# Patient Record
Sex: MALE | Race: WHITE | Employment: OTHER | URBAN - METROPOLITAN AREA
[De-identification: names, ages, dates, MRNs, and addresses within clinical notes are randomized per-mention and may not be internally consistent; named-entity substitution may affect disease eponyms.]

---

## 2017-01-23 DIAGNOSIS — I10 ESSENTIAL HYPERTENSION WITH GOAL BLOOD PRESSURE LESS THAN 140/90: Primary | ICD-10-CM

## 2017-01-23 DIAGNOSIS — E78.5 HYPERLIPIDEMIA LDL GOAL <100: ICD-10-CM

## 2017-01-23 DIAGNOSIS — E11.59 TYPE 2 DIABETES MELLITUS WITH OTHER CIRCULATORY COMPLICATIONS (H): ICD-10-CM

## 2017-01-23 DIAGNOSIS — M17.0 PRIMARY OSTEOARTHRITIS OF BOTH KNEES: ICD-10-CM

## 2017-01-23 NOTE — TELEPHONE ENCOUNTER
amLODIPine (NORVASC) 10 MG tablet      Last Written Prescription Date: 6/7/16  Last Fill Quantity: 90, # refills: 1    Last Office Visit with [facility], [facility] or [facility] prescribing provider:  9/27/16   Future Office Visit:    Next 5 appointments (look out 90 days)     Jan 24, 2017 10:00 AM   SHORT with Keyla Fonseca MD   [facility] ([facility]    [address] [facility] [address]   [phone]                    BP Readings from Last 3 Encounters:   09/27/16 124/77   07/06/16 137/75   06/07/16 118/68

## 2017-01-23 NOTE — TELEPHONE ENCOUNTER
atorvastatin (LIPITOR) 80 MG tablet     Last Written Prescription Date: 1/27/16  Last Fill Quantity: 90, # refills: 3  Last Office Visit with [facility], [facility] or [facility] prescribing provider: 9/27/16   Next 5 appointments (look out 90 days)     Jan 24, 2017 10:00 AM   SHORT with Keyla Fonseca MD   [facility] ([facility]    [address] [address]   [phone]                   CHOL      119   1/7/2016  HDL       57   1/7/2016  LDL       46   1/7/2016  LDL       73   4/24/2012  TRIG       81   1/7/2016  CHOLHDLRATIO      1.9   9/25/2014

## 2017-01-24 ENCOUNTER — OFFICE VISIT (OUTPATIENT)
Dept: FAMILY MEDICINE | Facility: CLINIC | Age: 77
End: 2017-01-24
Payer: MEDICARE

## 2017-01-24 VITALS
TEMPERATURE: 98.8 F | SYSTOLIC BLOOD PRESSURE: 137 MMHG | BODY MASS INDEX: 33.34 KG/M2 | HEIGHT: 68 IN | WEIGHT: 220 LBS | HEART RATE: 59 BPM | DIASTOLIC BLOOD PRESSURE: 57 MMHG

## 2017-01-24 DIAGNOSIS — I10 BENIGN ESSENTIAL HYPERTENSION: ICD-10-CM

## 2017-01-24 DIAGNOSIS — E11.65 TYPE 2 DIABETES MELLITUS WITH HYPERGLYCEMIA, WITHOUT LONG-TERM CURRENT USE OF INSULIN (H): Primary | ICD-10-CM

## 2017-01-24 DIAGNOSIS — E78.5 HYPERLIPIDEMIA LDL GOAL <100: ICD-10-CM

## 2017-01-24 DIAGNOSIS — M50.10 CERVICAL DISC DISORDER WITH RADICULOPATHY: ICD-10-CM

## 2017-01-24 DIAGNOSIS — M54.16 LUMBAR BACK PAIN WITH RADICULOPATHY AFFECTING LEFT LOWER EXTREMITY: ICD-10-CM

## 2017-01-24 DIAGNOSIS — M17.0 PRIMARY OSTEOARTHRITIS OF BOTH KNEES: ICD-10-CM

## 2017-01-24 LAB
ALBUMIN SERPL-MCNC: 4 G/DL (ref 3.4–5)
ALP SERPL-CCNC: 107 U/L (ref 40–150)
ALT SERPL W P-5'-P-CCNC: 20 U/L (ref 0–70)
ANION GAP SERPL CALCULATED.3IONS-SCNC: 9 MMOL/L (ref 3–14)
AST SERPL W P-5'-P-CCNC: 15 U/L (ref 0–45)
BASOPHILS # BLD AUTO: 0 10E9/L (ref 0–0.2)
BASOPHILS NFR BLD AUTO: 0.3 %
BILIRUB SERPL-MCNC: 0.5 MG/DL (ref 0.2–1.3)
BUN SERPL-MCNC: 23 MG/DL (ref 7–30)
CALCIUM SERPL-MCNC: 9.2 MG/DL (ref 8.5–10.1)
CHLORIDE SERPL-SCNC: 103 MMOL/L (ref 94–109)
CO2 SERPL-SCNC: 22 MMOL/L (ref 20–32)
CREAT SERPL-MCNC: 1.42 MG/DL (ref 0.66–1.25)
DIFFERENTIAL METHOD BLD: ABNORMAL
EOSINOPHIL # BLD AUTO: 0.7 10E9/L (ref 0–0.7)
EOSINOPHIL NFR BLD AUTO: 9.1 %
ERYTHROCYTE [DISTWIDTH] IN BLOOD BY AUTOMATED COUNT: 13.7 % (ref 10–15)
GFR SERPL CREATININE-BSD FRML MDRD: 48 ML/MIN/1.7M2
GLUCOSE SERPL-MCNC: 262 MG/DL (ref 70–99)
HBA1C MFR BLD: 7.7 % (ref 4.3–6)
HCT VFR BLD AUTO: 37.7 % (ref 40–53)
HGB BLD-MCNC: 11.9 G/DL (ref 13.3–17.7)
LDLC SERPL DIRECT ASSAY-MCNC: 69 MG/DL
LYMPHOCYTES # BLD AUTO: 1.3 10E9/L (ref 0.8–5.3)
LYMPHOCYTES NFR BLD AUTO: 16.4 %
MCH RBC QN AUTO: 27.1 PG (ref 26.5–33)
MCHC RBC AUTO-ENTMCNC: 31.6 G/DL (ref 31.5–36.5)
MCV RBC AUTO: 86 FL (ref 78–100)
MONOCYTES # BLD AUTO: 0.5 10E9/L (ref 0–1.3)
MONOCYTES NFR BLD AUTO: 6.4 %
NEUTROPHILS # BLD AUTO: 5.4 10E9/L (ref 1.6–8.3)
NEUTROPHILS NFR BLD AUTO: 67.8 %
PLATELET # BLD AUTO: 314 10E9/L (ref 150–450)
POTASSIUM SERPL-SCNC: 4.6 MMOL/L (ref 3.4–5.3)
PROT SERPL-MCNC: 7.9 G/DL (ref 6.8–8.8)
RBC # BLD AUTO: 4.39 10E12/L (ref 4.4–5.9)
SODIUM SERPL-SCNC: 134 MMOL/L (ref 133–144)
WBC # BLD AUTO: 8 10E9/L (ref 4–11)

## 2017-01-24 PROCEDURE — 99214 OFFICE O/P EST MOD 30 MIN: CPT | Performed by: FAMILY MEDICINE

## 2017-01-24 PROCEDURE — 80053 COMPREHEN METABOLIC PANEL: CPT | Performed by: FAMILY MEDICINE

## 2017-01-24 PROCEDURE — 83036 HEMOGLOBIN GLYCOSYLATED A1C: CPT | Performed by: FAMILY MEDICINE

## 2017-01-24 PROCEDURE — 36415 COLL VENOUS BLD VENIPUNCTURE: CPT | Performed by: FAMILY MEDICINE

## 2017-01-24 PROCEDURE — 83721 ASSAY OF BLOOD LIPOPROTEIN: CPT | Performed by: FAMILY MEDICINE

## 2017-01-24 PROCEDURE — 85025 COMPLETE CBC W/AUTO DIFF WBC: CPT | Performed by: FAMILY MEDICINE

## 2017-01-24 RX ORDER — ATORVASTATIN CALCIUM 80 MG/1
80 TABLET, FILM COATED ORAL DAILY
Qty: 90 TABLET | Refills: 3 | Status: SHIPPED | OUTPATIENT
Start: 2017-01-24 | End: 2018-02-01

## 2017-01-24 RX ORDER — OXYCODONE HCL 20 MG/1
20 TABLET, FILM COATED, EXTENDED RELEASE ORAL EVERY 12 HOURS
Qty: 60 TABLET | Refills: 0 | Status: SHIPPED | OUTPATIENT
Start: 2017-01-24 | End: 2017-03-06 | Stop reason: DRUGHIGH

## 2017-01-24 RX ORDER — AMLODIPINE BESYLATE 10 MG/1
10 TABLET ORAL DAILY
Qty: 90 TABLET | Refills: 1 | Status: SHIPPED | OUTPATIENT
Start: 2017-01-24 | End: 2017-07-21

## 2017-01-24 RX ORDER — OXYCODONE HYDROCHLORIDE 5 MG/1
TABLET ORAL
Qty: 15 TABLET | Refills: 0 | Status: SHIPPED | OUTPATIENT
Start: 2017-01-24 | End: 2017-03-06

## 2017-01-24 NOTE — PROGRESS NOTES
SUBJECTIVE:                                                    Vincent Mishra is a 76 year old male who presents to clinic today for the following health issues:    Ear Pain       Duration: pain started 8 days ago     Description (location/character/radiation): pt is concerned about right ear pain     Intensity:  moderate    Accompanying signs and symptoms: Pain in teeth that goes to his ear, he wears false teeth     History (similar episodes/previous evaluation): last time this happened he had a hair on his ear drum     Precipitating or alleviating factors: he is having trouble sleeping     Therapies tried and outcome: oxycodone, tylenol, oral gel for tooth pain        Problem list and histories reviewed & adjusted, as indicated.  Additional history: has severe ear pain and has tooth ache in the lower jaw  Also has the pain in the side of the face   He is also having to be helped out of the bed and he is very weak.   He has gained a lot of weight has been eating lots of sugar last time he checked his blood sugar a week or so ago it was 300 he needs a new glucometer  He has had 15/10 pain in the rigth side of the head. Has been lying around more the oxy doesn't even help with the pain .   His knees hurt and he just walks less and less  Due for bloodwork  He is able to sleep and he is able to get comfortabel enough to read       Patient Active Problem List   Diagnosis     Tension headache     Trapezius strain     Hyperlipidemia LDL goal <100     Parotid mass     Diplopia     Neuropathy (H)     Vision loss night     Neck pain     B12 deficiency     Tear of meniscus of left knee     Hypertension goal BP (blood pressure) < 140/90     C6-7 disc with radiculopathy     Right bundle branch block     Advanced directives, info letter sent 10/4/2011     Chest pain     Anatomic airway obstruction     Abnormal antinuclear antibody titer     Osteoarthritis, knee     Moderate major depression (H)     Orchitis, epididymitis, and  epididymo-orchitis     Malignant neoplasm of kidney excluding renal pelvis (H)     Renal mass, left     Vitamin D deficiency disease     Health Care Home     Insomnia     Chronic low back pain     Abdominal pain, right upper quadrant     Esophageal reflux     Hard of hearing     Constipation     NSTEMI (non-ST elevated myocardial infarction) (H)     Myocardial infarction, nontransmural (H)     Acute myocardial infarction of inferolateral wall (H)     Obesity     Sinoatrial node dysfunction (H)     Right bundle branch block (RBBB)     Essential hypertension     Hyperlipidemia     Type 2 diabetes mellitus with other circulatory complications (H)     Thyroid nodule     Hip pain, bilateral     Claudication (H)     Past Surgical History   Procedure Laterality Date     Arthroscopy knee with medial meniscectomy  6/26/2012     Procedure: ARTHROSCOPY KNEE WITH MEDIAL MENISCECTOMY;  Right Knee Arthroscopy With Medial Menisectomy;  Surgeon: Ley, Jeffrey Duane, MD;  Location: [facility]     Esophagoscopy, gastroscopy, duodenoscopy (egd), combined  10/25/2012     Procedure: COMBINED ESOPHAGOSCOPY, GASTROSCOPY, DUODENOSCOPY (EGD), BIOPSY SINGLE OR MULTIPLE;  Gastroscopy  ;  Surgeon: Lucius Dasilva MD;  Location: [facility]     Colonoscopy       Biopsy       Arthroscopy knee  2/11/2011     ARTHROSCOPY KNEE performed by LEY, JEFFREY DUANE at [facility]     Orthopedic surgery        back surgery     Cardiac surgery  7/2011     stentt placed     Back surgery       back surgery x4     Heart cath, angioplasty  07-25-14     mid LAD      Coronary angiography adult order  07-25-14     RCA, L.Main and CFX  had minor luminal irregularites. Mid LAD high grade stenosis 80-90%.Stent placed to mid LAD       Social History   Substance Use Topics     Smoking status: Never Smoker      Smokeless tobacco: Never Used     Alcohol Use: No     Family History   Problem Relation Age of Onset     CANCER Mother      Depression Mother      DIABETES Father      Hypertension  "Father      HEART DISEASE Father      MENTAL ILLNESS Father      HEART DISEASE Maternal Grandfather      Substance Abuse Maternal Grandfather      Alcohol/Drug Paternal Grandmother      Substance Abuse Paternal Grandmother      HEART DISEASE Paternal Grandfather      Alcohol/Drug Paternal Grandfather      Substance Abuse Paternal Grandfather      HEART DISEASE Brother      DIABETES Brother      Substance Abuse Brother      Obesity Brother      DIABETES Brother      Obesity Sister      Alcohol/Drug Daughter      Depression Daughter      Obesity Sister      DIABETES Sister      Obesity Sister      DIABETES Sister      Alcohol/Drug Sister      CANCER Sister 55     possible kidney cancer     Substance Abuse Sister      Alcohol/Drug Son      Substance Abuse Son      DIABETES Son      Alcohol/Drug Son      Substance Abuse Son      Obesity Daughter      DIABETES Daughter      Hypertension Daughter      Bipolar Disorder Other            ROS:  Constitutional, HEENT, cardiovascular, pulmonary, gi and gu systems are negative, except as otherwise noted.    OBJECTIVE:                                                    /57 mmHg  Pulse 59  Temp(Src) 98.8  F (37.1  C) (Tympanic)  Ht 5' 8\" (1.727 m)  Wt 220 lb (99.791 kg)  BMI 33.46 kg/m2 Body mass index is 33.46 kg/(m^2).   GENERAL APPEARANCE: alert and no distress  HENT: ear canals and TM's normal, oral mucous membranes moist and upper plate no lesions lower jaw with only 4 teeth that appear to be on the end of their use   NECK: no adenopathy, no asymmetry, masses, or scars and thyroid normal to palpation  RESP: lungs clear to auscultation - no rales, rhonchi or wheezes  CV: regular rates and rhythm, normal S1 S2, no S3 or S4 and no murmur, click or rub  ABDOMEN: soft, nontender, without hepatosplenomegaly or masses, bowel sounds normal and obese  ORTHO: Cervical Spine Exam: Inspection: rigid cervical spine  Tender:  occipital nerves, right paracervical " muscles  Non-tender:  spinous processes, left paracervical muscles  Range of Motion:  flexion:  full, extension: decreased, painful, left lateral bending: decreased, painful, right lateral bending: decreased, painful, left lateral rotation:  decreased, painful, right lateral rotation:  decreased, painful  Strength: Full strength of all neck muscles  Knees bilateral crepitance with quad atrophy and visible osteoarthritic changes         PSYCH: mentation appears normal and affect normal/bright  MENTAL STATUS EXAM:  Appearance/Behavior: No apparent distress and Casually groomed  Speech: Normal  Mood/Affect: depressed affect  Insight: Adequate       ASSESSMENT/PLAN:                                                      1. Type 2 diabetes mellitus with hyperglycemia, without long-term current use of insulin (H)  Results for orders placed or performed in visit on 01/24/17   CBC with platelets and differential   Result Value Ref Range    WBC 8.0 4.0 - 11.0 10e9/L    RBC Count 4.39 (L) 4.4 - 5.9 10e12/L    Hemoglobin 11.9 (L) 13.3 - 17.7 g/dL    Hematocrit 37.7 (L) 40.0 - 53.0 %    MCV 86 78 - 100 fl    MCH 27.1 26.5 - 33.0 pg    MCHC 31.6 31.5 - 36.5 g/dL    RDW 13.7 10.0 - 15.0 %    Platelet Count 314 150 - 450 10e9/L    Diff Method Automated Method     % Neutrophils 67.8 %    % Lymphocytes 16.4 %    % Monocytes 6.4 %    % Eosinophils 9.1 %    % Basophils 0.3 %    Absolute Neutrophil 5.4 1.6 - 8.3 10e9/L    Absolute Lymphocytes 1.3 0.8 - 5.3 10e9/L    Absolute Monocytes 0.5 0.0 - 1.3 10e9/L    Absolute Eosinophils 0.7 0.0 - 0.7 10e9/L    Absolute Basophils 0.0 0.0 - 0.2 10e9/L   Hemoglobin A1c   Result Value Ref Range    Hemoglobin A1C 7.7 (H) 4.3 - 6.0 %     Ok control   - blood glucose monitoring (NO BRAND SPECIFIED) meter device kit; Use to test blood sugar 2 times daily or as directed.  Dispense: 1 kit; Refill: 0  - Comprehensive metabolic panel  - CBC with platelets and differential  - Hemoglobin A1c    2. C6-7 disc  with radiculopathy  Pool [facility] at [facility]  - oxyCODONE (OXYCONTIN) 20 MG 12 hr tablet; Take 1 tablet (20 mg) by mouth every 12 hours  Dispense: 60 tablet; Refill: 0  - [facility] PT, HAND, AND CHIROPRACTIC REFERRAL    3. Primary osteoarthritis of both knees  Do the pool therapy   - oxyCODONE (OXYCONTIN) 20 MG 12 hr tablet; Take 1 tablet (20 mg) by mouth every 12 hours  Dispense: 60 tablet; Refill: 0  - [facility] PT, HAND, AND CHIROPRACTIC REFERRAL    4. Lumbar back pain with radiculopathy affecting left lower extremity  Pool therapy and get out of bed more   - oxyCODONE (ROXICODONE) 5 MG IR tablet; Take 1 daily as needed  for severe pain may have 15 per month  Dispense: 15 tablet; Refill: 0  - [facility] PT, HAND, AND CHIROPRACTIC REFERRAL    5. Benign essential hypertension  Good control   - Comprehensive metabolic panel  - CBC with platelets and differential    6. Hyperlipidemia LDL goal <100    - LDL cholesterol direct   scheduled to see urology, and cardiology in may for his follow ups.    reports that he has never smoked. He has never used smokeless tobacco.          Keyla Fonseca M.D.    [facility]

## 2017-01-24 NOTE — MR AVS SNAPSHOT
After Visit Summary   1/24/2017    Vincent Mishra    MRN: [id number]           Patient Information     Date Of Birth          1940        Visit Information        Provider Department      1/24/2017 10:00 AM Keyla Fonseca MD [facility]        Today's Diagnoses     Type 2 diabetes mellitus with hyperglycemia, without long-term current use of insulin (H)    -  1     C6-7 disc with radiculopathy         Primary osteoarthritis of both knees         Lumbar back pain with radiculopathy affecting left lower extremity         Benign essential hypertension         Hyperlipidemia LDL goal <100            Follow-ups after your visit        Additional Services     [facility] HAND, AND CHIR[facility]RACTIC REFERRAL       **This order will print in the [facility] Scheduling Office**    Physical Therapy, Hand Therapy and Chiropractic Care are available through:    [employer] [phone] or      [employer] [phone]    Your provider has referred you to: Physical Therapy at [facility] or [facility]    Indication/Reason for Referral: Knee Pain and Low Back Pain  Onset of Illness: years but he is now deconditioning   Therapy Orders: Per Protocol or Clinical Pathway  Special Programs: aquatic therapy   Special Request: Exercise: Active/Assistive ROM, Conditioning, Home Exercise Program, Posture/Body Mechanics, Progressive Strengthening and Stretching/Flexibility  Modalities: As Indicated:     Brody Skinner      Additional Comments for the Therapist or Chiropractor: this is a request for POOL therapy     Please be aware that coverage of these services is subject to the terms and limitations of your health insurance plan.  Call member services at your health plan with any benefit or coverage questions.      Please bring the following to your appointment:    *Your personal calendar for scheduling future appointments  *Comfortable clothing                  Who to contact     Normal or non-critical lab and imaging results  "will be communicated to you by [employer], letter or phone within 4 business days after the clinic has received the results. If you do not hear from us within 7 days, please contact the clinic through [employer] or phone. If you have a critical or abnormal lab result, we will notify you by phone as soon as possible.  Submit refill requests through [employer] or call your pharmacy and they will forward the refill request to us. Please allow 3 business days for your refill to be completed.          If you need to speak with a  for additional information , please call: [phone]             Additional Information About Your Visit        [employer] Information     [employer] gives you secure access to your electronic health record. If you see a primary care provider, you can also send messages to your care team and make appointments. If you have questions, please call your primary care clinic.  If you do not have a primary care provider, please call [phone] and they will assist you.        Care EveryWhere ID     This is your Care EveryWhere ID. This could be used by other organizations to access your [facility] medical records  [id number]        Your Vitals Were     Pulse Temperature Height BMI (Body Mass Index)          59 98.8  F (37.1  C) (Tympanic) 5' 8\" (1.727 m) 33.46 kg/m2         Blood Pressure from Last 3 Encounters:   01/24/17 137/57   09/27/16 124/77   07/06/16 137/75    Weight from Last 3 Encounters:   01/24/17 220 lb (99.791 kg)   09/27/16 188 lb (85.276 kg)   07/06/16 190 lb (86.183 kg)              We Performed the Following     CBC with platelets and differential     Comprehensive metabolic panel     Hemoglobin A1c     [facility] HAND, AND CHIROPRACTIC REFERRAL     LDL cholesterol direct          Today's Medication Changes          These changes are accurate as of: 1/24/17 11:06 AM.  If you have any questions, ask your nurse or doctor.               These medicines have changed or have updated " prescriptions.        Dose/Directions    * blood glucose monitoring meter device kit   This may have changed:  Another medication with the same name was added. Make sure you understand how and when to take each.   Used for:  Type 2 diabetes, HbA1c goal < 7% (H)        Use to test blood sugars 1  times daily or as directed.   Quantity:  1 kit   Refills:  0       * blood glucose monitoring meter device kit   Commonly known as:  no brand specified   This may have changed:  You were already taking a medication with the same name, and this prescription was added. Make sure you understand how and when to take each.   Used for:  Type 2 diabetes mellitus with hyperglycemia, without long-term current use of insulin (H)        Use to test blood sugar 2 times daily or as directed.   Quantity:  1 kit   Refills:  0       * Notice:  This list has 2 medication(s) that are the same as other medications prescribed for you. Read the directions carefully, and ask your doctor or other care provider to review them with you.         Where to get your medicines      These medications were sent to [id number] [place] [address] [facility] [place] [address] [address] [address]     Phone:  [phone]    - blood glucose monitoring meter device kit      Some of these will need a paper prescription and others can be bought over the counter.  Ask your nurse if you have questions.     Bring a paper prescription for each of these medications    - oxyCODONE 20 MG 12 hr tablet  - oxyCODONE 5 MG IR tablet             Primary Care Provider Office Phone # Fax #    Keyla Fonseca [phone]       [facility] [address] [facility] [address]        Thank you!     Thank you for choosing [facility]  for your care. Our goal is always to provide you with excellent care. Hearing back from our patients is one way we can continue to improve our services.  Please take a few minutes to complete the written survey that you may receive in the mail after your visit with us. Thank you!             Your Updated Medication List - Protect others around you: Learn how to safely use, store and throw away your medicines at www.disposemymeds.org.          This list is accurate as of: 1/24/17 11:06 AM.  Always use your most recent med list.                   Brand Name Dispense Instructions for use    acetaminophen 325 MG tablet    TYLENOL    250 tablet    Take 2 tablets by mouth every 4 hours as needed.       amLODIPine 10 MG tablet    NORVASC    90 tablet    Take 1 tablet (10 mg) by mouth daily       ascorbic acid 500 MG tablet    VITAMIN C     Take 500 mg by mouth daily       aspirin 81 MG tablet      Take by mouth daily       atorvastatin 80 MG tablet    LIPITOR    90 tablet    Take 1 tablet (80 mg) by mouth daily       B-12 1000 MCG Caps          * blood glucose monitoring meter device kit     1 kit    Use to test blood sugars 1  times daily or as directed.       * blood glucose monitoring meter device kit    no brand specified    1 kit    Use to test blood sugar 2 times daily or as directed.       blood glucose monitoring test strip    no brand specified    3 Month    Use to test blood sugar 1  times daily or as directed.       busPIRone 15 MG tablet    BUSPAR    180 tablet    Take 1 tablet (15 mg) by mouth 2 times daily       calcium + D 600-200 MG-UNIT Tabs   Generic drug:  calcium carbonate-vitamin D     100 tablet    Take 2 tablets by mouth daily.       cholecalciferol 1000 UNITS capsule    vitamin  -D     Take 1 capsule by mouth daily       clopidogrel 75 MG tablet    PLAVIX    90 tablet    Take 1 tablet (75 mg) by mouth daily       * gabapentin 100 MG capsule    NEURONTIN    270 capsule    Start with 1 capsule at bedtime then increase as directed up to 3 capsules 3 times per day       * gabapentin 100 MG capsule    NEURONTIN    270 capsule    Take 1 tablet increase by  1 tablet every 2 days to 6 tablets 3 times per day       hydrochlorothiazide 25 MG tablet    HYDRODIURIL    90 tablet    Take 1 tablet (25 mg) by mouth daily       ibuprofen 800 MG tablet    ADVIL/MOTRIN    120 tablet    Take 1 tablet (800 mg) by mouth every 8 hours as needed for moderate pain       LocappyCAN FINEPOINT LANCETS Misc     100 each    Use to test blood sugars 1 times daily or as directed.       lisinopril 40 MG tablet    PRINIVIL/ZESTRIL    90 tablet    Take 1 tablet (40 mg) by mouth daily       metFORMIN 500 MG 24 hr tablet    GLUCOPHAGE-XR    360 tablet    Take 2 tablets (1,000 mg) by mouth 2 times daily (with meals)       mirtazapine 15 MG tablet    REMERON    30 tablet    Take 0.5 tablets (7.5 mg) by mouth At Bedtime       nitroglycerin 0.4 MG sublingual tablet    NITROSTAT    25 tablet    Place 1 tablet (0.4 mg) under the tongue every 5 minutes as needed for chest pain       omeprazole 20 MG CR capsule    priLOSEC    180 capsule    Take 1 capsule (20 mg) by mouth 2 times daily       order for DME     1 Device    Equipment being ordered: Wheelchair motorized for patient with spinal stenosis and neurogenic claudication       * oxyCODONE 20 MG 12 hr tablet    OXYCONTIN    60 tablet    Take 1 tablet (20 mg) by mouth every 12 hours       * oxyCODONE 5 MG IR tablet    ROXICODONE    15 tablet    Take 1 daily as needed  for severe pain may have 15 per month       sertraline 100 MG tablet    ZOLOFT    225 tablet    Take 2 per day       spironolactone 25 MG tablet    ALDACTONE    90 tablet    Take 1 tablet (25 mg) by mouth daily       tiZANidine 2 MG tablet    ZANAFLEX    120 tablet    Take 1-2 tablets as needed up to 3 times per day       traZODone 100 MG tablet    DESYREL    270 tablet    Take 3 tablets (300 mg) by mouth nightly as needed for sleep 300 mg at HS for sleep       * Notice:  This list has 6 medication(s) that are the same as other medications prescribed for you. Read the directions carefully,  and ask your doctor or other care provider to review them with you.

## 2017-01-24 NOTE — NURSING NOTE
"Initial /57 mmHg  Pulse 59  Temp(Src) 98.8  F (37.1  C) (Tympanic)  Ht 5' 8\" (1.727 m)  Wt 220 lb (99.791 kg)  BMI 33.46 kg/m2 Estimated body mass index is 33.46 kg/(m^2) as calculated from the following:    Height as of this encounter: 5' 8\" (1.727 m).    Weight as of this encounter: 220 lb (99.791 kg). .    Ayla Real, EMILY ([facility])  "

## 2017-01-24 NOTE — TELEPHONE ENCOUNTER
ibuprofen (ADVIL/MOTRIN) 800 MG tablet      Last Written Prescription Date: 12/7/16  Last Quantity: 120, # refills: 0  Last Office Visit with [facility], P or [facility] prescribing provider: 1/24/17       CREATININE   Date Value Ref Range Status   01/24/2017 1.42* 0.66 - 1.25 mg/dL Final     AST       15   1/24/2017  ALT       20   1/24/2017  BP Readings from Last 3 Encounters:   01/24/17 137/57   09/27/16 124/77   07/06/16 137/75

## 2017-01-25 ENCOUNTER — TELEPHONE (OUTPATIENT)
Dept: FAMILY MEDICINE | Facility: CLINIC | Age: 77
End: 2017-01-25

## 2017-01-25 DIAGNOSIS — E11.59 TYPE 2 DIABETES MELLITUS WITH OTHER CIRCULATORY COMPLICATIONS (H): Primary | ICD-10-CM

## 2017-01-25 NOTE — TELEPHONE ENCOUNTER
Spouse calling stating that Dr. Fonseca ordered new blood glucose machine but no test strips.  New strips need to be refilled -  One touch ultra test strips.  Please order.  Thank you..Ebony Dickson    One touch ultra test strips      Last Written Prescription Date: 10/9/14  Last Fill Quantity: 3 month,  # refills: 3   Last Office Visit with G, P or [facility] prescribing provider: 1/24/17

## 2017-01-26 RX ORDER — IBUPROFEN 800 MG/1
800 TABLET, FILM COATED ORAL EVERY 8 HOURS PRN
Qty: 120 TABLET | Refills: 0 | Status: SHIPPED | OUTPATIENT
Start: 2017-01-26 | End: 2017-03-10

## 2017-01-26 NOTE — TELEPHONE ENCOUNTER
Medication is being filled for 1 time refill only due to:  Patient needs to be seen because will need 6 week follow up. Kidney function tests were abnormal.   Yvon Dickens RN

## 2017-01-31 ENCOUNTER — OFFICE VISIT (OUTPATIENT)
Dept: FAMILY MEDICINE | Facility: CLINIC | Age: 77
End: 2017-01-31
Payer: MEDICARE

## 2017-01-31 VITALS
WEIGHT: 225.5 LBS | TEMPERATURE: 98.3 F | BODY MASS INDEX: 34.17 KG/M2 | HEART RATE: 56 BPM | DIASTOLIC BLOOD PRESSURE: 85 MMHG | SYSTOLIC BLOOD PRESSURE: 146 MMHG | HEIGHT: 68 IN

## 2017-01-31 DIAGNOSIS — F33.1 MAJOR DEPRESSIVE DISORDER, RECURRENT EPISODE, MODERATE (H): ICD-10-CM

## 2017-01-31 DIAGNOSIS — K12.1 DENTURE SORE MOUTH: ICD-10-CM

## 2017-01-31 DIAGNOSIS — M26.609 TMJ (TEMPOROMANDIBULAR JOINT SYNDROME): Primary | ICD-10-CM

## 2017-01-31 DIAGNOSIS — M27.0 TORUS PALATINUS: ICD-10-CM

## 2017-01-31 PROCEDURE — 99214 OFFICE O/P EST MOD 30 MIN: CPT | Performed by: FAMILY MEDICINE

## 2017-01-31 RX ORDER — TRIAMCINOLONE ACETONIDE 0.1 %
PASTE (GRAM) DENTAL 2 TIMES DAILY
Qty: 5 G | Refills: 0 | Status: SHIPPED | OUTPATIENT
Start: 2017-01-31 | End: 2017-09-20

## 2017-01-31 ASSESSMENT — ANXIETY QUESTIONNAIRES
3. WORRYING TOO MUCH ABOUT DIFFERENT THINGS: SEVERAL DAYS
7. FEELING AFRAID AS IF SOMETHING AWFUL MIGHT HAPPEN: MORE THAN HALF THE DAYS
5. BEING SO RESTLESS THAT IT IS HARD TO SIT STILL: NOT AT ALL
6. BECOMING EASILY ANNOYED OR IRRITABLE: NOT AT ALL
1. FEELING NERVOUS, ANXIOUS, OR ON EDGE: NOT AT ALL
GAD7 TOTAL SCORE: 4
2. NOT BEING ABLE TO STOP OR CONTROL WORRYING: SEVERAL DAYS

## 2017-01-31 ASSESSMENT — PATIENT HEALTH QUESTIONNAIRE - PHQ9: 5. POOR APPETITE OR OVEREATING: NOT AT ALL

## 2017-01-31 NOTE — Clinical Note
My Depression Action Plan  Name: Vincent Mishra   Date of Birth 1940  Date: 1/31/2017    My doctor: Keyla Fonseca   My clinic: [address] [address] [phone]          GREEN    ZONE   Good Control    What it looks like:     Things are going generally well. You have normal up s and down s. You may even feel depressed from time to time, but bad moods usually last less than a day.   What you need to do:  1. Continue to care for yourself (see self care plan)  2. Check your depression survival kit and update it as needed  3. Follow your physician s recommendations including any medication.  4. Do not stop taking medication unless you consult with your physician first.           YELLOW         ZONE Getting Worse    What it looks like:     Depression is starting to interfere with your life.     It may be hard to get out of bed; you may be starting to isolate yourself from others.    Symptoms of depression are starting to last most all day and this has happened for several days.     You may have suicidal thoughts but they are not constant.   What you need to do:     1. Call your care team, your response to treatment will improve if you keep your care team informed of your progress. Yellow periods are signs an adjustment may need to be made.     2. Continue your self-care, even if you have to fake it!    3. Talk to someone in your support network    4. Open up your depression survival kit           RED    ZONE Medical Alert - Get Help    What it looks like:     Depression is seriously interfering with your life.     You may experience these or other symptoms: You can t get out of bed most days, can t work or engage in other necessary activities, you have trouble taking care of basic hygiene, or basic responsibilities, thoughts of suicide or death that will not go away, self-injurious behavior.     What you need to do:  1. Call your care team and request  a same-day appointment. If they are not available (weekends or after hours) call your local crisis line, emergency room or 911.      Electronically signed by: Shoshana Viramontes, January 31, 2017    Depression Self Care Plan / Survival Kit    Self-Care for Depression  Here s the deal. Your body and mind are really not as separate as most people think.  What you do and think affects how you feel and how you feel influences what you do and think. This means if you do things that people who feel good do, it will help you feel better.  Sometimes this is all it takes.  There is also a place for medication and therapy depending on how severe your depression is, so be sure to consult with your medical provider and/ or Behavioral Health Consultant if your symptoms are worsening or not improving.     In order to better manage my stress, I will:    Exercise  Get some form of exercise, every day. This will help reduce pain and release endorphins, the  feel good  chemicals in your brain. This is almost as good as taking antidepressants!  This is not the same as joining a gym and then never going! (they count on that by the way ) It can be as simple as just going for a walk or doing some gardening, anything that will get you moving.      Hygiene   Maintain good hygiene (Get out of bed in the morning, Make your bed, Brush your teeth, Take a shower, and Get dressed like you were going to work, even if you are unemployed).  If your clothes don't fit try to get ones that do.    Diet  I will strive to eat foods that are good for me, drink plenty of water, and avoid excessive sugar, caffeine, alcohol, and other mood-altering substances.  Some foods that are helpful in depression are: complex carbohydrates, B vitamins, flaxseed, fish or fish oil, fresh fruits and vegetables.    Psychotherapy  I agree to participate in Individual Therapy (if recommended).    Medication  If prescribed medications, I agree to take them.  Missing doses can  result in serious side effects.  I understand that drinking alcohol, or other illicit drug use, may cause potential side effects.  I will not stop my medication abruptly without first discussing it with my provider.    Staying Connected With Others  I will stay in touch with my friends, family members, and my primary care provider/team.    Use your imagination  Be creative.  We all have a creative side; it doesn t matter if it s oil painting, sand castles, or mud pies! This will also kick up the endorphins.    Witness Beauty  (AKA stop and smell the roses) Take a look outside, even in mid-winter. Notice colors, textures. Watch the squirrels and birds.     Service to others  Be of service to others.  There is always someone else in need.  By helping others we can  get out of ourselves  and remember the really important things.  This also provides opportunities for practicing all the other parts of the program.    Humor  Laugh and be silly!  Adjust your TV habits for less news and crime-drama and more comedy.    Control your stress  Try breathing deep, massage therapy, biofeedback, and meditation. Find time to relax each day.     My support system    Clinic Contact:  Phone number:    Contact 1:  Phone number:    Contact 2:  Phone number:    [facility]/:  Phone number:    Therapist:  Phone number:    Local crisis center:    Phone number:    Other community support:  Phone number:

## 2017-01-31 NOTE — MR AVS SNAPSHOT
"              After Visit Summary   1/31/2017    Vincent Mishra    MRN: [id number]           Patient Information     Date Of Birth          1940        Visit Information        Provider Department      1/31/2017 10:30 AM Ramya Velez MD [facility]        Today's Diagnoses     TMJ (temporomandibular joint syndrome)    -  1     Denture sore mouth         Major depressive disorder, recurrent episode, moderate (H)            Follow-ups after your visit        Who to contact     Normal or non-critical lab and imaging results will be communicated to you by [email or web address], letter or phone within 4 business days after the clinic has received the results. If you do not hear from us within 7 days, please contact the clinic through [email or web address] or phone. If you have a critical or abnormal lab result, we will notify you by phone as soon as possible.  Submit refill requests through [employer] or call your pharmacy and they will forward the refill request to us. Please allow 3 business days for your refill to be completed.          If you need to speak with a  for additional information , please call: [phone]             Additional Information About Your Visit        MyChart Information     [employer] gives you secure access to your electronic health record. If you see a primary care provider, you can also send messages to your care team and make appointments. If you have questions, please call your primary care clinic.  If you do not have a primary care provider, please call [phone] and they will assist you.        Care EveryWhere ID     This is your Care EveryWhere ID. This could be used by other organizations to access your [employer] medical records  [id number]        Your Vitals Were     Pulse Temperature Height BMI (Body Mass Index)          56 98.3  F (36.8  C) (Tympanic) 5' 8\" (1.727 m) 34.30 kg/m2         Blood Pressure from Last 3 Encounters:   01/31/17 146/85   01/24/17 137/57 "   09/27/16 124/77    Weight from Last 3 Encounters:   01/31/17 225 lb 8 oz (102.286 kg)   01/24/17 220 lb (99.791 kg)   09/27/16 188 lb (85.276 kg)              We Performed the Following     DEPRESSION ACTION PLAN (DAP) Order [[id number]]          Today's Medication Changes          These changes are accurate as of: 1/31/17 11:19 AM.  If you have any questions, ask your nurse or doctor.               Start taking these medicines.        Dose/Directions    triamcinolone 0.1 % paste   Commonly known as:  KENALOG   Used for:  Denture sore mouth   Started by:  Ramya Velez MD        Take by mouth 2 times daily Apply to sore area twice a day for a week   Quantity:  5 g   Refills:  0            Where to get your medicines      These medications were sent to [id number] - [place] [address] AT [address] [facility] [address]     Phone:  [phone]    - triamcinolone 0.1 % paste             Primary Care Provider Office Phone # Fax #    Keyla Fonseca [phone]       [facility] [address] [facility] [address]        Thank you!     Thank you for choosing [facility]  for your care. Our goal is always to provide you with excellent care. Hearing back from our patients is one way we can continue to improve our services. Please take a few minutes to complete the written survey that you may receive in the mail after your visit with us. Thank you!             Your Updated Medication List - Protect others around you: Learn how to safely use, store and throw away your medicines at www.disposemymeds.org.          This list is accurate as of: 1/31/17 11:19 AM.  Always use your most recent med list.                   Brand Name Dispense Instructions for use    acetaminophen 325 MG tablet    TYLENOL    250 tablet    Take 2 tablets by mouth every 4 hours as needed.       amLODIPine 10 MG tablet    NORVASC    90  tablet    Take 1 tablet (10 mg) by mouth daily       ascorbic acid 500 MG tablet    VITAMIN C     Take 500 mg by mouth daily       aspirin 81 MG tablet      Take by mouth daily       atorvastatin 80 MG tablet    LIPITOR    90 tablet    Take 1 tablet (80 mg) by mouth daily       B-12 1000 MCG Caps          blood glucose monitoring meter device kit    no brand specified    1 kit    Use to test blood sugar 2 times daily or as directed.       blood glucose monitoring test strip    ONE TOUCH ULTRA    200 strip    Use to test blood sugars 2 times daily or as directed.       busPIRone 15 MG tablet    BUSPAR    180 tablet    Take 1 tablet (15 mg) by mouth 2 times daily       calcium + D 600-200 MG-UNIT Tabs   Generic drug:  calcium carbonate-vitamin D     100 tablet    Take 2 tablets by mouth daily.       cholecalciferol 1000 UNITS capsule    vitamin  -D     Take 1 capsule by mouth daily       clopidogrel 75 MG tablet    PLAVIX    90 tablet    Take 1 tablet (75 mg) by mouth daily       * gabapentin 100 MG capsule    NEURONTIN    270 capsule    Start with 1 capsule at bedtime then increase as directed up to 3 capsules 3 times per day       * gabapentin 100 MG capsule    NEURONTIN    270 capsule    Take 1 tablet increase by 1 tablet every 2 days to 6 tablets 3 times per day       hydrochlorothiazide 25 MG tablet    HYDRODIURIL    90 tablet    Take 1 tablet (25 mg) by mouth daily       ibuprofen 800 MG tablet    ADVIL/MOTRIN    120 tablet    Take 1 tablet (800 mg) by mouth every 8 hours as needed for moderate pain       [employer] FINEPOINT LANCETS Misc     100 each    Use to test blood sugars 1 times daily or as directed.       lisinopril 40 MG tablet    PRINIVIL/ZESTRIL    90 tablet    Take 1 tablet (40 mg) by mouth daily       metFORMIN 500 MG 24 hr tablet    GLUCOPHAGE-XR    360 tablet    Take 2 tablets (1,000 mg) by mouth 2 times daily (with meals)       mirtazapine 15 MG tablet    REMERON    30 tablet    Take 0.5 tablets  (7.5 mg) by mouth At Bedtime       nitroglycerin 0.4 MG sublingual tablet    NITROSTAT    25 tablet    Place 1 tablet (0.4 mg) under the tongue every 5 minutes as needed for chest pain       omeprazole 20 MG CR capsule    priLOSEC    180 capsule    Take 1 capsule (20 mg) by mouth 2 times daily       order for DME     1 Device    Equipment being ordered: Wheelchair motorized for patient with spinal stenosis and neurogenic claudication       * oxyCODONE 20 MG 12 hr tablet    OXYCONTIN    60 tablet    Take 1 tablet (20 mg) by mouth every 12 hours       * oxyCODONE 5 MG IR tablet    ROXICODONE    15 tablet    Take 1 daily as needed  for severe pain may have 15 per month       sertraline 100 MG tablet    ZOLOFT    225 tablet    Take 2 per day       spironolactone 25 MG tablet    ALDACTONE    90 tablet    Take 1 tablet (25 mg) by mouth daily       tiZANidine 2 MG tablet    ZANAFLEX    120 tablet    Take 1-2 tablets as needed up to 3 times per day       traZODone 100 MG tablet    DESYREL    270 tablet    Take 3 tablets (300 mg) by mouth nightly as needed for sleep 300 mg at HS for sleep       triamcinolone 0.1 % paste    KENALOG    5 g    Take by mouth 2 times daily Apply to sore area twice a day for a week       * Notice:  This list has 4 medication(s) that are the same as other medications prescribed for you. Read the directions carefully, and ask your doctor or other care provider to review them with you.

## 2017-01-31 NOTE — PROGRESS NOTES
"  SUBJECTIVE:                                                    Vincent Mishra is a 76 year old male who presents to clinic today for the following health issues:      Acute Illness-    Acute illness concerns:   Onset: about 2 weeks     Fever: no     Chills/Sweats: no     Headache (location?): Yes    Sinus Pressure:no    Conjunctivitis:  Eye pain     Ear Pain: YES: right    Rhinorrhea: no     Congestion: no     Sore Throat: YES- right side sometimes      Cough: no    Wheeze: no     Decreased Appetite: no     Nausea: no     Vomiting: no     Diarrhea:  YES- off and on     Dysuria/Freq.: no    Fatigue/Achiness: YES- weakness     Sick/Strep Exposure: no     Lump in rough of mouth     Jaw/gum pain    swelling   Therapies Tried and outcome: tylenol and heat     Right ear pain -   Constant pain   No drainage     Right jaw pain - along the side of the jaw    He has only 4 teeth in the front of the mouth  Normally wears a plate but hasn't been wearing it correctly recently  Likes to crunch nuts with front teeth     Lump on roof of mouth - feels a \"crack and a sore\" there - noticed it two days ago     Gums are sore   Dentist says no cavities  Dentist adjusted the plate and advised him to wear the adhesive to keep the plate still     Review of systems:  No f/c   No trouble chewing/talking/swallowing   No loss of appetite   No malaise or fatigue that is more than usual   No n/v       Problem list and histories reviewed & adjusted, as indicated.  Additional history: as documented    Patient Active Problem List   Diagnosis     Tension headache     Trapezius strain     Hyperlipidemia LDL goal <100     Parotid mass     Diplopia     Neuropathy (H)     Vision loss night     Neck pain     B12 deficiency     Tear of meniscus of left knee     Hypertension goal BP (blood pressure) < 140/90     C6-7 disc with radiculopathy     Right bundle branch block     Advanced directives, info letter sent 10/4/2011     Chest pain     Anatomic " airway obstruction     Abnormal antinuclear antibody titer     Osteoarthritis, knee     Moderate major depression (H)     Orchitis, epididymitis, and epididymo-orchitis     Malignant neoplasm of kidney excluding renal pelvis (H)     Renal mass, left     Vitamin D deficiency disease     Health Care Home     Insomnia     Chronic low back pain     Abdominal pain, right upper quadrant     Esophageal reflux     Hard of hearing     Constipation     NSTEMI (non-ST elevated myocardial infarction) (H)     Myocardial infarction, nontransmural (H)     Acute myocardial infarction of inferolateral wall (H)     Obesity     Sinoatrial node dysfunction (H)     Right bundle branch block (RBBB)     Essential hypertension     Hyperlipidemia     Type 2 diabetes mellitus with other circulatory complications (H)     Thyroid nodule     Hip pain, bilateral     Claudication (H)     Current Outpatient Prescriptions   Medication     triamcinolone (KENALOG) 0.1 % paste     ibuprofen (ADVIL/MOTRIN) 800 MG tablet     blood glucose monitoring (ONE TOUCH ULTRA) test strip     amLODIPine (NORVASC) 10 MG tablet     atorvastatin (LIPITOR) 80 MG tablet     blood glucose monitoring (NO BRAND SPECIFIED) meter device kit     oxyCODONE (OXYCONTIN) 20 MG 12 hr tablet     oxyCODONE (ROXICODONE) 5 MG IR tablet     sertraline (ZOLOFT) 100 MG tablet     omeprazole (PRILOSEC) 20 MG CR capsule     busPIRone (BUSPAR) 15 MG tablet     clopidogrel (PLAVIX) 75 MG tablet     lisinopril (PRINIVIL/ZESTRIL) 40 MG tablet     traZODone (DESYREL) 100 MG tablet     gabapentin (NEURONTIN) 100 MG capsule     hydrochlorothiazide (HYDRODIURIL) 25 MG tablet     mirtazapine (REMERON) 15 MG tablet     gabapentin (NEURONTIN) 100 MG capsule     spironolactone (ALDACTONE) 25 MG tablet     metFORMIN (GLUCOPHAGE-XR) 500 MG 24 hr tablet     order for DME     Cyanocobalamin (B-12) 1000 MCG CAPS     tiZANidine (ZANAFLEX) 2 MG tablet     aspirin 81 MG tablet     nitroglycerin (NITROSTAT)  "0.4 MG SL tablet     [employer] FINEPOINT LANCETS MISC     cholecalciferol (VITAMIN  -D) 1000 UNITS capsule     ascorbic acid (VITAMIN C) 500 MG tablet     acetaminophen (TYLENOL) 325 MG tablet     Calcium Carbonate-Vitamin D (CALCIUM + D) 600-200 MG-UNIT per tablet     No current facility-administered medications for this visit.        Allergies   Allergen Reactions     Prozac [Fluoxetine] Other (See Comments)     \"I went crazy.\"       Benadryl [Diphenhydramine Hcl] Other (See Comments)     Starts to shake, and paranoid     Codeine Itching     Diphenhydramine      Other reaction(s): Hallucinations  See [facility] records scanned on 7/16/15     Labetalol Other (See Comments)     See [facility] records scanned on 7/16/15  Bradycardia down to 30 on holter, dizziness. Stopped med and symptoms resolved     Metoprolol Other (See Comments)     Bradycardia even at 12.5 mg     Morphine Visual Disturbance     /85 mmHg  Pulse 56  Temp(Src) 98.3  F (36.8  C) (Tympanic)  Ht 5' 8\" (1.727 m)  Wt 225 lb 8 oz (102.286 kg)  BMI 34.30 kg/m2  GENERAL - Pt is alert and oriented in no acute distress.  Affect is appropriate. Good eye contact. Very [place]   HEET - Head is normocephalic, atraumatic.    PERRLA,EEMI. Conjunctiva are free of icterus or erythema.    TMs bilaterally normal.   TM joint - clicking bilaterally on exam  Jaw - no swelling or redness or tenderness   Oropharynx  - mild erythema on the roof of the mouth and lump on the anterior roof of the mouth,no tonsillar exudate or petechiae.    NECK - Neck is supple w/o LA or thyromegaly  RESPIRATORY - Clear to auscultation bilaterally.  No wheezing noted  CV - RRR, no murmurs, rubs, gallops.       Assessment/Plan -  (M26.719) TMJ (temporomandibular joint syndrome)  (primary encounter diagnosis)  Comment: discussed diagnosis. Likely related to adentia and denture plate. 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 Erythema is likely due to rubbing plate.   Plan: triamcinolone (KENALOG) 0.1 % paste            (F33.1) Major depressive disorder, recurrent episode, moderate (H)  Comment:   Plan: DEPRESSION ACTION PLAN (DAP) Order [[id number]]            ARYA Velez MD

## 2017-01-31 NOTE — NURSING NOTE
"Chief Complaint   Patient presents with     Ear Problem       Initial /85 mmHg  Pulse 56  Temp(Src) 98.3  F (36.8  C) (Tympanic)  Ht 5' 8\" (1.727 m)  Wt 225 lb 8 oz (102.286 kg)  BMI 34.30 kg/m2 Estimated body mass index is 34.3 kg/(m^2) as calculated from the following:    Height as of this encounter: 5' 8\" (1.727 m).    Weight as of this encounter: 225 lb 8 oz (102.286 kg).  BP completed using cuff size: daryl Viramontes LPN    "

## 2017-02-01 ASSESSMENT — ANXIETY QUESTIONNAIRES: GAD7 TOTAL SCORE: 4

## 2017-02-01 ASSESSMENT — PATIENT HEALTH QUESTIONNAIRE - PHQ9: SUM OF ALL RESPONSES TO PHQ QUESTIONS 1-9: 11

## 2017-02-06 ENCOUNTER — TELEPHONE (OUTPATIENT)
Dept: FAMILY MEDICINE | Facility: CLINIC | Age: 77
End: 2017-02-06

## 2017-02-06 DIAGNOSIS — I10 ESSENTIAL HYPERTENSION WITH GOAL BLOOD PRESSURE LESS THAN 140/90: Primary | ICD-10-CM

## 2017-02-06 NOTE — TELEPHONE ENCOUNTER
Spironolactone 25mg      Last Written Prescription Date: 6/7/16  Last Fill Quantity: 90, # refills: 1  Last Office Visit with G, P or [facility] prescribing provider: 1/24/17       POTASSIUM   Date Value Ref Range Status   01/24/2017 4.6 3.4 - 5.3 mmol/L Final     CREATININE   Date Value Ref Range Status   01/24/2017 1.42* 0.66 - 1.25 mg/dL Final     BP Readings from Last 3 Encounters:   01/31/17 146/85   01/24/17 137/57   09/27/16 124/77

## 2017-02-07 RX ORDER — SPIRONOLACTONE 25 MG/1
25 TABLET ORAL DAILY
Qty: 90 TABLET | Refills: 0 | Status: SHIPPED | OUTPATIENT
Start: 2017-02-07 | End: 2017-09-06

## 2017-02-07 NOTE — TELEPHONE ENCOUNTER
Refilled but please remind patient that she wanted to see him 6 weeks from his last visit (1/24/17) which would be the first week in March  Renetta

## 2017-02-07 NOTE — TELEPHONE ENCOUNTER
Routing refill request to provider for review/approval because:  Labs out of range:  See below    Lavern Evangelista RN

## 2017-03-06 ENCOUNTER — OFFICE VISIT (OUTPATIENT)
Dept: FAMILY MEDICINE | Facility: CLINIC | Age: 77
End: 2017-03-06
Payer: MEDICARE

## 2017-03-06 VITALS
TEMPERATURE: 98.2 F | HEIGHT: 68 IN | SYSTOLIC BLOOD PRESSURE: 133 MMHG | HEART RATE: 62 BPM | DIASTOLIC BLOOD PRESSURE: 73 MMHG | BODY MASS INDEX: 34.51 KG/M2 | WEIGHT: 227.7 LBS

## 2017-03-06 DIAGNOSIS — I10 ESSENTIAL HYPERTENSION WITH GOAL BLOOD PRESSURE LESS THAN 140/90: ICD-10-CM

## 2017-03-06 DIAGNOSIS — G89.29 CHRONIC BILATERAL LOW BACK PAIN WITH SCIATICA, SCIATICA LATERALITY UNSPECIFIED: Primary | ICD-10-CM

## 2017-03-06 DIAGNOSIS — M25.552 HIP PAIN, BILATERAL: ICD-10-CM

## 2017-03-06 DIAGNOSIS — M25.551 HIP PAIN, BILATERAL: ICD-10-CM

## 2017-03-06 DIAGNOSIS — M54.16 LUMBAR BACK PAIN WITH RADICULOPATHY AFFECTING LEFT LOWER EXTREMITY: ICD-10-CM

## 2017-03-06 DIAGNOSIS — M54.40 CHRONIC BILATERAL LOW BACK PAIN WITH SCIATICA, SCIATICA LATERALITY UNSPECIFIED: Primary | ICD-10-CM

## 2017-03-06 DIAGNOSIS — E11.59 TYPE 2 DIABETES MELLITUS WITH OTHER CIRCULATORY COMPLICATIONS (H): ICD-10-CM

## 2017-03-06 LAB
ANION GAP SERPL CALCULATED.3IONS-SCNC: 7 MMOL/L (ref 3–14)
BUN SERPL-MCNC: 24 MG/DL (ref 7–30)
CALCIUM SERPL-MCNC: 9.2 MG/DL (ref 8.5–10.1)
CHLORIDE SERPL-SCNC: 101 MMOL/L (ref 94–109)
CO2 SERPL-SCNC: 27 MMOL/L (ref 20–32)
CREAT SERPL-MCNC: 1.32 MG/DL (ref 0.66–1.25)
GFR SERPL CREATININE-BSD FRML MDRD: 53 ML/MIN/1.7M2
GLUCOSE SERPL-MCNC: 228 MG/DL (ref 70–99)
POTASSIUM SERPL-SCNC: 4.4 MMOL/L (ref 3.4–5.3)
SODIUM SERPL-SCNC: 135 MMOL/L (ref 133–144)

## 2017-03-06 PROCEDURE — 80048 BASIC METABOLIC PNL TOTAL CA: CPT | Performed by: FAMILY MEDICINE

## 2017-03-06 PROCEDURE — 99213 OFFICE O/P EST LOW 20 MIN: CPT | Performed by: FAMILY MEDICINE

## 2017-03-06 PROCEDURE — 36415 COLL VENOUS BLD VENIPUNCTURE: CPT | Performed by: FAMILY MEDICINE

## 2017-03-06 RX ORDER — OXYCODONE HYDROCHLORIDE 30 MG/1
30 TABLET, FILM COATED, EXTENDED RELEASE ORAL EVERY 12 HOURS
Qty: 60 TABLET | Refills: 0 | Status: SHIPPED | OUTPATIENT
Start: 2017-04-06 | End: 2017-03-06

## 2017-03-06 RX ORDER — OXYCODONE HYDROCHLORIDE 5 MG/1
TABLET ORAL
Qty: 45 TABLET | Refills: 0 | Status: SHIPPED | OUTPATIENT
Start: 2017-03-06 | End: 2017-04-12

## 2017-03-06 RX ORDER — OXYCODONE HYDROCHLORIDE 30 MG/1
30 TABLET, FILM COATED, EXTENDED RELEASE ORAL EVERY 12 HOURS
Qty: 60 TABLET | Refills: 0 | Status: SHIPPED | OUTPATIENT
Start: 2017-05-06 | End: 2017-04-12

## 2017-03-06 RX ORDER — OXYCODONE HYDROCHLORIDE 30 MG/1
30 TABLET, FILM COATED, EXTENDED RELEASE ORAL EVERY 12 HOURS
Qty: 60 TABLET | Refills: 0 | Status: SHIPPED | OUTPATIENT
Start: 2017-03-06 | End: 2017-03-06

## 2017-03-06 NOTE — PROGRESS NOTES
"SUBJECTIVE:                                                    Vincent Mishra is a 77 year old male who presents to clinic today for the following health issues:    Chief Complaint   Patient presents with     RECHECK     6 week recheck. Doing terrible, staying in bed all day.        Problem list and histories reviewed & adjusted, as indicated.  Additional history: has not been able to take the nighttime dosing of the oxy  Sleeps ok with the trazodone  Rates his pain as a \"15\" today. He did return the book that he had borrowed last time and he would like another one to look at.   Most of the time he is relatively comfortable he does have episodes of severe pain that are associated mostly with min trying to cross his left leg. This is consistent with a tendonitis or possibly a neuralgia . He has not started physical therapy and remains resistant to anything that involves this type of effort. His wife Adenike agrees with me that warm pool therapy would be great for him but he has not been wililng to do this over the last few months and now they are getting ready for the summer when they travel a lot. They are willing to try while they are home the month of May.        Patient Active Problem List   Diagnosis     Tension headache     Trapezius strain     Hyperlipidemia LDL goal <100     Parotid mass     Diplopia     Neuropathy (H)     Vision loss night     Neck pain     B12 deficiency     Tear of meniscus of left knee     Hypertension goal BP (blood pressure) < 140/90     C6-7 disc with radiculopathy     Right bundle branch block     Advanced directives, info letter sent 10/4/2011     Chest pain     Anatomic airway obstruction     Abnormal antinuclear antibody titer     Osteoarthritis, knee     Moderate major depression (H)     Orchitis, epididymitis, and epididymo-orchitis     Malignant neoplasm of kidney excluding renal pelvis (H)     Renal mass, left     Vitamin D deficiency disease     Health Care Home     Insomnia "     Chronic low back pain     Abdominal pain, right upper quadrant     Esophageal reflux     Hard of hearing     Constipation     NSTEMI (non-ST elevated myocardial infarction) (H)     Myocardial infarction, nontransmural (H)     Acute myocardial infarction of inferolateral wall (H)     Obesity     Sinoatrial node dysfunction (H)     Right bundle branch block (RBBB)     Essential hypertension     Hyperlipidemia     Type 2 diabetes mellitus with other circulatory complications (H)     Thyroid nodule     Hip pain, bilateral     Claudication (H)     Past Surgical History   Procedure Laterality Date     Arthroscopy knee with medial meniscectomy  6/26/2012     Procedure: ARTHROSCOPY KNEE WITH MEDIAL MENISCECTOMY;  Right Knee Arthroscopy With Medial Menisectomy;  Surgeon: Ley, Jeffrey Duane, MD;  Location: [facility]     Esophagoscopy, gastroscopy, duodenoscopy (egd), combined  10/25/2012     Procedure: COMBINED ESOPHAGOSCOPY, GASTROSCOPY, DUODENOSCOPY (EGD), BIOPSY SINGLE OR MULTIPLE;  Gastroscopy  ;  Surgeon: Lucius Dasilva MD;  Location: [facility]     Colonoscopy       Biopsy       Arthroscopy knee  2/11/2011     ARTHROSCOPY KNEE performed by LEY, JEFFREY DUANE at [facility]     Orthopedic surgery        back surgery     Cardiac surgery  7/2011     stentt placed     Back surgery       back surgery x4     Heart cath, angioplasty  07-25-14     mid LAD      Coronary angiography adult order  07-25-14     RCA, L.Main and CFX  had minor luminal irregularites. Mid LAD high grade stenosis 80-90%.Stent placed to mid LAD       Social History   Substance Use Topics     Smoking status: Never Smoker     Smokeless tobacco: Never Used     Alcohol use No     Family History   Problem Relation Age of Onset     CANCER Mother      Depression Mother      DIABETES Father      Hypertension Father      HEART DISEASE Father      MENTAL ILLNESS Father      HEART DISEASE Maternal Grandfather      Substance Abuse Maternal Grandfather      Alcohol/Drug Paternal  "Grandmother      Substance Abuse Paternal Grandmother      HEART DISEASE Paternal Grandfather      Alcohol/Drug Paternal Grandfather      Substance Abuse Paternal Grandfather      HEART DISEASE Brother      DIABETES Brother      Substance Abuse Brother      Obesity Brother      DIABETES Brother      Obesity Sister      Alcohol/Drug Daughter      Depression Daughter      Obesity Sister      DIABETES Sister      Obesity Sister      DIABETES Sister      Alcohol/Drug Sister      CANCER Sister 55     possible kidney cancer     Substance Abuse Sister      Alcohol/Drug Son      Substance Abuse Son      DIABETES Son      Alcohol/Drug Son      Substance Abuse Son      Obesity Daughter      DIABETES Daughter      Hypertension Daughter      Bipolar Disorder Other            ROS:  Constitutional, HEENT, cardiovascular, pulmonary, gi and gu systems are negative, except as otherwise noted.    OBJECTIVE:                                                    /73 (BP Location: Right arm, Cuff Size: Adult Large)  Pulse 62  Temp 98.2  F (36.8  C) (Tympanic)  Ht 5' 8\" (1.727 m)  Wt 227 lb 11.2 oz (103.3 kg)  BMI 34.62 kg/m2 Body mass index is 34.62 kg/(m^2).   GENERAL APPEARANCE: healthy, alert and no distress  RESP: lungs clear to auscultation - no rales, rhonchi or wheezes  CV: regular rates and rhythm, normal S1 S2, no S3 or S4 and no murmur, click or rub  MS: decreased range of motion lumbar spine knees, arthritic changes of the hands knees  and decreased muscles mass legs bilaterally with core muscle weakness symmetrical        ASSESSMENT/PLAN:                                                      1. Essential hypertension with goal blood pressure less than 140/90  Well controlled   - Basic metabolic panel    2. Type 2 diabetes mellitus with other circulatory complications (H)  Controlled   - Basic metabolic panel    3. Chronic bilateral low back pain with sciatica, sciatica laterality unspecified  Needs to get into the pool " therapy   - order for DME; Equipment being ordered: TENS  Dispense: 1 Units; Refill: 0  - oxyCODONE (OXYCONTIN) 30 MG 12 hr tablet; Take 1 tablet (30 mg) by mouth every 12 hours  Dispense: 60 tablet; Refill: 0  - [facility] RE[facility]ERRAL    4. Hip pain, bilateral  Will try tens unit for the lower back he will try self adjusting this   - order for DME; Equipment being ordered: TENS  Dispense: 1 Units; Refill: 0  - oxyCODONE (OXYCONTIN) 30 MG 12 hr tablet; Take 1 tablet (30 mg) by mouth every 12 hours  Dispense: 60 tablet; Refill: 0  - [facility] RE[facility]ERRAL    5. Lumbar back pain with radiculopathy affecting left lower extremity    - oxyCODONE (ROXICODONE) 5 MG IR tablet; Take 1 daily as needed  for severe pain may have 15 per month this is a 3 month supply  Dispense: 45 tablet; Refill: 0  - [facility] REFERRAL  .pain pooorly controlled patient needs to be more actively involved in pain control and although hard push to work through the pain    reports that he has never smoked. He has never used smokeless tobacco.      Weight management plan: Discussed healthy diet and exercise guidelines and patient will follow up in 6 months in clinic to re-evaluate.    Keyla Fonseca M.D.  [facility]

## 2017-03-06 NOTE — MR AVS SNAPSHOT
After Visit Summary   3/6/2017    Vincent Mishra    MRN: [id number]           Patient Information     Date Of Birth          1940        Visit Information        Provider Department      3/6/2017 10:30 AM Keyla Fonseca MD [facility]        Today's Diagnoses     Chronic bilateral low back pain with sciatica, sciatica laterality unspecified    -  1    Essential hypertension with goal blood pressure less than 140/90        Type 2 diabetes mellitus with other circulatory complications (H)        Hip pain, bilateral        Lumbar back pain with radiculopathy affecting left lower extremity           Follow-ups after your visit        Additional Services     [facility] REFERRAL       **This order will print in the [facility] Scheduling Office**    Physical Therapy, Hand Therapy and Chiropractic Care are available through:    *[facility]  *[facility]  *[facility]    Call one number to schedule at any of the above locations: [phone].    Your provider has referred you to: Physical Therapy at [facility] or [facility]    Indication/Reason for Referral: Knee Pain and Low Back Pain  Onset of Illness: years and years   Therapy Orders: Evaluate and Treat  Special Programs: None  Special Request: Equipment: I have written for the tens unit  Exercise: Active/Assistive ROM, Conditioning, Home Exercise Program, Posture/Body Mechanics, Progressive Strengthening and Stretching/Flexibility  Modalities: As Indicated:E-Stim/TENS help him use these     Brody Skinner      Additional Comments for the Therapist or Chiropractor:     Please be aware that coverage of these services is subject to the terms and limitations of your health insurance plan.  Call member services at your health plan with any benefit or coverage questions.      Please bring the following to your appointment:    *Your personal calendar for scheduling future  "appointments  *Comfortable clothing                  Who to contact     Normal or non-critical lab and imaging results will be communicated to you by [employer], letter or phone within 4 business days after the clinic has received the results. If you do not hear from us within 7 days, please contact the clinic through [employer] or phone. If you have a critical or abnormal lab result, we will notify you by phone as soon as possible.  Submit refill requests through [employer] or call your pharmacy and they will forward the refill request to us. Please allow 3 business days for your refill to be completed.          If you need to speak with a  for additional information , please call: [phone]             Additional Information About Your Visit        [employer] Information     [employer] gives you secure access to your electronic health record. If you see a primary care provider, you can also send messages to your care team and make appointments. If you have questions, please call your primary care clinic.  If you do not have a primary care provider, please call [phone] and they will assist you.        Care EveryWhere ID     This is your Care EveryWhere ID. This could be used by other organizations to access your [place] medical records  [id number]        Your Vitals Were     Pulse Temperature Height BMI (Body Mass Index)          62 98.2  F (36.8  C) (Tympanic) 5' 8\" (1.727 m) 34.62 kg/m2         Blood Pressure from Last 3 Encounters:   03/06/17 133/73   01/31/17 146/85   01/24/17 137/57    Weight from Last 3 Encounters:   03/06/17 227 lb 11.2 oz (103.3 kg)   01/31/17 225 lb 8 oz (102.3 kg)   01/24/17 220 lb (99.8 kg)              We Performed the Following     Basic metabolic panel     [facility] REFERRAL          Today's Medication Changes          These changes are accurate as of: 3/6/17 11:34 AM.  If you have any questions, ask your nurse or doctor.               These medicines " have changed or have updated prescriptions.        Dose/Directions    gabapentin 100 MG capsule   Commonly known as:  NEURONTIN   This may have changed:  Another medication with the same name was removed. Continue taking this medication, and follow the directions you see here.   Used for:  Failed back surgical syndrome, Type 2 diabetes mellitus with diabetic neuropathy (H)   Changed by:  Keyla Fonseca MD        Take 1 tablet increase by 1 tablet every 2 days to 6 tablets 3 times per day   Quantity:  270 capsule   Refills:  3       * order for DME   This may have changed:  Another medication with the same name was added. Make sure you understand how and when to take each.   Used for:  Spinal stenosis, lumbar region, with neurogenic claudication   Changed by:  Keyla Fonseca MD        Equipment being ordered: Wheelchair motorized for patient with spinal stenosis and neurogenic claudication   Quantity:  1 Device   Refills:  0       * order for DME   This may have changed:  You were already taking a medication with the same name, and this prescription was added. Make sure you understand how and when to take each.   Used for:  Chronic bilateral low back pain with sciatica, sciatica laterality unspecified, Hip pain, bilateral   Changed by:  Keyla Fonseca MD        Equipment being ordered: TENS   Quantity:  1 Units   Refills:  0       * oxyCODONE 5 MG IR tablet   Commonly known as:  ROXICODONE   This may have changed:  additional instructions   Used for:  Lumbar back pain with radiculopathy affecting left lower extremity   Changed by:  Keyla Fonseca MD        Take 1 daily as needed  for severe pain may have 15 per month this is a 3 month supply   Quantity:  45 tablet   Refills:  0       * oxyCODONE 30 MG 12 hr tablet   Commonly known as:  OxyCONTIN   This may have changed:    - medication strength  - how much to take   Used for:  Hip pain, bilateral, Chronic bilateral low back pain with sciatica, sciatica  laterality unspecified   Changed by:  Keyla Fonseca MD        Dose:  30 mg   Start taking on:  5/6/2017   Take 1 tablet (30 mg) by mouth every 12 hours   Quantity:  60 tablet   Refills:  0       * Notice:  This list has 4 medication(s) that are the same as other medications prescribed for you. Read the directions carefully, and ask your doctor or other care provider to review them with you.      Stop taking these medicines if you haven't already. Please contact your care team if you have questions.     mirtazapine 15 MG tablet   Commonly known as:  REMERON   Stopped by:  Keyla Fonseca MD           tiZANidine 2 MG tablet   Commonly known as:  ZANAFLEX   Stopped by:  Keyla Fonseca MD                Where to get your medicines      Some of these will need a paper prescription and others can be bought over the counter.  Ask your nurse if you have questions.     Bring a paper prescription for each of these medications     order for DME    oxyCODONE 30 MG 12 hr tablet    oxyCODONE 5 MG IR tablet                Primary Care Provider Office Phone # Fax #    Keyla Fonseca [phone]       [address] [address]        Thank you!     Thank you for choosing [facility]  for your care. Our goal is always to provide you with excellent care. Hearing back from our patients is one way we can continue to improve our services. Please take a few minutes to complete the written survey that you may receive in the mail after your visit with us. Thank you!             Your Updated Medication List - Protect others around you: Learn how to safely use, store and throw away your medicines at www.disposemymeds.org.          This list is accurate as of: 3/6/17 11:34 AM.  Always use your most recent med list.                   Brand Name Dispense Instructions for use    acetaminophen 325 MG tablet    TYLENOL    250 tablet    Take 2 tablets by mouth every 4 hours as  needed.       amLODIPine 10 MG tablet    NORVASC    90 tablet    Take 1 tablet (10 mg) by mouth daily       ascorbic acid 500 MG tablet    VITAMIN C     Take 500 mg by mouth daily       aspirin 81 MG tablet      Take by mouth daily       atorvastatin 80 MG tablet    LIPITOR    90 tablet    Take 1 tablet (80 mg) by mouth daily       B-12 1000 MCG Caps          blood glucose monitoring meter device kit    no brand specified    1 kit    Use to test blood sugar 2 times daily or as directed.       blood glucose monitoring test strip    ONE TOUCH ULTRA    200 strip    Use to test blood sugars 2 times daily or as directed.       busPIRone 15 MG tablet    BUSPAR    180 tablet    Take 1 tablet (15 mg) by mouth 2 times daily       calcium + D 600-200 MG-UNIT Tabs   Generic drug:  calcium carbonate-vitamin D     100 tablet    Take 2 tablets by mouth daily.       cholecalciferol 1000 UNITS capsule    vitamin  -D     Take 1 capsule by mouth daily       clopidogrel 75 MG tablet    PLAVIX    90 tablet    Take 1 tablet (75 mg) by mouth daily       gabapentin 100 MG capsule    NEURONTIN    270 capsule    Take 1 tablet increase by 1 tablet every 2 days to 6 tablets 3 times per day       hydrochlorothiazide 25 MG tablet    HYDRODIURIL    90 tablet    Take 1 tablet (25 mg) by mouth daily       ibuprofen 800 MG tablet    ADVIL/MOTRIN    120 tablet    Take 1 tablet (800 mg) by mouth every 8 hours as needed for moderate pain       [employer] FINEPOINT LANCETS Misc     100 each    Use to test blood sugars 1 times daily or as directed.       lisinopril 40 MG tablet    PRINIVIL/ZESTRIL    90 tablet    Take 1 tablet (40 mg) by mouth daily       metFORMIN 500 MG 24 hr tablet    GLUCOPHAGE-XR    360 tablet    Take 2 tablets (1,000 mg) by mouth 2 times daily (with meals)       nitroglycerin 0.4 MG sublingual tablet    NITROSTAT    25 tablet    Place 1 tablet (0.4 mg) under the tongue every 5 minutes as needed for chest pain       omeprazole 20 MG  CR capsule    priLOSEC    180 capsule    Take 1 capsule (20 mg) by mouth 2 times daily       * order for DME     1 Device    Equipment being ordered: Wheelchair motorized for patient with spinal stenosis and neurogenic claudication       * order for DME     1 Units    Equipment being ordered: TENS       * oxyCODONE 5 MG IR tablet    ROXICODONE    45 tablet    Take 1 daily as needed  for severe pain may have 15 per month this is a 3 month supply       * oxyCODONE 30 MG 12 hr tablet   Start taking on:  5/6/2017    OxyCONTIN    60 tablet    Take 1 tablet (30 mg) by mouth every 12 hours       sertraline 100 MG tablet    ZOLOFT    225 tablet    Take 2 per day       spironolactone 25 MG tablet    ALDACTONE    90 tablet    Take 1 tablet (25 mg) by mouth daily       traZODone 100 MG tablet    DESYREL    270 tablet    Take 3 tablets (300 mg) by mouth nightly as needed for sleep 300 mg at HS for sleep       triamcinolone 0.1 % paste    KENALOG    5 g    Take by mouth 2 times daily Apply to sore area twice a day for a week       * Notice:  This list has 4 medication(s) that are the same as other medications prescribed for you. Read the directions carefully, and ask your doctor or other care provider to review them with you.

## 2017-03-06 NOTE — NURSING NOTE
"Chief Complaint   Patient presents with     RECHECK     6 week recheck. Doing terrible, staying in bed all day.        Initial /73 (BP Location: Right arm, Cuff Size: Adult Large)  Pulse 62  Temp 98.2  F (36.8  C) (Tympanic)  Ht 5' 8\" (1.727 m)  Wt 227 lb 11.2 oz (103.3 kg)  BMI 34.62 kg/m2 Estimated body mass index is 34.62 kg/(m^2) as calculated from the following:    Height as of this encounter: 5' 8\" (1.727 m).    Weight as of this encounter: 227 lb 11.2 oz (103.3 kg).  Medication Reconciliation: complete     Kassidy Reardon CMA    "

## 2017-03-07 ENCOUNTER — TELEPHONE (OUTPATIENT)
Dept: FAMILY MEDICINE | Facility: CLINIC | Age: 77
End: 2017-03-07

## 2017-03-07 NOTE — TELEPHONE ENCOUNTER
Vincent was seen on 3/6/17 and received an order for TENS unit.  He went to [facility] and they don't have them and referred him to [facility].  They have them, but don't bill.  They told him that he needed to go to a Pain Clinic to received a TENS unit.  They are leaving on Thursday and not sure what they should do.  Please review and advise.  Thank you..Ebony Dickson

## 2017-03-30 DIAGNOSIS — M17.0 PRIMARY OSTEOARTHRITIS OF BOTH KNEES: ICD-10-CM

## 2017-03-30 NOTE — TELEPHONE ENCOUNTER
ibuprofen (ADVIL/MOTRIN) 800 MG tablet      Last Written Prescription Date: 3/15/17  Last Quantity: 90, # refills: 0  Last Office Visit with [facility] or [facility] prescribing provider: 3/6/17       Creatinine   Date Value Ref Range Status   03/06/2017 1.32 (H) 0.66 - 1.25 mg/dL Final     Lab Results   Component Value Date    AST 15 01/24/2017     Lab Results   Component Value Date    ALT 20 01/24/2017     BP Readings from Last 3 Encounters:   03/06/17 133/73   01/31/17 146/85   01/24/17 137/57

## 2017-03-31 DIAGNOSIS — E11.42 PERIPHERAL SENSORY NEUROPATHY DUE TO TYPE 2 DIABETES MELLITUS (H): Primary | ICD-10-CM

## 2017-03-31 DIAGNOSIS — M96.1 FAILED BACK SURGICAL SYNDROME: ICD-10-CM

## 2017-03-31 RX ORDER — IBUPROFEN 800 MG/1
800 TABLET, FILM COATED ORAL EVERY 8 HOURS PRN
Qty: 90 TABLET | Refills: 0 | Status: SHIPPED | OUTPATIENT
Start: 2017-03-31 | End: 2017-10-03

## 2017-03-31 RX ORDER — GABAPENTIN 100 MG/1
CAPSULE ORAL
Qty: 270 CAPSULE | Refills: 3 | Status: SHIPPED | OUTPATIENT
Start: 2017-03-31 | End: 2017-12-11

## 2017-03-31 NOTE — TELEPHONE ENCOUNTER
Gabapentin 400mg      Last Written Prescription Date: 9/27/16  Last Quantity: 270, # refills: 3  [facility] or [facility] prescribing provider: 3/6/17       Creatinine   Date Value Ref Range Status   03/06/2017 1.32 (H) 0.66 - 1.25 mg/dL Final     Lab Results   Component Value Date    AST 15 01/24/2017     Lab Results   Component Value Date    ALT 20 01/24/2017     BP Readings from Last 3 Encounters:   03/06/17 133/73   01/31/17 146/85   01/24/17 137/57

## 2017-03-31 NOTE — TELEPHONE ENCOUNTER
Routing refill request to provider for review/approval because:  Drug not on the [facility] refill protocol     Lavern Evangelista RN

## 2017-04-03 ENCOUNTER — THERAPY VISIT (OUTPATIENT)
Dept: PHYSICAL THERAPY | Facility: CLINIC | Age: 77
End: 2017-04-03
Payer: MEDICARE

## 2017-04-03 DIAGNOSIS — M54.42 BILATERAL LOW BACK PAIN WITH LEFT-SIDED SCIATICA: ICD-10-CM

## 2017-04-03 DIAGNOSIS — M54.41 BILATERAL LOW BACK PAIN WITH RIGHT-SIDED SCIATICA: Primary | ICD-10-CM

## 2017-04-03 PROCEDURE — G8980 MOBILITY D/C STATUS: HCPCS | Mod: GP | Performed by: PHYSICAL THERAPIST

## 2017-04-03 PROCEDURE — G8979 MOBILITY GOAL STATUS: HCPCS | Mod: GP | Performed by: PHYSICAL THERAPIST

## 2017-04-03 PROCEDURE — 97110 THERAPEUTIC EXERCISES: CPT | Mod: GP | Performed by: PHYSICAL THERAPIST

## 2017-04-03 PROCEDURE — G8978 MOBILITY CURRENT STATUS: HCPCS | Mod: GP | Performed by: PHYSICAL THERAPIST

## 2017-04-03 PROCEDURE — 97163 PT EVAL HIGH COMPLEX 45 MIN: CPT | Mod: GP | Performed by: PHYSICAL THERAPIST

## 2017-04-03 NOTE — PROGRESS NOTES
Subjective:    Vincent Mishra is a 77 year old male with a lumbar condition.  Condition occurred with:  Repetition/overuse.  Condition occurred: at work.  This is a chronic condition  Long history of back pain dating back to 1972, had 4 spinal surgeries 1974 fusion, 76 pt fell and dislocated disc, 1981 discectomy, 2001 titanium screw.  Pt noted flare-up of pain as recent as 3/2/17.    Patient reports pain:  Central lumbar spine, lumbar spine left and lumbar spine right.  Radiates to:  Thigh left, lower leg left, gluteals right, gluteals left, thigh right and lower leg right.  Pain is described as aching (sharp pain in groin) Episode frequency: intermittent in groin, near constant pain. and reported as 5/10 and 10/10.  Associated symptoms:  Loss of balance, loss of strength, tingling, numbness, loss of motion and loss of motion/stiffness. Worse during: activity dependent.  Symptoms are exacerbated by walking, standing and lifting (lift 10# or under) and relieved by analgesics and rest.  Since onset symptoms are gradually worsening.  Special tests:  X-ray.  Previous treatment includes physical therapy and surgery.  There was mild and no improvement following previous treatment.  General health as reported by patient is fair and poor.                                              Objective:    Standing Alignment:    Cervical/Thoracic:  Forward head  Shoulder/UE:  Rounded shoulders  Lumbar:  Lordosis decr            Gait:  Pt states legs give way on him with prolonged ambulation  Gait Type:  Antalgic   Weight Bearing Status:  WBAT   Assistive Devices:  Crutches and walker  Deviations:  Lumbar:  Trunk flexionKnee:  Knee extension decr L and knee extension decr RAnkle:  Heel strike decr L, heel strike decr R, push off decr R and push off decr L    Flexibility/Screens:   Positive screens:  Hip; Lumbar and Knee                   Lumbar/SI Evaluation  ROM:    AROM Lumbar:   Flexion:          Mid shin  Ext:                     Unable to get to neutral   Side Bend:        Left:  35%pain    Right:  35%pain  Rotation:           Left:     Right:   Side Glide:        Left:     Right:           Lumbar Myotomes:    T12-L3 (Hip Flex):  Left: 4+    Right: 4+  L2-4 (Quads):  Left:  5    Right:  5  L4 (Ankle DF):  Left:  5    Right:  5  L5 (Great Toe Ext): Left: 5-    Right: 5-   S1 (Toe Raise):  Left: 3+    Right: 3+  Lumbar DTR's:  Lumbar dtr's: unable to ellicit bilaterally.        Lumbar Dermtomes:              L5 Right:  Hypo-light touch    Neural Tension/Mobility:    Left side:  SLR w/DF positive.  Left side:SLR  negative.     Right side:   SLR w/DF or SLR  negative.       Lumbar Provocation:    Left positive with:  PROM hip  Right positive with: PROM hip  Spinal Segmental Conclusions: NA lumbar fusion                                                       General     ROS    Assessment/Plan:      Patient is a 77 year old male with lumbar complaints.    Patient has the following significant findings with corresponding treatment plan.                Diagnosis 1:  Low back pain  Pain -  self management  Decreased ROM/flexibility - manual therapy and therapeutic exercise  Decreased strength - therapeutic exercise and therapeutic activities  Impaired balance - neuro re-education and therapeutic activities  Decreased proprioception - neuro re-education and therapeutic activities  Impaired gait - gait training  Decreased function - therapeutic activities and home program  Impaired posture - neuro re-education    Therapy Evaluation Codes:   1) History comprised of:   Personal factors that impact the plan of care:      Age, Overall behavior pattern, Past/current experiences, Time since onset of symptoms and Work status.    Comorbidity factors that impact the plan of care are:      Cancer, Chemical Dependency, Diabetes, Depression, Dizziness, Heart problems, High blood pressure, Numbness/tingling and Overweight.     Medications impacting care:  Anti-depressant, High blood pressure and Pain.  2) Examination of Body Systems comprised of:   Body structures and functions that impact the plan of care:      Hip and Lumbar spine.   Activity limitations that impact the plan of care are:      Bathing, Dressing, Lifting, Squatting/kneeling, Stairs, Standing and Walking.  3) Clinical presentation characteristics are:   Unstable/Unpredictable.  4) Decision-Making    High complexity using standardized patient assessment instrument and/or measureable assessment of functional outcome.  Cumulative Therapy Evaluation is: High complexity.    Previous and current functional limitations:  (See Goal Flow Sheet for this information)    Short term and Long term goals: (See Goal Flow Sheet for this information)     Communication ability:  Patient appears to be able to clearly communicate and understand verbal and written communication and follow directions correctly.  Treatment Explanation - The following has been discussed with the patient:   RX ordered/plan of care  Anticipated outcomes  Possible risks and side effects  This patient would benefit from PT intervention to resume normal activities.   Rehab potential is good.    Frequency:  1 X week, once daily  Duration:  for 6 weeks  Discharge Plan:  Achieve all LTG.  Independent in home treatment program.  Reach maximal therapeutic benefit.    Please refer to the daily flowsheet for treatment today, total treatment time and time spent performing 1:1 timed codes.

## 2017-04-03 NOTE — LETTER
DEPARTMENT OF HEALTH AND HUMAN SERVICES  [facility] MEDICA[facility] SERVICES    PLAN/UPDATED PLAN OF PROGRESS FOR OUTPATIENT REHABILITATION    PATIENTS NAME:  Vincent Mishra     : 1940    PROVIDER NUMBER:    [id number]    [id number]     PROVIDER NAME: [facility]    MEDICAL RECORD NUMBER: [id number]     START OF CARE DATE:  SOC Date: 17   TYPE:  PT    PRIMARY/TREATMENT DIAGNOSIS: (Pertinent Medical Diagnosis)     Bilateral low back pain with right-sided sciatica  Bilateral low back pain with left-sided sciatica    VISITS FROM START OF CARE:  Rxs Used: 1     Subjective:    Vincent Mishra is a 77 year old male with a lumbar condition.  Condition occurred with:  Repetition/overuse.  Condition occurred: at work.  This is a chronic condition  Long history of back pain dating back to , had 4 spinal surgeries  fusion,  pt fell and dislocated disc,  discectomy,  titanium screw.  Pt noted flare-up of pain as recent as 3/2/17.    Patient reports pain:  Central lumbar spine, lumbar spine left and lumbar spine right.  Radiates to:  Thigh left, lower leg left, gluteals right, gluteals left, thigh right and lower leg right.  Pain is described as aching (sharp pain in groin) Episode frequency: intermittent in groin, near constant pain. and reported as 5/10 and 10/10.  Associated symptoms:  Loss of balance, loss of strength, tingling, numbness, loss of motion and loss of motion/stiffness. Worse during: activity dependent.  Symptoms are exacerbated by walking, standing and lifting (lift 10# or under) and relieved by analgesics and rest.  Since onset symptoms are gradually worsening.  Special tests:  X-ray.  Previous treatment includes physical therapy and surgery.  There was mild and no improvement following previous treatment.  General health as reported by patient is fair and poor.                          Objective:    Standing Alignment:    Cervical/Thoracic:   Forward head  Shoulder/UE:  Rounded shoulders  Lumbar:  Lordosis decr      PATIENTS NAME:  Vincent Mishra     : 1940        Gait:  Pt states legs give way on him with prolonged ambulation  Gait Type:  Antalgic   Weight Bearing Status:  WBAT   Assistive Devices:  Crutches and walker  Deviations:  Lumbar:  Trunk flexionKnee:  Knee extension decr L and knee extension decr RAnkle:  Heel strike decr L, heel strike decr R, push off decr R and push off decr L  Flexibility/Screens:   Positive screens:  Hip; Lumbar and Knee       Lumbar/SI Evaluation  ROM:    AROM Lumbar:   Flexion:          Mid shin  Ext:                    Unable to get to neutral   Side Bend:        Left:  35%pain    Right:  35%pain  Rotation:           Left:     Right:   Side Glide:        Left:     Right:         Lumbar Myotomes:    T12-L3 (Hip Flex):  Left: 4+    Right: 4+  L2-4 (Quads):  Left:  5    Right:  5  L4 (Ankle DF):  Left:  5    Right:  5  L5 (Great Toe Ext): Left: 5-    Right: 5-   S1 (Toe Raise):  Left: 3+    Right: 3+  Lumbar DTR's:  Lumbar dtr's: unable to ellicit bilaterally.  Lumbar Dermtomes:    L5 Right:  Hypo-light touch    Neural Tension/Mobility:    Left side:  SLR w/DF positive.  Left side:SLR  negative.   Right side:   SLR w/DF or SLR  negative.     Lumbar Provocation:    Left positive with:  PROM hip  Right positive with: PROM hip  Spinal Segmental Conclusions: NA lumbar fusion  Assessment/Plan:      Patient is a 77 year old male with lumbar complaints.    Patient has the following significant findings with corresponding treatment plan.                Diagnosis 1:  Low back pain  Pain -  self management  Decreased ROM/flexibility - manual therapy and therapeutic exercise  Decreased strength - therapeutic exercise and therapeutic activities  Impaired balance - neuro re-education and therapeutic activities  PATIENTS NAME:  Vincent Mishra     : 1940      Decreased proprioception - neuro re-education and therapeutic  activities  Impaired gait - gait training  Decreased function - therapeutic activities and home program  Impaired posture - neuro re-education    Therapy Evaluation Codes:   1) History comprised of:   Personal factors that impact the plan of care:      Age, Overall behavior pattern, Past/current experiences, Time since onset of symptoms and Work status.    Comorbidity factors that impact the plan of care are:      Cancer, Chemical Dependency, Diabetes, Depression, Dizziness, Heart problems, High blood pressure, Numbness/tingling and Overweight.     Medications impacting care: Anti-depressant, High blood pressure and Pain.  2) Examination of Body Systems comprised of:   Body structures and functions that impact the plan of care:      Hip and Lumbar spine.   Activity limitations that impact the plan of care are:      Bathing, Dressing, Lifting, Squatting/kneeling, Stairs, Standing and Walking.  3) Clinical presentation characteristics are:   Unstable/Unpredictable.  4) Decision-Making    High complexity using standardized patient assessment instrument and/or measureable assessment of functional outcome.  Cumulative Therapy Evaluation is: High complexity.    Previous and current functional limitations:  (See Goal Flow Sheet for this information)    Short term and Long term goals: (See Goal Flow Sheet for this information)     Communication ability:  Patient appears to be able to clearly communicate and understand verbal and written communication and follow directions correctly.  Treatment Explanation - The following has been discussed with the patient:   RX ordered/plan of care  Anticipated outcomes  Possible risks and side effects  This patient would benefit from PT intervention to resume normal activities.   Rehab potential is good.                  PATIENTS NAME:  Vincent Mishra     : 1940        Frequency:  1 X week, once daily  Duration:  for 6 weeks  Discharge Plan:  Achieve all LTG.  Independent in home  "treatment program.  Reach maximal therapeutic benefit.         Caregiver Signature/Credentials _____________________________ Date ________       Treating Provider: justo ascencio PT   I have reviewed and certified the need for these services and plan of treatment while under my care.        PHYSICIAN'S SIGNATURE:   ______________________________________ Date___________     Keyla Fonseca    Certification period:  Beginning of Cert date period: 04/03/17 to  End of Cert period date: 07/02/17     Functional Level Progress Report: Please see attached \"Goal Flow sheet for Functional level.\"    ____X____ Continue Services or       ________ DC Services                Service dates: From  SOC Date: 04/03/17 date to present                         "

## 2017-04-03 NOTE — MR AVS SNAPSHOT
After Visit Summary   4/3/2017    Vincent Mishra    MRN: [id number]           Patient Information     Date Of Birth          1940        Visit Information        Provider Department      4/3/2017 9:30 AM Ruby Almanza, PT [facility]        Today's Diagnoses     Bilateral low back pain with right-sided sciatica    -  1    Bilateral low back pain with left-sided sciatica           Follow-ups after your visit        Your next 10 appointments already scheduled     Apr 12, 2017 11:20 AM CDT   [facility] Spine with Rosanne Stewart [facility]    [address] [facility] [address] [phone]            Apr 19, 2017 11:20 AM CDT   [facility] Spine with Rosanne Stewart [facility] (\[facility]    [address] [facility] [address] [phone]            Apr 26, 2017 11:20 AM CDT   [facility] Spine with Rosanne Stewart [facility] (\[facility]    [address] [facility] [address] [phone]            May 10, 2017 11:20 AM CDT   [facility] Spine with Rosanne Stewart [facility] (\[facility]    [id number] [place] [id number] [phone]              Who to contact     If you have questions or need follow up information about today's clinic visit or your schedule please contact [facility] directly at [phone].  Normal or non-critical lab and imaging results will be communicated to you by MyChart, letter or phone within 4 business days after the clinic has received the results. If you do not hear from us within 7 days, please contact the clinic through [employer] or phone. If you have a critical or abnormal lab result, we will notify you by phone as soon as possible.  Submit refill requests through [facility]Base or call your pharmacy and they will forward the refill request to us. Please allow 3 business days for your refill to be completed.           Additional Information About Your Visit        MyChart Information     [facility] gives you secure access to your electronic health record. If you see a primary care provider, you can also send messages to your care team and make appointments. If you have questions, please call your primary care clinic.  If you do not have a primary care provider, please call [phone] and they will assist you.        Care EveryWhere ID     This is your Care EveryWhere ID. This could be used by other organizations to access your [facility] medical records  [id number]         Blood Pressure from Last 3 Encounters:   03/06/17 133/73   01/31/17 146/85   01/24/17 137/57    Weight from Last 3 Encounters:   03/06/17 103.3 kg (227 lb 11.2 oz)   01/31/17 102.3 kg (225 lb 8 oz)   01/24/17 99.8 kg (220 lb)              We Performed the Following     HC PT EVAL, HIGH COMPLEXITY     WYATT CERT REPORT     WYATT INITIAL EVAL REPORT     THERAPEUTIC EXERCISES        Primary Care Provider Office Phone # Fax #    Keyla Fonseca [phone]       [facility] [address] [facility] [address]        Thank you!     Thank you for choosing [facility]  for your care. Our goal is always to provide you with excellent care. Hearing back from our patients is one way we can continue to improve our services. Please take a few minutes to complete the written survey that you may receive in the mail after your visit with us. Thank you!             Your Updated Medication List - Protect others around you: Learn how to safely use, store and throw away your medicines at www.disposemymeds.org.          This list is accurate as of: 4/3/17 11:51 AM.  Always use your most recent med list.                   Brand Name Dispense Instructions for use    acetaminophen 325 MG tablet    TYLENOL    250 tablet    Take 2 tablets by mouth every 4 hours as needed.       amLODIPine 10 MG tablet    NORVASC    90 tablet    Take 1 tablet  (10 mg) by mouth daily       ascorbic acid 500 MG tablet    VITAMIN C     Take 500 mg by mouth daily       aspirin 81 MG tablet      Take by mouth daily       atorvastatin 80 MG tablet    LIPITOR    90 tablet    Take 1 tablet (80 mg) by mouth daily       B-12 1000 MCG Caps          blood glucose monitoring meter device kit    no brand specified    1 kit    Use to test blood sugar 2 times daily or as directed.       blood glucose monitoring test strip    ONE TOUCH ULTRA    200 strip    Use to test blood sugars 2 times daily or as directed.       busPIRone 15 MG tablet    BUSPAR    180 tablet    Take 1 tablet (15 mg) by mouth 2 times daily       calcium + D 600-200 MG-UNIT Tabs   Generic drug:  calcium carbonate-vitamin D     100 tablet    Take 2 tablets by mouth daily.       cholecalciferol 1000 UNITS capsule    vitamin  -D     Take 1 capsule by mouth daily       clopidogrel 75 MG tablet    PLAVIX    90 tablet    Take 1 tablet (75 mg) by mouth daily       gabapentin 100 MG capsule    NEURONTIN    270 capsule    Take 1 tablet increase by 1 tablet every 2 days to 6 tablets 3 times per day       hydrochlorothiazide 25 MG tablet    HYDRODIURIL    90 tablet    Take 1 tablet (25 mg) by mouth daily       ibuprofen 800 MG tablet    ADVIL/MOTRIN    90 tablet    Take 1 tablet (800 mg) by mouth every 8 hours as needed for moderate pain       Shortlist FINEPOINT LANCETS Misc     100 each    Use to test blood sugars 1 times daily or as directed.       lisinopril 40 MG tablet    PRINIVIL/ZESTRIL    90 tablet    Take 1 tablet (40 mg) by mouth daily       metFORMIN 500 MG 24 hr tablet    GLUCOPHAGE-XR    360 tablet    Take 2 tablets (1,000 mg) by mouth 2 times daily (with meals)       nitroglycerin 0.4 MG sublingual tablet    NITROSTAT    25 tablet    Place 1 tablet (0.4 mg) under the tongue every 5 minutes as needed for chest pain       omeprazole 20 MG CR capsule    priLOSEC    180 capsule    Take 1 capsule (20 mg) by mouth 2 times  daily       * order for DME     1 Device    Equipment being ordered: Wheelchair motorized for patient with spinal stenosis and neurogenic claudication       * order for DME     1 Units    Equipment being ordered: TENS       * oxyCODONE 5 MG IR tablet    ROXICODONE    45 tablet    Take 1 daily as needed  for severe pain may have 15 per month this is a 3 month supply       * oxyCODONE 30 MG 12 hr tablet   Start taking on:  5/6/2017    OxyCONTIN    60 tablet    Take 1 tablet (30 mg) by mouth every 12 hours       sertraline 100 MG tablet    ZOLOFT    225 tablet    Take 2 per day       spironolactone 25 MG tablet    ALDACTONE    90 tablet    Take 1 tablet (25 mg) by mouth daily       traZODone 100 MG tablet    DESYREL    270 tablet    Take 3 tablets (300 mg) by mouth nightly as needed for sleep 300 mg at HS for sleep       triamcinolone 0.1 % paste    KENALOG    5 g    Take by mouth 2 times daily Apply to sore area twice a day for a week       * Notice:  This list has 4 medication(s) that are the same as other medications prescribed for you. Read the directions carefully, and ask your doctor or other care provider to review them with you.

## 2017-04-12 ENCOUNTER — THERAPY VISIT (OUTPATIENT)
Dept: PHYSICAL THERAPY | Facility: CLINIC | Age: 77
End: 2017-04-12
Payer: MEDICARE

## 2017-04-12 DIAGNOSIS — M54.42 ACUTE BILATERAL LOW BACK PAIN WITH LEFT-SIDED SCIATICA: ICD-10-CM

## 2017-04-12 DIAGNOSIS — M54.16 LUMBAR BACK PAIN WITH RADICULOPATHY AFFECTING LEFT LOWER EXTREMITY: ICD-10-CM

## 2017-04-12 DIAGNOSIS — M54.41 ACUTE BILATERAL LOW BACK PAIN WITH RIGHT-SIDED SCIATICA: ICD-10-CM

## 2017-04-12 DIAGNOSIS — M54.40 CHRONIC BILATERAL LOW BACK PAIN WITH SCIATICA, SCIATICA LATERALITY UNSPECIFIED: ICD-10-CM

## 2017-04-12 DIAGNOSIS — M25.551 HIP PAIN, BILATERAL: ICD-10-CM

## 2017-04-12 DIAGNOSIS — G89.29 CHRONIC BILATERAL LOW BACK PAIN WITH SCIATICA, SCIATICA LATERALITY UNSPECIFIED: ICD-10-CM

## 2017-04-12 DIAGNOSIS — M25.552 HIP PAIN, BILATERAL: ICD-10-CM

## 2017-04-12 PROCEDURE — 97110 THERAPEUTIC EXERCISES: CPT | Mod: GP | Performed by: PHYSICAL THERAPIST

## 2017-04-12 RX ORDER — OXYCODONE HYDROCHLORIDE 30 MG/1
30 TABLET, FILM COATED, EXTENDED RELEASE ORAL EVERY 12 HOURS
Qty: 60 TABLET | Refills: 0 | Status: SHIPPED | OUTPATIENT
Start: 2017-05-06 | End: 2017-04-12

## 2017-04-12 RX ORDER — OXYCODONE HYDROCHLORIDE 30 MG/1
30 TABLET, FILM COATED, EXTENDED RELEASE ORAL EVERY 12 HOURS
Qty: 60 TABLET | Refills: 0 | Status: SHIPPED | OUTPATIENT
Start: 2017-05-12 | End: 2017-09-15

## 2017-04-12 RX ORDER — OXYCODONE HYDROCHLORIDE 5 MG/1
TABLET ORAL
Qty: 45 TABLET | Refills: 0 | Status: SHIPPED | OUTPATIENT
Start: 2017-04-12 | End: 2017-09-15

## 2017-04-12 RX ORDER — OXYCODONE HYDROCHLORIDE 30 MG/1
30 TABLET, FILM COATED, EXTENDED RELEASE ORAL EVERY 12 HOURS
Qty: 60 TABLET | Refills: 0 | Status: SHIPPED | OUTPATIENT
Start: 2017-04-12 | End: 2017-04-12

## 2017-04-19 ENCOUNTER — THERAPY VISIT (OUTPATIENT)
Dept: PHYSICAL THERAPY | Facility: CLINIC | Age: 77
End: 2017-04-19
Payer: MEDICARE

## 2017-04-19 DIAGNOSIS — M54.42 ACUTE BILATERAL LOW BACK PAIN WITH LEFT-SIDED SCIATICA: ICD-10-CM

## 2017-04-19 DIAGNOSIS — M54.41 ACUTE BILATERAL LOW BACK PAIN WITH RIGHT-SIDED SCIATICA: ICD-10-CM

## 2017-04-19 PROCEDURE — 97110 THERAPEUTIC EXERCISES: CPT | Mod: GP | Performed by: PHYSICAL THERAPIST

## 2017-04-26 DIAGNOSIS — I45.10 RIGHT BUNDLE BRANCH BLOCK: ICD-10-CM

## 2017-04-26 DIAGNOSIS — I21.4 NSTEMI (NON-ST ELEVATED MYOCARDIAL INFARCTION) (H): ICD-10-CM

## 2017-04-26 DIAGNOSIS — I25.118 CORONARY ARTERY DISEASE INVOLVING NATIVE CORONARY ARTERY WITH OTHER FORMS OF ANGINA PECTORIS: ICD-10-CM

## 2017-04-26 RX ORDER — CLOPIDOGREL BISULFATE 75 MG/1
TABLET ORAL
Qty: 90 TABLET | Refills: 3 | Status: SHIPPED | OUTPATIENT
Start: 2017-04-26 | End: 2018-04-30

## 2017-04-26 NOTE — TELEPHONE ENCOUNTER
CLOPIDOGREL 75MG TABLETS             Last Written Prescription Date: 12/20/16  Last Fill Quantity: 90, # refills: 0    Last Office Visit with [facility] or [facility] prescribing provider:  3/6/17   Future Office Visit:       Lab Results   Component Value Date    WBC 8.0 01/24/2017     Lab Results   Component Value Date    RBC 4.39 01/24/2017     Lab Results   Component Value Date    HGB 11.9 01/24/2017     Lab Results   Component Value Date    HCT 37.7 01/24/2017     No components found for: MCT  Lab Results   Component Value Date    MCV 86 01/24/2017     Lab Results   Component Value Date    MCH 27.1 01/24/2017     Lab Results   Component Value Date    MCHC 31.6 01/24/2017     Lab Results   Component Value Date    RDW 13.7 01/24/2017     Lab Results   Component Value Date     01/24/2017     Lab Results   Component Value Date    AST 15 01/24/2017     Lab Results   Component Value Date    ALT 20 01/24/2017     Creatinine   Date Value Ref Range Status   03/06/2017 1.32 (H) 0.66 - 1.25 mg/dL Final   ]

## 2017-04-26 NOTE — TELEPHONE ENCOUNTER
Prescription approved per [facility] Refill Protocol.  Yvon Dickens RN

## 2017-04-27 DIAGNOSIS — M17.0 PRIMARY OSTEOARTHRITIS OF BOTH KNEES: ICD-10-CM

## 2017-04-28 RX ORDER — IBUPROFEN 800 MG/1
TABLET, FILM COATED ORAL
Qty: 120 TABLET | Refills: 1 | Status: SHIPPED | OUTPATIENT
Start: 2017-04-28 | End: 2017-12-11

## 2017-04-28 NOTE — TELEPHONE ENCOUNTER
Dr. Fonseca,   Drug-drug interaction warning with advil and sertraline, ok to proceed with refill? Please advise.  Aamir

## 2017-04-28 NOTE — TELEPHONE ENCOUNTER
ibuprofen (ADVIL/MOTRIN) 800 MG tablet      Last Written Prescription Date: 3/31/17  Last Quantity: 90, # refills: 0  [facility] Office Visit with [facility] or [facility] prescribing provider: 3/6/17       Creatinine   Date Value Ref Range Status   03/06/2017 1.32 (H) 0.66 - 1.25 mg/dL Final     Lab Results   Component Value Date    AST 15 01/24/2017     Lab Results   Component Value Date    ALT 20 01/24/2017     BP Readings from Last 3 Encounters:   03/06/17 133/73   01/31/17 146/85   01/24/17 137/57

## 2017-05-09 DIAGNOSIS — C64.9: Primary | ICD-10-CM

## 2017-05-09 DIAGNOSIS — C64.9 ADENOCARCINOMA, RENAL CELL (H): Primary | ICD-10-CM

## 2017-05-10 ENCOUNTER — PRE VISIT (OUTPATIENT)
Dept: UROLOGY | Facility: CLINIC | Age: 77
End: 2017-05-10

## 2017-05-10 ENCOUNTER — THERAPY VISIT (OUTPATIENT)
Dept: PHYSICAL THERAPY | Facility: CLINIC | Age: 77
End: 2017-05-10
Payer: MEDICARE

## 2017-05-10 DIAGNOSIS — M54.42 ACUTE BILATERAL LOW BACK PAIN WITH LEFT-SIDED SCIATICA: ICD-10-CM

## 2017-05-10 DIAGNOSIS — M54.41 ACUTE BILATERAL LOW BACK PAIN WITH RIGHT-SIDED SCIATICA: ICD-10-CM

## 2017-05-10 PROCEDURE — 97110 THERAPEUTIC EXERCISES: CPT | Mod: GP | Performed by: PHYSICAL THERAPIST

## 2017-05-10 NOTE — TELEPHONE ENCOUNTER
Chart reviewed for upcoming appt.  Getting CT before seeing Dr. Capone for 1 year follow up after kidney mass.  Ready for appt.

## 2017-05-12 ENCOUNTER — OFFICE VISIT (OUTPATIENT)
Dept: UROLOGY | Facility: CLINIC | Age: 77
End: 2017-05-12

## 2017-05-12 VITALS
WEIGHT: 225 LBS | BODY MASS INDEX: 34.1 KG/M2 | HEIGHT: 68 IN | SYSTOLIC BLOOD PRESSURE: 136 MMHG | DIASTOLIC BLOOD PRESSURE: 68 MMHG | HEART RATE: 56 BPM

## 2017-05-12 DIAGNOSIS — C64.2 MALIGNANT NEOPLASM OF KIDNEY EXCLUDING RENAL PELVIS, LEFT (H): Primary | ICD-10-CM

## 2017-05-12 ASSESSMENT — PAIN SCALES - GENERAL: PAINLEVEL: EXTREME PAIN (8)

## 2017-05-12 NOTE — PROGRESS NOTES
ASSESSMENT and PLAN    Still having chronic perineal pain.  His exam and previous CTs have been negative.  There is no clear etiology for this pain.  Discussed referring to pain clinic for possible injections but patient didn't want referral at this time.  He has 2 kidneys and normal kidney function.    Stable bilateral adrenal nodules.    Renal cell carcinoma.  Successful cryoablation.    Plan for follow-up with CT Abdomen/Pelvis in 1 year.    _________________________________________________________________    CHIEF COMPLAINT   It was my pleasure to see Vincent Mishra who is a 77 year old male for follow-up of perineal pain.      HPI  Mr. Mishra is status post percutaneous cryoablation of the left kidney for Gr 1 renal cell carcinoma in 2012.  Today he is here for groin pain.    His pain is constant occurring every day/night.  10/10 intensity. Had a fall in the tub this morning. Uses a walker to get around. He is currently on oxycodone 30mg and this helps the pain. Hx of 4 MIs.    Has been having left flank pain. No hematuria.     Has gained back the weight he lost last year. Son recently passed.     RADIOLOGIC IMAGING    EXAMINATION: CT ABDOMEN PELVIS W/O CONTRAST, 5/12/2017 8:33 AM     TECHNIQUE: Helical CT images from the lung bases through the pubic  symphysis were obtained without IV contrast.      COMPARISON: CT 6/3/2016     HISTORY: Malignant neoplasm of kidney excluding renal pelvis,  unspecified laterality, Malignant neoplasm of unspecified kidney,  except renal pelvis     FINDINGS:     Abdomen and pelvis: Postablation changes in the lateral interpolar  left kidney, the evaluation of which is limited without intravenous  contrast. Nodular soft tissue density within treatment bed measures 17  x 22 mm, previously demonstrating nonenhancing measuring 17 x 25 mm.  Calcific foci along the peripheral aspect of the treatment bed are  unchanged. Previously seen heterogeneously enhancing lesion in  the  superior pole of the left kidney is not visualized without intravenous  contrast.  No hyperdense renal calculi within either kidney, ureter or urinary  bladder. No hydronephrosis or perinephric fat stranding. Bladder is  partially distended with diffuse bladder wall thickening, not  significantly changed since prior study. Prostate is surgically  absent.     Remaining abdomen and pelvis: The liver, gallbladder, spleen, pancreas  and bowel are unremarkable on this limited unenhanced exam. Unchanged  multiple bilateral adrenal gland nodules. No ascites. No abdominal or  pelvic lymphadenopathy. Abdominal aorta is normal caliber. Scattered  atherosclerotic calcifications in the abdomen and pelvis.     Lung bases: Right middle lobe consolidation with associated  bronchiectasis, unchanged since prior study. Coronary artery  calcifications.     Bones and soft tissues: No pathologic appearing osseous lesions  identified. Extensive degenerative changes in the spine, not clearly  changed since prior study. Postsurgical changes of laminectomy and  interbody at L3-L4 level. Grade 1 retrolisthesis of L2 on L3,  unchanged since prior study.         IMPRESSION:   1. No renal calculi over the course of the kidneys, ureters or  bladder.  2. Stable post ablation changes along the lateral aspect of the left  kidney.  3. Previously seen complex solid and cystic lesion in the superior  pole of the left kidney cannot be evaluated on today's study without  intravenous contrast.  4. Unchanged multinodular appearing adrenal glands.     I have personally reviewed the examination and initial interpretation  and I agree with the findings.     CARLOS DAVIS    I reviewed the recent radiologic imaging and reports described above.     3/17/17 Cr 1.32    Patient Active Problem List    Diagnosis Date Noted     Chest pain 01/12/2012     Priority: High     Hyperlipidemia LDL goal <100 09/23/2010     Priority: High     Bilateral low back pain  with right-sided sciatica 04/03/2017     Priority: Medium     Bilateral low back pain with left-sided sciatica 04/03/2017     Priority: Medium     Claudication (H) 05/09/2016     Priority: Medium     Hip pain, bilateral 04/05/2016     Priority: Medium     Thyroid nodule 10/30/2015     Priority: Medium     Type 2 diabetes mellitus with other circulatory complications (H) 10/22/2015     Priority: Medium     Myocardial infarction, nontransmural (H) 07/16/2015     Priority: Medium     Sinoatrial node dysfunction (H) 07/16/2015     Priority: Medium     Right bundle branch block (RBBB) 07/16/2015     Priority: Medium     Essential hypertension 07/16/2015     Priority: Medium     Hyperlipidemia 07/16/2015     Priority: Medium     Malignant neoplasm of kidney excluding renal pelvis (H) 11/16/2012     Priority: Medium     SURGERY, PATHOLOGY, AND TREATMENT HISTORY FOR KIDNEY CANCER  11/12/12:  Left lower pole kidney mass biopsy: Renal cell carcinoma, clear cell type; Viktor grade 1  12/5/12 Left percutaneous cryoablation of two renal tumors.  These were located adjacent to one another in the lateral kidney.  Dr Asif Capone, [facility].  Problem list name updated by automated process. Provider to review       Acute myocardial infarction of inferolateral wall (H) 07/14/2011     Priority: Medium     Obesity 07/14/2011     Priority: Medium     Hypertension goal BP (blood pressure) < 140/90 02/28/2011     Priority: Medium     NSTEMI (non-ST elevated myocardial infarction) (H)      07-25-14 CATH- RCA, L.Main and CFX  had minor luminal irregularites. Mid LAD high grade stenosis 80-90%.Stent placed to mid LAD       Hard of hearing 06/05/2014     Constipation 06/05/2014     Abdominal pain, right upper quadrant 03/26/2014     Esophageal reflux 03/26/2014     Chronic low back pain 01/10/2014     Insomnia 10/21/2013     Health Care Home 10/08/2013     *See Letters for [facility] Care Plan: My Access Plan            Vitamin D deficiency disease 09/25/2013     Renal mass, left 12/05/2012     Moderate major depression (H) 09/17/2012     Orchitis, epididymitis, and epididymo-orchitis 09/17/2012     Osteoarthritis, knee 09/05/2012     Abnormal antinuclear antibody titer 03/12/2012     Anatomic airway obstruction 02/09/2012     fixed obstruction on PFTs with dyspnea       Advanced directives, info letter sent 10/4/2011 10/04/2011     Right bundle branch block 07/26/2011     Seen on EKG 2/2011.  Different configuration on 7/26/2011 s/p MI - but same at [place] post-MI as here        C6-7 disc with radiculopathy 02/28/2011     consider second Epidural steroid injection at [facility].  Continue PT.  Await accupuncture       Tear of meniscus of left knee 02/11/2011     Neck pain 11/12/2010     B12 deficiency 11/12/2010     Diplopia 11/02/2010     Neuropathy (H) 11/02/2010     Vision loss night 11/02/2010     Parotid mass 10/04/2010     Tension headache 09/23/2010     Trapezius strain 09/23/2010     Past Medical History:   Diagnosis Date     CAD (coronary artery disease) 7/26/2011 7/2011 ([place]): s/p MI, PTCA - RCA      Cancer of kidney (H)      Chest pain 1/12/2012     Coronary artery disease      History of angina      History of blood transfusion      Hyperlipidaemia      Hyperlipidemia LDL goal <100 9/23/2010     Malignant neoplasm (H)     Prostate CA (removed)     Myocardial infarction (H)     s/p MI 7/29/14, stent placement     NSTEMI (non-ST elevated myocardial infarction) (H)     07-25-14 CATH- RCA, L.Main and CFX  had minor luminal irregularites. Mid LAD high grade stenosis 80-90%.Stent placed to mid LAD     Other and unspecified hyperlipidemia     MI in July 2011     Right bundle branch block      Type II or unspecified type diabetes mellitus without mention of complication, not stated as uncontrolled      Unspecified essential hypertension      Past Surgical History:   Procedure Laterality Date     ARTHROSCOPY KNEE  2/11/2011     ARTHROSCOPY KNEE performed by LEY, JEFFREY DUANE at [facility]     ARTHROSCOPY KNEE WITH MEDIAL MENISCECTOMY  6/26/2012    Procedure: ARTHROSCOPY KNEE WITH MEDIAL MENISCECTOMY;  Right Knee Arthroscopy With Medial Menisectomy;  Surgeon: Ley, Jeffrey Duane, MD;  Location: [facility]     BACK SURGERY      back surgery x4     BIOPSY       CARDIAC SURGERY  7/2011    stentt placed     COLONOSCOPY       CORONARY ANGIOGRAPHY ADULT ORDER  07-25-14    RCA, L.Main and CFX  had minor luminal irregularites. Mid LAD high grade stenosis 80-90%.Stent placed to mid LAD     ESOPHAGOSCOPY, GASTROSCOPY, DUODENOSCOPY (EGD), COMBINED  10/25/2012    Procedure: COMBINED ESOPHAGOSCOPY, GASTROSCOPY, DUODENOSCOPY (EGD), BIOPSY SINGLE OR MULTIPLE;  Gastroscopy  ;  Surgeon: Lucius Dasilva MD;  Location: [facility]     HEART CATH, ANGIOPLASTY  07-25-14    mid LAD      ORTHOPEDIC SURGERY       back surgery     Current Outpatient Prescriptions   Medication Sig Dispense Refill     ibuprofen (ADVIL/MOTRIN) 800 MG tablet TAKE 1 TABLET BY MOUTH EVERY 8 HOURS AS NEEDED FOR MODERATE PAIN 120 tablet 1     clopidogrel (PLAVIX) 75 MG tablet TAKE 1 TABLET BY MOUTH DAILY 90 tablet 3     oxyCODONE (ROXICODONE) 5 MG IR tablet Take 1 daily as needed  for severe pain may have 15 per month this is a 3 month supply 45 tablet 0     oxyCODONE (OXYCONTIN) 30 MG 12 hr tablet Take 1 tablet (30 mg) by mouth every 12 hours 60 tablet 0     ibuprofen (ADVIL/MOTRIN) 800 MG tablet Take 1 tablet (800 mg) by mouth every 8 hours as needed for moderate pain 90 tablet 0     gabapentin (NEURONTIN) 100 MG capsule Take 1 tablet increase by 1 tablet every 2 days to 6 tablets 3 times per day 270 capsule 3     order for DME Equipment being ordered: TENS 1 Units 0     spironolactone (ALDACTONE) 25 MG tablet Take 1 tablet (25 mg) by mouth daily 90 tablet 0     triamcinolone (KENALOG) 0.1 % paste Take by mouth 2 times daily Apply to sore area twice a day for a week 5 g 0     blood glucose monitoring  (ONE TOUCH ULTRA) test strip Use to test blood sugars 2 times daily or as directed. 200 strip 3     amLODIPine (NORVASC) 10 MG tablet Take 1 tablet (10 mg) by mouth daily 90 tablet 1     atorvastatin (LIPITOR) 80 MG tablet Take 1 tablet (80 mg) by mouth daily 90 tablet 3     blood glucose monitoring (NO BRAND SPECIFIED) meter device kit Use to test blood sugar 2 times daily or as directed. 1 kit 0     sertraline (ZOLOFT) 100 MG tablet Take 2 per day 225 tablet 1     omeprazole (PRILOSEC) 20 MG CR capsule Take 1 capsule (20 mg) by mouth 2 times daily 180 capsule 2     busPIRone (BUSPAR) 15 MG tablet Take 1 tablet (15 mg) by mouth 2 times daily 180 tablet 1     lisinopril (PRINIVIL/ZESTRIL) 40 MG tablet Take 1 tablet (40 mg) by mouth daily 90 tablet 2     traZODone (DESYREL) 100 MG tablet Take 3 tablets (300 mg) by mouth nightly as needed for sleep 300 mg at HS for sleep 270 tablet 2     hydrochlorothiazide (HYDRODIURIL) 25 MG tablet Take 1 tablet (25 mg) by mouth daily 90 tablet 3     metFORMIN (GLUCOPHAGE-XR) 500 MG 24 hr tablet Take 2 tablets (1,000 mg) by mouth 2 times daily (with meals) 360 tablet 2     order for DME Equipment being ordered: Wheelchair motorized for patient with spinal stenosis and neurogenic claudication 1 Device 0     Cyanocobalamin (B-12) 1000 MCG CAPS        aspirin 81 MG tablet Take by mouth daily       nitroglycerin (NITROSTAT) 0.4 MG SL tablet Place 1 tablet (0.4 mg) under the tongue every 5 minutes as needed for chest pain 25 tablet 1     Code FeverCAN FINEPOINT LANCETS MISC Use to test blood sugars 1 times daily or as directed. 100 each 12     cholecalciferol (VITAMIN  -D) 1000 UNITS capsule Take 1 capsule by mouth daily       ascorbic acid (VITAMIN C) 500 MG tablet Take 500 mg by mouth daily        acetaminophen (TYLENOL) 325 MG tablet Take 2 tablets by mouth every 4 hours as needed. 250 tablet 1     Calcium Carbonate-Vitamin D (CALCIUM + D) 600-200 MG-UNIT per tablet Take 2 tablets by mouth  "daily. 100 tablet 12      SOCIAL HISTORY: He  reports that he has never smoked. He has never used smokeless tobacco. He reports that he does not drink alcohol or use illicit drugs.    PHYSICAL EXAM  Vitals:    05/12/17 1006   BP: 136/68   Pulse: 56   Weight: 102.1 kg (225 lb)   Height: 1.727 m (5' 8\")     Constitutional: Alert, no acute distress  Psychiatric: Mood is appropriate.    I spent over 15 minutes with the patient.  Over half this time was spent on counseling regarding his perineal pain.    I, Leslye Abarca am acting as scribe for Dr. Lucius Capone MD.    Leslye Abarca served as the scribe for this patient's visit and documented my history and physical exam.  I performed the history and physical exam.  I have edited and agree with the note.   I reviewed the described radiologic imaging and reports.   WHITLEY Capone MD          Patient Care Team:  Keyla Fonseca MD as PCP - General (Family Practice)  ARTHUR ASIF    Copy to patient  ISELA AGRAWAL[address] [id number]      "

## 2017-05-12 NOTE — LETTER
5/12/2017       RE: Vincent Mishra  [address] [place] [address]     Dear Colleague,    Thank you for referring your patient, Vincent Mishra, to the [facility] UROLOGY AND INST FOR PROSTATE AND UROLOGIC CANCERS at [facility]. Please see a copy of my visit note below.    ASSESSMENT and PLAN    Still having chronic perineal pain.  His exam and previous CTs have been negative.  There is no clear etiology for this pain.  Discussed referring to pain clinic for possible injections but patient didn't want referral at this time.  He has 2 kidneys and normal kidney function.    Stable bilateral adrenal nodules.    Renal cell carcinoma.  Successful cryoablation.    Plan for follow-up with CT Abdomen/Pelvis in 1 year.    _________________________________________________________________  CHIEF COMPLAINT   It was my pleasure to see Vincent Mishra who is a 77 year old male for follow-up of perineal pain.      HPI  Mr. Mishra is status post percutaneous cryoablation of the left kidney for Gr 1 renal cell carcinoma in 2012.  Today he is here for groin pain.    His pain is constant occurring every day/night.  10/10 intensity. Had a fall in the tub this morning. Uses a walker to get around. He is currently on oxycodone 30mg and this helps the pain. Hx of 4 MIs.    Has been having left flank pain. No hematuria.   Has gained back the weight he lost last year. Son recently passed.     RADIOLOGIC IMAGING  EXAMINATION: CT ABDOMEN PELVIS W/O CONTRAST, 5/12/2017 8:33 AM     TECHNIQUE: Helical CT images from the lung bases through the pubic  symphysis were obtained without IV contrast.      COMPARISON: CT 6/3/2016     HISTORY: Malignant neoplasm of kidney excluding renal pelvis,  unspecified laterality, Malignant neoplasm of unspecified kidney,  except renal pelvis     FINDINGS:     Abdomen and pelvis: Postablation changes in the lateral interpolar  left kidney, the evaluation of which is  limited without intravenous  contrast. Nodular soft tissue density within treatment bed measures 17  x 22 mm, previously demonstrating nonenhancing measuring 17 x 25 mm.  Calcific foci along the peripheral aspect of the treatment bed are  unchanged. Previously seen heterogeneously enhancing lesion in the  superior pole of the left kidney is not visualized without intravenous  contrast.  No hyperdense renal calculi within either kidney, ureter or urinary  bladder. No hydronephrosis or perinephric fat stranding. Bladder is  partially distended with diffuse bladder wall thickening, not  significantly changed since prior study. Prostate is surgically  absent.     Remaining abdomen and pelvis: The liver, gallbladder, spleen, pancreas  and bowel are unremarkable on this limited unenhanced exam. Unchanged  multiple bilateral adrenal gland nodules. No ascites. No abdominal or  pelvic lymphadenopathy. Abdominal aorta is normal caliber. Scattered  atherosclerotic calcifications in the abdomen and pelvis.     Lung bases: Right middle lobe consolidation with associated  bronchiectasis, unchanged since prior study. Coronary artery  calcifications.     Bones and soft tissues: No pathologic appearing osseous lesions  identified. Extensive degenerative changes in the spine, not clearly  changed since prior study. Postsurgical changes of laminectomy and  interbody at L3-L4 level. Grade 1 retrolisthesis of L2 on L3,  unchanged since prior study.         IMPRESSION:   1. No renal calculi over the course of the kidneys, ureters or  bladder.  2. Stable post ablation changes along the lateral aspect of the left  kidney.  3. Previously seen complex solid and cystic lesion in the superior  pole of the left kidney cannot be evaluated on today's study without  intravenous contrast.  4. Unchanged multinodular appearing adrenal glands.     I have personally reviewed the examination and initial interpretation  and I agree with the  findings.     CARLOS DAVIS I reviewed the recent radiologic imaging and reports described above.     3/17/17 Cr 1.32    Patient Active Problem List    Diagnosis Date Noted     Chest pain 01/12/2012     Priority: High     Hyperlipidemia LDL goal <100 09/23/2010     Priority: High     Bilateral low back pain with right-sided sciatica 04/03/2017     Priority: Medium     Bilateral low back pain with left-sided sciatica 04/03/2017     Priority: Medium     Claudication (H) 05/09/2016     Priority: Medium     Hip pain, bilateral 04/05/2016     Priority: Medium     Thyroid nodule 10/30/2015     Priority: Medium     Type 2 diabetes mellitus with other circulatory complications (H) 10/22/2015     Priority: Medium     Myocardial infarction, nontransmural (H) 07/16/2015     Priority: Medium     Sinoatrial node dysfunction (H) 07/16/2015     Priority: Medium     Right bundle branch block (RBBB) 07/16/2015     Priority: Medium     Essential hypertension 07/16/2015     Priority: Medium     Hyperlipidemia 07/16/2015     Priority: Medium     Malignant neoplasm of kidney excluding renal pelvis (H) 11/16/2012     Priority: Medium     SURGERY, PATHOLOGY, AND TREATMENT HISTORY FOR KIDNEY CANCER  11/12/12:  Left lower pole kidney mass biopsy: Renal cell carcinoma, clear cell type; Viktor grade 1  12/5/12 Left percutaneous cryoablation of two renal tumors.  These were located adjacent to one another in the lateral kidney.  Dr Asif Capone, [facility].  Problem list name updated by automated process. Provider to review       Acute myocardial infarction of inferolateral wall (H) 07/14/2011     Priority: Medium     Obesity 07/14/2011     Priority: Medium     Hypertension goal BP (blood pressure) < 140/90 02/28/2011     Priority: Medium     NSTEMI (non-ST elevated myocardial infarction) (H)      07-25-14 CATH- RCA, L.Main and CFX  had minor luminal irregularites. Mid LAD high grade stenosis 80-90%.Stent  placed to mid LAD       Hard of hearing 06/05/2014     Constipation 06/05/2014     Abdominal pain, right upper quadrant 03/26/2014     Esophageal reflux 03/26/2014     Chronic low back pain 01/10/2014     Insomnia 10/21/2013     Health Care Home 10/08/2013     *See Letters for [facility] Care Plan: My Access Plan           Vitamin D deficiency disease 09/25/2013     Renal mass, left 12/05/2012     Moderate major depression (H) 09/17/2012     Orchitis, epididymitis, and epididymo-orchitis 09/17/2012     Osteoarthritis, knee 09/05/2012     Abnormal antinuclear antibody titer 03/12/2012     Anatomic airway obstruction 02/09/2012     fixed obstruction on PFTs with dyspnea       Advanced directives, info letter sent 10/4/2011 10/04/2011     Right bundle branch block 07/26/2011     Seen on EKG 2/2011.  Different configuration on 7/26/2011 s/p MI - but same at [facility] post-MI as here        C6-7 disc with radiculopathy 02/28/2011     consider second Epidural steroid injection at [facility].  Continue PT.  Await accupuncture       Tear of meniscus of left knee 02/11/2011     Neck pain 11/12/2010     B12 deficiency 11/12/2010     Diplopia 11/02/2010     Neuropathy (H) 11/02/2010     Vision loss night 11/02/2010     Parotid mass 10/04/2010     Tension headache 09/23/2010     Trapezius strain 09/23/2010     Past Medical History:   Diagnosis Date     CAD (coronary artery disease) 7/26/2011 7/2011 ([facility]): s/p MI, PTCA - RCA      Cancer of kidney (H)      Chest pain 1/12/2012     Coronary artery disease      History of angina      History of blood transfusion      Hyperlipidaemia      Hyperlipidemia LDL goal <100 9/23/2010     Malignant neoplasm (H)     Prostate CA (removed)     Myocardial infarction (H)     s/p MI 7/29/14, stent placement     NSTEMI (non-ST elevated myocardial infarction) (H)     07-25-14 CATH- RCA, L.Main and CFX  had minor luminal irregularites. Mid LAD high grade stenosis 80-90%.Stent placed to mid LAD     Other and  unspecified hyperlipidemia     MI in July 2011     Right bundle branch block      Type II or unspecified type diabetes mellitus without mention of complication, not stated as uncontrolled      Unspecified essential hypertension      Past Surgical History:   Procedure Laterality Date     ARTHROSCOPY KNEE  2/11/2011    ARTHROSCOPY KNEE performed by LEY, JEFFREY DUANE at [facility]     ARTHROSCOPY KNEE WITH MEDIAL MENISCECTOMY  6/26/2012    Procedure: ARTHROSCOPY KNEE WITH MEDIAL MENISCECTOMY;  Right Knee Arthroscopy With Medial Menisectomy;  Surgeon: Ley, Jeffrey Duane, MD;  Location: [facility]     BACK SURGERY      back surgery x4     BIOPSY       CARDIAC SURGERY  7/2011    stentt placed     COLONOSCOPY       CORONARY ANGIOGRAPHY ADULT ORDER  07-25-14    RCA, L.Main and CFX  had minor luminal irregularites. Mid LAD high grade stenosis 80-90%.Stent placed to mid LAD     ESOPHAGOSCOPY, GASTROSCOPY, DUODENOSCOPY (EGD), COMBINED  10/25/2012    Procedure: COMBINED ESOPHAGOSCOPY, GASTROSCOPY, DUODENOSCOPY (EGD), BIOPSY SINGLE OR MULTIPLE;  Gastroscopy  ;  Surgeon: Lucius Dasilva MD;  Location: [facility]     HEART CATH, ANGIOPLASTY  07-25-14    mid LAD      ORTHOPEDIC SURGERY       back surgery     Current Outpatient Prescriptions   Medication Sig Dispense Refill     ibuprofen (ADVIL/MOTRIN) 800 MG tablet TAKE 1 TABLET BY MOUTH EVERY 8 HOURS AS NEEDED FOR MODERATE PAIN 120 tablet 1     clopidogrel (PLAVIX) 75 MG tablet TAKE 1 TABLET BY MOUTH DAILY 90 tablet 3     oxyCODONE (ROXICODONE) 5 MG IR tablet Take 1 daily as needed  for severe pain may have 15 per month this is a 3 month supply 45 tablet 0     oxyCODONE (OXYCONTIN) 30 MG 12 hr tablet Take 1 tablet (30 mg) by mouth every 12 hours 60 tablet 0     ibuprofen (ADVIL/MOTRIN) 800 MG tablet Take 1 tablet (800 mg) by mouth every 8 hours as needed for moderate pain 90 tablet 0     gabapentin (NEURONTIN) 100 MG capsule Take 1 tablet increase by 1 tablet every 2 days to 6 tablets 3 times  per day 270 capsule 3     order for DME Equipment being ordered: TENS 1 Units 0     spironolactone (ALDACTONE) 25 MG tablet Take 1 tablet (25 mg) by mouth daily 90 tablet 0     triamcinolone (KENALOG) 0.1 % paste Take by mouth 2 times daily Apply to sore area twice a day for a week 5 g 0     blood glucose monitoring (ONE TOUCH ULTRA) test strip Use to test blood sugars 2 times daily or as directed. 200 strip 3     amLODIPine (NORVASC) 10 MG tablet Take 1 tablet (10 mg) by mouth daily 90 tablet 1     atorvastatin (LIPITOR) 80 MG tablet Take 1 tablet (80 mg) by mouth daily 90 tablet 3     blood glucose monitoring (NO BRAND SPECIFIED) meter device kit Use to test blood sugar 2 times daily or as directed. 1 kit 0     sertraline (ZOLOFT) 100 MG tablet Take 2 per day 225 tablet 1     omeprazole (PRILOSEC) 20 MG CR capsule Take 1 capsule (20 mg) by mouth 2 times daily 180 capsule 2     busPIRone (BUSPAR) 15 MG tablet Take 1 tablet (15 mg) by mouth 2 times daily 180 tablet 1     lisinopril (PRINIVIL/ZESTRIL) 40 MG tablet Take 1 tablet (40 mg) by mouth daily 90 tablet 2     traZODone (DESYREL) 100 MG tablet Take 3 tablets (300 mg) by mouth nightly as needed for sleep 300 mg at HS for sleep 270 tablet 2     hydrochlorothiazide (HYDRODIURIL) 25 MG tablet Take 1 tablet (25 mg) by mouth daily 90 tablet 3     metFORMIN (GLUCOPHAGE-XR) 500 MG 24 hr tablet Take 2 tablets (1,000 mg) by mouth 2 times daily (with meals) 360 tablet 2     order for DME Equipment being ordered: Wheelchair motorized for patient with spinal stenosis and neurogenic claudication 1 Device 0     Cyanocobalamin (B-12) 1000 MCG CAPS        aspirin 81 MG tablet Take by mouth daily       nitroglycerin (NITROSTAT) 0.4 MG SL tablet Place 1 tablet (0.4 mg) under the tongue every 5 minutes as needed for chest pain 25 tablet 1     [employer] FINEPOINT LANCETS MISC Use to test blood sugars 1 times daily or as directed. 100 each 12     cholecalciferol (VITAMIN  -D) 1000  "UNITS capsule Take 1 capsule by mouth daily       ascorbic acid (VITAMIN C) 500 MG tablet Take 500 mg by mouth daily        acetaminophen (TYLENOL) 325 MG tablet Take 2 tablets by mouth every 4 hours as needed. 250 tablet 1     Calcium Carbonate-Vitamin D (CALCIUM + D) 600-200 MG-UNIT per tablet Take 2 tablets by mouth daily. 100 tablet 12      SOCIAL HISTORY: He  reports that he has never smoked. He has never used smokeless tobacco. He reports that he does not drink alcohol or use illicit drugs.    PHYSICAL EXAM  Vitals:    05/12/17 1006   BP: 136/68   Pulse: 56   Weight: 102.1 kg (225 lb)   Height: 1.727 m (5' 8\")     Constitutional: Alert, no acute distress  Psychiatric: Mood is appropriate.    I spent over 15 minutes with the patient.  Over half this time was spent on counseling regarding his perineal pain.    I, Leslye Abarca am acting as scribe for Dr. Lucius Capone MD.    Leslye Abarca served as the scribe for this patient's visit and documented my history and physical exam.  I performed the history and physical exam.  I have edited and agree with the note.   I reviewed the described radiologic imaging and reports.   WHITLEY Capone MD      Patient Care Team:  Keyla Fonseca MD as PCP - General (Family Practice)  ARTHUR ASIF    Copy to patient  ISELA AGUIRRE  [address] [place] [id number]    Again, thank you for allowing me to participate in the care of your patient.      Sincerely,    Lucius Capone MD      "

## 2017-05-12 NOTE — MR AVS SNAPSHOT
After Visit Summary   5/12/2017    Vincent Mishra    MRN: [id number]           Patient Information     Date Of Birth          1940        Visit Information        Provider Department      5/12/2017 10:20 AM Lucius Capone MD [facility]rology and [facility] for Prostate and Urologic Cancers        Today's Diagnoses     Malignant neoplasm of kidney excluding renal pelvis, left (H)    -  1      Care Instructions    Return in one year for CT.   Return to clinic as needed.    It was a pleasure meeting with you today.  Thank you for allowing me and my team the privilege of caring for you today.  YOU are the reason we are here, and I truly hope we provided you with the excellent service you deserve.  Please let us know if there is anything else we can do for you so that we can be sure you are leaving completely satisfied with your care experience.       Yoel Morrison LPN        Follow-ups after your visit        Your next 10 appointments already scheduled     May 17, 2017 12:00 PM CDT   [facility] with Rosanne Stewart PT   [facility]    [address] [facility] [address] [phone]            May 31, 2017  1:50 PM CDT   [facility] with Rosanne Stewart PT   [facility]    [address] [facility] [address] [phone]            Jun 14, 2017 11:00 AM CDT   Return Visit with Geremias hPelps MD   [facility] PHYSICIAN HEART AT [facility] ([facility])    [address] [facility] [address]   [phone]            Jun 21, 2017 12:00 PM CDT   [facility] with Rosanne Stewart PT   [facility]    [address] [address]   [phone]            Jun 28, 2017 12:00 PM CDT   [facility] with Rosanne Stewart PT   [facility]    [address] [address] [phone]              Future tests that were ordered  "for you today     Open Future Orders        Priority Expected Expires Ordered    CT Abdomen w/o & w Contrast Routine  6/16/2018 5/12/2017            Who to contact     Please call your clinic at [phone] to:    Ask questions about your health    Make or cancel appointments    Discuss your medicines    Learn about your test results    Speak to your doctor   If you have compliments or concerns about an experience at your clinic, or if you wish to file a complaint, please contact [facility] Patient Relations at [phone] or email us at [email or web address]         Additional Information About Your Visit        S[employer]le[employer]InstaJob Information     Your.MD gives you secure access to your electronic health record. If you see a primary care provider, you can also send messages to your care team and make appointments. If you have questions, please call your primary care clinic.  If you do not have a primary care provider, please call [phone] and they will assist you.      Your.MD is an electronic gateway that provides easy, online access to your medical records. With Your.MD, you can request a clinic appointment, read your test results, renew a prescription or communicate with your care team.     To access your existing account, please contact your [facility] or call [phone] for assistance.        Care EveryWhere ID     This is your Care EveryWhere ID. This could be used by other organizations to access your [facility] medical records  [id number]        Your Vitals Were     Pulse Height BMI (Body Mass Index)             56 1.727 m (5' 8\") 34.21 kg/m2          Blood Pressure from Last 3 Encounters:   05/12/17 136/68   03/06/17 133/73   01/31/17 146/85    Weight from Last 3 Encounters:   05/12/17 102.1 kg (225 lb)   03/06/17 103.3 kg (227 lb 11.2 oz)   01/31/17 102.3 kg (225 lb 8 oz)               Primary Care Provider Office Phone # Fax #    Keyla CABAN " MD Marian [phone]       [facility] [address] [facility] [address]        Thank you!     Thank you for choosing [facility] UROLOGY AND [facility] FOR PROSTATE AND UROLOGIC CANCERS  for your care. Our goal is always to provide you with excellent care. Hearing back from our patients is one way we can continue to improve our services. Please take a few minutes to complete the written survey that you may receive in the mail after your visit with us. Thank you!             Your Updated Medication List - Protect others around you: Learn how to safely use, store and throw away your medicines at www.disposemymeds.org.          This list is accurate as of: 5/12/17 11:27 AM.  Always use your most recent med list.                   Brand Name Dispense Instructions for use    acetaminophen 325 MG tablet    TYLENOL    250 tablet    Take 2 tablets by mouth every 4 hours as needed.       amLODIPine 10 MG tablet    NORVASC    90 tablet    Take 1 tablet (10 mg) by mouth daily       ascorbic acid 500 MG tablet    VITAMIN C     Take 500 mg by mouth daily       aspirin 81 MG tablet      Take by mouth daily       atorvastatin 80 MG tablet    LIPITOR    90 tablet    Take 1 tablet (80 mg) by mouth daily       B-12 1000 MCG Caps          blood glucose monitoring meter device kit    no brand specified    1 kit    Use to test blood sugar 2 times daily or as directed.       blood glucose monitoring test strip    ONE TOUCH ULTRA    200 strip    Use to test blood sugars 2 times daily or as directed.       busPIRone 15 MG tablet    BUSPAR    180 tablet    Take 1 tablet (15 mg) by mouth 2 times daily       calcium + D 600-200 MG-UNIT Tabs   Generic drug:  calcium carbonate-vitamin D     100 tablet    Take 2 tablets by mouth daily.       cholecalciferol 1000 UNITS capsule    vitamin  -D     Take 1 capsule by mouth daily       clopidogrel 75 MG tablet    PLAVIX    90 tablet    TAKE 1 TABLET BY MOUTH DAILY        gabapentin 100 MG capsule    NEURONTIN    270 capsule    Take 1 tablet increase by 1 tablet every 2 days to 6 tablets 3 times per day       hydrochlorothiazide 25 MG tablet    HYDRODIURIL    90 tablet    Take 1 tablet (25 mg) by mouth daily       * ibuprofen 800 MG tablet    ADVIL/MOTRIN    90 tablet    Take 1 tablet (800 mg) by mouth every 8 hours as needed for moderate pain       * ibuprofen 800 MG tablet    ADVIL/MOTRIN    120 tablet    TAKE 1 TABLET BY MOUTH EVERY 8 HOURS AS NEEDED FOR MODERATE PAIN       LIFESCAN FINEPOINT LANCETS Misc     100 each    Use to test blood sugars 1 times daily or as directed.       lisinopril 40 MG tablet    PRINIVIL/ZESTRIL    90 tablet    Take 1 tablet (40 mg) by mouth daily       metFORMIN 500 MG 24 hr tablet    GLUCOPHAGE-XR    360 tablet    Take 2 tablets (1,000 mg) by mouth 2 times daily (with meals)       nitroglycerin 0.4 MG sublingual tablet    NITROSTAT    25 tablet    Place 1 tablet (0.4 mg) under the tongue every 5 minutes as needed for chest pain       omeprazole 20 MG CR capsule    priLOSEC    180 capsule    Take 1 capsule (20 mg) by mouth 2 times daily       * order for DME     1 Device    Equipment being ordered: Wheelchair motorized for patient with spinal stenosis and neurogenic claudication       * order for DME     1 Units    Equipment being ordered: TENS       * oxyCODONE 5 MG IR tablet    ROXICODONE    45 tablet    Take 1 daily as needed  for severe pain may have 15 per month this is a 3 month supply       * oxyCODONE 30 MG 12 hr tablet    OxyCONTIN    60 tablet    Take 1 tablet (30 mg) by mouth every 12 hours       sertraline 100 MG tablet    ZOLOFT    225 tablet    Take 2 per day       spironolactone 25 MG tablet    ALDACTONE    90 tablet    Take 1 tablet (25 mg) by mouth daily       traZODone 100 MG tablet    DESYREL    270 tablet    Take 3 tablets (300 mg) by mouth nightly as needed for sleep 300 mg at HS for sleep       triamcinolone 0.1 % paste     KENALOG    5 g    Take by mouth 2 times daily Apply to sore area twice a day for a week       * Notice:  This list has 6 medication(s) that are the same as other medications prescribed for you. Read the directions carefully, and ask your doctor or other care provider to review them with you.

## 2017-05-12 NOTE — PATIENT INSTRUCTIONS
Return in one year for CT.   Return to clinic as needed.    It was a pleasure meeting with you today.  Thank you for allowing me and my team the privilege of caring for you today.  YOU are the reason we are here, and I truly hope we provided you with the excellent service you deserve.  Please let us know if there is anything else we can do for you so that we can be sure you are leaving completely satisfied with your care experience.       Yoel Morrison LPN

## 2017-05-30 ENCOUNTER — HOSPITAL ENCOUNTER (EMERGENCY)
Facility: CLINIC | Age: 77
Discharge: ANOTHER HOSPITAL | End: 2017-05-30
Attending: EMERGENCY MEDICINE | Admitting: EMERGENCY MEDICINE
Payer: MEDICARE

## 2017-05-30 ENCOUNTER — APPOINTMENT (OUTPATIENT)
Dept: GENERAL RADIOLOGY | Facility: CLINIC | Age: 77
End: 2017-05-30
Attending: EMERGENCY MEDICINE
Payer: MEDICARE

## 2017-05-30 ENCOUNTER — HOSPITAL ENCOUNTER (INPATIENT)
Facility: CLINIC | Age: 77
LOS: 1 days | Discharge: HOME | DRG: 282 | End: 2017-05-31
Attending: INTERNAL MEDICINE | Admitting: INTERNAL MEDICINE
Payer: MEDICARE

## 2017-05-30 VITALS
HEART RATE: 43 BPM | SYSTOLIC BLOOD PRESSURE: 137 MMHG | RESPIRATION RATE: 20 BRPM | DIASTOLIC BLOOD PRESSURE: 86 MMHG | TEMPERATURE: 98.1 F | OXYGEN SATURATION: 96 %

## 2017-05-30 DIAGNOSIS — I21.4 NSTEMI (NON-ST ELEVATED MYOCARDIAL INFARCTION) (H): ICD-10-CM

## 2017-05-30 DIAGNOSIS — R07.9 CHEST PAIN, UNSPECIFIED TYPE: Primary | ICD-10-CM

## 2017-05-30 LAB
ALBUMIN SERPL-MCNC: 3.6 G/DL (ref 3.4–5)
ALP SERPL-CCNC: 93 U/L (ref 40–150)
ALT SERPL W P-5'-P-CCNC: 20 U/L (ref 0–70)
ANION GAP SERPL CALCULATED.3IONS-SCNC: 10 MMOL/L (ref 3–14)
APTT PPP: 31 SEC (ref 22–37)
AST SERPL W P-5'-P-CCNC: 16 U/L (ref 0–45)
BASOPHILS # BLD AUTO: 0 10E9/L (ref 0–0.2)
BASOPHILS NFR BLD AUTO: 0.3 %
BILIRUB SERPL-MCNC: 0.5 MG/DL (ref 0.2–1.3)
BUN SERPL-MCNC: 20 MG/DL (ref 7–30)
CALCIUM SERPL-MCNC: 9 MG/DL (ref 8.5–10.1)
CHLORIDE SERPL-SCNC: 106 MMOL/L (ref 94–109)
CO2 SERPL-SCNC: 23 MMOL/L (ref 20–32)
CREAT SERPL-MCNC: 1.32 MG/DL (ref 0.66–1.25)
DIFFERENTIAL METHOD BLD: ABNORMAL
EOSINOPHIL # BLD AUTO: 0.6 10E9/L (ref 0–0.7)
EOSINOPHIL NFR BLD AUTO: 9 %
ERYTHROCYTE [DISTWIDTH] IN BLOOD BY AUTOMATED COUNT: 14.9 % (ref 10–15)
ERYTHROCYTE [DISTWIDTH] IN BLOOD BY AUTOMATED COUNT: 15.4 % (ref 10–15)
GFR SERPL CREATININE-BSD FRML MDRD: 53 ML/MIN/1.7M2
GLUCOSE BLDC GLUCOMTR-MCNC: 167 MG/DL (ref 70–99)
GLUCOSE SERPL-MCNC: 180 MG/DL (ref 70–99)
HCT VFR BLD AUTO: 36 % (ref 40–53)
HCT VFR BLD AUTO: 36.8 % (ref 40–53)
HGB BLD-MCNC: 10.9 G/DL (ref 13.3–17.7)
HGB BLD-MCNC: 11.4 G/DL (ref 13.3–17.7)
IMM GRANULOCYTES # BLD: 0 10E9/L (ref 0–0.4)
IMM GRANULOCYTES NFR BLD: 0.3 %
INR PPP: 0.96 (ref 0.86–1.14)
LYMPHOCYTES # BLD AUTO: 1.1 10E9/L (ref 0.8–5.3)
LYMPHOCYTES NFR BLD AUTO: 17.9 %
MCH RBC QN AUTO: 25.1 PG (ref 26.5–33)
MCH RBC QN AUTO: 25.6 PG (ref 26.5–33)
MCHC RBC AUTO-ENTMCNC: 30.3 G/DL (ref 31.5–36.5)
MCHC RBC AUTO-ENTMCNC: 31 G/DL (ref 31.5–36.5)
MCV RBC AUTO: 83 FL (ref 78–100)
MCV RBC AUTO: 83 FL (ref 78–100)
MONOCYTES # BLD AUTO: 0.4 10E9/L (ref 0–1.3)
MONOCYTES NFR BLD AUTO: 7.1 %
NEUTROPHILS # BLD AUTO: 4.1 10E9/L (ref 1.6–8.3)
NEUTROPHILS NFR BLD AUTO: 65.4 %
PLATELET # BLD AUTO: 283 10E9/L (ref 150–450)
PLATELET # BLD AUTO: 312 10E9/L (ref 150–450)
POTASSIUM SERPL-SCNC: 4.3 MMOL/L (ref 3.4–5.3)
PROT SERPL-MCNC: 7.3 G/DL (ref 6.8–8.8)
RBC # BLD AUTO: 4.35 10E12/L (ref 4.4–5.9)
RBC # BLD AUTO: 4.46 10E12/L (ref 4.4–5.9)
SODIUM SERPL-SCNC: 139 MMOL/L (ref 133–144)
TROPONIN I SERPL-MCNC: 0.33 UG/L (ref 0–0.04)
TROPONIN I SERPL-MCNC: 0.36 UG/L (ref 0–0.04)
WBC # BLD AUTO: 6.2 10E9/L (ref 4–11)
WBC # BLD AUTO: 7.1 10E9/L (ref 4–11)

## 2017-05-30 PROCEDURE — 00000146 ZZHCL STATISTIC GLUCOSE BY METER IP

## 2017-05-30 PROCEDURE — 85610 PROTHROMBIN TIME: CPT | Performed by: EMERGENCY MEDICINE

## 2017-05-30 PROCEDURE — 80053 COMPREHEN METABOLIC PANEL: CPT | Performed by: EMERGENCY MEDICINE

## 2017-05-30 PROCEDURE — 25000128 H RX IP 250 OP 636: Performed by: INTERNAL MEDICINE

## 2017-05-30 PROCEDURE — 25000132 ZZH RX MED GY IP 250 OP 250 PS 637: Mod: GY | Performed by: EMERGENCY MEDICINE

## 2017-05-30 PROCEDURE — 93010 ELECTROCARDIOGRAM REPORT: CPT | Performed by: INTERNAL MEDICINE

## 2017-05-30 PROCEDURE — A9270 NON-COVERED ITEM OR SERVICE: HCPCS | Mod: GY | Performed by: INTERNAL MEDICINE

## 2017-05-30 PROCEDURE — 25000132 ZZH RX MED GY IP 250 OP 250 PS 637: Mod: GY | Performed by: INTERNAL MEDICINE

## 2017-05-30 PROCEDURE — 85025 COMPLETE CBC W/AUTO DIFF WBC: CPT | Performed by: EMERGENCY MEDICINE

## 2017-05-30 PROCEDURE — 93005 ELECTROCARDIOGRAM TRACING: CPT

## 2017-05-30 PROCEDURE — 71010 XR CHEST PORT 1 VW: CPT

## 2017-05-30 PROCEDURE — 84484 ASSAY OF TROPONIN QUANT: CPT | Performed by: INTERNAL MEDICINE

## 2017-05-30 PROCEDURE — 85027 COMPLETE CBC AUTOMATED: CPT | Performed by: INTERNAL MEDICINE

## 2017-05-30 PROCEDURE — 84484 ASSAY OF TROPONIN QUANT: CPT | Performed by: EMERGENCY MEDICINE

## 2017-05-30 PROCEDURE — 96365 THER/PROPH/DIAG IV INF INIT: CPT

## 2017-05-30 PROCEDURE — 99285 EMERGENCY DEPT VISIT HI MDM: CPT | Mod: 25

## 2017-05-30 PROCEDURE — 21400006 ZZH R&B CCU INTERMEDIATE UMMC

## 2017-05-30 PROCEDURE — A9270 NON-COVERED ITEM OR SERVICE: HCPCS | Mod: GY | Performed by: EMERGENCY MEDICINE

## 2017-05-30 PROCEDURE — 96376 TX/PRO/DX INJ SAME DRUG ADON: CPT

## 2017-05-30 PROCEDURE — 99285 EMERGENCY DEPT VISIT HI MDM: CPT | Mod: 25 | Performed by: EMERGENCY MEDICINE

## 2017-05-30 PROCEDURE — 25000128 H RX IP 250 OP 636: Performed by: EMERGENCY MEDICINE

## 2017-05-30 PROCEDURE — 93010 ELECTROCARDIOGRAM REPORT: CPT | Performed by: EMERGENCY MEDICINE

## 2017-05-30 PROCEDURE — 85730 THROMBOPLASTIN TIME PARTIAL: CPT | Performed by: EMERGENCY MEDICINE

## 2017-05-30 PROCEDURE — 36415 COLL VENOUS BLD VENIPUNCTURE: CPT | Performed by: INTERNAL MEDICINE

## 2017-05-30 RX ORDER — AMLODIPINE BESYLATE 10 MG/1
10 TABLET ORAL DAILY
Status: DISCONTINUED | OUTPATIENT
Start: 2017-05-31 | End: 2017-05-31 | Stop reason: HOSPADM

## 2017-05-30 RX ORDER — ACETAMINOPHEN 325 MG/1
650 TABLET ORAL EVERY 4 HOURS PRN
Status: DISCONTINUED | OUTPATIENT
Start: 2017-05-30 | End: 2017-05-31 | Stop reason: HOSPADM

## 2017-05-30 RX ORDER — SENNOSIDES 8.6 MG
8.6 TABLET ORAL 2 TIMES DAILY
Status: DISCONTINUED | OUTPATIENT
Start: 2017-05-30 | End: 2017-05-31 | Stop reason: HOSPADM

## 2017-05-30 RX ORDER — TRAZODONE HYDROCHLORIDE 150 MG/1
300 TABLET ORAL
Status: DISCONTINUED | OUTPATIENT
Start: 2017-05-30 | End: 2017-05-31 | Stop reason: HOSPADM

## 2017-05-30 RX ORDER — BUSPIRONE HYDROCHLORIDE 15 MG/1
15 TABLET ORAL 2 TIMES DAILY
Status: DISCONTINUED | OUTPATIENT
Start: 2017-05-30 | End: 2017-05-31 | Stop reason: HOSPADM

## 2017-05-30 RX ORDER — GABAPENTIN 100 MG/1
100 CAPSULE ORAL 2 TIMES DAILY
Status: DISCONTINUED | OUTPATIENT
Start: 2017-05-30 | End: 2017-05-31 | Stop reason: HOSPADM

## 2017-05-30 RX ORDER — SERTRALINE HYDROCHLORIDE 100 MG/1
200 TABLET, FILM COATED ORAL DAILY
Status: DISCONTINUED | OUTPATIENT
Start: 2017-05-31 | End: 2017-05-31 | Stop reason: HOSPADM

## 2017-05-30 RX ORDER — HYDROCHLOROTHIAZIDE 25 MG/1
25 TABLET ORAL DAILY
Status: DISCONTINUED | OUTPATIENT
Start: 2017-05-31 | End: 2017-05-31 | Stop reason: HOSPADM

## 2017-05-30 RX ORDER — ASPIRIN 81 MG/1
324 TABLET, CHEWABLE ORAL ONCE
Status: COMPLETED | OUTPATIENT
Start: 2017-05-30 | End: 2017-05-30

## 2017-05-30 RX ORDER — HEPARIN SODIUM 10000 [USP'U]/100ML
0-3500 INJECTION, SOLUTION INTRAVENOUS CONTINUOUS
Status: DISCONTINUED | OUTPATIENT
Start: 2017-05-30 | End: 2017-05-31

## 2017-05-30 RX ORDER — ALUMINA, MAGNESIA, AND SIMETHICONE 2400; 2400; 240 MG/30ML; MG/30ML; MG/30ML
15-30 SUSPENSION ORAL EVERY 4 HOURS PRN
Status: DISCONTINUED | OUTPATIENT
Start: 2017-05-30 | End: 2017-05-31 | Stop reason: HOSPADM

## 2017-05-30 RX ORDER — NALOXONE HYDROCHLORIDE 0.4 MG/ML
.1-.4 INJECTION, SOLUTION INTRAMUSCULAR; INTRAVENOUS; SUBCUTANEOUS
Status: DISCONTINUED | OUTPATIENT
Start: 2017-05-30 | End: 2017-05-31 | Stop reason: HOSPADM

## 2017-05-30 RX ORDER — ACETAMINOPHEN 650 MG/1
650 SUPPOSITORY RECTAL EVERY 4 HOURS PRN
Status: DISCONTINUED | OUTPATIENT
Start: 2017-05-30 | End: 2017-05-31 | Stop reason: HOSPADM

## 2017-05-30 RX ORDER — NITROGLYCERIN 0.4 MG/1
0.4 TABLET SUBLINGUAL EVERY 5 MIN PRN
Status: DISCONTINUED | OUTPATIENT
Start: 2017-05-30 | End: 2017-05-31 | Stop reason: HOSPADM

## 2017-05-30 RX ORDER — NICOTINE POLACRILEX 4 MG
15-30 LOZENGE BUCCAL
Status: DISCONTINUED | OUTPATIENT
Start: 2017-05-30 | End: 2017-05-31

## 2017-05-30 RX ORDER — OXYCODONE HYDROCHLORIDE 5 MG/1
5 TABLET ORAL EVERY 6 HOURS PRN
Status: DISCONTINUED | OUTPATIENT
Start: 2017-05-30 | End: 2017-05-31 | Stop reason: HOSPADM

## 2017-05-30 RX ORDER — ASCORBIC ACID 500 MG
500 TABLET ORAL DAILY
Status: DISCONTINUED | OUTPATIENT
Start: 2017-05-31 | End: 2017-05-30

## 2017-05-30 RX ORDER — DEXTROSE MONOHYDRATE 25 G/50ML
25-50 INJECTION, SOLUTION INTRAVENOUS
Status: DISCONTINUED | OUTPATIENT
Start: 2017-05-30 | End: 2017-05-31

## 2017-05-30 RX ORDER — LIDOCAINE 40 MG/G
CREAM TOPICAL
Status: DISCONTINUED | OUTPATIENT
Start: 2017-05-30 | End: 2017-05-31 | Stop reason: HOSPADM

## 2017-05-30 RX ORDER — OXYCODONE HCL 10 MG/1
30 TABLET, FILM COATED, EXTENDED RELEASE ORAL EVERY MORNING
Status: DISCONTINUED | OUTPATIENT
Start: 2017-05-31 | End: 2017-05-31 | Stop reason: HOSPADM

## 2017-05-30 RX ORDER — SPIRONOLACTONE 25 MG/1
25 TABLET ORAL DAILY
Status: DISCONTINUED | OUTPATIENT
Start: 2017-05-31 | End: 2017-05-31 | Stop reason: HOSPADM

## 2017-05-30 RX ORDER — ATORVASTATIN CALCIUM 80 MG/1
80 TABLET, FILM COATED ORAL AT BEDTIME
Status: DISCONTINUED | OUTPATIENT
Start: 2017-05-30 | End: 2017-05-31 | Stop reason: HOSPADM

## 2017-05-30 RX ORDER — LISINOPRIL 40 MG/1
40 TABLET ORAL DAILY
Status: DISCONTINUED | OUTPATIENT
Start: 2017-05-31 | End: 2017-05-31 | Stop reason: HOSPADM

## 2017-05-30 RX ORDER — CLOPIDOGREL BISULFATE 75 MG/1
75 TABLET ORAL DAILY
Status: DISCONTINUED | OUTPATIENT
Start: 2017-05-31 | End: 2017-05-31 | Stop reason: HOSPADM

## 2017-05-30 RX ADMIN — OMEPRAZOLE 20 MG: 20 CAPSULE, DELAYED RELEASE ORAL at 21:05

## 2017-05-30 RX ADMIN — TRAZODONE HYDROCHLORIDE 300 MG: 150 TABLET ORAL at 21:05

## 2017-05-30 RX ADMIN — Medication 5000 UNITS: at 15:24

## 2017-05-30 RX ADMIN — HEPARIN SODIUM 12 UNITS/KG/HR: 10000 INJECTION, SOLUTION INTRAVENOUS at 15:25

## 2017-05-30 RX ADMIN — BUSPIRONE HYDROCHLORIDE 15 MG: 15 TABLET ORAL at 21:05

## 2017-05-30 RX ADMIN — NITROGLYCERIN 15 MG: 20 OINTMENT TOPICAL at 15:14

## 2017-05-30 RX ADMIN — HEPARIN SODIUM 1200 UNITS/HR: 10000 INJECTION, SOLUTION INTRAVENOUS at 20:48

## 2017-05-30 RX ADMIN — ASPIRIN 324 MG: 81 TABLET, CHEWABLE ORAL at 15:15

## 2017-05-30 RX ADMIN — GABAPENTIN 100 MG: 100 CAPSULE ORAL at 21:05

## 2017-05-30 RX ADMIN — ATORVASTATIN CALCIUM 80 MG: 80 TABLET, FILM COATED ORAL at 21:06

## 2017-05-30 ASSESSMENT — ACTIVITIES OF DAILY LIVING (ADL)
DRESS: 0-->INDEPENDENT
RETIRED_EATING: 0-->INDEPENDENT
TOILETING: 0-->INDEPENDENT
SWALLOWING: 0-->SWALLOWS FOODS/LIQUIDS WITHOUT DIFFICULTY
COGNITION: 0 - NO COGNITION ISSUES REPORTED
RETIRED_COMMUNICATION: 0-->UNDERSTANDS/COMMUNICATES WITHOUT DIFFICULTY
AMBULATION: 1-->ASSISTIVE EQUIPMENT
NUMBER_OF_TIMES_PATIENT_HAS_FALLEN_WITHIN_LAST_SIX_MONTHS: 30
BATHING: 0-->INDEPENDENT
FALL_HISTORY_WITHIN_LAST_SIX_MONTHS: YES
TRANSFERRING: 1-->ASSISTIVE EQUIPMENT

## 2017-05-30 NOTE — CONSULTS
Patient to start the heparin C-V/Vascular protocol with a goal anti 10a level of 0.15-0.35. Using a HT of 68 inches and a WT of 102 kg, a dosing WT of 81.9 kg was calculated. Based on this dosing WT, give patient a heparin bolus of 5000 units from the bag and then start a drip at 1000 units/hr. Check the patient's anti 10a level 6 hours after starting the drip at ~2130.   Per C-V/Vascular protocol.    Roni RossiD

## 2017-05-30 NOTE — LETTER
Transition Communication Hand-off for Care Transitions to Next Level of Care Provider    Name: Vincent Mishra  MRN #: [id number]  Primary Care Provider: Keyla Fonseca     Primary Clinic[facility] [address] [address]     Reason for Hospitalization:  N-STEMI  ACS (acute coronary syndrome) (H)  Admit Date/Time: 5/30/2017  6:36 PM  Discharge Date:  5/31/17  Payor Source: Payor: MEDICARE / Plan: MEDICARE / Product Type: Medicare /     RReason for Communication Hand-off Referral: Multiple providers/specialties  Discharge Plan:   Concern for non-adherence with plan of care:   no  Discharge Needs Assessment:  Needs       Most Recent Value    Equipment Currently Used at Home crutches, forearm, wheelchair    Transportation Available family or friend will provide, car        Follow-up specialty is recommended: Yes    Follow-up plan:  Future Appointments  Date Time Provider Department Center   6/14/2017 11:00 AM [facility]   6/21/2017 12:00 PM Rosanne Stewart, [facility]   6/28/2017 12:00 PM Rosanne Stewart, PT [facility]            [place] Recommendations:  Post hospitalization follow up.     SHAKEEL HOUGH RN CC    AVS/Discharge Summary is the source of truth; this is a helpful guide for improved communication of patient story

## 2017-05-30 NOTE — IP AVS SNAPSHOT
MRN:[id number]                      After Visit Summary   5/30/2017    Vincent Mishra    MRN: [id number]           Thank you!     Thank you for choosing [facility] for your care. Our goal is always to provide you with excellent care. Hearing back from our patients is one way we can continue to improve our services. Please take a few minutes to complete the written survey that you may receive in the mail after you visit with us. Thank you!        Patient Information     Date Of Birth          1940        Designated Caregiver       Most Recent Value    Caregiver    Will someone help with your care after discharge? yes    Name of designated caregiver Mima    Phone number of caregiver same as pt     Caregiver address same as pt      About your hospital stay     You were admitted on:  May 30, 2017 You last received care in the:  [address] [facility]    You were discharged on:  May 31, 2017        Reason for your hospital stay       You were admitted to the hospital for chest discomfort and underwent a stress test which was negative.                  Who to Call     For medical emergencies, please call 911.  For non-urgent questions about your medical care, please call your primary care provider or clinic, [phone]          Attending Provider     Provider Specialty    Carlos Leung MD Cardiology       Primary Care Provider Office Phone # Fax #    Keyla Fonseca [phone]      After Care Instructions     Activity       Your activity upon discharge: Activity as tolerated.            Diet       Follow this diet upon discharge: Regular                  Follow-up Appointments     Adult [facility] Follow-up and recommended labs and tests       Follow up with primary care provider, Keyla Fonseca, within 1 week of your hospitalization and schedule an appointment with your cardiologist as instructed.     Appointments on [place] and/or [facility] (with [facility] or [facility]  provider or service). Call [phone] if you haven't heard regarding these appointments within 7 days of discharge.                  Your next 10 appointments already scheduled     Jun 14, 2017 11:00 AM CDT   Return Visit with Geremias Phelps MD   [facility] ([facility])    [address] [facility] [place] [address]   [phone]            Jun 21, 2017 12:00 PM CDT   [facility] with Rosanne Stewart PT   [facility]    [address] [facility] [address] [phone]            Jun 28, 2017 12:00 PM CDT   [facility] Spine with Rosanne Stewart PT   [facility]    [address] [facility] [address] [phone]              Further instructions from your care team           Pending Results     No orders found for last 3 day(s).            Statement of Approval     Ordered          05/31/17 1800  I have reviewed and agree with all the recommendations and orders detailed in this document.  EFFECTIVE NOW     Approved and electronically signed by:  Carlos Leung MD             Admission Information     Date & Time Provider Department Dept. Phone    5/30/2017 Carlos Leung MD [address] [facility] [phone]      Your Vitals Were     Blood Pressure Temperature Respirations Weight Pulse Oximetry BMI (Body Mass Index)    130/72 (BP Location: Right arm) 98  F (36.7  C) (Oral) 16 101.7 kg (224 lb 3.2 oz) 96% 34.09 kg/m2      [employer] Information     [employer] gives you secure access to your electronic health record. If you see a primary care provider, you can also send messages to your care team and make appointments. If you have questions, please call your primary care clinic.  If you do not have a primary care provider, please call [phone] and they will assist you.        Care EveryWhere ID     This is your Care EveryWhere ID. This could be used by other organizations to access your  [facility] medical records  [id number]           Review of your medicines      START taking        Dose / Directions    aspirin 81 MG EC tablet   Used for:  Chest pain, unspecified type, NSTEMI (non-ST elevated myocardial infarction) (H)        Dose:  81 mg   Start taking on:  6/1/2017   Take 1 tablet (81 mg) by mouth daily   Refills:  0         CONTINUE these medicines which may have CHANGED, or have new prescriptions. If we are uncertain of the size of tablets/capsules you have at home, strength may be listed as something that might have changed.        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 Make sure you understand how and when to take each.   Used for:  Lumbar back pain with radiculopathy affecting left lower extremity        Take 1 daily as needed  for severe pain may have 15 per month this is a 3 month supply   Quantity:  45 tablet   Refills:  0       * oxyCODONE 30 MG 12 hr tablet   Commonly known as:  OxyCONTIN   This may have changed:  when to take this   Used for:  Hip pain, bilateral, Chronic bilateral low back pain with sciatica, sciatica laterality unspecified        Dose:  30 mg   Take 1 tablet (30 mg) by mouth every 12 hours   Quantity:  60 tablet   Refills:  0       sertraline 100 MG tablet   Commonly known as:  ZOLOFT   This may have changed:    - how much to take  - how to take this  - when to take this  - additional instructions   Used for:  Major depressive disorder,  recurrent episode, moderate (H)        Take 2 per day   Quantity:  225 tablet   Refills:  1       * Notice:  This list has 2 medication(s) that are the same as other medications prescribed for you. Read the directions carefully, and ask your doctor or other care provider to review them with you.      CONTINUE these medicines which have NOT CHANGED        Dose / Directions    acetaminophen 325 MG tablet   Commonly known as:  TYLENOL   Used for:  Tear of meniscus of left knee        Dose:  650 mg   Take 2 tablets by mouth every 4 hours as needed.   Quantity:  250 tablet   Refills:  1       amLODIPine 10 MG tablet   Commonly known as:  NORVASC   Used for:  Essential hypertension with goal blood pressure less than 140/90        Dose:  10 mg   Take 1 tablet (10 mg) by mouth daily   Quantity:  90 tablet   Refills:  1       ascorbic acid 500 MG tablet   Commonly known as:  VITAMIN C        Dose:  500 mg   Take 500 mg by mouth daily   Refills:  0       atorvastatin 80 MG tablet   Commonly known as:  LIPITOR   Used for:  Hyperlipidemia LDL goal <100        Dose:  80 mg   Take 1 tablet (80 mg) by mouth daily   Quantity:  90 tablet   Refills:  3       blood glucose monitoring meter device kit   Commonly known as:  no brand specified   Used for:  Type 2 diabetes mellitus with hyperglycemia, without long-term current use of insulin (H)        Use to test blood sugar 2 times daily or as directed.   Quantity:  1 kit   Refills:  0       blood glucose monitoring test strip   Commonly known as:  ONE TOUCH ULTRA   Used for:  Type 2 diabetes mellitus with other circulatory complications (H)        Use to test blood sugars 2 times daily or as directed.   Quantity:  200 strip   Refills:  3       busPIRone 15 MG tablet   Commonly known as:  BUSPAR   Used for:  Depression with anxiety        Dose:  15 mg   Take 1 tablet (15 mg) by mouth 2 times daily   Quantity:  180 tablet   Refills:  1       calcium + D 600-200 MG-UNIT Tabs   Generic  drug:  calcium carbonate-vitamin D        Dose:  2 tablet   Take 2 tablets by mouth daily.   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 (H), Right bundle branch block, NSTEMI (non-ST elevated myocardial infarction) (H)        TAKE 1 TABLET BY MOUTH DAILY   Quantity:  90 tablet   Refills:  3       hydrochlorothiazide 25 MG tablet   Commonly known as:  HYDRODIURIL   Used for:  Essential hypertension with goal blood pressure less than 140/90        Dose:  25 mg   Take 1 tablet (25 mg) by mouth daily   Quantity:  90 tablet   Refills:  3       * ibuprofen 800 MG tablet   Commonly known as:  ADVIL/MOTRIN   Used for:  Primary osteoarthritis of both knees        Dose:  800 mg   Take 1 tablet (800 mg) by mouth every 8 hours as needed for moderate pain   Quantity:  90 tablet   Refills:  0       * ibuprofen 800 MG tablet   Commonly known as:  ADVIL/MOTRIN   Used for:  Primary osteoarthritis of both knees        TAKE 1 TABLET BY MOUTH EVERY 8 HOURS AS NEEDED FOR MODERATE PAIN   Quantity:  120 tablet   Refills:  1       [employer] FINEPOINT LANCETS Misc   Used for:  Type 2 diabetes, HbA1c goal < 7% (H)        Use to test blood sugars 1 times daily or as directed.   Quantity:  100 each   Refills:  12       lisinopril 40 MG tablet   Commonly known as:  PRINIVIL/ZESTRIL   Used for:  Essential hypertension with goal blood pressure less than 140/90        Dose:  40 mg   Take 1 tablet (40 mg) by mouth daily   Quantity:  90 tablet   Refills:  2       metFORMIN 500 MG 24 hr tablet   Commonly known as:  GLUCOPHAGE-XR   Used for:  Type 2 diabetes mellitus with diabetic polyneuropathy (H)        Dose:  1000 mg   Take 2 tablets (1,000 mg) by  mouth 2 times daily (with meals)   Quantity:  360 tablet   Refills:  2       nitroglycerin 0.4 MG sublingual tablet   Commonly known as:  NITROSTAT   Used for:  CAD (coronary artery disease)        Dose:  0.4 mg   Place 1 tablet (0.4 mg) under the tongue every 5 minutes as needed for chest pain   Quantity:  25 tablet   Refills:  1       omeprazole 20 MG CR capsule   Commonly known as:  priLOSEC   Used for:  Gastroesophageal reflux disease without esophagitis        Dose:  20 mg   Take 1 capsule (20 mg) by mouth 2 times daily   Quantity:  180 capsule   Refills:  2       * order for DME   Used for:  Spinal stenosis, lumbar region, with neurogenic claudication        Equipment being ordered: Wheelchair motorized for patient with spinal stenosis and neurogenic claudication   Quantity:  1 Device   Refills:  0       * order for DME   Used for:  Chronic bilateral low back pain with sciatica, sciatica laterality unspecified, Hip pain, bilateral        Equipment being ordered: TENS   Quantity:  1 Units   Refills:  0       spironolactone 25 MG tablet   Commonly known as:  ALDACTONE   Used for:  Essential hypertension with goal blood pressure less than 140/90        Dose:  25 mg   Take 1 tablet (25 mg) by mouth daily   Quantity:  90 tablet   Refills:  0       traZODone 100 MG tablet   Commonly known as:  DESYREL   Used for:  Primary insomnia        Dose:  300 mg   Take 3 tablets (300 mg) by mouth nightly as needed for sleep 300 mg at HS for sleep   Quantity:  270 tablet   Refills:  2       triamcinolone 0.1 % paste   Commonly known as:  KENALOG   Used for:  Denture sore mouth        Take by mouth 2 times daily Apply to sore area twice a day for a week   Quantity:  5 g   Refills:  0       * Notice:  This list has 4 medication(s) that are the same as other medications prescribed for you. Read the directions carefully, and ask your doctor or other care provider to review them with you.         Where to get your medicines       Some of these will need a paper prescription and others can be bought over the counter. Ask your nurse if you have questions.     You don't need a prescription for these medications     aspirin 81 MG EC tablet                Protect others around you: Learn how to safely use, store and throw away your medicines at www.disposemymeds.org.             Medication List: This is a list of all your medications and when to take them. Check marks below indicate your daily home schedule. Keep this list as a reference.      Medications           Morning Afternoon Evening Bedtime As Needed    acetaminophen 325 MG tablet   Commonly known as:  TYLENOL   Take 2 tablets by mouth every 4 hours as needed.                                amLODIPine 10 MG tablet   Commonly known as:  NORVASC   Take 1 tablet (10 mg) by mouth daily   Last time this was given:  10 mg on 5/31/2017  8:04 AM                                ascorbic acid 500 MG tablet   Commonly known as:  VITAMIN C   Take 500 mg by mouth daily                                aspirin 81 MG EC tablet   Take 1 tablet (81 mg) by mouth daily   Start taking on:  6/1/2017                                atorvastatin 80 MG tablet   Commonly known as:  LIPITOR   Take 1 tablet (80 mg) by mouth daily   Last time this was given:  80 mg on 5/30/2017  9:06 PM                                blood glucose monitoring meter device kit   Commonly known as:  no brand specified   Use to test blood sugar 2 times daily or as directed.                                blood glucose monitoring test strip   Commonly known as:  ONE TOUCH ULTRA   Use to test blood sugars 2 times daily or as directed.                                busPIRone 15 MG tablet   Commonly known as:  BUSPAR   Take 1 tablet (15 mg) by mouth 2 times daily   Last time this was given:  15 mg on 5/31/2017  8:04 AM                                calcium + D 600-200 MG-UNIT Tabs   Take 2 tablets by mouth daily.   Generic drug:  calcium  carbonate-vitamin D                                cholecalciferol 1000 UNITS capsule   Commonly known as:  vitamin  -D   Take 1 capsule by mouth daily                                clopidogrel 75 MG tablet   Commonly known as:  PLAVIX   TAKE 1 TABLET BY MOUTH DAILY   Last time this was given:  75 mg on 5/31/2017  8:04 AM                                gabapentin 100 MG capsule   Commonly known as:  NEURONTIN   Take 1 tablet increase by 1 tablet every 2 days to 6 tablets 3 times per day   Last time this was given:  100 mg on 5/31/2017  8:04 AM                                hydrochlorothiazide 25 MG tablet   Commonly known as:  HYDRODIURIL   Take 1 tablet (25 mg) by mouth daily   Last time this was given:  25 mg on 5/31/2017  8:05 AM                                * ibuprofen 800 MG tablet   Commonly known as:  ADVIL/MOTRIN   Take 1 tablet (800 mg) by mouth every 8 hours as needed for moderate pain                                * ibuprofen 800 MG tablet   Commonly known as:  ADVIL/MOTRIN   TAKE 1 TABLET BY MOUTH EVERY 8 HOURS AS NEEDED FOR MODERATE PAIN                                [employer] FINEPOINT LANCETS Misc   Use to test blood sugars 1 times daily or as directed.                                lisinopril 40 MG tablet   Commonly known as:  PRINIVIL/ZESTRIL   Take 1 tablet (40 mg) by mouth daily   Last time this was given:  40 mg on 5/31/2017  8:04 AM                                metFORMIN 500 MG 24 hr tablet   Commonly known as:  GLUCOPHAGE-XR   Take 2 tablets (1,000 mg) by mouth 2 times daily (with meals)                                nitroglycerin 0.4 MG sublingual tablet   Commonly known as:  NITROSTAT   Place 1 tablet (0.4 mg) under the tongue every 5 minutes as needed for chest pain                                omeprazole 20 MG CR capsule   Commonly known as:  priLOSEC   Take 1 capsule (20 mg) by mouth 2 times daily   Last time this was given:  20 mg on 5/31/2017  8:04 AM                                 * order for DME   Equipment being ordered: Wheelchair motorized for patient with spinal stenosis and neurogenic claudication                                * order for DME   Equipment being ordered: TENS                                * oxyCODONE 5 MG IR tablet   Commonly known as:  ROXICODONE   Take 1 daily as needed  for severe pain may have 15 per month this is a 3 month supply                                * oxyCODONE 30 MG 12 hr tablet   Commonly known as:  OxyCONTIN   Take 1 tablet (30 mg) by mouth every 12 hours   Last time this was given:  30 mg on 5/31/2017  8:04 AM                                sertraline 100 MG tablet   Commonly known as:  ZOLOFT   Take 2 per day   Last time this was given:  200 mg on 5/31/2017  8:04 AM                                spironolactone 25 MG tablet   Commonly known as:  ALDACTONE   Take 1 tablet (25 mg) by mouth daily   Last time this was given:  25 mg on 5/31/2017  8:05 AM                                traZODone 100 MG tablet   Commonly known as:  DESYREL   Take 3 tablets (300 mg) by mouth nightly as needed for sleep 300 mg at HS for sleep   Last time this was given:  300 mg on 5/30/2017  9:05 PM                                triamcinolone 0.1 % paste   Commonly known as:  KENALOG   Take by mouth 2 times daily Apply to sore area twice a day for a week                                * Notice:  This list has 6 medication(s) that are the same as other medications prescribed for you. Read the directions carefully, and ask your doctor or other care provider to review them with you.

## 2017-05-30 NOTE — ED NOTES
Pt presents to ED with complaints of chest pain that started at 5:30 am. Nothing seems to make the pain worse, laying on the left side makes the pain better. Pt had several episodes of dry heaves when the pain started in the night. Pt describes the pain as stabbing. Pt still has some nausea and feels dizzy because of his eyes (they see cross-eyed/double vision).

## 2017-05-30 NOTE — IP AVS SNAPSHOT
[address] [address]    Phone:  [phone]                                       After Visit Summary   5/30/2017    Vincent Mishra    MRN: [id number]           After Visit Summary Signature Page     I have received my discharge instructions, and my questions have been answered. I have discussed any challenges I see with this plan with the nurse or doctor.    ..........................................................................................................................................  Patient/Patient Representative Signature      ..........................................................................................................................................  Patient Representative Print Name and Relationship to Patient    ..................................................               ................................................  Date                                            Time    ..........................................................................................................................................  Reviewed by Signature/Title    ...................................................              ..............................................  Date                                                            Time

## 2017-05-30 NOTE — ED PROVIDER NOTES
Chief complaint chest pain    77-year-old male presents from home with spouse.  History of coronary disease, stents ×3, maintained on Plavix 75 mg daily which he took this morning.  5:00 this morning developed substernal chest pain, nausea and gagging, associated shortness of air.  Symptoms waxed and waned for approximately an hour, subsequently was able to lie down and sleep.  Continued to have very mild substernal chest discomfort and a feeling of a sharp poking sensation in the left parasternal.  He denies recent febrile illness, cough, abdominal pain, denies change in bowel or bladder.  Currently denies any substernal chest pain or shortness of air.    Past medical history, medications, allergies, social history, family history all reviewed.  Patient Active Problem List   Diagnosis     Tension headache     Trapezius strain     Hyperlipidemia LDL goal <100     Parotid mass     Diplopia     Neuropathy (H)     Vision loss night     Neck pain     B12 deficiency     Tear of meniscus of left knee     Hypertension goal BP (blood pressure) < 140/90     C6-7 disc with radiculopathy     Right bundle branch block     Advanced directives, info letter sent 10/4/2011     Chest pain     Anatomic airway obstruction     Abnormal antinuclear antibody titer     Osteoarthritis, knee     Moderate major depression (H)     Orchitis, epididymitis, and epididymo-orchitis     Malignant neoplasm of kidney excluding renal pelvis (H)     Renal mass, left     Vitamin D deficiency disease     Health Care Home     Insomnia     Chronic low back pain     Abdominal pain, right upper quadrant     Esophageal reflux     Hard of hearing     Constipation     NSTEMI (non-ST elevated myocardial infarction) (H)     Myocardial infarction, nontransmural (H)     Acute myocardial infarction of inferolateral wall (H)     Obesity     Sinoatrial node dysfunction (H)     Right bundle branch block (RBBB)     Essential hypertension     Hyperlipidemia     Type 2  diabetes mellitus with other circulatory complications (H)     Thyroid nodule     Hip pain, bilateral     Claudication (H)     Bilateral low back pain with right-sided sciatica     Bilateral low back pain with left-sided sciatica     ROS: All other systems reviewed and are negative.    /70  Temp 98  F (36.7  C) (Oral)  Resp 16  SpO2 93%  Nontoxic appearing no respiratory distress alert and oriented ×3  Head atraumatic normocephalic  Conjunctiva noninjected, oropharynx moist without lesions or erythema  No cervical adenopathy neck supple full active painless range of motion  Lungs clear to auscultation  Heart regular no murmur  Abdomen soft nontender bowel sounds positive no masses or HSM  Strength and sensation grossly intact throughout the extremities, gait and station normal  Speech is fluent, good eye contact, thought processes are rational    EKG time 1401, symptoms none, normal sinus rhythm rate 58, first-degree AV block, right bundle-branch block, no acute ST or T-wave changes appreciated, unchanged from 6/17/16, Read Dr Jayy Cross    Results for orders placed or performed during the hospital encounter of 05/30/17   CBC with platelets differential   Result Value Ref Range    WBC 6.2 4.0 - 11.0 10e9/L    RBC Count 4.35 (L) 4.4 - 5.9 10e12/L    Hemoglobin 10.9 (L) 13.3 - 17.7 g/dL    Hematocrit 36.0 (L) 40.0 - 53.0 %    MCV 83 78 - 100 fl    MCH 25.1 (L) 26.5 - 33.0 pg    MCHC 30.3 (L) 31.5 - 36.5 g/dL    RDW 14.9 10.0 - 15.0 %    Platelet Count 312 150 - 450 10e9/L    Diff Method Automated Method     % Neutrophils 65.4 %    % Lymphocytes 17.9 %    % Monocytes 7.1 %    % Eosinophils 9.0 %    % Basophils 0.3 %    % Immature Granulocytes 0.3 %    Absolute Neutrophil 4.1 1.6 - 8.3 10e9/L    Absolute Lymphocytes 1.1 0.8 - 5.3 10e9/L    Absolute Monocytes 0.4 0.0 - 1.3 10e9/L    Absolute Eosinophils 0.6 0.0 - 0.7 10e9/L    Absolute Basophils 0.0 0.0 - 0.2 10e9/L    Abs Immature Granulocytes 0.0 0 - 0.4  10e9/L   Comprehensive metabolic panel   Result Value Ref Range    Sodium 139 133 - 144 mmol/L    Potassium 4.3 3.4 - 5.3 mmol/L    Chloride 106 94 - 109 mmol/L    Carbon Dioxide 23 20 - 32 mmol/L    Anion Gap 10 3 - 14 mmol/L    Glucose 180 (H) 70 - 99 mg/dL    Urea Nitrogen 20 7 - 30 mg/dL    Creatinine 1.32 (H) 0.66 - 1.25 mg/dL    GFR Estimate 53 (L) >60 mL/min/1.7m2    GFR Estimate If Black 64 >60 mL/min/1.7m2    Calcium 9.0 8.5 - 10.1 mg/dL    Bilirubin Total 0.5 0.2 - 1.3 mg/dL    Albumin 3.6 3.4 - 5.0 g/dL    Protein Total 7.3 6.8 - 8.8 g/dL    Alkaline Phosphatase 93 40 - 150 U/L    ALT 20 0 - 70 U/L    AST 16 0 - 45 U/L   Troponin I   Result Value Ref Range    Troponin I ES 0.328 (HH) 0.000 - 0.045 ug/L     Medications   nitroglycerin (NITRO-BID) ointment REMOVAL (not administered)   heparin infusion 25,000 units in 0.45% NaCl 250 mL (12 Units/kg/hr × 81.9 kg (Adjusted) Intravenous New Bag 5/30/17 1525)   aspirin chewable tablet 324 mg (324 mg Oral Given 5/30/17 1515)   nitroglycerin (NITRO-BID) 2 % ointment 15 mg (15 mg Transdermal Given 5/30/17 1514)   heparin Loading Dose bolus dose from infusion pump 5,000 Units (5,000 Units Intravenous Given 5/30/17 1524)     MDM: 77-year-old male with known coronary disease, 3 previous stents presents with chest pain outlined per HPI.  Asymptomatic here.  Vitals within normal limits.  No ischemic changes on cardiogram, troponin mild elevation 0.328.  Treated as above.  Reviewed with Dr. Carlos Leung cardiology [facility] who accepts patient in transfer.  Results of study, working diagnosis and plan reviewed with patient and spouse expressed understanding and agreement.    Impression:NSTEMI     Jayy Cross MD  05/30/17 3303

## 2017-05-30 NOTE — ED NOTES
DATE:  5/30/2017   TIME OF RECEIPT FROM LAB:  8102  LAB TEST:  troponin  LAB VALUE:  0.328  RESULTS GIVEN WITH READ-BACK TO (PROVIDER):  Jayy Cross MD  TIME LAB VALUE REPORTED TO PROVIDER:   6824

## 2017-05-31 ENCOUNTER — TRANSFERRED RECORDS (OUTPATIENT)
Dept: CARDIOLOGY | Facility: CLINIC | Age: 77
End: 2017-05-31

## 2017-05-31 ENCOUNTER — APPOINTMENT (OUTPATIENT)
Dept: NUCLEAR MEDICINE | Facility: CLINIC | Age: 77
DRG: 282 | End: 2017-05-31
Attending: INTERNAL MEDICINE
Payer: MEDICARE

## 2017-05-31 ENCOUNTER — APPOINTMENT (OUTPATIENT)
Dept: OCCUPATIONAL THERAPY | Facility: CLINIC | Age: 77
DRG: 282 | End: 2017-05-31
Attending: INTERNAL MEDICINE
Payer: MEDICARE

## 2017-05-31 VITALS
BODY MASS INDEX: 34.09 KG/M2 | TEMPERATURE: 98 F | SYSTOLIC BLOOD PRESSURE: 130 MMHG | RESPIRATION RATE: 16 BRPM | OXYGEN SATURATION: 96 % | WEIGHT: 224.2 LBS | DIASTOLIC BLOOD PRESSURE: 72 MMHG

## 2017-05-31 LAB
ANION GAP SERPL CALCULATED.3IONS-SCNC: 7 MMOL/L (ref 3–14)
BUN SERPL-MCNC: 21 MG/DL (ref 7–30)
CALCIUM SERPL-MCNC: 8.7 MG/DL (ref 8.5–10.1)
CHLORIDE SERPL-SCNC: 105 MMOL/L (ref 94–109)
CO2 SERPL-SCNC: 27 MMOL/L (ref 20–32)
CREAT SERPL-MCNC: 1.31 MG/DL (ref 0.66–1.25)
GFR SERPL CREATININE-BSD FRML MDRD: 53 ML/MIN/1.7M2
GLUCOSE BLDC GLUCOMTR-MCNC: 169 MG/DL (ref 70–99)
GLUCOSE BLDC GLUCOMTR-MCNC: 170 MG/DL (ref 70–99)
GLUCOSE BLDC GLUCOMTR-MCNC: 224 MG/DL (ref 70–99)
GLUCOSE BLDC GLUCOMTR-MCNC: 235 MG/DL (ref 70–99)
GLUCOSE SERPL-MCNC: 154 MG/DL (ref 70–99)
INTERPRETATION ECG - MUSE: NORMAL
LMWH PPP CHRO-ACNC: 0.3 IU/ML
LMWH PPP CHRO-ACNC: 0.4 IU/ML
POTASSIUM SERPL-SCNC: 4.3 MMOL/L (ref 3.4–5.3)
RADIOLOGIST FLAGS: NORMAL
SODIUM SERPL-SCNC: 139 MMOL/L (ref 133–144)
TROPONIN I SERPL-MCNC: 0.33 UG/L (ref 0–0.04)

## 2017-05-31 PROCEDURE — 00000146 ZZHCL STATISTIC GLUCOSE BY METER IP

## 2017-05-31 PROCEDURE — 93017 CV STRESS TEST TRACING ONLY: CPT

## 2017-05-31 PROCEDURE — 85520 HEPARIN ASSAY: CPT | Performed by: INTERNAL MEDICINE

## 2017-05-31 PROCEDURE — 97110 THERAPEUTIC EXERCISES: CPT | Mod: GO | Performed by: OCCUPATIONAL THERAPIST

## 2017-05-31 PROCEDURE — 25000128 H RX IP 250 OP 636: Performed by: INTERNAL MEDICINE

## 2017-05-31 PROCEDURE — 40000133 ZZH STATISTIC OT WARD VISIT: Performed by: OCCUPATIONAL THERAPIST

## 2017-05-31 PROCEDURE — 25000131 ZZH RX MED GY IP 250 OP 636 PS 637: Mod: GY | Performed by: NURSE PRACTITIONER

## 2017-05-31 PROCEDURE — 36415 COLL VENOUS BLD VENIPUNCTURE: CPT | Performed by: INTERNAL MEDICINE

## 2017-05-31 PROCEDURE — A9270 NON-COVERED ITEM OR SERVICE: HCPCS | Mod: GY | Performed by: INTERNAL MEDICINE

## 2017-05-31 PROCEDURE — 78452 HT MUSCLE IMAGE SPECT MULT: CPT

## 2017-05-31 PROCEDURE — 25000132 ZZH RX MED GY IP 250 OP 250 PS 637: Mod: GY | Performed by: INTERNAL MEDICINE

## 2017-05-31 PROCEDURE — 97535 SELF CARE MNGMENT TRAINING: CPT | Mod: GO | Performed by: OCCUPATIONAL THERAPIST

## 2017-05-31 PROCEDURE — 80048 BASIC METABOLIC PNL TOTAL CA: CPT | Performed by: INTERNAL MEDICINE

## 2017-05-31 PROCEDURE — 99223 1ST HOSP IP/OBS HIGH 75: CPT | Performed by: INTERNAL MEDICINE

## 2017-05-31 PROCEDURE — 84484 ASSAY OF TROPONIN QUANT: CPT | Performed by: INTERNAL MEDICINE

## 2017-05-31 PROCEDURE — A9502 TC99M TETROFOSMIN: HCPCS | Performed by: INTERNAL MEDICINE

## 2017-05-31 PROCEDURE — 97165 OT EVAL LOW COMPLEX 30 MIN: CPT | Mod: GO | Performed by: OCCUPATIONAL THERAPIST

## 2017-05-31 PROCEDURE — 94620 ZZHC PULMONARY STRESS TEST, SIMPLE: CPT | Mod: 26 | Performed by: INTERNAL MEDICINE

## 2017-05-31 PROCEDURE — 34300033 ZZH RX 343: Performed by: INTERNAL MEDICINE

## 2017-05-31 RX ORDER — ASPIRIN 81 MG/1
324 TABLET, CHEWABLE ORAL ONCE
Status: COMPLETED | OUTPATIENT
Start: 2017-05-31 | End: 2017-05-31

## 2017-05-31 RX ORDER — NICOTINE POLACRILEX 4 MG
15-30 LOZENGE BUCCAL
Status: DISCONTINUED | OUTPATIENT
Start: 2017-05-31 | End: 2017-05-31 | Stop reason: HOSPADM

## 2017-05-31 RX ORDER — REGADENOSON 0.08 MG/ML
0.4 INJECTION, SOLUTION INTRAVENOUS ONCE
Status: COMPLETED | OUTPATIENT
Start: 2017-05-31 | End: 2017-05-31

## 2017-05-31 RX ORDER — ASPIRIN 81 MG/1
81 TABLET ORAL DAILY
Status: DISCONTINUED | OUTPATIENT
Start: 2017-06-01 | End: 2017-05-31 | Stop reason: HOSPADM

## 2017-05-31 RX ORDER — DEXTROSE MONOHYDRATE 25 G/50ML
25-50 INJECTION, SOLUTION INTRAVENOUS
Status: DISCONTINUED | OUTPATIENT
Start: 2017-05-31 | End: 2017-05-31 | Stop reason: HOSPADM

## 2017-05-31 RX ADMIN — TETROFOSMIN 11.1 MCI.: 0.23 INJECTION, POWDER, LYOPHILIZED, FOR SOLUTION INTRAVENOUS at 09:52

## 2017-05-31 RX ADMIN — CLOPIDOGREL 75 MG: 75 TABLET, FILM COATED ORAL at 08:04

## 2017-05-31 RX ADMIN — OXYCODONE HYDROCHLORIDE 30 MG: 10 TABLET, FILM COATED, EXTENDED RELEASE ORAL at 08:04

## 2017-05-31 RX ADMIN — OMEPRAZOLE 20 MG: 20 CAPSULE, DELAYED RELEASE ORAL at 08:04

## 2017-05-31 RX ADMIN — GABAPENTIN 100 MG: 100 CAPSULE ORAL at 08:04

## 2017-05-31 RX ADMIN — BUSPIRONE HYDROCHLORIDE 15 MG: 15 TABLET ORAL at 08:04

## 2017-05-31 RX ADMIN — TETROFOSMIN 40.3 MCI.: 0.23 INJECTION, POWDER, LYOPHILIZED, FOR SOLUTION INTRAVENOUS at 11:40

## 2017-05-31 RX ADMIN — HYDROCHLOROTHIAZIDE 25 MG: 25 TABLET ORAL at 08:05

## 2017-05-31 RX ADMIN — HEPARIN SODIUM 1050 UNITS/HR: 10000 INJECTION, SOLUTION INTRAVENOUS at 08:34

## 2017-05-31 RX ADMIN — REGADENOSON 0.4 MG: 0.08 INJECTION, SOLUTION INTRAVENOUS at 11:23

## 2017-05-31 RX ADMIN — LISINOPRIL 40 MG: 40 TABLET ORAL at 08:04

## 2017-05-31 RX ADMIN — INSULIN ASPART 2 UNITS: 100 INJECTION, SOLUTION INTRAVENOUS; SUBCUTANEOUS at 14:57

## 2017-05-31 RX ADMIN — SENNOSIDES 8.6 MG: 8.6 TABLET, FILM COATED ORAL at 08:05

## 2017-05-31 RX ADMIN — SPIRONOLACTONE 25 MG: 25 TABLET ORAL at 08:05

## 2017-05-31 RX ADMIN — ASPIRIN 81 MG 324 MG: 81 TABLET ORAL at 08:59

## 2017-05-31 RX ADMIN — SERTRALINE HYDROCHLORIDE 200 MG: 100 TABLET ORAL at 08:04

## 2017-05-31 RX ADMIN — AMLODIPINE BESYLATE 10 MG: 10 TABLET ORAL at 08:04

## 2017-05-31 NOTE — PLAN OF CARE
Problem: Goal Outcome Summary  Goal: Goal Outcome Summary  Outcome: Adequate for Discharge Date Met:  05/31/17  DISCHARGE   Discharged to: Home  Via: Automobile  Accompanied by: Family  Discharge Instructions: diet, activity, medications, follow up appointments, when to call the MD, and what to watchout for (i.e. s/s of infection, increasing SOB, palpitations, chest pain,)  Prescriptions: On Aspirin as new medication, will get OTC; medication list reviewed & sent with pt  Follow Up Appointments: arranged; information given  Belongings: All sent with pt  IV: out  Telemetry: off  Pt exhibits understanding of above discharge instructions; all questions answered.     D/I/A: Patient admitted for chest discomfort on 5/30; plan for stress test this am. Stress test completed at 0900. Rhythm: A fib (40s-50s); no dizziness reported by patient. BPs 150s/80s-90s prior to medication, /80s this afternoon. -235; 2 units coverage given this afternoon. Patient normally takes Metformin at home. Declined BS check prior to leaving the unit; patient will recheck when home. Heparin gtt changed to 1050 at 0830; stopped while down for stress test, per orders.   P: Patient stress test negative. Discharged home; left unit at 1830.     Genesis Hewitt RN

## 2017-05-31 NOTE — PLAN OF CARE
Problem: Goal Outcome Summary  Goal: Goal Outcome Summary  Outcome: No Change  Admission          5/30/2017  6:36 PM  -----------------------------------------------------------  Diagnosis:  NSTEMI     Admitted from:  [facility] ED  Via: stretcher  Belongings: Placed in closet  Admission Profile: complete  Teaching: orientation to unit, call don't fall, use of console, meal times, visiting hours,  when to call for the RN (angina/sob/dizzyness, etc.), and enforced importance of safety   Access: PIV  Telemetry:Placed on pt  Ht./Wt.: complete     Pt reported daily falls. Educated him on the importance of using call light when needing to get up to prevent further falls and injury. He verbalized understanding.      Temp:  [97.6  F (36.4  C)] 97.6  F (36.4  C)  Heart Rate:  [42] 42  Resp:  [18] 18  BP: (151)/(92) 151/92  SpO2:  [96 %] 96 %

## 2017-05-31 NOTE — DISCHARGE SUMMARY
[facility] Discharge Summary    Vincent Mishra MRN# [id number]   Age: 77 year old YOB: 1940     Date of Admission:  5/30/2017  Date of Discharge::  5/31/2017  Admitting Physician:  Carlos Leung MD  Discharge Physician:  Kendrick Kirby MD             Admission Diagnoses:   N-STEMI  ACS (acute coronary syndrome) (H)          Discharge Diagnosis:   NSTEMI, unlikely ACS          Procedures:   Cardiology procedures perfromed:   Nuclear stress testing             Discharge Medications:   Resume previous outpatient medication regimen. In addition please take Aspirin 81 mg daily.           Consultations:   No consultations were requested during this admission            Hospital Course:   Vincent Mishra is a 77 year old male with NSTEMI s/p PCI in 2011 (RCA) and 2014 (mLAD), HTN, and HLD presenting with acute burning/ hollow sensation in his chest. Patient reports chest pain is similar to prior bouts of MI. He was found to have mildly positive enzymes (TnI 0.3 x3) with no true peak/through). Patient had an angiogram in 2015 which showed mild in stent stenosis (20%), no other disease. Patient's symptoms are very vague and not typical. Given prior negative angiograms and symptomatology a NM stress test was pursued to evaluate for ischemia.     NM stress showed a normal SPECT study w/ a summed stress score of 3. The apical thinning was present in 2014 and is unchanged. The apical thinning may be physiological. No changes of ischemia. Normal LVEF and wall motion.          Discharge Instructions and Follow-Up:   Discharge diet: Cardiac   Discharge activity: Activity as tolerated   Discharge follow-up: Follow up with primary care provider in 7-14 days           Discharge Disposition:   Discharged to home        Kendrick Kirby MD

## 2017-05-31 NOTE — PROGRESS NOTES
05/31/17 1500   Quick Adds   Type of Visit Initial Occupational Therapy Evaluation   Living Environment   Lives With child(adán), adult;child(adán), dependent;grandchild(adán);spouse   Living Arrangements house   Home Accessibility tub/shower is not walk in;stairs to enter home;bed and bath on same level   Number of Stairs to Enter Home 3   Number of Stairs Within Home 0  (flight to basement but Pt does not need to go)   Stair Railings at Home outside, present on right side   Transportation Available family or friend will provide;car   Living Environment Comment Pt lives with large family in home (10 people in house), 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 Wife and children perform heavy IADL and assist as needed. planning on traveling to [facility] in near future for up to 7 weeks   Self-Care   Usual Activity Tolerance fair   Current Activity Tolerance poor   Regular Exercise no   Equipment Currently Used at Home crutches, forearm;wheelchair   Activity/Exercise/Self-Care Comment Pt currently working [facility] outpatient PT, Pt states that he does not get out of bed on some days and does not always engaged in PT home exercise program   Functional Level Prior   Ambulation 1-->assistive equipment   Transferring 1-->assistive equipment   Toileting 0-->independent   Bathing 0-->independent   Dressing 0-->independent   Eating 0-->independent   Communication 0-->understands/communicates without difficulty   Swallowing 0-->swallows foods/liquids without difficulty   Fall history within last six months yes   Number of times patient has fallen within last six months 30   Which of the above functional risks had a recent onset or change? 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  Wife present throughout evaluation.  Has hearing deficit has uses assistive device   Cognitive Status Examination   Orientation orientation to person, place and time   Level of Consciousness alert   Able to Follow Commands WNL/WFL   Personal Safety (Cognitive) WNL/WFL   Cognitive Comment Pt states that he has felt a decrease in short term memory.  Wife provides most IADL.  Further testing requried   Visual Perception   Visual Perception No deficits were identified   Visual Perception Comments Pt able to read clock from 10 ft and therapist's name tag from 2 ft   Range of Motion (ROM)   ROM Comment B UE WFL   Strength   Strength Comments B UE & LE WFL   Hand Strength   Hand Strength Comments B UE WFL   Coordination   Upper Extremity Coordination No deficits were identified   Gross Motor Coordination BUE WFL   Fine Motor Coordination B UE WFL   Coordination Comments B UE WFL   Lower Body Dressing   Level of [place]: Dress Lower Body independent   Physical Assist/Nonphysical Assist: Dress Lower Body (donning socks using figure 4 technique)   Instrumental Activities of Daily Living (IADL)   IADL Comments Family performs majority of IADL   Activities of Daily Living Analysis   Impairments Contributing to Impaired Activities of Daily Living balance impaired;strength decreased   ADL Comments Pt appears to have LE deficits limiting ambulation and ADL performance   General Therapy Interventions   Planned Therapy Interventions balance training;ADL  "retraining;cognition;progressive activity/exercise;home program guidelines;risk factor education   Clinical Impression   Criteria for Skilled Therapeutic Interventions Met yes, treatment indicated   OT Diagnosis decreased ADL and IADL I   Assessment of Occupational Performance 1-3 Performance Deficits   Identified Performance Deficits showering, leisure, home management   Clinical Decision Making (Complexity) Low complexity   Therapy Frequency 3 times/wk   Predicted Duration of Therapy Intervention (days/wks) 1 week   Anticipated Equipment Needs at Discharge tub bench   Anticipated Discharge Disposition Home with Outpatient Therapy   Risks and Benefits of Treatment have been explained. Yes   Patient, Family & other staff in agreement with plan of care Yes   Clinical Impression Comments Pt presents with general deconditioning resulting in decreased ADL I.  Pt would benefit from skilled OT/CR to improve functional endurance to allow for greater ADL I. Pt also will benefit from continued outpatient PT as pt has significant history of falls at home.    [facility] [employer] TM \"6 Clicks\"   2016, Trustees of [facility], under license to [employer].  All rights reserved.   6 Clicks Short Forms Daily Activity Inpatient Short Form   [facility] AM-PAC  \"6 Clicks\" Daily Activity Inpatient Short Form   1. Putting on and taking off regular lower body clothing? 4 - None   2. Bathing (including washing, rinsing, drying)? 4 - None   3. Toileting, which includes using toilet, bedpan or urinal? 4 - None   4. Putting on and taking off regular upper body clothing? 4 - None   5. Taking care of personal grooming such as brushing teeth? 4 - None   6. Eating meals? 4 - None   Daily Activity Raw Score (Score out of 24.Lower scores equate to lower levels of function) 24   Total Evaluation Time   Total Evaluation Time (Minutes) 10      05/31/17 1500   Quick Adds   Type of Visit Initial Occupational Therapy Evaluation "   Living Environment   Lives With child(adán), adult;child(adán), dependent;grandchild(adán);spouse   Living Arrangements house   Home Accessibility tub/shower is not walk in;stairs to enter home;bed and bath on same level   Number of Stairs to Enter Home 3   Number of Stairs Within Home 0  (flight to basement but Pt does not need to go)   Stair Railings at Home outside, present on right side   Transportation Available family or friend will provide;car   Living Environment Comment Pt lives with large family in home (10 people in house), 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 Wife and children perform heavy IADL and assist as needed. planning on traveling to [facility] in near future for up to 7 weeks   Self-Care   Usual Activity Tolerance fair   Current Activity Tolerance poor   Regular Exercise no   Equipment Currently Used at Home crutches, forearm;wheelchair   Activity/Exercise/Self-Care Comment Pt currently working [facility] outpatient PT, Pt states that he does not get out of bed on some days and does not always engaged in PT home exercise program   Functional Level Prior   Ambulation 1-->assistive equipment   Transferring 1-->assistive equipment   Toileting 0-->independent   Bathing 0-->independent   Dressing 0-->independent   Eating 0-->independent   Communication 0-->understands/communicates without difficulty   Swallowing 0-->swallows foods/liquids without difficulty   Fall history within last six months yes   Number of times patient has fallen within last six months 30   Which of the above functional risks had a recent onset or change? 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  Wife present throughout evaluation.  Has hearing deficit has uses assistive device   Cognitive Status Examination   Orientation orientation to person, place and time   Level of Consciousness alert   Able to Follow Commands WNL/WFL   Personal Safety (Cognitive) WNL/WFL   Cognitive Comment Pt states that he has felt a decrease in short term memory.  Wife provides most IADL.  Further testing requried   Visual Perception   Visual Perception No deficits were identified   Visual Perception Comments Pt able to read clock from 10 ft and therapist's name tag from 2 ft   Range of Motion (ROM)   ROM Comment B UE WFL   Strength   Strength Comments B UE & LE WFL   Hand Strength   Hand Strength Comments B UE WFL   Coordination   Upper Extremity Coordination No deficits were identified   Gross Motor Coordination BUE WFL   Fine Motor Coordination B UE WFL   Coordination Comments B UE WFL   Lower Body Dressing   Level of [place]: Dress Lower Body independent   Physical Assist/Nonphysical Assist: Dress Lower Body (donning socks using figure 4 technique)   Instrumental Activities of Daily Living (IADL)   IADL Comments Family performs majority of IADL   Activities of Daily Living Analysis   Impairments Contributing to Impaired Activities of Daily Living balance impaired;strength decreased   ADL Comments Pt appears to have LE deficits limiting ambulation and ADL performance   General Therapy Interventions   Planned Therapy Interventions balance training;ADL retraining;cognition;progressive activity/exercise;home program guidelines;risk factor education   Clinical  "Impression   Criteria for Skilled Therapeutic Interventions Met yes, treatment indicated   OT Diagnosis decreased ADL and IADL I   Assessment of Occupational Performance 1-3 Performance Deficits   Identified Performance Deficits showering, leisure, home management   Clinical Decision Making (Complexity) Low complexity   Therapy Frequency 3 times/wk   Predicted Duration of Therapy Intervention (days/wks) 1 week   Anticipated Equipment Needs at Discharge tub bench   Anticipated Discharge Disposition Home with Outpatient Therapy   Risks and Benefits of Treatment have been explained. Yes   Patient, Family & other staff in agreement with plan of care Yes   Clinical Impression Comments Pt presents with general deconditioning resulting in decreased ADL I.  Pt would benefit from skilled OT/CR to improve functional endurance to allow for greater ADL I. Pt also will benefit from continued outpatient PT as pt has significant history of falls at home.    [facility] [employer] TM \"6 Clicks\"   2016, Trustees of [facility], under license to [employer].  All rights reserved.   6 Clicks Short Forms Daily Activity Inpatient Short Form   [facility] AM-PAC  \"6 Clicks\" Daily Activity Inpatient Short Form   1. Putting on and taking off regular lower body clothing? 4 - None   2. Bathing (including washing, rinsing, drying)? 4 - None   3. Toileting, which includes using toilet, bedpan or urinal? 4 - None   4. Putting on and taking off regular upper body clothing? 4 - None   5. Taking care of personal grooming such as brushing teeth? 4 - None   6. Eating meals? 4 - None   Daily Activity Raw Score (Score out of 24.Lower scores equate to lower levels of function) 24   Total Evaluation Time   Total Evaluation Time (Minutes) 10     "

## 2017-05-31 NOTE — H&P
CSI  History and Physical    Vincent Mishra MRN# [id number]   Age: 77 year old YOB: 1940     Date of Admission:  5/30/2017    Primary care provider: Keyla Fonseca        Chief Complaint   Chest pain    History of Present Illness   Vincent Mishra is a 77 year old male with NSTEMI s/p PCI in 2011 (RCA) and 2014 (mLAD), HTN, and HLD presenting with acute burning/ hollow sensation in his chest. This began acutely this morning after waking to use the restroom. He finished urinating and after getting up he had a sudden hollow/rising sensation from his epigastrium to his neck. It was more burning in characters. He has associated nausea. He was did not lose consciousness. He went back and forth from the toilet to the door multiple times with recurrence of his symptoms. He was able to get back to bed and was very tachypneic. This dissipated and he was able to go back to sleep. He thought this was similar to his prior heart attacks and presented to the ED in [place]. He was found to have an elevated troponin and was given  mg, nitroglycerin paste, and started on a heparin gtt. He took his plavix this AM.    Past Medical History     Past Medical History:   Diagnosis Date     CAD (coronary artery disease) 7/26/2011 7/2011 ([place]): s/p MI, PTCA - RCA      Cancer of kidney (H)      Chest pain 1/12/2012     Coronary artery disease      History of angina      History of blood transfusion      Hyperlipidaemia      Hyperlipidemia LDL goal <100 9/23/2010     Malignant neoplasm (H)     Prostate CA (removed)     Myocardial infarction (H)     s/p MI 7/29/14, stent placement     NSTEMI (non-ST elevated myocardial infarction) (H)     07-25-14 CATH- RCA, L.Main and CFX  had minor luminal irregularites. Mid LAD high grade stenosis 80-90%.Stent placed to mid LAD     Other and unspecified hyperlipidemia     MI in July 2011     Right bundle branch block      Type II or unspecified type diabetes mellitus without  mention of complication, not stated as uncontrolled      Unspecified essential hypertension         Past Surgical History      Past Surgical History:   Procedure Laterality Date     ARTHROSCOPY KNEE  2/11/2011    ARTHROSCOPY KNEE performed by LEY, JEFFREY DUANE at [facility]     ARTHROSCOPY KNEE WITH MEDIAL MENISCECTOMY  6/26/2012    Procedure: ARTHROSCOPY KNEE WITH MEDIAL MENISCECTOMY;  Right Knee Arthroscopy With Medial Menisectomy;  Surgeon: Ley, Jeffrey Duane, MD;  Location: [facility]     BACK SURGERY      back surgery x4     BIOPSY       CARDIAC SURGERY  7/2011    stentt placed     COLONOSCOPY       CORONARY ANGIOGRAPHY ADULT ORDER  07-25-14    RCA, L.Main and CFX  had minor luminal irregularites. Mid LAD high grade stenosis 80-90%.Stent placed to mid LAD     ESOPHAGOSCOPY, GASTROSCOPY, DUODENOSCOPY (EGD), COMBINED  10/25/2012    Procedure: COMBINED ESOPHAGOSCOPY, GASTROSCOPY, DUODENOSCOPY (EGD), BIOPSY SINGLE OR MULTIPLE;  Gastroscopy  ;  Surgeon: Lucius Dasilva MD;  Location: [facility]     HEART CATH, ANGIOPLASTY  07-25-14    mid LAD      ORTHOPEDIC SURGERY       back surgery        Social History     Lives with wife in [place], [place]. Never smoker. No alcohol use.    Family History     Family History   Problem Relation Age of Onset     CANCER Mother      Depression Mother      DIABETES Father      Hypertension Father      HEART DISEASE Father      MENTAL ILLNESS Father      HEART DISEASE Maternal Grandfather      Substance Abuse Maternal Grandfather      Alcohol/Drug Paternal Grandmother      Substance Abuse Paternal Grandmother      HEART DISEASE Paternal Grandfather      Alcohol/Drug Paternal Grandfather      Substance Abuse Paternal Grandfather      HEART DISEASE Brother      DIABETES Brother      Substance Abuse Brother      Obesity Brother      DIABETES Brother      Obesity Sister      Alcohol/Drug Daughter      Depression Daughter      Obesity Sister      DIABETES Sister      Obesity Sister      DIABETES Sister   "    Alcohol/Drug Sister      CANCER Sister 55     possible kidney cancer     Substance Abuse Sister      Alcohol/Drug Son      Substance Abuse Son      DIABETES Son      Alcohol/Drug Son      Substance Abuse Son      Obesity Daughter      DIABETES Daughter      Hypertension Daughter      Bipolar Disorder Other         Allergies     Allergies   Allergen Reactions     Prozac [Fluoxetine] Other (See Comments)     \"I went crazy.\"       Benadryl [Diphenhydramine Hcl] Other (See Comments)     Starts to shake, and paranoid     Codeine Itching     Diphenhydramine Other (See Comments)     Hallucinations, See [facility] records scanned on 7/16/15     Labetalol Other (See Comments)     See [facility] records scanned on 7/16/15  Bradycardia down to 30 on holter, dizziness. Stopped med and symptoms resolved     Metoprolol Other (See Comments)     Bradycardia even at 12.5 mg     Morphine Visual Disturbance        Medications     Prescriptions Prior to Admission   Medication Sig Dispense Refill Last Dose     ibuprofen (ADVIL/MOTRIN) 800 MG tablet TAKE 1 TABLET BY MOUTH EVERY 8 HOURS AS NEEDED FOR MODERATE PAIN 120 tablet 1      clopidogrel (PLAVIX) 75 MG tablet TAKE 1 TABLET BY MOUTH DAILY 90 tablet 3 5/30/2017 at am     oxyCODONE (ROXICODONE) 5 MG IR tablet Take 1 daily as needed  for severe pain may have 15 per month this is a 3 month supply 45 tablet 0 Past Week at Unknown time     oxyCODONE (OXYCONTIN) 30 MG 12 hr tablet Take 1 tablet (30 mg) by mouth every 12 hours (Patient taking differently: Take 30 mg by mouth every morning ) 60 tablet 0 5/30/2017 at am     ibuprofen (ADVIL/MOTRIN) 800 MG tablet Take 1 tablet (800 mg) by mouth every 8 hours as needed for moderate pain 90 tablet 0      gabapentin (NEURONTIN) 100 MG capsule Take 1 tablet increase by 1 tablet every 2 days to 6 tablets 3 times per day (Patient taking differently: Take 100 mg by mouth 2 times daily Take 1 tablet increase " by 1 tablet every 2 days to 6 tablets 3 times per day) 270 capsule 3 5/30/2017 at am     order for DME Equipment being ordered: TENS 1 Units 0 not covered     spironolactone (ALDACTONE) 25 MG tablet Take 1 tablet (25 mg) by mouth daily 90 tablet 0 5/30/2017 at am     triamcinolone (KENALOG) 0.1 % paste Take by mouth 2 times daily Apply to sore area twice a day for a week 5 g 0 More than a month at Unknown time     blood glucose monitoring (ONE TOUCH ULTRA) test strip Use to test blood sugars 2 times daily or as directed. 200 strip 3 Past Week at Unknown time     amLODIPine (NORVASC) 10 MG tablet Take 1 tablet (10 mg) by mouth daily 90 tablet 1 5/30/2017 at am     atorvastatin (LIPITOR) 80 MG tablet Take 1 tablet (80 mg) by mouth daily 90 tablet 3 5/29/2017 at pm     blood glucose monitoring (NO BRAND SPECIFIED) meter device kit Use to test blood sugar 2 times daily or as directed. 1 kit 0 Past Week at Unknown time     sertraline (ZOLOFT) 100 MG tablet Take 2 per day (Patient taking differently: Take 200 mg by mouth daily Take 2 per day) 225 tablet 1 5/30/2017 at am     omeprazole (PRILOSEC) 20 MG CR capsule Take 1 capsule (20 mg) by mouth 2 times daily 180 capsule 2 5/30/2017 at am     busPIRone (BUSPAR) 15 MG tablet Take 1 tablet (15 mg) by mouth 2 times daily 180 tablet 1 5/30/2017 at am     lisinopril (PRINIVIL/ZESTRIL) 40 MG tablet Take 1 tablet (40 mg) by mouth daily 90 tablet 2 5/30/2017 at am     traZODone (DESYREL) 100 MG tablet Take 3 tablets (300 mg) by mouth nightly as needed for sleep 300 mg at HS for sleep 270 tablet 2 5/29/2017 at hs     hydrochlorothiazide (HYDRODIURIL) 25 MG tablet Take 1 tablet (25 mg) by mouth daily 90 tablet 3 5/30/2017 at am     metFORMIN (GLUCOPHAGE-XR) 500 MG 24 hr tablet Take 2 tablets (1,000 mg) by mouth 2 times daily (with meals) 360 tablet 2 5/30/2017 at am     order for DME Equipment being ordered: Wheelchair motorized for patient with spinal stenosis and neurogenic  claudication 1 Device 0      nitroglycerin (NITROSTAT) 0.4 MG SL tablet Place 1 tablet (0.4 mg) under the tongue every 5 minutes as needed for chest pain 25 tablet 1 More than a month at Unknown time     [employer] LANCETS MISC Use to test blood sugars 1 times daily or as directed. 100 each 12 Taking     cholecalciferol (VITAMIN  -D) 1000 UNITS capsule Take 1 capsule by mouth daily   5/30/2017 at am     ascorbic acid (VITAMIN C) 500 MG tablet Take 500 mg by mouth daily    5/30/2017 at am     acetaminophen (TYLENOL) 325 MG tablet Take 2 tablets by mouth every 4 hours as needed. 250 tablet 1 Past Month at Unknown time     Calcium Carbonate-Vitamin D (CALCIUM + D) 600-200 MG-UNIT per tablet Take 2 tablets by mouth daily. 100 tablet 12 5/30/2017 at am        Review of Systems   Review Of Systems  Skin: negative  Eyes: double vision  Ears/Nose/Throat: negative  Respiratory: No shortness of breath, dyspnea on exertion, cough, or hemoptysis  Cardiovascular: see HPI  Gastrointestinal: constipation  Genitourinary: negative  Musculoskeletal: negative  Neurologic: negative  Psychiatric: negative  Hematologic/Lymphatic/Immunologic: negative  Endocrine: negative     Physical Exam   Blood pressure (!) 151/92, temperature 97.6  F (36.4  C), temperature source Oral, resp. rate 18, weight 101.5 kg (223 lb 12.8 oz), SpO2 96 %.    Wt Readings from Last 4 Encounters:   05/30/17 101.5 kg (223 lb 12.8 oz)   05/12/17 102.1 kg (225 lb)   03/06/17 103.3 kg (227 lb 11.2 oz)   01/31/17 102.3 kg (225 lb 8 oz)     General: Alert, interactive, NAD  HEENT: AT/NC, sclera anicteric, EOMI  Neck: Supple, no JVD or cervical LAD  Resp: clear to auscultation bilaterally, no crackles or wheezes  Cardiac: irregularly irregular, bradycardic, no m/g/r  Abdomen: Soft, nontender, nondistended. +BS  Extremities: trace pitting edema bilaterally  Skin: Warm and dry, no jaundice or rash  Neuro:  Face symmetric, moving all extremities without focal  deficit     Data   CMP  Recent Labs  Lab 05/30/17  1410      POTASSIUM 4.3   CHLORIDE 106   CO2 23   ANIONGAP 10   *   BUN 20   CR 1.32*   GFRESTIMATED 53*   GFRESTBLACK 64   LIOR 9.0   PROTTOTAL 7.3   ALBUMIN 3.6   BILITOTAL 0.5   ALKPHOS 93   AST 16   ALT 20     CBC  Recent Labs  Lab 05/30/17  1410   WBC 6.2   RBC 4.35*   HGB 10.9*   HCT 36.0*   MCV 83   MCH 25.1*   MCHC 30.3*   RDW 14.9        INR  Recent Labs  Lab 05/30/17  1410   INR 0.96     Lab Results   Component Value Date    TROPI 0.328 () 05/30/2017    TROPI 0.292 () 10/08/2014    TROPI 0.334 () 10/08/2014    TROPI 0.342 () 10/08/2014    TROPI 0.350 () 10/07/2014    TROPONIN <0.012 07/25/2014    TROPONIN 0.012 07/24/2014    TROPONIN <0.30 02/28/2004     EKG: sinus rhythm with RBBB (old) and ST depressions in inferior leads compared to old EKGs    CXR:  IMPRESSION:   1. Unchanged left upper lobe pleural-based mass.  2. Right midlung atelectasis, unchanged.  3. Cardiomegaly, unchanged.    Coronary Angiogram 7/17/2015  PROCEDURES  LHC/CA/LV    CONCLUSIONS  Non-Obstructive coronary disease No significant lesions    RECOMMENDATIONS:  Medical therapy Optimization  Risk factor modification    COMPLICATIONS  No Complications    HEMODYNAMICS:           BSA:  2.06      HR:  197BPM  AO: 0/0  510  O2 REST  AO: 76/48 60  LV: 123/8 14    LV FINDINGS  Normal EDP 14 mmHg post  Left Ventriculogram    CARDIAC FINDINGS:  DOMINANCE:  Right Dominant  LEFT MAIN:  is angiographically normal (0% Stenosis)  LEFT ANTERIOR DESCENDING :  Proximal Segment widely patent in previously placed stent (less than 20% Stenosis)  CIRCUMFLEX:  is angiographically normal (0% Stenosis)  RIGHT CORONARY ARTERY:  Mid Segment widely patent in previously placed stent (less than 20% Stenosis)  COLLATERALS:  No collateral flow  Coronary angiogram 10/7/2014 :  RIGHT dominant system.  LM:Widely patent, no stenosis  LAD: Type 2 vessel with a stent in the mid segment which is  patent with mild instent restenosis. Distal vessel is small caliber and has mild diffuse disease.  LCx: Moderate sized vessel, with a moderate sized OM@ and a small OM1 branch. Mild luminal irregularities, no stenosis.  RCA: Large dominant vessel with mild diffuse disease in the proximal and mid segments. There appears to be a patent stent in the mid RCA with  Mild instent restenosis.     LV gram: Normal LV function with estimated EF of 55%  Mild elevation of LVEDP-17mmHg  No aortic pull back gradient     Conclusions:  1. Mild non obstructive CAD  2. Normal LV function    ECHO 8/16/2011  Interpretation Summary  The study was technically adequate.  Compared to the prior study dated 2/21/2011, there have been no changes.  The pulmonary pressure has improved somewhat (from 42 mmHg to 28 mmHg).  The visual ejection fraction is estimated at 55-60%. Left ventricular   systolic function is normal. There is mild to moderate concentric left   ventricular hypertrophy. Grade I left ventricular diastolic dysfunction is   noted. Right ventricular systolic pressure is normal. Mild aortic root   dilatation. The ascending aorta is Borderline dilated. The rhythm was sinus   bradycardia.  PatientHeight: 68 in  PatientWeight: 208 lbs  SystolicPressure: 120 mmHg  DiastolicPressure: 64 mmHg  HeartRate: 53 bpm  BSA 2.1 m^2      Assessment    Vincent Mishra is a 77 year old male with NSTEMI s/p PCI in 2011 (RCA) and 2014 (mLAD), HTN, and HLD presenting with possible NSTEMI and sinus bradycardia.        Plan   # NSTEMI  # CAD s/p PCI in 2011 (RCA) and 2014 (mLAD)  Atypical chest pain by history, but similar in character to his prior MIs with YASMIN score of 4. EKG with possible ST depression in inferior leads at ED, but resolved on repeat EKG here. Angiogram in 2015 with < 20% stenosis. It is also possible that he had a vagal mediated process (due to recent micturition) leading to demand ischemia.   - already received ASA in ED  - continue  heparin gtt  - continue clopidogrel (consider restarting DAPT on discharge - unclear why he stopped)  - continue atorvastatin  - trend troponin  - nitro PRN  - NPO at midnight for possible angiogram vs stress test    # Sinus bradycardia, asymptomatic  Has been recommended PPM in past (to allow for BB use to manage CAD). Met with Dr. Chamberlain, but never pursued device  - monitor on telemetry    # Left upper lobe pleural mass on CXR  Stable from CT in 2015 and previous imaging going back to 2010    Chronic medical problems:  # HTN - continue amlodipine, lisinopril, HCTZ, and spironolactone  # Depression/anxiety - continue sertraline and buspirone  # DM2 - hold metformin; SSI  # GERD - continue omeprazole  # Chronic back pain - continue gabapentin, oxycontin, and oxycodone PRN; start senna BID    FEN: NPO at midnight  Prophylaxis: on heparin gtt  Dispo: admit to [facility]    CODE: FULL    Discussed with Cardiology fellow overnight. To be staffed in AM.    Kin Garcia, PGY-2  Internal Medicine  [phone]      ATTENDING NOTE:  Patient has been seen and evaluated by me on 05/31/2017. I have reviewed the H&P.  Please refer to it for additional details.  I have reviewed today's vital signs, medications, labs, and imaging results.  I have reviewed and edited, as necessary, the history, review of systems, physical examination, and assessment and plan.  I have discussed my assessment and plan with the cardiology fellow.  Vincent Mishra is a 77 year old male with risk factor profile (+) HTN, Type II DM, (+) hypercholesterolemia, (-) tobacco use, (-) fam Hx premature CAD, Hx 2V CAD s/p PCI RCA (2011) and mid LAD (2014), normal LV function by nuclear study (2014), [facility] with recurrent nausea and chest discomfort similar to what he experienced with prior PCI procedures.  Patient has had two subsequent coronary angiograms (2014, 2015), neither of which have shown progression in his CAD.      Lab Results   Component Value  Date    TROPI 0.332 () 05/31/2017    TROPI 0.363 () 05/30/2017    TROPI 0.328 () 05/30/2017     Troponins do not show class peak and trough.  ECG shows NSR with RBBB, no ST shift, no TWI, no Q waves.   I spoke to patient regarding both repeat coronary angiography and noninvasive testing (stress cardiac MR/nuclear and coronary CTA).  The patient preferred to defer on coronary angiography.  We will arrange a stress nuclear study.  The last study (2014) showed no ischemia and a small fixed apical infarction.  Continue ASA, statin, and ACE-I.  Defer on BB given underlying bradycardia.  Differential diagnosis includes myocarditis.      Carlos Leung MD     Cardiovascular Division

## 2017-05-31 NOTE — PLAN OF CARE
Problem: Goal Outcome Summary  Goal: Goal Outcome Summary  OT: REC Home with OP CR and OP PT.  Pt presents with general deconditioning resulting in decreased ADL and IADL I.  Therapist educated Pt on EC/WS and compensatory techniques related to ADL performance and Pt demos and verbalizes competence.  Pt ambulated ~500 ft total with CGA, no assistive device and 2 therapist initiated rest breaks. Pt ascended/descended 6 stairs total with CGA, and no rest breaks required.  Therapist recommended OP CR and Pt agrees to attend CR at [facility] upon discharge. Pt endorsing up to 30 falls at home and is currently working with PT on an outpatient basis, pt should continue working with PT, potentially a home safety evaluation would be of great benefit to avoid future falls.

## 2017-05-31 NOTE — PLAN OF CARE
Problem: Goal Outcome Summary  Goal: Goal Outcome Summary  Outcome: No Change  Pt admitted from [facility] around 7pm for chest pain. Pt currently chest pain free. Pt A/Ox4, VSS on RA. EKG showed Afib rates 35-60's. MDs aware, pt asymptomatic. Pt up with SBA. Pt voiding adequately. Pt [place]. Pt has heparin gtt running at 1200 units/hr, next 10A with AM labs. Pt has been NPO since midnight for possible angiogram vs stress test in AM. Continue to monitor and notify MD of questions or concerns.

## 2017-05-31 NOTE — PROGRESS NOTES
Pt here for Lexiscan. Test, medication and side effects reviewed with patient. Lung sounds clear. Pt denies caffeine use. Pt tolerated Lexiscan dose without any adverse reactions. Monitored post injection and escorted back to nuclear medicine via wheelchair for follow up imaging.

## 2017-05-31 NOTE — PROGRESS NOTES
Care Coordinator- Discharge Planning     Admission Date/Time:  5/30/2017  Attending MD:  Carlos Leung MD   Data  Chart reviewed, discussed with interdisciplinary team.   Patient with h/o NSTEMI s/p PCI in 2011 (RCA) and 2014 (mLAD), HTN, and HLD; admitted with possible NSTEMI and sinus bradycardia.    Assessment  Concerns with insurance coverage for discharge needs: None.  Current Living Situation: Patient said that he lives with his wife and his adult son and daughter also lives with pt.   Support System: Supportive and Involved wife and adult children.   Services Involved: Pt said that he goes to outpt physical therapy at [facility].   Transportation: Family or Friend will provide  Coordination of Care and Referrals: No home care needs per pt.     Plan  Anticipated Discharge Date:  5/31/17  Anticipated Discharge Plan:  Discharge to home.     CTS Handoff completed:  YES    SHAKEEL HOUGH RN BSN  Care Coordinator  [phone]

## 2017-05-31 NOTE — PROGRESS NOTES
05/31/17 1500   Quick Adds   Type of Visit Initial Occupational Therapy Evaluation   Living Environment   Lives With child(adán), adult;child(adán), dependent;grandchild(adán);spouse   Living Arrangements house   Home Accessibility tub/shower is not walk in;stairs to enter home;bed and bath on same level   Number of Stairs to Enter Home 3   Number of Stairs Within Home 0  (flight to basement but Pt does not need to go)   Stair Railings at Home outside, present on right side   Transportation Available bicycle;family or friend will provide   Living Environment Comment Pt lives with large family in home (10 people in house), 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 Wife and children perform heavy IADL and assist as needed. planning on traveling to [facility] in near future for up to 7 weeks   Self-Care   Usual Activity Tolerance fair   Current Activity Tolerance poor   Regular Exercise no   Equipment Currently Used at Home crutches, forearm;wheelchair   Activity/Exercise/Self-Care Comment Pt currently working [facility] outpatient PT, Pt states that he does not get out of bed on some days and does not always engaged in PT home exercise program   Functional Level Prior   Ambulation 1-->assistive equipment   Transferring 1-->assistive equipment   Toileting 0-->independent   Bathing 0-->independent   Dressing 0-->independent   Eating 0-->independent   Communication 0-->understands/communicates without difficulty   Swallowing 0-->swallows foods/liquids without difficulty   Fall history within last six months yes   Number of times patient has fallen within last six months 30   Which of the above functional risks had a recent onset or change? 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  Wife present throughout evaluation.  Has hearing deficit has uses assistive device   Cognitive Status Examination   Orientation orientation to person, place and time   Level of Consciousness alert   Able to Follow Commands WNL/WFL   Personal Safety (Cognitive) WNL/WFL   Cognitive Comment Pt states that he has felt a decrease in short term memory.  Wife provides most IADL.  Further testing requried   Visual Perception   Visual Perception No deficits were identified   Visual Perception Comments Pt able to read clock from 10 ft and therapist's name tag from 2 ft   Range of Motion (ROM)   ROM Comment B UE WFL   Strength   Strength Comments B UE & LE WFL   Hand Strength   Hand Strength Comments B UE WFL   Coordination   Upper Extremity Coordination No deficits were identified   Gross Motor Coordination BUE WFL   Fine Motor Coordination B UE WFL   Coordination Comments B UE WFL   Lower Body Dressing   Level of [place]: Dress Lower Body independent   Physical Assist/Nonphysical Assist: Dress Lower Body (donning socks using figure 4 technique)   Instrumental Activities of Daily Living (IADL)   IADL Comments Family performs majority of IADL   Activities of Daily Living Analysis   Impairments Contributing to Impaired Activities of Daily Living balance impaired;strength decreased   ADL Comments Pt appears to have LE deficits limiting ambulation and ADL performance   General Therapy Interventions   Planned Therapy Interventions balance training;ADL  "retraining;cognition;progressive activity/exercise;home program guidelines;risk factor education   Clinical Impression   Criteria for Skilled Therapeutic Interventions Met yes, treatment indicated   OT Diagnosis decreased ADL and IADL I   Assessment of Occupational Performance 1-3 Performance Deficits   Identified Performance Deficits showering, leisure, home management   Clinical Decision Making (Complexity) Low complexity   Therapy Frequency 3 times/wk   Predicted Duration of Therapy Intervention (days/wks) 1 week   Anticipated Equipment Needs at Discharge tub bench   Anticipated Discharge Disposition Home with Outpatient Therapy   Risks and Benefits of Treatment have been explained. Yes   Patient, Family & other staff in agreement with plan of care Yes   Clinical Impression Comments Pt presents with general deconditioning resulting in decreased ADL I.  Pt would benefit from skilled OT/CR to improve functional endurance to allow for greater ADL I. Pt also will benefit from continued outpatient PT as pt has significant history of falls at home.    [facility] [employer] TM \"6 Clicks\"   2016, Trustees of [facility], under license to [employer].  All rights reserved.   6 Clicks Short Forms Daily Activity Inpatient Short Form   [facility]  \"6 Clicks\" Daily Activity Inpatient Short Form   1. Putting on and taking off regular lower body clothing? 4 - None   2. Bathing (including washing, rinsing, drying)? 4 - None   3. Toileting, which includes using toilet, bedpan or urinal? 4 - None   4. Putting on and taking off regular upper body clothing? 4 - None   5. Taking care of personal grooming such as brushing teeth? 4 - None   6. Eating meals? 4 - None   Daily Activity Raw Score (Score out of 24.Lower scores equate to lower levels of function) 24   Total Evaluation Time   Total Evaluation Time (Minutes) 10     "

## 2017-06-01 ENCOUNTER — CARE COORDINATION (OUTPATIENT)
Dept: CARE COORDINATION | Facility: CLINIC | Age: 77
End: 2017-06-01

## 2017-06-01 NOTE — PLAN OF CARE
Problem: Goal Outcome Summary  Goal: Goal Outcome Summary  Occupational Therapy Discharge Summary     Reason for therapy discharge:    Discharged to home with outpatient therapy.     Progress towards therapy goal(s). See goals on Care Plan in [facility] electronic health record for goal details.  Goals not met.  Barriers to achieving goals:   discharge on same date as initial evaluation.     Therapy recommendation(s):    Continued therapy is recommended.  Rationale/Recommendations:  Pt would benefit from continue OP PT as previous 2/2 fall history and reported weakness in B LE.  Pt would also benefit from OP cardiac rehabiliation to improve functional endurance and activity tolerance to allow for greater engagement in desired occupations.

## 2017-06-02 ENCOUNTER — CARE COORDINATION (OUTPATIENT)
Dept: CARE COORDINATION | Facility: CLINIC | Age: 77
End: 2017-06-02

## 2017-06-02 NOTE — LETTER
Health Care Home - Access Care Plan    About Me  Patient Name:  Vincent Aguirre    YOB: 1940  Age:                            77 year old   [place] MRN:         [id number] Telephone Information:     Home Phone [phone]   Mobile [phone]       Address:    [place] [address] Email address:  [email or web address]      Emergency Contact(s)  Name Relationship Lgl Grd Work Phone Home Phone Mobile Phone   1. LEONARD AGUIRRE Spouse   [phone]   2. SWATHI AGUIRRE Son   [phone]    3. VALERIE CHEW Daughter   [phone]              Health Maintenance:      My Access Plan  Medical Emergency 914   Questions or concerns during clinic hours Primary Clinic Line, I will call the clinic directly: Primary Clinic: [facility] [phone]   24 Hour Appointment Line [phone] or  [phone] [place] ([phone])  (toll free)   24 Hour Nurse Line [phone] (toll free)   Questions or concerns outside clinic hours 24 Hour Appointment Line, I will call the after-hours on-call line:   [facility] [phone] or [phone] ([phone]) (toll-free)   Preferred Urgent Care Preferred Urgent Care: [facility], [phone]   Preferred Hospital Preferred Hospital: [facility] [place]  [phone]   [id number] [address] AT [facility]     Behavioral Health Crisis Line [facility], [phone] or 911     My Care Team Members  Patient Care Team       Relationship Specialty Notifications Start End    Keyla Fonseca MD PCP - General Family Practice All results, Admissions 11/28/16     Phone: [phone] Fax: [phone]         [facility] [address] [facility] [address]        My Medical and Care Information  Problem List   Patient Active Problem List   Diagnosis     Tension  headache     Trapezius strain     Hyperlipidemia LDL goal <100     Parotid mass     Diplopia     Neuropathy (H)     Vision loss night     Neck pain     B12 deficiency     Tear of meniscus of left knee     Hypertension goal BP (blood pressure) < 140/90     C6-7 disc with radiculopathy     Right bundle branch block     Advanced directives, info letter sent 10/4/2011     Chest pain     Anatomic airway obstruction     Abnormal antinuclear antibody titer     Osteoarthritis, knee     Moderate major depression (H)     Orchitis, epididymitis, and epididymo-orchitis     Malignant neoplasm of kidney excluding renal pelvis (H)     Renal mass, left     Vitamin D deficiency disease     Health Care Home     Insomnia     Chronic low back pain     Abdominal pain, right upper quadrant     Esophageal reflux     Hard of hearing     Constipation     NSTEMI (non-ST elevated myocardial infarction) (H)     Myocardial infarction, nontransmural (H)     Acute myocardial infarction of inferolateral wall (H)     Obesity     Sinoatrial node dysfunction (H)     Right bundle branch block (RBBB)     Essential hypertension     Hyperlipidemia     Type 2 diabetes mellitus with other circulatory complications (H)     Thyroid nodule     Hip pain, bilateral     Claudication (H)     Bilateral low back pain with right-sided sciatica     Bilateral low back pain with left-sided sciatica     ACS (acute coronary syndrome) (H)      Current Medications and Allergies:  See printed Medication Report

## 2017-06-02 NOTE — LETTER
[facility]  [address]  [place] [address]  [phone]        June 2, 2017      Vincent Mishra  [address] [place] [address]    Dear Vincent,  I am the Clinic Care Coordinator that works with your primary care provider's clinic. I wanted to introduce myself and provide you with my contact information for you to be able to call me with any questions or concerns. Below is a description of what Clinic Care Coordination is and how I can further assist you.     The Clinic Care Coordinator role is a Registered Nurse and/or  who understands the health care system. The goal of Clinic Care Coordination is to help you manage your health and improve access to the [facility] system in the most efficient manner.  The Registered Nurse can assist you in meeting your health care goals by providing education, coordinating services, and strengthening the communication among your providers. The  can assist you with financial, behavioral, psychosocial, and chemical dependency and counseling/psychiatric resources.    Please feel free to keep this letter and contact information to contact me at [phone], [phone] with any further questions or concerns that may arise. We at [facility] are focused on providing you with the highest-quality healthcare experience possible and that all starts with you.       Sincerely,     Luis Felipe Hooks RN  Clinic Care Coordinator    Enclosed: I have enclosed a copy of a 24 Hour Access Plan. This has helpful phone numbers for you to call when needed. Please keep this in an easy to access place to use as needed.

## 2017-06-02 NOTE — PROGRESS NOTES
Clinic Care Coordination Contact  OUTREACH    Referral Information:  Referral Source: CTS  Reason for Contact: post hospital    Clinical Concerns:  Current Medical Concerns: Patient reports he is doing well at home. Energy is back. Patient reports small amount of chest pain daily, but this is due to muscle spasm and is not concerned with this. No other concerns reported at this time.      Medication Management:  Patient is knowledgeable on medications and is adherent. No financial concerns at this time.      Functional Status:     Equipment Currently Used at Home: crutches, forearm, wheelchair        Plan: 1) Patient will continue to follow treatment plan as directed and follow up with PCP with concerns ongoing.   2) Care Coordination to remain available for future needs.     Luis Felipe Hooks RN  Clinic Care Coordinator  [phone] or [phone]

## 2017-06-05 ENCOUNTER — TELEPHONE (OUTPATIENT)
Dept: FAMILY MEDICINE | Facility: CLINIC | Age: 77
End: 2017-06-05

## 2017-06-05 DIAGNOSIS — R91.8 PULMONARY NODULES: Primary | ICD-10-CM

## 2017-06-05 NOTE — TELEPHONE ENCOUNTER
"Dr. Fonseca: Charles from [facility] called and wanted to alert you that when Vincent had his nuclear medicine stress test done; they noted:  \" Abnormal appearance of the chest. Left upper mass is likely a  lipoma but they are right lower lobe nodular and parenchymal opacities  which should be followed. Limited CT of the chest is recommended.\"  Yvon Dickens RN    "

## 2017-06-05 NOTE — TELEPHONE ENCOUNTER
Message left on Radiology vm asking about what to order for limited CT of the chest.  Yvon Dickens RN

## 2017-06-05 NOTE — TELEPHONE ENCOUNTER
"I was unsure whether cardiology would beordering that or if that was something that we needed to order. I will put in the order, please let Vincent know. Keyla Fonseca M.D.  OK I went to order \"limited ct chest \" and I don't know what that means. Is that without contrast for nodules? Can you call radiology and see what that means? Keyla Fonseca M.D.      "

## 2017-06-06 ENCOUNTER — HOSPITAL ENCOUNTER (OUTPATIENT)
Dept: CT IMAGING | Facility: CLINIC | Age: 77
Discharge: HOME | End: 2017-06-06
Attending: FAMILY MEDICINE | Admitting: FAMILY MEDICINE
Payer: MEDICARE

## 2017-06-06 ENCOUNTER — TELEPHONE (OUTPATIENT)
Dept: FAMILY MEDICINE | Facility: CLINIC | Age: 77
End: 2017-06-06

## 2017-06-06 DIAGNOSIS — J18.9 LUNG INFECTION: Primary | ICD-10-CM

## 2017-06-06 DIAGNOSIS — R91.8 PULMONARY NODULES: ICD-10-CM

## 2017-06-06 PROCEDURE — 71250 CT THORAX DX C-: CPT

## 2017-06-06 RX ORDER — DOXYCYCLINE 100 MG/1
100 CAPSULE ORAL 2 TIMES DAILY
Qty: 14 CAPSULE | Refills: 0 | Status: SHIPPED | OUTPATIENT
Start: 2017-06-06 | End: 2017-06-13

## 2017-06-06 NOTE — TELEPHONE ENCOUNTER
"Pat notified that Vincent is \"always tired\". Denies fever, cough, and taking antibiotics recently. Advised of CT result and doxycycline order. Pat said she would pick it up.  Yvon Dickens RN    "

## 2017-06-06 NOTE — TELEPHONE ENCOUNTER
Sheyla Mishra is calling as Vincent had CT scan completed today.  She was told that you would have the results within the hour.  They are leaving town tomorrow morning for 2 weeks and if there is something seriously wrong and shouldn't leave town she needs to know today.  Please review and advise. Thank you..Ebony Dickson

## 2017-06-06 NOTE — TELEPHONE ENCOUNTER
Does he feel sick? It looks like inflammation or infection. Has he been on any antibiotics? If not then I want him to take doxy 100 2 times per day for 7 days/ I don't think it is serious unless he has had fever or chills , couhg etc. Keyla Fonseca M.D.

## 2017-06-14 ENCOUNTER — OFFICE VISIT (OUTPATIENT)
Dept: CARDIOLOGY | Facility: CLINIC | Age: 77
End: 2017-06-14
Attending: INTERNAL MEDICINE
Payer: MEDICARE

## 2017-06-14 VITALS
OXYGEN SATURATION: 97 % | DIASTOLIC BLOOD PRESSURE: 64 MMHG | WEIGHT: 214.2 LBS | BODY MASS INDEX: 32.57 KG/M2 | SYSTOLIC BLOOD PRESSURE: 109 MMHG | HEART RATE: 63 BPM

## 2017-06-14 DIAGNOSIS — I25.10 CORONARY ARTERY DISEASE INVOLVING NATIVE CORONARY ARTERY OF NATIVE HEART WITHOUT ANGINA PECTORIS: Primary | ICD-10-CM

## 2017-06-14 DIAGNOSIS — E78.5 HYPERLIPIDEMIA LDL GOAL <100: ICD-10-CM

## 2017-06-14 DIAGNOSIS — I10 HYPERTENSION GOAL BP (BLOOD PRESSURE) < 140/90: ICD-10-CM

## 2017-06-14 DIAGNOSIS — I10 ESSENTIAL HYPERTENSION WITH GOAL BLOOD PRESSURE LESS THAN 140/90: ICD-10-CM

## 2017-06-14 PROCEDURE — 99213 OFFICE O/P EST LOW 20 MIN: CPT | Performed by: INTERNAL MEDICINE

## 2017-06-14 NOTE — PROGRESS NOTES
HPI and Plan:   See dictation    Orders Placed This Encounter   Procedures     Follow-Up with Cardiologist       No orders of the defined types were placed in this encounter.      There are no discontinued medications.      Encounter Diagnoses   Name Primary?     Coronary artery disease involving native coronary artery of native heart without angina pectoris Yes     Essential hypertension with goal blood pressure less than 140/90        CURRENT MEDICATIONS:  Current Outpatient Prescriptions   Medication Sig Dispense Refill     aspirin EC 81 MG EC tablet Take 1 tablet (81 mg) by mouth daily       ibuprofen (ADVIL/MOTRIN) 800 MG tablet TAKE 1 TABLET BY MOUTH EVERY 8 HOURS AS NEEDED FOR MODERATE PAIN 120 tablet 1     clopidogrel (PLAVIX) 75 MG tablet TAKE 1 TABLET BY MOUTH DAILY 90 tablet 3     oxyCODONE (ROXICODONE) 5 MG IR tablet Take 1 daily as needed  for severe pain may have 15 per month this is a 3 month supply 45 tablet 0     oxyCODONE (OXYCONTIN) 30 MG 12 hr tablet Take 1 tablet (30 mg) by mouth every 12 hours (Patient taking differently: Take 30 mg by mouth every morning ) 60 tablet 0     ibuprofen (ADVIL/MOTRIN) 800 MG tablet Take 1 tablet (800 mg) by mouth every 8 hours as needed for moderate pain 90 tablet 0     gabapentin (NEURONTIN) 100 MG capsule Take 1 tablet increase by 1 tablet every 2 days to 6 tablets 3 times per day (Patient taking differently: Take 100 mg by mouth 2 times daily Take 1 tablet increase by 1 tablet every 2 days to 6 tablets 3 times per day) 270 capsule 3     spironolactone (ALDACTONE) 25 MG tablet Take 1 tablet (25 mg) by mouth daily 90 tablet 0     triamcinolone (KENALOG) 0.1 % paste Take by mouth 2 times daily Apply to sore area twice a day for a week 5 g 0     amLODIPine (NORVASC) 10 MG tablet Take 1 tablet (10 mg) by mouth daily 90 tablet 1     atorvastatin (LIPITOR) 80 MG tablet Take 1 tablet (80 mg) by mouth daily 90 tablet 3     sertraline (ZOLOFT) 100 MG tablet Take 2 per day  (Patient taking differently: Take 200 mg by mouth daily Take 2 per day) 225 tablet 1     omeprazole (PRILOSEC) 20 MG CR capsule Take 1 capsule (20 mg) by mouth 2 times daily 180 capsule 2     busPIRone (BUSPAR) 15 MG tablet Take 1 tablet (15 mg) by mouth 2 times daily 180 tablet 1     lisinopril (PRINIVIL/ZESTRIL) 40 MG tablet Take 1 tablet (40 mg) by mouth daily 90 tablet 2     traZODone (DESYREL) 100 MG tablet Take 3 tablets (300 mg) by mouth nightly as needed for sleep 300 mg at HS for sleep 270 tablet 2     hydrochlorothiazide (HYDRODIURIL) 25 MG tablet Take 1 tablet (25 mg) by mouth daily 90 tablet 3     metFORMIN (GLUCOPHAGE-XR) 500 MG 24 hr tablet Take 2 tablets (1,000 mg) by mouth 2 times daily (with meals) 360 tablet 2     nitroglycerin (NITROSTAT) 0.4 MG SL tablet Place 1 tablet (0.4 mg) under the tongue every 5 minutes as needed for chest pain 25 tablet 1     cholecalciferol (VITAMIN  -D) 1000 UNITS capsule Take 1 capsule by mouth daily       ascorbic acid (VITAMIN C) 500 MG tablet Take 500 mg by mouth daily        acetaminophen (TYLENOL) 325 MG tablet Take 2 tablets by mouth every 4 hours as needed. 250 tablet 1     Calcium Carbonate-Vitamin D (CALCIUM + D) 600-200 MG-UNIT per tablet Take 2 tablets by mouth daily. 100 tablet 12     order for DME Equipment being ordered: TENS (Patient not taking: Reported on 6/14/2017) 1 Units 0     blood glucose monitoring (ONE TOUCH ULTRA) test strip Use to test blood sugars 2 times daily or as directed. (Patient not taking: Reported on 6/14/2017) 200 strip 3     blood glucose monitoring (NO BRAND SPECIFIED) meter device kit Use to test blood sugar 2 times daily or as directed. (Patient not taking: Reported on 6/14/2017) 1 kit 0     order for DME Equipment being ordered: Wheelchair motorized for patient with spinal stenosis and neurogenic claudication (Patient not taking: Reported on 6/14/2017) 1 Device 0     Plug.dj FINEPOINT LANCETS MISC Use to test blood sugars 1  "times daily or as directed. (Patient not taking: Reported on 6/14/2017) 100 each 12       ALLERGIES     Allergies   Allergen Reactions     Prozac [Fluoxetine] Other (See Comments)     \"I went crazy.\"       Benadryl [Diphenhydramine Hcl] Other (See Comments)     Starts to shake, and paranoid     Codeine Itching     Diphenhydramine Other (See Comments)     Hallucinations, See [facility] records scanned on 7/16/15     Labetalol Other (See Comments)     See [facility] records scanned on 7/16/15  Bradycardia down to 30 on holter, dizziness. Stopped med and symptoms resolved     Metoprolol Other (See Comments)     Bradycardia even at 12.5 mg     Morphine Visual Disturbance       PAST MEDICAL HISTORY:  Past Medical History:   Diagnosis Date     CAD (coronary artery disease) 7/26/2011 7/2011 ([facility]): s/p MI, PTCA - RCA      Cancer of kidney (H)      Chest pain 1/12/2012     Coronary artery disease      History of angina      History of blood transfusion      Hyperlipidaemia      Hyperlipidemia LDL goal <100 9/23/2010     Malignant neoplasm (H)     Prostate CA (removed)     Myocardial infarction (H)     s/p MI 7/29/14, stent placement     NSTEMI (non-ST elevated myocardial infarction) (H)     07-25-14 CATH- RCA, L.Main and CFX  had minor luminal irregularites. Mid LAD high grade stenosis 80-90%.Stent placed to mid LAD     Other and unspecified hyperlipidemia     MI in July 2011     Right bundle branch block      Type II or unspecified type diabetes mellitus without mention of complication, not stated as uncontrolled      Unspecified essential hypertension        PAST SURGICAL HISTORY:  Past Surgical History:   Procedure Laterality Date     ARTHROSCOPY KNEE  2/11/2011    ARTHROSCOPY KNEE performed by LEY, JEFFREY DUANE at [facility]     ARTHROSCOPY KNEE WITH MEDIAL MENISCECTOMY  6/26/2012    Procedure: ARTHROSCOPY KNEE WITH MEDIAL MENISCECTOMY;  Right Knee Arthroscopy With Medial " Menisectomy;  Surgeon: Ley, Jeffrey Duane, MD;  Location: [facility]     BACK SURGERY      back surgery x4     BIOPSY       CARDIAC SURGERY  7/2011    stentt placed     COLONOSCOPY       CORONARY ANGIOGRAPHY ADULT ORDER  07-25-14    RCA, L.Main and CFX  had minor luminal irregularites. Mid LAD high grade stenosis 80-90%.Stent placed to mid LAD     ESOPHAGOSCOPY, GASTROSCOPY, DUODENOSCOPY (EGD), COMBINED  10/25/2012    Procedure: COMBINED ESOPHAGOSCOPY, GASTROSCOPY, DUODENOSCOPY (EGD), BIOPSY SINGLE OR MULTIPLE;  Gastroscopy  ;  Surgeon: Lucius Dasilva MD;  Location: [facility]     HEART CATH, ANGIOPLASTY  07-25-14    mid LAD      ORTHOPEDIC SURGERY       back surgery       FAMILY HISTORY:  Family History   Problem Relation Age of Onset     CANCER Mother      Depression Mother      DIABETES Father      Hypertension Father      HEART DISEASE Father      MENTAL ILLNESS Father      HEART DISEASE Maternal Grandfather      Substance Abuse Maternal Grandfather      Alcohol/Drug Paternal Grandmother      Substance Abuse Paternal Grandmother      HEART DISEASE Paternal Grandfather      Alcohol/Drug Paternal Grandfather      Substance Abuse Paternal Grandfather      HEART DISEASE Brother      DIABETES Brother      Substance Abuse Brother      Obesity Brother      DIABETES Brother      Obesity Sister      Alcohol/Drug Daughter      Depression Daughter      Obesity Sister      DIABETES Sister      Obesity Sister      DIABETES Sister      Alcohol/Drug Sister      CANCER Sister 55     possible kidney cancer     Substance Abuse Sister      Alcohol/Drug Son      Substance Abuse Son      DIABETES Son      Alcohol/Drug Son      Substance Abuse Son      Obesity Daughter      DIABETES Daughter      Hypertension Daughter      Bipolar Disorder Other        SOCIAL HISTORY:  Social History     Social History     Marital status:      Spouse name: N/A     Number of children: N/A     Years of education: N/A     Social History Main Topics      Smoking status: Never Smoker     Smokeless tobacco: Never Used     Alcohol use No     Drug use: No     Sexual activity: Yes     Partners: Female     Other Topics Concern     Parent/Sibling W/ Cabg, Mi Or Angioplasty Before 65f 55m? No     Caffeine Concern No     4-5 cups per day     Special Diet Yes     smaller portions     Exercise No     Social History Narrative       Review of Systems:  Skin:  Positive for bruising     Eyes:  Positive for visual blurring    ENT:  Positive for hearing loss    Respiratory:  Negative       Cardiovascular:    Positive for;chest pain;fatigue;lightheadedness;dizziness    Gastroenterology: Positive for constipation    Genitourinary:  Negative      Musculoskeletal:  Positive for back pain;joint pain;joint swelling;joint stiffness    Neurologic:  Negative      Psychiatric:  Positive for anxiety;depression;excessive stress    Heme/Lymph/Imm:  Negative      Endocrine:  Positive for diabetes      Physical Exam:  Vitals: /64 (BP Location: Right arm, Patient Position: Chair, Cuff Size: Adult Regular)  Pulse 63  Wt 97.2 kg (214 lb 3.2 oz)  SpO2 97%  BMI 32.57 kg/m2    Constitutional:  cooperative, alert and oriented, well developed, well nourished, in no acute distress appears older than stated age      Skin:  warm and dry to the touch, no apparent skin lesions or masses noted   had a bruise on bridge nose    Head:  normocephalic        Eyes:  sclera white        ENT:  no pallor or cyanosis        Neck:  JVP normal        Chest:  normal breath sounds, clear to auscultation, normal A-P diameter, normal symmetry, normal respiratory excursion, no use of accessory muscles          Cardiac: regular rhythm, normal S1/S2, no S3 or S4, apical impulse not displaced, no murmurs, gallops or rubs                  Abdomen:  abdomen soft, non-tender, BS normoactive, no mass, no HSM, no bruits obese      Vascular: pulses full and equal, no bruits auscultated                                         Extremities and Back:  no deformities, clubbing, cyanosis, erythema observed              Neurological:  affect appropriate, oriented to time, person and place              CC  Geremias Phelps MD   [facility]  [address]  [place] [id number]

## 2017-06-14 NOTE — LETTER
6/14/2017    Keyla Fonseca MD  [address] [facility] [address]    RE: Vincent Mishra       Dear Colleague,    I had the pleasure of seeing Vincent Mishra in the [facility].    Mr. Mishra is a pleasant 77-year-old gentleman with a history of coronary artery disease, status post PCI to the RCA in 2011 through [facility] in [place], PCI to the LAD in 2014 in [place], [place], history of recurrent non-ST segment elevation MI for which he underwent recurrent cardiac catheterization at the [facility] in 10/2014 with no evidence of obstructive disease, and a second non-ST segment elevation MI in 07/2015, again in [place], [place], for which he was placed on dual antiplatelet therapy.  His cardiac catheterization at that time showed patent stents without evidence of obstructive disease and a normal LVEDP.  He returns in annual followup.      Unfortunately, the patient was readmitted to the [facility] with a non-ST segment elevation MI; however, in reviewing his troponins, there was no clearcut elevation consistent with an acute coronary syndrome.  I suspect Mr. Mishra has baseline elevated troponin levels.  His troponin came back at 0.332, 0.363, and 0.328, all statistically within the same level.      Since discharge from the hospital, he has felt well.  He has chronic chest pain that has been present, he states for 50 years.  It is not improved by antianginals.      Please see my separate note with his full physical examination.     Outpatient Encounter Prescriptions as of 6/14/2017   Medication Sig Dispense Refill     aspirin EC 81 MG EC tablet Take 1 tablet (81 mg) by mouth daily       ibuprofen (ADVIL/MOTRIN) 800 MG tablet TAKE 1 TABLET BY MOUTH EVERY 8 HOURS AS NEEDED FOR MODERATE PAIN 120 tablet 1     clopidogrel (PLAVIX) 75 MG tablet TAKE 1 TABLET BY MOUTH DAILY 90 tablet 3     oxyCODONE (ROXICODONE) 5 MG IR tablet Take 1 daily as needed  for severe pain may  have 15 per month this is a 3 month supply 45 tablet 0     oxyCODONE (OXYCONTIN) 30 MG 12 hr tablet Take 1 tablet (30 mg) by mouth every 12 hours (Patient taking differently: Take 30 mg by mouth every morning ) 60 tablet 0     ibuprofen (ADVIL/MOTRIN) 800 MG tablet Take 1 tablet (800 mg) by mouth every 8 hours as needed for moderate pain 90 tablet 0     gabapentin (NEURONTIN) 100 MG capsule Take 1 tablet increase by 1 tablet every 2 days to 6 tablets 3 times per day (Patient taking differently: Take 100 mg by mouth 2 times daily Take 1 tablet increase by 1 tablet every 2 days to 6 tablets 3 times per day) 270 capsule 3     spironolactone (ALDACTONE) 25 MG tablet Take 1 tablet (25 mg) by mouth daily 90 tablet 0     triamcinolone (KENALOG) 0.1 % paste Take by mouth 2 times daily Apply to sore area twice a day for a week 5 g 0     atorvastatin (LIPITOR) 80 MG tablet Take 1 tablet (80 mg) by mouth daily 90 tablet 3     [DISCONTINUED] amLODIPine (NORVASC) 10 MG tablet Take 1 tablet (10 mg) by mouth daily 90 tablet 1     sertraline (ZOLOFT) 100 MG tablet Take 2 per day (Patient taking differently: Take 200 mg by mouth daily Take 2 per day) 225 tablet 1     omeprazole (PRILOSEC) 20 MG CR capsule Take 1 capsule (20 mg) by mouth 2 times daily 180 capsule 2     [DISCONTINUED] busPIRone (BUSPAR) 15 MG tablet Take 1 tablet (15 mg) by mouth 2 times daily 180 tablet 1     lisinopril (PRINIVIL/ZESTRIL) 40 MG tablet Take 1 tablet (40 mg) by mouth daily 90 tablet 2     [DISCONTINUED] traZODone (DESYREL) 100 MG tablet Take 3 tablets (300 mg) by mouth nightly as needed for sleep 300 mg at HS for sleep 270 tablet 2     hydrochlorothiazide (HYDRODIURIL) 25 MG tablet Take 1 tablet (25 mg) by mouth daily 90 tablet 3     [DISCONTINUED] metFORMIN (GLUCOPHAGE-XR) 500 MG 24 hr tablet Take 2 tablets (1,000 mg) by mouth 2 times daily (with meals) 360 tablet 2     nitroglycerin (NITROSTAT) 0.4 MG SL tablet Place 1 tablet (0.4 mg) under the  tongue every 5 minutes as needed for chest pain 25 tablet 1     cholecalciferol (VITAMIN  -D) 1000 UNITS capsule Take 1 capsule by mouth daily       ascorbic acid (VITAMIN C) 500 MG tablet Take 500 mg by mouth daily        acetaminophen (TYLENOL) 325 MG tablet Take 2 tablets by mouth every 4 hours as needed. 250 tablet 1     Calcium Carbonate-Vitamin D (CALCIUM + D) 600-200 MG-UNIT per tablet Take 2 tablets by mouth daily. 100 tablet 12     order for DME Equipment being ordered: TENS (Patient not taking: Reported on 6/14/2017) 1 Units 0     blood glucose monitoring (ONE TOUCH ULTRA) test strip Use to test blood sugars 2 times daily or as directed. (Patient not taking: Reported on 6/14/2017) 200 strip 3     blood glucose monitoring (NO BRAND SPECIFIED) meter device kit Use to test blood sugar 2 times daily or as directed. (Patient not taking: Reported on 6/14/2017) 1 kit 0     order for DME Equipment being ordered: Wheelchair motorized for patient with spinal stenosis and neurogenic claudication (Patient not taking: Reported on 6/14/2017) 1 Device 0     Rapt[employer] FINEPOINT LANCETS MISC Use to test blood sugars 1 times daily or as directed. (Patient not taking: Reported on 6/14/2017) 100 each 12     No facility-administered encounter medications on file as of 6/14/2017.       IMPRESSION AND PLAN:  Mr. Mishra has most likely a baseline elevated troponin elevation for which he keeps getting readmitted to the hospital and diagnosed with non-ST segment elevation MIs which likely is incorrect.  Given his advanced atherosclerotic disease, I do think it is reasonable to keep him on dual antiplatelet therapy and atorvastatin unchanged.  At the [facility] he underwent a stress test for risk stratification which did not show evidence for significant coronary artery disease/cardiac ischemia.  His summed stress score was only 3.  In the future if the patient is seen in the emergency room, he will need 2 serial troponins at least  4 hours apart to determine if this could potentially represent an acute coronary syndrome given his baseline elevated troponin level.  The baseline elevated troponin level does carry a worse prognosis, and therefore again we will continue lifelong dual antiplatelet therapy as long as he tolerates this well.  I will otherwise plan to see Mr. Mishra back in routine clinical followup in 6 months.     Again, thank you for allowing me to participate in the care of your patient.      Sincerely,    Geremias Phelps MD     [facility]

## 2017-06-14 NOTE — MR AVS SNAPSHOT
After Visit Summary   6/14/2017    Vincent Mishra    MRN: [id number]           Patient Information     Date Of Birth          1940        Visit Information        Provider Department      6/14/2017 11:00 AM Geremias Phelps MD [facility] PHYSICIAN HEART AT [facility]        Today's Diagnoses     Coronary artery disease involving native coronary artery of native heart without angina pectoris    -  1    Essential hypertension with goal blood pressure less than 140/90           Follow-ups after your visit        Additional Services     Follow-Up with Cardiologist                 Your next 10 appointments already scheduled     Jun 21, 2017 12:00 PM CDT   [facility] with Rosanne Stewart PT   [facility] ([facility])    [address] [facility] [address] [phone]            Jun 28, 2017 12:00 PM CDT   [facility] Spine with Rosanne Stewart PT   [facility] ([facility])    [address] [facility] [address]   [phone]              Future tests that were ordered for you today     Open Future Orders        Priority Expected Expires Ordered    Follow-Up with Cardiologist Routine 12/11/2017 6/14/2018 6/14/2017            Who to contact     If you have questions or need follow up information about today's clinic visit or your schedule please contact [facility] PHYS[facility] AT [facility] directly at [phone].  Normal or non-critical lab and imaging results will be communicated to you by MyChart, letter or phone within 4 business days after the clinic has received the results. If you do not hear from us within 7 days, please contact the clinic through MyChart or phone. If you have a critical or abnormal lab result, we will notify you by phone as soon as possible.  Submit refill requests through [employer] or call your pharmacy and they will forward the refill request to us. Please allow 3 business days for your refill to be  completed.          Additional Information About Your Visit        [facility] Information     [facility] gives you secure access to your electronic health record. If you see a primary care provider, you can also send messages to your care team and make appointments. If you have questions, please call your primary care clinic.  If you do not have a primary care provider, please call [phone] and they will assist you.        Care EveryWhere ID     This is your Care EveryWhere ID. This could be used by other organizations to access your [facility] medical records  [id number]        Your Vitals Were     Pulse Pulse Oximetry BMI (Body Mass Index)             63 97% 32.57 kg/m2          Blood Pressure from Last 3 Encounters:   06/14/17 109/64   05/31/17 130/72   05/30/17 137/86    Weight from Last 3 Encounters:   06/14/17 97.2 kg (214 lb 3.2 oz)   05/31/17 101.7 kg (224 lb 3.2 oz)   05/12/17 102.1 kg (225 lb)              We Performed the Following     Follow-Up with Cardiologist          Today's Medication Changes          These changes are accurate as of: 6/14/17 11:14 AM.  If you have any questions, ask your nurse or doctor.               These medicines have changed or have updated prescriptions.        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 Make sure you understand how and when to take each.   Used for:  Lumbar back pain with radiculopathy affecting left lower extremity   Changed by:  Keyla Fonseca  MD ARSH        Take 1 daily as needed  for severe pain may have 15 per month this is a 3 month supply   Quantity:  45 tablet   Refills:  0       * oxyCODONE 30 MG 12 hr tablet   Commonly known as:  OxyCONTIN   This may have changed:  when to take this   Used for:  Hip pain, bilateral, Chronic bilateral low back pain with sciatica, sciatica laterality unspecified   Changed by:  Keyla Fonseca MD        Dose:  30 mg   Take 1 tablet (30 mg) by mouth every 12 hours   Quantity:  60 tablet   Refills:  0       sertraline 100 MG tablet   Commonly known as:  ZOLOFT   This may have changed:    - how much to take  - how to take this  - when to take this  - additional instructions   Used for:  Major depressive disorder, recurrent episode, moderate (H)        Take 2 per day   Quantity:  225 tablet   Refills:  1       * Notice:  This list has 2 medication(s) that are the same as other medications prescribed for you. Read the directions carefully, and ask your doctor or other care provider to review them with you.             Primary Care Provider Office Phone # Fax #    Keyla Fonseca [phone]       [facility] [address] [facility] [address]        Thank you!     Thank you for choosing [facility] PHYSICIAN HEART AT [facility]  for your care. Our goal is always to provide you with excellent care. Hearing back from our patients is one way we can continue to improve our services. Please take a few minutes to complete the written survey that you may receive in the mail after your visit with us. Thank you!             Your Updated Medication List - Protect others around you: Learn how to safely use, store and throw away your medicines at www.disposemymeds.org.          This list is accurate as of: 6/14/17 11:14 AM.  Always use your most recent med list.                   Brand Name Dispense Instructions for use    acetaminophen 325 MG tablet    TYLENOL    250 tablet    Take 2  tablets by mouth every 4 hours as needed.       amLODIPine 10 MG tablet    NORVASC    90 tablet    Take 1 tablet (10 mg) by mouth daily       ascorbic acid 500 MG tablet    VITAMIN C     Take 500 mg by mouth daily       aspirin 81 MG EC tablet      Take 1 tablet (81 mg) by mouth daily       atorvastatin 80 MG tablet    LIPITOR    90 tablet    Take 1 tablet (80 mg) by mouth daily       blood glucose monitoring meter device kit    no brand specified    1 kit    Use to test blood sugar 2 times daily or as directed.       blood glucose monitoring test strip    ONE TOUCH ULTRA    200 strip    Use to test blood sugars 2 times daily or as directed.       busPIRone 15 MG tablet    BUSPAR    180 tablet    Take 1 tablet (15 mg) by mouth 2 times daily       calcium + D 600-200 MG-UNIT Tabs   Generic drug:  calcium carbonate-vitamin D     100 tablet    Take 2 tablets by mouth daily.       cholecalciferol 1000 UNITS capsule    vitamin  -D     Take 1 capsule by mouth daily       clopidogrel 75 MG tablet    PLAVIX    90 tablet    TAKE 1 TABLET BY MOUTH DAILY       gabapentin 100 MG capsule    NEURONTIN    270 capsule    Take 1 tablet increase by 1 tablet every 2 days to 6 tablets 3 times per day       hydrochlorothiazide 25 MG tablet    HYDRODIURIL    90 tablet    Take 1 tablet (25 mg) by mouth daily       * ibuprofen 800 MG tablet    ADVIL/MOTRIN    90 tablet    Take 1 tablet (800 mg) by mouth every 8 hours as needed for moderate pain       * ibuprofen 800 MG tablet    ADVIL/MOTRIN    120 tablet    TAKE 1 TABLET BY MOUTH EVERY 8 HOURS AS NEEDED FOR MODERATE PAIN       LIFESCAN FINEPOINT LANCETS Misc     100 each    Use to test blood sugars 1 times daily or as directed.       lisinopril 40 MG tablet    PRINIVIL/ZESTRIL    90 tablet    Take 1 tablet (40 mg) by mouth daily       metFORMIN 500 MG 24 hr tablet    GLUCOPHAGE-XR    360 tablet    Take 2 tablets (1,000 mg) by mouth 2 times daily (with meals)       nitroglycerin 0.4 MG  sublingual tablet    NITROSTAT    25 tablet    Place 1 tablet (0.4 mg) under the tongue every 5 minutes as needed for chest pain       omeprazole 20 MG CR capsule    priLOSEC    180 capsule    Take 1 capsule (20 mg) by mouth 2 times daily       * order for DME     1 Device    Equipment being ordered: Wheelchair motorized for patient with spinal stenosis and neurogenic claudication       * order for DME     1 Units    Equipment being ordered: TENS       * oxyCODONE 5 MG IR tablet    ROXICODONE    45 tablet    Take 1 daily as needed  for severe pain may have 15 per month this is a 3 month supply       * oxyCODONE 30 MG 12 hr tablet    OxyCONTIN    60 tablet    Take 1 tablet (30 mg) by mouth every 12 hours       sertraline 100 MG tablet    ZOLOFT    225 tablet    Take 2 per day       spironolactone 25 MG tablet    ALDACTONE    90 tablet    Take 1 tablet (25 mg) by mouth daily       traZODone 100 MG tablet    DESYREL    270 tablet    Take 3 tablets (300 mg) by mouth nightly as needed for sleep 300 mg at HS for sleep       triamcinolone 0.1 % paste    KENALOG    5 g    Take by mouth 2 times daily Apply to sore area twice a day for a week       * Notice:  This list has 6 medication(s) that are the same as other medications prescribed for you. Read the directions carefully, and ask your doctor or other care provider to review them with you.

## 2017-06-15 NOTE — PROGRESS NOTES
HISTORY OF PRESENT ILLNESS:  Mr. Mishra is a pleasant 77-year-old gentleman with a history of coronary artery disease, status post PCI to the RCA in 2011 through [facility] in [place], PCI to the LAD in 2014 in [place], [place], history of recurrent non-ST segment elevation MI for which he underwent recurrent cardiac catheterization at the [facility] in 10/2014 with no evidence of obstructive disease, and a second non-ST segment elevation MI in 07/2015, again in [place], [place], for which he was placed on dual antiplatelet therapy.  His cardiac catheterization at that time showed patent stents without evidence of obstructive disease and a normal LVEDP.  He returns in annual followup.      Unfortunately, the patient was readmitted to the [facility] with a non-ST segment elevation MI; however, in reviewing his troponins, there was no clearcut elevation consistent with an acute coronary syndrome.  I suspect Mr. Mishra has baseline elevated troponin levels.  His troponin came back at 0.332, 0.363, and 0.328, all statistically within the same level.      Since discharge from the hospital, he has felt well.  He has chronic chest pain that has been present, he states for 50 years.  It is not improved by antianginals.      Please see my separate note with his full physical examination.      IMPRESSION AND PLAN:  Mr. Mishra has most likely a baseline elevated troponin elevation for which he keeps getting readmitted to the hospital and diagnosed with non-ST segment elevation MIs which likely is incorrect.  Given his advanced atherosclerotic disease, I do think it is reasonable to keep him on dual antiplatelet therapy and atorvastatin unchanged.  At the [facility] he underwent a stress test for risk stratification which did not show evidence for significant coronary artery disease/cardiac ischemia.  His summed stress score was only 3.  In the future if the patient is seen in the emergency room, he will need 2  serial troponins at least 4 hours apart to determine if this could potentially represent an acute coronary syndrome given his baseline elevated troponin level.  The baseline elevated troponin level does carry a worse prognosis, and therefore again we will continue lifelong dual antiplatelet therapy as long as he tolerates this well.  I will otherwise plan to see Mr. Aguirre back in routine clinical followup in 6 months.         ZAHEER BONNER MD             D: 2017 11:20   T: 2017 22:34   MT: [id number]      Name:     ISELA AGUIRRE   MRN:      [id number]        Account:      [id number]   :      1940           Service Date: 2017      Document: [id number]

## 2017-07-21 DIAGNOSIS — I10 ESSENTIAL HYPERTENSION WITH GOAL BLOOD PRESSURE LESS THAN 140/90: ICD-10-CM

## 2017-07-24 RX ORDER — AMLODIPINE BESYLATE 10 MG/1
TABLET ORAL
Qty: 90 TABLET | Refills: 0 | Status: SHIPPED | OUTPATIENT
Start: 2017-07-24 | End: 2017-10-30

## 2017-07-24 NOTE — TELEPHONE ENCOUNTER
AMLODIPINE BESYLATE 10MG TABLETS        Last Written Prescription Date: 1/24/17  Last Fill Quantity: 90, # refills: 1    Last Office Visit with [facility], [facility] or [facility] prescribing provider:  3/6/17   Future Office Visit:        BP Readings from Last 3 Encounters:   06/14/17 109/64   05/31/17 130/72   05/30/17 137/86

## 2017-08-24 DIAGNOSIS — F33.1 MAJOR DEPRESSIVE DISORDER, RECURRENT EPISODE, MODERATE (H): ICD-10-CM

## 2017-08-24 DIAGNOSIS — K21.9 GASTROESOPHAGEAL REFLUX DISEASE WITHOUT ESOPHAGITIS: ICD-10-CM

## 2017-08-24 DIAGNOSIS — F51.01 PRIMARY INSOMNIA: ICD-10-CM

## 2017-08-24 RX ORDER — TRAZODONE HYDROCHLORIDE 100 MG/1
300 TABLET ORAL
Qty: 270 TABLET | Refills: 0 | Status: SHIPPED | OUTPATIENT
Start: 2017-08-24 | End: 2018-01-12

## 2017-08-24 NOTE — TELEPHONE ENCOUNTER
Prescription approved per [facility].  Ava Robison RN

## 2017-08-24 NOTE — TELEPHONE ENCOUNTER
Pt is out of this meds and needs refill. Pt won't be home for another 3 weeks. Would like to use [place] [place] [facility]. Please advise    Re [place], [place].

## 2017-08-24 NOTE — TELEPHONE ENCOUNTER
OMEPRAZOLE 20MG CAPSULES        Last Written Prescription Date: 12/28/16  Last Fill Quantity: 180,  # refills: 2   Last Office Visit with [facility], [facility] or [facility] prescribing provider: 3/6/17    sertraline (ZOLOFT) 100 MG tablet     Last Written Prescription Date: 1/15/17  Last Fill Quantity: 225, # refills: 1  Last Office Visit with [facility] primary care provider:  3/6/17        Last PHQ-9 score on record=   PHQ-9 SCORE 1/31/2017   Total Score -   Total Score 11

## 2017-08-25 NOTE — TELEPHONE ENCOUNTER
Patient needs PHQ9 before next refill. The dose of zoloft exceeds maximum dose recommended. Please advise. Script is pended.  Shelley Iglesias RN

## 2017-08-27 RX ORDER — SERTRALINE HYDROCHLORIDE 100 MG/1
250 TABLET, FILM COATED ORAL DAILY
Qty: 225 TABLET | Refills: 0 | Status: SHIPPED | OUTPATIENT
Start: 2017-08-27 | End: 2017-09-20 | Stop reason: DRUGHIGH

## 2017-09-06 DIAGNOSIS — I10 ESSENTIAL HYPERTENSION WITH GOAL BLOOD PRESSURE LESS THAN 140/90: ICD-10-CM

## 2017-09-06 RX ORDER — SPIRONOLACTONE 25 MG/1
TABLET ORAL
Qty: 90 TABLET | Refills: 0 | Status: SHIPPED | OUTPATIENT
Start: 2017-09-06 | End: 2017-09-29

## 2017-09-06 NOTE — TELEPHONE ENCOUNTER
Routing refill request to provider for review/approval because:  Labs out of range:  5/30/17 with Cr of 1.32  Yvon Dickens RN

## 2017-09-15 DIAGNOSIS — M54.16 LUMBAR BACK PAIN WITH RADICULOPATHY AFFECTING LEFT LOWER EXTREMITY: ICD-10-CM

## 2017-09-15 DIAGNOSIS — M54.40 CHRONIC BILATERAL LOW BACK PAIN WITH SCIATICA, SCIATICA LATERALITY UNSPECIFIED: ICD-10-CM

## 2017-09-15 DIAGNOSIS — M25.552 HIP PAIN, BILATERAL: ICD-10-CM

## 2017-09-15 DIAGNOSIS — G89.29 CHRONIC BILATERAL LOW BACK PAIN WITH SCIATICA, SCIATICA LATERALITY UNSPECIFIED: ICD-10-CM

## 2017-09-15 DIAGNOSIS — M25.551 HIP PAIN, BILATERAL: ICD-10-CM

## 2017-09-15 RX ORDER — OXYCODONE HYDROCHLORIDE 5 MG/1
TABLET ORAL
Qty: 45 TABLET | Refills: 0 | Status: SHIPPED | OUTPATIENT
Start: 2017-09-15 | End: 2017-10-30

## 2017-09-15 RX ORDER — OXYCODONE HYDROCHLORIDE 30 MG/1
30 TABLET, FILM COATED, EXTENDED RELEASE ORAL EVERY MORNING
Qty: 30 TABLET | Refills: 0 | Status: SHIPPED | OUTPATIENT
Start: 2017-09-15 | End: 2017-10-30

## 2017-09-15 NOTE — TELEPHONE ENCOUNTER
Oxycodone 5mg      Last Written Prescription Date:  4/12/17  Last Fill Quantity: 45,   # refills: 0  Last Office Visit with [facility], [facility] or [facility] prescribing provider: 3/6/17  Future Office visit:       Routing refill request to provider for review/approval because:  Drug not on the [facility], [facility] or [facility] refill protocol or controlled substance      Oxycodone 30mg      Last Written Prescription Date:  5/12/17  Last Fill Quantity: 60,   # refills: 0  Last Office Visit with [facility], [facility] or [facility] prescribing provider: 3/6/17  Future Office visit:       Routing refill request to provider for review/approval because:  Drug not on the [facility], [facility] or [facility] refill protocol or controlled substance

## 2017-09-20 ENCOUNTER — OFFICE VISIT (OUTPATIENT)
Dept: FAMILY MEDICINE | Facility: CLINIC | Age: 77
End: 2017-09-20
Payer: MEDICARE

## 2017-09-20 VITALS
SYSTOLIC BLOOD PRESSURE: 136 MMHG | HEART RATE: 60 BPM | TEMPERATURE: 97.2 F | HEIGHT: 68 IN | WEIGHT: 206.5 LBS | DIASTOLIC BLOOD PRESSURE: 60 MMHG | BODY MASS INDEX: 31.3 KG/M2

## 2017-09-20 DIAGNOSIS — H01.00B BLEPHARITIS OF UPPER AND LOWER EYELIDS OF BOTH EYES, UNSPECIFIED TYPE: ICD-10-CM

## 2017-09-20 DIAGNOSIS — Z13.89 SCREENING FOR DIABETIC PERIPHERAL NEUROPATHY: ICD-10-CM

## 2017-09-20 DIAGNOSIS — H01.00A BLEPHARITIS OF UPPER AND LOWER EYELIDS OF BOTH EYES, UNSPECIFIED TYPE: ICD-10-CM

## 2017-09-20 DIAGNOSIS — E11.59 TYPE 2 DIABETES MELLITUS WITH OTHER CIRCULATORY COMPLICATIONS (H): Primary | ICD-10-CM

## 2017-09-20 DIAGNOSIS — M17.11 PRIMARY OSTEOARTHRITIS OF RIGHT KNEE: ICD-10-CM

## 2017-09-20 DIAGNOSIS — M25.552 HIP PAIN, BILATERAL: ICD-10-CM

## 2017-09-20 DIAGNOSIS — Z23 NEED FOR PROPHYLACTIC VACCINATION AGAINST STREPTOCOCCUS PNEUMONIAE (PNEUMOCOCCUS): ICD-10-CM

## 2017-09-20 DIAGNOSIS — M54.40 CHRONIC BILATERAL LOW BACK PAIN WITH SCIATICA, SCIATICA LATERALITY UNSPECIFIED: ICD-10-CM

## 2017-09-20 DIAGNOSIS — F43.21 ADJUSTMENT DISORDER WITH DEPRESSED MOOD: ICD-10-CM

## 2017-09-20 DIAGNOSIS — Z23 NEED FOR PROPHYLACTIC VACCINATION AND INOCULATION AGAINST INFLUENZA: ICD-10-CM

## 2017-09-20 DIAGNOSIS — Z91.81 AT RISK FOR FALLING: ICD-10-CM

## 2017-09-20 DIAGNOSIS — G89.29 CHRONIC BILATERAL LOW BACK PAIN WITH SCIATICA, SCIATICA LATERALITY UNSPECIFIED: ICD-10-CM

## 2017-09-20 DIAGNOSIS — M25.551 HIP PAIN, BILATERAL: ICD-10-CM

## 2017-09-20 DIAGNOSIS — Z13.5 SCREENING FOR DIABETIC RETINOPATHY: ICD-10-CM

## 2017-09-20 LAB
CREAT UR-MCNC: 102 MG/DL
HBA1C MFR BLD: 6.4 % (ref 4.3–6)
MICROALBUMIN UR-MCNC: 5 MG/L
MICROALBUMIN/CREAT UR: 5.36 MG/G CR (ref 0–17)

## 2017-09-20 PROCEDURE — 82043 UR ALBUMIN QUANTITATIVE: CPT | Performed by: FAMILY MEDICINE

## 2017-09-20 PROCEDURE — 83036 HEMOGLOBIN GLYCOSYLATED A1C: CPT | Performed by: FAMILY MEDICINE

## 2017-09-20 PROCEDURE — 99207 C FOOT EXAM  NO CHARGE: CPT | Performed by: FAMILY MEDICINE

## 2017-09-20 PROCEDURE — G0008 ADMIN INFLUENZA VIRUS VAC: HCPCS | Performed by: FAMILY MEDICINE

## 2017-09-20 PROCEDURE — 99215 OFFICE O/P EST HI 40 MIN: CPT | Mod: 25 | Performed by: FAMILY MEDICINE

## 2017-09-20 PROCEDURE — 90662 IIV NO PRSV INCREASED AG IM: CPT | Performed by: FAMILY MEDICINE

## 2017-09-20 PROCEDURE — 36415 COLL VENOUS BLD VENIPUNCTURE: CPT | Performed by: FAMILY MEDICINE

## 2017-09-20 PROCEDURE — G0009 ADMIN PNEUMOCOCCAL VACCINE: HCPCS | Performed by: FAMILY MEDICINE

## 2017-09-20 PROCEDURE — 90670 PCV13 VACCINE IM: CPT | Performed by: FAMILY MEDICINE

## 2017-09-20 RX ORDER — BUPROPION HYDROCHLORIDE 100 MG/1
TABLET ORAL
Qty: 180 TABLET | Refills: 1 | Status: SHIPPED | OUTPATIENT
Start: 2017-09-20 | End: 2017-11-08

## 2017-09-20 ASSESSMENT — PATIENT HEALTH QUESTIONNAIRE - PHQ9
SUM OF ALL RESPONSES TO PHQ QUESTIONS 1-9: 14
5. POOR APPETITE OR OVEREATING: NEARLY EVERY DAY

## 2017-09-20 ASSESSMENT — ANXIETY QUESTIONNAIRES
6. BECOMING EASILY ANNOYED OR IRRITABLE: NEARLY EVERY DAY
3. WORRYING TOO MUCH ABOUT DIFFERENT THINGS: NEARLY EVERY DAY
2. NOT BEING ABLE TO STOP OR CONTROL WORRYING: NEARLY EVERY DAY
GAD7 TOTAL SCORE: 19
5. BEING SO RESTLESS THAT IT IS HARD TO SIT STILL: SEVERAL DAYS
7. FEELING AFRAID AS IF SOMETHING AWFUL MIGHT HAPPEN: NEARLY EVERY DAY
1. FEELING NERVOUS, ANXIOUS, OR ON EDGE: NEARLY EVERY DAY

## 2017-09-20 NOTE — MR AVS SNAPSHOT
After Visit Summary   9/20/2017    Vincent Mishra    MRN: [id number]           Patient Information     Date Of Birth          1940        Visit Information        Provider Department      9/20/2017 11:30 AM Keyla Fonseca MD [facility]        Today's Diagnoses     Type 2 diabetes mellitus with other circulatory complications (H)    -  1    At risk for falling        Screening for diabetic retinopathy        Screening for diabetic peripheral neuropathy        Need for prophylactic vaccination against Streptococcus pneumoniae (pneumococcus)        Blepharitis of upper and lower eyelids of both eyes, unspecified type        Need for prophylactic vaccination and inoculation against influenza        Hip pain, bilateral        Chronic bilateral low back pain with sciatica, sciatica laterality unspecified        Primary osteoarthritis of right knee        Adjustment disorder with depressed mood          Care Instructions      Treating Blepharitis: Self-Care    To treat the problem, keep your eyelids clean. Warm compresses can reduce redness and swelling, and help clean your eyelids, too. You may also need to wash the area gently with an eyelid scrub when you wake up.  To apply a warm compress:  1. Wash your hands with soap and warm water.  2. Wet a clean washcloth with warm water. Then wring it out.  3. Close your eyes and place the washcloth over your eyelids for 3 to 5 minutes. This helps loosen scales or crusts.  4. Wet the washcloth again as often as needed to keep it warm.  Repeat 2 or more times a day. Use a clean washcloth each time.     To use an eyelid scrub:  1. Wash your hands with soap and warm water.  2. Use a ready-made eyelid scrub. Or mix 3 drops of baby shampoo in 1/4 cup of warm water.  3. Dip a lint-free pad, cotton swab, or clean washcloth in the scrub.  4. Close one eye and gently scrub the base of the eyelid.  5. Rinse the lid in cool water and dry with a clean  uday.  6. Repeat on your other eye.  Date Last Reviewed: 6/6/2015 2000-2017 [employer]. [address] [place] [address], [place] [address]. All rights reserved. This information is not intended as a substitute for professional medical care. Always follow your healthcare professional's instructions.                Follow-ups after your visit        Additional Services     OPHTHALMOLOGY ADULT REFERRAL       Your provider has referred you to: [facility]: [facility] [phone]   [email or web address]    Please be aware that coverage of these services is subject to the terms and limitations of your health insurance plan.  Call member services at your health plan with any benefit or coverage questions.      Please bring the following with you to your appointment:    (1) Any X-Rays, CTs or MRIs which have been performed.  Contact the facility where they were done to arrange for  prior to your scheduled appointment.    (2) List of current medications  (3) This referral request   (4) Any documents/labs given to you for this referral            ORTHO  REFERRAL       [facility] is referring you to the Orthopedic  Services at [facility].       The  Representative will assist you in the coordination of your Orthopedic and Musculoskeletal Care as prescribed by your physician.    The  Representative will call you within 1 business day to help schedule your appointment, or you may contact the  Representative at:    All areas ~ [phone]     Type of Referral : Spine: Lumbar  **Choose Medical Spine Specialist (unless patient was seen by a Medical Spine Specialist within the past 6 months).**  Surgical Evaluation is advised if the patient presents with one or more of the following red flags: Evidence of Spinal Tumor, Infection or Fracture, Cauda Equina Syndrome, Sudden or Progressive Weakness, Loss of Bowel or  Bladder Control, or any other documented emergent neurological condition resulting from a Lumbar Spinal Condition. Spine Surgeon        Timeframe requested: Within 2 weeks    Coverage of these services is subject to the terms and limitations of your health insurance plan.  Please call member services at your health plan with any benefit or coverage questions.      If X-rays, CT or MRI's have been performed, please contact the facility where they were done to arrange for , prior to your scheduled appointment.  Please bring this referral request to your appointment and present it to your specialist.            ORTHO  REFERRAL       [facility] is referring you to the Orthopedic  Services at [facility].       The  Representative will assist you in the coordination of your Orthopedic and Musculoskeletal Care as prescribed by your physician.    The  Representative will call you within 1 business day to help schedule your appointment, or you may contact the  Representative at:    All areas ~ [phone]     Type of Referral : Surgical / Specialist       Timeframe requested: Routine    Coverage of these services is subject to the terms and limitations of your health insurance plan.  Please call member services at your health plan with any benefit or coverage questions.      If X-rays, CT or MRI's have been performed, please contact the facility where they were done to arrange for , prior to your scheduled appointment.  Please bring this referral request to your appointment and present it to your specialist.                  Who to contact     Normal or non-critical lab and imaging results will be communicated to you by MyChart, letter or phone within 4 business days after the clinic has received the results. If you do not hear from us within 7 days, please contact the clinic through MyChart or phone. If you have a critical  "or abnormal lab result, we will notify you by phone as soon as possible.  Submit refill requests through [employer] or call your pharmacy and they will forward the refill request to us. Please allow 3 business days for your refill to be completed.          If you need to speak with a  for additional information , please call: [phone]             Additional Information About Your Visit        [employer] Information     [employer] gives you secure access to your electronic health record. If you see a primary care provider, you can also send messages to your care team and make appointments. If you have questions, please call your primary care clinic.  If you do not have a primary care provider, please call [phone] and they will assist you.        Care EveryWhere ID     This is your Care EveryWhere ID. This could be used by other organizations to access your [place] medical records  [id number]        Your Vitals Were     Pulse Temperature Height BMI (Body Mass Index)          60 97.2  F (36.2  C) (Tympanic) 5' 8\" (1.727 m) 31.4 kg/m2         Blood Pressure from Last 3 Encounters:   09/20/17 136/60   06/14/17 109/64   05/31/17 130/72    Weight from Last 3 Encounters:   09/20/17 206 lb 8 oz (93.7 kg)   06/14/17 214 lb 3.2 oz (97.2 kg)   05/31/17 224 lb 3.2 oz (101.7 kg)              We Performed the Following     ADMIN INFLUENZA (For MEDICARE Patients ONLY) []     ADMIN MEDICARE: Pneumococcal Vaccine ()     Albumin Random Urine Quantitative with Creat Ratio     FLU VACCINE, INCREASED ANTIGEN, PRESV FREE, AGE 65+ [40540]     FOOT EXAM  NO CHARGE [09396.114]     HEMOGLOBIN A1C     OPHTHALMOLOGY ADULT REFERRAL     ORTHO  REFERRAL     ORTHO  REFERRAL     Pneumococcal vaccine 13 valent PCV13 IM (Prevnar) [27232]          Today's Medication Changes          These changes are accurate as of: 9/20/17  1:00 PM.  If you have any questions, ask your nurse or doctor.             "   Start taking these medicines.        Dose/Directions    buPROPion 100 MG tablet   Commonly known as:  WELLBUTRIN   Used for:  Adjustment disorder with depressed mood   Started by:  Keyla Fonseca MD        Take 1 pill in am for 7 days then increase to 1 po 2 times per day   Quantity:  180 tablet   Refills:  1         These medicines have changed or have updated prescriptions.        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 Continue taking this medication, and follow the directions you see here.   Used for:  Major depressive disorder, recurrent episode, moderate (H)   Changed by:  Keyla Fonseca MD        Take 2 per day   Quantity:  225 tablet   Refills:  1            Where to get your medicines      These medications were sent to [facility] [address] [place] [address]  AT [address] [facility] [address]     Phone:  [phone]     buPROPion 100 MG tablet                Primary Care Provider Office Phone # Fax #    Keyla Fonseca [phone] [id number]  [facility] [address]        Equal Access to Services     [facility] AH: Hadii rosa kohlero Soilene, waaxda luqadaha, qaybta kaalmada aderamses, sobeida sandhu. [facility] [phone].    ATENCIÓN: Si darnellla español, tiene a nicholas disposición  servicios gratuitos de asistencia lingüística. Shasta reich [phone].    We comply with applicable federal civil rights laws and [place] laws. We do not discriminate on the basis of race, color, national origin, age, disability sex, sexual orientation or gender identity.            Thank you!     Thank you for choosing [facility]  for your care. Our goal is always to provide you with excellent care. Hearing back from our patients is one way we can continue to improve our services. Please take a few minutes to complete the written survey that you may receive in the mail after your visit with us. Thank you!             Your Updated Medication List - Protect others around you: Learn how to safely use, store and throw away your medicines at www.disposemymeds.org.          This list is accurate as of: 9/20/17  1:00 PM.  Always use your most recent med list.                   Brand Name Dispense Instructions for use Diagnosis    acetaminophen 325 MG tablet    TYLENOL    250 tablet    Take 2 tablets by mouth every 4 hours as needed.    Tear of meniscus of left knee       amLODIPine 10 MG tablet    NORVASC    90 tablet    TAKE 1 TABLET(10 MG) BY MOUTH DAILY    Essential hypertension with goal blood pressure less than 140/90       ascorbic acid 500 MG tablet    VITAMIN C     Take 500 mg by mouth daily        aspirin 81 MG EC tablet      Take 1 tablet (81 mg) by mouth daily    Chest pain, unspecified type, NSTEMI (non-ST elevated myocardial infarction) (H)       atorvastatin 80 MG tablet    LIPITOR    90 tablet    Take 1 tablet (80 mg) by mouth daily    Hyperlipidemia LDL goal <100       blood glucose monitoring meter device kit    no brand specified    1 kit    Use to test blood sugar 2 times daily or as directed.    Type 2 diabetes mellitus with hyperglycemia, without long-term current use of insulin (H)       blood glucose monitoring test strip    ONE TOUCH ULTRA    200 strip    Use to test blood sugars 2  times daily or as directed.    Type 2 diabetes mellitus with other circulatory complications (H)       buPROPion 100 MG tablet    WELLBUTRIN    180 tablet    Take 1 pill in am for 7 days then increase to 1 po 2 times per day    Adjustment disorder with depressed mood       busPIRone 15 MG tablet    BUSPAR    180 tablet    TAKE 1 TABLET(15 MG) BY MOUTH TWICE DAILY    Depression with anxiety       calcium + D 600-200 MG-UNIT Tabs   Generic drug:  calcium carbonate-vitamin D     100 tablet    Take 2 tablets by mouth daily.        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)       hydrochlorothiazide 25 MG tablet    HYDRODIURIL    90 tablet    Take 1 tablet (25 mg) by mouth daily    Essential hypertension with goal blood pressure less than 140/90       * ibuprofen 800 MG tablet    ADVIL/MOTRIN    90 tablet    Take 1 tablet (800 mg) by mouth every 8 hours as needed for moderate pain    Primary osteoarthritis of both knees       * ibuprofen 800 MG tablet    ADVIL/MOTRIN    120 tablet    TAKE 1 TABLET BY MOUTH EVERY 8 HOURS AS NEEDED FOR MODERATE PAIN    Primary osteoarthritis of both knees       [employer] FINEPOINT LANCETS Misc     100 each    Use to test blood sugars 1 times daily or as directed.    Type 2 diabetes, HbA1c goal < 7% (H)       lisinopril 40 MG tablet    PRINIVIL/ZESTRIL    90 tablet    Take 1 tablet (40 mg) by mouth daily    Essential hypertension with goal blood pressure  less than 140/90       metFORMIN 500 MG 24 hr tablet    GLUCOPHAGE-XR    360 tablet    TAKE 2 TABLETS BY MOUTH TWICE DAILY WITH MEALS.    Type 2 diabetes mellitus without complication, unspecified long term insulin use status (H)       nitroGLYcerin 0.4 MG sublingual tablet    NITROSTAT    25 tablet    Place 1 tablet (0.4 mg) under the tongue every 5 minutes as needed for chest pain    CAD (coronary artery disease)       * omeprazole 20 MG CR capsule    priLOSEC    180 capsule    Take 1 capsule (20 mg) by mouth 2 times daily    Gastroesophageal reflux disease without esophagitis       * omeprazole 20 MG CR capsule    priLOSEC    180 capsule    TAKE ONE CAPSULE BY MOUTH TWICE DAILY    Gastroesophageal reflux disease without esophagitis       * order for DME     1 Device    Equipment being ordered: Wheelchair motorized for patient with spinal stenosis and neurogenic claudication    Spinal stenosis, lumbar region, with neurogenic claudication       * order for DME     1 Units    Equipment being ordered: TENS    Chronic bilateral low back pain with sciatica, sciatica laterality unspecified, Hip pain, bilateral       * oxyCODONE 30 MG 12 hr tablet    OxyCONTIN    30 tablet    Take 1 tablet (30 mg) by mouth every morning    Hip pain, bilateral, Chronic bilateral low back pain with sciatica, sciatica laterality unspecified       * oxyCODONE 5 MG IR tablet    ROXICODONE    45 tablet    Take 1 daily as needed  for severe pain may have 15 per month this is a 3 month supply    Lumbar back pain with radiculopathy affecting left lower extremity       sertraline 100 MG tablet    ZOLOFT    225 tablet    Take 2 per day    Major depressive disorder, recurrent episode, moderate (H)       spironolactone 25 MG tablet    ALDACTONE    90 tablet    TAKE 1 TABLET(25 MG) BY MOUTH DAILY    Essential hypertension with goal blood pressure less than 140/90       traZODone 100 MG tablet    DESYREL    270 tablet    Take 3 tablets (300 mg) by  mouth nightly as needed for sleep 300 mg at HS for sleep    Primary insomnia       * Notice:  This list has 8 medication(s) that are the same as other medications prescribed for you. Read the directions carefully, and ask your doctor or other care provider to review them with you.
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
## 2017-09-20 NOTE — PATIENT INSTRUCTIONS
Treating Blepharitis: Self-Care    To treat the problem, keep your eyelids clean. Warm compresses can reduce redness and swelling, and help clean your eyelids, too. You may also need to wash the area gently with an eyelid scrub when you wake up.  To apply a warm compress:  1. Wash your hands with soap and warm water.  2. Wet a clean washcloth with warm water. Then wring it out.  3. Close your eyes and place the washcloth over your eyelids for 3 to 5 minutes. This helps loosen scales or crusts.  4. Wet the washcloth again as often as needed to keep it warm.  Repeat 2 or more times a day. Use a clean washcloth each time.     To use an eyelid scrub:  1. Wash your hands with soap and warm water.  2. Use a ready-made eyelid scrub. Or mix 3 drops of baby shampoo in 1/4 cup of warm water.  3. Dip a lint-free pad, cotton swab, or clean washcloth in the scrub.  4. Close one eye and gently scrub the base of the eyelid.  5. Rinse the lid in cool water and dry with a clean towel.  6. Repeat on your other eye.  Date Last Reviewed: 6/6/2015 2000-2017 [employer]. [address] [place] [address], [place] [address]. All rights reserved. This information is not intended as a substitute for professional medical care. Always follow your healthcare professional's instructions.

## 2017-09-20 NOTE — NURSING NOTE
"Chief Complaint   Patient presents with     Back Pain     Discuss [facility]     Groin Pain     believes this is from the back pain.        Initial /60 (BP Location: Right arm, Patient Position: Chair, Cuff Size: Adult Regular)  Pulse 60  Temp 97.2  F (36.2  C) (Tympanic)  Ht 5' 8\" (1.727 m)  Wt 206 lb 8 oz (93.7 kg)  BMI 31.4 kg/m2 Estimated body mass index is 31.4 kg/(m^2) as calculated from the following:    Height as of this encounter: 5' 8\" (1.727 m).    Weight as of this encounter: 206 lb 8 oz (93.7 kg).  Medication Reconciliation: complete     Kassidy Reardon CMA    "

## 2017-09-20 NOTE — PROGRESS NOTES
Injectable Influenza Immunization Documentation    1.  Is the person to be vaccinated sick today?   No    2. Does the person to be vaccinated have an allergy to a component   of the vaccine?   No    3. Has the person to be vaccinated ever had a serious reaction   to influenza vaccine in the past?   No    4. Has the person to be vaccinated ever had Guillain-Barré syndrome?   No    Form completed by Kassidy Reardon CMA

## 2017-09-21 DIAGNOSIS — I10 ESSENTIAL HYPERTENSION WITH GOAL BLOOD PRESSURE LESS THAN 140/90: ICD-10-CM

## 2017-09-21 DIAGNOSIS — E11.9 TYPE 2 DIABETES MELLITUS WITHOUT COMPLICATION, UNSPECIFIED LONG TERM INSULIN USE STATUS: ICD-10-CM

## 2017-09-21 ASSESSMENT — ANXIETY QUESTIONNAIRES: GAD7 TOTAL SCORE: 19

## 2017-09-22 RX ORDER — LISINOPRIL 40 MG/1
TABLET ORAL
Qty: 90 TABLET | Refills: 0 | Status: SHIPPED | OUTPATIENT
Start: 2017-09-22 | End: 2017-12-22

## 2017-09-22 RX ORDER — METFORMIN HCL 500 MG
TABLET, EXTENDED RELEASE 24 HR ORAL
Qty: 360 TABLET | Refills: 0 | Status: SHIPPED | OUTPATIENT
Start: 2017-09-22 | End: 2017-12-22

## 2017-09-22 NOTE — TELEPHONE ENCOUNTER
metFORMIN (GLUCOPHAGE-XR) 500 MG 24 hr tablet         Last Written Prescription Date: 6/24/17  Last Fill Quantity: 360, # refills: 0  Last Office Visit with G, [facility] or [facility] prescribing provider:  9/20/17        BP Readings from Last 3 Encounters:   09/20/17 136/60   06/14/17 109/64   05/31/17 130/72     Lab Results   Component Value Date    MICROL 5 09/20/2017     Lab Results   Component Value Date    UMALCR 5.36 09/20/2017     Creatinine   Date Value Ref Range Status   05/31/2017 1.31 (H) 0.66 - 1.25 mg/dL Final   ]  GFR Estimate   Date Value Ref Range Status   05/31/2017 53 (L) >60 mL/min/1.7m2 Final     Comment:     Non  GFR Calc   05/30/2017 53 (L) >60 mL/min/1.7m2 Final     Comment:     Non  GFR Calc   03/06/2017 53 (L) >60 mL/min/1.7m2 Final     Comment:     Non  GFR Calc     GFR Estimate If Black   Date Value Ref Range Status   05/31/2017 64 >60 mL/min/1.7m2 Final     Comment:      GFR Calc   05/30/2017 64 >60 mL/min/1.7m2 Final     Comment:      GFR Calc   03/06/2017 64 >60 mL/min/1.7m2 Final     Comment:      GFR Calc     Lab Results   Component Value Date    CHOL 119 01/07/2016     Lab Results   Component Value Date    HDL 57 01/07/2016     Lab Results   Component Value Date    LDL 69 01/24/2017    LDL 46 01/07/2016     Lab Results   Component Value Date    TRIG 81 01/07/2016     Lab Results   Component Value Date    CHOLHDLRATIO 1.9 09/25/2014     Lab Results   Component Value Date    AST 16 05/30/2017     Lab Results   Component Value Date    ALT 20 05/30/2017     Lab Results   Component Value Date    A1C 6.4 09/20/2017    A1C 7.7 01/24/2017    A1C 6.5 01/07/2016    A1C 6.6 08/17/2015    A1C 6.5 01/30/2015     Potassium   Date Value Ref Range Status   05/31/2017 4.3 3.4 - 5.3 mmol/L Final           lisinopril (PRINIVIL/ZESTRIL) 40 MG tablet      Last Written Prescription Date: 12/12/16  Last Fill  Quantity: 90, # refills: 2  [place] [facility] prescribing provider: 9/20/17       Potassium   Date Value Ref Range Status   05/31/2017 4.3 3.4 - 5.3 mmol/L Final     Creatinine   Date Value Ref Range Status   05/31/2017 1.31 (H) 0.66 - 1.25 mg/dL Final     BP Readings from Last 3 Encounters:   09/20/17 136/60   06/14/17 109/64   05/31/17 130/72

## 2017-09-22 NOTE — TELEPHONE ENCOUNTER
Routing refill request to provider for review/approval because:  Labs out of range:      Creatinine   Date Value Ref Range Status   05/31/2017 1.31 (H) 0.66 - 1.25 mg/dL Final     Please advise refill.  ROQUE Benitez

## 2017-09-27 ENCOUNTER — RADIANT APPOINTMENT (OUTPATIENT)
Dept: GENERAL RADIOLOGY | Facility: CLINIC | Age: 77
End: 2017-09-27
Attending: ORTHOPAEDIC SURGERY
Payer: MEDICARE

## 2017-09-27 ENCOUNTER — OFFICE VISIT (OUTPATIENT)
Dept: ORTHOPEDICS | Facility: CLINIC | Age: 77
End: 2017-09-27
Payer: MEDICARE

## 2017-09-27 VITALS
SYSTOLIC BLOOD PRESSURE: 138 MMHG | WEIGHT: 202.2 LBS | HEIGHT: 68 IN | HEART RATE: 60 BPM | BODY MASS INDEX: 30.65 KG/M2 | RESPIRATION RATE: 16 BRPM | DIASTOLIC BLOOD PRESSURE: 70 MMHG

## 2017-09-27 DIAGNOSIS — G89.29 CHRONIC PAIN OF RIGHT KNEE: ICD-10-CM

## 2017-09-27 DIAGNOSIS — M17.11 PRIMARY OSTEOARTHRITIS OF RIGHT KNEE: Primary | ICD-10-CM

## 2017-09-27 DIAGNOSIS — M25.561 CHRONIC PAIN OF RIGHT KNEE: ICD-10-CM

## 2017-09-27 PROCEDURE — 73562 X-RAY EXAM OF KNEE 3: CPT | Mod: RT

## 2017-09-27 PROCEDURE — 20610 DRAIN/INJ JOINT/BURSA W/O US: CPT | Mod: RT | Performed by: ORTHOPAEDIC SURGERY

## 2017-09-27 PROCEDURE — 99204 OFFICE O/P NEW MOD 45 MIN: CPT | Mod: 25 | Performed by: ORTHOPAEDIC SURGERY

## 2017-09-27 ASSESSMENT — PAIN SCALES - GENERAL: PAINLEVEL: WORST PAIN (10)

## 2017-09-27 NOTE — PROGRESS NOTES
CHIEF COMPLAINT:   Chief Complaint   Patient presents with     Knee Pain     Right knee pain. Onset: off and on for about 10 years. Last 2 years he was going to have a TKA but had to post pone because of MI. He has had cortisone injections but they did not help. Pain with walking or standing. Pain is anterior/medial. His knee will buckle and he will fall. He would like synvisc/gel injection.      Vincent Mishra is seen today in the [facility] for evaluation of right knee pain at the request of Dr. Keyla Fonseca     HISTORY OF PRESENT ILLNESS    Vincent Mishra is a 77 year old male seen for evaluation of ongoing right knee pain with no known injury. Pain has been present for many years, ~10 years, on and off. Pain is located over the medial knee. His pain is the worst pain he's been in, rated a 10/10. Pain is aggravated with walking or standing. Does have some pain at rest. Intermittent numbness over the mid thigh to the mid calf that will cause him to fall. No knee swelling. He notes positive lower extremity swelling at times. Over the last 2 years, he was going to have a total knee arthroplasty, however had multiple (4) myocardial infarctions. He has had cortisone injections in the past, however did not work well for him, maybe a week. The injection worked for maybe a week. He is currently on oxycodone for his back. Other treatments include: a knee brace. Presents today with his wife.    Present symptoms: worst pain, + buckling, + giving way.    Pain severity: 10/10  Frequency of symptoms: constant  Exacerbating Factors: weight bearing, [id number], prolonged sitting, prolonged standing  Relieving Factors: rest, oxycodone (he takes for his back)  Night Pain: No  Pain while at rest: Yes   Numbness or tingling: Yes   Patient has tried:     NSAIDS: Yes      Physical Therapy: No      Activity modification: Yes      Bracing: Yes      Injections: Yes, without much relief.     Ice: No       Assistive device:  No     Other: oxycodone     Orthopaedic PMH: history of left (and possibly right) arthroscopy. Chronic back pain,     Other PMH:  has a past medical history of CAD (coronary artery disease) (7/26/2011); Cancer of kidney (H); Chest pain (1/12/2012); Coronary artery disease; History of angina; History of blood transfusion; Hyperlipidaemia; Hyperlipidemia LDL goal <100 (9/23/2010); Malignant neoplasm (H); Myocardial infarction (H); NSTEMI (non-ST elevated myocardial infarction) (H); Other and unspecified hyperlipidemia; Right bundle branch block; Type II or unspecified type diabetes mellitus without mention of complication, not stated as uncontrolled; and Unspecified essential hypertension. He also has no past medical history of Chronic infection or PONV (postoperative nausea and vomiting).  Patient Active Problem List    Diagnosis Date Noted     Chest pain 01/12/2012     Priority: High     Hyperlipidemia LDL goal <100 09/23/2010     Priority: High     Chronic bilateral low back pain with sciatica, sciatica laterality unspecified 09/20/2017     Priority: Medium     ACS (acute coronary syndrome) (H) 05/30/2017     Priority: Medium     Bilateral low back pain with right-sided sciatica 04/03/2017     Priority: Medium     Bilateral low back pain with left-sided sciatica 04/03/2017     Priority: Medium     Claudication (H) 05/09/2016     Priority: Medium     Hip pain, bilateral 04/05/2016     Priority: Medium     Thyroid nodule 10/30/2015     Priority: Medium     Type 2 diabetes mellitus with other circulatory complications (H) 10/22/2015     Priority: Medium     Myocardial infarction, nontransmural (H) 07/16/2015     Priority: Medium     Sinoatrial node dysfunction (H) 07/16/2015     Priority: Medium     Right bundle branch block (RBBB) 07/16/2015     Priority: Medium     Essential hypertension 07/16/2015     Priority: Medium     Hyperlipidemia 07/16/2015     Priority: Medium     NSTEMI (non-ST elevated  myocardial infarction) (H)      Priority: Medium     07-25-14 CATH- RCA, L.Main and CFX  had minor luminal irregularites. Mid LAD high grade stenosis 80-90%.Stent placed to mid LAD       Hard of hearing 06/05/2014     Priority: Medium     Constipation 06/05/2014     Priority: Medium     Abdominal pain, right upper quadrant 03/26/2014     Priority: Medium     Esophageal reflux 03/26/2014     Priority: Medium     Chronic low back pain 01/10/2014     Priority: Medium     Insomnia 10/21/2013     Priority: Medium     Health Care Home 10/08/2013     Priority: Medium     *See Letters for [facility] Care Plan: My Access Plan           Vitamin D deficiency disease 09/25/2013     Priority: Medium     Renal mass, left 12/05/2012     Priority: Medium     Malignant neoplasm of kidney excluding renal pelvis (H) 11/16/2012     Priority: Medium     SURGERY, PATHOLOGY, AND TREATMENT HISTORY FOR KIDNEY CANCER  11/12/12:  Left lower pole kidney mass biopsy: Renal cell carcinoma, clear cell type; Viktor grade 1  12/5/12 Left percutaneous cryoablation of two renal tumors.  These were located adjacent to one another in the lateral kidney.  Dr Asif Capone, [facility].  Problem list name updated by automated process. Provider to review       Moderate major depression (H) 09/17/2012     Priority: Medium     Orchitis, epididymitis, and epididymo-orchitis 09/17/2012     Priority: Medium     Osteoarthritis, knee 09/05/2012     Priority: Medium     Abnormal antinuclear antibody titer 03/12/2012     Priority: Medium     Anatomic airway obstruction 02/09/2012     Priority: Medium     fixed obstruction on PFTs with dyspnea       Advanced directives, info letter sent 10/4/2011 10/04/2011     Priority: Medium     Right bundle branch block 07/26/2011     Priority: Medium     Seen on EKG 2/2011.  Different configuration on 7/26/2011 s/p MI - but same at [place] post-MI as here        Acute myocardial infarction of inferolateral  wall (H) 07/14/2011     Priority: Medium     Obesity 07/14/2011     Priority: Medium     Hypertension goal BP (blood pressure) < 140/90 02/28/2011     Priority: Medium     C6-7 disc with radiculopathy 02/28/2011     Priority: Medium     consider second Epidural steroid injection at [facility].  Continue PT.  Await accupuncture       Tear of meniscus of left knee 02/11/2011     Priority: Medium     Neck pain 11/12/2010     Priority: Medium     B12 deficiency 11/12/2010     Priority: Medium     Diplopia 11/02/2010     Priority: Medium     Neuropathy (H) 11/02/2010     Priority: Medium     Vision loss night 11/02/2010     Priority: Medium     Parotid mass 10/04/2010     Priority: Medium     Tension headache 09/23/2010     Priority: Medium     Trapezius strain 09/23/2010     Priority: Medium       Surgical Hx:  has a past surgical history that includes Arthroscopy knee with medial meniscectomy (6/26/2012); Esophagoscopy, gastroscopy, duodenoscopy (EGD), combined (10/25/2012); colonoscopy; biopsy; Arthroscopy knee (2/11/2011); orthopedic surgery; Cardiac surgery (7/2011); back surgery; Heart Cath, Angioplasty (07-25-14); and Coronary Angiography Adult Order (07-25-14).    Medications:   Current Outpatient Prescriptions:      metFORMIN (GLUCOPHAGE-XR) 500 MG 24 hr tablet, TAKE 2 TABLETS BY MOUTH TWICE DAILY WITH MEALS., Disp: 360 tablet, Rfl: 0     lisinopril (PRINIVIL/ZESTRIL) 40 MG tablet, TAKE 1 TABLET(40 MG) BY MOUTH DAILY, Disp: 90 tablet, Rfl: 0     buPROPion (WELLBUTRIN) 100 MG tablet, Take 1 pill in am for 7 days then increase to 1 po 2 times per day, Disp: 180 tablet, Rfl: 1     oxyCODONE (OXYCONTIN) 30 MG 12 hr tablet, Take 1 tablet (30 mg) by mouth every morning, Disp: 30 tablet, Rfl: 0     oxyCODONE (ROXICODONE) 5 MG IR tablet, Take 1 daily as needed  for severe pain may have 15 per month this is a 3 month supply, Disp: 45 tablet, Rfl: 0     spironolactone (ALDACTONE) 25 MG tablet, TAKE 1 TABLET(25 MG) BY MOUTH  DAILY, Disp: 90 tablet, Rfl: 0     omeprazole (PRILOSEC) 20 MG CR capsule, TAKE ONE CAPSULE BY MOUTH TWICE DAILY, Disp: 180 capsule, Rfl: 0     traZODone (DESYREL) 100 MG tablet, Take 3 tablets (300 mg) by mouth nightly as needed for sleep 300 mg at HS for sleep, Disp: 270 tablet, Rfl: 0     amLODIPine (NORVASC) 10 MG tablet, TAKE 1 TABLET(10 MG) BY MOUTH DAILY, Disp: 90 tablet, Rfl: 0     busPIRone (BUSPAR) 15 MG tablet, TAKE 1 TABLET(15 MG) BY MOUTH TWICE DAILY, Disp: 180 tablet, Rfl: 0     aspirin EC 81 MG EC tablet, Take 1 tablet (81 mg) by mouth daily, Disp: , Rfl:      ibuprofen (ADVIL/MOTRIN) 800 MG tablet, TAKE 1 TABLET BY MOUTH EVERY 8 HOURS AS NEEDED FOR MODERATE PAIN, Disp: 120 tablet, Rfl: 1     clopidogrel (PLAVIX) 75 MG tablet, TAKE 1 TABLET BY MOUTH DAILY, Disp: 90 tablet, Rfl: 3     ibuprofen (ADVIL/MOTRIN) 800 MG tablet, Take 1 tablet (800 mg) by mouth every 8 hours as needed for moderate pain, Disp: 90 tablet, Rfl: 0     gabapentin (NEURONTIN) 100 MG capsule, Take 1 tablet increase by 1 tablet every 2 days to 6 tablets 3 times per day (Patient taking differently: Take 100 mg by mouth 2 times daily Take 1 tablet increase by 1 tablet every 2 days to 6 tablets 3 times per day), Disp: 270 capsule, Rfl: 3     order for DME, Equipment being ordered: TENS, Disp: 1 Units, Rfl: 0     blood glucose monitoring (ONE TOUCH ULTRA) test strip, Use to test blood sugars 2 times daily or as directed., Disp: 200 strip, Rfl: 3     atorvastatin (LIPITOR) 80 MG tablet, Take 1 tablet (80 mg) by mouth daily, Disp: 90 tablet, Rfl: 3     blood glucose monitoring (NO BRAND SPECIFIED) meter device kit, Use to test blood sugar 2 times daily or as directed., Disp: 1 kit, Rfl: 0     sertraline (ZOLOFT) 100 MG tablet, Take 2 per day (Patient taking differently: Take 200 mg by mouth daily Take 2 per day), Disp: 225 tablet, Rfl: 1     omeprazole (PRILOSEC) 20 MG CR capsule, Take 1 capsule (20 mg) by mouth 2 times daily, Disp: 180  "capsule, Rfl: 2     hydrochlorothiazide (HYDRODIURIL) 25 MG tablet, Take 1 tablet (25 mg) by mouth daily, Disp: 90 tablet, Rfl: 3     order for DME, Equipment being ordered: Wheelchair motorized for patient with spinal stenosis and neurogenic claudication, Disp: 1 Device, Rfl: 0     nitroglycerin (NITROSTAT) 0.4 MG SL tablet, Place 1 tablet (0.4 mg) under the tongue every 5 minutes as needed for chest pain, Disp: 25 tablet, Rfl: 1     XATA FINEPOINT LANCETS MISC, Use to test blood sugars 1 times daily or as directed., Disp: 100 each, Rfl: 12     cholecalciferol (VITAMIN  -D) 1000 UNITS capsule, Take 1 capsule by mouth daily, Disp: , Rfl:      ascorbic acid (VITAMIN C) 500 MG tablet, Take 500 mg by mouth daily , Disp: , Rfl:      acetaminophen (TYLENOL) 325 MG tablet, Take 2 tablets by mouth every 4 hours as needed., Disp: 250 tablet, Rfl: 1     Calcium Carbonate-Vitamin D (CALCIUM + D) 600-200 MG-UNIT per tablet, Take 2 tablets by mouth daily., Disp: 100 tablet, Rfl: 12    Allergies:   Allergies   Allergen Reactions     Prozac [Fluoxetine] Other (See Comments)     \"I went crazy.\"       Benadryl [Diphenhydramine Hcl] Other (See Comments)     Starts to shake, and paranoid     Codeine Itching     Diphenhydramine Other (See Comments)     Hallucinations, See [facility] records scanned on 7/16/15     Labetalol Other (See Comments)     See [facility] records scanned on 7/16/15  Bradycardia down to 30 on holter, dizziness. Stopped med and symptoms resolved     Metoprolol Other (See Comments)     Bradycardia even at 12.5 mg     Morphine Visual Disturbance       Social Hx: retired.   reports that he has never smoked. He has never used smokeless tobacco. He reports that he does not drink alcohol or use illicit drugs.    Family Hx: family history includes Alcohol/Drug in his daughter, paternal grandfather, paternal grandmother, sister, son, and son; Bipolar Disorder in an other family " "member; CANCER in his mother; CANCER (age of onset: 55) in his sister; DIABETES in his brother, brother, daughter, father, sister, sister, and son; Depression in his daughter and mother; HEART DISEASE in his brother, father, maternal grandfather, and paternal grandfather; Hypertension in his daughter and father; MENTAL ILLNESS in his father; Obesity in his brother, daughter, sister, sister, and sister; Substance Abuse in his brother, maternal grandfather, paternal grandfather, paternal grandmother, sister, son, and son.    REVIEW OF SYSTEMS: 10 point ROS neg other than the symptoms noted above in the HPI and PMH. Notables include  CONSTITUTIONAL:NEGATIVE for fever, chills, change in weight  INTEGUMENTARY/SKIN: NEGATIVE for worrisome rashes, moles or lesions  MUSCULOSKELETAL:See HPI above  NEURO: NEGATIVE for weakness, dizziness or paresthesias    This document serves as a record of the services and decisions personally performed and made by Mani Neumann MD. It was created on his behalf by Zuleika Gallegos, a trained medical scribe. The creation of this document is based the provider's statements to the medical scribe.    Scribe Zuleika Gallegos 10:00 AM 9/27/2017    PHYSICAL EXAM:  /70  Pulse 60  Resp 16  Ht 1.727 m (5' 8\")  Wt 91.7 kg (202 lb 3.2 oz)  BMI 30.74 kg/m2   GENERAL APPEARANCE: healthy, alert, no distress; accompanied by his wife  SKIN: no suspicious lesions or rashes  NEURO: Normal strength and tone, mentation intact and speech normal; hard of hearing.  PSYCH:  mentation appears normal and affect normal, not anxious  RESPIRATORY: No increased work of breathing.  HANDS: no clubbing, nail pitting.    BILATERAL LOWER EXTREMITIES:  Gait: favors the right, but \"hobbles\" side to side, hunched.  No gross deformities or masses.  Bilateral  Quad atrophy, strength weak.  Intact sensation deep peroneal nerve, superficial peroneal nerve, med/lat tibial nerve, sural nerve, saphenous nerve  Intact EHL, EDL, TA, FHL, " "GS, quadriceps hamstrings and hip flexors  Toes warm and well perfused, brisk capillary refill. Palpable 2+ dp pulses.  Bilateral calf soft and nttp or squeeze.  Edema: trace    LEFT KNEE EXAM:    Skin: intact, no ecchymosis or erythema; arthroscopic portal scars present  ROM: 2 extension to 130 flexion  Tight hamstrings on straight leg raise.  Effusion: none  Tender: medial joint line, otherwise nontender to palpation lat joint line, anterior or posterior knee  McMurrays: negative    MCL: stable, and non-painful at both 0 and 30 degrees knee flexion  Varus stress: stable, and non-painful at both 0 and 30 degrees knee flexion  Lachmans: neg, firm endpoint  Posterior Drawer stable  Patellofemoral joint:                Apprehension: negative              Crepitations: mild    RIGHT KNEE EXAM:    Skin: intact, no ecchymosis or erythema,  arthroscopic portal scars present  ROM: 0 extension to 130 flexion  Tight hamstrings on straight leg raise.  Effusion: trace   Tender: medial joint line, pes bursa  NTTP lateral joint line, anterior or posterior knee  McMurrays: negative    MCL: stable, and non-painful at both 0 and 30 degrees knee flexion  Varus stress: stable, and non-painful at both 0 and 30 degrees knee flexion  Lachmans: neg, firm endpoint  Posterior Drawer stable  Patellofemoral joint:                Apprehension: negative              Crepitations: mild    X-RAY:  3 views right knee from 9/27/2017 were reviewed in clinic today. On my review, no obvious fractures or dislocations. Mild-moderate medial space narrowing. Small patello-femoral marginal osteophytes.    Impression:   77 year old male with chronic right knee pain, primary moderate right knee osteoarthritis.    Plan:    * reviewed imaging studies with patient, showing arthritis. Arthritis is wearing of the cartilage due to longstanding \"wear and tear\" or can follow an injury to the joint.    Discussed typical symptoms of arthritic pain is pain aggravated " with weight bearing activities, stiffness, relieved by sitting or rest. Swelling may be associated. It is common to have some grinding and popping in the knee with arthritis.    Workup for degenerative knee arthrosis and pain, typically starts with plain xrays of the knee. Xrays are usually all that is needed for evaluation of ongoing knee pain for arthritis, as they show the bony anatomy well and underlying degenerative changes. An MRI is not usually needed in cases of degenerative knee pain and arthritis, as degenerative cartilage and meniscal changes seen on MRI are expected, given findings on xray. MRI may be indicated if the arthritis is mild and there are mechanical symptoms such as locking or catching in the knee, or an acute knee injury.    Discussed various treatments for degenerative arthrosis of the knee, including nonoperative and operative approaches. Nonoperative approaches, which are exhausted prior to operative treatment, include doing nothing and living with the pain as patient has been doing, activity modification (avoid aggravating activities), physical therapy and strengthening exercises, weight loss, anti-inflammatory medications, bracing, ambulatory aids (cane, walker) and injections. Once these have been tried and are deemed unsuccessful with a good effort, and the patient is an appropriate candidate, the next treatment would be knee arthroplasty or replacement. Depending on the location of the arthritis, knee replacement can be partial (one side of the knee affected by arthritis) or a total knee arthroplasty (all 3 compartments). The risks, perceived benefits and perioperative rehabilitation expectations of knee arthroplasty were discussed in detail. Also discussed that approximately 10% of patients that undergo knee arthroplasty are not happy following surgery and may have worse pain or no improvement in pain, contrary to their preoperative expectations.    At this time, nonoperative  "treatment will be pursued.  Non-surgical treatment for knee arthritis includes:    * rest, sitting  * Activity modification - avoid impact activities or activities that aggravate symptoms.  * NSAIDS (non-steroidal anti-inflammatory medications; e.g. Aleve, advil, motrin, ibuprofen) - regular use for inflammation ( twice daily or three times daily), with food, as long as no contra-indications Please discuss with primary care doctor if needed  * ice, 15-20 minutes at a time several times a day or as needed.  * Strengthening of quadriceps muscles  * Physical Therapy for strengthening, stretching and range of motion exercises of legs  * Tylenol as needed for pain, consider Tylenol arthritis or similar  * Weight loss: Weight loss:  Body mass index is 30.74 kg/(m^2).. weight loss benefits, not only for the current pain symptoms, but also overall health. Recommend a good diet plan that works for the patient, with the assistance of a dietician or primary care doctor as needed. Also, a good, low-impact exercise program for at least 20 minutes per day, 3 times per week, such as exercise bike, elliptical , or pool.  * Exercise: low impact such as stationary bike, elliptical, pool.  * Injections: cortisone versus viscosupplementation (hyaluronic acid, \"rooster comb\", \"gel shots\"); risks and perceived benefits discussed today. Patient elects to proceed with Synvisc.  * Bracing: bracing the knee may offer some relief of symptoms when worn and provide some stability.  * over the counter supplements such as glucosamine and chondroitin sulfate may help with joint pain.  * topical ointments may help as well    * return to clinic as needed.    PROCEDURE NOTE:  The risks, perceived benefits and potential complications (including but not limited to: bleeding, infection, pain, scar, damage to adjacent structures, atrophy or necrosis of soft tissue, skin blanching, failure to relieve symptoms, worsening of symptoms, allergic " reaction) of injection were discussed with the patient. Questions were addressed and answered.The patient elected to proceed. Written informed consent was obtained. The correct procedural site was identified and confirmed. A RIGHT knee intraarticular injection was performed using 6 mL Synvisc One 8 units per mL  and 7mL (4mL 1% lidocaine, 3mL 0.25% marcaine)  of local anesthetic after sterile prep, to the correct procedural site. Sterile bandaid applied. This was tolerated well by the patient. No apparent complications. Did also discuss that if diabetic, recommend close monitoring of blood sugars over the next week as cortisone injections can temporarily elevate blood sugars.       The information in this document, created by a scribe for me, accurately reflects the services I personally performed and the decisions made by me. I have reviewed and approved this document for accuracy.     Mani Neumann M.D., M.S.  Dept. of Orthopaedic Surgery  [facility]

## 2017-09-27 NOTE — LETTER
9/27/2017         RE: Vincent Mishra  [address] [place] [address]        Dear Colleague,    Thank you for referring your patient, Vincent Mishra, to the [facility]. Please see a copy of my visit note below.    CHIEF COMPLAINT:   Chief Complaint   Patient presents with     Knee Pain     Right knee pain. Onset: off and on for about 10 years. Last 2 years he was going to have a TKA but had to post pone because of MI. He has had cortisone injections but they did not help. Pain with walking or standing. Pain is anterior/medial. His knee will buckle and he will fall. He would like synvisc/gel injection.      Vincent Mishra is seen today in the [facility] for evaluation of right knee pain at the request of Dr. Keyla Fonseca     HISTORY OF PRESENT ILLNESS    Vincent Mishra is a 77 year old male seen for evaluation of ongoing right knee pain with no known injury. Pain has been present for many years, ~10 years, on and off. Pain is located over the medial knee. His pain is the worst pain he's been in, rated a 10/10. Pain is aggravated with walking or standing. Does have some pain at rest. Intermittent numbness over the mid thigh to the mid calf that will cause him to fall. No knee swelling. He notes positive lower extremity swelling at times. Over the last 2 years, he was going to have a total knee arthroplasty, however had multiple (4) myocardial infarctions. He has had cortisone injections in the past, however did not work well for him, maybe a week. The injection worked for maybe a week. He is currently on oxycodone for his back. Other treatments include: a knee brace. Presents today with his wife.    Present symptoms: worst pain, + buckling, + giving way.    Pain severity: 10/10  Frequency of symptoms: constant  Exacerbating Factors: weight bearing, [id number], prolonged sitting, prolonged standing  Relieving Factors: rest, oxycodone (he takes for his  back)  Night Pain: No  Pain while at rest: Yes   Numbness or tingling: Yes   Patient has tried:     NSAIDS: Yes      Physical Therapy: No      Activity modification: Yes      Bracing: Yes      Injections: Yes, without much relief.     Ice: No      Assistive device:  No     Other: oxycodone     Orthopaedic PMH: history of left (and possibly right) arthroscopy. Chronic back pain,     Other PMH:  has a past medical history of CAD (coronary artery disease) (7/26/2011); Cancer of kidney (H); Chest pain (1/12/2012); Coronary artery disease; History of angina; History of blood transfusion; Hyperlipidaemia; Hyperlipidemia LDL goal <100 (9/23/2010); Malignant neoplasm (H); Myocardial infarction (H); NSTEMI (non-ST elevated myocardial infarction) (H); Other and unspecified hyperlipidemia; Right bundle branch block; Type II or unspecified type diabetes mellitus without mention of complication, not stated as uncontrolled; and Unspecified essential hypertension. He also has no past medical history of Chronic infection or PONV (postoperative nausea and vomiting).  Patient Active Problem List    Diagnosis Date Noted     Chest pain 01/12/2012     Priority: High     Hyperlipidemia LDL goal <100 09/23/2010     Priority: High     Chronic bilateral low back pain with sciatica, sciatica laterality unspecified 09/20/2017     Priority: Medium     ACS (acute coronary syndrome) (H) 05/30/2017     Priority: Medium     Bilateral low back pain with right-sided sciatica 04/03/2017     Priority: Medium     Bilateral low back pain with left-sided sciatica 04/03/2017     Priority: Medium     Claudication (H) 05/09/2016     Priority: Medium     Hip pain, bilateral 04/05/2016     Priority: Medium     Thyroid nodule 10/30/2015     Priority: Medium     Type 2 diabetes mellitus with other circulatory complications (H) 10/22/2015     Priority: Medium     Myocardial infarction, nontransmural (H) 07/16/2015     Priority: Medium     Sinoatrial node  dysfunction (H) 07/16/2015     Priority: Medium     Right bundle branch block (RBBB) 07/16/2015     Priority: Medium     Essential hypertension 07/16/2015     Priority: Medium     Hyperlipidemia 07/16/2015     Priority: Medium     NSTEMI (non-ST elevated myocardial infarction) (H)      Priority: Medium     07-25-14 CATH- RCA, L.Main and CFX  had minor luminal irregularites. Mid LAD high grade stenosis 80-90%.Stent placed to mid LAD       Hard of hearing 06/05/2014     Priority: Medium     Constipation 06/05/2014     Priority: Medium     Abdominal pain, right upper quadrant 03/26/2014     Priority: Medium     Esophageal reflux 03/26/2014     Priority: Medium     Chronic low back pain 01/10/2014     Priority: Medium     Insomnia 10/21/2013     Priority: Medium     Health Care Home 10/08/2013     Priority: Medium     *See Letters for [facility] Care Plan: My Access Plan           Vitamin D deficiency disease 09/25/2013     Priority: Medium     Renal mass, left 12/05/2012     Priority: Medium     Malignant neoplasm of kidney excluding renal pelvis (H) 11/16/2012     Priority: Medium     SURGERY, PATHOLOGY, AND TREATMENT HISTORY FOR KIDNEY CANCER  11/12/12:  Left lower pole kidney mass biopsy: Renal cell carcinoma, clear cell type; Viktor grade 1  12/5/12 Left percutaneous cryoablation of two renal tumors.  These were located adjacent to one another in the lateral kidney.  Dr Asif Capone, [facility].  Problem list name updated by automated process. Provider to review       Moderate major depression (H) 09/17/2012     Priority: Medium     Orchitis, epididymitis, and epididymo-orchitis 09/17/2012     Priority: Medium     Osteoarthritis, knee 09/05/2012     Priority: Medium     Abnormal antinuclear antibody titer 03/12/2012     Priority: Medium     Anatomic airway obstruction 02/09/2012     Priority: Medium     fixed obstruction on PFTs with dyspnea       Advanced directives, info letter sent  10/4/2011 10/04/2011     Priority: Medium     Right bundle branch block 07/26/2011     Priority: Medium     Seen on EKG 2/2011.  Different configuration on 7/26/2011 s/p MI - but same at [place] post-MI as here        Acute myocardial infarction of inferolateral wall (H) 07/14/2011     Priority: Medium     Obesity 07/14/2011     Priority: Medium     Hypertension goal BP (blood pressure) < 140/90 02/28/2011     Priority: Medium     C6-7 disc with radiculopathy 02/28/2011     Priority: Medium     consider second Epidural steroid injection at [facility].  Continue PT.  Await accupuncture       Tear of meniscus of left knee 02/11/2011     Priority: Medium     Neck pain 11/12/2010     Priority: Medium     B12 deficiency 11/12/2010     Priority: Medium     Diplopia 11/02/2010     Priority: Medium     Neuropathy (H) 11/02/2010     Priority: Medium     Vision loss night 11/02/2010     Priority: Medium     Parotid mass 10/04/2010     Priority: Medium     Tension headache 09/23/2010     Priority: Medium     Trapezius strain 09/23/2010     Priority: Medium       Surgical Hx:  has a past surgical history that includes Arthroscopy knee with medial meniscectomy (6/26/2012); Esophagoscopy, gastroscopy, duodenoscopy (EGD), combined (10/25/2012); colonoscopy; biopsy; Arthroscopy knee (2/11/2011); orthopedic surgery; Cardiac surgery (7/2011); back surgery; Heart Cath, Angioplasty (07-25-14); and Coronary Angiography Adult Order (07-25-14).    Medications:   Current Outpatient Prescriptions:      metFORMIN (GLUCOPHAGE-XR) 500 MG 24 hr tablet, TAKE 2 TABLETS BY MOUTH TWICE DAILY WITH MEALS., Disp: 360 tablet, Rfl: 0     lisinopril (PRINIVIL/ZESTRIL) 40 MG tablet, TAKE 1 TABLET(40 MG) BY MOUTH DAILY, Disp: 90 tablet, Rfl: 0     buPROPion (WELLBUTRIN) 100 MG tablet, Take 1 pill in am for 7 days then increase to 1 po 2 times per day, Disp: 180 tablet, Rfl: 1     oxyCODONE (OXYCONTIN) 30 MG 12 hr tablet, Take 1 tablet (30 mg) by mouth every  morning, Disp: 30 tablet, Rfl: 0     oxyCODONE (ROXICODONE) 5 MG IR tablet, Take 1 daily as needed  for severe pain may have 15 per month this is a 3 month supply, Disp: 45 tablet, Rfl: 0     spironolactone (ALDACTONE) 25 MG tablet, TAKE 1 TABLET(25 MG) BY MOUTH DAILY, Disp: 90 tablet, Rfl: 0     omeprazole (PRILOSEC) 20 MG CR capsule, TAKE ONE CAPSULE BY MOUTH TWICE DAILY, Disp: 180 capsule, Rfl: 0     traZODone (DESYREL) 100 MG tablet, Take 3 tablets (300 mg) by mouth nightly as needed for sleep 300 mg at HS for sleep, Disp: 270 tablet, Rfl: 0     amLODIPine (NORVASC) 10 MG tablet, TAKE 1 TABLET(10 MG) BY MOUTH DAILY, Disp: 90 tablet, Rfl: 0     busPIRone (BUSPAR) 15 MG tablet, TAKE 1 TABLET(15 MG) BY MOUTH TWICE DAILY, Disp: 180 tablet, Rfl: 0     aspirin EC 81 MG EC tablet, Take 1 tablet (81 mg) by mouth daily, Disp: , Rfl:      ibuprofen (ADVIL/MOTRIN) 800 MG tablet, TAKE 1 TABLET BY MOUTH EVERY 8 HOURS AS NEEDED FOR MODERATE PAIN, Disp: 120 tablet, Rfl: 1     clopidogrel (PLAVIX) 75 MG tablet, TAKE 1 TABLET BY MOUTH DAILY, Disp: 90 tablet, Rfl: 3     ibuprofen (ADVIL/MOTRIN) 800 MG tablet, Take 1 tablet (800 mg) by mouth every 8 hours as needed for moderate pain, Disp: 90 tablet, Rfl: 0     gabapentin (NEURONTIN) 100 MG capsule, Take 1 tablet increase by 1 tablet every 2 days to 6 tablets 3 times per day (Patient taking differently: Take 100 mg by mouth 2 times daily Take 1 tablet increase by 1 tablet every 2 days to 6 tablets 3 times per day), Disp: 270 capsule, Rfl: 3     order for DME, Equipment being ordered: TENS, Disp: 1 Units, Rfl: 0     blood glucose monitoring (ONE TOUCH ULTRA) test strip, Use to test blood sugars 2 times daily or as directed., Disp: 200 strip, Rfl: 3     atorvastatin (LIPITOR) 80 MG tablet, Take 1 tablet (80 mg) by mouth daily, Disp: 90 tablet, Rfl: 3     blood glucose monitoring (NO BRAND SPECIFIED) meter device kit, Use to test blood sugar 2 times daily or as directed., Disp: 1  "kit, Rfl: 0     sertraline (ZOLOFT) 100 MG tablet, Take 2 per day (Patient taking differently: Take 200 mg by mouth daily Take 2 per day), Disp: 225 tablet, Rfl: 1     omeprazole (PRILOSEC) 20 MG CR capsule, Take 1 capsule (20 mg) by mouth 2 times daily, Disp: 180 capsule, Rfl: 2     hydrochlorothiazide (HYDRODIURIL) 25 MG tablet, Take 1 tablet (25 mg) by mouth daily, Disp: 90 tablet, Rfl: 3     order for DME, Equipment being ordered: Wheelchair motorized for patient with spinal stenosis and neurogenic claudication, Disp: 1 Device, Rfl: 0     nitroglycerin (NITROSTAT) 0.4 MG SL tablet, Place 1 tablet (0.4 mg) under the tongue every 5 minutes as needed for chest pain, Disp: 25 tablet, Rfl: 1     [employer] FINEPOINT LANCETS MISC, Use to test blood sugars 1 times daily or as directed., Disp: 100 each, Rfl: 12     cholecalciferol (VITAMIN  -D) 1000 UNITS capsule, Take 1 capsule by mouth daily, Disp: , Rfl:      ascorbic acid (VITAMIN C) 500 MG tablet, Take 500 mg by mouth daily , Disp: , Rfl:      acetaminophen (TYLENOL) 325 MG tablet, Take 2 tablets by mouth every 4 hours as needed., Disp: 250 tablet, Rfl: 1     Calcium Carbonate-Vitamin D (CALCIUM + D) 600-200 MG-UNIT per tablet, Take 2 tablets by mouth daily., Disp: 100 tablet, Rfl: 12    Allergies:   Allergies   Allergen Reactions     Prozac [Fluoxetine] Other (See Comments)     \"I went crazy.\"       Benadryl [Diphenhydramine Hcl] Other (See Comments)     Starts to shake, and paranoid     Codeine Itching     Diphenhydramine Other (See Comments)     Hallucinations, See [facility] records scanned on 7/16/15     Labetalol Other (See Comments)     See [facility] records scanned on 7/16/15  Bradycardia down to 30 on holter, dizziness. Stopped med and symptoms resolved     Metoprolol Other (See Comments)     Bradycardia even at 12.5 mg     Morphine Visual Disturbance       Social Hx: retired.   reports that he has never smoked. He " "has never used smokeless tobacco. He reports that he does not drink alcohol or use illicit drugs.    Family Hx: family history includes Alcohol/Drug in his daughter, paternal grandfather, paternal grandmother, sister, son, and son; Bipolar Disorder in an other family member; CANCER in his mother; CANCER (age of onset: 55) in his sister; DIABETES in his brother, brother, daughter, father, sister, sister, and son; Depression in his daughter and mother; HEART DISEASE in his brother, father, maternal grandfather, and paternal grandfather; Hypertension in his daughter and father; MENTAL ILLNESS in his father; Obesity in his brother, daughter, sister, sister, and sister; Substance Abuse in his brother, maternal grandfather, paternal grandfather, paternal grandmother, sister, son, and son.    REVIEW OF SYSTEMS: 10 point ROS neg other than the symptoms noted above in the HPI and PMH. Notables include  CONSTITUTIONAL:NEGATIVE for fever, chills, change in weight  INTEGUMENTARY/SKIN: NEGATIVE for worrisome rashes, moles or lesions  MUSCULOSKELETAL:See HPI above  NEURO: NEGATIVE for weakness, dizziness or paresthesias    This document serves as a record of the services and decisions personally performed and made by Mani Neumann MD. It was created on his behalf by Zuleika Gallegos, a trained medical scribe. The creation of this document is based the provider's statements to the medical scribe.    Scribe Zuleika Gallegos 10:00 AM 9/27/2017    PHYSICAL EXAM:  /70  Pulse 60  Resp 16  Ht 1.727 m (5' 8\")  Wt 91.7 kg (202 lb 3.2 oz)  BMI 30.74 kg/m2   GENERAL APPEARANCE: healthy, alert, no distress; accompanied by his wife  SKIN: no suspicious lesions or rashes  NEURO: Normal strength and tone, mentation intact and speech normal; hard of hearing.  PSYCH:  mentation appears normal and affect normal, not anxious  RESPIRATORY: No increased work of breathing.  HANDS: no clubbing, nail pitting.    BILATERAL LOWER EXTREMITIES:  Gait: " "favors the right, but \"hobbles\" side to side, hunched.  No gross deformities or masses.  Bilateral  Quad atrophy, strength weak.  Intact sensation deep peroneal nerve, superficial peroneal nerve, med/lat tibial nerve, sural nerve, saphenous nerve  Intact EHL, EDL, TA, FHL, GS, quadriceps hamstrings and hip flexors  Toes warm and well perfused, brisk capillary refill. Palpable 2+ dp pulses.  Bilateral calf soft and nttp or squeeze.  Edema: trace    LEFT KNEE EXAM:    Skin: intact, no ecchymosis or erythema; arthroscopic portal scars present  ROM: 2 extension to 130 flexion  Tight hamstrings on straight leg raise.  Effusion: none  Tender: medial joint line, otherwise nontender to palpation lat joint line, anterior or posterior knee  McMurrays: negative    MCL: stable, and non-painful at both 0 and 30 degrees knee flexion  Varus stress: stable, and non-painful at both 0 and 30 degrees knee flexion  Lachmans: neg, firm endpoint  Posterior Drawer stable  Patellofemoral joint:                Apprehension: negative              Crepitations: mild    RIGHT KNEE EXAM:    Skin: intact, no ecchymosis or erythema,  arthroscopic portal scars present  ROM: 0 extension to 130 flexion  Tight hamstrings on straight leg raise.  Effusion: trace   Tender: medial joint line, pes bursa  NTTP lateral joint line, anterior or posterior knee  McMurrays: negative    MCL: stable, and non-painful at both 0 and 30 degrees knee flexion  Varus stress: stable, and non-painful at both 0 and 30 degrees knee flexion  Lachmans: neg, firm endpoint  Posterior Drawer stable  Patellofemoral joint:                Apprehension: negative              Crepitations: mild    X-RAY:  3 views right knee from 9/27/2017 were reviewed in clinic today. On my review, no obvious fractures or dislocations. Mild-moderate medial space narrowing. Small patello-femoral marginal osteophytes.    Impression:   77 year old male with chronic right knee pain, primary moderate " "right knee osteoarthritis.    Plan:    * reviewed imaging studies with patient, showing arthritis. Arthritis is wearing of the cartilage due to longstanding \"wear and tear\" or can follow an injury to the joint.    Discussed typical symptoms of arthritic pain is pain aggravated with weight bearing activities, stiffness, relieved by sitting or rest. Swelling may be associated. It is common to have some grinding and popping in the knee with arthritis.    Workup for degenerative knee arthrosis and pain, typically starts with plain xrays of the knee. Xrays are usually all that is needed for evaluation of ongoing knee pain for arthritis, as they show the bony anatomy well and underlying degenerative changes. An MRI is not usually needed in cases of degenerative knee pain and arthritis, as degenerative cartilage and meniscal changes seen on MRI are expected, given findings on xray. MRI may be indicated if the arthritis is mild and there are mechanical symptoms such as locking or catching in the knee, or an acute knee injury.    Discussed various treatments for degenerative arthrosis of the knee, including nonoperative and operative approaches. Nonoperative approaches, which are exhausted prior to operative treatment, include doing nothing and living with the pain as patient has been doing, activity modification (avoid aggravating activities), physical therapy and strengthening exercises, weight loss, anti-inflammatory medications, bracing, ambulatory aids (cane, walker) and injections. Once these have been tried and are deemed unsuccessful with a good effort, and the patient is an appropriate candidate, the next treatment would be knee arthroplasty or replacement. Depending on the location of the arthritis, knee replacement can be partial (one side of the knee affected by arthritis) or a total knee arthroplasty (all 3 compartments). The risks, perceived benefits and perioperative rehabilitation expectations of knee " "arthroplasty were discussed in detail. Also discussed that approximately 10% of patients that undergo knee arthroplasty are not happy following surgery and may have worse pain or no improvement in pain, contrary to their preoperative expectations.    At this time, nonoperative treatment will be pursued.  Non-surgical treatment for knee arthritis includes:    * rest, sitting  * Activity modification - avoid impact activities or activities that aggravate symptoms.  * NSAIDS (non-steroidal anti-inflammatory medications; e.g. Aleve, advil, motrin, ibuprofen) - regular use for inflammation ( twice daily or three times daily), with food, as long as no contra-indications Please discuss with primary care doctor if needed  * ice, 15-20 minutes at a time several times a day or as needed.  * Strengthening of quadriceps muscles  * Physical Therapy for strengthening, stretching and range of motion exercises of legs  * Tylenol as needed for pain, consider Tylenol arthritis or similar  * Weight loss: Weight loss:  Body mass index is 30.74 kg/(m^2).. weight loss benefits, not only for the current pain symptoms, but also overall health. Recommend a good diet plan that works for the patient, with the assistance of a dietician or primary care doctor as needed. Also, a good, low-impact exercise program for at least 20 minutes per day, 3 times per week, such as exercise bike, elliptical , or pool.  * Exercise: low impact such as stationary bike, elliptical, pool.  * Injections: cortisone versus viscosupplementation (hyaluronic acid, \"rooster comb\", \"gel shots\"); risks and perceived benefits discussed today. Patient elects to proceed with Synvisc.  * Bracing: bracing the knee may offer some relief of symptoms when worn and provide some stability.  * over the counter supplements such as glucosamine and chondroitin sulfate may help with joint pain.  * topical ointments may help as well    * return to clinic as needed.    PROCEDURE " NOTE:  The risks, perceived benefits and potential complications (including but not limited to: bleeding, infection, pain, scar, damage to adjacent structures, atrophy or necrosis of soft tissue, skin blanching, failure to relieve symptoms, worsening of symptoms, allergic reaction) of injection were discussed with the patient. Questions were addressed and answered.The patient elected to proceed. Written informed consent was obtained. The correct procedural site was identified and confirmed. A RIGHT knee intraarticular injection was performed using 6 mL Synvisc One 8 units per mL  and 7mL (4mL 1% lidocaine, 3mL 0.25% marcaine)  of local anesthetic after sterile prep, to the correct procedural site. Sterile bandaid applied. This was tolerated well by the patient. No apparent complications. Did also discuss that if diabetic, recommend close monitoring of blood sugars over the next week as cortisone injections can temporarily elevate blood sugars.       The information in this document, created by a scribe for me, accurately reflects the services I personally performed and the decisions made by me. I have reviewed and approved this document for accuracy.     Mani Neumann M.D., M.S.  Dept. of Orthopaedic Surgery  [facility]        The patient's right knee was prepped with betadine solution after verification of allergies. Area approximately 10 cm x 10 cm prepped in a sterile fashion. After injection, betadine removed with soap and water and band-aids applied.    4cc Lidocaine 1% NDC 0034-0740-81, LOT -dk,  [id number]  6 mL Synvisc ONE Product number [id number], LOT [id number],  2020 was injected into patient's right knee by:    Winston Welch PA-C, [facility] (Ortho)  Supervising Physician: Mani Neumann M.D., M.S.  Dept. of Orthopaedic Surgery  [facility]            Again, thank you for allowing me to participate in the care of your patient.        Sincerely,        Mani Duenas  MD Thien

## 2017-09-27 NOTE — PROGRESS NOTES
The patient's right knee was prepped with betadine solution after verification of allergies. Area approximately 10 cm x 10 cm prepped in a sterile fashion. After injection, betadine removed with soap and water and band-aids applied.    4cc Lidocaine 1% NDC 7216-6551-95, LOT -dk,  [id number]  6 mL Synvisc ONE Product number [id number], LOT [id number],  -03 was injected into patient's right knee by:    Winston Welch PA-C, [facility] (Ortho)  Supervising Physician: Mani Neumann M.D., M.S.  Dept. of Orthopaedic Surgery  [facility]

## 2017-09-27 NOTE — NURSING NOTE
"Chief Complaint   Patient presents with     Knee Pain     Right knee pain. Onset: off and on for about 10 years. Last 2 years he was going to have a TKA but had to post pone because of MI. He has had cortisone injections but they did not help. Pain with walking or standing. Pain is anterior/medial. His knee will buckle and he will fall. He would like synvisc/gel injection.        Initial /70  Pulse 60  Resp 16  Ht 1.727 m (5' 8\")  Wt 91.7 kg (202 lb 3.2 oz)  BMI 30.74 kg/m2 Estimated body mass index is 30.74 kg/(m^2) as calculated from the following:    Height as of this encounter: 1.727 m (5' 8\").    Weight as of this encounter: 91.7 kg (202 lb 3.2 oz).  Medication Reconciliation: complete   Ruby Schwab Certified Medical Assistant    "

## 2017-09-27 NOTE — MR AVS SNAPSHOT
After Visit Summary   9/27/2017    Vincent Mishra    MRN: [id number]           Patient Information     Date Of Birth          1940        Visit Information        Provider Department      9/27/2017 10:15 AM Mani Neumann MD [facility] [place]        Today's Diagnoses     Primary osteoarthritis of right knee    -  1    Chronic pain of right knee          Care Instructions    Please remember to call and schedule a follow up appointment if one was recommended at your earliest convenience.  Orthopedics CLINIC HOURS TELEPHONE NUMBER   Dr. Thien Beltran  Certified Medical Assistant   Monday & Wednesday   8am - 5pm  Thursday 1pm - 5pm  Friday 8am -11:30am Specialty schedulers:   [phone] to schedule your surgery.  Main Clinic:   [phone] to make an appointment with any provider.    Urgent Care locations:    [facility] Monday[place] [facility]  Saturday-Sunday 9am-5pm      Monday-Friday 12pm - 8pm  Saturday-Sunday 9am-5pm [phone]     If SURGERY has been recommended, please call our Specialty Schedulers at [phone] to schedule your procedure.    If you need a medication refill, please contact your pharmacy. Please allow 3 business days for your refill to be completed.    If an MRI or CT scan has been recommended, please call [facility] Schedulers at [phone] to schedule your appointment.  Use [employer] (secure e-mail communication and access to your chart) to send a message or to make an appointment. Please ask how you can sign up for simfy.  Your care team's suggested websites for health information:   [email or web address] : Up to date and easily searchable information on multiple topics.   [email or web address] : [place] dept of heatl, public health issues in [place], N1N1              Follow-ups after your visit        Additional Services     [facility] PT, HAND, AND CHIROPRACTIC REFERRAL       **This order will print in  the [facility] Scheduling Office**    Physical Therapy, Hand Therapy and Chiropractic Care are available through:    *[facility]  *[facility]  *[facility]    Call one number to schedule at any of the above locations: [phone].    Your provider has referred you to: Physical Therapy at [facility] or [facility]    Indication/Reason for Referral: Right knee osteoarthritis   Onset of Illness: years  Therapy Orders: Evaluate and Treat  Special Programs: None  Special Request: None    Brody Skinner      Additional Comments for the Therapist or Chiropractor:     Please be aware that coverage of these services is subject to the terms and limitations of your health insurance plan.  Call member services at your health plan with any benefit or coverage questions.      Please bring the following to your appointment:    *Your personal calendar for scheduling future appointments  *Comfortable clothing                  Follow-up notes from your care team     Return if symptoms worsen or fail to improve.      Your next 10 appointments already scheduled     Oct 03, 2017  2:00 PM CDT   New Visit with Alina Calderon NP   [facility]    [address] [place] [address] [id number] [phone]              Who to contact     If you have questions or need follow up information about today's clinic visit or your schedule please contact [facility] directly at [phone].  Normal or non-critical lab and imaging results will be communicated to you by MyChart, letter or phone within 4 business days after the clinic has received the results. If you do not hear from us within 7 days, please contact the clinic through MyChart or phone. If you have a critical or abnormal lab result, we will notify you by phone as soon as possible.  Submit refill requests through [employer] or call your pharmacy and they will forward the refill request to us.  "Please allow 3 business days for your refill to be completed.          Additional Information About Your Visit        [employer] Information     poLight gives you secure access to your electronic health record. If you see a primary care provider, you can also send messages to your care team and make appointments. If you have questions, please call your primary care clinic.  If you do not have a primary care provider, please call [phone] and they will assist you.        Care EveryWhere ID     This is your Care EveryWhere ID. This could be used by other organizations to access your [place] medical records  [id number]        Your Vitals Were     Pulse Respirations Height BMI (Body Mass Index)          60 16 5' 8\" (1.727 m) 30.74 kg/m2         Blood Pressure from Last 3 Encounters:   09/27/17 138/70   09/20/17 136/60   06/14/17 109/64    Weight from Last 3 Encounters:   09/27/17 202 lb 3.2 oz (91.7 kg)   09/20/17 206 lb 8 oz (93.7 kg)   06/14/17 214 lb 3.2 oz (97.2 kg)              We Performed the Following     C SYNVISC PER 1 MG     DRAIN/INJECT LARGE JOINT/BURSA     [facility] PT, HAND, AND CHIROPRACTIC REFERRAL          Today's Medication Changes          These changes are accurate as of: 9/27/17  1:21 PM.  If you have any questions, ask your nurse or doctor.               Start taking these medicines.        Dose/Directions    Hylan 48 MG/6ML Sosy injection   Commonly known as:  SYNVISC ONE   Used for:  Primary osteoarthritis of right knee   Started by:  Mani Neumann MD        Dose:  48 Units   48 Units by INTRA-ARTICULAR route once for 1 dose   Quantity:  6 mL   Refills:  0         These medicines have changed or have updated prescriptions.        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  Ask your nurse if you have questions.     You don't need a prescription for these medications     Hylan 48 MG/6ML Sosy injection                Primary Care Provider Office Phone # Fax #    Keyla Fonseca [phone] [address] [place] [address]        Equal Access to Services     [facility]: Hadii aad ku hadasho [place], waaxda luqadaha, qaybta kaalmada corrina, sobeida lorenzo . So [facility] [phone].    ATENCIÓN: Si habla español, tiene a nicholas disposición servicios gratuitos de asistencia lingüística. [facility] [phone].    We comply with applicable federal civil rights laws and [place] laws. We do not discriminate on the basis of race, color, national origin, age, disability sex, sexual orientation or gender identity.            Thank you!     Thank you for choosing [facility]  for your care. Our goal is always to provide you with excellent care. Hearing back from our patients is one way we can continue to improve our services. Please take a few minutes to complete the written survey that you may receive in the mail after your visit with us. Thank you!             Your Updated Medication List - Protect others around you: Learn how to safely use, store and throw away your medicines at www.disposemymeds.org.          This list is accurate as of:  9/27/17  1:21 PM.  Always use your most recent med list.                   Brand Name Dispense Instructions for use Diagnosis    acetaminophen 325 MG tablet    TYLENOL    250 tablet    Take 2 tablets by mouth every 4 hours as needed.    Tear of meniscus of left knee       amLODIPine 10 MG tablet    NORVASC    90 tablet    TAKE 1 TABLET(10 MG) BY MOUTH DAILY    Essential hypertension with goal blood pressure less than 140/90       ascorbic acid 500 MG tablet    VITAMIN C     Take 500 mg by mouth daily        aspirin 81 MG EC tablet      Take 1 tablet (81 mg) by mouth daily    Chest pain, unspecified type, NSTEMI (non-ST elevated myocardial infarction) (H)       atorvastatin 80 MG tablet    LIPITOR    90 tablet    Take 1 tablet (80 mg) by mouth daily    Hyperlipidemia LDL goal <100       blood glucose monitoring meter device kit    no brand specified    1 kit    Use to test blood sugar 2 times daily or as directed.    Type 2 diabetes mellitus with hyperglycemia, without long-term current use of insulin (H)       blood glucose monitoring test strip    ONE TOUCH ULTRA    200 strip    Use to test blood sugars 2 times daily or as directed.    Type 2 diabetes mellitus with other circulatory complications (H)       buPROPion 100 MG tablet    WELLBUTRIN    180 tablet    Take 1 pill in am for 7 days then increase to 1 po 2 times per day    Adjustment disorder with depressed mood       busPIRone 15 MG tablet    BUSPAR    180 tablet    TAKE 1 TABLET(15 MG) BY MOUTH TWICE DAILY    Depression with anxiety       calcium + D 600-200 MG-UNIT Tabs   Generic drug:  calcium carbonate-vitamin D     100 tablet    Take 2 tablets by mouth daily.        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)       hydrochlorothiazide 25 MG tablet    HYDRODIURIL    90 tablet    Take 1 tablet (25 mg) by mouth daily    Essential hypertension with goal blood pressure less than 140/90       Hylan 48 MG/6ML Sosy injection    SYNVISC ONE    6 mL    48 Units by INTRA-ARTICULAR route once for 1 dose    Primary osteoarthritis of right knee       * ibuprofen 800 MG tablet    ADVIL/MOTRIN    90 tablet    Take 1 tablet (800 mg) by mouth every 8 hours as needed for moderate pain    Primary osteoarthritis of both knees       * ibuprofen 800 MG tablet    ADVIL/MOTRIN    120 tablet    TAKE 1 TABLET BY MOUTH EVERY 8 HOURS AS NEEDED FOR MODERATE PAIN    Primary osteoarthritis of both knees       [employer] FINEPOINT LANCETS Misc     100 each    Use to test blood sugars 1 times daily or as directed.    Type 2 diabetes, HbA1c goal < 7% (H)       lisinopril 40 MG tablet    PRINIVIL/ZESTRIL    90 tablet    TAKE 1 TABLET(40 MG) BY MOUTH DAILY    Essential hypertension with goal blood pressure less than 140/90       metFORMIN 500 MG 24 hr tablet    GLUCOPHAGE-XR    360 tablet    TAKE 2 TABLETS BY MOUTH TWICE DAILY WITH MEALS.    Type 2 diabetes mellitus without complication, unspecified long term insulin use status (H)       nitroGLYcerin 0.4 MG sublingual tablet    NITROSTAT    25 tablet    Place 1 tablet (0.4 mg) under the tongue every 5 minutes as needed for chest pain    CAD (coronary artery disease)       * omeprazole 20 MG CR capsule    priLOSEC    180 capsule    Take 1 capsule (20 mg) by mouth 2 times daily    Gastroesophageal reflux disease without esophagitis       * omeprazole 20 MG CR capsule    priLOSEC    180 capsule    TAKE ONE CAPSULE BY  MOUTH TWICE DAILY    Gastroesophageal reflux disease without esophagitis       * order for DME     1 Device    Equipment being ordered: Wheelchair motorized for patient with spinal stenosis and neurogenic claudication    Spinal stenosis, lumbar region, with neurogenic claudication       * order for DME     1 Units    Equipment being ordered: TENS    Chronic bilateral low back pain with sciatica, sciatica laterality unspecified, Hip pain, bilateral       * oxyCODONE 30 MG 12 hr tablet    OxyCONTIN    30 tablet    Take 1 tablet (30 mg) by mouth every morning    Hip pain, bilateral, Chronic bilateral low back pain with sciatica, sciatica laterality unspecified       * oxyCODONE 5 MG IR tablet    ROXICODONE    45 tablet    Take 1 daily as needed  for severe pain may have 15 per month this is a 3 month supply    Lumbar back pain with radiculopathy affecting left lower extremity       sertraline 100 MG tablet    ZOLOFT    225 tablet    Take 2 per day    Major depressive disorder, recurrent episode, moderate (H)       spironolactone 25 MG tablet    ALDACTONE    90 tablet    TAKE 1 TABLET(25 MG) BY MOUTH DAILY    Essential hypertension with goal blood pressure less than 140/90       traZODone 100 MG tablet    DESYREL    270 tablet    Take 3 tablets (300 mg) by mouth nightly as needed for sleep 300 mg at HS for sleep    Primary insomnia       * Notice:  This list has 8 medication(s) that are the same as other medications prescribed for you. Read the directions carefully, and ask your doctor or other care provider to review them with you.

## 2017-09-27 NOTE — PATIENT INSTRUCTIONS
Please remember to call and schedule a follow up appointment if one was recommended at your earliest convenience.  Orthopedics CLINIC HOURS TELEPHONE NUMBER   Dr. Thien Beltran  Certified Medical Assistant   Monday & Wednesday   8am - 5pm  Thursday 1pm - 5pm  Friday 8am -11:30am Specialty schedulers:   [phone] to schedule your surgery.  Main Clinic:   [phone] to make an appointment with any provider.    Urgent Care locations:    [facility] Monday-Friday Closed  Saturday-Sunday 9am-5pm      Monday-Friday 12pm - 8pm  Saturday-Sunday 9am-5pm [phone]     If SURGERY has been recommended, please call our Specialty Schedulers at [phone] to schedule your procedure.    If you need a medication refill, please contact your pharmacy. Please allow 3 business days for your refill to be completed.    If an MRI or CT scan has been recommended, please call [facility] Schedulers at [phone] to schedule your appointment.  Use [employer] (secure e-mail communication and access to your chart) to send a message or to make an appointment. Please ask how you can sign up for [employer].  Your care team's suggested websites for health information:   [email or web address] : Up to date and easily searchable information on multiple topics.   [email or web address] : [place] dept of heat, public health issues in [place], N1N1

## 2017-09-27 NOTE — PROGRESS NOTES
Vincent,  Your lab results were normal/stable. Please feel free to my chart or call the office with questions. Keyla Fonseca M.D.

## 2017-09-29 DIAGNOSIS — I10 ESSENTIAL HYPERTENSION WITH GOAL BLOOD PRESSURE LESS THAN 140/90: ICD-10-CM

## 2017-09-29 DIAGNOSIS — F41.8 DEPRESSION WITH ANXIETY: ICD-10-CM

## 2017-09-29 NOTE — TELEPHONE ENCOUNTER
busPIRone (BUSPAR) 15 MG tablet       Last Written Prescription Date: 6/24/17  Last Fill Quantity: 180; # refills: 0  Last Office Visit with [facility], [facility] or [facility] prescribing provider:  9/20/17        Last PHQ-9 score on record=   PHQ-9 SCORE 9/20/2017   Total Score -   Total Score 14       Lab Results   Component Value Date    AST 16 05/30/2017     Lab Results   Component Value Date    ALT 20 05/30/2017             spironolactone (ALDACTONE) 25 MG tablet      Last Written Prescription Date: 9/6/17  Last Fill Quantity: 90, # refills: 0  Last Office Visit with [facility], [facility] or [facility] prescribing provider: 9/20/17       Potassium   Date Value Ref Range Status   05/31/2017 4.3 3.4 - 5.3 mmol/L Final     Creatinine   Date Value Ref Range Status   05/31/2017 1.31 (H) 0.66 - 1.25 mg/dL Final     BP Readings from Last 3 Encounters:   09/27/17 138/70   09/20/17 136/60   06/14/17 109/64

## 2017-09-29 NOTE — TELEPHONE ENCOUNTER
hctz      Last Written Prescription Date: 9/27/16  Last Fill Quantity: 90, # refills: 3  Last Office Visit with [facility], [facility] or [facility] prescribing provider: 9/20/17       Potassium   Date Value Ref Range Status   05/31/2017 4.3 3.4 - 5.3 mmol/L Final     Creatinine   Date Value Ref Range Status   05/31/2017 1.31 (H) 0.66 - 1.25 mg/dL Final     BP Readings from Last 3 Encounters:   09/27/17 138/70   09/20/17 136/60   06/14/17 109/64

## 2017-10-02 RX ORDER — HYDROCHLOROTHIAZIDE 25 MG/1
TABLET ORAL
Qty: 90 TABLET | Refills: 0 | Status: SHIPPED | OUTPATIENT
Start: 2017-10-02 | End: 2017-12-26

## 2017-10-02 RX ORDER — BUSPIRONE HYDROCHLORIDE 15 MG/1
TABLET ORAL
Qty: 180 TABLET | Refills: 0 | Status: SHIPPED | OUTPATIENT
Start: 2017-10-02 | End: 2017-12-26

## 2017-10-02 RX ORDER — SPIRONOLACTONE 25 MG/1
TABLET ORAL
Qty: 90 TABLET | Refills: 0 | Status: SHIPPED | OUTPATIENT
Start: 2017-10-02 | End: 2018-01-01

## 2017-10-03 ENCOUNTER — RADIANT APPOINTMENT (OUTPATIENT)
Dept: GENERAL RADIOLOGY | Facility: CLINIC | Age: 77
End: 2017-10-03
Attending: NURSE PRACTITIONER
Payer: MEDICARE

## 2017-10-03 ENCOUNTER — OFFICE VISIT (OUTPATIENT)
Dept: NEUROSURGERY | Facility: CLINIC | Age: 77
End: 2017-10-03
Payer: MEDICARE

## 2017-10-03 VITALS
OXYGEN SATURATION: 97 % | WEIGHT: 200.8 LBS | BODY MASS INDEX: 30.43 KG/M2 | HEIGHT: 68 IN | TEMPERATURE: 97.4 F | HEART RATE: 70 BPM | SYSTOLIC BLOOD PRESSURE: 116 MMHG | DIASTOLIC BLOOD PRESSURE: 64 MMHG | RESPIRATION RATE: 16 BRPM

## 2017-10-03 DIAGNOSIS — M54.16 LUMBAR RADICULOPATHY: ICD-10-CM

## 2017-10-03 DIAGNOSIS — M51.369 LUMBAR DEGENERATIVE DISC DISEASE: ICD-10-CM

## 2017-10-03 DIAGNOSIS — M54.16 LUMBAR RADICULOPATHY: Primary | ICD-10-CM

## 2017-10-03 DIAGNOSIS — M17.0 PRIMARY OSTEOARTHRITIS OF BOTH KNEES: ICD-10-CM

## 2017-10-03 PROCEDURE — 99203 OFFICE O/P NEW LOW 30 MIN: CPT | Performed by: NURSE PRACTITIONER

## 2017-10-03 PROCEDURE — 72080 X-RAY EXAM THORACOLMB 2/> VW: CPT

## 2017-10-03 RX ORDER — IBUPROFEN 800 MG/1
TABLET, FILM COATED ORAL
Qty: 90 TABLET | Refills: 0 | Status: SHIPPED | OUTPATIENT
Start: 2017-10-03 | End: 2017-12-11

## 2017-10-03 ASSESSMENT — PAIN SCALES - GENERAL: PAINLEVEL: SEVERE PAIN (6)

## 2017-10-03 NOTE — PROGRESS NOTES
"Dr. Leonard Vaughn  [facility]  Neurosurgery Clinic Visit        CC: Low back pain    Primary care Provider: Keyla Fonseca      Reason For Visit:   I was asked by Dr. Fonseca to consult on the patient for chronic bilateral low back pain with sciatica.      HPI: Vincent Mishra is a 77 year old male with a long-standing history of low back pain.  He has had multiple lumbar surgeries x4, with his last surgeyr in 2001.  He states the pain was somewhat well controlled after his last surgery, however, the pain started to intensify over the past 4 years. He recalls falling getting out of his pick-up and he fell backwards onto a rock, \"And my back hit the rock.\"  He states the pain started to increase after this incident.  He describes his pain as a constant dull pain to the lower back bilaterally and it radiates into the groin and posterior leg bilaterally.  He states he has falls frequently.  He denies any changes to his bowel or bladder; has urinary issues related to previous prostate surgery. He does have an extensive cardiac history and is on blood thinners for previous stent placement.    Pain right now:  8    Past Medical History:   Diagnosis Date     CAD (coronary artery disease) 7/26/2011 7/2011 (Juanito): s/p MI, PTCA - RCA      Cancer of kidney (H)      Chest pain 1/12/2012     Coronary artery disease      History of angina      History of blood transfusion      Hyperlipidaemia      Hyperlipidemia LDL goal <100 9/23/2010     Malignant neoplasm (H)     Prostate CA (removed)     Myocardial infarction     s/p MI 7/29/14, stent placement     NSTEMI (non-ST elevated myocardial infarction) (H)     07-25-14 CATH- RCA, L.Main and CFX  had minor luminal irregularites. Mid LAD high grade stenosis 80-90%.Stent placed to mid LAD     Other and unspecified hyperlipidemia     MI in July 2011     Right bundle branch block      Type II or unspecified type diabetes mellitus without mention of " complication, not stated as uncontrolled      Unspecified essential hypertension        Past Medical History reviewed with patient during visit.    Past Surgical History:   Procedure Laterality Date     ARTHROSCOPY KNEE  2/11/2011    ARTHROSCOPY KNEE performed by LEY, JEFFREY DUANE at [facility]     ARTHROSCOPY KNEE WITH MEDIAL MENISCECTOMY  6/26/2012    Procedure: ARTHROSCOPY KNEE WITH MEDIAL MENISCECTOMY;  Right Knee Arthroscopy With Medial Menisectomy;  Surgeon: Ley, Jeffrey Duane, MD;  Location: [facility]     BACK SURGERY      back surgery x4     BIOPSY       CARDIAC SURGERY  7/2011    stentt placed     COLONOSCOPY       CORONARY ANGIOGRAPHY ADULT ORDER  07-25-14    RCA, L.Main and CFX  had minor luminal irregularites. Mid LAD high grade stenosis 80-90%.Stent placed to mid LAD     ESOPHAGOSCOPY, GASTROSCOPY, DUODENOSCOPY (EGD), COMBINED  10/25/2012    Procedure: COMBINED ESOPHAGOSCOPY, GASTROSCOPY, DUODENOSCOPY (EGD), BIOPSY SINGLE OR MULTIPLE;  Gastroscopy  ;  Surgeon: Lucius Dasilva MD;  Location: [facility]     HEART CATH, ANGIOPLASTY  07-25-14    mid LAD      ORTHOPEDIC SURGERY       back surgery     Past Surgical History reviewed with patient during visit.    Current Outpatient Prescriptions   Medication     hydrochlorothiazide (HYDRODIURIL) 25 MG tablet     busPIRone (BUSPAR) 15 MG tablet     spironolactone (ALDACTONE) 25 MG tablet     metFORMIN (GLUCOPHAGE-XR) 500 MG 24 hr tablet     lisinopril (PRINIVIL/ZESTRIL) 40 MG tablet     buPROPion (WELLBUTRIN) 100 MG tablet     oxyCODONE (OXYCONTIN) 30 MG 12 hr tablet     oxyCODONE (ROXICODONE) 5 MG IR tablet     omeprazole (PRILOSEC) 20 MG CR capsule     traZODone (DESYREL) 100 MG tablet     amLODIPine (NORVASC) 10 MG tablet     aspirin EC 81 MG EC tablet     ibuprofen (ADVIL/MOTRIN) 800 MG tablet     clopidogrel (PLAVIX) 75 MG tablet     ibuprofen (ADVIL/MOTRIN) 800 MG tablet     gabapentin (NEURONTIN) 100 MG capsule     order for DME     blood glucose monitoring (ONE TOUCH  "ULTRA) test strip     atorvastatin (LIPITOR) 80 MG tablet     blood glucose monitoring (NO BRAND SPECIFIED) meter device kit     sertraline (ZOLOFT) 100 MG tablet     omeprazole (PRILOSEC) 20 MG CR capsule     order for DME     nitroglycerin (NITROSTAT) 0.4 MG SL tablet     [employer] FINEPOINT LANCETS MISC     cholecalciferol (VITAMIN  -D) 1000 UNITS capsule     ascorbic acid (VITAMIN C) 500 MG tablet     acetaminophen (TYLENOL) 325 MG tablet     Calcium Carbonate-Vitamin D (CALCIUM + D) 600-200 MG-UNIT per tablet     No current facility-administered medications for this visit.        Allergies   Allergen Reactions     Prozac [Fluoxetine] Other (See Comments)     \"I went crazy.\"       Benadryl [Diphenhydramine Hcl] Other (See Comments)     Starts to shake, and paranoid     Codeine Itching     Diphenhydramine Other (See Comments)     Hallucinations, See [facility] records scanned on 7/16/15     Labetalol Other (See Comments)     See [facility] records scanned on 7/16/15  Bradycardia down to 30 on holter, dizziness. Stopped med and symptoms resolved     Metoprolol Other (See Comments)     Bradycardia even at 12.5 mg     Morphine Visual Disturbance       Social History     Social History     Marital status:      Spouse name: N/A     Number of children: N/A     Years of education: N/A     Social History Main Topics     Smoking status: Never Smoker     Smokeless tobacco: Never Used     Alcohol use No     Drug use: No     Sexual activity: Yes     Partners: Female     Other Topics Concern     Parent/Sibling W/ Cabg, Mi Or Angioplasty Before 65f 55m? No     Caffeine Concern No     4-5 cups per day     Special Diet Yes     smaller portions     Exercise No     Social History Narrative       Family History   Problem Relation Age of Onset     CANCER Mother      Depression Mother      DIABETES Father      Hypertension Father      HEART DISEASE Father      MENTAL ILLNESS Father      " "HEART DISEASE Maternal Grandfather      Substance Abuse Maternal Grandfather      Alcohol/Drug Paternal Grandmother      Substance Abuse Paternal Grandmother      HEART DISEASE Paternal Grandfather      Alcohol/Drug Paternal Grandfather      Substance Abuse Paternal Grandfather      HEART DISEASE Brother      DIABETES Brother      Substance Abuse Brother      Obesity Brother      DIABETES Brother      Obesity Sister      Alcohol/Drug Daughter      Depression Daughter      Obesity Sister      DIABETES Sister      Obesity Sister      DIABETES Sister      Alcohol/Drug Sister      CANCER Sister 55     possible kidney cancer     Substance Abuse Sister      Alcohol/Drug Son      Substance Abuse Son      DIABETES Son      Alcohol/Drug Son      Substance Abuse Son      Obesity Daughter      DIABETES Daughter      Hypertension Daughter      Bipolar Disorder Other             ROS: 10 point ROS neg other than the symptoms noted above in the HPI.    Vital Signs: /64  Pulse 70  Temp 97.4  F (36.3  C) (Oral)  Resp 16  Ht 5' 8\" (1.727 m)  Wt 200 lb 12.8 oz (91.1 kg)  SpO2 97%  BMI 30.53 kg/m2    Examination:  Constitutional:  Alert, well nourished, NAD.  HEENT: Normocephalic, atraumatic.   Pulm:  Without shortness of breath   CV:  No pitting edema of BLE.    Neurological:  Awake  Alert  Oriented x 3  Speech clear  Cranial nerves II - XII intact    Motor exam   Shoulder Abduction:  Right:  5/5   Left:  5/5  Biceps:                      Right:  5/5   Left:  5/5  Triceps:                     Right:  5/5   Left:  5/5  Wrist Extensors:       Right:  5/5   Left:  5/5  Wrist Flexors:           Right:  5/5   Left:  5/5  Intrinsics:                   Right:  5/5   Left:  5/5  Hip Flexor:                Right: 5/5  Left:  5/5  Hip Adductor:             Right:  5/5  Left:  5/5  Hip Abductor:             Right:  5/5  Left:  5/5  Gastroc Soleus:        Right:  5/5  Left:  5/5  Tib/Ant:                      Right:  5/5  Left:  " "5/5  EHL:                          Right:  5/5  Left:  5/5   Sensation normal to bilateral upper and lower extremities  Clonus negative  DTRs 1+ symmetric    Gait: Able to stand from a seated position, slowly.  Antalgic gait. Non-myelopathic gait.    Cervical examination reveals good range of motion.  No tenderness to palpation of the cervical spine or paraspinous muscles bilaterally.    Lumbar examination reveals tenderness of the spine or paraspinous muscles. Limited ROM.  Negative SI joint, sciatic notch or greater trochanteric tenderness to palpation bilaterally.  Straight leg raise is negative bilaterally.      Imaging: MRI 2016 with multilevel DDD    Assessment/Plan:   Vincent Mishra is a 77 year old male with a long-standing history of low back pain.  He has had multiple lumbar surgeries x4, with his last surgeyr in 2001.  He states the pain was somewhat well controlled after his last surgery, however, the pain started to intensify over the past 4 years. He recalls falling getting out of his pick-up and he fell backwards onto a rock, \"And my back hit the rock.\"  He states the pain started to increase after this incident.  He describes his pain as a constant dull pain to the lower back bilaterally and it radiates into the groin and posterior leg bilaterally.  He states he has falls frequently.  He denies any changes to his bowel or bladder; has urinary issues related to previous prostate surgery. He does have an extensive cardiac history and is on blood thinners for previous stent placement.  We are going to order lumbar Xrays and an updated MRI.  Patient is agreeable.  We will contact him once results received to discuss recommendations.        Patient Instructions   Xrays today  Schedule lumbar MRI  We will call you with results  Please contact the clinic if pain persists at [phone].      Alina Calderon [facility]  [address]  [place] [address]    Tel [phone]  Pager [phone]

## 2017-10-03 NOTE — TELEPHONE ENCOUNTER
IBUPROFEN 800MG TABLETS        Last Written Prescription Date: 4/28/17  Last Quantity: 120, # refills: 1  Last Office Visit with G, P or [facility] prescribing provider: 9/20/17       Creatinine   Date Value Ref Range Status   05/31/2017 1.31 (H) 0.66 - 1.25 mg/dL Final     Lab Results   Component Value Date    AST 16 05/30/2017     Lab Results   Component Value Date    ALT 20 05/30/2017     BP Readings from Last 3 Encounters:   09/27/17 138/70   09/20/17 136/60   06/14/17 109/64

## 2017-10-03 NOTE — MR AVS SNAPSHOT
After Visit Summary   10/3/2017    Vincent Mishra    MRN: [id number]           Patient Information     Date Of Birth          1940        Visit Information        Provider Department      10/3/2017 2:00 PM Alina Calderon NP [facility]        Today's Diagnoses     Lumbar radiculopathy    -  1    Lumbar degenerative disc disease          Care Instructions    Xrays today  Schedule lumbar MRI  We will call you with results  Please contact the clinic if pain persists at [phone].            Follow-ups after your visit        Future tests that were ordered for you today     Open Future Orders        Priority Expected Expires Ordered    MR Lumbar Spine w/o & w Contrast Routine  10/3/2018 10/3/2017    XR Thoracolumbar Spine 2 Views Routine 10/3/2017 10/3/2018 10/3/2017            Who to contact     If you have questions or need follow up information about today's clinic visit or your schedule please contact [facility] directly at [phone].  Normal or non-critical lab and imaging results will be communicated to you by Phraxishart, letter or phone within 4 business days after the clinic has received the results. If you do not hear from us within 7 days, please contact the clinic through Coolio or phone. If you have a critical or abnormal lab result, we will notify you by phone as soon as possible.  Submit refill requests through Coolio or call your pharmacy and they will forward the refill request to us. Please allow 3 business days for your refill to be completed.          Additional Information About Your Visit        Phraxishart Information     Coolio gives you secure access to your electronic health record. If you see a primary care provider, you can also send messages to your care team and make appointments. If you have questions, please call your primary care clinic.  If you do not have a primary care provider, please call [phone] and they will assist you.    "     Care EveryWhere ID     This is your Care EveryWhere ID. This could be used by other organizations to access your [facility] medical records  [id number]        Your Vitals Were     Pulse Temperature Respirations Height Pulse Oximetry BMI (Body Mass Index)    70 97.4  F (36.3  C) (Oral) 16 5' 8\" (1.727 m) 97% 30.53 kg/m2       Blood Pressure from Last 3 Encounters:   10/03/17 116/64   09/27/17 138/70   09/20/17 136/60    Weight from Last 3 Encounters:   10/03/17 200 lb 12.8 oz (91.1 kg)   09/27/17 202 lb 3.2 oz (91.7 kg)   09/20/17 206 lb 8 oz (93.7 kg)                 Today's Medication Changes          These changes are accurate as of: 10/3/17  2:36 PM.  If you have any questions, ask your nurse or doctor.               These medicines have changed or have updated prescriptions.        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)        Take 1 tablet increase by 1 tablet every 2 days to 6 tablets 3 times per day   Quantity:  270 capsule   Refills:  3       sertraline 100 MG tablet   Commonly known as:  ZOLOFT   This may have changed:    - how much to take  - how to take this  - when to take this  - additional instructions   Used for:  Major depressive disorder, recurrent episode, moderate (H)        Take 2 per day   Quantity:  225 tablet   Refills:  1                Primary Care Provider Office Phone # Fax #    Keyla Fonseca [phone]  [facility] [address]        Equal Access to Services     REGINO ZULETA AH: Derrick Sousa, wakeegan manzanares, qaybta kaalsina houser, sobeida sandhu. So [facility] [phone].    ATENCIÓN: Si habla español, tiene a nicholas disposición servicios gratuitos " de asistencia lingüística. Shasta reich [phone].    We comply with applicable federal civil rights laws and [place] laws. We do not discriminate on the basis of race, color, national origin, age, disability, sex, sexual orientation, or gender identity.            Thank you!     Thank you for choosing [facility]  for your care. Our goal is always to provide you with excellent care. Hearing back from our patients is one way we can continue to improve our services. Please take a few minutes to complete the written survey that you may receive in the mail after your visit with us. Thank you!             Your Updated Medication List - Protect others around you: Learn how to safely use, store and throw away your medicines at www.disposemymeds.org.          This list is accurate as of: 10/3/17  2:36 PM.  Always use your most recent med list.                   Brand Name Dispense Instructions for use Diagnosis    acetaminophen 325 MG tablet    TYLENOL    250 tablet    Take 2 tablets by mouth every 4 hours as needed.    Tear of meniscus of left knee       amLODIPine 10 MG tablet    NORVASC    90 tablet    TAKE 1 TABLET(10 MG) BY MOUTH DAILY    Essential hypertension with goal blood pressure less than 140/90       ascorbic acid 500 MG tablet    VITAMIN C     Take 500 mg by mouth daily        aspirin 81 MG EC tablet      Take 1 tablet (81 mg) by mouth daily    Chest pain, unspecified type, NSTEMI (non-ST elevated myocardial infarction) (H)       atorvastatin 80 MG tablet    LIPITOR    90 tablet    Take 1 tablet (80 mg) by mouth daily    Hyperlipidemia LDL goal <100       blood glucose monitoring meter device kit    no brand specified    1 kit    Use to test blood sugar 2 times daily or as directed.    Type 2 diabetes mellitus with hyperglycemia, without long-term current use of insulin (H)       blood glucose monitoring test strip    ONE TOUCH ULTRA    200 strip    Use to test blood sugars 2 times daily or as  directed.    Type 2 diabetes mellitus with other circulatory complications       buPROPion 100 MG tablet    WELLBUTRIN    180 tablet    Take 1 pill in am for 7 days then increase to 1 po 2 times per day    Adjustment disorder with depressed mood       busPIRone 15 MG tablet    BUSPAR    180 tablet    TAKE 1 TABLET(15 MG) BY MOUTH TWICE DAILY    Depression with anxiety       calcium + D 600-200 MG-UNIT Tabs   Generic drug:  calcium carbonate-vitamin D     100 tablet    Take 2 tablets by mouth daily.        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)       hydrochlorothiazide 25 MG tablet    HYDRODIURIL    90 tablet    TAKE 1 TABLET BY MOUTH DAILY    Essential hypertension with goal blood pressure less than 140/90       * ibuprofen 800 MG tablet    ADVIL/MOTRIN    90 tablet    Take 1 tablet (800 mg) by mouth every 8 hours as needed for moderate pain    Primary osteoarthritis of both knees       * ibuprofen 800 MG tablet    ADVIL/MOTRIN    120 tablet    TAKE 1 TABLET BY MOUTH EVERY 8 HOURS AS NEEDED FOR MODERATE PAIN    Primary osteoarthritis of both knees       [employer] FINEPOINT LANCETS Misc     100 each    Use to test blood sugars 1 times daily or as directed.    Type 2 diabetes, HbA1c goal < 7% (H)       lisinopril 40 MG tablet    PRINIVIL/ZESTRIL    90 tablet    TAKE 1 TABLET(40 MG) BY MOUTH DAILY    Essential hypertension with goal blood pressure less than 140/90       metFORMIN  500 MG 24 hr tablet    GLUCOPHAGE-XR    360 tablet    TAKE 2 TABLETS BY MOUTH TWICE DAILY WITH MEALS.    Type 2 diabetes mellitus without complication, unspecified long term insulin use status (H)       nitroGLYcerin 0.4 MG sublingual tablet    NITROSTAT    25 tablet    Place 1 tablet (0.4 mg) under the tongue every 5 minutes as needed for chest pain    CAD (coronary artery disease)       * omeprazole 20 MG CR capsule    priLOSEC    180 capsule    Take 1 capsule (20 mg) by mouth 2 times daily    Gastroesophageal reflux disease without esophagitis       * omeprazole 20 MG CR capsule    priLOSEC    180 capsule    TAKE ONE CAPSULE BY MOUTH TWICE DAILY    Gastroesophageal reflux disease without esophagitis       * order for DME     1 Device    Equipment being ordered: Wheelchair motorized for patient with spinal stenosis and neurogenic claudication    Spinal stenosis, lumbar region, with neurogenic claudication       * order for DME     1 Units    Equipment being ordered: TENS    Chronic bilateral low back pain with sciatica, sciatica laterality unspecified, Hip pain, bilateral       * oxyCODONE 30 MG 12 hr tablet    OxyCONTIN    30 tablet    Take 1 tablet (30 mg) by mouth every morning    Hip pain, bilateral, Chronic bilateral low back pain with sciatica, sciatica laterality unspecified       * oxyCODONE 5 MG IR tablet    ROXICODONE    45 tablet    Take 1 daily as needed  for severe pain may have 15 per month this is a 3 month supply    Lumbar back pain with radiculopathy affecting left lower extremity       sertraline 100 MG tablet    ZOLOFT    225 tablet    Take 2 per day    Major depressive disorder, recurrent episode, moderate (H)       spironolactone 25 MG tablet    ALDACTONE    90 tablet    TAKE 1 TABLET(25 MG) BY MOUTH DAILY    Essential hypertension with goal blood pressure less than 140/90       traZODone 100 MG tablet    DESYREL    270 tablet    Take 3 tablets (300 mg) by mouth nightly as needed for sleep 300  mg at HS for sleep    Primary insomnia       * Notice:  This list has 8 medication(s) that are the same as other medications prescribed for you. Read the directions carefully, and ask your doctor or other care provider to review them with you.

## 2017-10-03 NOTE — NURSING NOTE
"Vincent Mishra is a 77 year old male who presents for:  Chief Complaint   Patient presents with     Back Pain     Low back pain with bilateral sciatica. Onset: years. Pain radiates around fromt he low back to the groin area. He has severe groin pain. Sometimes he will have pain that radiates down the posterior bilateral legs. He will occasionally have N/T in his legs. Hx of 4 back surgeries, last one in 2001. No recent treatments. He couldn't do PT.         Initial Vitals:  /64  Pulse 70  Temp 97.4  F (36.3  C) (Oral)  Resp 16  Ht 5' 8\" (1.727 m)  Wt 200 lb 12.8 oz (91.1 kg)  SpO2 97%  BMI 30.53 kg/m2 Estimated body mass index is 30.53 kg/(m^2) as calculated from the following:    Height as of this encounter: 5' 8\" (1.727 m).    Weight as of this encounter: 200 lb 12.8 oz (91.1 kg).. Body surface area is 2.09 meters squared. BP completed using cuff size: regular  Severe Pain (6)    Do you feel safe in your environment?  Yes  Do you need any refills today? No    Nursing Comments: Low back pain with bilateral sciatica. Onset: years. Pain radiates around fromt he low back to the groin area. He has severe groin pain. Sometimes he will have pain that radiates down the posterior bilateral legs. He will occasionally have N/T in his legs. Hx of 4 back surgeries, last one in 2001. No recent treatments. He couldn't do PT.         Ruby Schwab"

## 2017-10-03 NOTE — PATIENT INSTRUCTIONS
Xrays today  Schedule lumbar MRI  We will call you with results  Please contact the clinic if pain persists at [phone].

## 2017-10-05 ENCOUNTER — HOSPITAL ENCOUNTER (OUTPATIENT)
Dept: MRI IMAGING | Facility: CLINIC | Age: 77
Discharge: HOME | End: 2017-10-05
Attending: NURSE PRACTITIONER | Admitting: NURSE PRACTITIONER
Payer: MEDICARE

## 2017-10-05 DIAGNOSIS — M51.369 LUMBAR DEGENERATIVE DISC DISEASE: ICD-10-CM

## 2017-10-05 DIAGNOSIS — M54.16 LUMBAR RADICULOPATHY: ICD-10-CM

## 2017-10-05 PROCEDURE — 72148 MRI LUMBAR SPINE W/O DYE: CPT

## 2017-10-05 RX ORDER — GADOBUTROL 604.72 MG/ML
9 INJECTION INTRAVENOUS ONCE
Status: DISCONTINUED | OUTPATIENT
Start: 2017-10-05 | End: 2017-10-06 | Stop reason: HOSPADM

## 2017-10-17 ENCOUNTER — TELEPHONE (OUTPATIENT)
Dept: PALLIATIVE MEDICINE | Facility: CLINIC | Age: 77
End: 2017-10-17

## 2017-10-17 DIAGNOSIS — M54.16 LUMBAR RADICULOPATHY: Primary | ICD-10-CM

## 2017-10-17 NOTE — TELEPHONE ENCOUNTER
Pre-screening Questions for Radiology Injections:    Injection to be done at which interventional clinic site? [facility]    Procedure ordered by Philippe    Procedure ordered?  Epidural Steroid (interlaminar approach): Lumbar vs lumbar facets    What insurance would patient like us to bill for this procedure? Medicare/[place]      Worker's comp-Any injection DO NOT SCHEDULE and route to Katie Guillen.      [employer] insurance - For SI joint injections, DO NOT SCHEDULE and route Isabel Lloyd.      [employer]- MBB's must be scheduled at LEAST two weeks apart      [employer] - Any injection besides hip/shoulder/knee joint DO NOT SCHEDULE and route to Isabel Lloyd. She will obtain PA and call pt back to schedule procedure or notify pt of denial.     HP [employer]-PA REQUIRED FOR NON-ALISON OR Joint injections    Any chance of pregnancy? Not Applicable   If YES, do NOT schedule and route to RN pool    Is an  needed? No     Patient has a drive home? (mandatory) YES:     Is patient taking any blood thinners (plavix, coumadin, jantoven, warfarin, heparin, pradaxa or dabigatran )? Yes - Plavix   If hold needed, do NOT schedule, route to RN pool     Is patient taking any aspirin products? Yes - Pt takes 81mg daily; instructed to hold 0 day(s) prior to procedure.      If more than 325mg/day do NOT schedule; route to RN pool     For CERVICAL procedures, hold all aspirin products for 6 days.      Does the patient have a bleeding or clotting disorder? No     If YES, okay to schedule AND route to RN nurse pool    **For any patients with platelet count <100, must be forwarded to provider**    Is patient diabetic?  Yes  If YES, have them bring their glucometer.    Does patient have an active infection or treated for one within the past week? No     Is patient currently taking any antibiotics?  No     For patients on chronic, preventative, or prophylactic antibiotics, procedures may be scheduled.     For patients on  antibiotics for active or recent infection:    Binta Whitman Burton, Snitzer-antibiotic course must have been completed for 4 days    Dr. Calvillo-antibiotic course must have been completed for 7 days    Is patient currently taking any steroid medications? (i.e. Prednisone, Medrol)  No     For patients on steroid medications:    Binta Whitman Burton, Snitzer-steroid course must have been completed for 4 days    -steroid course must have been completed for 7 days    Reviewed with patient:  If you are started on any steroids or antibiotics between now and your appointment, you must contact us because it may affect our ability to perform your procedure.  Yes    Is patient actively being treated for cancer or immunocompromised? No  If YES, do NOT schedule and route to RN pool     Are you able to get on and off an exam table with minimal or no assistance? Yes  If NO, do NOT schedule and route to RN pool    Are you able to roll over and lay on your stomach with minimal or no assistance? Yes  If NO, do NOT schedule and route to RN pool     Any allergies to contrast dye, iodine, shellfish, or numbing and steroid medications? No  If YES, route to RN pool AND add allergy information to appointment notes    Allergies: Prozac [fluoxetine]; Benadryl [diphenhydramine hcl]; Codeine; Diphenhydramine; Labetalol; Metoprolol; and Morphine      Has the patient had a flu shot or any other vaccinations within 7 days before or after the procedure.  No     Does patient have an MRI/CT?  YES:   (SI joint, hip injections, lumbar sympathetic blocks, and stellate ganglion blocks do not require an MRI)    Was the MRI done w/in the last 3 years?  Yes    Was MRI done at [place]? Yes      If not, where was it done? N/A       If MRI was not done at [place], [facility] or [facility] do NOT schedule and route to nursing.  If pt has an imaging disc, the injection may be scheduled but pt has to bring disc to appt. If they show  up w/out disc the injection cannot be done    Reminders (please tell patient if applicable):       Instructed pt to arrive 30 minutes early for IV start if this is for a cervical procedure, ALL sympathetic (stellate ganglion, hypogastric, or lumbar sympathetic block) and all sedation procedures (RFA, spinal cord stimulation trials).  Not Applicable   -IVs are not routinely placed for Dr. Ascencio cervical cases   -Dr. Greenwood DOES want IVs for cervical cases       If NPO for sedation, informed patient that it is okay to take medications with sips of water (except if they are to hold blood thinners).  Not Applicable   *DO take blood pressure medication if it is prescribed*      If this is for a cervical ALISON, informed patient that aspirin needs to be held for 6 days.   Not Applicable      For all patients not having spinal cord stimulator (SCS) trials or radiofrequency ablations (RFAs), informed patient:    IV sedation is not provided for this procedure.  If you feel that an oral anti-anxiety medication is needed, you can discuss this further with your referring provider or primary care provider.  The Pain Clinic provider will discuss specifics of what the procedure includes at your appointment.  Most procedures last 10-20 minutes.  We use numbing medications to help with any discomfort during the procedure.  Not Applicable      Do not schedule procedures requiring IV placement in the first appointment of the day or first appointment after lunch.       For patients 85 or older we recommend having an adult stay w/ them for the remainder of the day.       Does the patient have any questions?  NO  Tanvi Giang  [facility]

## 2017-10-18 NOTE — TELEPHONE ENCOUNTER
Dr. Fonseca,     Your patient is requesting to schedule a procedural injection that may require holding of the Plavix. .     This would require an approval to hold the Plavix for 7 days prior to injection with injection occuring on the 8th day. Barring any complications, typically patients resume their Plavix 12 hours after the injection.  We are requesting your approval to hold the medication for this time frame.     Thank you.     Please route back to pool and don't close encounter.

## 2017-10-20 ENCOUNTER — TELEPHONE (OUTPATIENT)
Dept: FAMILY MEDICINE | Facility: CLINIC | Age: 77
End: 2017-10-20

## 2017-10-20 NOTE — TELEPHONE ENCOUNTER
Please see encounter from Dr. Fonseca from today 10/20/17.   Called patient. Spoke to spouse as he is [place]. Scheduled for 11/02/17 [place] location, provided address.  Aware to hold Plavix 7 days prior, Instructed to call if does not hold, if becomes ill/infection/fever or started on steroid medication. Aware  needed.   They will call back if this date does not work. Will leave encounter open for patient response/chart review by nursing.       Roopa Amezquita  BSN-RN Care Coordinator  [facility]

## 2017-10-20 NOTE — TELEPHONE ENCOUNTER
Please see covering provider's response regarding plavix and pain injection    Routing to ppc nurse    Corrina TREJO Rn

## 2017-10-20 NOTE — TELEPHONE ENCOUNTER
There is already an encounter regarding anticoag hold.  Will document in that encounter.     Roopa Amezquita  BSN-RN Care Coordinator  [facility]

## 2017-10-20 NOTE — TELEPHONE ENCOUNTER
Patient's wife is reporting:  They received a call from the pain clinic 10/17/17 regarding a pain injection for this patient  The RN forwarded the following note:    Dr. Fonseca,      Your patient is requesting to schedule a procedural injection that may require holding of the Plavix. .      This would require an approval to hold the Plavix for 7 days prior to injection with injection occuring on the 8th day. Barring any complications, typically patients resume their Plavix 12 hours after the injection.  We are requesting your approval to hold the medication for this time frame.      Thank you.     Wife reports that her  would like to know today if they should and when they should hold his Plavix so they can make the appointment for the injection.    Please advise    Routing to provider.    Corrina TREJO Rn

## 2017-10-23 NOTE — TELEPHONE ENCOUNTER
Agree. I thought I had answered this but I guess it did not take. He should be okay. His pain level is high and he has seen cardiology this year. He is on the plavix but does not have a recent stent or MI. Should be ok although there is some risk but I think the benefit is worth the small risk and he would agree as his pain is so poorly controlled right now. Keyla Fonseca M.D.

## 2017-10-30 ENCOUNTER — OFFICE VISIT (OUTPATIENT)
Dept: FAMILY MEDICINE | Facility: CLINIC | Age: 77
End: 2017-10-30
Payer: MEDICARE

## 2017-10-30 VITALS
WEIGHT: 196 LBS | BODY MASS INDEX: 29.7 KG/M2 | TEMPERATURE: 97.9 F | HEIGHT: 68 IN | HEART RATE: 60 BPM | SYSTOLIC BLOOD PRESSURE: 120 MMHG | DIASTOLIC BLOOD PRESSURE: 66 MMHG

## 2017-10-30 DIAGNOSIS — M96.1 FAILED BACK SURGICAL SYNDROME: ICD-10-CM

## 2017-10-30 DIAGNOSIS — M54.40 CHRONIC BILATERAL LOW BACK PAIN WITH SCIATICA, SCIATICA LATERALITY UNSPECIFIED: ICD-10-CM

## 2017-10-30 DIAGNOSIS — I10 ESSENTIAL HYPERTENSION WITH GOAL BLOOD PRESSURE LESS THAN 140/90: ICD-10-CM

## 2017-10-30 DIAGNOSIS — M25.552 HIP PAIN, BILATERAL: ICD-10-CM

## 2017-10-30 DIAGNOSIS — M25.551 HIP PAIN, BILATERAL: ICD-10-CM

## 2017-10-30 DIAGNOSIS — F32.1 MODERATE MAJOR DEPRESSION (H): ICD-10-CM

## 2017-10-30 DIAGNOSIS — R11.0 NAUSEA: Primary | ICD-10-CM

## 2017-10-30 DIAGNOSIS — E11.42 PERIPHERAL SENSORY NEUROPATHY DUE TO TYPE 2 DIABETES MELLITUS (H): ICD-10-CM

## 2017-10-30 DIAGNOSIS — G89.29 CHRONIC BILATERAL LOW BACK PAIN WITH SCIATICA, SCIATICA LATERALITY UNSPECIFIED: ICD-10-CM

## 2017-10-30 DIAGNOSIS — M54.16 LUMBAR BACK PAIN WITH RADICULOPATHY AFFECTING LEFT LOWER EXTREMITY: ICD-10-CM

## 2017-10-30 LAB
BASOPHILS # BLD AUTO: 0 10E9/L (ref 0–0.2)
BASOPHILS NFR BLD AUTO: 0.3 %
DIFFERENTIAL METHOD BLD: ABNORMAL
EOSINOPHIL # BLD AUTO: 0.6 10E9/L (ref 0–0.7)
EOSINOPHIL NFR BLD AUTO: 7.8 %
ERYTHROCYTE [DISTWIDTH] IN BLOOD BY AUTOMATED COUNT: 14.8 % (ref 10–15)
ERYTHROCYTE [SEDIMENTATION RATE] IN BLOOD BY WESTERGREN METHOD: 13 MM/H (ref 0–20)
HCT VFR BLD AUTO: 37.7 % (ref 40–53)
HGB BLD-MCNC: 11.8 G/DL (ref 13.3–17.7)
LYMPHOCYTES # BLD AUTO: 1.5 10E9/L (ref 0.8–5.3)
LYMPHOCYTES NFR BLD AUTO: 19.1 %
MCH RBC QN AUTO: 26.7 PG (ref 26.5–33)
MCHC RBC AUTO-ENTMCNC: 31.3 G/DL (ref 31.5–36.5)
MCV RBC AUTO: 85 FL (ref 78–100)
MONOCYTES # BLD AUTO: 0.6 10E9/L (ref 0–1.3)
MONOCYTES NFR BLD AUTO: 7.3 %
NEUTROPHILS # BLD AUTO: 5 10E9/L (ref 1.6–8.3)
NEUTROPHILS NFR BLD AUTO: 65.5 %
PLATELET # BLD AUTO: 332 10E9/L (ref 150–450)
RBC # BLD AUTO: 4.42 10E12/L (ref 4.4–5.9)
WBC # BLD AUTO: 7.7 10E9/L (ref 4–11)

## 2017-10-30 PROCEDURE — 99214 OFFICE O/P EST MOD 30 MIN: CPT | Performed by: FAMILY MEDICINE

## 2017-10-30 PROCEDURE — 82607 VITAMIN B-12: CPT | Performed by: FAMILY MEDICINE

## 2017-10-30 PROCEDURE — 85652 RBC SED RATE AUTOMATED: CPT | Performed by: FAMILY MEDICINE

## 2017-10-30 PROCEDURE — 85025 COMPLETE CBC W/AUTO DIFF WBC: CPT | Performed by: FAMILY MEDICINE

## 2017-10-30 PROCEDURE — 36415 COLL VENOUS BLD VENIPUNCTURE: CPT | Performed by: FAMILY MEDICINE

## 2017-10-30 PROCEDURE — 80053 COMPREHEN METABOLIC PANEL: CPT | Performed by: FAMILY MEDICINE

## 2017-10-30 RX ORDER — OXYCODONE HYDROCHLORIDE 5 MG/1
TABLET ORAL
Qty: 45 TABLET | Refills: 0 | Status: SHIPPED | OUTPATIENT
Start: 2017-10-30 | End: 2017-10-30

## 2017-10-30 RX ORDER — OXYCODONE HYDROCHLORIDE 5 MG/1
TABLET ORAL
Qty: 45 TABLET | Refills: 0 | Status: SHIPPED | OUTPATIENT
Start: 2017-11-28 | End: 2017-10-30

## 2017-10-30 RX ORDER — LANCING DEVICE
1 EACH MISCELLANEOUS
Qty: 1 KIT | Refills: 1 | Status: ON HOLD | OUTPATIENT
Start: 2017-10-30 | End: 2019-01-01

## 2017-10-30 RX ORDER — OXYCODONE HYDROCHLORIDE 30 MG/1
30 TABLET, FILM COATED, EXTENDED RELEASE ORAL EVERY MORNING
Qty: 30 TABLET | Refills: 0 | Status: SHIPPED | OUTPATIENT
Start: 2017-11-28 | End: 2017-10-30

## 2017-10-30 RX ORDER — OXYCODONE HYDROCHLORIDE 30 MG/1
30 TABLET, FILM COATED, EXTENDED RELEASE ORAL EVERY MORNING
Qty: 30 TABLET | Refills: 0 | Status: SHIPPED | OUTPATIENT
Start: 2017-12-26 | End: 2018-01-30

## 2017-10-30 RX ORDER — OXYCODONE HYDROCHLORIDE 5 MG/1
TABLET ORAL
Qty: 45 TABLET | Refills: 0 | Status: SHIPPED | OUTPATIENT
Start: 2017-12-26 | End: 2018-06-04

## 2017-10-30 RX ORDER — OXYCODONE HYDROCHLORIDE 30 MG/1
30 TABLET, FILM COATED, EXTENDED RELEASE ORAL EVERY MORNING
Qty: 30 TABLET | Refills: 0 | Status: SHIPPED | OUTPATIENT
Start: 2017-10-30 | End: 2017-10-30

## 2017-10-30 RX ORDER — DULOXETIN HYDROCHLORIDE 30 MG/1
CAPSULE, DELAYED RELEASE ORAL
Qty: 180 CAPSULE | Refills: 1 | Status: SHIPPED | OUTPATIENT
Start: 2017-10-30 | End: 2017-12-11

## 2017-10-30 NOTE — PATIENT INSTRUCTIONS
Decrease the sertraline to 1 tablet daily start the duloxetine at 1 capsule for 1 week   Then the next week increase the duloxetine to 1 capsule 2 times per day   Then week three stop the sertraline

## 2017-10-30 NOTE — MR AVS SNAPSHOT
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)        Hip pain, bilateral        Chronic bilateral low back pain with sciatica, sciatica laterality unspecified        Lumbar back pain with radiculopathy affecting left lower extremity        Moderate major depression (H)          Care Instructions    Decrease the sertraline to 1 tablet daily start the duloxetine at 1 capsule for 1 week   Then the next week increase the duloxetine to 1 capsule 2 times per day   Then week three stop the sertraline               Follow-ups after your visit        Your next 10 appointments already scheduled     Nov 02, 2017  1:15 PM CDT   Radiology Injections with Alhaji Greenwood MD   [facility] ([facility])    [address] [place] [id number] [phone]              Who to contact     Normal or non-critical lab and imaging results will be communicated to you by [employer], letter or phone within 4 business days after the clinic has received the results. If you do not hear from us within 7 days, please contact the clinic through [employer] or phone. If you have a critical or abnormal lab result, we will notify you by phone as soon as possible.  Submit refill requests through [employer] or call your pharmacy and they will forward the refill request to us. Please allow 3 business days for your refill to be completed.          If you need to speak with a  for additional information , please call: [phone]             Additional Information About Your Visit        MyChart Information     [employer]  "gives you secure access to your electronic health record. If you see a primary care provider, you can also send messages to your care team and make appointments. If you have questions, please call your primary care clinic.  If you do not have a primary care provider, please call [phone] and they will assist you.        Care EveryWhere ID     This is your Care EveryWhere ID. This could be used by other organizations to access your [facility] medical records  [id number]        Your Vitals Were     Pulse Temperature Height BMI (Body Mass Index)          60 97.9  F (36.6  C) (Tympanic) 5' 8\" (1.727 m) 29.8 kg/m2         Blood Pressure from Last 3 Encounters:   10/30/17 120/66   10/03/17 116/64   09/27/17 138/70    Weight from Last 3 Encounters:   10/30/17 196 lb (88.9 kg)   10/03/17 200 lb 12.8 oz (91.1 kg)   09/27/17 202 lb 3.2 oz (91.7 kg)              We Performed the Following     CBC with platelets and differential     Comprehensive metabolic panel     ESR: Erythrocyte sedimentation rate     Vitamin B12          Today's Medication Changes          These changes are accurate as of: 10/30/17  3:48 PM.  If you have any questions, ask your nurse or doctor.               Start taking these medicines.        Dose/Directions    DULoxetine 30 MG EC capsule   Commonly known as:  CYMBALTA   Used for:  Hip pain, bilateral, Chronic bilateral low back pain with sciatica, sciatica laterality unspecified, Lumbar back pain with radiculopathy affecting left lower extremity, Moderate major depression (H)        Take 1 capsule by mouth daily for 1 week then increase to 1 capsule 2 times per day   Quantity:  180 capsule   Refills:  1       * oxyCODONE 30 MG 12 hr tablet   Commonly known as:  OxyCONTIN   Used for:  Hip pain, bilateral, Chronic bilateral low back pain with sciatica, sciatica laterality unspecified        Dose:  30 mg   Start taking on:  12/26/2017   Take 1 tablet (30 mg) by mouth every morning   Quantity:  30 " tablet   Refills:  0       * oxyCODONE 5 MG IR tablet   Commonly known as:  ROXICODONE   Used for:  Lumbar back pain with radiculopathy affecting left lower extremity        Start taking on:  12/26/2017   Take 1 daily as needed  for severe pain may have 15 per month this is a 3 month supply   Quantity:  45 tablet   Refills:  0       SELECT-LITE DEVICE/LANCETS Kit   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 Read the directions carefully, and ask your doctor or other care provider to review them with you.      These medicines have changed or have updated prescriptions.        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)        Take 1 tablet increase by 1 tablet every 2 days to 6 tablets 3 times per day   Quantity:  270 capsule   Refills:  3       sertraline 100 MG tablet   Commonly known as:  ZOLOFT   This may have changed:    - how much to take  - how to take this  - when to take this  - additional instructions   Used for:  Major depressive disorder, recurrent episode, moderate (H)        Take 2 per day   Quantity:  225 tablet   Refills:  1            Where to get your medicines      These medications were sent to [id number] - [place] [id number] AT [place] [address], [facility] [address]     Phone:  [phone]     DULoxetine 30 MG EC capsule    SELECT-LITE DEVICE/LANCETS Kit         Some of these will need a paper prescription and others can be bought over the counter.  Ask your nurse if you  have questions.     Bring a paper prescription for each of these medications     oxyCODONE 30 MG 12 hr tablet    oxyCODONE 5 MG IR tablet                Primary Care Provider Office Phone # Fax #    Keyla Fonseca [phone] [address] [facility] [address]        Equal Access to Services     OVI BARNESFREEDOM : Hadii aad ku hadasho Soomaali, waaxda luqadaha, qaybta kaalmada adeegyada, waxay winsomein haymohindern aderj dixon maura sandhu. So [facility] [place] [phone].    ATENCIÓN: Si habla español, tiene a nicholas disposición servicios gratuitos de asistencia lingüística. Llame al [phone].    We comply with applicable federal civil rights laws and [place] laws. We do not discriminate on the basis of race, color, national origin, age, disability, sex, sexual orientation, or gender identity.            Thank you!     Thank you for choosing [facility]  for your care. Our goal is always to provide you with excellent care. Hearing back from our patients is one way we can continue to improve our services. Please take a few minutes to complete the written survey that you may receive in the mail after your visit with us. Thank you!             Your Updated Medication List - Protect others around you: Learn how to safely use, store and throw away your medicines at www.disposemymeds.org.          This list is accurate as of: 10/30/17  3:48 PM.  Always use your most recent med list.                   Brand Name Dispense Instructions for use Diagnosis    acetaminophen 325 MG tablet    TYLENOL    250 tablet    Take 2 tablets by mouth every 4 hours as needed.    Tear of meniscus of left knee       amLODIPine 10 MG tablet    NORVASC    90 tablet    TAKE 1 TABLET(10 MG) BY MOUTH DAILY    Essential hypertension with goal blood pressure less than 140/90       ascorbic acid 500 MG tablet    VITAMIN C     Take 500 mg by mouth daily        aspirin 81 MG EC tablet      Take 1 tablet (81 mg) by mouth daily    Chest  pain, unspecified type, NSTEMI (non-ST elevated myocardial infarction) (H)       atorvastatin 80 MG tablet    LIPITOR    90 tablet    Take 1 tablet (80 mg) by mouth daily    Hyperlipidemia LDL goal <100       blood glucose monitoring meter device kit    no brand specified    1 kit    Use to test blood sugar 2 times daily or as directed.    Type 2 diabetes mellitus with hyperglycemia, without long-term current use of insulin (H)       blood glucose monitoring test strip    ONE TOUCH ULTRA    200 strip    Use to test blood sugars 2 times daily or as directed.    Type 2 diabetes mellitus with other circulatory complications       buPROPion 100 MG tablet    WELLBUTRIN    180 tablet    Take 1 pill in am for 7 days then increase to 1 po 2 times per day    Adjustment disorder with depressed mood       busPIRone 15 MG tablet    BUSPAR    180 tablet    TAKE 1 TABLET(15 MG) BY MOUTH TWICE DAILY    Depression with anxiety       calcium + D 600-200 MG-UNIT Tabs   Generic drug:  calcium carbonate-vitamin D     100 tablet    Take 2 tablets by mouth daily.        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, Right bundle branch block, NSTEMI (non-ST elevated myocardial infarction) (H)       DULoxetine 30 MG EC capsule    CYMBALTA    180 capsule    Take 1 capsule by mouth daily for 1 week then increase to 1 capsule 2 times per day    Hip pain, bilateral, Chronic bilateral low back pain with sciatica, sciatica laterality unspecified, Lumbar back pain with radiculopathy affecting left lower extremity, Moderate major depression (H)       gabapentin 100 MG capsule    NEURONTIN    270 capsule    Take 1 tablet increase by 1 tablet every 2 days to 6 tablets 3 times per day    Failed back surgical syndrome, Peripheral  sensory neuropathy due to type 2 diabetes mellitus (H)       hydrochlorothiazide 25 MG tablet    HYDRODIURIL    90 tablet    TAKE 1 TABLET BY MOUTH DAILY    Essential hypertension with goal blood pressure less than 140/90       * ibuprofen 800 MG tablet    ADVIL/MOTRIN    120 tablet    TAKE 1 TABLET BY MOUTH EVERY 8 HOURS AS NEEDED FOR MODERATE PAIN    Primary osteoarthritis of both knees       * ibuprofen 800 MG tablet    ADVIL/MOTRIN    90 tablet    TAKE 1 TABLET(800 MG) BY MOUTH EVERY 8 HOURS AS NEEDED FOR MODERATE PAIN    Primary osteoarthritis of both knees       [employer] FINEPOINT LANCETS Misc     100 each    Use to test blood sugars 1 times daily or as directed.    Type 2 diabetes, HbA1c goal < 7% (H)       lisinopril 40 MG tablet    PRINIVIL/ZESTRIL    90 tablet    TAKE 1 TABLET(40 MG) BY MOUTH DAILY    Essential hypertension with goal blood pressure less than 140/90       metFORMIN 500 MG 24 hr tablet    GLUCOPHAGE-XR    360 tablet    TAKE 2 TABLETS BY MOUTH TWICE DAILY WITH MEALS.    Type 2 diabetes mellitus without complication, unspecified long term insulin use status (H)       nitroGLYcerin 0.4 MG sublingual tablet    NITROSTAT    25 tablet    Place 1 tablet (0.4 mg) under the tongue every 5 minutes as needed for chest pain    CAD (coronary artery disease)       * omeprazole 20 MG CR capsule    priLOSEC    180 capsule    Take 1 capsule (20 mg) by mouth 2 times daily    Gastroesophageal reflux disease without esophagitis       * omeprazole 20 MG CR capsule    priLOSEC    180 capsule    TAKE ONE CAPSULE BY MOUTH TWICE DAILY    Gastroesophageal reflux disease without esophagitis       * order for DME     1 Device    Equipment being ordered: Wheelchair motorized for patient with spinal stenosis and neurogenic claudication    Spinal stenosis, lumbar region, with neurogenic claudication       * order for DME     1 Units    Equipment being ordered: TENS    Chronic bilateral low back pain with sciatica,  sciatica laterality unspecified, Hip pain, bilateral       * oxyCODONE 30 MG 12 hr tablet   Start taking on:  12/26/2017    OxyCONTIN    30 tablet    Take 1 tablet (30 mg) by mouth every morning    Hip pain, bilateral, Chronic bilateral low back pain with sciatica, sciatica laterality unspecified       * oxyCODONE 5 MG IR tablet   Start taking on:  12/26/2017    ROXICODONE    45 tablet    Take 1 daily as needed  for severe pain may have 15 per month this is a 3 month supply    Lumbar back pain with radiculopathy affecting left lower extremity       SELECT-LITE DEVICE/LANCETS Kit     1 kit    1 kit 2 times daily    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 Read the directions carefully, and ask your doctor or other care provider to review them with you.

## 2017-10-30 NOTE — PROGRESS NOTES
SUBJECTIVE:                                                    Vincent Mishra is a 77 year old male who presents to clinic today for the following health issues:    Chief Complaint   Patient presents with     Fatigue     Weakness, stomach upset, dry heving, trouble sleeping for couples weeks.        Problem list and histories reviewed & adjusted, as indicated.  Additional history: has been feeling like he has dry heaves he has been having soft stools had nausea . No energy not eating well   The knee shot did not help much   He is going to get the back injection later this week   He feels better when he eats some soup  He fell about 1 week ago and hit the back of the head hard 1 week ago against the dressed couldn't get up   Has worse balance and he is very depressed   He did not find the wellbutrin is not very helpful   Wt Readings from Last 5 Encounters:   10/30/17 196 lb (88.9 kg)   10/03/17 200 lb 12.8 oz (91.1 kg)   09/27/17 202 lb 3.2 oz (91.7 kg)   09/20/17 206 lb 8 oz (93.7 kg)   06/14/17 214 lb 3.2 oz (97.2 kg)     Wt Readings from Last 10 Encounters:   10/30/17 196 lb (88.9 kg)   10/03/17 200 lb 12.8 oz (91.1 kg)   09/27/17 202 lb 3.2 oz (91.7 kg)   09/20/17 206 lb 8 oz (93.7 kg)   06/14/17 214 lb 3.2 oz (97.2 kg)   05/31/17 224 lb 3.2 oz (101.7 kg)   05/12/17 225 lb (102.1 kg)   03/06/17 227 lb 11.2 oz (103.3 kg)   01/31/17 225 lb 8 oz (102.3 kg)   01/24/17 220 lb (99.8 kg)       Patient Active Problem List   Diagnosis     Tension headache     Trapezius strain     Hyperlipidemia LDL goal <100     Parotid mass     Diplopia     Neuropathy     Vision loss night     Neck pain     B12 deficiency     Tear of meniscus of left knee     Hypertension goal BP (blood pressure) < 140/90     C6-7 disc with radiculopathy     Right bundle branch block     Advanced directives, info letter sent 10/4/2011     Chest pain     Anatomic airway obstruction     Abnormal antinuclear antibody titer     Osteoarthritis, knee      Moderate major depression (H)     Orchitis, epididymitis, and epididymo-orchitis     Malignant neoplasm of kidney excluding renal pelvis (H)     Renal mass, left     Vitamin D deficiency disease     Health Care Home     Insomnia     Chronic low back pain     Abdominal pain, right upper quadrant     Esophageal reflux     Hard of hearing     Constipation     NSTEMI (non-ST elevated myocardial infarction) (H)     Myocardial infarction, nontransmural (H)     Acute myocardial infarction of inferolateral wall (H)     Obesity     Sinoatrial node dysfunction (H)     Right bundle branch block (RBBB)     Essential hypertension     Hyperlipidemia     Type 2 diabetes mellitus with other circulatory complications     Thyroid nodule     Hip pain, bilateral     Claudication (H)     Bilateral low back pain with right-sided sciatica     Bilateral low back pain with left-sided sciatica     ACS (acute coronary syndrome) (H)     Chronic bilateral low back pain with sciatica, sciatica laterality unspecified     Past Surgical History:   Procedure Laterality Date     ARTHROSCOPY KNEE  2/11/2011    ARTHROSCOPY KNEE performed by LEY, JEFFREY DUANE at [facility]     ARTHROSCOPY KNEE WITH MEDIAL MENISCECTOMY  6/26/2012    Procedure: ARTHROSCOPY KNEE WITH MEDIAL MENISCECTOMY;  Right Knee Arthroscopy With Medial Menisectomy;  Surgeon: Ley, Jeffrey Duane, MD;  Location: [facility]     BACK SURGERY      back surgery x4     BIOPSY       CARDIAC SURGERY  7/2011    stentt placed     COLONOSCOPY       CORONARY ANGIOGRAPHY ADULT ORDER  07-25-14    RCA, L.Main and CFX  had minor luminal irregularites. Mid LAD high grade stenosis 80-90%.Stent placed to mid LAD     ESOPHAGOSCOPY, GASTROSCOPY, DUODENOSCOPY (EGD), COMBINED  10/25/2012    Procedure: COMBINED ESOPHAGOSCOPY, GASTROSCOPY, DUODENOSCOPY (EGD), BIOPSY SINGLE OR MULTIPLE;  Gastroscopy  ;  Surgeon: Lucius Dasilva MD;  Location: [facility]     HEART CATH, ANGIOPLASTY  07-25-14    mid LAD      ORTHOPEDIC SURGERY    "    back surgery       Social History   Substance Use Topics     Smoking status: Never Smoker     Smokeless tobacco: Never Used     Alcohol use No     Family History   Problem Relation Age of Onset     CANCER Mother      Depression Mother      DIABETES Father      Hypertension Father      HEART DISEASE Father      MENTAL ILLNESS Father      HEART DISEASE Maternal Grandfather      Substance Abuse Maternal Grandfather      Alcohol/Drug Paternal Grandmother      Substance Abuse Paternal Grandmother      HEART DISEASE Paternal Grandfather      Alcohol/Drug Paternal Grandfather      Substance Abuse Paternal Grandfather      HEART DISEASE Brother      DIABETES Brother      Substance Abuse Brother      Obesity Brother      DIABETES Brother      Obesity Sister      Alcohol/Drug Daughter      Depression Daughter      Obesity Sister      DIABETES Sister      Obesity Sister      DIABETES Sister      Alcohol/Drug Sister      CANCER Sister 55     possible kidney cancer     Substance Abuse Sister      Alcohol/Drug Son      Substance Abuse Son      DIABETES Son      Alcohol/Drug Son      Substance Abuse Son      Obesity Daughter      DIABETES Daughter      Hypertension Daughter      Bipolar Disorder Other              ROS:  Constitutional, HEENT, cardiovascular, pulmonary, gi and gu systems are negative, except as otherwise noted.      OBJECTIVE:                                                    /66  Pulse 60  Temp 97.9  F (36.6  C) (Tympanic)  Ht 5' 8\" (1.727 m)  Wt 196 lb (88.9 kg)  BMI 29.8 kg/m2 Body mass index is 29.8 kg/(m^2).   GENERAL APPEARANCE: alert and no distress  HENT: ear canals and TM's normal and nose and mouth without ulcers or lesions  NECK: no adenopathy, no asymmetry, masses, or scars and thyroid normal to palpation  RESP: lungs clear to auscultation - no rales, rhonchi or wheezes  CV: regular rates and rhythm, normal S1 S2, no S3 or S4 and no murmur, click or rub  ABDOMEN: soft, nontender, without " hepatosplenomegaly or masses, bowel sounds normal and protuberant  Low  bilateral paralumbar muscles and pars and able to walk but has generalized muscle weakness      ASSESSMENT/PLAN:                                                      1. Failed back surgical syndrome  Will try the injection next week as plannned as he may benefit from this . He does need to push through the pain a bit more   - CBC with platelets and differential  - Comprehensive metabolic panel  - ESR: Erythrocyte sedimentation rate  - Vitamin B12    2. 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 (SELECT-LITE DEVICE/LANCETS) KIT; 1 kit 2 times daily  Dispense: 1 kit; Refill: 1  - CBC with platelets and differential  - Comprehensive metabolic panel  - ESR: Erythrocyte sedimentation rate  - Vitamin B12    3. Hip pain, bilateral  Will also try cymbalta for the pain well butrin not helpful has been on sertraline forever and does not seem to help him much   - CBC with platelets and differential  - Comprehensive metabolic panel  - ESR: Erythrocyte sedimentation rate  - Vitamin B12  - DULoxetine (CYMBALTA) 30 MG EC capsule; Take 1 capsule by mouth daily for 1 week then increase to 1 capsule 2 times per day  Dispense: 180 capsule; Refill: 1  - oxyCODONE (OXYCONTIN) 30 MG 12 hr tablet; Take 1 tablet (30 mg) by mouth every morning  Dispense: 30 tablet; Refill: 0    4. Chronic bilateral low back pain with sciatica, sciatica laterality unspecified    - CBC with platelets and differential  - Comprehensive metabolic panel  - ESR: Erythrocyte sedimentation rate  - Vitamin B12  - DULoxetine (CYMBALTA) 30 MG EC capsule; Take 1 capsule by mouth daily for 1 week then increase to 1 capsule 2 times per day  Dispense: 180 capsule; Refill: 1  - oxyCODONE (OXYCONTIN) 30 MG 12 hr tablet; Take 1 tablet (30 mg) by mouth every morning  Dispense: 30 tablet; Refill: 0    5. Lumbar back pain with radiculopathy affecting left  lower extremity    - CBC with platelets and differential  - Comprehensive metabolic panel  - ESR: Erythrocyte sedimentation rate  - Vitamin B12  - DULoxetine (CYMBALTA) 30 MG EC capsule; Take 1 capsule by mouth daily for 1 week then increase to 1 capsule 2 times per day  Dispense: 180 capsule; Refill: 1  - oxyCODONE (ROXICODONE) 5 MG IR tablet; Take 1 daily as needed  for severe pain may have 15 per month this is a 3 month supply  Dispense: 45 tablet; Refill: 0    6. Nausea    - CBC with platelets and differential  - Comprehensive metabolic panel  - ESR: Erythrocyte sedimentation rate  - Vitamin B12    7. Moderate major depression (H)    - Vitamin B12  - DULoxetine (CYMBALTA) 30 MG EC capsule; Take 1 capsule by mouth daily for 1 week then increase to 1 capsule 2 times per day  Dispense: 180 capsule; Refill: 1     reports that he has never smoked. He has never used smokeless tobacco.      Recheck in 4 weeks sooner if worsening     Keyla Fonseca M.D.  [facility]

## 2017-10-31 LAB
ALBUMIN SERPL-MCNC: 4 G/DL (ref 3.4–5)
ALP SERPL-CCNC: 97 U/L (ref 40–150)
ALT SERPL W P-5'-P-CCNC: 29 U/L (ref 0–70)
ANION GAP SERPL CALCULATED.3IONS-SCNC: 10 MMOL/L (ref 3–14)
AST SERPL W P-5'-P-CCNC: 22 U/L (ref 0–45)
BILIRUB SERPL-MCNC: 0.5 MG/DL (ref 0.2–1.3)
BUN SERPL-MCNC: 24 MG/DL (ref 7–30)
CALCIUM SERPL-MCNC: 9.2 MG/DL (ref 8.5–10.1)
CHLORIDE SERPL-SCNC: 99 MMOL/L (ref 94–109)
CO2 SERPL-SCNC: 26 MMOL/L (ref 20–32)
CREAT SERPL-MCNC: 1.36 MG/DL (ref 0.66–1.25)
GFR SERPL CREATININE-BSD FRML MDRD: 51 ML/MIN/1.7M2
GLUCOSE SERPL-MCNC: 111 MG/DL (ref 70–99)
POTASSIUM SERPL-SCNC: 3.8 MMOL/L (ref 3.4–5.3)
PROT SERPL-MCNC: 7.7 G/DL (ref 6.8–8.8)
SODIUM SERPL-SCNC: 135 MMOL/L (ref 133–144)
VIT B12 SERPL-MCNC: 779 PG/ML (ref 193–986)

## 2017-10-31 RX ORDER — AMLODIPINE BESYLATE 10 MG/1
TABLET ORAL
Qty: 90 TABLET | Refills: 0 | Status: SHIPPED | OUTPATIENT
Start: 2017-10-31 | End: 2018-02-01

## 2017-10-31 NOTE — TELEPHONE ENCOUNTER
Routing refill request to provider for review/approval because:  Patient was seen yesterday and had lab work done.  Yvon Dickens RN

## 2017-11-02 ENCOUNTER — RADIANT APPOINTMENT (OUTPATIENT)
Dept: RADIOLOGY | Facility: CLINIC | Age: 77
End: 2017-11-02
Attending: PAIN MEDICINE

## 2017-11-02 ENCOUNTER — RADIOLOGY INJECTION OFFICE VISIT (OUTPATIENT)
Dept: PALLIATIVE MEDICINE | Facility: CLINIC | Age: 77
End: 2017-11-02
Payer: MEDICARE

## 2017-11-02 VITALS — DIASTOLIC BLOOD PRESSURE: 73 MMHG | HEART RATE: 66 BPM | SYSTOLIC BLOOD PRESSURE: 132 MMHG

## 2017-11-02 DIAGNOSIS — M54.16 LUMBAR RADICULOPATHY: ICD-10-CM

## 2017-11-02 DIAGNOSIS — M54.16 LUMBAR RADICULOPATHY: Primary | ICD-10-CM

## 2017-11-02 PROCEDURE — 62323 NJX INTERLAMINAR LMBR/SAC: CPT | Performed by: PAIN MEDICINE

## 2017-11-02 ASSESSMENT — PAIN SCALES - GENERAL: PAINLEVEL: EXTREME PAIN (8)

## 2017-11-02 NOTE — MR AVS SNAPSHOT
After Visit Summary   11/2/2017    Vincent Mishra    MRN: [id number]           Patient Information     Date Of Birth          1940        Visit Information        Provider Department      11/2/2017 1:15 PM Alhaji Greenwood MD [facility]3]ine        Today's Diagnoses     Lumbar radiculopathy    -  1      Care Instructions    Dr. Greenwood will talk w/ you tomorrow morning.  Do NOT restart your plavix until he talks with you.    Go the the emergency if the following occurs:  -your weakness worsens  -if you are unable to urinate in 24 hours      [facility]   Procedure Discharge Instructions    Today you saw: Dr. Alhaji Greenwood    You had an:  Epidural steroid injection   -lumbar     Medications used:  Lidocaine   Bupivacaine   Dexamethasone  Omnipaque  Ropivicaine          Be cautious when walking. Numbness and/or weakness in the lower extremities may occur for up to 6-8 hours after the procedure due to effect of the local anesthetic    Do not drive for 6 hours. The effect of the local anesthetic could slow your reflexes.     You may resume your regular activities after 24 hours    Avoid strenuous activity for the first 24 hours    You may shower, however avoid swimming, tub baths or hot tubs for 24 hours following your procedure    You may have a mild to moderate increase in pain for several days following the injection.    It may take up to 14 days for the steroid medication to start working although you may feel the effect as early as a few days after the procedure.       You may use ice packs for 10-15 minutes, 3 to 4 times a day at the injection site for comfort    Do not use heat to painful areas for 6 to 8 hours. This will give the local anesthetic time to wear off and prevent you from accidentally burning your skin.     You may use anti-inflammatory medications (such as Ibuprofen or Aleve or Advil) or Tylenol for pain control if necessary    If you were  fasting, you may resume your normal diet and medications after the procedure    If you have diabetes, check your blood sugar more frequently than usual as your blood sugar may be higher than normal for 10-14 days following a steroid injection. Contact your doctor who manages your diabetes if your blood sugar is higher than usual    If you experience any of the following, call the pain center nursing line during work hours at [phone] or the after hours provider line at [phone]:  -Fever over 100 degree F  -Swelling, bleeding, redness, drainage, warmth at the injection site  -Progressive weakness or numbness in your legs or arms  -Loss of bowel or bladder function  -Unusual headache that is not relieved by Tylenol  -Unusual new onset of pain that is not improving      Phone #s:  Appointment line: [phone];  Nurse line: [phone]                  Follow-ups after your visit        Who to contact     If you have questions or need follow up information about today's clinic visit or your schedule please contact [facility] directly at [phone].  Normal or non-critical lab and imaging results will be communicated to you by PolySp[employer], letter or phone within 4 business days after the clinic has received the results. If you do not hear from us within 7 days, please contact the clinic through [employer] or phone. If you have a critical or abnormal lab result, we will notify you by phone as soon as possible.  Submit refill requests through [employer] or call your pharmacy and they will forward the refill request to us. Please allow 3 business days for your refill to be completed.          Additional Information About Your Visit        [employer] Information     [employer] gives you secure access to your electronic health record. If you see a primary care provider, you can also send messages to your care team and make appointments. If you have questions, please call your primary care clinic.  If you do not have  a primary care provider, please call [phone] and they will assist you.        Care EveryWhere ID     This is your Care EveryWhere ID. This could be used by other organizations to access your [facility] medical records  [id number]        Your Vitals Were     Pulse                   66            Blood Pressure from Last 3 Encounters:   11/02/17 (P) 114/69   10/30/17 120/66   10/03/17 116/64    Weight from Last 3 Encounters:   10/30/17 88.9 kg (196 lb)   10/03/17 91.1 kg (200 lb 12.8 oz)   09/27/17 91.7 kg (202 lb 3.2 oz)              We Performed the Following     NO CHARGE LOS          Today's Medication Changes          These changes are accurate as of: 11/2/17  6:30 PM.  If you have any questions, ask your nurse or doctor.               These medicines have changed or have updated prescriptions.        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)        Take 1 tablet increase by 1 tablet every 2 days to 6 tablets 3 times per day   Quantity:  270 capsule   Refills:  3       sertraline 100 MG tablet   Commonly known as:  ZOLOFT   This may have changed:    - how much to take  - how to take this  - when to take this  - additional instructions   Used for:  Major depressive disorder, recurrent episode, moderate (H)        Take 2 per day   Quantity:  225 tablet   Refills:  1                Primary Care Provider Office Phone # Fax #    Keyla Fonseca [phone] [facility] [address]        Equal Access to Services     [facility]: Derrick Sousa, juan miguel manzanares, sobeida stein. So [facility] [phone].    ATENCIÓN: Si habla español, tiene a nicholas disposición  servicios gratuitos de asistencia lingüística. Shasta reich [phone].    We comply with applicable federal civil rights laws and [place] laws. We do not discriminate on the basis of race, color, national origin, age, disability, sex, sexual orientation, or gender identity.            Thank you!     Thank you for choosing [facility]  for your care. Our goal is always to provide you with excellent care. Hearing back from our patients is one way we can continue to improve our services. Please take a few minutes to complete the written survey that you may receive in the mail after your visit with us. Thank you!             Your Updated Medication List - Protect others around you: Learn how to safely use, store and throw away your medicines at www.disposemymeds.org.          This list is accurate as of: 11/2/17  6:30 PM.  Always use your most recent med list.                   Brand Name Dispense Instructions for use Diagnosis    acetaminophen 325 MG tablet    TYLENOL    250 tablet    Take 2 tablets by mouth every 4 hours as needed.    Tear of meniscus of left knee       amLODIPine 10 MG tablet    NORVASC    90 tablet    TAKE 1 TABLET(10 MG) BY MOUTH DAILY    Essential hypertension with goal blood pressure less than 140/90       ascorbic acid 500 MG tablet    VITAMIN C     Take 500 mg by mouth daily        aspirin 81 MG EC tablet      Take 1 tablet (81 mg) by mouth daily    Chest pain, unspecified type, NSTEMI (non-ST elevated myocardial infarction) (H)       atorvastatin 80 MG tablet    LIPITOR    90 tablet    Take 1 tablet (80 mg) by mouth daily    Hyperlipidemia LDL goal <100       blood glucose monitoring meter device kit    no brand specified    1 kit    Use to test blood sugar 2 times daily or as directed.    Type 2 diabetes mellitus with hyperglycemia, without long-term current use of insulin (H)       blood glucose monitoring test strip    ONETOUCH ULTRA    200 strip    Use to test blood sugars 2  times daily or as directed.    Type 2 diabetes mellitus with other circulatory complications       buPROPion 100 MG tablet    WELLBUTRIN    180 tablet    Take 1 pill in am for 7 days then increase to 1 po 2 times per day    Adjustment disorder with depressed mood       busPIRone 15 MG tablet    BUSPAR    180 tablet    TAKE 1 TABLET(15 MG) BY MOUTH TWICE DAILY    Depression with anxiety       calcium + D 600-200 MG-UNIT Tabs   Generic drug:  calcium carbonate-vitamin D     100 tablet    Take 2 tablets by mouth daily.        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, Right bundle branch block, NSTEMI (non-ST elevated myocardial infarction) (H)       DULoxetine 30 MG EC capsule    CYMBALTA    180 capsule    Take 1 capsule by mouth daily for 1 week then increase to 1 capsule 2 times per day    Hip pain, bilateral, Chronic bilateral low back pain with sciatica, sciatica laterality unspecified, Lumbar back pain with radiculopathy affecting left lower extremity, Moderate major depression (H)       gabapentin 100 MG capsule    NEURONTIN    270 capsule    Take 1 tablet increase by 1 tablet every 2 days to 6 tablets 3 times per day    Failed back surgical syndrome, 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)       hydrochlorothiazide 25 MG tablet    HYDRODIURIL    90 tablet    TAKE 1 TABLET BY MOUTH DAILY    Essential hypertension with goal blood pressure less than 140/90       * ibuprofen 800 MG tablet    ADVIL/MOTRIN    120 tablet    TAKE 1 TABLET BY MOUTH EVERY 8 HOURS AS NEEDED FOR MODERATE PAIN    Primary osteoarthritis of both knees       * ibuprofen 800 MG tablet    ADVIL/MOTRIN    90 tablet    TAKE 1 TABLET(800 MG) BY MOUTH EVERY 8 HOURS AS NEEDED FOR MODERATE PAIN    Primary  osteoarthritis of both knees       LIFESCAN FINEPOINT LANCETS Misc     100 each    Use to test blood sugars 1 times daily or as directed.    Type 2 diabetes, HbA1c goal < 7% (H)       lisinopril 40 MG tablet    PRINIVIL/ZESTRIL    90 tablet    TAKE 1 TABLET(40 MG) BY MOUTH DAILY    Essential hypertension with goal blood pressure less than 140/90       metFORMIN 500 MG 24 hr tablet    GLUCOPHAGE-XR    360 tablet    TAKE 2 TABLETS BY MOUTH TWICE DAILY WITH MEALS.    Type 2 diabetes mellitus without complication, unspecified long term insulin use status (H)       nitroGLYcerin 0.4 MG sublingual tablet    NITROSTAT    25 tablet    Place 1 tablet (0.4 mg) under the tongue every 5 minutes as needed for chest pain    CAD (coronary artery disease)       * omeprazole 20 MG CR capsule    priLOSEC    180 capsule    Take 1 capsule (20 mg) by mouth 2 times daily    Gastroesophageal reflux disease without esophagitis       * omeprazole 20 MG CR capsule    priLOSEC    180 capsule    TAKE ONE CAPSULE BY MOUTH TWICE DAILY    Gastroesophageal reflux disease without esophagitis       * order for DME     1 Device    Equipment being ordered: Wheelchair motorized for patient with spinal stenosis and neurogenic claudication    Spinal stenosis, lumbar region, with neurogenic claudication       * order for DME     1 Units    Equipment being ordered: TENS    Chronic bilateral low back pain with sciatica, sciatica laterality unspecified, Hip pain, bilateral       * oxyCODONE 30 MG 12 hr tablet   Start taking on:  12/26/2017    OxyCONTIN    30 tablet    Take 1 tablet (30 mg) by mouth every morning    Hip pain, bilateral, Chronic bilateral low back pain with sciatica, sciatica laterality unspecified       * oxyCODONE IR 5 MG tablet   Start taking on:  12/26/2017    ROXICODONE    45 tablet    Take 1 daily as needed  for severe pain may have 15 per month this is a 3 month supply    Lumbar back pain with radiculopathy affecting left lower  extremity       SELECT-LITE DEVICE/LANCETS Kit     1 kit    1 kit 2 times daily    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 Read the directions carefully, and ask your doctor or other care provider to review them with you.

## 2017-11-02 NOTE — Clinical Note
After the procedure pt reported profound numbness. Pt was helped to chair. Shortly after legs started to feel weak. Pt LE strength 2/5 but had sensory level to T12. Concern for subdural/intrathecal injection. Pt hemodynamically stable. Pt cont normal mentation. Pt discharged at 7pm w/ return of motor function. Pt given strict recs to go to ED if had progressive weakness.   Pt  Was called at night and this am. Pt reports complete return of motor function. The pt reports voiding without issue. He reports having a great night sleep last night. The pt reports no pain in his groin this am.  Of note pt had a positive jose eduardo/fadir on the L may consider hip pathology as component of his pain.

## 2017-11-02 NOTE — NURSING NOTE
"Chief Complaint   Patient presents with     Pain       Initial /73  Pulse 66 Estimated body mass index is 29.8 kg/(m^2) as calculated from the following:    Height as of 10/30/17: 1.727 m (5' 8\").    Weight as of 10/30/17: 88.9 kg (196 lb).  Medication Reconciliation: complete     Pre-procedure Intake    Have you been fasting? NA    If yes, for how long? No    Are you taking a prescribed blood thinner such as coumadin, Plavix, Xarelto?    Yes -   Plavix    If yes, when did you take your last dose? For 5 days     Do you take aspirin? yes    If cervical procedure, have you held aspirin for 6 days?   No    Do you have any allergies to contrast dye, iodine, steroid and/or numbing medications?  NO    Are you currently taking antibiotics or have an active infection?  NO    Have you had a fever/elevated temperature within the past week? NO    Are you currently taking oral steroids? NO    Do you have a ? Yes       Are you pregnant or breastfeeding?  Not Applicable    Are the vital signs normal?  Yes    Olya Maynard MA            "

## 2017-11-02 NOTE — PROGRESS NOTES
Pre procedure Diagnosis: Lumbar radiculopathy   Post procedure Diagnosis: Same  Procedure performed: L2-3 interlaminar epidural steroid injection   Anesthesia: none  Complications:   Procedure went well. Contrast spread checked under live fluoroscopy. Rechecked w/ static image and contrast was still present in the epidural space. Neg aspiration for csf.. After the procedure pt reported profound numbness. Pt was helped to chair. Shortly after legs started to feel weak. Pt LE strength 2/5 but had sensory level to T12. Concern for subdural/intrathecal injection. Pt hemodynamically stable. Pt cont normal mentation. Plan to cont to observe until strength returns. Approx 4hrs later strength 3    Operators: Alhaji Greenwood MD     Indications: Pt stopped anticoagulation  Vincent Mishra is a 77 year old male.  The patient has a history of lumbar radiculopathy s/p prior lumbar fusion. The pain mainly radiates into the groin L>R. Examination shows + SLR, + FADIR/JENS on the left. He has tried conservative treatment including PHYSICAL THERAPY /meds.    MRI          IMPRESSION:  1. L2-L3 degenerative disc and facet disease with moderate central  canal and severe bilateral neural foraminal stenosis. Findings have  progressed since the prior exam.  2. Mild central and severe bilateral neural foraminal stenosis at  L3-L4 secondary to degenerative disc disease and facet disease. There  is also an intradiscal metallic device which could be a fusion device  or disc replacement. This level is stable.  3. Interval decrease in small right paramedian T12-L1 disc protrusion.  There is no stenosis at this level.  4. Solid posterior fusion from L4 to S1.     Options/alternatives, benefits and risks were discussed with the patient including but not limited to bleeding, infection, no pain relief, tissue trauma, exposure to radiation, reaction to medications, spinal cord injury, dural puncture, weakness, numbness and headache.  Questions  were answered to his satisfaction and he wishes to proceed. Voluntary informed consent was obtained and signed.     Vitals were reviewed: Yes  Allergies were reviewed:  Yes   Medications were reviewed:  Yes   Pre-procedure pain score: 8/10    Procedure:  The patient's medical history, medications, and allergies were reviewed and reconciled.  After obtaining signed informed consent, the patient was brought into the procedure suite and was placed in a prone position on the procedure table.   A Pause for the Cause was performed.  Patient was prepped and draped in the usual sterile fashion.     The L2-3 interspace was identified with AP fluoroscopy.  A total of 8ml of 1% lidocaine was used to anesthetize the skin and subcutaneous tissues for a  midline approach.    A 20gauge 3.5inch Touhy needle was advanced utilizing intermittent AP and Lateral fluoroscopy and air for loss of resistance.  The epidural space was encountered on the first pass without difficulties.  Aspiration for blood and CSF was negative.  Needle position was verified by injecting 0.5 ml of Omnipaque utilizing real-time fluoroscopy that showed good needle placement and epidural spread without signs of intravascular or intrathecal uptake.  Omnipaque wasted:  9.5 ml.    Then, after repeated negative aspiration for blood or CSF, a combination of Kenalog 40 mg, Bupivicaine 0.25% 2 ml, diluted with 3 ml of normal saline to a total injectate volume of 6 ml was injected into the epidural space in a slow and incremental fashion and the needle was restyletted and withdrawn.  All injected medications were preservative free.    The injection site was cleaned and a sterile dressing was applied.    Images were saved to PACS.    Post-procedure pain score: 0/10  Follow-up includes:   -f/u call from Dr. Greenwood [facility] instructed that if weakness progresses or unable to void to go to the ED  -f/u with referring provider     lAhaji Greenwood MD  [facility]        Pt was called at night and this am. Pt reports complete return of motor function. The pt reports voiding without issue. He reports having a great night sleep last night. The pt reports no pain in his groin at this time.

## 2017-11-02 NOTE — PATIENT INSTRUCTIONS
Dr. Greenwood will talk w/ you tomorrow morning.  Do NOT restart your plavix until he talks with you.    Go the the emergency if the following occurs:  -your weakness worsens  -if you are unable to urinate in 24 hours      [facility]   Procedure Discharge Instructions    Today you saw: Dr. Alhaji Greenwood    You had an:  Epidural steroid injection   -lumbar     Medications used:  Lidocaine   Bupivacaine   Dexamethasone  Omnipaque  Ropivicaine          Be cautious when walking. Numbness and/or weakness in the lower extremities may occur for up to 6-8 hours after the procedure due to effect of the local anesthetic    Do not drive for 6 hours. The effect of the local anesthetic could slow your reflexes.     You may resume your regular activities after 24 hours    Avoid strenuous activity for the first 24 hours    You may shower, however avoid swimming, tub baths or hot tubs for 24 hours following your procedure    You may have a mild to moderate increase in pain for several days following the injection.    It may take up to 14 days for the steroid medication to start working although you may feel the effect as early as a few days after the procedure.       You may use ice packs for 10-15 minutes, 3 to 4 times a day at the injection site for comfort    Do not use heat to painful areas for 6 to 8 hours. This will give the local anesthetic time to wear off and prevent you from accidentally burning your skin.     You may use anti-inflammatory medications (such as Ibuprofen or Aleve or Advil) or Tylenol for pain control if necessary    If you were fasting, you may resume your normal diet and medications after the procedure    If you have diabetes, check your blood sugar more frequently than usual as your blood sugar may be higher than normal for 10-14 days following a steroid injection. Contact your doctor who manages your diabetes if your blood sugar is higher than usual    If you experience any of the  following, call the pain center nursing line during work hours at [phone] or the after hours provider line at [phone]:  -Fever over 100 degree F  -Swelling, bleeding, redness, drainage, warmth at the injection site  -Progressive weakness or numbness in your legs or arms  -Loss of bowel or bladder function  -Unusual headache that is not relieved by Tylenol  -Unusual new onset of pain that is not improving      Phone #s:  Appointment line: [phone];  Nurse line: [phone]

## 2017-11-06 DIAGNOSIS — M17.0 PRIMARY OSTEOARTHRITIS OF BOTH KNEES: ICD-10-CM

## 2017-11-07 RX ORDER — IBUPROFEN 800 MG/1
TABLET, FILM COATED ORAL
Qty: 90 TABLET | Refills: 0 | Status: SHIPPED | OUTPATIENT
Start: 2017-11-07 | End: 2017-12-11

## 2017-12-07 DIAGNOSIS — M17.0 PRIMARY OSTEOARTHRITIS OF BOTH KNEES: ICD-10-CM

## 2017-12-07 RX ORDER — IBUPROFEN 800 MG/1
TABLET, FILM COATED ORAL
Qty: 90 TABLET | Refills: 0 | Status: SHIPPED | OUTPATIENT
Start: 2017-12-07 | End: 2018-01-08

## 2017-12-07 NOTE — TELEPHONE ENCOUNTER
Prescription approved per [facility] Refill Protocol.    Angeles RAMACHANDRAN RN

## 2017-12-08 DIAGNOSIS — E11.40 TYPE 2 DIABETES MELLITUS WITH DIABETIC NEUROPATHY (H): ICD-10-CM

## 2017-12-08 DIAGNOSIS — M96.1 FAILED BACK SURGICAL SYNDROME: ICD-10-CM

## 2017-12-08 RX ORDER — GABAPENTIN 100 MG/1
CAPSULE ORAL
Qty: 270 CAPSULE | Refills: 0 | OUTPATIENT
Start: 2017-12-08

## 2017-12-08 NOTE — TELEPHONE ENCOUNTER
Pt has been off this medication since September.  Was to follow-up in clinic with Dr. Fonseca 4 weeks after 10/30 appointment.  Scheduled pt to be seen in clinic on Monday 12/11/17 at 11:30.  Will decline this refill at this time so that pt can discuss with Dr. Fonseca at appointment.    Angeles RAMACHANDRAN RN

## 2017-12-11 ENCOUNTER — OFFICE VISIT (OUTPATIENT)
Dept: FAMILY MEDICINE | Facility: CLINIC | Age: 77
End: 2017-12-11
Payer: MEDICARE

## 2017-12-11 VITALS
SYSTOLIC BLOOD PRESSURE: 120 MMHG | TEMPERATURE: 98.8 F | BODY MASS INDEX: 29.84 KG/M2 | HEIGHT: 68 IN | OXYGEN SATURATION: 96 % | HEART RATE: 87 BPM | DIASTOLIC BLOOD PRESSURE: 62 MMHG | WEIGHT: 196.9 LBS

## 2017-12-11 DIAGNOSIS — M96.1 FAILED BACK SURGICAL SYNDROME: ICD-10-CM

## 2017-12-11 DIAGNOSIS — E11.42 PERIPHERAL SENSORY NEUROPATHY DUE TO TYPE 2 DIABETES MELLITUS (H): ICD-10-CM

## 2017-12-11 DIAGNOSIS — F33.41 RECURRENT MAJOR DEPRESSIVE DISORDER, IN PARTIAL REMISSION (H): Primary | ICD-10-CM

## 2017-12-11 DIAGNOSIS — M25.551 HIP PAIN, BILATERAL: ICD-10-CM

## 2017-12-11 DIAGNOSIS — F32.1 MODERATE MAJOR DEPRESSION (H): ICD-10-CM

## 2017-12-11 DIAGNOSIS — M25.552 HIP PAIN, BILATERAL: ICD-10-CM

## 2017-12-11 DIAGNOSIS — G89.29 CHRONIC BILATERAL LOW BACK PAIN WITH SCIATICA, SCIATICA LATERALITY UNSPECIFIED: ICD-10-CM

## 2017-12-11 DIAGNOSIS — M54.16 LUMBAR BACK PAIN WITH RADICULOPATHY AFFECTING LEFT LOWER EXTREMITY: ICD-10-CM

## 2017-12-11 DIAGNOSIS — M54.40 CHRONIC BILATERAL LOW BACK PAIN WITH SCIATICA, SCIATICA LATERALITY UNSPECIFIED: ICD-10-CM

## 2017-12-11 PROCEDURE — 99214 OFFICE O/P EST MOD 30 MIN: CPT | Performed by: FAMILY MEDICINE

## 2017-12-11 RX ORDER — CALCIUM CARBONATE 300MG(750)
10 TABLET,CHEWABLE ORAL AT BEDTIME
Qty: 90 TABLET | Status: ON HOLD | COMMUNITY
Start: 2017-12-11 | End: 2019-01-01

## 2017-12-11 RX ORDER — DULOXETIN HYDROCHLORIDE 60 MG/1
60 CAPSULE, DELAYED RELEASE ORAL
Qty: 30 CAPSULE | Refills: 1 | Status: SHIPPED | OUTPATIENT
Start: 2017-12-11 | End: 2018-01-08

## 2017-12-11 RX ORDER — DULOXETIN HYDROCHLORIDE 30 MG/1
CAPSULE, DELAYED RELEASE ORAL
Qty: 90 CAPSULE | Refills: 1 | Status: SHIPPED | OUTPATIENT
Start: 2017-12-11 | End: 2018-02-20

## 2017-12-11 RX ORDER — GABAPENTIN 100 MG/1
CAPSULE ORAL
Qty: 270 CAPSULE | Refills: 3 | Status: CANCELLED | OUTPATIENT
Start: 2017-12-11

## 2017-12-11 RX ORDER — GABAPENTIN 100 MG/1
CAPSULE ORAL
Qty: 270 CAPSULE | Refills: 3 | Status: SHIPPED | OUTPATIENT
Start: 2017-12-11 | End: 2018-05-06

## 2017-12-11 NOTE — MR AVS SNAPSHOT
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      Who to contact     Normal or non-critical lab and imaging results will be communicated to you by [employer], letter or phone within 4 business days after the clinic has received the results. If you do not hear from us within 7 days, please contact the clinic through [employer] or phone. If you have a critical or abnormal lab result, we will notify you by phone as soon as possible.  Submit refill requests through [employer] or call your pharmacy and they will forward the refill request to us. Please allow 3 business days for your refill to be completed.          If you need to speak with a  for additional information , please call: [phone]             Additional Information About Your Visit        [employer] Information     [employer] gives you secure access to your electronic health record. If you see a primary care provider, you can also send messages to your care team and make appointments. If you have questions, please call your primary care clinic.  If you do not have a primary care provider, please call [phone] and they will assist you.        Care EveryWhere ID     This is your  "Care EveryWhere ID. This could be used by other organizations to access your [facility] medical records  [id number]        Your Vitals Were     Pulse Temperature Height Pulse Oximetry BMI (Body Mass Index)       87 98.8  F (37.1  C) (Tympanic) 5' 8\" (1.727 m) 96% 29.94 kg/m2        Blood Pressure from Last 3 Encounters:   12/11/17 120/62   11/02/17 (P) 114/69   10/30/17 120/66    Weight from Last 3 Encounters:   12/11/17 196 lb 14.4 oz (89.3 kg)   10/30/17 196 lb (88.9 kg)   10/03/17 200 lb 12.8 oz (91.1 kg)              Today, you had the following     No orders found for display         Today's Medication Changes          These changes are accurate as of: 12/11/17  2:12 PM.  If you have any questions, ask your nurse or doctor.               These medicines have changed or have updated prescriptions.        Dose/Directions    * DULoxetine 60 MG EC capsule   Commonly known as:  CYMBALTA   This may have changed:  You were already taking a medication with the same name, and this prescription was added. Make sure you understand how and when to take each.   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), Failed back surgical syndrome, Recurrent major depressive disorder, in partial remission (H)   Changed by:  Keyla Fonseca MD        Dose:  60 mg   Take 1 capsule (60 mg) by mouth daily (with breakfast)   Quantity:  30 capsule   Refills:  1       * DULoxetine 30 MG EC capsule   Commonly known as:  CYMBALTA   This may have changed:  additional instructions   Used for:  Hip pain, bilateral, Chronic bilateral low back pain with sciatica, sciatica laterality unspecified, Lumbar back pain with radiculopathy affecting left lower extremity, Moderate major depression (H)   Changed by:  Keyla Fonseca MD        Take 1 capsule by mouth daily at dinnertime   Quantity:  90 capsule   Refills:  1       gabapentin 100 MG capsule   Commonly known as:  NEURONTIN   This may have changed:    - how much " to take  - how to take this  - when to take this  - additional instructions   Used for:  Failed back surgical syndrome, 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 Read the directions carefully, and ask your doctor or other care provider to review them with you.         Where to get your medicines      These medications were sent to [id number] - [place] [id number] [place]  AT [address] [facility] [address]     Phone:  [phone]     DULoxetine 30 MG EC capsule    DULoxetine 60 MG EC capsule    gabapentin 100 MG capsule                Primary Care Provider Office Phone # Fax #    Keyla Fonseca [phone] [address] [facility] [address]        Equal Access to Services     OVI ZULETA AH: Hadii rosa ku hadasho Soomaali, waaxda luqadaha, qaybta kaalmada adeegyada, waxay idiin hayaan deena lorenzo . So [facility] [phone].    ATENCIÓN: Si habla español, tiene a nicholas disposición servicios gratuitos de asistencia lingüística. Llame al [phone].    We comply with applicable federal civil rights laws and [place] laws. We do not discriminate on the basis of race, color, national origin, age, disability, sex, sexual orientation, or gender identity.            Thank you!     Thank you for choosing [facility]  for your care. Our goal is always to provide you with excellent care. Hearing back from our patients is one way we can continue to improve our services. Please take a few minutes to complete the written survey that you may receive in the mail after your visit with us. Thank you!             Your Updated Medication List - Protect others around you: Learn how to  safely use, store and throw away your medicines at www.disposemymeds.org.          This list is accurate as of: 12/11/17  2:12 PM.  Always use your most recent med list.                   Brand Name Dispense Instructions for use Diagnosis    acetaminophen 325 MG tablet    TYLENOL    250 tablet    Take 2 tablets by mouth every 4 hours as needed.    Tear of meniscus of left knee       amLODIPine 10 MG tablet    NORVASC    90 tablet    TAKE 1 TABLET(10 MG) BY MOUTH DAILY    Essential hypertension with goal blood pressure less than 140/90       ascorbic acid 500 MG tablet    VITAMIN C     Take 500 mg by mouth daily        aspirin 81 MG EC tablet      Take 1 tablet (81 mg) by mouth daily    Chest pain, unspecified type, NSTEMI (non-ST elevated myocardial infarction) (H)       atorvastatin 80 MG tablet    LIPITOR    90 tablet    Take 1 tablet (80 mg) by mouth daily    Hyperlipidemia LDL goal <100       blood glucose monitoring meter device kit    no brand specified    1 kit    Use to test blood sugar 2 times daily or as directed.    Type 2 diabetes mellitus with hyperglycemia, without long-term current use of insulin (H)       blood glucose monitoring test strip    ONETOUCH ULTRA    200 strip    Use to test blood sugars 2 times daily or as directed.    Type 2 diabetes mellitus with other circulatory complications       busPIRone 15 MG tablet    BUSPAR    180 tablet    TAKE 1 TABLET(15 MG) BY MOUTH TWICE DAILY    Depression with anxiety       calcium + D 600-200 MG-UNIT Tabs   Generic drug:  calcium carbonate-vitamin D     100 tablet    Take 2 tablets by mouth daily.        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), Failed back surgical syndrome, Recurrent major depressive disorder, in partial remission (H)       * DULoxetine 30 MG EC capsule    CYMBALTA    90 capsule    Take 1 capsule by mouth daily at dinnertime    Hip pain, bilateral, Chronic bilateral low back pain with sciatica, sciatica laterality unspecified, Lumbar back pain with radiculopathy affecting left lower extremity, Moderate major depression (H)       gabapentin 100 MG capsule    NEURONTIN    270 capsule    Take 1 tablet increase by 1 tablet every 2 days to 6 tablets 3 times per day    Failed back surgical syndrome, 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    Type 2 diabetes, HbA1c goal < 7% (H)       lisinopril 40 MG tablet    PRINIVIL/ZESTRIL    90 tablet    TAKE 1 TABLET(40 MG) BY MOUTH DAILY    Essential hypertension with goal blood pressure less than 140/90       Melatonin 10 MG Tbdp     90 tablet    Take 10 mg by mouth At Bedtime        metFORMIN 500 MG 24 hr tablet    GLUCOPHAGE-XR    360 tablet    TAKE 2 TABLETS BY MOUTH TWICE DAILY WITH MEALS.    Type 2 diabetes mellitus without complication, unspecified long term insulin use status (H)       nitroGLYcerin 0.4 MG sublingual tablet    NITROSTAT    25 tablet    Place 1 tablet (0.4 mg) under the tongue every 5 minutes as needed for chest pain    CAD (coronary  artery disease)       omeprazole 20 MG CR capsule    priLOSEC    180 capsule    TAKE ONE CAPSULE BY MOUTH TWICE DAILY    Gastroesophageal reflux disease without esophagitis       * order for DME     1 Device    Equipment being ordered: Wheelchair motorized for patient with spinal stenosis and neurogenic claudication    Spinal stenosis, lumbar region, with neurogenic claudication       * order for DME     1 Units    Equipment being ordered: TENS    Chronic bilateral low back pain with sciatica, sciatica laterality unspecified, Hip pain, bilateral       * oxyCODONE 30 MG 12 hr tablet   Start taking on:  12/26/2017    OxyCONTIN    30 tablet    Take 1 tablet (30 mg) by mouth every morning    Hip pain, bilateral, Chronic bilateral low back pain with sciatica, sciatica laterality unspecified       * oxyCODONE IR 5 MG tablet   Start taking on:  12/26/2017    ROXICODONE    45 tablet    Take 1 daily as needed  for severe pain may have 15 per month this is a 3 month supply    Lumbar back pain with radiculopathy affecting left lower extremity       SELECT-LITE DEVICE/LANCETS Kit     1 kit    1 kit 2 times daily    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 Read the directions carefully, and ask your doctor or other care provider to review them with you.

## 2017-12-11 NOTE — PROGRESS NOTES
SUBJECTIVE:                                                    Vincent Mishra is a 77 year old male who presents to clinic today for the following health issues:        Problem list and histories reviewed & adjusted, as indicated.  Additional history: Still just not doing anything just sitting around doing nothing . Depressed and not doing anything he does not even walk out to the kitchen of note his wife Sheyla has been having daily IV infusions of antibiotics which has been limiting her ability to do some things.  He reports that his depression is worse but according to Sheyla he actually seems a little bit better while he is in the room he is not wincing as much in pain and does not have as many spasms as he generally would have had.  He did get several weeks of relief earlier this summer when he had the spine injection for which.  He was thankful.  He understands that he is the only one who can motivate himself to get up and get moving.  He had not been taking the gabapentin at all for some reason this has not been filled in a while I think he would benefit especially with the sharp pain that he gets if we can slowly titrate this or allow him to have some of that as needed so he can have something to use for some of the nerve pain.  The duloxetine appears to be working for the back pain he is currently taking 60 mg a day and to 30 mg divided doses with no side effects that can be seen.  He does complain of knee pain from his osteoarthritis but this has improved  some.  His son got rid of 1 of his walkers and he is currently not using the other one because he does not like it.    Patient Active Problem List   Diagnosis     Tension headache     Trapezius strain     Hyperlipidemia LDL goal <100     Parotid mass     Diplopia     Neuropathy     Vision loss night     Neck pain     B12 deficiency     Tear of meniscus of left knee     Hypertension goal BP (blood pressure) < 140/90     C6-7 disc with radiculopathy     Right  bundle branch block     Advanced directives, info letter sent 10/4/2011     Chest pain     Anatomic airway obstruction     Abnormal antinuclear antibody titer     Osteoarthritis, knee     Moderate major depression (H)     Orchitis, epididymitis, and epididymo-orchitis     Malignant neoplasm of kidney excluding renal pelvis (H)     Renal mass, left     Vitamin D deficiency disease     Health Care Home     Insomnia     Chronic low back pain     Abdominal pain, right upper quadrant     Esophageal reflux     Hard of hearing     Constipation     NSTEMI (non-ST elevated myocardial infarction) (H)     Myocardial infarction, nontransmural (H)     Acute myocardial infarction of inferolateral wall (H)     Obesity     Sinoatrial node dysfunction (H)     Right bundle branch block (RBBB)     Essential hypertension     Hyperlipidemia     Type 2 diabetes mellitus with other circulatory complications     Thyroid nodule     Hip pain, bilateral     Claudication (H)     Bilateral low back pain with right-sided sciatica     Bilateral low back pain with left-sided sciatica     ACS (acute coronary syndrome) (H)     Chronic bilateral low back pain with sciatica, sciatica laterality unspecified     Past Surgical History:   Procedure Laterality Date     ARTHROSCOPY KNEE  2/11/2011    ARTHROSCOPY KNEE performed by LEY, JEFFREY DUANE at [facility]     ARTHROSCOPY KNEE WITH MEDIAL MENISCECTOMY  6/26/2012    Procedure: ARTHROSCOPY KNEE WITH MEDIAL MENISCECTOMY;  Right Knee Arthroscopy With Medial Menisectomy;  Surgeon: Ley, Jeffrey Duane, MD;  Location: [facility]     BACK SURGERY      back surgery x4     BIOPSY       CARDIAC SURGERY  7/2011    stentt placed     COLONOSCOPY       CORONARY ANGIOGRAPHY ADULT ORDER  07-25-14    RCA, L.Main and CFX  had minor luminal irregularites. Mid LAD high grade stenosis 80-90%.Stent placed to mid LAD     ESOPHAGOSCOPY, GASTROSCOPY, DUODENOSCOPY (EGD), COMBINED  10/25/2012    Procedure: COMBINED ESOPHAGOSCOPY,  "GASTROSCOPY, DUODENOSCOPY (EGD), BIOPSY SINGLE OR MULTIPLE;  Gastroscopy  ;  Surgeon: Lucius Dasilva MD;  Location: [facility]     HEART CATH, ANGIOPLASTY  07-25-14    mid LAD      ORTHOPEDIC SURGERY       back surgery       Social History   Substance Use Topics     Smoking status: Never Smoker     Smokeless tobacco: Never Used     Alcohol use No     Family History   Problem Relation Age of Onset     CANCER Mother      Depression Mother      DIABETES Father      Hypertension Father      HEART DISEASE Father      MENTAL ILLNESS Father      HEART DISEASE Maternal Grandfather      Substance Abuse Maternal Grandfather      Alcohol/Drug Paternal Grandmother      Substance Abuse Paternal Grandmother      HEART DISEASE Paternal Grandfather      Alcohol/Drug Paternal Grandfather      Substance Abuse Paternal Grandfather      HEART DISEASE Brother      DIABETES Brother      Substance Abuse Brother      Obesity Brother      DIABETES Brother      Obesity Sister      Alcohol/Drug Daughter      Depression Daughter      Obesity Sister      DIABETES Sister      Obesity Sister      DIABETES Sister      Alcohol/Drug Sister      CANCER Sister 55     possible kidney cancer     Substance Abuse Sister      Alcohol/Drug Son      Substance Abuse Son      DIABETES Son      Alcohol/Drug Son      Substance Abuse Son      Obesity Daughter      DIABETES Daughter      Hypertension Daughter      Bipolar Disorder Other              ROS:  Constitutional, HEENT, cardiovascular, pulmonary, gi and gu systems are negative, except as otherwise noted.      OBJECTIVE:                                                    /62 (BP Location: Right arm, Patient Position: Chair, Cuff Size: Adult Regular)  Pulse 87  Temp 98.8  F (37.1  C) (Tympanic)  Ht 5' 8\" (1.727 m)  Wt 196 lb 14.4 oz (89.3 kg)  SpO2 96%  BMI 29.94 kg/m2 Body mass index is 29.94 kg/(m^2).   GENERAL APPEARANCE: healthy, alert and no distress  MS: arthritic changes of the knees and low " back   ORTHO: Lumber/Thoracic Spine Exam: Tender:  left para lumbar muscles, left SI joint, left sciatic notch  Non-tender:  Spinous processes   Range of Motion:  lumbar flexion  painful, lumbar extension  decreased, painful, left lateral lumbar bending  decreased, painful, right lateral lumbar bending  decreased, painful, left lateral lumbar rotation  decreased, painful, right lateral lumbar rotation  decreased, painful  Strength:  unable to heel walk, unable to toe walk  Special tests:  positive left straight leg raise          SKIN: no suspicious lesions or rashes       ASSESSMENT/PLAN:                                                      1. Failed back surgical syndrome    - DULoxetine (CYMBALTA) 60 MG EC capsule; Take 1 capsule (60 mg) by mouth daily (with breakfast)  Dispense: 30 capsule; Refill: 1  - gabapentin (NEURONTIN) 100 MG capsule; Take 1 tablet increase by 1 tablet every 2 days to 6 tablets 3 times per day  Dispense: 270 capsule; Refill: 3    2. 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 Recurrent major depressive disorder, in partial remission (H)    - DULoxetine (CYMBALTA) 60 MG EC capsule; Take 1 capsule (60 mg) by mouth daily (with breakfast)  Dispense: 30 capsule; Refill: 1    4. Hip pain, bilateral    - DULoxetine (CYMBALTA) 30 MG EC capsule; Take 1 capsule by mouth daily at dinnertime  Dispense: 90 capsule; Refill: 1    5. Chronic bilateral low back pain with sciatica, sciatica laterality unspecified    - DULoxetine (CYMBALTA) 30 MG EC capsule; Take 1 capsule by mouth daily at dinnertime  Dispense: 90 capsule; Refill: 1    6. Lumbar back pain with radiculopathy affecting left lower  extremity    - DULoxetine (CYMBALTA) 30 MG EC capsule; Take 1 capsule by mouth daily at dinnertime  Dispense: 90 capsule; Refill: 1    7. Moderate major depression (H)    - DULoxetine (CYMBALTA) 30 MG EC capsule; Take 1 capsule by mouth daily at dinnertime  Dispense: 90 capsule; Refill: 1  Increase the duloxetine to 60 mg in the morning 30 mg with dinner call with side effects unlikely because he is already taking also restart the gabapentin he can use anywhere from 1-200 mg a day maximum would be 1800 mg a day probably would settle somewhere more around 600-900 mg dosing most likely like to recheck in 6 weeks or so sooner if doing worse.   reports that he has never smoked. He has never used smokeless tobacco.          Keyla Fonseca M.D.  [facility]

## 2017-12-22 DIAGNOSIS — E11.9 TYPE 2 DIABETES MELLITUS WITHOUT COMPLICATION, UNSPECIFIED LONG TERM INSULIN USE STATUS: ICD-10-CM

## 2017-12-22 DIAGNOSIS — K21.9 GASTROESOPHAGEAL REFLUX DISEASE WITHOUT ESOPHAGITIS: ICD-10-CM

## 2017-12-22 DIAGNOSIS — I10 ESSENTIAL HYPERTENSION WITH GOAL BLOOD PRESSURE LESS THAN 140/90: ICD-10-CM

## 2017-12-26 ENCOUNTER — TELEPHONE (OUTPATIENT)
Dept: FAMILY MEDICINE | Facility: CLINIC | Age: 77
End: 2017-12-26

## 2017-12-26 DIAGNOSIS — R10.32 DEEP INGUINAL PAIN, LEFT: Primary | ICD-10-CM

## 2017-12-26 DIAGNOSIS — M54.42 CHRONIC BILATERAL LOW BACK PAIN WITH LEFT-SIDED SCIATICA: ICD-10-CM

## 2017-12-26 DIAGNOSIS — F41.8 DEPRESSION WITH ANXIETY: ICD-10-CM

## 2017-12-26 DIAGNOSIS — F43.21 ADJUSTMENT DISORDER WITH DEPRESSED MOOD: ICD-10-CM

## 2017-12-26 DIAGNOSIS — I10 ESSENTIAL HYPERTENSION WITH GOAL BLOOD PRESSURE LESS THAN 140/90: ICD-10-CM

## 2017-12-26 DIAGNOSIS — G89.29 CHRONIC BILATERAL LOW BACK PAIN WITH LEFT-SIDED SCIATICA: ICD-10-CM

## 2017-12-26 RX ORDER — BUSPIRONE HYDROCHLORIDE 15 MG/1
TABLET ORAL
Qty: 180 TABLET | Refills: 0 | Status: SHIPPED | OUTPATIENT
Start: 2017-12-26 | End: 2018-03-29

## 2017-12-26 RX ORDER — HYDROCHLOROTHIAZIDE 25 MG/1
TABLET ORAL
Qty: 90 TABLET | Refills: 0 | Status: SHIPPED | OUTPATIENT
Start: 2017-12-26 | End: 2018-03-28

## 2017-12-26 RX ORDER — BUPROPION HYDROCHLORIDE 100 MG/1
TABLET ORAL
Qty: 180 TABLET | Refills: 0 | Status: SHIPPED | OUTPATIENT
Start: 2017-12-26 | End: 2018-03-29

## 2017-12-26 RX ORDER — LISINOPRIL 40 MG/1
TABLET ORAL
Qty: 90 TABLET | Refills: 0 | Status: SHIPPED | OUTPATIENT
Start: 2017-12-26 | End: 2018-03-28

## 2017-12-26 RX ORDER — METFORMIN HCL 500 MG
TABLET, EXTENDED RELEASE 24 HR ORAL
Qty: 360 TABLET | Refills: 0 | Status: SHIPPED | OUTPATIENT
Start: 2017-12-26 | End: 2018-03-30

## 2017-12-27 ENCOUNTER — TELEPHONE (OUTPATIENT)
Dept: FAMILY MEDICINE | Facility: CLINIC | Age: 77
End: 2017-12-27

## 2017-12-27 DIAGNOSIS — F33.1 MODERATE EPISODE OF RECURRENT MAJOR DEPRESSIVE DISORDER (H): Primary | ICD-10-CM

## 2017-12-27 NOTE — TELEPHONE ENCOUNTER
Patient's wife, Sheyla, notified of provider's recommendations  She verbalized understanding  She believes that Cymbalta is a capsule, she will verify when she gets home and let the clinic know  Phone number given for palliative care referral    Corrina TREJO Rn

## 2017-12-27 NOTE — TELEPHONE ENCOUNTER
Will refill patient CR has been stable. Keyla Fonseca M.D.  Could you please call and see how he is doing on the wellbutrin? Is it helping him do I need to increase or decrease it ? Is he getting up  And moving ? How is his pain level/ Keyla Fonseca M.D.

## 2017-12-27 NOTE — TELEPHONE ENCOUNTER
Spoke with patient's wife:  She reports the patient is doing terrible, he is depressed, sits on side of bed an cries  He cannot sleep  He does not get up and move   His pain is in his groin, it is intermittent. When he is up and moving pain is a 10, when lying down pain is 3/10  Back pain radiates to groin, his legs are weak    Routing to provider.    Corrina TREJO Rn

## 2017-12-27 NOTE — TELEPHONE ENCOUNTER
Is he on the 30 mg of duloxetine still? If he is then he can stay on this. I would like him to increase the sertraline back up to 200 mg though. I already sent in the palliative care referral. Keyla Fonseca M.D.;

## 2017-12-27 NOTE — TELEPHONE ENCOUNTER
Please apologize to hi . I can't remember when he was done weaning off of this. I am not sure whether he was better on or off the sertraline but I think he should try going back on this. I would like to have him restart this and I sent this in to the pharmacy for him. Keyla Fonseac M.D.;

## 2017-12-27 NOTE — TELEPHONE ENCOUNTER
Notified patient's wife, Pat, provider would like patient to take sertraline, rx sent to pharmacy  Patient to continue taking Cymbalta 30 mg a day, fill Rx for Buspirone, Bupropion and take as prescribed  Call palliative care an make appt for patient  Patient's wife verbalized understanding and agreed with provider's plan    Corrina TREJO Rn

## 2017-12-27 NOTE — TELEPHONE ENCOUNTER
Patient's wife, Sheyla' reports that Cymbalta is a capsule and the pharmacy reports it does not come in tablet form  She will be unable to cut the dose in half  Please advise on new RX or alternate medication?    Routing to provider.    Corrina TREJO Rn

## 2017-12-27 NOTE — TELEPHONE ENCOUNTER
We decreased his sertraline and he probably needs to go back up on that.whatever he is on have him double that and go down to half of the cymbalta dose.  I would like him to go back to the pain management people. I don't know how else to help him and his pain is severe. Or I can refer him to palliative care. I don't think I can fix his pain but maybe palliative care can help make it better. I am making the palliative care referral right now. Keyla Fonseca M.D.'

## 2018-01-01 ENCOUNTER — APPOINTMENT (OUTPATIENT)
Dept: PHYSICAL THERAPY | Facility: CLINIC | Age: 78
DRG: 699 | End: 2018-01-01
Attending: NURSE PRACTITIONER
Payer: MEDICARE

## 2018-01-01 ENCOUNTER — PATIENT OUTREACH (OUTPATIENT)
Dept: CARE COORDINATION | Facility: CLINIC | Age: 78
End: 2018-01-01

## 2018-01-01 ENCOUNTER — TELEPHONE (OUTPATIENT)
Dept: CARDIOLOGY | Facility: CLINIC | Age: 78
End: 2018-01-01

## 2018-01-01 ENCOUNTER — APPOINTMENT (OUTPATIENT)
Dept: INTERVENTIONAL RADIOLOGY/VASCULAR | Facility: CLINIC | Age: 78
End: 2018-01-01
Attending: NURSE PRACTITIONER
Payer: MEDICARE

## 2018-01-01 ENCOUNTER — OFFICE VISIT (OUTPATIENT)
Dept: FAMILY MEDICINE | Facility: CLINIC | Age: 78
End: 2018-01-01
Payer: COMMERCIAL

## 2018-01-01 ENCOUNTER — OFFICE VISIT (OUTPATIENT)
Dept: SURGERY | Facility: CLINIC | Age: 78
End: 2018-01-01
Payer: COMMERCIAL

## 2018-01-01 ENCOUNTER — TELEPHONE (OUTPATIENT)
Dept: FAMILY MEDICINE | Facility: CLINIC | Age: 78
End: 2018-01-01

## 2018-01-01 ENCOUNTER — HOSPITAL ENCOUNTER (EMERGENCY)
Facility: CLINIC | Age: 78
Discharge: ANOTHER HOSPITAL | End: 2018-12-11
Attending: PHYSICIAN ASSISTANT | Admitting: PHYSICIAN ASSISTANT
Payer: MEDICARE

## 2018-01-01 ENCOUNTER — TELEPHONE (OUTPATIENT)
Dept: UROLOGY | Facility: CLINIC | Age: 78
End: 2018-01-01

## 2018-01-01 ENCOUNTER — HOSPITAL ENCOUNTER (OUTPATIENT)
Dept: ULTRASOUND IMAGING | Facility: CLINIC | Age: 78
Discharge: HOME | End: 2018-11-19
Attending: FAMILY MEDICINE | Admitting: FAMILY MEDICINE
Payer: MEDICARE

## 2018-01-01 ENCOUNTER — HOSPITAL ENCOUNTER (OUTPATIENT)
Facility: CLINIC | Age: 78
Discharge: HOME | End: 2018-12-04
Attending: RADIOLOGY | Admitting: RADIOLOGY
Payer: MEDICARE

## 2018-01-01 ENCOUNTER — APPOINTMENT (OUTPATIENT)
Dept: INTERVENTIONAL RADIOLOGY/VASCULAR | Facility: CLINIC | Age: 78
End: 2018-01-01
Attending: UROLOGY
Payer: MEDICARE

## 2018-01-01 ENCOUNTER — ANESTHESIA (OUTPATIENT)
Dept: SURGERY | Facility: CLINIC | Age: 78
End: 2018-01-01
Payer: MEDICARE

## 2018-01-01 ENCOUNTER — OFFICE VISIT (OUTPATIENT)
Dept: CARDIOLOGY | Facility: CLINIC | Age: 78
End: 2018-01-01
Attending: INTERNAL MEDICINE
Payer: COMMERCIAL

## 2018-01-01 ENCOUNTER — ANESTHESIA EVENT (OUTPATIENT)
Dept: SURGERY | Facility: CLINIC | Age: 78
End: 2018-01-01
Payer: MEDICARE

## 2018-01-01 ENCOUNTER — APPOINTMENT (OUTPATIENT)
Dept: CT IMAGING | Facility: CLINIC | Age: 78
End: 2018-01-01
Attending: PHYSICIAN ASSISTANT
Payer: MEDICARE

## 2018-01-01 ENCOUNTER — HOSPITAL ENCOUNTER (INPATIENT)
Facility: CLINIC | Age: 78
LOS: 1 days | Discharge: HOME | DRG: 699 | End: 2018-12-12
Attending: EMERGENCY MEDICINE | Admitting: SURGERY
Payer: MEDICARE

## 2018-01-01 ENCOUNTER — PRE VISIT (OUTPATIENT)
Dept: SURGERY | Facility: CLINIC | Age: 78
End: 2018-01-01

## 2018-01-01 VITALS
TEMPERATURE: 97.8 F | OXYGEN SATURATION: 93 % | SYSTOLIC BLOOD PRESSURE: 143 MMHG | RESPIRATION RATE: 21 BRPM | BODY MASS INDEX: 30.92 KG/M2 | HEIGHT: 68 IN | DIASTOLIC BLOOD PRESSURE: 69 MMHG | WEIGHT: 204 LBS

## 2018-01-01 VITALS
HEIGHT: 68 IN | HEART RATE: 72 BPM | SYSTOLIC BLOOD PRESSURE: 132 MMHG | DIASTOLIC BLOOD PRESSURE: 80 MMHG | TEMPERATURE: 98.9 F | BODY MASS INDEX: 29.1 KG/M2 | WEIGHT: 192 LBS

## 2018-01-01 VITALS
SYSTOLIC BLOOD PRESSURE: 122 MMHG | BODY MASS INDEX: 31.04 KG/M2 | OXYGEN SATURATION: 96 % | HEIGHT: 68 IN | RESPIRATION RATE: 16 BRPM | TEMPERATURE: 98.4 F | WEIGHT: 204.81 LBS | DIASTOLIC BLOOD PRESSURE: 68 MMHG

## 2018-01-01 VITALS
HEART RATE: 63 BPM | OXYGEN SATURATION: 98 % | WEIGHT: 194 LBS | DIASTOLIC BLOOD PRESSURE: 61 MMHG | BODY MASS INDEX: 29.5 KG/M2 | SYSTOLIC BLOOD PRESSURE: 128 MMHG

## 2018-01-01 VITALS
OXYGEN SATURATION: 97 % | HEIGHT: 68 IN | TEMPERATURE: 98.1 F | BODY MASS INDEX: 30.93 KG/M2 | DIASTOLIC BLOOD PRESSURE: 74 MMHG | HEART RATE: 62 BPM | WEIGHT: 204.1 LBS | SYSTOLIC BLOOD PRESSURE: 154 MMHG

## 2018-01-01 VITALS
HEART RATE: 88 BPM | HEIGHT: 68 IN | OXYGEN SATURATION: 96 % | SYSTOLIC BLOOD PRESSURE: 156 MMHG | BODY MASS INDEX: 29.77 KG/M2 | WEIGHT: 196.43 LBS | RESPIRATION RATE: 18 BRPM | DIASTOLIC BLOOD PRESSURE: 86 MMHG | TEMPERATURE: 97.8 F

## 2018-01-01 VITALS
DIASTOLIC BLOOD PRESSURE: 76 MMHG | WEIGHT: 204 LBS | SYSTOLIC BLOOD PRESSURE: 120 MMHG | TEMPERATURE: 98.5 F | HEIGHT: 68 IN | HEART RATE: 76 BPM | BODY MASS INDEX: 30.92 KG/M2

## 2018-01-01 DIAGNOSIS — S22.32XA CLOSED FRACTURE OF ONE RIB OF LEFT SIDE, INITIAL ENCOUNTER: ICD-10-CM

## 2018-01-01 DIAGNOSIS — C64.2: ICD-10-CM

## 2018-01-01 DIAGNOSIS — N28.89 RENAL MASS: ICD-10-CM

## 2018-01-01 DIAGNOSIS — M79.89 OTHER SPECIFIED SOFT TISSUE DISORDERS: ICD-10-CM

## 2018-01-01 DIAGNOSIS — F33.2 SEVERE EPISODE OF RECURRENT MAJOR DEPRESSIVE DISORDER, WITHOUT PSYCHOTIC FEATURES (H): ICD-10-CM

## 2018-01-01 DIAGNOSIS — D50.0 IRON DEFICIENCY ANEMIA DUE TO CHRONIC BLOOD LOSS: Primary | ICD-10-CM

## 2018-01-01 DIAGNOSIS — Z01.818 PREOP EXAMINATION: Primary | ICD-10-CM

## 2018-01-01 DIAGNOSIS — R30.0 DYSURIA: Primary | ICD-10-CM

## 2018-01-01 DIAGNOSIS — M54.16 LUMBAR BACK PAIN WITH RADICULOPATHY AFFECTING LEFT LOWER EXTREMITY: ICD-10-CM

## 2018-01-01 DIAGNOSIS — S83.207A TEAR OF MENISCUS OF LEFT KNEE: ICD-10-CM

## 2018-01-01 DIAGNOSIS — N28.89 RENAL HEMORRHAGE, LEFT: ICD-10-CM

## 2018-01-01 DIAGNOSIS — L03.116 CELLULITIS OF LEFT LOWER EXTREMITY: Primary | ICD-10-CM

## 2018-01-01 DIAGNOSIS — R58 RETROPERITONEAL BLEED: ICD-10-CM

## 2018-01-01 DIAGNOSIS — E11.59 TYPE 2 DIABETES MELLITUS WITH OTHER CIRCULATORY COMPLICATION, WITHOUT LONG-TERM CURRENT USE OF INSULIN (H): ICD-10-CM

## 2018-01-01 DIAGNOSIS — L98.9 LEG SORE: ICD-10-CM

## 2018-01-01 DIAGNOSIS — E78.5 HYPERLIPIDEMIA LDL GOAL <100: ICD-10-CM

## 2018-01-01 DIAGNOSIS — G89.18 ACUTE POST-OPERATIVE PAIN: ICD-10-CM

## 2018-01-01 DIAGNOSIS — S22.42XD CLOSED FRACTURE OF MULTIPLE RIBS OF LEFT SIDE WITH ROUTINE HEALING, SUBSEQUENT ENCOUNTER: Primary | ICD-10-CM

## 2018-01-01 DIAGNOSIS — R30.0 DYSURIA: ICD-10-CM

## 2018-01-01 DIAGNOSIS — N28.89 LEFT RENAL MASS: ICD-10-CM

## 2018-01-01 DIAGNOSIS — S22.42XA CLOSED FRACTURE OF MULTIPLE RIBS OF LEFT SIDE, INITIAL ENCOUNTER: ICD-10-CM

## 2018-01-01 DIAGNOSIS — I25.10 CORONARY ARTERY DISEASE INVOLVING NATIVE CORONARY ARTERY OF NATIVE HEART WITHOUT ANGINA PECTORIS: ICD-10-CM

## 2018-01-01 LAB
ABO + RH BLD: NORMAL
ABO + RH BLD: NORMAL
ALBUMIN SERPL-MCNC: 3 G/DL (ref 3.4–5)
ALBUMIN SERPL-MCNC: 3.1 G/DL (ref 3.4–5)
ALBUMIN UR-MCNC: 10 MG/DL
ALP SERPL-CCNC: 96 U/L (ref 40–150)
ALP SERPL-CCNC: 99 U/L (ref 40–150)
ALT SERPL W P-5'-P-CCNC: 16 U/L (ref 0–70)
ALT SERPL W P-5'-P-CCNC: 19 U/L (ref 0–70)
ANION GAP SERPL CALCULATED.3IONS-SCNC: 8 MMOL/L (ref 3–14)
APPEARANCE UR: CLEAR
APTT PPP: 28 SEC (ref 22–37)
AST SERPL W P-5'-P-CCNC: 22 U/L (ref 0–45)
AST SERPL W P-5'-P-CCNC: 22 U/L (ref 0–45)
BACTERIA SPEC CULT: NORMAL
BASOPHILS # BLD AUTO: 0 10E9/L (ref 0–0.2)
BASOPHILS NFR BLD AUTO: 0.3 %
BILIRUB SERPL-MCNC: 1.2 MG/DL (ref 0.2–1.3)
BILIRUB SERPL-MCNC: 1.2 MG/DL (ref 0.2–1.3)
BILIRUB UR QL STRIP: NEGATIVE
BLD GP AB SCN SERPL QL: NORMAL
BLOOD BANK CMNT PATIENT-IMP: NORMAL
BLOOD BANK CMNT PATIENT-IMP: NORMAL
BUN SERPL-MCNC: 20 MG/DL (ref 7–30)
BUN SERPL-MCNC: 22 MG/DL (ref 7–30)
BUN SERPL-MCNC: 25 MG/DL (ref 7–30)
CALCIUM SERPL-MCNC: 8.3 MG/DL (ref 8.5–10.1)
CALCIUM SERPL-MCNC: 8.6 MG/DL (ref 8.5–10.1)
CALCIUM SERPL-MCNC: 9.3 MG/DL (ref 8.5–10.1)
CHLORIDE SERPL-SCNC: 104 MMOL/L (ref 94–109)
CHLORIDE SERPL-SCNC: 104 MMOL/L (ref 94–109)
CHLORIDE SERPL-SCNC: 107 MMOL/L (ref 94–109)
CHOLEST SERPL-MCNC: 119 MG/DL
CO2 SERPL-SCNC: 25 MMOL/L (ref 20–32)
CO2 SERPL-SCNC: 25 MMOL/L (ref 20–32)
CO2 SERPL-SCNC: 26 MMOL/L (ref 20–32)
COLOR UR AUTO: YELLOW
COPATH REPORT: NORMAL
CREAT SERPL-MCNC: 1.27 MG/DL (ref 0.66–1.25)
CREAT SERPL-MCNC: 1.31 MG/DL (ref 0.66–1.25)
CREAT SERPL-MCNC: 1.4 MG/DL (ref 0.66–1.25)
CREAT SERPL-MCNC: 1.49 MG/DL (ref 0.66–1.25)
DIFFERENTIAL METHOD BLD: ABNORMAL
EOSINOPHIL # BLD AUTO: 0.5 10E9/L (ref 0–0.7)
EOSINOPHIL NFR BLD AUTO: 5 %
ERYTHROCYTE [DISTWIDTH] IN BLOOD BY AUTOMATED COUNT: 15.9 % (ref 10–15)
ERYTHROCYTE [DISTWIDTH] IN BLOOD BY AUTOMATED COUNT: 16.3 % (ref 10–15)
ERYTHROCYTE [DISTWIDTH] IN BLOOD BY AUTOMATED COUNT: 16.4 % (ref 10–15)
ERYTHROCYTE [DISTWIDTH] IN BLOOD BY AUTOMATED COUNT: 16.9 % (ref 10–15)
GFR SERPL CREATININE-BSD FRML MDRD: 46 ML/MIN/1.7M2
GFR SERPL CREATININE-BSD FRML MDRD: 49 ML/MIN/1.7M2
GFR SERPL CREATININE-BSD FRML MDRD: 53 ML/MIN/1.7M2
GFR SERPL CREATININE-BSD FRML MDRD: 55 ML/MIN/1.7M2
GLUCOSE BLDC GLUCOMTR-MCNC: 117 MG/DL (ref 70–99)
GLUCOSE BLDC GLUCOMTR-MCNC: 124 MG/DL (ref 70–99)
GLUCOSE BLDC GLUCOMTR-MCNC: 129 MG/DL (ref 70–99)
GLUCOSE BLDC GLUCOMTR-MCNC: 131 MG/DL (ref 70–99)
GLUCOSE SERPL-MCNC: 105 MG/DL (ref 70–99)
GLUCOSE SERPL-MCNC: 126 MG/DL (ref 70–99)
GLUCOSE SERPL-MCNC: 156 MG/DL (ref 70–99)
GLUCOSE UR STRIP-MCNC: NEGATIVE MG/DL
HBA1C MFR BLD: 6.4 % (ref 0–5.6)
HCT VFR BLD AUTO: 27.6 % (ref 40–53)
HCT VFR BLD AUTO: 28.6 % (ref 40–53)
HCT VFR BLD AUTO: 29.9 % (ref 40–53)
HCT VFR BLD AUTO: 30.5 % (ref 40–53)
HDLC SERPL-MCNC: 60 MG/DL
HGB BLD-MCNC: 8.3 G/DL (ref 13.3–17.7)
HGB BLD-MCNC: 8.3 G/DL (ref 13.3–17.7)
HGB BLD-MCNC: 8.6 G/DL (ref 13.3–17.7)
HGB BLD-MCNC: 8.7 G/DL (ref 13.3–17.7)
HGB BLD-MCNC: 9 G/DL (ref 13.3–17.7)
HGB UR QL STRIP: ABNORMAL
IMM GRANULOCYTES # BLD: 0 10E9/L (ref 0–0.4)
IMM GRANULOCYTES NFR BLD: 0.4 %
INR PPP: 1.07 (ref 0.86–1.14)
INR PPP: 1.16 (ref 0.86–1.14)
INTERPRETATION ECG - MUSE: NORMAL
KETONES UR STRIP-MCNC: NEGATIVE MG/DL
LACTATE BLD-SCNC: 0.7 MMOL/L (ref 0.7–2)
LDLC SERPL CALC-MCNC: 43 MG/DL
LEUKOCYTE ESTERASE UR QL STRIP: NEGATIVE
LYMPHOCYTES # BLD AUTO: 0.7 10E9/L (ref 0.8–5.3)
LYMPHOCYTES NFR BLD AUTO: 7.8 %
Lab: NORMAL
MCH RBC QN AUTO: 23.1 PG (ref 26.5–33)
MCH RBC QN AUTO: 23.2 PG (ref 26.5–33)
MCH RBC QN AUTO: 23.6 PG (ref 26.5–33)
MCH RBC QN AUTO: 23.8 PG (ref 26.5–33)
MCHC RBC AUTO-ENTMCNC: 28.8 G/DL (ref 31.5–36.5)
MCHC RBC AUTO-ENTMCNC: 29 G/DL (ref 31.5–36.5)
MCHC RBC AUTO-ENTMCNC: 29.5 G/DL (ref 31.5–36.5)
MCHC RBC AUTO-ENTMCNC: 30.1 G/DL (ref 31.5–36.5)
MCV RBC AUTO: 79 FL (ref 78–100)
MCV RBC AUTO: 80 FL (ref 78–100)
MONOCYTES # BLD AUTO: 0.7 10E9/L (ref 0–1.3)
MONOCYTES NFR BLD AUTO: 7 %
NEUTROPHILS # BLD AUTO: 7.5 10E9/L (ref 1.6–8.3)
NEUTROPHILS NFR BLD AUTO: 79.5 %
NITRATE UR QL: NEGATIVE
NONHDLC SERPL-MCNC: 59 MG/DL
NRBC # BLD AUTO: 0 10*3/UL
NRBC BLD AUTO-RTO: 0 /100
PH UR STRIP: 6 PH (ref 5–7)
PLATELET # BLD AUTO: 366 10E9/L (ref 150–450)
PLATELET # BLD AUTO: 445 10E9/L (ref 150–450)
PLATELET # BLD AUTO: 451 10E9/L (ref 150–450)
PLATELET # BLD AUTO: 472 10E9/L (ref 150–450)
POTASSIUM SERPL-SCNC: 3.8 MMOL/L (ref 3.4–5.3)
POTASSIUM SERPL-SCNC: 4.2 MMOL/L (ref 3.4–5.3)
POTASSIUM SERPL-SCNC: 4.4 MMOL/L (ref 3.4–5.3)
POTASSIUM SERPL-SCNC: 4.6 MMOL/L (ref 3.4–5.3)
PROT SERPL-MCNC: 7.1 G/DL (ref 6.8–8.8)
PROT SERPL-MCNC: 7.3 G/DL (ref 6.8–8.8)
RADIOLOGIST FLAGS: ABNORMAL
RBC # BLD AUTO: 3.49 10E12/L (ref 4.4–5.9)
RBC # BLD AUTO: 3.57 10E12/L (ref 4.4–5.9)
RBC # BLD AUTO: 3.72 10E12/L (ref 4.4–5.9)
RBC # BLD AUTO: 3.81 10E12/L (ref 4.4–5.9)
RBC #/AREA URNS AUTO: 17 /HPF (ref 0–2)
SODIUM SERPL-SCNC: 137 MMOL/L (ref 133–144)
SODIUM SERPL-SCNC: 137 MMOL/L (ref 133–144)
SODIUM SERPL-SCNC: 141 MMOL/L (ref 133–144)
SOURCE: ABNORMAL
SP GR UR STRIP: 1.04 (ref 1–1.03)
SPECIMEN EXP DATE BLD: NORMAL
SPECIMEN SOURCE: NORMAL
TRIGL SERPL-MCNC: 78 MG/DL
TROPONIN I SERPL-MCNC: 0.35 UG/L (ref 0–0.04)
TROPONIN I SERPL-MCNC: 0.35 UG/L (ref 0–0.04)
TROPONIN I SERPL-MCNC: 0.37 UG/L (ref 0–0.04)
TROPONIN I SERPL-MCNC: 0.38 UG/L (ref 0–0.04)
TROPONIN I SERPL-MCNC: 0.41 UG/L (ref 0–0.04)
TSH SERPL DL<=0.005 MIU/L-ACNC: 3.06 MU/L (ref 0.4–4)
UROBILINOGEN UR STRIP-MCNC: 2 MG/DL (ref 0–2)
WBC # BLD AUTO: 10.3 10E9/L (ref 4–11)
WBC # BLD AUTO: 6.6 10E9/L (ref 4–11)
WBC # BLD AUTO: 9.4 10E9/L (ref 4–11)
WBC # BLD AUTO: 9.6 10E9/L (ref 4–11)
WBC #/AREA URNS AUTO: <1 /HPF (ref 0–5)

## 2018-01-01 PROCEDURE — 97116 GAIT TRAINING THERAPY: CPT | Mod: GP,XU

## 2018-01-01 PROCEDURE — 25000125 ZZHC RX 250: Performed by: PHYSICIAN ASSISTANT

## 2018-01-01 PROCEDURE — 84484 ASSAY OF TROPONIN QUANT: CPT | Performed by: NURSE PRACTITIONER

## 2018-01-01 PROCEDURE — 25000128 H RX IP 250 OP 636: Performed by: NURSE PRACTITIONER

## 2018-01-01 PROCEDURE — 27210909 ZZH NEEDLE CR5

## 2018-01-01 PROCEDURE — G8980 MOBILITY D/C STATUS: HCPCS | Mod: GP,CI

## 2018-01-01 PROCEDURE — 27210732 ZZH ACCESSORY CR1

## 2018-01-01 PROCEDURE — 25000566 ZZH SEVOFLURANE, EA 15 MIN

## 2018-01-01 PROCEDURE — 00000146 ZZHCL STATISTIC GLUCOSE BY METER IP

## 2018-01-01 PROCEDURE — 97161 PT EVAL LOW COMPLEX 20 MIN: CPT | Mod: GP

## 2018-01-01 PROCEDURE — 96361 HYDRATE IV INFUSION ADD-ON: CPT | Performed by: EMERGENCY MEDICINE

## 2018-01-01 PROCEDURE — 93010 ELECTROCARDIOGRAM REPORT: CPT | Mod: 76 | Performed by: EMERGENCY MEDICINE

## 2018-01-01 PROCEDURE — 36415 COLL VENOUS BLD VENIPUNCTURE: CPT | Performed by: NURSE PRACTITIONER

## 2018-01-01 PROCEDURE — 82565 ASSAY OF CREATININE: CPT | Performed by: ANESTHESIOLOGY

## 2018-01-01 PROCEDURE — 96374 THER/PROPH/DIAG INJ IV PUSH: CPT

## 2018-01-01 PROCEDURE — 83605 ASSAY OF LACTIC ACID: CPT | Performed by: EMERGENCY MEDICINE

## 2018-01-01 PROCEDURE — 93922 UPR/L XTREMITY ART 2 LEVELS: CPT

## 2018-01-01 PROCEDURE — 68200002 ZZH TRAUMA EVALUATION W/O CC LEVEL II: Performed by: EMERGENCY MEDICINE

## 2018-01-01 PROCEDURE — 93010 ELECTROCARDIOGRAM REPORT: CPT | Mod: Z6 | Performed by: EMERGENCY MEDICINE

## 2018-01-01 PROCEDURE — 25000128 H RX IP 250 OP 636: Performed by: UROLOGY

## 2018-01-01 PROCEDURE — 99285 EMERGENCY DEPT VISIT HI MDM: CPT | Mod: 25 | Performed by: EMERGENCY MEDICINE

## 2018-01-01 PROCEDURE — 25000132 ZZH RX MED GY IP 250 OP 250 PS 637: Mod: GY | Performed by: NURSE PRACTITIONER

## 2018-01-01 PROCEDURE — 85027 COMPLETE CBC AUTOMATED: CPT | Performed by: NURSE PRACTITIONER

## 2018-01-01 PROCEDURE — G8978 MOBILITY CURRENT STATUS: HCPCS | Mod: GP,CI

## 2018-01-01 PROCEDURE — 99285 EMERGENCY DEPT VISIT HI MDM: CPT | Mod: 25

## 2018-01-01 PROCEDURE — 96376 TX/PRO/DX INJ SAME DRUG ADON: CPT

## 2018-01-01 PROCEDURE — 99213 OFFICE O/P EST LOW 20 MIN: CPT | Performed by: FAMILY MEDICINE

## 2018-01-01 PROCEDURE — 80053 COMPREHEN METABOLIC PANEL: CPT | Performed by: EMERGENCY MEDICINE

## 2018-01-01 PROCEDURE — 96360 HYDRATION IV INFUSION INIT: CPT | Performed by: EMERGENCY MEDICINE

## 2018-01-01 PROCEDURE — 25000128 H RX IP 250 OP 636: Performed by: PHYSICIAN ASSISTANT

## 2018-01-01 PROCEDURE — A9270 NON-COVERED ITEM OR SERVICE: HCPCS | Mod: GY | Performed by: NURSE PRACTITIONER

## 2018-01-01 PROCEDURE — 99238 HOSP IP/OBS DSCHRG MGMT 30/<: CPT | Performed by: NURSE PRACTITIONER

## 2018-01-01 PROCEDURE — 37000008 ZZH ANESTHESIA TECHNICAL FEE, 1ST 30 MIN

## 2018-01-01 PROCEDURE — 40000170 ZZH STATISTIC PRE-PROCEDURE ASSESSMENT II

## 2018-01-01 PROCEDURE — 99213 OFFICE O/P EST LOW 20 MIN: CPT | Performed by: INTERNAL MEDICINE

## 2018-01-01 PROCEDURE — 2894A VOIDCORRECT: CPT | Mod: 25 | Performed by: EMERGENCY MEDICINE

## 2018-01-01 PROCEDURE — 71260 CT THORAX DX C+: CPT

## 2018-01-01 PROCEDURE — 94150 VITAL CAPACITY TEST: CPT

## 2018-01-01 PROCEDURE — 99222 1ST HOSP IP/OBS MODERATE 55: CPT | Performed by: NURSE PRACTITIONER

## 2018-01-01 PROCEDURE — 93005 ELECTROCARDIOGRAM TRACING: CPT

## 2018-01-01 PROCEDURE — 25000125 ZZHC RX 250: Performed by: ANESTHESIOLOGY

## 2018-01-01 PROCEDURE — 71000016 ZZH RECOVERY PHASE 1 LEVEL 3 FIRST HR

## 2018-01-01 PROCEDURE — 97530 THERAPEUTIC ACTIVITIES: CPT | Mod: GP

## 2018-01-01 PROCEDURE — 12000006 ZZH R&B IMCU INTERMEDIATE UMMC

## 2018-01-01 PROCEDURE — 88342 IMHCHEM/IMCYTCHM 1ST ANTB: CPT | Performed by: UROLOGY

## 2018-01-01 PROCEDURE — 93005 ELECTROCARDIOGRAM TRACING: CPT | Performed by: EMERGENCY MEDICINE

## 2018-01-01 PROCEDURE — 85610 PROTHROMBIN TIME: CPT | Performed by: ANESTHESIOLOGY

## 2018-01-01 PROCEDURE — 85610 PROTHROMBIN TIME: CPT | Performed by: EMERGENCY MEDICINE

## 2018-01-01 PROCEDURE — 84132 ASSAY OF SERUM POTASSIUM: CPT | Performed by: ANESTHESIOLOGY

## 2018-01-01 PROCEDURE — 37000009 ZZH ANESTHESIA TECHNICAL FEE, EACH ADDTL 15 MIN

## 2018-01-01 PROCEDURE — G8979 MOBILITY GOAL STATUS: HCPCS | Mod: GP,CI

## 2018-01-01 PROCEDURE — 80053 COMPREHEN METABOLIC PANEL: CPT | Performed by: NURSE PRACTITIONER

## 2018-01-01 PROCEDURE — 77013 CT GUIDE FOR TISSUE ABLATION: CPT

## 2018-01-01 PROCEDURE — 88341 IMHCHEM/IMCYTCHM EA ADD ANTB: CPT | Performed by: UROLOGY

## 2018-01-01 PROCEDURE — 25000128 H RX IP 250 OP 636: Performed by: ANESTHESIOLOGY

## 2018-01-01 PROCEDURE — 84484 ASSAY OF TROPONIN QUANT: CPT | Performed by: EMERGENCY MEDICINE

## 2018-01-01 PROCEDURE — 88305 TISSUE EXAM BY PATHOLOGIST: CPT | Performed by: UROLOGY

## 2018-01-01 PROCEDURE — 50200 RENAL BIOPSY PERQ: CPT

## 2018-01-01 PROCEDURE — 36415 COLL VENOUS BLD VENIPUNCTURE: CPT | Performed by: ANESTHESIOLOGY

## 2018-01-01 PROCEDURE — 40000193 ZZH STATISTIC PT WARD VISIT

## 2018-01-01 PROCEDURE — 85018 HEMOGLOBIN: CPT | Mod: 91 | Performed by: NURSE PRACTITIONER

## 2018-01-01 PROCEDURE — 81001 URINALYSIS AUTO W/SCOPE: CPT | Performed by: NURSE PRACTITIONER

## 2018-01-01 PROCEDURE — 82962 GLUCOSE BLOOD TEST: CPT

## 2018-01-01 PROCEDURE — 85730 THROMBOPLASTIN TIME PARTIAL: CPT | Performed by: ANESTHESIOLOGY

## 2018-01-01 PROCEDURE — 71000027 ZZH RECOVERY PHASE 2 EACH 15 MINS

## 2018-01-01 PROCEDURE — 27210903 ZZH KIT CR5

## 2018-01-01 PROCEDURE — 85025 COMPLETE CBC W/AUTO DIFF WBC: CPT | Performed by: EMERGENCY MEDICINE

## 2018-01-01 RX ORDER — ACETAMINOPHEN 325 MG/1
650 TABLET ORAL EVERY 4 HOURS PRN
Qty: 30 TABLET | Refills: 1 | Status: ON HOLD | COMMUNITY
Start: 2018-01-01 | End: 2019-01-01

## 2018-01-01 RX ORDER — ONDANSETRON 2 MG/ML
INJECTION INTRAMUSCULAR; INTRAVENOUS PRN
Status: DISCONTINUED | OUTPATIENT
Start: 2018-01-01 | End: 2018-01-01

## 2018-01-01 RX ORDER — METHOCARBAMOL 750 MG/1
750 TABLET, FILM COATED ORAL EVERY 6 HOURS PRN
Status: DISCONTINUED | OUTPATIENT
Start: 2018-01-01 | End: 2018-01-01 | Stop reason: HOSPADM

## 2018-01-01 RX ORDER — SERTRALINE HYDROCHLORIDE 100 MG/1
TABLET, FILM COATED ORAL
Qty: 180 TABLET | Refills: 0 | Status: SHIPPED | OUTPATIENT
Start: 2018-01-01 | End: 2019-01-01

## 2018-01-01 RX ORDER — HYDROMORPHONE HYDROCHLORIDE 1 MG/ML
0.5 INJECTION, SOLUTION INTRAMUSCULAR; INTRAVENOUS; SUBCUTANEOUS
Status: COMPLETED | OUTPATIENT
Start: 2018-01-01 | End: 2018-01-01

## 2018-01-01 RX ORDER — SERTRALINE HYDROCHLORIDE 100 MG/1
200 TABLET, FILM COATED ORAL DAILY
Status: DISCONTINUED | OUTPATIENT
Start: 2018-01-01 | End: 2018-01-01 | Stop reason: HOSPADM

## 2018-01-01 RX ORDER — BISACODYL 10 MG
10 SUPPOSITORY, RECTAL RECTAL DAILY PRN
Status: DISCONTINUED | OUTPATIENT
Start: 2018-01-01 | End: 2018-01-01 | Stop reason: HOSPADM

## 2018-01-01 RX ORDER — AMOXICILLIN 250 MG
1-2 CAPSULE ORAL 2 TIMES DAILY
Status: DISCONTINUED | OUTPATIENT
Start: 2018-01-01 | End: 2018-01-01 | Stop reason: HOSPADM

## 2018-01-01 RX ORDER — HYDROMORPHONE HYDROCHLORIDE 1 MG/ML
.3-.5 INJECTION, SOLUTION INTRAMUSCULAR; INTRAVENOUS; SUBCUTANEOUS
Status: DISCONTINUED | OUTPATIENT
Start: 2018-01-01 | End: 2018-01-01 | Stop reason: HOSPADM

## 2018-01-01 RX ORDER — EPHEDRINE SULFATE 50 MG/ML
INJECTION, SOLUTION INTRAMUSCULAR; INTRAVENOUS; SUBCUTANEOUS PRN
Status: DISCONTINUED | OUTPATIENT
Start: 2018-01-01 | End: 2018-01-01

## 2018-01-01 RX ORDER — ONDANSETRON 4 MG/1
4 TABLET, ORALLY DISINTEGRATING ORAL EVERY 6 HOURS PRN
Status: DISCONTINUED | OUTPATIENT
Start: 2018-01-01 | End: 2018-01-01 | Stop reason: HOSPADM

## 2018-01-01 RX ORDER — FERROUS SULFATE 325(65) MG
325 TABLET, DELAYED RELEASE (ENTERIC COATED) ORAL DAILY
Qty: 90 TABLET | Refills: 3 | Status: SHIPPED | OUTPATIENT
Start: 2018-01-01 | End: 2019-12-21

## 2018-01-01 RX ORDER — FENTANYL CITRATE 50 UG/ML
25-50 INJECTION, SOLUTION INTRAMUSCULAR; INTRAVENOUS
Status: DISCONTINUED | OUTPATIENT
Start: 2018-01-01 | End: 2018-01-01 | Stop reason: HOSPADM

## 2018-01-01 RX ORDER — DEXTROSE MONOHYDRATE 25 G/50ML
25-50 INJECTION, SOLUTION INTRAVENOUS
Status: DISCONTINUED | OUTPATIENT
Start: 2018-01-01 | End: 2018-01-01 | Stop reason: HOSPADM

## 2018-01-01 RX ORDER — NICOTINE POLACRILEX 4 MG
15-30 LOZENGE BUCCAL
Status: DISCONTINUED | OUTPATIENT
Start: 2018-01-01 | End: 2018-01-01 | Stop reason: HOSPADM

## 2018-01-01 RX ORDER — ONDANSETRON 2 MG/ML
4 INJECTION INTRAMUSCULAR; INTRAVENOUS EVERY 6 HOURS PRN
Status: DISCONTINUED | OUTPATIENT
Start: 2018-01-01 | End: 2018-01-01 | Stop reason: HOSPADM

## 2018-01-01 RX ORDER — SODIUM CHLORIDE 9 MG/ML
INJECTION, SOLUTION INTRAVENOUS CONTINUOUS
Status: DISCONTINUED | OUTPATIENT
Start: 2018-01-01 | End: 2018-01-01 | Stop reason: HOSPADM

## 2018-01-01 RX ORDER — OXYCODONE HYDROCHLORIDE 30 MG/1
30 TABLET, FILM COATED, EXTENDED RELEASE ORAL EVERY 12 HOURS
Qty: 60 TABLET | Refills: 0 | Status: SHIPPED | OUTPATIENT
Start: 2018-01-01 | End: 2019-01-01

## 2018-01-01 RX ORDER — NITROGLYCERIN 0.4 MG/1
0.4 TABLET SUBLINGUAL EVERY 5 MIN PRN
Status: DISCONTINUED | OUTPATIENT
Start: 2018-01-01 | End: 2018-01-01 | Stop reason: HOSPADM

## 2018-01-01 RX ORDER — CEFAZOLIN SODIUM 2 G/100ML
2 INJECTION, SOLUTION INTRAVENOUS
Status: DISCONTINUED | OUTPATIENT
Start: 2018-01-01 | End: 2018-01-01 | Stop reason: HOSPADM

## 2018-01-01 RX ORDER — DEXAMETHASONE SODIUM PHOSPHATE 4 MG/ML
INJECTION, SOLUTION INTRA-ARTICULAR; INTRALESIONAL; INTRAMUSCULAR; INTRAVENOUS; SOFT TISSUE PRN
Status: DISCONTINUED | OUTPATIENT
Start: 2018-01-01 | End: 2018-01-01

## 2018-01-01 RX ORDER — KETOROLAC TROMETHAMINE 30 MG/ML
15 INJECTION, SOLUTION INTRAMUSCULAR; INTRAVENOUS ONCE
Status: DISCONTINUED | OUTPATIENT
Start: 2018-01-01 | End: 2018-01-01 | Stop reason: HOSPADM

## 2018-01-01 RX ORDER — IOPAMIDOL 755 MG/ML
99 INJECTION, SOLUTION INTRAVASCULAR ONCE
Status: COMPLETED | OUTPATIENT
Start: 2018-01-01 | End: 2018-01-01

## 2018-01-01 RX ORDER — CEFAZOLIN SODIUM 2 G/100ML
2 INJECTION, SOLUTION INTRAVENOUS
Status: COMPLETED | OUTPATIENT
Start: 2018-01-01 | End: 2018-01-01

## 2018-01-01 RX ORDER — GABAPENTIN 400 MG/1
400 CAPSULE ORAL 2 TIMES DAILY
Status: DISCONTINUED | OUTPATIENT
Start: 2018-01-01 | End: 2018-01-01

## 2018-01-01 RX ORDER — FENTANYL CITRATE 50 UG/ML
INJECTION, SOLUTION INTRAMUSCULAR; INTRAVENOUS PRN
Status: DISCONTINUED | OUTPATIENT
Start: 2018-01-01 | End: 2018-01-01

## 2018-01-01 RX ORDER — NALOXONE HYDROCHLORIDE 0.4 MG/ML
.1-.4 INJECTION, SOLUTION INTRAMUSCULAR; INTRAVENOUS; SUBCUTANEOUS
Status: DISCONTINUED | OUTPATIENT
Start: 2018-01-01 | End: 2018-01-01 | Stop reason: HOSPADM

## 2018-01-01 RX ORDER — GABAPENTIN 400 MG/1
400 CAPSULE ORAL 3 TIMES DAILY
Status: DISCONTINUED | OUTPATIENT
Start: 2018-01-01 | End: 2018-01-01 | Stop reason: HOSPADM

## 2018-01-01 RX ORDER — ONDANSETRON 4 MG/1
4 TABLET, ORALLY DISINTEGRATING ORAL EVERY 30 MIN PRN
Status: DISCONTINUED | OUTPATIENT
Start: 2018-01-01 | End: 2018-01-01 | Stop reason: HOSPADM

## 2018-01-01 RX ORDER — HYDRALAZINE HYDROCHLORIDE 20 MG/ML
10-20 INJECTION INTRAMUSCULAR; INTRAVENOUS EVERY 6 HOURS PRN
Status: DISCONTINUED | OUTPATIENT
Start: 2018-01-01 | End: 2018-01-01 | Stop reason: HOSPADM

## 2018-01-01 RX ORDER — SULFAMETHOXAZOLE AND TRIMETHOPRIM 400; 80 MG/1; MG/1
1 TABLET ORAL 2 TIMES DAILY
Qty: 14 TABLET | Refills: 0 | Status: SHIPPED | OUTPATIENT
Start: 2018-01-01 | End: 2018-01-01

## 2018-01-01 RX ORDER — PROPOFOL 10 MG/ML
INJECTION, EMULSION INTRAVENOUS PRN
Status: DISCONTINUED | OUTPATIENT
Start: 2018-01-01 | End: 2018-01-01

## 2018-01-01 RX ORDER — ONDANSETRON 2 MG/ML
4 INJECTION INTRAMUSCULAR; INTRAVENOUS ONCE
Status: DISCONTINUED | OUTPATIENT
Start: 2018-01-01 | End: 2018-01-01 | Stop reason: HOSPADM

## 2018-01-01 RX ORDER — OXYCODONE HYDROCHLORIDE 15 MG/1
30 TABLET, FILM COATED, EXTENDED RELEASE ORAL EVERY MORNING
Status: DISCONTINUED | OUTPATIENT
Start: 2018-01-01 | End: 2018-01-01 | Stop reason: HOSPADM

## 2018-01-01 RX ORDER — AMLODIPINE BESYLATE 5 MG/1
5 TABLET ORAL DAILY
Status: DISCONTINUED | OUTPATIENT
Start: 2018-01-01 | End: 2018-01-01 | Stop reason: HOSPADM

## 2018-01-01 RX ORDER — OXYCODONE HYDROCHLORIDE 5 MG/1
5-10 TABLET ORAL EVERY 4 HOURS PRN
Status: DISCONTINUED | OUTPATIENT
Start: 2018-01-01 | End: 2018-01-01 | Stop reason: HOSPADM

## 2018-01-01 RX ORDER — POLYETHYLENE GLYCOL 3350 17 G/17G
17 POWDER, FOR SOLUTION ORAL 2 TIMES DAILY
Status: DISCONTINUED | OUTPATIENT
Start: 2018-01-01 | End: 2018-01-01 | Stop reason: HOSPADM

## 2018-01-01 RX ORDER — OXYCODONE HYDROCHLORIDE 5 MG/1
TABLET ORAL
Qty: 10 TABLET | Refills: 0 | Status: SHIPPED | OUTPATIENT
Start: 2018-01-01 | End: 2019-01-01

## 2018-01-01 RX ORDER — LIDOCAINE 4 G/G
2 PATCH TOPICAL EVERY 24 HOURS
Qty: 15 PATCH | Refills: 0 | Status: ON HOLD | OUTPATIENT
Start: 2018-01-01 | End: 2019-01-01

## 2018-01-01 RX ORDER — ACETAMINOPHEN 325 MG/1
975 TABLET ORAL EVERY 8 HOURS
Status: DISCONTINUED | OUTPATIENT
Start: 2018-01-01 | End: 2018-01-01 | Stop reason: HOSPADM

## 2018-01-01 RX ORDER — SODIUM CHLORIDE, SODIUM LACTATE, POTASSIUM CHLORIDE, CALCIUM CHLORIDE 600; 310; 30; 20 MG/100ML; MG/100ML; MG/100ML; MG/100ML
INJECTION, SOLUTION INTRAVENOUS CONTINUOUS
Status: DISCONTINUED | OUTPATIENT
Start: 2018-01-01 | End: 2018-01-01 | Stop reason: HOSPADM

## 2018-01-01 RX ORDER — CEFAZOLIN SODIUM 1 G/3ML
1 INJECTION, POWDER, FOR SOLUTION INTRAMUSCULAR; INTRAVENOUS SEE ADMIN INSTRUCTIONS
Status: DISCONTINUED | OUTPATIENT
Start: 2018-01-01 | End: 2018-01-01 | Stop reason: HOSPADM

## 2018-01-01 RX ORDER — LISINOPRIL 10 MG/1
40 TABLET ORAL DAILY
Status: DISCONTINUED | OUTPATIENT
Start: 2018-01-01 | End: 2018-01-01 | Stop reason: HOSPADM

## 2018-01-01 RX ORDER — TRAZODONE HYDROCHLORIDE 50 MG/1
100 TABLET, FILM COATED ORAL
Status: DISCONTINUED | OUTPATIENT
Start: 2018-01-01 | End: 2018-01-01 | Stop reason: HOSPADM

## 2018-01-01 RX ORDER — LIDOCAINE HYDROCHLORIDE 20 MG/ML
INJECTION, SOLUTION INFILTRATION; PERINEURAL PRN
Status: DISCONTINUED | OUTPATIENT
Start: 2018-01-01 | End: 2018-01-01

## 2018-01-01 RX ORDER — MULTIPLE VITAMINS W/ MINERALS TAB 9MG-400MCG
1 TAB ORAL DAILY
Status: DISCONTINUED | OUTPATIENT
Start: 2018-01-01 | End: 2018-01-01 | Stop reason: HOSPADM

## 2018-01-01 RX ORDER — LIDOCAINE 40 MG/G
CREAM TOPICAL
Status: DISCONTINUED | OUTPATIENT
Start: 2018-01-01 | End: 2018-01-01 | Stop reason: HOSPADM

## 2018-01-01 RX ORDER — LIDOCAINE 4 G/G
2 PATCH TOPICAL
Status: DISCONTINUED | OUTPATIENT
Start: 2018-01-01 | End: 2018-01-01 | Stop reason: HOSPADM

## 2018-01-01 RX ORDER — ONDANSETRON 2 MG/ML
4 INJECTION INTRAMUSCULAR; INTRAVENOUS EVERY 30 MIN PRN
Status: DISCONTINUED | OUTPATIENT
Start: 2018-01-01 | End: 2018-01-01 | Stop reason: HOSPADM

## 2018-01-01 RX ORDER — HYDROMORPHONE HCL/0.9% NACL/PF 0.2MG/0.2
0.2 SYRINGE (ML) INTRAVENOUS
Status: COMPLETED | OUTPATIENT
Start: 2018-01-01 | End: 2018-01-01

## 2018-01-01 RX ORDER — ACETAMINOPHEN 325 MG/1
650 TABLET ORAL EVERY 4 HOURS PRN
Status: DISCONTINUED | OUTPATIENT
Start: 2018-01-01 | End: 2018-01-01 | Stop reason: HOSPADM

## 2018-01-01 RX ORDER — ATORVASTATIN CALCIUM 40 MG/1
80 TABLET, FILM COATED ORAL EVERY EVENING
Status: DISCONTINUED | OUTPATIENT
Start: 2018-01-01 | End: 2018-01-01 | Stop reason: HOSPADM

## 2018-01-01 RX ORDER — HYDROMORPHONE HYDROCHLORIDE 2 MG/1
2 TABLET ORAL EVERY 6 HOURS PRN
Qty: 22 TABLET | Refills: 0 | Status: SHIPPED | OUTPATIENT
Start: 2018-01-01 | End: 2019-01-01

## 2018-01-01 RX ORDER — SODIUM CHLORIDE, SODIUM LACTATE, POTASSIUM CHLORIDE, CALCIUM CHLORIDE 600; 310; 30; 20 MG/100ML; MG/100ML; MG/100ML; MG/100ML
INJECTION, SOLUTION INTRAVENOUS CONTINUOUS PRN
Status: DISCONTINUED | OUTPATIENT
Start: 2018-01-01 | End: 2018-01-01

## 2018-01-01 RX ADMIN — TRAZODONE HYDROCHLORIDE 100 MG: 50 TABLET ORAL at 21:35

## 2018-01-01 RX ADMIN — Medication 10 MG: at 11:24

## 2018-01-01 RX ADMIN — ROCURONIUM BROMIDE 20 MG: 10 INJECTION INTRAVENOUS at 10:42

## 2018-01-01 RX ADMIN — POLYETHYLENE GLYCOL 3350 17 G: 17 POWDER, FOR SOLUTION ORAL at 08:17

## 2018-01-01 RX ADMIN — PHENYLEPHRINE HYDROCHLORIDE 100 MCG: 10 INJECTION, SOLUTION INTRAMUSCULAR; INTRAVENOUS; SUBCUTANEOUS at 12:00

## 2018-01-01 RX ADMIN — ATORVASTATIN CALCIUM 80 MG: 40 TABLET, FILM COATED ORAL at 21:35

## 2018-01-01 RX ADMIN — Medication 10 MG: at 12:00

## 2018-01-01 RX ADMIN — ONDANSETRON 4 MG: 2 INJECTION INTRAMUSCULAR; INTRAVENOUS at 11:47

## 2018-01-01 RX ADMIN — Medication 10 MG: at 10:47

## 2018-01-01 RX ADMIN — MULTIPLE VITAMINS W/ MINERALS TAB 1 TABLET: TAB at 08:17

## 2018-01-01 RX ADMIN — Medication 10 MG: at 21:35

## 2018-01-01 RX ADMIN — SUGAMMADEX 200 MG: 100 INJECTION, SOLUTION INTRAVENOUS at 12:29

## 2018-01-01 RX ADMIN — IOPAMIDOL 99 ML: 755 INJECTION, SOLUTION INTRAVENOUS at 11:11

## 2018-01-01 RX ADMIN — OXYCODONE HYDROCHLORIDE 10 MG: 5 TABLET ORAL at 21:34

## 2018-01-01 RX ADMIN — ACETAMINOPHEN 975 MG: 325 TABLET, FILM COATED ORAL at 04:52

## 2018-01-01 RX ADMIN — SODIUM CHLORIDE, POTASSIUM CHLORIDE, SODIUM LACTATE AND CALCIUM CHLORIDE: 600; 310; 30; 20 INJECTION, SOLUTION INTRAVENOUS at 01:42

## 2018-01-01 RX ADMIN — OMEPRAZOLE 20 MG: 20 CAPSULE, DELAYED RELEASE ORAL at 08:18

## 2018-01-01 RX ADMIN — LIDOCAINE HYDROCHLORIDE 100 MG: 20 INJECTION, SOLUTION INFILTRATION; PERINEURAL at 10:06

## 2018-01-01 RX ADMIN — ROCURONIUM BROMIDE 10 MG: 10 INJECTION INTRAVENOUS at 11:36

## 2018-01-01 RX ADMIN — OXYCODONE HYDROCHLORIDE 10 MG: 5 TABLET ORAL at 04:52

## 2018-01-01 RX ADMIN — Medication 0.5 MG: at 12:01

## 2018-01-01 RX ADMIN — Medication 10 MG: at 11:05

## 2018-01-01 RX ADMIN — PROPOFOL 150 MG: 10 INJECTION, EMULSION INTRAVENOUS at 10:06

## 2018-01-01 RX ADMIN — SENNOSIDES AND DOCUSATE SODIUM 1 TABLET: 8.6; 5 TABLET ORAL at 08:18

## 2018-01-01 RX ADMIN — HYDROMORPHONE HYDROCHLORIDE 0.5 MG: 1 INJECTION, SOLUTION INTRAMUSCULAR; INTRAVENOUS; SUBCUTANEOUS at 12:52

## 2018-01-01 RX ADMIN — SODIUM CHLORIDE, POTASSIUM CHLORIDE, SODIUM LACTATE AND CALCIUM CHLORIDE: 600; 310; 30; 20 INJECTION, SOLUTION INTRAVENOUS at 12:01

## 2018-01-01 RX ADMIN — SODIUM CHLORIDE 65 ML: 9 INJECTION, SOLUTION INTRAVENOUS at 11:12

## 2018-01-01 RX ADMIN — PHENYLEPHRINE HYDROCHLORIDE 100 MCG: 10 INJECTION, SOLUTION INTRAMUSCULAR; INTRAVENOUS; SUBCUTANEOUS at 10:45

## 2018-01-01 RX ADMIN — PHENYLEPHRINE HYDROCHLORIDE 200 MCG: 10 INJECTION, SOLUTION INTRAMUSCULAR; INTRAVENOUS; SUBCUTANEOUS at 10:29

## 2018-01-01 RX ADMIN — CEFAZOLIN SODIUM 2 G: 2 INJECTION, SOLUTION INTRAVENOUS at 10:13

## 2018-01-01 RX ADMIN — Medication 0.5 MG: at 23:25

## 2018-01-01 RX ADMIN — Medication 10 MG: at 11:57

## 2018-01-01 RX ADMIN — Medication 10 MG: at 10:23

## 2018-01-01 RX ADMIN — ACETAMINOPHEN 975 MG: 325 TABLET, FILM COATED ORAL at 21:33

## 2018-01-01 RX ADMIN — LIDOCAINE 2 PATCH: 560 PATCH PERCUTANEOUS; TOPICAL; TRANSDERMAL at 21:37

## 2018-01-01 RX ADMIN — FENTANYL CITRATE 100 MCG: 50 INJECTION, SOLUTION INTRAMUSCULAR; INTRAVENOUS at 10:06

## 2018-01-01 RX ADMIN — ACETAMINOPHEN 975 MG: 325 TABLET, FILM COATED ORAL at 12:01

## 2018-01-01 RX ADMIN — HYDROMORPHONE HYDROCHLORIDE 0.2 MG: 1 INJECTION, SOLUTION INTRAMUSCULAR; INTRAVENOUS; SUBCUTANEOUS at 11:24

## 2018-01-01 RX ADMIN — VITAMIN D, TAB 1000IU (100/BT) 1000 UNITS: 25 TAB at 08:18

## 2018-01-01 RX ADMIN — DEXAMETHASONE SODIUM PHOSPHATE 6 MG: 4 INJECTION, SOLUTION INTRA-ARTICULAR; INTRALESIONAL; INTRAMUSCULAR; INTRAVENOUS; SOFT TISSUE at 10:43

## 2018-01-01 RX ADMIN — PHENYLEPHRINE HYDROCHLORIDE 100 MCG: 10 INJECTION, SOLUTION INTRAMUSCULAR; INTRAVENOUS; SUBCUTANEOUS at 11:57

## 2018-01-01 RX ADMIN — POLYETHYLENE GLYCOL 3350 17 G: 17 POWDER, FOR SOLUTION ORAL at 21:38

## 2018-01-01 RX ADMIN — Medication 5 MG: at 11:36

## 2018-01-01 RX ADMIN — Medication 0.5 MG: at 07:22

## 2018-01-01 RX ADMIN — SODIUM CHLORIDE, POTASSIUM CHLORIDE, SODIUM LACTATE AND CALCIUM CHLORIDE: 600; 310; 30; 20 INJECTION, SOLUTION INTRAVENOUS at 09:55

## 2018-01-01 RX ADMIN — LISINOPRIL 40 MG: 10 TABLET ORAL at 08:18

## 2018-01-01 RX ADMIN — PHENYLEPHRINE HYDROCHLORIDE 100 MCG: 10 INJECTION, SOLUTION INTRAMUSCULAR; INTRAVENOUS; SUBCUTANEOUS at 11:05

## 2018-01-01 RX ADMIN — OMEPRAZOLE 20 MG: 20 CAPSULE, DELAYED RELEASE ORAL at 21:34

## 2018-01-01 RX ADMIN — PHENYLEPHRINE HYDROCHLORIDE 100 MCG: 10 INJECTION, SOLUTION INTRAMUSCULAR; INTRAVENOUS; SUBCUTANEOUS at 11:36

## 2018-01-01 RX ADMIN — CEFAZOLIN 1 G: 1 INJECTION, POWDER, FOR SOLUTION INTRAMUSCULAR; INTRAVENOUS at 12:13

## 2018-01-01 RX ADMIN — GABAPENTIN 400 MG: 400 CAPSULE ORAL at 09:49

## 2018-01-01 RX ADMIN — PHENYLEPHRINE HYDROCHLORIDE 100 MCG: 10 INJECTION, SOLUTION INTRAMUSCULAR; INTRAVENOUS; SUBCUTANEOUS at 11:24

## 2018-01-01 RX ADMIN — SENNOSIDES AND DOCUSATE SODIUM 2 TABLET: 8.6; 5 TABLET ORAL at 21:34

## 2018-01-01 RX ADMIN — ROCURONIUM BROMIDE 50 MG: 10 INJECTION INTRAVENOUS at 10:06

## 2018-01-01 RX ADMIN — OXYCODONE HYDROCHLORIDE 30 MG: 15 TABLET, FILM COATED, EXTENDED RELEASE ORAL at 08:17

## 2018-01-01 RX ADMIN — GABAPENTIN 400 MG: 400 CAPSULE ORAL at 21:32

## 2018-01-01 RX ADMIN — SERTRALINE HYDROCHLORIDE 200 MG: 100 TABLET ORAL at 08:18

## 2018-01-01 RX ADMIN — AMLODIPINE BESYLATE 5 MG: 5 TABLET ORAL at 06:55

## 2018-01-01 RX ADMIN — SODIUM CHLORIDE, POTASSIUM CHLORIDE, SODIUM LACTATE AND CALCIUM CHLORIDE: 600; 310; 30; 20 INJECTION, SOLUTION INTRAVENOUS at 16:40

## 2018-01-01 RX ADMIN — ROCURONIUM BROMIDE 10 MG: 10 INJECTION INTRAVENOUS at 11:12

## 2018-01-01 ASSESSMENT — ACTIVITIES OF DAILY LIVING (ADL)
BATHING: 0-->INDEPENDENT
ADLS_ACUITY_SCORE: 17
DRESS: 0-->INDEPENDENT
ADLS_ACUITY_SCORE: 18
SWALLOWING: 0-->SWALLOWS FOODS/LIQUIDS WITHOUT DIFFICULTY
ADLS_ACUITY_SCORE: 16
RETIRED_EATING: 0-->INDEPENDENT
COGNITION: 1 - ATTENTION OR MEMORY DEFICITS
WHICH_OF_THE_ABOVE_FUNCTIONAL_RISKS_HAD_A_RECENT_ONSET_OR_CHANGE?: FALL HISTORY
ADLS_ACUITY_SCORE: 16
RETIRED_COMMUNICATION: 0-->UNDERSTANDS/COMMUNICATES WITHOUT DIFFICULTY
TRANSFERRING: 1-->ASSISTIVE EQUIPMENT
TOILETING: 1-->ASSISTIVE EQUIPMENT
AMBULATION: 1-->ASSISTIVE EQUIPMENT
ADLS_ACUITY_SCORE: 18
ADLS_ACUITY_SCORE: 18
FALL_HISTORY_WITHIN_LAST_SIX_MONTHS: YES

## 2018-01-01 ASSESSMENT — ENCOUNTER SYMPTOMS
CONFUSION: 0
FLANK PAIN: 1
DYSURIA: 0
NECK STIFFNESS: 0
HEADACHES: 0
NEUROLOGICAL NEGATIVE: 1
SHORTNESS OF BREATH: 0
HEADACHES: 0
SHORTNESS OF BREATH: 0
COUGH: 0
ABDOMINAL PAIN: 0
FEVER: 0
DIFFICULTY URINATING: 0
BACK PAIN: 1
ABDOMINAL PAIN: 1
COLOR CHANGE: 0
FEVER: 0
ARTHRALGIAS: 0
DIFFICULTY URINATING: 0
CONSTITUTIONAL NEGATIVE: 1
FLANK PAIN: 1
EYE REDNESS: 0
RESPIRATORY NEGATIVE: 1
HEMATURIA: 0
NAUSEA: 0
WOUND: 1
BACK PAIN: 1
NECK PAIN: 0
VOMITING: 0

## 2018-01-01 ASSESSMENT — MIFFLIN-ST. JEOR
SCORE: 1590.5
SCORE: 1570.41
SCORE: 1624.84
SCORE: 1588.55
SCORE: 1697.41

## 2018-01-01 ASSESSMENT — PAIN DESCRIPTION - DESCRIPTORS: DESCRIPTORS: THROBBING

## 2018-01-07 DIAGNOSIS — M17.0 PRIMARY OSTEOARTHRITIS OF BOTH KNEES: ICD-10-CM

## 2018-01-08 ENCOUNTER — OFFICE VISIT (OUTPATIENT)
Dept: FAMILY MEDICINE | Facility: CLINIC | Age: 78
End: 2018-01-08
Payer: COMMERCIAL

## 2018-01-08 VITALS
DIASTOLIC BLOOD PRESSURE: 75 MMHG | HEART RATE: 78 BPM | HEIGHT: 68 IN | BODY MASS INDEX: 30.57 KG/M2 | SYSTOLIC BLOOD PRESSURE: 123 MMHG | WEIGHT: 201.7 LBS | TEMPERATURE: 98.3 F

## 2018-01-08 DIAGNOSIS — F33.1 MAJOR DEPRESSIVE DISORDER, RECURRENT EPISODE, MODERATE (H): Primary | ICD-10-CM

## 2018-01-08 DIAGNOSIS — E11.8 TYPE 2 DIABETES MELLITUS WITH COMPLICATION, WITHOUT LONG-TERM CURRENT USE OF INSULIN (H): ICD-10-CM

## 2018-01-08 DIAGNOSIS — M17.0 PRIMARY OSTEOARTHRITIS OF BOTH KNEES: ICD-10-CM

## 2018-01-08 DIAGNOSIS — M25.551 HIP PAIN, BILATERAL: ICD-10-CM

## 2018-01-08 DIAGNOSIS — M25.552 HIP PAIN, BILATERAL: ICD-10-CM

## 2018-01-08 DIAGNOSIS — M54.40 CHRONIC BILATERAL LOW BACK PAIN WITH SCIATICA, SCIATICA LATERALITY UNSPECIFIED: ICD-10-CM

## 2018-01-08 DIAGNOSIS — G47.00 PERSISTENT DISORDER OF INITIATING OR MAINTAINING SLEEP: ICD-10-CM

## 2018-01-08 DIAGNOSIS — G89.29 CHRONIC BILATERAL LOW BACK PAIN WITH SCIATICA, SCIATICA LATERALITY UNSPECIFIED: ICD-10-CM

## 2018-01-08 LAB — GLUCOSE BLD-MCNC: 103 MG/DL (ref 70–99)

## 2018-01-08 PROCEDURE — 80048 BASIC METABOLIC PNL TOTAL CA: CPT | Performed by: FAMILY MEDICINE

## 2018-01-08 PROCEDURE — 99214 OFFICE O/P EST MOD 30 MIN: CPT | Performed by: FAMILY MEDICINE

## 2018-01-08 PROCEDURE — 36415 COLL VENOUS BLD VENIPUNCTURE: CPT | Performed by: FAMILY MEDICINE

## 2018-01-08 PROCEDURE — 82947 ASSAY GLUCOSE BLOOD QUANT: CPT | Performed by: FAMILY MEDICINE

## 2018-01-08 RX ORDER — QUETIAPINE FUMARATE 25 MG/1
25 TABLET, FILM COATED ORAL
Qty: 60 TABLET | Refills: 1 | Status: SHIPPED | OUTPATIENT
Start: 2018-01-08 | End: 2018-02-20

## 2018-01-08 RX ORDER — IBUPROFEN 800 MG/1
TABLET, FILM COATED ORAL
Qty: 90 TABLET | Refills: 0 | OUTPATIENT
Start: 2018-01-08

## 2018-01-08 RX ORDER — DULOXETIN HYDROCHLORIDE 60 MG/1
60 CAPSULE, DELAYED RELEASE ORAL DAILY
Qty: 30 CAPSULE | Refills: 1 | Status: SHIPPED | OUTPATIENT
Start: 2018-01-08 | End: 2018-03-02

## 2018-01-08 RX ORDER — SERTRALINE HYDROCHLORIDE 100 MG/1
100 TABLET, FILM COATED ORAL DAILY
Qty: 90 TABLET | Refills: 3 | Status: SHIPPED | OUTPATIENT
Start: 2018-01-08 | End: 2018-02-20

## 2018-01-08 RX ORDER — IBUPROFEN 800 MG/1
TABLET, FILM COATED ORAL
Qty: 90 TABLET | Refills: 0 | Status: SHIPPED | OUTPATIENT
Start: 2018-01-08 | End: 2018-02-04

## 2018-01-08 NOTE — TELEPHONE ENCOUNTER
IBUPROFEN 800MG TABLETS        Last Written Prescription Date:  12/07/17  Last Fill Quantity: 90,   # refills: 0  Last Office Visit: 12/11/17  Future Office visit:    Next 5 appointments (look out 90 days)     Jan 08, 2018  1:00 PM CST   [facility] with Keyla Fonseca MD   [facility]    [address] [facility] [address]   [phone]                   Requested Prescriptions   Pending Prescriptions Disp Refills     ibuprofen (ADVIL/MOTRIN) 800 MG tablet [Pharmacy Med Name: IBUPROFEN 800MG TABLETS] 90 tablet 0     Sig: TAKE 1 TABLET(800 MG) BY MOUTH EVERY 8 HOURS AS NEEDED FOR MODERATE PAIN    NSAID Medications Failed    1/7/2018 11:17 AM       Failed - Patient is age 6-64 years       Failed - Normal CBC on file in past 12 months    Recent Labs   Lab Test  10/30/17   1552   WBC  7.7   RBC  4.42   HGB  11.8*   HCT  37.7*   PLT  332            Failed - Normal serum creatinine on file in past 12 months    Recent Labs   Lab Test  10/30/17   1552   CR  1.36*            Passed - Blood pressure under 140/90    BP Readings from Last 3 Encounters:   12/11/17 120/62   11/02/17 (P) 114/69   10/30/17 120/66                Passed - Normal ALT on file in past 12 months    Recent Labs   Lab Test  10/30/17   1552   ALT  29            Passed - Normal AST on file in past 12 months    Recent Labs   Lab Test  10/30/17   1552   AST  22            Passed - Recent or future visit with authorizing provider's specialty    Patient had office visit in the last year or has a visit in the next 30 days with authorizing provider.  See chart review.

## 2018-01-08 NOTE — NURSING NOTE
"Chief Complaint   Patient presents with     Sleep Problem     decreased the duloxetine to only 30mg in the am, also taking OTC sleep aid.      Fall     falling more.        Initial /75  Pulse 78  Temp 98.3  F (36.8  C) (Tympanic)  Ht 5' 8\" (1.727 m)  Wt 201 lb 11.2 oz (91.5 kg)  BMI 30.67 kg/m2 Estimated body mass index is 30.67 kg/(m^2) as calculated from the following:    Height as of this encounter: 5' 8\" (1.727 m).    Weight as of this encounter: 201 lb 11.2 oz (91.5 kg).  Medication Reconciliation: complete   Kassidy Reardon CMA    "

## 2018-01-08 NOTE — PROGRESS NOTES
SUBJECTIVE:                                                    Vincent Mishra is a 77 year old male who presents to clinic today for the following health issues:    Chief Complaint   Patient presents with     Sleep Problem     decreased the duloxetine to only 30mg in the am, also taking OTC sleep aid.      Fall     falling more.        Problem list and histories reviewed & adjusted, as indicated.  Additional history: sleeping with a sleep aid   Is taking 30 mg cymbalta just in the morning and this is going better he is having some hallucinations seeing things out of the corner of his eyes thought someone was standing over him in bed  Has an intention tremor   He had hallucinations and got paranoid with benadryl in the past  Does not have any during the day   After we discussed for a while it is the sleep aid that is doing this as this is the only thing that has changed   Patient didn't realize that the sleep aid most likely has diphenhydramine in it /  He also has a lot of pain   He once again brings up long ago traumas that he has had   While these are a part of him , he carries this burden   He has the weakness in the legs but his wife attests to the fact that he is just not moving around   He has atrophied the muscles in the legs with all of his sitting around.   Patient Active Problem List   Diagnosis     Tension headache     Trapezius strain     Hyperlipidemia LDL goal <100     Parotid mass     Diplopia     Neuropathy     Vision loss night     Neck pain     B12 deficiency     Tear of meniscus of left knee     Hypertension goal BP (blood pressure) < 140/90     C6-7 disc with radiculopathy     Right bundle branch block     Advanced directives, info letter sent 10/4/2011     Chest pain     Anatomic airway obstruction     Abnormal antinuclear antibody titer     Osteoarthritis, knee     Moderate major depression (H)     Orchitis, epididymitis, and epididymo-orchitis     Malignant neoplasm of kidney excluding renal  pelvis (H)     Renal mass, left     Vitamin D deficiency disease     Health Care Home     Insomnia     Chronic low back pain     Abdominal pain, right upper quadrant     Esophageal reflux     Hard of hearing     Constipation     NSTEMI (non-ST elevated myocardial infarction) (H)     Myocardial infarction, nontransmural (H)     Acute myocardial infarction of inferolateral wall (H)     Obesity     Sinoatrial node dysfunction (H)     Right bundle branch block (RBBB)     Essential hypertension     Hyperlipidemia     Type 2 diabetes mellitus with other circulatory complications     Thyroid nodule     Hip pain, bilateral     Claudication (H)     Bilateral low back pain with right-sided sciatica     Bilateral low back pain with left-sided sciatica     ACS (acute coronary syndrome) (H)     Chronic bilateral low back pain with sciatica, sciatica laterality unspecified     Past Surgical History:   Procedure Laterality Date     ARTHROSCOPY KNEE  2/11/2011    ARTHROSCOPY KNEE performed by LEY, JEFFREY DUANE at [facility]     ARTHROSCOPY KNEE WITH MEDIAL MENISCECTOMY  6/26/2012    Procedure: ARTHROSCOPY KNEE WITH MEDIAL MENISCECTOMY;  Right Knee Arthroscopy With Medial Menisectomy;  Surgeon: Ley, Jeffrey Duane, MD;  Location: [facility]     BACK SURGERY      back surgery x4     BIOPSY       CARDIAC SURGERY  7/2011    stentt placed     COLONOSCOPY       CORONARY ANGIOGRAPHY ADULT ORDER  07-25-14    RCA, L.Main and CFX  had minor luminal irregularites. Mid LAD high grade stenosis 80-90%.Stent placed to mid LAD     ESOPHAGOSCOPY, GASTROSCOPY, DUODENOSCOPY (EGD), COMBINED  10/25/2012    Procedure: COMBINED ESOPHAGOSCOPY, GASTROSCOPY, DUODENOSCOPY (EGD), BIOPSY SINGLE OR MULTIPLE;  Gastroscopy  ;  Surgeon: Lucius Dasilva MD;  Location: [facility]     HEART CATH, ANGIOPLASTY  07-25-14    mid LAD      ORTHOPEDIC SURGERY       back surgery       Social History   Substance Use Topics     Smoking status: Never Smoker     Smokeless tobacco: Never Used  "    Alcohol use No     Family History   Problem Relation Age of Onset     CANCER Mother      Depression Mother      DIABETES Father      Hypertension Father      HEART DISEASE Father      MENTAL ILLNESS Father      HEART DISEASE Maternal Grandfather      Substance Abuse Maternal Grandfather      Alcohol/Drug Paternal Grandmother      Substance Abuse Paternal Grandmother      HEART DISEASE Paternal Grandfather      Alcohol/Drug Paternal Grandfather      Substance Abuse Paternal Grandfather      HEART DISEASE Brother      DIABETES Brother      Substance Abuse Brother      Obesity Brother      DIABETES Brother      Obesity Sister      Alcohol/Drug Daughter      Depression Daughter      Obesity Sister      DIABETES Sister      Obesity Sister      DIABETES Sister      Alcohol/Drug Sister      CANCER Sister 55     possible kidney cancer     Substance Abuse Sister      Alcohol/Drug Son      Substance Abuse Son      DIABETES Son      Alcohol/Drug Son      Substance Abuse Son      Obesity Daughter      DIABETES Daughter      Hypertension Daughter      Bipolar Disorder Other              ROS:  Constitutional, HEENT, cardiovascular, pulmonary, gi and gu systems are negative, except as otherwise noted.      OBJECTIVE:                                                    /75  Pulse 78  Temp 98.3  F (36.8  C) (Tympanic)  Ht 5' 8\" (1.727 m)  Wt 201 lb 11.2 oz (91.5 kg)  BMI 30.67 kg/m2 Body mass index is 30.67 kg/(m^2).   GENERAL APPEARANCE: healthy, alert and no distress  RESP: lungs clear to auscultation - no rales, rhonchi or wheezes  CV: regular rates and rhythm, normal S1 S2, no S3 or S4 and no murmur, click or rub  MS: decreased range of motion lower back  and arthritic changes of the hands knees wrists   While patient was standing to leave he nearly dropped to his knees due to the pain in the groin did not have his cane nearby        ASSESSMENT/PLAN:                                                      1. Primary " osteoarthritis of both knees  - ibuprofen (ADVIL/MOTRIN) 800 MG tablet; TAKE 1 TABLET(800 MG) BY MOUTH EVERY 8 HOURS AS NEEDED FOR MODERATE PAIN  Dispense: 90 tablet; Refill: 0  - Basic metabolic panel    2. Major depressive disorder, recurrent episode, moderate (H)    - sertraline (ZOLOFT) 100 MG tablet; Take 1 tablet (100 mg) by mouth daily  Dispense: 90 tablet; Refill: 3  - DULoxetine (CYMBALTA) 60 MG EC capsule; Take 1 capsule (60 mg) by mouth daily  Dispense: 30 capsule; Refill: 1  - Basic metabolic panel    3. Persistent disorder of initiating or maintaining sleep    - QUEtiapine (SEROQUEL) 25 MG tablet; Take 1 tablet (25 mg) by mouth nightly as needed Increase to 2 tablets after 3-4 nights if tolerated increase to 3 tabs after 3-4 nights then to 4  Dispense: 60 tablet; Refill: 1  - Basic metabolic panel    4. Type 2 diabetes mellitus with complication, without long-term current use of insulin (H)    - Glucose whole blood  - blood glucose monitoring (NO BRAND SPECIFIED) meter device kit; Use to test blood sugar 1 times daily or as directed.  Dispense: 1 kit; Refill: 0  - Basic metabolic panel    5. Chronic bilateral low back pain with sciatica, sciatica laterality unspecified      6. Hip pain, bilateral  Patient Instructions   Increase the duloxetine to 60 mg in the morning   Stay on the 100 mg of the sertraline       Stop the sleep aid   Diphenhydramine is the same as bendryl        Start the seroquel   Increase by 1 tablet every 4 nights or so when you get to 100 mg/4 tabs  let me know   Decrease the trazodone to 2 and tablets in 1 week, then decrease to 1 tab after 2 weeks     Call and let me know how you are doing after this              reports that he has never smoked. He has never used smokeless tobacco.          Keyla Fonseca M.D.  [facility]

## 2018-01-08 NOTE — MR AVS SNAPSHOT
After Visit Summary   1/8/2018    Vincent Mishra    MRN: [id number]           Patient Information     Date Of Birth          1940        Visit Information        Provider Department      1/8/2018 1:00 PM Keyla Fonseca MD [facility]        Today's Diagnoses     Major depressive disorder, recurrent episode, moderate (H)    -  1    Primary osteoarthritis of both knees        Persistent disorder of initiating or maintaining sleep        Type 2 diabetes mellitus with complication, without long-term current use of insulin (H)          Care Instructions    Increase the duloxetine to 60 mg in the morning   Stay on the 100 mg of the sertraline       Stop the sleep aid   Diphenhydramine is the same as bendryl        Start the seroquel   Increase by 1 tablet every 4 nights or so when you get to 100 mg/4 tabs  let me know   Decrease the trazodone to 2 and tablets in 1 week, then decrease to 1 tab after 2 weeks     Call and let me know how you are doing after this               Follow-ups after your visit        Who to contact     Normal or non-critical lab and imaging results will be communicated to you by Unigo, letter or phone within 4 business days after the clinic has received the results. If you do not hear from us within 7 days, please contact the clinic through Unigo or phone. If you have a critical or abnormal lab result, we will notify you by phone as soon as possible.  Submit refill requests through Unigo or call your pharmacy and they will forward the refill request to us. Please allow 3 business days for your refill to be completed.          If you need to speak with a  for additional information , please call: [phone]             Additional Information About Your Visit        Unigo Information     Unigo gives you secure access to your electronic health record. If you see a primary care provider, you can also send messages to your care team and make  "appointments. If you have questions, please call your primary care clinic.  If you do not have a primary care provider, please call [phone] and they will assist you.        Care EveryWhere ID     This is your Care EveryWhere ID. This could be used by other organizations to access your [place] medical records  [id number]        Your Vitals Were     Pulse Temperature Height BMI (Body Mass Index)          78 98.3  F (36.8  C) (Tympanic) 5' 8\" (1.727 m) 30.67 kg/m2         Blood Pressure from Last 3 Encounters:   01/08/18 123/75   12/11/17 120/62   11/02/17 (P) 114/69    Weight from Last 3 Encounters:   01/08/18 201 lb 11.2 oz (91.5 kg)   12/11/17 196 lb 14.4 oz (89.3 kg)   10/30/17 196 lb (88.9 kg)              We Performed the Following     Glucose whole blood          Today's Medication Changes          These changes are accurate as of: 1/8/18  2:06 PM.  If you have any questions, ask your nurse or doctor.               Start taking these medicines.        Dose/Directions    QUEtiapine 25 MG tablet   Commonly known as:  SEROQUEL   Used for:  Persistent disorder of initiating or maintaining sleep   Started by:  Keyla Fonseca MD        Dose:  25 mg   Take 1 tablet (25 mg) by mouth nightly as needed Increase to 2 tablets after 3-4 nights if tolerated increase to 3 tabs after 3-4 nights then to 4   Quantity:  60 tablet   Refills:  1         These medicines have changed or have updated prescriptions.        Dose/Directions    * blood glucose monitoring meter device kit   Commonly known as:  no brand specified   This may have changed:  Another medication with the same name was added. Make sure you understand how and when to take each.   Used for:  Type 2 diabetes mellitus with hyperglycemia, without long-term current use of insulin (H)   Changed by:  Keyla Fonseca MD        Use to test blood sugar 2 times daily or as directed.   Quantity:  1 kit   Refills:  0       * blood glucose monitoring meter device " kit   Commonly known as:  no brand specified   This may have changed:  You were already taking a medication with the same name, and this prescription was added. Make sure you understand how and when to take each.   Used for:  Type 2 diabetes mellitus with complication, without long-term current use of insulin (H)   Changed by:  Keyla Fonseca MD        Use to test blood sugar 1 times daily or as directed.   Quantity:  1 kit   Refills:  0       * DULoxetine 30 MG EC capsule   Commonly known as:  CYMBALTA   This may have changed:  additional instructions   Used for:  Hip pain, bilateral, Chronic bilateral low back pain with sciatica, sciatica laterality unspecified, Lumbar back pain with radiculopathy affecting left lower extremity, Moderate major depression (H)   Changed by:  Keyla Fonseca MD        Take 1 capsule by mouth daily at dinnertime   Quantity:  90 capsule   Refills:  1       * DULoxetine 60 MG EC capsule   Commonly known as:  CYMBALTA   This may have changed:  when to take this   Used for:  Major depressive disorder, recurrent episode, moderate (H)   Changed by:  Keyla Fonseca MD        Dose:  60 mg   Take 1 capsule (60 mg) by mouth daily   Quantity:  30 capsule   Refills:  1       ibuprofen 800 MG tablet   Commonly known as:  ADVIL/MOTRIN   This may have changed:  See the new instructions.   Used for:  Primary osteoarthritis of both knees   Changed by:  Keyla Fonseca MD        TAKE 1 TABLET(800 MG) BY MOUTH EVERY 8 HOURS AS NEEDED FOR MODERATE PAIN   Quantity:  90 tablet   Refills:  0       * sertraline 50 MG tablet   Commonly known as:  ZOLOFT   This may have changed:  Another medication with the same name was added. Make sure you understand how and when to take each.   Used for:  Moderate episode of recurrent major depressive disorder (H)   Changed by:  Keyla Fonseca MD        Dose:  50 mg   Take 1 tablet (50 mg) by mouth daily Increase to 2 tabs after 1 week   Quantity:  60 tablet    Refills:  3       * sertraline 100 MG tablet   Commonly known as:  ZOLOFT   This may have changed:  You were already taking a medication with the same name, and this prescription was added. Make sure you understand how and when to take each.   Used for:  Major depressive disorder, recurrent episode, moderate (H)   Changed by:  Keyla Fonseca MD        Dose:  100 mg   Take 1 tablet (100 mg) by mouth daily   Quantity:  90 tablet   Refills:  3       * Notice:  This list has 6 medication(s) that are the same as other medications prescribed for you. Read the directions carefully, and ask your doctor or other care provider to review them with you.         Where to get your medicines      These medications were sent to [facility] [address] [place] [address]  AT [address] [facility] [address]     Phone:  [phone]     blood glucose monitoring meter device kit    DULoxetine 60 MG EC capsule    ibuprofen 800 MG tablet    sertraline 100 MG tablet         Some of these will need a paper prescription and others can be bought over the counter.  Ask your nurse if you have questions.     Bring a paper prescription for each of these medications     QUEtiapine 25 MG tablet                Primary Care Provider Office Phone # Fax #    Keyla Fonseca [phone] [address] [facility] [address]        Equal Access to Services     OVI ZULETA AH: Hadii rosa momin hadasho Soomaali, waaxda luqadaha, qaybta kaalmada adeegyada, sobeida hernandez hayneelam sandhu. So [facility] [phone].    ATENCIÓN: Si habla español, tiene a nicholas disposición servicios gratuitos de asistencia lingüística. Llame al [phone].    We comply with applicable federal civil rights laws and [place] laws. We do not discriminate on the basis of race, color, national origin, age, disability, sex, sexual orientation, or gender identity.            Thank you!     Thank  you for choosing [facility]  for your care. Our goal is always to provide you with excellent care. Hearing back from our patients is one way we can continue to improve our services. Please take a few minutes to complete the written survey that you may receive in the mail after your visit with us. Thank you!             Your Updated Medication List - Protect others around you: Learn how to safely use, store and throw away your medicines at www.disposemymeds.org.          This list is accurate as of: 1/8/18  2:06 PM.  Always use your most recent med list.                   Brand Name Dispense Instructions for use Diagnosis    acetaminophen 325 MG tablet    TYLENOL    250 tablet    Take 2 tablets by mouth every 4 hours as needed.    Tear of meniscus of left knee       amLODIPine 10 MG tablet    NORVASC    90 tablet    TAKE 1 TABLET(10 MG) BY MOUTH DAILY    Essential hypertension with goal blood pressure less than 140/90       ascorbic acid 500 MG tablet    VITAMIN C     Take 500 mg by mouth daily        aspirin 81 MG EC tablet      Take 1 tablet (81 mg) by mouth daily    Chest pain, unspecified type, NSTEMI (non-ST elevated myocardial infarction) (H)       atorvastatin 80 MG tablet    LIPITOR    90 tablet    Take 1 tablet (80 mg) by mouth daily    Hyperlipidemia LDL goal <100       * blood glucose monitoring meter device kit    no brand specified    1 kit    Use to test blood sugar 2 times daily or as directed.    Type 2 diabetes mellitus with hyperglycemia, without long-term current use of insulin (H)       * blood glucose monitoring meter device kit    no brand specified    1 kit    Use to test blood sugar 1 times daily or as directed.    Type 2 diabetes mellitus with complication, without long-term current use of insulin (H)       blood glucose monitoring test strip    ONETOUCH ULTRA    200 strip    Use to test blood sugars 2 times daily or as directed.    Type 2 diabetes mellitus with other circulatory  complications       buPROPion 100 MG tablet    WELLBUTRIN    180 tablet    TAKE 1 TABLET BY MOUTH EVERY MORNING FOR 7 DAYS THEN INCREASE TO 1 TABLET TWICE DAILY    Adjustment disorder with depressed mood       busPIRone 15 MG tablet    BUSPAR    180 tablet    TAKE 1 TABLET(15 MG) BY MOUTH TWICE DAILY    Depression with anxiety       calcium + D 600-200 MG-UNIT Tabs   Generic drug:  calcium carbonate-vitamin D     100 tablet    Take 2 tablets by mouth daily.        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, Right bundle branch block, NSTEMI (non-ST elevated myocardial infarction) (H)       * DULoxetine 30 MG EC capsule    CYMBALTA    90 capsule    Take 1 capsule by mouth daily at dinnertime    Hip pain, bilateral, Chronic bilateral low back pain with sciatica, sciatica laterality unspecified, Lumbar back pain with radiculopathy affecting left lower extremity, Moderate major depression (H)       * DULoxetine 60 MG EC capsule    CYMBALTA    30 capsule    Take 1 capsule (60 mg) by mouth daily    Major depressive disorder, recurrent episode, moderate (H)       gabapentin 100 MG capsule    NEURONTIN    270 capsule    Take 1 tablet increase by 1 tablet every 2 days to 6 tablets 3 times per day    Failed back surgical syndrome, 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    Type 2 diabetes, HbA1c goal < 7% (H)       lisinopril 40 MG tablet    PRINIVIL/ZESTRIL    90 tablet    TAKE 1 TABLET(40 MG) BY MOUTH DAILY    Essential hypertension with goal blood pressure less than 140/90       Melatonin 10 MG Tbdp     90 tablet    Take 10 mg by mouth At Bedtime        metFORMIN 500 MG 24 hr tablet    GLUCOPHAGE-XR    360 tablet    TAKE 2 TABLETS BY MOUTH TWICE DAILY WITH MEALS.    Type 2 diabetes mellitus without complication, unspecified long term insulin use status (H)       nitroGLYcerin 0.4 MG sublingual tablet    NITROSTAT    25 tablet    Place 1 tablet (0.4 mg) under the tongue every 5 minutes as needed for chest pain    CAD (coronary artery disease)       omeprazole 20 MG CR capsule    priLOSEC    180 capsule    TAKE ONE CAPSULE BY MOUTH TWICE DAILY    Gastroesophageal reflux disease without esophagitis       * order for DME     1 Device    Equipment being ordered: Wheelchair motorized for patient with spinal stenosis and neurogenic claudication    Spinal stenosis, lumbar region, with neurogenic claudication       * order for DME     1 Units    Equipment being ordered: TENS    Chronic bilateral low back pain with sciatica, sciatica laterality unspecified, Hip pain, bilateral       * oxyCODONE 30 MG 12 hr tablet    OxyCONTIN    30 tablet    Take 1 tablet (30 mg) by mouth every morning    Hip pain, bilateral, Chronic bilateral low back pain with sciatica, sciatica laterality unspecified       * oxyCODONE IR 5 MG tablet    ROXICODONE    45 tablet    Take 1 daily as needed  for severe pain may have 15 per month this is a 3 month supply    Lumbar back pain with radiculopathy affecting left lower extremity       QUEtiapine 25 MG tablet    SEROQUEL    60 tablet    Take 1 tablet (25 mg) by mouth nightly as needed Increase to 2 tablets after 3-4 nights if tolerated increase to 3 tabs after 3-4 nights then to 4    Persistent disorder of initiating or  maintaining sleep       SELECT-LITE DEVICE/LANCETS Kit     1 kit    1 kit 2 times daily    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)       * sertraline 50 MG tablet    ZOLOFT    60 tablet    Take 1 tablet (50 mg) by mouth daily Increase to 2 tabs after 1 week    Moderate episode of recurrent major depressive disorder (H)       * sertraline 100 MG tablet    ZOLOFT    90 tablet    Take 1 tablet (100 mg) by mouth daily    Major depressive disorder, recurrent episode, moderate (H)       spironolactone 25 MG tablet    ALDACTONE    90 tablet    TAKE 1 TABLET(25 MG) BY MOUTH DAILY    Essential hypertension with goal blood pressure less than 140/90       traZODone 100 MG tablet    DESYREL    270 tablet    Take 3 tablets (300 mg) by mouth nightly as needed for sleep 300 mg at HS for sleep    Primary insomnia       VITAMIN B-12 PO           * Notice:  This list has 10 medication(s) that are the same as other medications prescribed for you. Read the directions carefully, and ask your doctor or other care provider to review them with you.

## 2018-01-08 NOTE — PATIENT INSTRUCTIONS
Increase the duloxetine to 60 mg in the morning   Stay on the 100 mg of the sertraline       Stop the sleep aid   Diphenhydramine is the same as bendryl        Start the seroquel   Increase by 1 tablet every 4 nights or so when you get to 100 mg/4 tabs  let me know   Decrease the trazodone to 2 and tablets in 1 week, then decrease to 1 tab after 2 weeks     Call and let me know how you are doing after this

## 2018-01-09 LAB
ANION GAP SERPL CALCULATED.3IONS-SCNC: 8 MMOL/L (ref 3–14)
BUN SERPL-MCNC: 26 MG/DL (ref 7–30)
CALCIUM SERPL-MCNC: 9.2 MG/DL (ref 8.5–10.1)
CHLORIDE SERPL-SCNC: 105 MMOL/L (ref 94–109)
CO2 SERPL-SCNC: 25 MMOL/L (ref 20–32)
CREAT SERPL-MCNC: 1.38 MG/DL (ref 0.66–1.25)
GFR SERPL CREATININE-BSD FRML MDRD: 50 ML/MIN/1.7M2
GLUCOSE SERPL-MCNC: 98 MG/DL (ref 70–99)
POTASSIUM SERPL-SCNC: 4.5 MMOL/L (ref 3.4–5.3)
SODIUM SERPL-SCNC: 138 MMOL/L (ref 133–144)

## 2018-01-09 NOTE — PROGRESS NOTES
Vincent,  Your lab results were normal/stable. The blood sugar was fine at 103.  Please feel free to my chart or call the office with questions. Keyla Fonseca M.D.

## 2018-01-12 DIAGNOSIS — F51.01 PRIMARY INSOMNIA: ICD-10-CM

## 2018-01-12 NOTE — TELEPHONE ENCOUNTER
Routing refill request to provider for review/approval because:  Drug not on the [facility], [facility] or [facility] refill protocol or controlled substance.  Shelley Iglesias RN

## 2018-01-12 NOTE — TELEPHONE ENCOUNTER
"Requested Prescriptions   Pending Prescriptions Disp Refills     traZODone (DESYREL) 100 MG tablet [Pharmacy Med Name: TRAZODONE 100MG TABLETS] 270 tablet 0    Last Written Prescription Date:  8/24/17  Last Fill Quantity: 270,  # refills: 0   Last Office Visit with [facility], [facility] or [facility] prescribing provider:  1/8/17   Future Office Visit:      Sig: TAKE 3 TABLETS(300 MG) BY MOUTH EVERY NIGHT AT BEDTIME AS NEEDED FOR SLEEP    Serotonin Modulators Passed    1/12/2018 10:18 AM       Passed - Recent or future visit with authorizing provider's specialty    Patient had office visit in the last year or has a visit in the next 30 days with authorizing provider.  See \"Patient Info\" tab in inbasket, or \"Choose Columns\" in Meds & Orders section of the refill encounter.              Passed - Patient is age 18 or older          "

## 2018-01-13 RX ORDER — TRAZODONE HYDROCHLORIDE 100 MG/1
TABLET ORAL
Qty: 270 TABLET | Refills: 0 | Status: SHIPPED | OUTPATIENT
Start: 2018-01-13 | End: 2018-04-12

## 2018-01-24 ENCOUNTER — TELEPHONE (OUTPATIENT)
Dept: FAMILY MEDICINE | Facility: CLINIC | Age: 78
End: 2018-01-24

## 2018-01-24 NOTE — TELEPHONE ENCOUNTER
Sheyla was concerned about his medication and a little mixed up, thinking the Seroquel had benadryl in it so never increased it.    I discussed dr Senior note he is to increase Seroquel slowly to 75 mg a night.  Then call and report how he is doing.

## 2018-01-24 NOTE — TELEPHONE ENCOUNTER
Reason for call:  Patient reporting a symptom    Symptom or request: Sheyla is calling for Vincent.  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  Pat thinks his medications need to be tweeted.  Please call and assess. Thank you..Ebony Dickson    Duration (how long have symptoms been present): past few nights    Have you been treated for this before? No    Additional comments: none    Phone Number patient can be reached at:  Home number on file [phone] (home)    Best Time:  Any time    Can we leave a detailed message on this number:  YES    Call taken on 1/24/2018 at 10:17 AM by Ebony Dickson

## 2018-01-30 ENCOUNTER — TELEPHONE (OUTPATIENT)
Dept: FAMILY MEDICINE | Facility: CLINIC | Age: 78
End: 2018-01-30

## 2018-01-30 DIAGNOSIS — M54.40 CHRONIC BILATERAL LOW BACK PAIN WITH SCIATICA, SCIATICA LATERALITY UNSPECIFIED: ICD-10-CM

## 2018-01-30 DIAGNOSIS — M25.552 HIP PAIN, BILATERAL: ICD-10-CM

## 2018-01-30 DIAGNOSIS — G89.29 CHRONIC BILATERAL LOW BACK PAIN WITH SCIATICA, SCIATICA LATERALITY UNSPECIFIED: ICD-10-CM

## 2018-01-30 DIAGNOSIS — M25.551 HIP PAIN, BILATERAL: ICD-10-CM

## 2018-01-30 RX ORDER — OXYCODONE HYDROCHLORIDE 30 MG/1
30 TABLET, FILM COATED, EXTENDED RELEASE ORAL EVERY MORNING
Qty: 30 TABLET | Refills: 0 | Status: SHIPPED | OUTPATIENT
Start: 2018-01-30 | End: 2018-03-02

## 2018-01-30 NOTE — TELEPHONE ENCOUNTER
Wife is requesting refill of chronic narcotics.   Dr Fonseca is PCP and fills #30/month for him regularly     ARYA Velez MD

## 2018-02-01 DIAGNOSIS — E11.59 TYPE 2 DIABETES MELLITUS WITH OTHER CIRCULATORY COMPLICATION, WITHOUT LONG-TERM CURRENT USE OF INSULIN (H): Primary | ICD-10-CM

## 2018-02-01 DIAGNOSIS — E78.5 HYPERLIPIDEMIA LDL GOAL <100: ICD-10-CM

## 2018-02-01 DIAGNOSIS — I10 ESSENTIAL HYPERTENSION WITH GOAL BLOOD PRESSURE LESS THAN 140/90: ICD-10-CM

## 2018-02-01 RX ORDER — ATORVASTATIN CALCIUM 80 MG/1
TABLET, FILM COATED ORAL
Qty: 90 TABLET | Refills: 0 | Status: SHIPPED | OUTPATIENT
Start: 2018-02-01 | End: 2018-04-30

## 2018-02-01 RX ORDER — AMLODIPINE BESYLATE 10 MG/1
TABLET ORAL
Qty: 90 TABLET | Refills: 0 | Status: SHIPPED | OUTPATIENT
Start: 2018-02-01 | End: 2018-04-30

## 2018-02-01 NOTE — TELEPHONE ENCOUNTER
"ATORVASTATIN 80MG TABLETS        Last Written Prescription Date:  1/24/17  Last Fill Quantity: 90,   # refills: 3  Last Office Visit: 1/8/18  Future Office visit:       amLODIPine (NORVASC) 10 MG tablet      Last Written Prescription Date:  10/31/17  Last Fill Quantity: 90,   # refills: 0  Last Office Visit: 1/8/18  Future Office visit:       Requested Prescriptions   Pending Prescriptions Disp Refills     atorvastatin (LIPITOR) 80 MG tablet [Pharmacy Med Name: ATORVASTATIN 80MG TABLETS] 90 tablet 0     Sig: TAKE 1 TABLET(80 MG) BY MOUTH DAILY    Statins Protocol Failed    2/1/2018 10:22 AM       Failed - LDL on file in past 12 months    Recent Labs   Lab Test  01/24/17   1100   LDL  69            Passed - No abnormal creatine kinase in past 12 months    Recent Labs   Lab Test 07/25/14   CKTOTAL  98            Passed - Recent or future visit with authorizing provider    Patient had office visit in the last year or has a visit in the next 30 days with authorizing provider.  See \"Patient Info\" tab in inbasket, or \"Choose Columns\" in Meds & Orders section of the refill encounter.            Passed - Patient is age 18 or older        amLODIPine (NORVASC) 10 MG tablet [Pharmacy Med Name: AMLODIPINE BESYLATE 10MG TABLETS] 90 tablet 0     Sig: TAKE 1 TABLET(10 MG) BY MOUTH DAILY    Calcium Channel Blockers Protocol  Failed    2/1/2018 10:22 AM       Failed - Normal serum creatinine on file in past 12 months    Recent Labs   Lab Test  01/08/18   1414   CR  1.38*            Passed - Blood pressure under 140/90    BP Readings from Last 3 Encounters:   01/08/18 123/75   12/11/17 120/62   11/02/17 (P) 114/69                Passed - Recent or future visit with authorizing provider    Patient had office visit in the last year or has a visit in the next 30 days with authorizing provider.  See \"Patient Info\" tab in inbasket, or \"Choose Columns\" in Meds & Orders section of the refill encounter.            Passed - Patient is age 18 " or older

## 2018-02-02 NOTE — TELEPHONE ENCOUNTER
Medication is being filled for 1 time refill only due to:  Patient needs labs lipids,thyroid, hgba1c. Future labs ordered yes.   Yvon Dickens RN

## 2018-02-04 DIAGNOSIS — M17.0 PRIMARY OSTEOARTHRITIS OF BOTH KNEES: ICD-10-CM

## 2018-02-05 NOTE — TELEPHONE ENCOUNTER
"IBUPROFEN 800MG TABLETS        Last Written Prescription Date:  1/8/18  Last Fill Quantity: 90,   # refills: 0  Last Office Visit: 1/8/18  Future Office visit:       Requested Prescriptions   Pending Prescriptions Disp Refills     ibuprofen (ADVIL/MOTRIN) 800 MG tablet [Pharmacy Med Name: IBUPROFEN 800MG TABLETS] 90 tablet 0     Sig: TAKE 1 TABLET BY MOUTH EVERY 8 HOURS AS NEEDED FOR MODERATE PAIN    NSAID Medications Failed    2/4/2018 11:18 AM       Failed - Patient is age 6-64 years       Failed - Normal CBC on file in past 12 months    Recent Labs   Lab Test  10/30/17   1552   WBC  7.7   RBC  4.42   HGB  11.8*   HCT  37.7*   PLT  332            Failed - Normal serum creatinine on file in past 12 months    Recent Labs   Lab Test  01/08/18   1414   CR  1.38*            Passed - Blood pressure under 140/90    BP Readings from Last 3 Encounters:   01/08/18 123/75   12/11/17 120/62   11/02/17 (P) 114/69                Passed - Normal ALT on file in past 12 months    Recent Labs   Lab Test  10/30/17   1552   ALT  29            Passed - Normal AST on file in past 12 months    Recent Labs   Lab Test  10/30/17   1552   AST  22            Passed - Recent or future visit with authorizing provider's specialty    Patient had office visit in the last year or has a visit in the next 30 days with authorizing provider.  See \"Patient Info\" tab in inbasket, or \"Choose Columns\" in Meds & Orders section of the refill encounter.               "

## 2018-02-06 RX ORDER — IBUPROFEN 800 MG/1
TABLET, FILM COATED ORAL
Qty: 90 TABLET | Refills: 0 | Status: SHIPPED | OUTPATIENT
Start: 2018-02-06 | End: 2018-03-17

## 2018-02-06 NOTE — TELEPHONE ENCOUNTER
Prescription approved per [facility] Refill Protocol.  Yvon Dickens RN

## 2018-02-20 ENCOUNTER — OFFICE VISIT (OUTPATIENT)
Dept: FAMILY MEDICINE | Facility: CLINIC | Age: 78
End: 2018-02-20
Payer: COMMERCIAL

## 2018-02-20 VITALS
HEIGHT: 68 IN | WEIGHT: 203 LBS | BODY MASS INDEX: 30.77 KG/M2 | SYSTOLIC BLOOD PRESSURE: 134 MMHG | HEART RATE: 65 BPM | DIASTOLIC BLOOD PRESSURE: 76 MMHG | TEMPERATURE: 98.4 F

## 2018-02-20 DIAGNOSIS — F33.2 SEVERE EPISODE OF RECURRENT MAJOR DEPRESSIVE DISORDER, WITHOUT PSYCHOTIC FEATURES (H): Primary | ICD-10-CM

## 2018-02-20 DIAGNOSIS — Z91.81 AT RISK FOR FALLING: ICD-10-CM

## 2018-02-20 DIAGNOSIS — G47.00 PERSISTENT DISORDER OF INITIATING OR MAINTAINING SLEEP: ICD-10-CM

## 2018-02-20 PROCEDURE — 93000 ELECTROCARDIOGRAM COMPLETE: CPT | Performed by: FAMILY MEDICINE

## 2018-02-20 PROCEDURE — 99215 OFFICE O/P EST HI 40 MIN: CPT | Performed by: FAMILY MEDICINE

## 2018-02-20 RX ORDER — QUETIAPINE FUMARATE 25 MG/1
25 TABLET, FILM COATED ORAL
Qty: 60 TABLET | Refills: 1 | COMMUNITY
Start: 2018-02-20 | End: 2018-03-05

## 2018-02-20 RX ORDER — SERTRALINE HYDROCHLORIDE 100 MG/1
200 TABLET, FILM COATED ORAL DAILY
Qty: 180 TABLET | Refills: 0 | Status: SHIPPED | OUTPATIENT
Start: 2018-02-20 | End: 2018-09-14

## 2018-02-20 ASSESSMENT — ANXIETY QUESTIONNAIRES
GAD7 TOTAL SCORE: 13
1. FEELING NERVOUS, ANXIOUS, OR ON EDGE: NEARLY EVERY DAY
2. NOT BEING ABLE TO STOP OR CONTROL WORRYING: NEARLY EVERY DAY
6. BECOMING EASILY ANNOYED OR IRRITABLE: NOT AT ALL
3. WORRYING TOO MUCH ABOUT DIFFERENT THINGS: NEARLY EVERY DAY
5. BEING SO RESTLESS THAT IT IS HARD TO SIT STILL: SEVERAL DAYS
7. FEELING AFRAID AS IF SOMETHING AWFUL MIGHT HAPPEN: NEARLY EVERY DAY

## 2018-02-20 ASSESSMENT — PATIENT HEALTH QUESTIONNAIRE - PHQ9: 5. POOR APPETITE OR OVEREATING: NOT AT ALL

## 2018-02-20 NOTE — PATIENT INSTRUCTIONS
At your visit today, we discussed your risk for falls and preventive options.    Fall Prevention  Falls often occur due to slipping, tripping or losing your balance. Millions of people fall every year and injure themselves. Here are ways to reduce your risk of falling again.    Think about your fall, was there anything that caused your fall that can be fixed, removed, or replaced?    Make your home safe by keeping walkways clear of objects you may trip over.    Use non-slip pads under rugs. Do not use area rugs or small throw rugs.    Use non-slip mats in bathtubs and showers.    Install handrails and lights on staircases.    Do not walk in poorly lit areas.    Do not stand on chairs or wobbly ladders.    Use caution when reaching overhead or looking upward. This position can cause a loss of balance.    Be sure your shoes fit properly, have non-slip bottoms and are in good condition.     Wear shoes both inside and out. Avoid going barefoot or wearing slippers.    Be cautious when going up and down stairs, curbs, and when walking on uneven sidewalks.    If your balance is poor, consider using a cane or walker.    If your fall was related to alcohol use, stop or limit alcohol intake.     If your fall was related to use of sleeping medicines, talk to your doctor about this. You may need to reduce your dosage at bedtime if you awaken during the night to go to the bathroom.      To reduce the need for nighttime bathroom trips:    Avoid drinking fluids for several hours before going to bed    Empty your bladder before going to bed    Men can keep a urinal at the bedside    Stay as active as you can. Balance, flexibility, strength, and endurance all come from exercise. They all play a role in preventing falls. Ask your healthcare provider which types of activity are right for you.    Get your vision checked on a regular basis.    If you have pets, know where they are before you stand up or walk so you don't trip over  them.    Use night lights.  Date Last Reviewed: 11/5/2015 2000-2017 [employer]. [address] [place] [address], [place] [address]. All rights reserved. This information is not intended as a substitute for professional medical care. Always follow your healthcare professional's instructions.

## 2018-02-20 NOTE — MR AVS SNAPSHOT
After Visit Summary   2/20/2018    Vincent Mishra    MRN: [id number]           Patient Information     Date Of Birth          1940        Visit Information        Provider Department      2/20/2018 11:30 AM Ramya Velez MD [facility]        Today's Diagnoses     Severe episode of recurrent major depressive disorder, without psychotic features (H)    -  1    At risk for falling        Persistent disorder of initiating or maintaining sleep          Care Instructions      At your visit today, we discussed your risk for falls and preventive options.    Fall Prevention  Falls often occur due to slipping, tripping or losing your balance. Millions of people fall every year and injure themselves. Here are ways to reduce your risk of falling again.    Think about your fall, was there anything that caused your fall that can be fixed, removed, or replaced?    Make your home safe by keeping walkways clear of objects you may trip over.    Use non-slip pads under rugs. Do not use area rugs or small throw rugs.    Use non-slip mats in bathtubs and showers.    Install handrails and lights on staircases.    Do not walk in poorly lit areas.    Do not stand on chairs or wobbly ladders.    Use caution when reaching overhead or looking upward. This position can cause a loss of balance.    Be sure your shoes fit properly, have non-slip bottoms and are in good condition.     Wear shoes both inside and out. Avoid going barefoot or wearing slippers.    Be cautious when going up and down stairs, curbs, and when walking on uneven sidewalks.    If your balance is poor, consider using a cane or walker.    If your fall was related to alcohol use, stop or limit alcohol intake.     If your fall was related to use of sleeping medicines, talk to your doctor about this. You may need to reduce your dosage at bedtime if you awaken during the night to go to the bathroom.      To reduce the need for nighttime  bathroom trips:    Avoid drinking fluids for several hours before going to bed    Empty your bladder before going to bed    Men can keep a urinal at the bedside    Stay as active as you can. Balance, flexibility, strength, and endurance all come from exercise. They all play a role in preventing falls. Ask your healthcare provider which types of activity are right for you.    Get your vision checked on a regular basis.    If you have pets, know where they are before you stand up or walk so you don't trip over them.    Use night lights.  Date Last Reviewed: 11/5/2015 2000-2017 [email or web address]. [address] [place] [address]. All rights reserved. This information is not intended as a substitute for professional medical care. Always follow your healthcare professional's instructions.                Follow-ups after your visit        Additional Services     MENTAL HEALTH REFERRAL  - Adult; Psychiatry and Medication Management; Psychiatry; [facility]: Collaborative Care Psychiatry Service   [place], [place] [phone]  Medication management & future refills will be returned to [facility] PCP upon compl...       All scheduling is subject to the client's specific insurance plan & benefits, provider/location availability, and provider clinical specialities.  Please arrive 15 minutes early for your first appointment and bring your completed paperwork.    Please be aware that coverage of these services is subject to the terms and limitations of your health insurance plan.  Call member services at your health plan with any benefit or coverage questions.                            Who to contact     Normal or non-critical lab and imaging results will be communicated to you by MyChart, letter or phone within 4 business days after the clinic has received the results. If you do not hear from us within 7 days, please contact the clinic through MyChart or phone. If you have a critical or abnormal lab result, we  "will notify you by phone as soon as possible.  Submit refill requests through [employer] or call your pharmacy and they will forward the refill request to us. Please allow 3 business days for your refill to be completed.          If you need to speak with a  for additional information , please call: [phone]             Additional Information About Your Visit        [employer] Information     [employer] gives you secure access to your electronic health record. If you see a primary care provider, you can also send messages to your care team and make appointments. If you have questions, please call your primary care clinic.  If you do not have a primary care provider, please call [phone] and they will assist you.        Care EveryWhere ID     This is your Care EveryWhere ID. This could be used by other organizations to access your [facility] medical records  [id number]        Your Vitals Were     Pulse Temperature Height BMI (Body Mass Index)          65 98.4  F (36.9  C) (Tympanic) 5' 8\" (1.727 m) 30.87 kg/m2         Blood Pressure from Last 3 Encounters:   02/20/18 134/76   01/08/18 123/75   12/11/17 120/62    Weight from Last 3 Encounters:   02/20/18 203 lb (92.1 kg)   01/08/18 201 lb 11.2 oz (91.5 kg)   12/11/17 196 lb 14.4 oz (89.3 kg)              We Performed the Following     DEPRESSION ACTION PLAN (DAP)     EKG 12-lead complete w/read - Clinics     MENTAL HEALTH REFERRAL  - Adult; Psychiatry and Medication Management; Psychiatry; [facility]: Collaborative Care Psychiatry Service   [place], [place] [phone]  Medication management & future refills will be returned to [facility] PCP upon compl...          Today's Medication Changes          These changes are accurate as of 2/20/18 12:42 PM.  If you have any questions, ask your nurse or doctor.               These medicines have changed or have updated prescriptions.        Dose/Directions    DULoxetine 60 MG EC capsule   Commonly known as:  " CYMBALTA   This may have changed:  Another medication with the same name was removed. Continue taking this medication, and follow the directions you see here.   Used for:  Major depressive disorder, recurrent episode, moderate (H)   Changed by:  Ramya Velez MD        Dose:  60 mg   Take 1 capsule (60 mg) by mouth daily   Quantity:  30 capsule   Refills:  1       SEROQUEL 25 MG tablet   This may have changed:  additional instructions   Used for:  Persistent disorder of initiating or maintaining sleep   Generic drug:  QUEtiapine   Changed by:  Ramya Velez MD        Dose:  25 mg   Take 1 tablet (25 mg) by mouth nightly as needed   Quantity:  60 tablet   Refills:  1       sertraline 100 MG tablet   Commonly known as:  ZOLOFT   This may have changed:    - how much to take  - Another medication with the same name was removed. Continue taking this medication, and follow the directions you see here.   Changed by:  Ramya Velez MD        Dose:  200 mg   Take 2 tablets (200 mg) by mouth daily   Quantity:  180 tablet   Refills:  0            Where to get your medicines      These medications were sent to [facility] [address] [address] AT [address] [address]     Phone:  [phone]     sertraline 100 MG tablet                Primary Care Provider Office Phone # Fax #    Keyla Fonseca [phone] [address] [facility] [address]        Equal Access to Services     [facility] AH: Hadii aad ku hadasho Soomaali, waaxda luqadaha, qaybta kaalmada adeegyada, sobeida sandhu. So [facility] [phone].    ATENCIÓN: Si habla español, tiene a nicholas disposición servicios gratuitos de asistencia lingüística. Llame al [phone].    We comply with applicable federal civil rights laws and [place] laws. We do not discriminate on the basis of race, color, national origin, age,  disability, sex, sexual orientation, or gender identity.            Thank you!     Thank you for choosing [facility]  for your care. Our goal is always to provide you with excellent care. Hearing back from our patients is one way we can continue to improve our services. Please take a few minutes to complete the written survey that you may receive in the mail after your visit with us. Thank you!             Your Updated Medication List - Protect others around you: Learn how to safely use, store and throw away your medicines at www.disposemymeds.org.          This list is accurate as of 2/20/18 12:42 PM.  Always use your most recent med list.                   Brand Name Dispense Instructions for use Diagnosis    acetaminophen 325 MG tablet    TYLENOL    250 tablet    Take 2 tablets by mouth every 4 hours as needed.    Tear of meniscus of left knee       amLODIPine 10 MG tablet    NORVASC    90 tablet    TAKE 1 TABLET(10 MG) BY MOUTH DAILY    Essential hypertension with goal blood pressure less than 140/90       ascorbic acid 500 MG tablet    VITAMIN C     Take 500 mg by mouth daily        aspirin 81 MG EC tablet      Take 1 tablet (81 mg) by mouth daily    Chest pain, unspecified type, NSTEMI (non-ST elevated myocardial infarction) (H)       atorvastatin 80 MG tablet    LIPITOR    90 tablet    TAKE 1 TABLET(80 MG) BY MOUTH DAILY    Hyperlipidemia LDL goal <100       * blood glucose monitoring meter device kit    no brand specified    1 kit    Use to test blood sugar 2 times daily or as directed.    Type 2 diabetes mellitus with hyperglycemia, without long-term current use of insulin (H)       * blood glucose monitoring meter device kit    no brand specified    1 kit    Use to test blood sugar 1 times daily or as directed.    Type 2 diabetes mellitus with complication, without long-term current use of insulin (H)       blood glucose monitoring test strip    ONETOUCH ULTRA    200 strip    Use to test blood  sugars 2 times daily or as directed.    Type 2 diabetes mellitus with other circulatory complications       buPROPion 100 MG tablet    WELLBUTRIN    180 tablet    TAKE 1 TABLET BY MOUTH EVERY MORNING FOR 7 DAYS THEN INCREASE TO 1 TABLET TWICE DAILY    Adjustment disorder with depressed mood       busPIRone 15 MG tablet    BUSPAR    180 tablet    TAKE 1 TABLET(15 MG) BY MOUTH TWICE DAILY    Depression with anxiety       calcium + D 600-200 MG-UNIT Tabs   Generic drug:  calcium carbonate-vitamin D     100 tablet    Take 2 tablets by mouth daily.        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, Right bundle branch block, NSTEMI (non-ST elevated myocardial infarction) (H)       DULoxetine 60 MG EC capsule    CYMBALTA    30 capsule    Take 1 capsule (60 mg) by mouth daily    Major depressive disorder, recurrent episode, moderate (H)       gabapentin 100 MG capsule    NEURONTIN    270 capsule    Take 1 tablet increase by 1 tablet every 2 days to 6 tablets 3 times per day    Failed back surgical syndrome, 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    Type 2 diabetes, HbA1c goal < 7% (H)       lisinopril 40 MG tablet    PRINIVIL/ZESTRIL    90 tablet    TAKE 1 TABLET(40 MG) BY MOUTH DAILY    Essential hypertension with goal blood pressure less  than 140/90       Melatonin 10 MG Tbdp     90 tablet    Take 10 mg by mouth At Bedtime        metFORMIN 500 MG 24 hr tablet    GLUCOPHAGE-XR    360 tablet    TAKE 2 TABLETS BY MOUTH TWICE DAILY WITH MEALS.    Type 2 diabetes mellitus without complication, unspecified long term insulin use status (H)       nitroGLYcerin 0.4 MG sublingual tablet    NITROSTAT    25 tablet    Place 1 tablet (0.4 mg) under the tongue every 5 minutes as needed for chest pain    CAD (coronary artery disease)       omeprazole 20 MG CR capsule    priLOSEC    180 capsule    TAKE ONE CAPSULE BY MOUTH TWICE DAILY    Gastroesophageal reflux disease without esophagitis       * order for DME     1 Device    Equipment being ordered: Wheelchair motorized for patient with spinal stenosis and neurogenic claudication    Spinal stenosis, lumbar region, with neurogenic claudication       * order for DME     1 Units    Equipment being ordered: TENS    Chronic bilateral low back pain with sciatica, sciatica laterality unspecified, Hip pain, bilateral       * oxyCODONE IR 5 MG tablet    ROXICODONE    45 tablet    Take 1 daily as needed  for severe pain may have 15 per month this is a 3 month supply    Lumbar back pain with radiculopathy affecting left lower extremity       * oxyCODONE 30 MG 12 hr tablet    OxyCONTIN    30 tablet    Take 1 tablet (30 mg) by mouth every morning    Hip pain, bilateral, Chronic bilateral low back pain with sciatica, sciatica laterality unspecified       SELECT-LITE DEVICE/LANCETS Kit     1 kit    1 kit 2 times daily    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)       SEROQUEL 25 MG tablet   Generic drug:  QUEtiapine     60 tablet    Take 1 tablet (25 mg) by mouth nightly as needed    Persistent disorder of initiating or maintaining sleep       sertraline 100 MG tablet    ZOLOFT    180 tablet    Take 2 tablets (200 mg) by mouth daily        spironolactone 25 MG tablet    ALDACTONE    90 tablet    TAKE 1  TABLET(25 MG) BY MOUTH DAILY    Essential hypertension with goal blood pressure less than 140/90       traZODone 100 MG tablet    DESYREL    270 tablet    TAKE 3 TABLETS(300 MG) BY MOUTH EVERY NIGHT AT BEDTIME AS NEEDED FOR SLEEP    Primary insomnia       VITAMIN B-12 PO           * Notice:  This list has 6 medication(s) that are the same as other medications prescribed for you. Read the directions carefully, and ask your doctor or other care provider to review them with you.

## 2018-02-20 NOTE — LETTER
My Depression Action Plan  Name: Vincent Mishra   Date of Birth 1940  Date: 2/20/2018    My doctor: Keyla Fonseca   My clinic: [address] [address] [phone]          GREEN    ZONE   Good Control    What it looks like:     Things are going generally well. You have normal up s and down s. You may even feel depressed from time to time, but bad moods usually last less than a day.   What you need to do:  1. Continue to care for yourself (see self care plan)  2. Check your depression survival kit and update it as needed  3. Follow your physician s recommendations including any medication.  4. Do not stop taking medication unless you consult with your physician first.           YELLOW         ZONE Getting Worse    What it looks like:     Depression is starting to interfere with your life.     It may be hard to get out of bed; you may be starting to isolate yourself from others.    Symptoms of depression are starting to last most all day and this has happened for several days.     You may have suicidal thoughts but they are not constant.   What you need to do:     1. Call your care team, your response to treatment will improve if you keep your care team informed of your progress. Yellow periods are signs an adjustment may need to be made.     2. Continue your self-care, even if you have to fake it!    3. Talk to someone in your support network    4. Open up your depression survival kit           RED    ZONE Medical Alert - Get Help    What it looks like:     Depression is seriously interfering with your life.     You may experience these or other symptoms: You can t get out of bed most days, can t work or engage in other necessary activities, you have trouble taking care of basic hygiene, or basic responsibilities, thoughts of suicide or death that will not go away, self-injurious behavior.     What you need to do:  1. Call your care team and request a  same-day appointment. If they are not available (weekends or after hours) call your local crisis line, emergency room or 911.      Electronically signed by: Shoshana Viramontes, February 20, 2018    Depression Self Care Plan / Survival Kit    Self-Care for Depression  Here s the deal. Your body and mind are really not as separate as most people think.  What you do and think affects how you feel and how you feel influences what you do and think. This means if you do things that people who feel good do, it will help you feel better.  Sometimes this is all it takes.  There is also a place for medication and therapy depending on how severe your depression is, so be sure to consult with your medical provider and/ or Behavioral Health Consultant if your symptoms are worsening or not improving.     In order to better manage my stress, I will:    Exercise  Get some form of exercise, every day. This will help reduce pain and release endorphins, the  feel good  chemicals in your brain. This is almost as good as taking antidepressants!  This is not the same as joining a gym and then never going! (they count on that by the way ) It can be as simple as just going for a walk or doing some gardening, anything that will get you moving.      Hygiene   Maintain good hygiene (Get out of bed in the morning, Make your bed, Brush your teeth, Take a shower, and Get dressed like you were going to work, even if you are unemployed).  If your clothes don't fit try to get ones that do.    Diet  I will strive to eat foods that are good for me, drink plenty of water, and avoid excessive sugar, caffeine, alcohol, and other mood-altering substances.  Some foods that are helpful in depression are: complex carbohydrates, B vitamins, flaxseed, fish or fish oil, fresh fruits and vegetables.    Psychotherapy  I agree to participate in Individual Therapy (if recommended).    Medication  If prescribed medications, I agree to take them.  Missing doses can  result in serious side effects.  I understand that drinking alcohol, or other illicit drug use, may cause potential side effects.  I will not stop my medication abruptly without first discussing it with my provider.    Staying Connected With Others  I will stay in touch with my friends, family members, and my primary care provider/team.    Use your imagination  Be creative.  We all have a creative side; it doesn t matter if it s oil painting, sand castles, or mud pies! This will also kick up the endorphins.    Witness Beauty  (AKA stop and smell the roses) Take a look outside, even in mid-winter. Notice colors, textures. Watch the squirrels and birds.     Service to others  Be of service to others.  There is always someone else in need.  By helping others we can  get out of ourselves  and remember the really important things.  This also provides opportunities for practicing all the other parts of the program.    Humor  Laugh and be silly!  Adjust your TV habits for less news and crime-drama and more comedy.    Control your stress  Try breathing deep, massage therapy, biofeedback, and meditation. Find time to relax each day.     My support system    Clinic Contact:  Phone number:    Contact 1:  Phone number:    Contact 2:  Phone number:    Religion/:  Phone number:    Therapist:  Phone number:    Local crisis center:    Phone number:    Other community support:  Phone number:

## 2018-02-20 NOTE — NURSING NOTE
"Chief Complaint   Patient presents with     Depression       Initial /76 (BP Location: Right arm, Patient Position: Sitting, Cuff Size: Adult Regular)  Pulse 65  Temp 98.4  F (36.9  C) (Tympanic)  Ht 5' 8\" (1.727 m)  Wt 203 lb (92.1 kg)  BMI 30.87 kg/m2 Estimated body mass index is 30.87 kg/(m^2) as calculated from the following:    Height as of this encounter: 5' 8\" (1.727 m).    Weight as of this encounter: 203 lb (92.1 kg).  Medication Reconciliation: complete   Shoshana Viramontes LPN    "
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
## 2018-02-21 ASSESSMENT — ANXIETY QUESTIONNAIRES: GAD7 TOTAL SCORE: 13

## 2018-02-21 ASSESSMENT — PATIENT HEALTH QUESTIONNAIRE - PHQ9: SUM OF ALL RESPONSES TO PHQ QUESTIONS 1-9: 19

## 2018-02-27 ENCOUNTER — OFFICE VISIT (OUTPATIENT)
Dept: CARDIOLOGY | Facility: CLINIC | Age: 78
End: 2018-02-27
Attending: INTERNAL MEDICINE
Payer: COMMERCIAL

## 2018-02-27 VITALS
OXYGEN SATURATION: 97 % | HEART RATE: 72 BPM | SYSTOLIC BLOOD PRESSURE: 129 MMHG | DIASTOLIC BLOOD PRESSURE: 70 MMHG | WEIGHT: 211 LBS | BODY MASS INDEX: 32.08 KG/M2

## 2018-02-27 DIAGNOSIS — I10 ESSENTIAL HYPERTENSION WITH GOAL BLOOD PRESSURE LESS THAN 140/90: ICD-10-CM

## 2018-02-27 DIAGNOSIS — I25.10 CORONARY ARTERY DISEASE INVOLVING NATIVE CORONARY ARTERY OF NATIVE HEART WITHOUT ANGINA PECTORIS: ICD-10-CM

## 2018-02-27 PROCEDURE — 99213 OFFICE O/P EST LOW 20 MIN: CPT | Performed by: INTERNAL MEDICINE

## 2018-02-27 NOTE — PROGRESS NOTES
HPI and Plan:   See dictation    Orders Placed This Encounter   Procedures     Follow-Up with Cardiologist       No orders of the defined types were placed in this encounter.      There are no discontinued medications.      Encounter Diagnoses   Name Primary?     Coronary artery disease involving native coronary artery of native heart without angina pectoris      Essential hypertension with goal blood pressure less than 140/90        CURRENT MEDICATIONS:  Current Outpatient Prescriptions   Medication Sig Dispense Refill     QUEtiapine (SEROQUEL) 25 MG tablet Take 1 tablet (25 mg) by mouth nightly as needed 60 tablet 1     sertraline (ZOLOFT) 100 MG tablet Take 2 tablets (200 mg) by mouth daily 180 tablet 0     ibuprofen (ADVIL/MOTRIN) 800 MG tablet TAKE 1 TABLET BY MOUTH EVERY 8 HOURS AS NEEDED FOR MODERATE PAIN 90 tablet 0     atorvastatin (LIPITOR) 80 MG tablet TAKE 1 TABLET(80 MG) BY MOUTH DAILY 90 tablet 0     amLODIPine (NORVASC) 10 MG tablet TAKE 1 TABLET(10 MG) BY MOUTH DAILY 90 tablet 0     oxyCODONE (OXYCONTIN) 30 MG 12 hr tablet Take 1 tablet (30 mg) by mouth every morning 30 tablet 0     traZODone (DESYREL) 100 MG tablet TAKE 3 TABLETS(300 MG) BY MOUTH EVERY NIGHT AT BEDTIME AS NEEDED FOR SLEEP 270 tablet 0     DULoxetine (CYMBALTA) 60 MG EC capsule Take 1 capsule (60 mg) by mouth daily 30 capsule 1     blood glucose monitoring (NO BRAND SPECIFIED) meter device kit Use to test blood sugar 1 times daily or as directed. 1 kit 0     metFORMIN (GLUCOPHAGE-XR) 500 MG 24 hr tablet TAKE 2 TABLETS BY MOUTH TWICE DAILY WITH MEALS. 360 tablet 0     omeprazole (PRILOSEC) 20 MG CR capsule TAKE ONE CAPSULE BY MOUTH TWICE DAILY 180 capsule 0     lisinopril (PRINIVIL/ZESTRIL) 40 MG tablet TAKE 1 TABLET(40 MG) BY MOUTH DAILY 90 tablet 0     busPIRone (BUSPAR) 15 MG tablet TAKE 1 TABLET(15 MG) BY MOUTH TWICE DAILY 180 tablet 0     hydrochlorothiazide (HYDRODIURIL) 25 MG tablet TAKE 1 TABLET BY MOUTH DAILY 90 tablet 0      buPROPion (WELLBUTRIN) 100 MG tablet TAKE 1 TABLET BY MOUTH EVERY MORNING FOR 7 DAYS THEN INCREASE TO 1 TABLET TWICE DAILY 180 tablet 0     Cyanocobalamin (VITAMIN B-12 PO)        gabapentin (NEURONTIN) 100 MG capsule Take 1 tablet increase by 1 tablet every 2 days to 6 tablets 3 times per day 270 capsule 3     Melatonin 10 MG TBDP Take 10 mg by mouth At Bedtime 90 tablet      Lancets Misc. (SELECT-LITE DEVICE/LANCETS) KIT 1 kit 2 times daily 1 kit 1     oxyCODONE (ROXICODONE) 5 MG IR tablet Take 1 daily as needed  for severe pain may have 15 per month this is a 3 month supply 45 tablet 0     spironolactone (ALDACTONE) 25 MG tablet TAKE 1 TABLET(25 MG) BY MOUTH DAILY 90 tablet 0     aspirin EC 81 MG EC tablet Take 1 tablet (81 mg) by mouth daily       clopidogrel (PLAVIX) 75 MG tablet TAKE 1 TABLET BY MOUTH DAILY 90 tablet 3     order for DME Equipment being ordered: TENS 1 Units 0     blood glucose monitoring (ONE TOUCH ULTRA) test strip Use to test blood sugars 2 times daily or as directed. 200 strip 3     blood glucose monitoring (NO BRAND SPECIFIED) meter device kit Use to test blood sugar 2 times daily or as directed. 1 kit 0     order for DME Equipment being ordered: Wheelchair motorized for patient with spinal stenosis and neurogenic claudication 1 Device 0     nitroglycerin (NITROSTAT) 0.4 MG SL tablet Place 1 tablet (0.4 mg) under the tongue every 5 minutes as needed for chest pain 25 tablet 1     LIFESCAN FINEPOINT LANCETS MISC Use to test blood sugars 1 times daily or as directed. 100 each 12     cholecalciferol (VITAMIN  -D) 1000 UNITS capsule Take 1 capsule by mouth daily       ascorbic acid (VITAMIN C) 500 MG tablet Take 500 mg by mouth daily        acetaminophen (TYLENOL) 325 MG tablet Take 2 tablets by mouth every 4 hours as needed. 250 tablet 1     Calcium Carbonate-Vitamin D (CALCIUM + D) 600-200 MG-UNIT per tablet Take 2 tablets by mouth daily. 100 tablet 12       ALLERGIES     Allergies  "  Allergen Reactions     Prozac [Fluoxetine] Other (See Comments)     \"I went crazy.\"       Benadryl [Diphenhydramine Hcl] Other (See Comments)     Starts to shake, and paranoid     Codeine Itching     Diphenhydramine Other (See Comments)     Hallucinations, See [facility] [place] records scanned on 7/16/15     Labetalol Other (See Comments)     See [facility] [place] records scanned on 7/16/15  Bradycardia down to 30 on holter, dizziness. Stopped med and symptoms resolved     Metoprolol Other (See Comments)     Bradycardia even at 12.5 mg     Morphine Visual Disturbance       PAST MEDICAL HISTORY:  Past Medical History:   Diagnosis Date     CAD (coronary artery disease) 7/26/2011 7/2011 ([facility]): s/p MI, PTCA - RCA      Cancer of kidney (H)      Chest pain 1/12/2012     Coronary artery disease      History of angina      History of blood transfusion      Hyperlipidaemia      Hyperlipidemia LDL goal <100 9/23/2010     Malignant neoplasm (H)     Prostate CA (removed)     Myocardial infarction     s/p MI 7/29/14, stent placement     NSTEMI (non-ST elevated myocardial infarction) (H)     07-25-14 CATH- RCA, L.Main and CFX  had minor luminal irregularites. Mid LAD high grade stenosis 80-90%.Stent placed to mid LAD     Other and unspecified hyperlipidemia     MI in July 2011     Right bundle branch block      Type II or unspecified type diabetes mellitus without mention of complication, not stated as uncontrolled      Unspecified essential hypertension        PAST SURGICAL HISTORY:  Past Surgical History:   Procedure Laterality Date     ARTHROSCOPY KNEE  2/11/2011    ARTHROSCOPY KNEE performed by LEY, JEFFREY DUANE at [facility]     ARTHROSCOPY KNEE WITH MEDIAL MENISCECTOMY  6/26/2012    Procedure: ARTHROSCOPY KNEE WITH MEDIAL MENISCECTOMY;  Right Knee Arthroscopy With Medial Menisectomy;  Surgeon: Ley, Jeffrey Duane, MD;  Location: [facility]     BACK SURGERY      back surgery x4     BIOPSY       " CARDIAC SURGERY  7/2011    stentt placed     COLONOSCOPY       CORONARY ANGIOGRAPHY ADULT ORDER  07-25-14    RCA, L.Main and CFX  had minor luminal irregularites. Mid LAD high grade stenosis 80-90%.Stent placed to mid LAD     ESOPHAGOSCOPY, GASTROSCOPY, DUODENOSCOPY (EGD), COMBINED  10/25/2012    Procedure: COMBINED ESOPHAGOSCOPY, GASTROSCOPY, DUODENOSCOPY (EGD), BIOPSY SINGLE OR MULTIPLE;  Gastroscopy  ;  Surgeon: Lucius Dasilva MD;  Location: [facility]     HEART CATH, ANGIOPLASTY  07-25-14    mid LAD      ORTHOPEDIC SURGERY       back surgery       FAMILY HISTORY:  Family History   Problem Relation Age of Onset     CANCER Mother      Depression Mother      DIABETES Father      Hypertension Father      HEART DISEASE Father      MENTAL ILLNESS Father      HEART DISEASE Maternal Grandfather      Substance Abuse Maternal Grandfather      Alcohol/Drug Paternal Grandmother      Substance Abuse Paternal Grandmother      HEART DISEASE Paternal Grandfather      Alcohol/Drug Paternal Grandfather      Substance Abuse Paternal Grandfather      HEART DISEASE Brother      DIABETES Brother      Substance Abuse Brother      Obesity Brother      DIABETES Brother      Obesity Sister      Alcohol/Drug Daughter      Depression Daughter      Obesity Sister      DIABETES Sister      Obesity Sister      DIABETES Sister      Alcohol/Drug Sister      CANCER Sister 55     possible kidney cancer     Substance Abuse Sister      Alcohol/Drug Son      Substance Abuse Son      DIABETES Son      Alcohol/Drug Son      Substance Abuse Son      Obesity Daughter      DIABETES Daughter      Hypertension Daughter      Bipolar Disorder Other        SOCIAL HISTORY:  Social History     Social History     Marital status:      Spouse name: N/A     Number of children: N/A     Years of education: N/A     Social History Main Topics     Smoking status: Never Smoker     Smokeless tobacco: Never Used     Alcohol use No     Drug use: No     Sexual activity:  Yes     Partners: Female     Other Topics Concern     Parent/Sibling W/ Cabg, Mi Or Angioplasty Before 65f 55m? No     Caffeine Concern No     4-5 cups per day     Special Diet Yes     smaller portions     Exercise No     Social History Narrative       Review of Systems:  Skin:  Positive for bruising     Eyes:  Positive for visual blurring;double vision    ENT:  Positive for hearing loss    Respiratory:  Positive for dyspnea on exertion     Cardiovascular:    Positive for;chest pain;fatigue;lightheadedness;dizziness;palpitations;edema;syncope or near-syncope    Gastroenterology: Positive for constipation    Genitourinary:  Negative nocturia;urinary frequency    Musculoskeletal:  Positive for back pain;joint pain;joint swelling;joint stiffness    Neurologic:  Negative      Psychiatric:  Positive for anxiety;depression;excessive stress    Heme/Lymph/Imm:  Negative      Endocrine:  Positive for diabetes      Physical Exam:  Vitals: /70  Pulse 72  Wt 95.7 kg (211 lb)  SpO2 97%  BMI 32.08 kg/m2    Constitutional:    appears older than stated age;frail;chronically ill      Skin:  warm and dry to the touch     had a bruise on bridge nose    Head:  normocephalic        Eyes:  sclera white        Lymph:      ENT:  no pallor or cyanosis        Neck:  JVP normal        Respiratory:            Cardiac: regular rhythm         systolic ejection murmur      pulses full and equal                                        GI:  abdomen soft        Extremities and Muscular Skeletal:        trace trace      Neurological:  affect appropriate        Psych:    Depression      CC  Geremias Phelps MD  [id number]  [place] [id number]

## 2018-02-27 NOTE — LETTER
2/27/2018    Keyla Fonseca MD  [address] [address] [facility] [address]    RE: Vincent Mishra       Dear Colleague,    I had the pleasure of seeing Vincent Mishra in the [facility].    HPI and Plan:   See dictation    Orders Placed This Encounter   Procedures     Follow-Up with Cardiologist       No orders of the defined types were placed in this encounter.      There are no discontinued medications.      Encounter Diagnoses   Name Primary?     Coronary artery disease involving native coronary artery of native heart without angina pectoris      Essential hypertension with goal blood pressure less than 140/90        CURRENT MEDICATIONS:  Current Outpatient Prescriptions   Medication Sig Dispense Refill     QUEtiapine (SEROQUEL) 25 MG tablet Take 1 tablet (25 mg) by mouth nightly as needed 60 tablet 1     sertraline (ZOLOFT) 100 MG tablet Take 2 tablets (200 mg) by mouth daily 180 tablet 0     ibuprofen (ADVIL/MOTRIN) 800 MG tablet TAKE 1 TABLET BY MOUTH EVERY 8 HOURS AS NEEDED FOR MODERATE PAIN 90 tablet 0     atorvastatin (LIPITOR) 80 MG tablet TAKE 1 TABLET(80 MG) BY MOUTH DAILY 90 tablet 0     amLODIPine (NORVASC) 10 MG tablet TAKE 1 TABLET(10 MG) BY MOUTH DAILY 90 tablet 0     oxyCODONE (OXYCONTIN) 30 MG 12 hr tablet Take 1 tablet (30 mg) by mouth every morning 30 tablet 0     traZODone (DESYREL) 100 MG tablet TAKE 3 TABLETS(300 MG) BY MOUTH EVERY NIGHT AT BEDTIME AS NEEDED FOR SLEEP 270 tablet 0     DULoxetine (CYMBALTA) 60 MG EC capsule Take 1 capsule (60 mg) by mouth daily 30 capsule 1     blood glucose monitoring (NO BRAND SPECIFIED) meter device kit Use to test blood sugar 1 times daily or as directed. 1 kit 0     metFORMIN (GLUCOPHAGE-XR) 500 MG 24 hr tablet TAKE 2 TABLETS BY MOUTH TWICE DAILY WITH MEALS. 360 tablet 0     omeprazole (PRILOSEC) 20 MG CR capsule TAKE ONE CAPSULE BY MOUTH TWICE DAILY 180 capsule 0     lisinopril (PRINIVIL/ZESTRIL) 40 MG tablet TAKE 1  TABLET(40 MG) BY MOUTH DAILY 90 tablet 0     busPIRone (BUSPAR) 15 MG tablet TAKE 1 TABLET(15 MG) BY MOUTH TWICE DAILY 180 tablet 0     hydrochlorothiazide (HYDRODIURIL) 25 MG tablet TAKE 1 TABLET BY MOUTH DAILY 90 tablet 0     buPROPion (WELLBUTRIN) 100 MG tablet TAKE 1 TABLET BY MOUTH EVERY MORNING FOR 7 DAYS THEN INCREASE TO 1 TABLET TWICE DAILY 180 tablet 0     Cyanocobalamin (VITAMIN B-12 PO)        gabapentin (NEURONTIN) 100 MG capsule Take 1 tablet increase by 1 tablet every 2 days to 6 tablets 3 times per day 270 capsule 3     Melatonin 10 MG TBDP Take 10 mg by mouth At Bedtime 90 tablet      Lancets Misc. (SELECT-LITE DEVICE/LANCETS) KIT 1 kit 2 times daily 1 kit 1     oxyCODONE (ROXICODONE) 5 MG IR tablet Take 1 daily as needed  for severe pain may have 15 per month this is a 3 month supply 45 tablet 0     spironolactone (ALDACTONE) 25 MG tablet TAKE 1 TABLET(25 MG) BY MOUTH DAILY 90 tablet 0     aspirin EC 81 MG EC tablet Take 1 tablet (81 mg) by mouth daily       clopidogrel (PLAVIX) 75 MG tablet TAKE 1 TABLET BY MOUTH DAILY 90 tablet 3     order for DME Equipment being ordered: TENS 1 Units 0     blood glucose monitoring (ONE TOUCH ULTRA) test strip Use to test blood sugars 2 times daily or as directed. 200 strip 3     blood glucose monitoring (NO BRAND SPECIFIED) meter device kit Use to test blood sugar 2 times daily or as directed. 1 kit 0     order for DME Equipment being ordered: Wheelchair motorized for patient with spinal stenosis and neurogenic claudication 1 Device 0     nitroglycerin (NITROSTAT) 0.4 MG SL tablet Place 1 tablet (0.4 mg) under the tongue every 5 minutes as needed for chest pain 25 tablet 1     KeraFASTCAN FINEPOINT LANCETS MISC Use to test blood sugars 1 times daily or as directed. 100 each 12     cholecalciferol (VITAMIN  -D) 1000 UNITS capsule Take 1 capsule by mouth daily       ascorbic acid (VITAMIN C) 500 MG tablet Take 500 mg by mouth daily        acetaminophen (TYLENOL) 325  "MG tablet Take 2 tablets by mouth every 4 hours as needed. 250 tablet 1     Calcium Carbonate-Vitamin D (CALCIUM + D) 600-200 MG-UNIT per tablet Take 2 tablets by mouth daily. 100 tablet 12       ALLERGIES     Allergies   Allergen Reactions     Prozac [Fluoxetine] Other (See Comments)     \"I went crazy.\"       Benadryl [Diphenhydramine Hcl] Other (See Comments)     Starts to shake, and paranoid     Codeine Itching     Diphenhydramine Other (See Comments)     Hallucinations, See [facility] records scanned on 7/16/15     Labetalol Other (See Comments)     See [facility] records scanned on 7/16/15  Bradycardia down to 30 on holter, dizziness. Stopped med and symptoms resolved     Metoprolol Other (See Comments)     Bradycardia even at 12.5 mg     Morphine Visual Disturbance       PAST MEDICAL HISTORY:  Past Medical History:   Diagnosis Date     CAD (coronary artery disease) 7/26/2011 7/2011 (Juanito): s/p MI, PTCA - RCA      Cancer of kidney (H)      Chest pain 1/12/2012     Coronary artery disease      History of angina      History of blood transfusion      Hyperlipidaemia      Hyperlipidemia LDL goal <100 9/23/2010     Malignant neoplasm (H)     Prostate CA (removed)     Myocardial infarction     s/p MI 7/29/14, stent placement     NSTEMI (non-ST elevated myocardial infarction) (H)     07-25-14 CATH- RCA, L.Main and CFX  had minor luminal irregularites. Mid LAD high grade stenosis 80-90%.Stent placed to mid LAD     Other and unspecified hyperlipidemia     MI in July 2011     Right bundle branch block      Type II or unspecified type diabetes mellitus without mention of complication, not stated as uncontrolled      Unspecified essential hypertension        PAST SURGICAL HISTORY:  Past Surgical History:   Procedure Laterality Date     ARTHROSCOPY KNEE  2/11/2011    ARTHROSCOPY KNEE performed by LEY, JEFFREY DUANE at [facility]     ARTHROSCOPY KNEE WITH MEDIAL MENISCECTOMY  " 6/26/2012    Procedure: ARTHROSCOPY KNEE WITH MEDIAL MENISCECTOMY;  Right Knee Arthroscopy With Medial Menisectomy;  Surgeon: Ley, Jeffrey Duane, MD;  Location: [facility]     BACK SURGERY      back surgery x4     BIOPSY       CARDIAC SURGERY  7/2011    stentt placed     COLONOSCOPY       CORONARY ANGIOGRAPHY ADULT ORDER  07-25-14    RCA, L.Main and CFX  had minor luminal irregularites. Mid LAD high grade stenosis 80-90%.Stent placed to mid LAD     ESOPHAGOSCOPY, GASTROSCOPY, DUODENOSCOPY (EGD), COMBINED  10/25/2012    Procedure: COMBINED ESOPHAGOSCOPY, GASTROSCOPY, DUODENOSCOPY (EGD), BIOPSY SINGLE OR MULTIPLE;  Gastroscopy  ;  Surgeon: Lucius Dasilva MD;  Location: [facility]     HEART CATH, ANGIOPLASTY  07-25-14    mid LAD      ORTHOPEDIC SURGERY       back surgery       FAMILY HISTORY:  Family History   Problem Relation Age of Onset     CANCER Mother      Depression Mother      DIABETES Father      Hypertension Father      HEART DISEASE Father      MENTAL ILLNESS Father      HEART DISEASE Maternal Grandfather      Substance Abuse Maternal Grandfather      Alcohol/Drug Paternal Grandmother      Substance Abuse Paternal Grandmother      HEART DISEASE Paternal Grandfather      Alcohol/Drug Paternal Grandfather      Substance Abuse Paternal Grandfather      HEART DISEASE Brother      DIABETES Brother      Substance Abuse Brother      Obesity Brother      DIABETES Brother      Obesity Sister      Alcohol/Drug Daughter      Depression Daughter      Obesity Sister      DIABETES Sister      Obesity Sister      DIABETES Sister      Alcohol/Drug Sister      CANCER Sister 55     possible kidney cancer     Substance Abuse Sister      Alcohol/Drug Son      Substance Abuse Son      DIABETES Son      Alcohol/Drug Son      Substance Abuse Son      Obesity Daughter      DIABETES Daughter      Hypertension Daughter      Bipolar Disorder Other        SOCIAL HISTORY:  Social History     Social History     Marital status:      Spouse  name: N/A     Number of children: N/A     Years of education: N/A     Social History Main Topics     Smoking status: Never Smoker     Smokeless tobacco: Never Used     Alcohol use No     Drug use: No     Sexual activity: Yes     Partners: Female     Other Topics Concern     Parent/Sibling W/ Cabg, Mi Or Angioplasty Before 65f 55m? No     Caffeine Concern No     4-5 cups per day     Special Diet Yes     smaller portions     Exercise No     Social History Narrative       Review of Systems:  Skin:  Positive for bruising     Eyes:  Positive for visual blurring;double vision    ENT:  Positive for hearing loss    Respiratory:  Positive for dyspnea on exertion     Cardiovascular:    Positive for;chest pain;fatigue;lightheadedness;dizziness;palpitations;edema;syncope or near-syncope    Gastroenterology: Positive for constipation    Genitourinary:  Negative nocturia;urinary frequency    Musculoskeletal:  Positive for back pain;joint pain;joint swelling;joint stiffness    Neurologic:  Negative      Psychiatric:  Positive for anxiety;depression;excessive stress    Heme/Lymph/Imm:  Negative      Endocrine:  Positive for diabetes      Physical Exam:  Vitals: /70  Pulse 72  Wt 95.7 kg (211 lb)  SpO2 97%  BMI 32.08 kg/m2    Constitutional:    appears older than stated age;frail;chronically ill      Skin:  warm and dry to the touch     had a bruise on bridge nose    Head:  normocephalic        Eyes:  sclera white        Lymph:      ENT:  no pallor or cyanosis        Neck:  JVP normal        Respiratory:            Cardiac: regular rhythm         systolic ejection murmur      pulses full and equal                                        GI:  abdomen soft        Extremities and Muscular Skeletal:        trace trace      Neurological:  affect appropriate        Psych:    Depression      CC  Geremias Phelps MD  [address]  [place] [id number]                Thank you for allowing me to participate in the care of your  patient.      Sincerely,     Geremias Phelps MD     [facility]    cc:   Geremias Phelps MD  [address]  [place] [id number]

## 2018-02-27 NOTE — MR AVS SNAPSHOT
After Visit Summary   2/27/2018    Vincent Mishra    MRN: [id number]           Patient Information     Date Of Birth          1940        Visit Information        Provider Department      2/27/2018 3:30 PM Geremias Phelps MD [facility]        Today's Diagnoses     Coronary artery disease involving native coronary artery of native heart without angina pectoris        Essential hypertension with goal blood pressure less than 140/90           Follow-ups after your visit        Additional Services     Follow-Up with Cardiologist                 Your next 10 appointments already scheduled     Mar 29, 2018 10:45 AM CDT   (Arrive by 10:30 AM)   [facility] BESSY Cannon   [facility]    [address] [address] [phone]              Future tests that were ordered for you today     Open Future Orders        Priority Expected Expires Ordered    Follow-Up with Cardiologist Routine 8/26/2018 2/27/2019 2/27/2018            Who to contact     If you have questions or need follow up information about today's clinic visit or your schedule please contact [facility] directly at [phone].  Normal or non-critical lab and imaging results will be communicated to you by MyChart, letter or phone within 4 business days after the clinic has received the results. If you do not hear from us within 7 days, please contact the clinic through MyChart or phone. If you have a critical or abnormal lab result, we will notify you by phone as soon as possible.  Submit refill requests through [employer] or call your pharmacy and they will forward the refill request to us. Please allow 3 business days for your refill to be completed.          Additional Information About Your Visit        MyChart Information     [employer] gives you secure access to your electronic  health record. If you see a primary care provider, you can also send messages to your care team and make appointments. If you have questions, please call your primary care clinic.  If you do not have a primary care provider, please call [phone] and they will assist you.        Care EveryWhere ID     This is your Care EveryWhere ID. This could be used by other organizations to access your [place] medical records  [id number]        Your Vitals Were     Pulse Pulse Oximetry BMI (Body Mass Index)             72 97% 32.08 kg/m2          Blood Pressure from Last 3 Encounters:   02/27/18 129/70   02/20/18 134/76   01/08/18 123/75    Weight from Last 3 Encounters:   02/27/18 95.7 kg (211 lb)   02/20/18 92.1 kg (203 lb)   01/08/18 91.5 kg (201 lb 11.2 oz)              We Performed the Following     Follow-Up with Cardiologist        Primary Care Provider Office Phone # Fax #    Keyla Fonseca [phone] [address] [address] [facility] [address]        Equal Access to Services     [facility] : Hadii aad ku hadasho Soilene, waaxda luqadaha, qaybta kaalmada corrina, sobeida lorenzo . So [facility] [phone].    ATENCIÓN: Si habla español, tiene a nicholas disposición servicios gratuitos de asistencia lingüística. [place] al [phone].    We comply with applicable federal civil rights laws and [place] laws. We do not discriminate on the basis of race, color, national origin, age, disability, sex, sexual orientation, or gender identity.            Thank you!     Thank you for choosing [facility]  for your care. Our goal is always to provide you with excellent care. Hearing back from our patients is one way we can continue to improve our services. Please take a few minutes to complete the written survey that you may receive in the mail after your visit with us. Thank you!             Your Updated Medication List - Protect others around  you: Learn how to safely use, store and throw away your medicines at www.disposemymeds.org.          This list is accurate as of 2/27/18  3:52 PM.  Always use your most recent med list.                   Brand Name Dispense Instructions for use Diagnosis    acetaminophen 325 MG tablet    TYLENOL    250 tablet    Take 2 tablets by mouth every 4 hours as needed.    Tear of meniscus of left knee       amLODIPine 10 MG tablet    NORVASC    90 tablet    TAKE 1 TABLET(10 MG) BY MOUTH DAILY    Essential hypertension with goal blood pressure less than 140/90       ascorbic acid 500 MG tablet    VITAMIN C     Take 500 mg by mouth daily        aspirin 81 MG EC tablet      Take 1 tablet (81 mg) by mouth daily    Chest pain, unspecified type, NSTEMI (non-ST elevated myocardial infarction) (H)       atorvastatin 80 MG tablet    LIPITOR    90 tablet    TAKE 1 TABLET(80 MG) BY MOUTH DAILY    Hyperlipidemia LDL goal <100       * blood glucose monitoring meter device kit    no brand specified    1 kit    Use to test blood sugar 2 times daily or as directed.    Type 2 diabetes mellitus with hyperglycemia, without long-term current use of insulin (H)       * blood glucose monitoring meter device kit    no brand specified    1 kit    Use to test blood sugar 1 times daily or as directed.    Type 2 diabetes mellitus with complication, without long-term current use of insulin (H)       blood glucose monitoring test strip    ONETOUCH ULTRA    200 strip    Use to test blood sugars 2 times daily or as directed.    Type 2 diabetes mellitus with other circulatory complications       buPROPion 100 MG tablet    WELLBUTRIN    180 tablet    TAKE 1 TABLET BY MOUTH EVERY MORNING FOR 7 DAYS THEN INCREASE TO 1 TABLET TWICE DAILY    Adjustment disorder with depressed mood       busPIRone 15 MG tablet    BUSPAR    180 tablet    TAKE 1 TABLET(15 MG) BY MOUTH TWICE DAILY    Depression with anxiety       calcium + D 600-200 MG-UNIT Tabs   Generic drug:   calcium carbonate-vitamin D     100 tablet    Take 2 tablets by mouth daily.        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, Right bundle branch block, NSTEMI (non-ST elevated myocardial infarction) (H)       DULoxetine 60 MG EC capsule    CYMBALTA    30 capsule    Take 1 capsule (60 mg) by mouth daily    Major depressive disorder, recurrent episode, moderate (H)       gabapentin 100 MG capsule    NEURONTIN    270 capsule    Take 1 tablet increase by 1 tablet every 2 days to 6 tablets 3 times per day    Failed back surgical syndrome, 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    Type 2 diabetes, HbA1c goal < 7% (H)       lisinopril 40 MG tablet    PRINIVIL/ZESTRIL    90 tablet    TAKE 1 TABLET(40 MG) BY MOUTH DAILY    Essential hypertension with goal blood pressure less than 140/90       Melatonin 10 MG Tbdp     90 tablet    Take 10 mg by mouth At Bedtime        metFORMIN 500 MG 24 hr tablet    GLUCOPHAGE-XR    360 tablet    TAKE 2 TABLETS BY MOUTH TWICE DAILY WITH MEALS.    Type 2 diabetes mellitus without complication, unspecified long term insulin use status (H)       nitroGLYcerin 0.4 MG sublingual tablet    NITROSTAT    25 tablet    Place 1 tablet (0.4 mg) under the tongue every 5 minutes as needed for chest pain    CAD (coronary  artery disease)       omeprazole 20 MG CR capsule    priLOSEC    180 capsule    TAKE ONE CAPSULE BY MOUTH TWICE DAILY    Gastroesophageal reflux disease without esophagitis       * order for DME     1 Device    Equipment being ordered: Wheelchair motorized for patient with spinal stenosis and neurogenic claudication    Spinal stenosis, lumbar region, with neurogenic claudication       * order for DME     1 Units    Equipment being ordered: TENS    Chronic bilateral low back pain with sciatica, sciatica laterality unspecified, Hip pain, bilateral       * oxyCODONE IR 5 MG tablet    ROXICODONE    45 tablet    Take 1 daily as needed  for severe pain may have 15 per month this is a 3 month supply    Lumbar back pain with radiculopathy affecting left lower extremity       * oxyCODONE 30 MG 12 hr tablet    OxyCONTIN    30 tablet    Take 1 tablet (30 mg) by mouth every morning    Hip pain, bilateral, Chronic bilateral low back pain with sciatica, sciatica laterality unspecified       SELECT-LITE DEVICE/LANCETS Kit     1 kit    1 kit 2 times daily    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)       SEROQUEL 25 MG tablet   Generic drug:  QUEtiapine     60 tablet    Take 1 tablet (25 mg) by mouth nightly as needed    Persistent disorder of initiating or maintaining sleep       sertraline 100 MG tablet    ZOLOFT    180 tablet    Take 2 tablets (200 mg) by mouth daily        spironolactone 25 MG tablet    ALDACTONE    90 tablet    TAKE 1 TABLET(25 MG) BY MOUTH DAILY    Essential hypertension with goal blood pressure less than 140/90       traZODone 100 MG tablet    DESYREL    270 tablet    TAKE 3 TABLETS(300 MG) BY MOUTH EVERY NIGHT AT BEDTIME AS NEEDED FOR SLEEP    Primary insomnia       VITAMIN B-12 PO           * Notice:  This list has 6 medication(s) that are the same as other medications prescribed for you. Read the directions carefully, and ask your doctor or other care provider to review them with  you.

## 2018-02-27 NOTE — LETTER
2/27/2018      Keyla Fonseca MD  [address] [facility] [address]      RE: Vincent Mishra       Dear Colleague,    I had the pleasure of seeing Vincent Mishra in the [facility].    Service Date: 02/27/2018      HISTORY OF PRESENT ILLNESS:  Mr. Mishra is a pleasant 77-year-old gentleman with a history of coronary artery disease, status post PCI to the RCA in 2011 in [facility] in [place], PCI of the LAD in 2014 at [place], [place], history of recurrent non-ST segment elevation MI for which he underwent repeat cardiac catheterization at the [place] in 2014 with no evidence of worsening obstructive disease, another non-STEMI in 07/2015 in [place] for which he was placed on lifelong dual antiplatelet therapy.  At that time, his cardiac catheterization showed patent stents and no evidence of obstructive disease along with a normal LVEDP, and unfortunately the patient had a final non-STEMI in 2017 and was taken care of at the [place] where his troponins never really peaked and troughed.  At this point, it appears that his baseline troponins are always elevated.      The patient has chronic chest pain which has been present for years.  The chest pain comes on at rest but can be worsened sometimes with exertion.  It typically lasts 15 minutes.  He gets this 3 times per week.  It has not changed since I have been following him.      Unfortunately, Mr. 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  His back pain is so severe that he cannot walk more than 10-15 feet without stopping.  He has previously seen a back surgeon who has not recommended surgery.      He denies any other cardiovascular complaints such as shortness of breath at rest.  He does have stable dyspnea with exertion, but this is unchanged.  He has no orthopnea or paroxysmal nocturnal dyspnea.      His last  stress test was in 2017 at the [place] and did not show evidence for myocardial ischemia.      Please see my separate note with his full physical examination.      IMPRESSION AND PLAN:  Mr. Aguirre has stable coronary artery disease and stable chest pain which may be related to coronary disease but has not worsened in years.  He does have baseline elevated troponins each time he has been admitted to the hospital making it difficult to determine if he indeed has ACS.  His most recent 2 cardiac catheterizations did not show worsening of his coronary disease.  At this time, I would recommend continued dual antiplatelet therapy and atorvastatin 80 mg daily unchanged for secondary prevention.  The patient is instructed to call the clinic should his chest pain worsen in intensity or duration, in which case we will repeat another stress test.  I would not proceed to invasive angiography unless we have documented ischemia on a stress test.  I will plan to see Mr. Aguirre back in routine clinical followup in 6 months.         ZAHEER BONNER MD             D: 2018   T: 2018   MT: ENZO      Name:     ISELA AGUIRRE   MRN:      [id number]        Account:      [id number]   :      1940           Service Date: 2018      Document: [id number]           Outpatient Encounter Prescriptions as of 2018   Medication Sig Dispense Refill     QUEtiapine (SEROQUEL) 25 MG tablet Take 1 tablet (25 mg) by mouth nightly as needed 60 tablet 1     sertraline (ZOLOFT) 100 MG tablet Take 2 tablets (200 mg) by mouth daily 180 tablet 0     ibuprofen (ADVIL/MOTRIN) 800 MG tablet TAKE 1 TABLET BY MOUTH EVERY 8 HOURS AS NEEDED FOR MODERATE PAIN 90 tablet 0     atorvastatin (LIPITOR) 80 MG tablet TAKE 1 TABLET(80 MG) BY MOUTH DAILY 90 tablet 0     amLODIPine (NORVASC) 10 MG tablet TAKE 1 TABLET(10 MG) BY MOUTH DAILY 90 tablet 0     [DISCONTINUED] oxyCODONE (OXYCONTIN) 30 MG 12 hr tablet Take 1 tablet (30 mg) by mouth  every morning 30 tablet 0     traZODone (DESYREL) 100 MG tablet TAKE 3 TABLETS(300 MG) BY MOUTH EVERY NIGHT AT BEDTIME AS NEEDED FOR SLEEP 270 tablet 0     blood glucose monitoring (NO BRAND SPECIFIED) meter device kit Use to test blood sugar 1 times daily or as directed. 1 kit 0     [DISCONTINUED] DULoxetine (CYMBALTA) 60 MG EC capsule Take 1 capsule (60 mg) by mouth daily 30 capsule 1     metFORMIN (GLUCOPHAGE-XR) 500 MG 24 hr tablet TAKE 2 TABLETS BY MOUTH TWICE DAILY WITH MEALS. 360 tablet 0     omeprazole (PRILOSEC) 20 MG CR capsule TAKE ONE CAPSULE BY MOUTH TWICE DAILY 180 capsule 0     lisinopril (PRINIVIL/ZESTRIL) 40 MG tablet TAKE 1 TABLET(40 MG) BY MOUTH DAILY 90 tablet 0     busPIRone (BUSPAR) 15 MG tablet TAKE 1 TABLET(15 MG) BY MOUTH TWICE DAILY 180 tablet 0     hydrochlorothiazide (HYDRODIURIL) 25 MG tablet TAKE 1 TABLET BY MOUTH DAILY 90 tablet 0     buPROPion (WELLBUTRIN) 100 MG tablet TAKE 1 TABLET BY MOUTH EVERY MORNING FOR 7 DAYS THEN INCREASE TO 1 TABLET TWICE DAILY 180 tablet 0     Cyanocobalamin (VITAMIN B-12 PO)        gabapentin (NEURONTIN) 100 MG capsule Take 1 tablet increase by 1 tablet every 2 days to 6 tablets 3 times per day 270 capsule 3     Melatonin 10 MG TBDP Take 10 mg by mouth At Bedtime 90 tablet      Lancets Misc. (SELECT-LITE DEVICE/LANCETS) KIT 1 kit 2 times daily 1 kit 1     oxyCODONE (ROXICODONE) 5 MG IR tablet Take 1 daily as needed  for severe pain may have 15 per month this is a 3 month supply 45 tablet 0     spironolactone (ALDACTONE) 25 MG tablet TAKE 1 TABLET(25 MG) BY MOUTH DAILY 90 tablet 0     aspirin EC 81 MG EC tablet Take 1 tablet (81 mg) by mouth daily       clopidogrel (PLAVIX) 75 MG tablet TAKE 1 TABLET BY MOUTH DAILY 90 tablet 3     order for DME Equipment being ordered: TENS 1 Units 0     blood glucose monitoring (ONE TOUCH ULTRA) test strip Use to test blood sugars 2 times daily or as directed. 200 strip 3     blood glucose monitoring (NO BRAND SPECIFIED)  meter device kit Use to test blood sugar 2 times daily or as directed. 1 kit 0     order for DME Equipment being ordered: Wheelchair motorized for patient with spinal stenosis and neurogenic claudication 1 Device 0     nitroglycerin (NITROSTAT) 0.4 MG SL tablet Place 1 tablet (0.4 mg) under the tongue every 5 minutes as needed for chest pain 25 tablet 1     LIFESCAN FINEPOINT LANCETS MISC Use to test blood sugars 1 times daily or as directed. 100 each 12     cholecalciferol (VITAMIN  -D) 1000 UNITS capsule Take 1 capsule by mouth daily       ascorbic acid (VITAMIN C) 500 MG tablet Take 500 mg by mouth daily        acetaminophen (TYLENOL) 325 MG tablet Take 2 tablets by mouth every 4 hours as needed. 250 tablet 1     Calcium Carbonate-Vitamin D (CALCIUM + D) 600-200 MG-UNIT per tablet Take 2 tablets by mouth daily. 100 tablet 12     No facility-administered encounter medications on file as of 2/27/2018.        Again, thank you for allowing me to participate in the care of your patient.      Sincerely,    Geremias Phelps MD     [facility]

## 2018-02-28 NOTE — PROGRESS NOTES
Service Date: 02/27/2018      HISTORY OF PRESENT ILLNESS:  Mr. Mishra is a pleasant 77-year-old gentleman with a history of coronary artery disease, status post PCI to the RCA in 2011 in [facility] in [place], PCI of the LAD in 2014 at [place], [place], history of recurrent non-ST segment elevation MI for which he underwent repeat cardiac catheterization at the [place] in 2014 with no evidence of worsening obstructive disease, another non-STEMI in 07/2015 in [place] for which he was placed on lifelong dual antiplatelet therapy.  At that time, his cardiac catheterization showed patent stents and no evidence of obstructive disease along with a normal LVEDP, and unfortunately the patient had a final non-STEMI in 2017 and was taken care of at the [place] where his troponins never really peaked and troughed.  At this point, it appears that his baseline troponins are always elevated.      The patient has chronic chest pain which has been present for years.  The chest pain comes on at rest but can be worsened sometimes with exertion.  It typically lasts 15 minutes.  He gets this 3 times per week.  It has not changed since I have been following him.      Unfortunately, Mr. 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  His back pain is so severe that he cannot walk more than 10-15 feet without stopping.  He has previously seen a back surgeon who has not recommended surgery.      He denies any other cardiovascular complaints such as shortness of breath at rest.  He does have stable dyspnea with exertion, but this is unchanged.  He has no orthopnea or paroxysmal nocturnal dyspnea.      His last stress test was in 2017 at the [place] and did not show evidence for myocardial ischemia.      Please see my separate note with his full physical examination.      IMPRESSION AND PLAN:  Mr. Mishra has stable coronary artery  disease and stable chest pain which may be related to coronary disease but has not worsened in years.  He does have baseline elevated troponins each time he has been admitted to the hospital making it difficult to determine if he indeed has ACS.  His most recent 2 cardiac catheterizations did not show worsening of his coronary disease.  At this time, I would recommend continued dual antiplatelet therapy and atorvastatin 80 mg daily unchanged for secondary prevention.  The patient is instructed to call the clinic should his chest pain worsen in intensity or duration, in which case we will repeat another stress test.  I would not proceed to invasive angiography unless we have documented ischemia on a stress test.  I will plan to see Mr. Aguirre back in routine clinical followup in 6 months.         ZAHEER BONNER MD             D: 2018   T: 2018   MT: ENZO      Name:     ISELA AGUIRRE   MRN:      [id number]        Account:      [id number]   :      1940           Service Date: 2018      Document: [id number]

## 2018-03-02 DIAGNOSIS — M54.40 CHRONIC BILATERAL LOW BACK PAIN WITH SCIATICA, SCIATICA LATERALITY UNSPECIFIED: ICD-10-CM

## 2018-03-02 DIAGNOSIS — M25.552 HIP PAIN, BILATERAL: ICD-10-CM

## 2018-03-02 DIAGNOSIS — G89.29 CHRONIC BILATERAL LOW BACK PAIN WITH SCIATICA, SCIATICA LATERALITY UNSPECIFIED: ICD-10-CM

## 2018-03-02 DIAGNOSIS — F33.1 MAJOR DEPRESSIVE DISORDER, RECURRENT EPISODE, MODERATE (H): ICD-10-CM

## 2018-03-02 DIAGNOSIS — M54.16 LUMBAR BACK PAIN WITH RADICULOPATHY AFFECTING LEFT LOWER EXTREMITY: ICD-10-CM

## 2018-03-02 DIAGNOSIS — M25.551 HIP PAIN, BILATERAL: ICD-10-CM

## 2018-03-02 RX ORDER — DULOXETIN HYDROCHLORIDE 60 MG/1
CAPSULE, DELAYED RELEASE ORAL
Qty: 30 CAPSULE | Refills: 0 | Status: SHIPPED | OUTPATIENT
Start: 2018-03-02 | End: 2018-04-26

## 2018-03-02 RX ORDER — OXYCODONE HYDROCHLORIDE 30 MG/1
30 TABLET, FILM COATED, EXTENDED RELEASE ORAL EVERY MORNING
Qty: 30 TABLET | Refills: 0 | Status: SHIPPED | OUTPATIENT
Start: 2018-03-02 | End: 2018-04-04

## 2018-03-02 NOTE — TELEPHONE ENCOUNTER
Vincent's wife calling to request refill of his oxycodone.  They are leaving town for a couple of weeks on Monday and would like paper copy placed @  by Monday, so she can  before they leave.  Please review and fill if appropriate.  Thank you..Ebony Dickson    oxycodone 30mggisela bradshaw   Last Written Prescription Date:  1/30/18  Last Fill Quantity: 30,   # refills: 0  Last Office Visit: 2/20/18  Future Office visit:       Routing refill request to provider for review/approval because:  Drug not on the [facility], P or [facility] refill protocol or controlled substance

## 2018-03-02 NOTE — TELEPHONE ENCOUNTER
"Requested Prescriptions   Pending Prescriptions Disp Refills     DULoxetine (CYMBALTA) 60 MG EC capsule [Pharmacy Med Name: DULOXETINE DR 60MG CAPSULES] 30 capsule 0    Last Written Prescription Date:  1/8/18  Last Fill Quantity: 30,  # refills: 1   Last office visit: 2/20/2018 with prescribing provider:  2/20/18   Future Office Visit:     Sig: TAKE ONE CAPSULE BY MOUTH EVERY DAY    Serotonin-Norepinephrine Reuptake Inhibitors  Failed    3/2/2018 11:46 AM       Failed - PHQ-9 score of less than 5 in past 6 months    The PHQ-9 criteria is meant to fail. It requires a PHQ-9 score review         Passed - Blood pressure under 140/90 in past 12 months    BP Readings from Last 3 Encounters:   02/27/18 129/70   02/20/18 134/76   01/08/18 123/75                Passed - Patient is age 18 or older       Passed - Recent (6 mo) or future visit with authorizing provider's specialty    Patient had office visit in the last 6 months or has a visit in the next 30 days with authorizing provider.  See \"Patient Info\" tab in inbasket, or \"Choose Columns\" in Meds & Orders section of the refill encounter.              "

## 2018-03-02 NOTE — TELEPHONE ENCOUNTER
Routing refill request to provider for review/approval because:  PHQ-9 score of 19 on 2/20/18    PHQ-9 SCORE 1/31/2017 9/20/2017 2/20/2018   Total Score - - -   Total Score 11 14 19     Polly COSTELLO RN

## 2018-03-05 DIAGNOSIS — G47.00 PERSISTENT DISORDER OF INITIATING OR MAINTAINING SLEEP: ICD-10-CM

## 2018-03-05 NOTE — TELEPHONE ENCOUNTER
QUETIAPINE 25MG TABLETS        Last Written Prescription Date:  2/20/18  Last Fill Quantity: 60,   # refills: 1  Last Office Visit: 2/20/18  Future Office visit:       Requested Prescriptions   Pending Prescriptions Disp Refills     QUEtiapine (SEROQUEL) 25 MG tablet [Pharmacy Med Name: QUETIAPINE 25MG TABLETS] 270 tablet 1     Sig: TAKE 1 TABLET BY MOUTH EVERY NIGHT AT BEDTIME. EVERY THREE TO FOUR NIGHTS MAY INCREASE DOSE BY 1 TABLET UP TO A MAX OF 4 TABLETS DAILY    Antipsychotic Medications Failed    3/5/2018  8:49 AM       Failed - Lipid panel on file within the past 12 months    Recent Labs   Lab Test  01/24/17   1100  01/07/16   0951  09/25/14   0954   CHOL   --   119  111   TRIG   --   81  70   HDL   --   57  57   LDL  69  46  40   NHDL   --   62   --    VLDL   --    --   14   CHOLHDLRATIO   --    --   1.9              Passed - Blood pressure under 140/90 in past 12 months    BP Readings from Last 3 Encounters:   02/27/18 129/70   02/20/18 134/76   01/08/18 123/75                Passed - Patient is 12 years of age or older       Passed - CBC on file in past 12 months    Recent Labs   Lab Test  10/30/17   1552   WBC  7.7   RBC  4.42   HGB  11.8*   HCT  37.7*   PLT  332            Passed - Heart Rate on file within past 12 months    Pulse Readings from Last 3 Encounters:   02/27/18 72   02/20/18 65   01/08/18 78              Passed - A1c or Glucose on file in past 12 months    Recent Labs   Lab Test  01/08/18   1414   09/20/17   1306   GLC  98   < >   --    A1C   --    --   6.4*    < > = values in this interval not displayed.       Please review patients last 3 weights. If a weight gain of >10 lbs exists, you may refill the prescription once after instructing the patient to schedule an appointment within the next 30 days.    Wt Readings from Last 3 Encounters:   02/27/18 211 lb (95.7 kg)   02/20/18 203 lb (92.1 kg)   01/08/18 201 lb 11.2 oz (91.5 kg)            Passed - Recent (6 mo) or future (30 days) visit  "within the authorizing provider's specialty    Patient had office visit in the last 6 months or has a visit in the next 30 days with authorizing provider.  See \"Patient Info\" tab in inbasket, or \"Choose Columns\" in Meds & Orders section of the refill encounter.              "

## 2018-03-06 ENCOUNTER — TELEPHONE (OUTPATIENT)
Dept: FAMILY MEDICINE | Facility: CLINIC | Age: 78
End: 2018-03-06

## 2018-03-06 NOTE — LETTER
[facility]  [address]  [facility] [address]  Phone: [phone]    March 6, 2018      Re: Vincent Mishra  [address] [place] [address]        To whom it may concern,        Vincent Mishra is a patient in our clinic. He has severe and persistent depression. Please allow him to have his small dog with him in his room as it brings him peace and comfort. The dog is a soothing  when he is sad, anxious, or stressed.         Sincerely,        ARYA Velez MD

## 2018-03-06 NOTE — TELEPHONE ENCOUNTER
Reason for Call:  Other Letter    Detailed comments: Pat is calling stating that her and Vincent are traveling for 3 weeks for work and staying in different hotels.   Vincent has been very depressed and has a small therapy dog.  The hotel is stating that they would need a letter stating that Vincent needs this therapy dog to stay with them inside the hotel.  Could you please review and advise If appropriate.  Thank you..Ebony Dickson    Phone Number Patient can be reached at: Home number on file [phone] (home)    Best Time: any time    Can we leave a detailed message on this number? YES    Call taken on 3/6/2018 at 2:25 PM by Ebony Dickson

## 2018-03-17 DIAGNOSIS — M17.0 PRIMARY OSTEOARTHRITIS OF BOTH KNEES: ICD-10-CM

## 2018-03-19 RX ORDER — IBUPROFEN 800 MG/1
TABLET, FILM COATED ORAL
Qty: 90 TABLET | Refills: 0 | Status: SHIPPED | OUTPATIENT
Start: 2018-03-19 | End: 2018-05-01

## 2018-03-19 NOTE — TELEPHONE ENCOUNTER
"IBUPROFEN 800MG TABLETS        Last Written Prescription Date:  2/6/18  Last Fill Quantity: 90,   # refills: 0  Last Office Visit: 2/20/18  Future Office visit:       Requested Prescriptions   Pending Prescriptions Disp Refills     ibuprofen (ADVIL/MOTRIN) 800 MG tablet [Pharmacy Med Name: IBUPROFEN 800MG TABLETS] 90 tablet 0     Sig: TAKE 1 TABLET BY MOUTH EVERY 8 HOURS AS NEEDED FOR MODERATE PAIN    NSAID Medications Failed    3/17/2018 10:20 AM       Failed - Patient is age 6-64 years       Failed - Normal serum creatinine on file in past 12 months    Recent Labs   Lab Test  01/08/18   1414   CR  1.38*            Passed - Blood pressure under 140/90 in past 12 months    BP Readings from Last 3 Encounters:   02/27/18 129/70   02/20/18 134/76   01/08/18 123/75                Passed - Normal ALT on file in past 12 months    Recent Labs   Lab Test  10/30/17   1552   ALT  29            Passed - Normal AST on file in past 12 months    Recent Labs   Lab Test  10/30/17   1552   AST  22            Passed - Recent (12 mo) or future (30 days) visit within the authorizing provider's specialty    Patient had office visit in the last 12 months or has a visit in the next 30 days with authorizing provider or within the authorizing provider's specialty.  See \"Patient Info\" tab in inbasket, or \"Choose Columns\" in Meds & Orders section of the refill encounter.           Passed - Normal CBC on file in past 12 months    Recent Labs   Lab Test  10/30/17   1552   WBC  7.7   RBC  4.42   HGB  11.8*   HCT  37.7*   PLT  332               "

## 2018-03-23 NOTE — TELEPHONE ENCOUNTER
Patient is currently taking seroquel 25 mg, 1 tablet at night to help with sleep. If he takes any more than that, he has nightmares.  Shelley Iglesias RN

## 2018-03-27 RX ORDER — QUETIAPINE FUMARATE 25 MG/1
TABLET, FILM COATED ORAL
Qty: 270 TABLET | Refills: 1 | Status: SHIPPED | OUTPATIENT
Start: 2018-03-27 | End: 2018-04-26

## 2018-03-28 DIAGNOSIS — K21.9 GASTROESOPHAGEAL REFLUX DISEASE WITHOUT ESOPHAGITIS: ICD-10-CM

## 2018-03-28 DIAGNOSIS — F41.8 DEPRESSION WITH ANXIETY: ICD-10-CM

## 2018-03-28 DIAGNOSIS — I10 ESSENTIAL HYPERTENSION WITH GOAL BLOOD PRESSURE LESS THAN 140/90: ICD-10-CM

## 2018-03-29 ENCOUNTER — OFFICE VISIT (OUTPATIENT)
Dept: PSYCHIATRY | Facility: CLINIC | Age: 78
End: 2018-03-29
Attending: FAMILY MEDICINE
Payer: COMMERCIAL

## 2018-03-29 VITALS
WEIGHT: 211 LBS | TEMPERATURE: 98.6 F | SYSTOLIC BLOOD PRESSURE: 105 MMHG | RESPIRATION RATE: 14 BRPM | HEIGHT: 68 IN | DIASTOLIC BLOOD PRESSURE: 71 MMHG | HEART RATE: 65 BPM | BODY MASS INDEX: 31.98 KG/M2

## 2018-03-29 DIAGNOSIS — F33.1 MAJOR DEPRESSIVE DISORDER, RECURRENT EPISODE, MODERATE (H): Primary | ICD-10-CM

## 2018-03-29 DIAGNOSIS — F43.10 PTSD (POST-TRAUMATIC STRESS DISORDER): ICD-10-CM

## 2018-03-29 DIAGNOSIS — F41.1 GAD (GENERALIZED ANXIETY DISORDER): ICD-10-CM

## 2018-03-29 PROCEDURE — 90792 PSYCH DIAG EVAL W/MED SRVCS: CPT | Performed by: CLINICAL NURSE SPECIALIST

## 2018-03-29 RX ORDER — BUSPIRONE HYDROCHLORIDE 15 MG/1
TABLET ORAL
Qty: 180 TABLET | Refills: 0 | Status: SHIPPED | OUTPATIENT
Start: 2018-03-29 | End: 2018-04-26

## 2018-03-29 RX ORDER — HYDROCHLOROTHIAZIDE 25 MG/1
TABLET ORAL
Qty: 90 TABLET | Refills: 0 | Status: SHIPPED | OUTPATIENT
Start: 2018-03-29 | End: 2018-06-25

## 2018-03-29 RX ORDER — LISINOPRIL 40 MG/1
TABLET ORAL
Qty: 90 TABLET | Refills: 0 | Status: SHIPPED | OUTPATIENT
Start: 2018-03-29 | End: 2018-06-25

## 2018-03-29 ASSESSMENT — ANXIETY QUESTIONNAIRES
5. BEING SO RESTLESS THAT IT IS HARD TO SIT STILL: NOT AT ALL
IF YOU CHECKED OFF ANY PROBLEMS ON THIS QUESTIONNAIRE, HOW DIFFICULT HAVE THESE PROBLEMS MADE IT FOR YOU TO DO YOUR WORK, TAKE CARE OF THINGS AT HOME, OR GET ALONG WITH OTHER PEOPLE: VERY DIFFICULT
GAD7 TOTAL SCORE: 11
7. FEELING AFRAID AS IF SOMETHING AWFUL MIGHT HAPPEN: NEARLY EVERY DAY
6. BECOMING EASILY ANNOYED OR IRRITABLE: NEARLY EVERY DAY
3. WORRYING TOO MUCH ABOUT DIFFERENT THINGS: MORE THAN HALF THE DAYS
2. NOT BEING ABLE TO STOP OR CONTROL WORRYING: MORE THAN HALF THE DAYS
1. FEELING NERVOUS, ANXIOUS, OR ON EDGE: SEVERAL DAYS

## 2018-03-29 ASSESSMENT — PATIENT HEALTH QUESTIONNAIRE - PHQ9: 5. POOR APPETITE OR OVEREATING: NOT AT ALL
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
## 2018-03-29 NOTE — NURSING NOTE
"No chief complaint on file.      Initial /71  Pulse 65  Temp 98.6  F (37  C) (Tympanic)  Resp 14  Ht 5' 8\" (1.727 m)  Wt 211 lb (95.7 kg)  BMI 32.08 kg/m2 Estimated body mass index is 32.08 kg/(m^2) as calculated from the following:    Height as of this encounter: 5' 8\" (1.727 m).    Weight as of this encounter: 211 lb (95.7 kg).    Medication Reconciliation:  complete    Rasheeda Garcia CMA (AAMA)    "

## 2018-03-29 NOTE — PATIENT INSTRUCTIONS
Treatment Plan:  Continue duloxetine (Cymbalta) 60 mg daily, sertraline (Zoloft) 200 mg daily and trazodone (Desyrel) 300 mg at bedtime.     Decrease to bupropion (Wellbutrin)  mg daily for four days, then discontinue.     When off bupropion (Wellbutrin), decrease to buspirone (Buspar) 7.5 mg (half tab) in the morning and 15 mg in the afternoon for 3 days, then 7.5 mg twice daily for 3 days, then 7.5 mg once daily for 3 days, then discontinue.     Follow up in one month.     - Recommend patient discuss medications with their pharmacist. Risks and benefits of medications discussed, including side effect profile.   - Safety plan was reviewed; to the ER as needed or call after hours crisis line; [phone]  - Education and counseling was done regarding use of medications, psychotherapy options  - Call [phone] for appointment or to speak to a nurse.   -Office hours: Monday through Thursday 8:00 am to 4:30 pm.

## 2018-03-29 NOTE — MR AVS SNAPSHOT
After Visit Summary   3/29/2018    Vincent Mishra    MRN: [id number]           Patient Information     Date Of Birth          1940        Visit Information        Provider Department      3/29/2018 10:45 AM Ellen Tariq APRN [facility]        Care Instructions    Treatment Plan:  Continue duloxetine (Cymbalta) 60 mg daily, sertraline (Zoloft) 200 mg daily and trazodone (Desyrel) 300 mg at bedtime.     Decrease to bupropion (Wellbutrin)  mg daily for four days, then discontinue.     When off bupropion (Wellbutrin), decrease to buspirone (Buspar) 7.5 mg (half tab) in the morning and 15 mg in the afternoon for 3 days, then 7.5 mg twice daily for 3 days, then 7.5 mg once daily for 3 days, then discontinue.     Follow up in one month.     - Recommend patient discuss medications with their pharmacist. Risks and benefits of medications discussed, including side effect profile.   - Safety plan was reviewed; to the ER as needed or call after hours crisis line; [phone]  - Education and counseling was done regarding use of medications, psychotherapy options  - Call [phone] for appointment or to speak to a nurse.   -Office hours: Monday through Thursday 8:00 am to 4:30 pm.             Follow-ups after your visit        Your next 10 appointments already scheduled     Apr 04, 2018  8:30 AM CDT   [facility] with Keyla Fonseca MD   [facility]    [address] [facility] [address] [phone]              Who to contact     If you have questions or need follow up information about today's clinic visit or your schedule please contact [facility] directly at [phone].  Normal or non-critical lab and imaging results will be communicated to you by MyChart, letter or phone within 4 business days after the clinic has received the results. If you do not hear from us within 7 days, please contact the clinic through  "MyChart or phone. If you have a critical or abnormal lab result, we will notify you by phone as soon as possible.  Submit refill requests through [employer] or call your pharmacy and they will forward the refill request to us. Please allow 3 business days for your refill to be completed.          Additional Information About Your Visit        DeNAhart Information     [employer] gives you secure access to your electronic health record. If you see a primary care provider, you can also send messages to your care team and make appointments. If you have questions, please call your primary care clinic.  If you do not have a primary care provider, please call [phone] and they will assist you.        Care EveryWhere ID     This is your Care EveryWhere ID. This could be used by other organizations to access your [facility] medical records  [id number]        Your Vitals Were     Pulse Temperature Respirations Height BMI (Body Mass Index)       65 98.6  F (37  C) (Tympanic) 14 5' 8\" (1.727 m) 32.08 kg/m2        Blood Pressure from Last 3 Encounters:   03/29/18 105/71   02/27/18 129/70   02/20/18 134/76    Weight from Last 3 Encounters:   03/29/18 211 lb (95.7 kg)   02/27/18 211 lb (95.7 kg)   02/20/18 203 lb (92.1 kg)              Today, you had the following     No orders found for display       Primary Care Provider Office Phone # Fax #    Keyla Fonseca [phone] [address] [facility] [address]        Equal Access to Services     [facility]: Hadii aad ku hadasho Soomaali, waaxda luqadaha, qaybta kaalmada adeegyada, waxay david lorenzo . So [facility] [phone].    ATENCIÓN: Si habla español, tiene a nicholas disposición servicios gratuitos de asistencia lingüística. Llame al [phone].    We comply with applicable federal civil rights laws and [place] laws. We do not discriminate on the basis of race, color, national origin, age, disability, sex, sexual orientation, or " gender identity.            Thank you!     Thank you for choosing [facility]  for your care. Our goal is always to provide you with excellent care. Hearing back from our patients is one way we can continue to improve our services. Please take a few minutes to complete the written survey that you may receive in the mail after your visit with us. Thank you!             Your Updated Medication List - Protect others around you: Learn how to safely use, store and throw away your medicines at www.disposemymeds.org.          This list is accurate as of 3/29/18 11:55 AM.  Always use your most recent med list.                   Brand Name Dispense Instructions for use Diagnosis    acetaminophen 325 MG tablet    TYLENOL    250 tablet    Take 2 tablets by mouth every 4 hours as needed.    Tear of meniscus of left knee       amLODIPine 10 MG tablet    NORVASC    90 tablet    TAKE 1 TABLET(10 MG) BY MOUTH DAILY    Essential hypertension with goal blood pressure less than 140/90       ascorbic acid 500 MG tablet    VITAMIN C     Take 500 mg by mouth daily        aspirin 81 MG EC tablet      Take 1 tablet (81 mg) by mouth daily    Chest pain, unspecified type, NSTEMI (non-ST elevated myocardial infarction) (H)       atorvastatin 80 MG tablet    LIPITOR    90 tablet    TAKE 1 TABLET(80 MG) BY MOUTH DAILY    Hyperlipidemia LDL goal <100       * blood glucose monitoring meter device kit    no brand specified    1 kit    Use to test blood sugar 2 times daily or as directed.    Type 2 diabetes mellitus with hyperglycemia, without long-term current use of insulin (H)       * blood glucose monitoring meter device kit    no brand specified    1 kit    Use to test blood sugar 1 times daily or as directed.    Type 2 diabetes mellitus with complication, without long-term current use of insulin (H)       blood glucose monitoring test strip    ONETOUCH ULTRA    200 strip    Use to test blood sugars 2 times daily or as directed.     Type 2 diabetes mellitus with other circulatory complications       calcium + D 600-200 MG-UNIT Tabs   Generic drug:  calcium carbonate-vitamin D     100 tablet    Take 2 tablets by mouth daily.        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, Right bundle branch block, NSTEMI (non-ST elevated myocardial infarction) (H)       DULoxetine 60 MG EC capsule    CYMBALTA    30 capsule    TAKE ONE CAPSULE BY MOUTH EVERY DAY    Major depressive disorder, recurrent episode, moderate (H)       gabapentin 100 MG capsule    NEURONTIN    270 capsule    Take 1 tablet increase by 1 tablet every 2 days to 6 tablets 3 times per day    Failed back surgical syndrome, 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    Type 2 diabetes, HbA1c goal < 7% (H)       lisinopril 40 MG tablet    PRINIVIL/ZESTRIL    90 tablet    TAKE 1 TABLET(40 MG) BY MOUTH DAILY    Essential hypertension with goal blood pressure less than 140/90       Melatonin 10 MG Tbdp     90 tablet    Take 10 mg by mouth At Bedtime        metFORMIN 500 MG 24 hr tablet    GLUCOPHAGE-XR    360 tablet    TAKE 2 TABLETS BY MOUTH TWICE DAILY WITH MEALS.    Type 2 diabetes mellitus without complication, unspecified long term insulin use status (H)       nitroGLYcerin 0.4 MG sublingual tablet     NITROSTAT    25 tablet    Place 1 tablet (0.4 mg) under the tongue every 5 minutes as needed for chest pain    CAD (coronary artery disease)       omeprazole 20 MG CR capsule    priLOSEC    180 capsule    TAKE ONE CAPSULE BY MOUTH TWICE DAILY    Gastroesophageal reflux disease without esophagitis       * order for DME     1 Device    Equipment being ordered: Wheelchair motorized for patient with spinal stenosis and neurogenic claudication    Spinal stenosis, lumbar region, with neurogenic claudication       * order for DME     1 Units    Equipment being ordered: TENS    Chronic bilateral low back pain with sciatica, sciatica laterality unspecified, Hip pain, bilateral       * oxyCODONE IR 5 MG tablet    ROXICODONE    45 tablet    Take 1 daily as needed  for severe pain may have 15 per month this is a 3 month supply    Lumbar back pain with radiculopathy affecting left lower extremity       * oxyCODONE 30 MG 12 hr tablet    OxyCONTIN    30 tablet    Take 1 tablet (30 mg) by mouth every morning    Hip pain, bilateral, Chronic bilateral low back pain with sciatica, sciatica laterality unspecified       QUEtiapine 25 MG tablet    SEROquel    270 tablet    TAKE 1 TABLET BY MOUTH EVERY NIGHT AT BEDTIME. 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 Read the directions carefully, and ask your doctor or other care provider to review them with you.

## 2018-03-29 NOTE — TELEPHONE ENCOUNTER
Pres[place] per [facility] [place]  Shelley Iglesias RN

## 2018-03-29 NOTE — TELEPHONE ENCOUNTER
HYDROCHLOROTHIAZIDE 25MG TABLETS        Last Written Prescription Date:  12/26/17  Last Fill Quantity: 90,   # refills: 0  Last Office Visit: 2/20/18  Future Office visit:    Next 5 appointments (look out 90 days)     Apr 04, 2018  8:30 AM CDT   SHORT with Keyla Fonseca MD   [facility]    [address] [address]   [phone]                   lisinopril (PRINIVIL/ZESTRIL) 40 MG tablet      Last Written Prescription Date:  12/26/17  Last Fill Quantity: 90,   # refills: 0  Last Office Visit: 2/20/18  Future Office visit:    Next 5 appointments (look out 90 days)     Apr 04, 2018  8:30 AM CDT   SHORT with Keyla Fonseca MD   [facility]    [address] [address]   [phone]                   busPIRone (BUSPAR) 15 MG tablet      Last Written Prescription Date:  12/26/17  Last Fill Quantity: 180,   # refills: 0  Last Office Visit: 2/20/18  Future Office visit:    Next 5 appointments (look out 90 days)     Apr 04, 2018  8:30 AM CDT   SHORT with Keyla Fonseca MD   [facility]    [address] [address]   [phone]                   omeprazole (PRILOSEC) 20 MG CR capsule      Last Written Prescription Date:  12/26/17  Last Fill Quantity: 180,   # refills: 0  Last Office Visit: 2/20/18  Future Office visit:    Next 5 appointments (look out 90 days)     Apr 04, 2018  8:30 AM CDT   SHORT with Keyla Fonseca MD   [facility]    [address] [address] [phone]                   Requested Prescriptions   Pending Prescriptions Disp Refills     hydrochlorothiazide (HYDRODIURIL) 25 MG tablet [Pharmacy Med Name: HYDROCHLOROTHIAZIDE 25MG TABLETS] 90 tablet 0     Sig: TAKE 1 TABLET BY MOUTH DAILY    Diuretics (Including Combos) Protocol Failed    3/28/2018  6:24 PM       Failed - Normal serum  "creatinine on file in past 12 months    Recent Labs   Lab Test  01/08/18   1414   CR  1.38*             Passed - Blood pressure under 140/90 in past 12 months    BP Readings from Last 3 Encounters:   03/29/18 105/71   02/27/18 129/70   02/20/18 134/76                Passed - Recent (12 mo) or future (30 days) visit within the authorizing provider's specialty    Patient had office visit in the last 12 months or has a visit in the next 30 days with authorizing provider or within the authorizing provider's specialty.  See \"Patient Info\" tab in inbasket, or \"Choose Columns\" in Meds & Orders section of the refill encounter.           Passed - Patient is age 18 or older       Passed - Normal serum potassium on file in past 12 months    Recent Labs   Lab Test  01/08/18   1414   POTASSIUM  4.5                   Passed - Normal serum sodium on file in past 12 months    Recent Labs   Lab Test  01/08/18   1414   NA  138              lisinopril (PRINIVIL/ZESTRIL) 40 MG tablet [Pharmacy Med Name: LISINOPRIL 40MG TABLETS] 90 tablet 0     Sig: TAKE 1 TABLET(40 MG) BY MOUTH DAILY    ACE Inhibitors (Including Combos) Protocol Failed    3/28/2018  6:24 PM       Failed - Normal serum creatinine on file in past 12 months    Recent Labs   Lab Test  01/08/18   1414   CR  1.38*            Passed - Blood pressure under 140/90 in past 12 months    BP Readings from Last 3 Encounters:   03/29/18 105/71   02/27/18 129/70   02/20/18 134/76                Passed - Recent (12 mo) or future (30 days) visit within the authorizing provider's specialty    Patient had office visit in the last 12 months or has a visit in the next 30 days with authorizing provider or within the authorizing provider's specialty.  See \"Patient Info\" tab in inbasket, or \"Choose Columns\" in Meds & Orders section of the refill encounter.           Passed - Patient is age 18 or older       Passed - Normal serum potassium on file in past 12 months    Recent Labs   Lab Test  " "01/08/18   1414   POTASSIUM  4.5             busPIRone (BUSPAR) 15 MG tablet [Pharmacy Med Name: BUSPIRONE 15MG TABLETS] 180 tablet 0     Sig: TAKE 1 TABLET(15 MG) BY MOUTH TWICE DAILY    Atypical Antidepressants Protocol Passed    3/28/2018  6:24 PM       Passed - Recent (12 mo) or future (30 days) visit within the authorizing provider's specialty    Patient had office visit in the last 12 months or has a visit in the next 30 days with authorizing provider or within the authorizing provider's specialty.  See \"Patient Info\" tab in inbasket, or \"Choose Columns\" in Meds & Orders section of the refill encounter.           Passed - Patient is age 18 or older        omeprazole (PRILOSEC) 20 MG CR capsule [Pharmacy Med Name: OMEPRAZOLE 20MG CAPSULES] 180 capsule 0     Sig: TAKE ONE CAPSULE BY MOUTH TWICE DAILY    PPI Protocol Failed    3/28/2018  6:24 PM       Failed - Not on Clopidogrel (unless Pantoprazole ordered)       Passed - No diagnosis of osteoporosis on record       Passed - Recent (12 mo) or future (30 days) visit within the authorizing provider's specialty    Patient had office visit in the last 12 months or has a visit in the next 30 days with authorizing provider or within the authorizing provider's specialty.  See \"Patient Info\" tab in inbasket, or \"Choose Columns\" in Meds & Orders section of the refill encounter.           Passed - Patient is age 18 or older          "

## 2018-03-30 DIAGNOSIS — E11.9 TYPE 2 DIABETES MELLITUS WITHOUT COMPLICATION, UNSPECIFIED LONG TERM INSULIN USE STATUS: ICD-10-CM

## 2018-03-30 RX ORDER — METFORMIN HCL 500 MG
TABLET, EXTENDED RELEASE 24 HR ORAL
Qty: 360 TABLET | Refills: 0 | Status: SHIPPED | OUTPATIENT
Start: 2018-03-30 | End: 2018-06-28

## 2018-03-30 ASSESSMENT — PATIENT HEALTH QUESTIONNAIRE - PHQ9: SUM OF ALL RESPONSES TO PHQ QUESTIONS 1-9: 17

## 2018-03-30 ASSESSMENT — ANXIETY QUESTIONNAIRES: GAD7 TOTAL SCORE: 11

## 2018-03-30 NOTE — TELEPHONE ENCOUNTER
Requested Prescriptions   Pending Prescriptions Disp Refills     metFORMIN (GLUCOPHAGE-XR) 500 MG 24 hr tablet [Pharmacy Med Name: METFORMIN ER 500MG 24HR TABS] 360 tablet 0    Last Written Prescription Date:  12/26/17  Last Fill Quantity: 360,  # refills: 0   Last office visit: 2/20/2018 with prescribing provider:  2/20/18   Future Office Visit:   Next 5 appointments (look out 90 days)     Apr 04, 2018  8:30 AM CDT   SHORT with Keyla Fonseca MD   [facility]    [address] [address]   [phone]            Apr 26, 2018  9:15 AM CDT   Return Visit with [facility]    [address] [address]   [phone]                  Sig: TAKE 2 TABLETS BY MOUTH TWICE DAILY WITH MEALS.    Biguanide Agents Failed    3/30/2018 10:17 AM       Failed - Patient has documented LDL within the past 12 mos.    Recent Labs   Lab Test  01/24/17   1100   LDL  69            Failed - Patient has documented A1c within the specified period of time.    Recent Labs   Lab Test  09/20/17   1306   A1C  6.4*            Passed - Blood pressure less than 140/90 in past 6 months    BP Readings from Last 3 Encounters:   03/29/18 105/71   02/27/18 129/70   02/20/18 134/76                Passed - Patient has had a Microalbumin in the past 12 mos.    Recent Labs   Lab Test  09/20/17   1309   MICROL  5   UMALCR  5.36            Passed - Patient is age 10 or older       Passed - Patient's CR is NOT>1.4 OR Patient's EGFR is NOT<45 within past 12 mos.    Recent Labs   Lab Test  01/08/18   1414   GFRESTIMATED  50*   GFRESTBLACK  60*       Recent Labs   Lab Test  01/08/18   1414   CR  1.38*            Passed - Patient does NOT have a diagnosis of CHF.       Passed - Recent (6 mo) or future (30 days) visit within the authorizing provider's specialty    Patient had office visit in the last 6 months or has a  "visit in the next 30 days with authorizing provider or within the authorizing provider's specialty.  See \"Patient Info\" tab in inbasket, or \"Choose Columns\" in Meds & Orders section of the refill encounter.              "

## 2018-03-30 NOTE — TELEPHONE ENCOUNTER
[place] [facility] [place].  Shelley Iglesias RN

## 2018-04-04 ENCOUNTER — OFFICE VISIT (OUTPATIENT)
Dept: FAMILY MEDICINE | Facility: CLINIC | Age: 78
End: 2018-04-04
Payer: COMMERCIAL

## 2018-04-04 VITALS
HEART RATE: 64 BPM | HEIGHT: 68 IN | SYSTOLIC BLOOD PRESSURE: 96 MMHG | DIASTOLIC BLOOD PRESSURE: 58 MMHG | WEIGHT: 205 LBS | BODY MASS INDEX: 31.07 KG/M2

## 2018-04-04 DIAGNOSIS — G89.29 CHRONIC BILATERAL LOW BACK PAIN WITH SCIATICA, SCIATICA LATERALITY UNSPECIFIED: ICD-10-CM

## 2018-04-04 DIAGNOSIS — M25.551 HIP PAIN, BILATERAL: ICD-10-CM

## 2018-04-04 DIAGNOSIS — M25.552 HIP PAIN, BILATERAL: ICD-10-CM

## 2018-04-04 DIAGNOSIS — E78.5 HYPERLIPIDEMIA LDL GOAL <100: Primary | ICD-10-CM

## 2018-04-04 DIAGNOSIS — M54.40 CHRONIC BILATERAL LOW BACK PAIN WITH SCIATICA, SCIATICA LATERALITY UNSPECIFIED: ICD-10-CM

## 2018-04-04 DIAGNOSIS — E11.59 TYPE 2 DIABETES MELLITUS WITH OTHER CIRCULATORY COMPLICATION, WITHOUT LONG-TERM CURRENT USE OF INSULIN (H): ICD-10-CM

## 2018-04-04 LAB
ANION GAP SERPL CALCULATED.3IONS-SCNC: 9 MMOL/L (ref 3–14)
BUN SERPL-MCNC: 28 MG/DL (ref 7–30)
CALCIUM SERPL-MCNC: 8.5 MG/DL (ref 8.5–10.1)
CHLORIDE SERPL-SCNC: 105 MMOL/L (ref 94–109)
CO2 SERPL-SCNC: 23 MMOL/L (ref 20–32)
CREAT SERPL-MCNC: 1.48 MG/DL (ref 0.66–1.25)
GFR SERPL CREATININE-BSD FRML MDRD: 46 ML/MIN/1.7M2
GLUCOSE SERPL-MCNC: 180 MG/DL (ref 70–99)
HBA1C MFR BLD: 6.6 % (ref 0–6.4)
LDLC SERPL DIRECT ASSAY-MCNC: 64 MG/DL
POTASSIUM SERPL-SCNC: 4.4 MMOL/L (ref 3.4–5.3)
SODIUM SERPL-SCNC: 137 MMOL/L (ref 133–144)
TSH SERPL DL<=0.005 MIU/L-ACNC: 2.84 MU/L (ref 0.4–4)

## 2018-04-04 PROCEDURE — 36415 COLL VENOUS BLD VENIPUNCTURE: CPT | Performed by: FAMILY MEDICINE

## 2018-04-04 PROCEDURE — 80048 BASIC METABOLIC PNL TOTAL CA: CPT | Performed by: FAMILY MEDICINE

## 2018-04-04 PROCEDURE — 84443 ASSAY THYROID STIM HORMONE: CPT | Performed by: FAMILY MEDICINE

## 2018-04-04 PROCEDURE — 83036 HEMOGLOBIN GLYCOSYLATED A1C: CPT | Performed by: FAMILY MEDICINE

## 2018-04-04 PROCEDURE — 99214 OFFICE O/P EST MOD 30 MIN: CPT | Performed by: FAMILY MEDICINE

## 2018-04-04 PROCEDURE — 83721 ASSAY OF BLOOD LIPOPROTEIN: CPT | Performed by: FAMILY MEDICINE

## 2018-04-04 RX ORDER — OXYCODONE HYDROCHLORIDE 30 MG/1
30 TABLET, FILM COATED, EXTENDED RELEASE ORAL EVERY MORNING
Qty: 30 TABLET | Refills: 0 | Status: SHIPPED | OUTPATIENT
Start: 2018-04-04 | End: 2018-05-15

## 2018-04-04 NOTE — NURSING NOTE
"Chief Complaint   Patient presents with     Referral     discuss referral for back.      Groin Pain     on going pain, getting worse.        Initial BP 96/58  Pulse 64  Ht 5' 8\" (1.727 m)  Wt 205 lb (93 kg)  BMI 31.17 kg/m2 Estimated body mass index is 31.17 kg/(m^2) as calculated from the following:    Height as of this encounter: 5' 8\" (1.727 m).    Weight as of this encounter: 205 lb (93 kg).  Medication Reconciliation: complete   Kassidy Reardon CMA    "

## 2018-04-04 NOTE — PROGRESS NOTES
SUBJECTIVE:                                                    Vincent Mishra is a 78 year old male who presents to clinic today for the following health issues:    Chief Complaint   Patient presents with     Referral     discuss referral for back.      Groin Pain     on going pain, getting worse.        Problem list and histories reviewed & adjusted, as indicated.  Additional history: he would like a referral to [facility] for his back   The family wants him to get him hearing aids since he does not participate in the conversations   Saw lElen pretty and she is working on his depression medications  He cont to have the pain which is made worse by him not moving around.   He is very stubborn.         Patient Active Problem List   Diagnosis     Tension headache     Trapezius strain     Hyperlipidemia LDL goal <100     Parotid mass     Diplopia     Neuropathy     Vision loss night     Neck pain     B12 deficiency     Tear of meniscus of left knee     Hypertension goal BP (blood pressure) < 140/90     C6-7 disc with radiculopathy     Right bundle branch block     Advanced directives, info letter sent 10/4/2011     Chest pain     Anatomic airway obstruction     Abnormal antinuclear antibody titer     Osteoarthritis, knee     Moderate major depression (H)     Orchitis, epididymitis, and epididymo-orchitis     Malignant neoplasm of kidney excluding renal pelvis (H)     Renal mass, left     Vitamin D deficiency disease     Health Care Home     Insomnia     Abdominal pain, right upper quadrant     Esophageal reflux     Hard of hearing     Constipation     NSTEMI (non-ST elevated myocardial infarction) (H)     Myocardial infarction, nontransmural (H)     Acute myocardial infarction of inferolateral wall (H)     Obesity     Sinoatrial node dysfunction (H)     Right bundle branch block (RBBB)     Essential hypertension     Hyperlipidemia     Type 2 diabetes mellitus with other circulatory complication, without long-term  current use of insulin (H)     Thyroid nodule     Hip pain, bilateral     Claudication (H)     Bilateral low back pain with right-sided sciatica     Bilateral low back pain with left-sided sciatica     ACS (acute coronary syndrome) (H)     Chronic bilateral low back pain with sciatica, sciatica laterality unspecified     Severe episode of recurrent major depressive disorder, without psychotic features (H)     Past Surgical History:   Procedure Laterality Date     ARTHROSCOPY KNEE  2/11/2011    ARTHROSCOPY KNEE performed by LEY, JEFFREY DUANE at [facility]     ARTHROSCOPY KNEE WITH MEDIAL MENISCECTOMY  6/26/2012    Procedure: ARTHROSCOPY KNEE WITH MEDIAL MENISCECTOMY;  Right Knee Arthroscopy With Medial Menisectomy;  Surgeon: Ley, Jeffrey Duane, MD;  Location: [facility]     BACK SURGERY      back surgery x4     BIOPSY       CARDIAC SURGERY  7/2011    stentt placed     COLONOSCOPY       CORONARY ANGIOGRAPHY ADULT ORDER  07-25-14    RCA, L.Main and CFX  had minor luminal irregularites. Mid LAD high grade stenosis 80-90%.Stent placed to mid LAD     ESOPHAGOSCOPY, GASTROSCOPY, DUODENOSCOPY (EGD), COMBINED  10/25/2012    Procedure: COMBINED ESOPHAGOSCOPY, GASTROSCOPY, DUODENOSCOPY (EGD), BIOPSY SINGLE OR MULTIPLE;  Gastroscopy  ;  Surgeon: Lucius Dasilva MD;  Location: [facility]     HEART CATH, ANGIOPLASTY  07-25-14    mid LAD      ORTHOPEDIC SURGERY       back surgery       Social History   Substance Use Topics     Smoking status: Never Smoker     Smokeless tobacco: Never Used     Alcohol use No     Family History   Problem Relation Age of Onset     CANCER Mother      Depression Mother      DIABETES Father      Hypertension Father      HEART DISEASE Father      MENTAL ILLNESS Father      HEART DISEASE Maternal Grandfather      Substance Abuse Maternal Grandfather      Alcohol/Drug Paternal Grandmother      Substance Abuse Paternal Grandmother      HEART DISEASE Paternal Grandfather      Alcohol/Drug Paternal Grandfather       Substance Abuse Paternal Grandfather      HEART DISEASE Brother      DIABETES Brother      Substance Abuse Brother      Obesity Brother      DIABETES Brother      Obesity Sister      Alcohol/Drug Daughter      Depression Daughter      Obesity Sister      DIABETES Sister      Obesity Sister      DIABETES Sister      Alcohol/Drug Sister      CANCER Sister 55     possible kidney cancer     Substance Abuse Sister      Alcohol/Drug Son      Substance Abuse Son      DIABETES Son      Alcohol/Drug Son      Substance Abuse Son      Obesity Daughter      DIABETES Daughter      Hypertension Daughter      Bipolar Disorder Other          Current Outpatient Prescriptions   Medication Sig Dispense Refill     oxyCODONE (OXYCONTIN) 30 MG 12 hr tablet Take 1 tablet (30 mg) by mouth every morning 30 tablet 0     metFORMIN (GLUCOPHAGE-XR) 500 MG 24 hr tablet TAKE 2 TABLETS BY MOUTH TWICE DAILY WITH MEALS. 360 tablet 0     hydrochlorothiazide (HYDRODIURIL) 25 MG tablet TAKE 1 TABLET BY MOUTH DAILY 90 tablet 0     lisinopril (PRINIVIL/ZESTRIL) 40 MG tablet TAKE 1 TABLET(40 MG) BY MOUTH DAILY 90 tablet 0     busPIRone (BUSPAR) 15 MG tablet TAKE 1 TABLET(15 MG) BY MOUTH TWICE DAILY 180 tablet 0     omeprazole (PRILOSEC) 20 MG CR capsule TAKE ONE CAPSULE BY MOUTH TWICE DAILY 180 capsule 0     QUEtiapine (SEROQUEL) 25 MG tablet TAKE 1 TABLET BY MOUTH EVERY NIGHT AT BEDTIME. EVERY THREE TO FOUR NIGHTS MAY INCREASE DOSE BY 1 TABLET UP TO A MAX OF 4 TABLETS DAILY 270 tablet 1     ibuprofen (ADVIL/MOTRIN) 800 MG tablet TAKE 1 TABLET BY MOUTH EVERY 8 HOURS AS NEEDED FOR MODERATE PAIN 90 tablet 0     DULoxetine (CYMBALTA) 60 MG EC capsule TAKE ONE CAPSULE BY MOUTH EVERY DAY 30 capsule 0     sertraline (ZOLOFT) 100 MG tablet Take 2 tablets (200 mg) by mouth daily 180 tablet 0     atorvastatin (LIPITOR) 80 MG tablet TAKE 1 TABLET(80 MG) BY MOUTH DAILY 90 tablet 0     amLODIPine (NORVASC) 10 MG tablet TAKE 1 TABLET(10 MG) BY MOUTH DAILY 90 tablet 0      traZODone (DESYREL) 100 MG tablet TAKE 3 TABLETS(300 MG) BY MOUTH EVERY NIGHT AT BEDTIME AS NEEDED FOR SLEEP 270 tablet 0     blood glucose monitoring (NO BRAND SPECIFIED) meter device kit Use to test blood sugar 1 times daily or as directed. 1 kit 0     Cyanocobalamin (VITAMIN B-12 PO)        gabapentin (NEURONTIN) 100 MG capsule Take 1 tablet increase by 1 tablet every 2 days to 6 tablets 3 times per day 270 capsule 3     Melatonin 10 MG TBDP Take 10 mg by mouth At Bedtime 90 tablet      Lancets Misc. (SELECT-LITE DEVICE/LANCETS) KIT 1 kit 2 times daily 1 kit 1     oxyCODONE (ROXICODONE) 5 MG IR tablet Take 1 daily as needed  for severe pain may have 15 per month this is a 3 month supply 45 tablet 0     spironolactone (ALDACTONE) 25 MG tablet TAKE 1 TABLET(25 MG) BY MOUTH DAILY 90 tablet 0     aspirin EC 81 MG EC tablet Take 1 tablet (81 mg) by mouth daily       clopidogrel (PLAVIX) 75 MG tablet TAKE 1 TABLET BY MOUTH DAILY 90 tablet 3     order for DME Equipment being ordered: TENS 1 Units 0     blood glucose monitoring (ONE TOUCH ULTRA) test strip Use to test blood sugars 2 times daily or as directed. 200 strip 3     blood glucose monitoring (NO BRAND SPECIFIED) meter device kit Use to test blood sugar 2 times daily or as directed. 1 kit 0     order for DME Equipment being ordered: Wheelchair motorized for patient with spinal stenosis and neurogenic claudication 1 Device 0     nitroglycerin (NITROSTAT) 0.4 MG SL tablet Place 1 tablet (0.4 mg) under the tongue every 5 minutes as needed for chest pain 25 tablet 1     [employer] FINEPOINT LANCETS MISC Use to test blood sugars 1 times daily or as directed. 100 each 12     cholecalciferol (VITAMIN  -D) 1000 UNITS capsule Take 1 capsule by mouth daily       ascorbic acid (VITAMIN C) 500 MG tablet Take 500 mg by mouth daily        acetaminophen (TYLENOL) 325 MG tablet Take 2 tablets by mouth every 4 hours as needed. 250 tablet 1     Calcium Carbonate-Vitamin D  "(CALCIUM + D) 600-200 MG-UNIT per tablet Take 2 tablets by mouth daily. 100 tablet 12       ROS:  Constitutional, HEENT, cardiovascular, pulmonary, gi and gu systems are negative, except as otherwise noted.    OBJECTIVE:                                                    BP 96/58  Pulse 64  Ht 5' 8\" (1.727 m)  Wt 205 lb (93 kg)  BMI 31.17 kg/m2 Body mass index is 31.17 kg/(m^2).   GENERAL:: healthy, alert and no distress  NECK: no tenderness, no adenopathy, no asymmetry, no masses, no stiffness; thyroid- normal to palpation  RESP: lungs clear to auscultation - no rales, no rhonchi, no wheezes  CV: regular rates and rhythm, normal S1 S2, no S3 or S4 and no murmur, no click or rub -  Comprehensive back pain exam:  Tenderness of bilateral lumbar muscles  and Pain limits the following motions: flexion extyension and rotation he has spaspms of pain that radiate ransomly down the left thigh        ASSESSMENT/PLAN:                                                      1. Hip pain, bilateral    - oxyCODONE (OXYCONTIN) 30 MG 12 hr tablet; Take 1 tablet (30 mg) by mouth every morning  Dispense: 30 tablet; Refill: 0    2. Chronic bilateral low back pain with sciatica, sciatica laterality unspecified    - oxyCODONE (OXYCONTIN) 30 MG 12 hr tablet; Take 1 tablet (30 mg) by mouth every morning  Dispense: 30 tablet; Refill: 0  - SPINE SURGERY REFERRAL    3. Hyperlipidemia LDL goal <100    - LDL cholesterol direct    4. 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 He has never used smokeless tobacco.          Keyla Fonseca M.D.  [facility]    "

## 2018-04-04 NOTE — MR AVS SNAPSHOT
After Visit Summary   4/4/2018    Vincent Mishra    MRN: [id number]           Patient Information     Date Of Birth          1940        Visit Information        Provider Department      4/4/2018 8:30 AM Keyla Fonseca MD [facility]        Today's Diagnoses     Hyperlipidemia LDL goal <100    -  1    Hip pain, bilateral        Chronic bilateral low back pain with sciatica, sciatica laterality unspecified        Type 2 diabetes mellitus with other circulatory complication, without long-term current use of insulin (H)           Follow-ups after your visit        Additional Services     SPINE SURGERY REFERRAL       Please choose Medical Spine Specialist (unless patient was seen by a Medical Spine Specialist within the past 6 months).  Surgical Evaluation is advised if the patient presents with one or more of the following red flags:     **Cauda Equina Syndrome  **Evidence of Spinal Tumor  **Fracture  **Infection  **Loss of Bowel or Bladder Control  **Sudden or Progressive Weakness  **Any other documented emergent neurological condition resulting from a Lumbar Spinal Condition.    You have been referred to: Spine Lumbar: Spine Surgeon: [facility] Main phone for all locations: [phone]    Please be aware that coverage of these services is subject to the terms and limitations of your health insurance plan.  Call member services at your health plan with any benefit or coverage questions.      Please bring the following to your appointment:    **Any x-rays, CTs or MRIs which have been performed.  Contact the facility where they were done to arrange for  prior to your scheduled appointment.    **List of current medications   **This referral request   **Any documents/labs given to you regarding this referral                  Your next 10 appointments already scheduled     Apr 26, 2018  9:15 AM CDT   Return Visit with BESSY Cannon [facility]  "[place] [facility]    [address] [address]   [phone]              Who to contact     Normal or non-critical lab and imaging results will be communicated to you by [employer], letter or phone within 4 business days after the clinic has received the results. If you do not hear from us within 7 days, please contact the clinic through MyChart or phone. If you have a critical or abnormal lab result, we will notify you by phone as soon as possible.  Submit refill requests through [employer] or call your pharmacy and they will forward the refill request to us. Please allow 3 business days for your refill to be completed.          If you need to speak with a  for additional information , please call: [phone]             Additional Information About Your Visit        [employer] Information     [employer] gives you secure access to your electronic health record. If you see a primary care provider, you can also send messages to your care team and make appointments. If you have questions, please call your primary care clinic.  If you do not have a primary care provider, please call [phone] and they will assist you.        Care EveryWhere ID     This is your Care EveryWhere ID. This could be used by other organizations to access your [facility] medical records  [id number]        Your Vitals Were     Pulse Height BMI (Body Mass Index)             64 5' 8\" (1.727 m) 31.17 kg/m2          Blood Pressure from Last 3 Encounters:   04/04/18 96/58   03/29/18 105/71   02/27/18 129/70    Weight from Last 3 Encounters:   04/04/18 205 lb (93 kg)   03/29/18 211 lb (95.7 kg)   02/27/18 211 lb (95.7 kg)              We Performed the Following     Basic metabolic panel     Hemoglobin A1c     LDL cholesterol direct     SPINE SURGERY REFERRAL     TSH with free T4 reflex          Where to get your medicines      Some of these will need a paper prescription and others can be bought over " the counter.  Ask your nurse if you have questions.     Bring a paper prescription for each of these medications     oxyCODONE 30 MG 12 hr tablet          Primary Care Provider Office Phone # Fax #    Keyla Fonseca [phone] [id number] [facility] [address]        Equal Access to Services     JAXONREGINO MERLYN : Hadii aad ku hadasho Soomaali, waaxda luqadaha, qaybta kaalmada adeegyada, waxay winsomein haymohindern deena meyerlorietala sandhu. So [facility] [phone].    ATENCIÓN: Si habla español, tiene a nicholas disposición servicios gratuitos de asistencia lingüística. Llame al [phone].    We comply with applicable federal civil rights laws and [place] laws. We do not discriminate on the basis of race, color, national origin, age, disability, sex, sexual orientation, or gender identity.            Thank you!     Thank you for choosing [facility]  for your care. Our goal is always to provide you with excellent care. Hearing back from our patients is one way we can continue to improve our services. Please take a few minutes to complete the written survey that you may receive in the mail after your visit with us. Thank you!             Your Updated Medication List - Protect others around you: Learn how to safely use, store and throw away your medicines at www.disposemymeds.org.          This list is accurate as of 4/4/18  9:35 AM.  Always use your most recent med list.                   Brand Name Dispense Instructions for use Diagnosis    acetaminophen 325 MG tablet    TYLENOL    250 tablet    Take 2 tablets by mouth every 4 hours as needed.    Tear of meniscus of left knee       amLODIPine 10 MG tablet    NORVASC    90 tablet    TAKE 1 TABLET(10 MG) BY MOUTH DAILY    Essential hypertension with goal blood pressure less than 140/90       ascorbic acid 500 MG tablet    VITAMIN C     Take 500 mg by mouth daily        aspirin 81 MG EC tablet      Take 1 tablet (81 mg) by mouth daily    Chest  pain, unspecified type, NSTEMI (non-ST elevated myocardial infarction) (H)       atorvastatin 80 MG tablet    LIPITOR    90 tablet    TAKE 1 TABLET(80 MG) BY MOUTH DAILY    Hyperlipidemia LDL goal <100       * blood glucose monitoring meter device kit    no brand specified    1 kit    Use to test blood sugar 2 times daily or as directed.    Type 2 diabetes mellitus with hyperglycemia, without long-term current use of insulin (H)       * blood glucose monitoring meter device kit    no brand specified    1 kit    Use to test blood sugar 1 times daily or as directed.    Type 2 diabetes mellitus with complication, without long-term current use of insulin (H)       blood glucose monitoring test strip    ONETOUCH ULTRA    200 strip    Use to test blood sugars 2 times daily or as directed.    Type 2 diabetes mellitus with other circulatory complications       busPIRone 15 MG tablet    BUSPAR    180 tablet    TAKE 1 TABLET(15 MG) BY MOUTH TWICE DAILY    Depression with anxiety       calcium + D 600-200 MG-UNIT Tabs   Generic drug:  calcium carbonate-vitamin D     100 tablet    Take 2 tablets by mouth daily.        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, Right bundle branch block, NSTEMI (non-ST elevated myocardial infarction) (H)       DULoxetine 60 MG EC capsule    CYMBALTA    30 capsule    TAKE ONE CAPSULE BY MOUTH EVERY DAY    Major depressive disorder, recurrent episode, moderate (H)       gabapentin 100 MG capsule    NEURONTIN    270 capsule    Take 1 tablet increase by 1 tablet every 2 days to 6 tablets 3 times per day    Failed back surgical syndrome, 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    Type 2 diabetes, HbA1c goal < 7% (H)       lisinopril 40 MG tablet    PRINIVIL/ZESTRIL    90 tablet    TAKE 1 TABLET(40 MG) BY MOUTH DAILY    Essential hypertension with goal blood pressure less than 140/90       Melatonin 10 MG Tbdp     90 tablet    Take 10 mg by mouth At Bedtime        metFORMIN 500 MG 24 hr tablet    GLUCOPHAGE-XR    360 tablet    TAKE 2 TABLETS BY MOUTH TWICE DAILY WITH MEALS.    Type 2 diabetes mellitus without complication, unspecified long term insulin use status (H)       nitroGLYcerin 0.4 MG sublingual tablet    NITROSTAT    25 tablet    Place 1 tablet (0.4 mg) under the tongue every 5 minutes as needed for chest pain    CAD (coronary artery disease)       omeprazole 20 MG CR capsule    priLOSEC    180 capsule    TAKE ONE CAPSULE BY MOUTH TWICE DAILY    Gastroesophageal reflux disease without esophagitis       * order for DME     1 Device    Equipment being ordered: Wheelchair motorized for patient with spinal stenosis and neurogenic claudication    Spinal stenosis, lumbar region, with neurogenic claudication       * order for DME     1 Units    Equipment being ordered: TENS    Chronic bilateral low back pain with sciatica, sciatica laterality unspecified, Hip pain, bilateral       * oxyCODONE IR 5 MG tablet    ROXICODONE    45 tablet    Take 1 daily as needed  for severe pain may have 15 per month this is a 3 month supply    Lumbar back pain with radiculopathy affecting left lower extremity       * oxyCODONE 30 MG 12 hr tablet    OxyCONTIN    30 tablet    Take 1 tablet (30 mg) by mouth every morning    Hip pain, bilateral,  Chronic bilateral low back pain with sciatica, sciatica laterality unspecified       QUEtiapine 25 MG tablet    SEROquel    270 tablet    TAKE 1 TABLET BY MOUTH EVERY NIGHT AT BEDTIME. 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 Read the directions carefully, and ask your doctor or other care provider to review them with you.

## 2018-04-12 DIAGNOSIS — F51.01 PRIMARY INSOMNIA: ICD-10-CM

## 2018-04-12 NOTE — TELEPHONE ENCOUNTER
**This refill requires provider completion and is not appropriate for RN review per RN refill guidelines.**  PH-Q9 needs to be less than 5 to approve medication on RN Refill protocol pt's score is 17.  aLvern Evangelista RN

## 2018-04-12 NOTE — TELEPHONE ENCOUNTER
"traZODone (DESYREL) 100 MG tablet      Last Written Prescription Date:  1/13/18  Last Fill Quantity: 270,   # refills: 0  Last Office Visit: 4/4/18  Future Office visit:    Next 5 appointments (look out 90 days)     Apr 26, 2018  9:15 AM CDT   [facility] BESS[facility]    [address] [address]   [phone]                   Requested Prescriptions   Pending Prescriptions Disp Refills     traZODone (DESYREL) 100 MG tablet [Pharmacy Med Name: TRAZODONE 100MG TABLETS] 270 tablet 0     Sig: TAKE 3 TABLETS(300 MG) BY MOUTH EVERY NIGHT AT BEDTIME AS NEEDED FOR SLEEP    Serotonin Modulators Passed    4/12/2018  1:33 PM       Passed - Recent (12 mo) or future (30 days) visit within the authorizing provider's specialty    Patient had office visit in the last 12 months or has a visit in the next 30 days with authorizing provider or within the authorizing provider's specialty.  See \"Patient Info\" tab in inbasket, or \"Choose Columns\" in Meds & Orders section of the refill encounter.           Passed - Patient is age 18 or older          "

## 2018-04-13 RX ORDER — TRAZODONE HYDROCHLORIDE 100 MG/1
TABLET ORAL
Qty: 270 TABLET | Refills: 0 | Status: SHIPPED | OUTPATIENT
Start: 2018-04-13 | End: 2018-06-25

## 2018-04-23 ENCOUNTER — OFFICE VISIT (OUTPATIENT)
Dept: FAMILY MEDICINE | Facility: CLINIC | Age: 78
End: 2018-04-23
Payer: COMMERCIAL

## 2018-04-23 VITALS
SYSTOLIC BLOOD PRESSURE: 136 MMHG | BODY MASS INDEX: 32.3 KG/M2 | WEIGHT: 212.4 LBS | OXYGEN SATURATION: 95 % | HEART RATE: 63 BPM | DIASTOLIC BLOOD PRESSURE: 78 MMHG

## 2018-04-23 DIAGNOSIS — L03.119 CELLULITIS AND ABSCESS OF LEG, EXCEPT FOOT: Primary | ICD-10-CM

## 2018-04-23 DIAGNOSIS — F33.2 SEVERE EPISODE OF RECURRENT MAJOR DEPRESSIVE DISORDER, WITHOUT PSYCHOTIC FEATURES (H): ICD-10-CM

## 2018-04-23 DIAGNOSIS — L02.419 CELLULITIS AND ABSCESS OF LEG, EXCEPT FOOT: Primary | ICD-10-CM

## 2018-04-23 PROCEDURE — 99214 OFFICE O/P EST MOD 30 MIN: CPT | Performed by: PHYSICIAN ASSISTANT

## 2018-04-23 RX ORDER — CEPHALEXIN 500 MG/1
500 CAPSULE ORAL 3 TIMES DAILY
Qty: 30 CAPSULE | Refills: 0 | Status: SHIPPED | OUTPATIENT
Start: 2018-04-23 | End: 2018-05-15

## 2018-04-23 NOTE — PATIENT INSTRUCTIONS
Referral was placed for compression stockings.  Off the shelf at the [place] showroom: [phone]  Custom fit stockings at [phone]     Follow up with Dr. Fnoseca - make appointment for Wednesday  Keep appointment Thursday

## 2018-04-23 NOTE — NURSING NOTE
"Chief Complaint   Patient presents with     Infection       Initial /78  Pulse 63  Wt 212 lb 6.4 oz (96.3 kg)  SpO2 95%  BMI 32.3 kg/m2 Estimated body mass index is 32.3 kg/(m^2) as calculated from the following:    Height as of 4/4/18: 5' 8\" (1.727 m).    Weight as of this encounter: 212 lb 6.4 oz (96.3 kg).  Medication Reconciliation: complete  "
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
## 2018-04-23 NOTE — MR AVS SNAPSHOT
After Visit Summary   2018    Vincent Mishra    MRN: [id number]           Patient Information     Date Of Birth          1940        Visit Information        Provider Department      2018 1:40 PM Cydney Lomeli PA-C [facility]        Today's Diagnoses     Cellulitis and abscess of leg, except foot    -  1      Care Instructions    Referral was placed for compression stockings.  Off the shelf at the [facility]: [phone]  Custom fit stockings at [phone]     Follow up with Dr. Fonseca - make appointment for Wednesday  Keep appointment Thursday          Follow-ups after your visit        Your next 10 appointments already scheduled     2018  9:15 AM CDT   Return Visit with B[facility])    [address] [facility] [place] [address] [phone]              Who to contact     Normal or non-critical lab and imaging results will be communicated to you by Kuli Kulit, letter or phone within 4 business days after the clinic has received the results. If you do not hear from us within 7 days, please contact the clinic through Kuli Kulit or phone. If you have a critical or abnormal lab result, we will notify you by phone as soon as possible.  Submit refill requests through [employer]Ranks or call your pharmacy and they will forward the refill request to us. Please allow 3 business days for your refill to be completed.          If you need to speak with a  for additional information , please call: [phone]             Additional Information About Your Visit        stickappsharStartup Weekend Information     TipRanks gives you secure access to your electronic health record. If you see a primary care provider, you can also send messages to your care team and make appointments. If you have questions, please call your primary care clinic.  If you do not have a primary care provider, please  call [phone] and they will assist you.        Care EveryWhere ID     This is your Care EveryWhere ID. This could be used by other organizations to access your [facility] medical records  [id number]        Your Vitals Were     Pulse Pulse Oximetry BMI (Body Mass Index)             63 95% 32.3 kg/m2          Blood Pressure from Last 3 Encounters:   04/23/18 136/78   04/04/18 96/58   03/29/18 105/71    Weight from Last 3 Encounters:   04/23/18 212 lb 6.4 oz (96.3 kg)   04/04/18 205 lb (93 kg)   03/29/18 211 lb (95.7 kg)              Today, you had the following     No orders found for display         Today's Medication Changes          These changes are accurate as of 4/23/18  3:09 PM.  If you have any questions, ask your nurse or doctor.               Start taking these medicines.        Dose/Directions    cephALEXin 500 MG capsule   Commonly known as:  KEFLEX   Used for:  Cellulitis and abscess of leg, except foot   Started by:  Cydney Lomeli PA-C        Dose:  500 mg   Take 1 capsule (500 mg) by mouth 3 times daily   Quantity:  30 capsule   Refills:  0       order for DME   Used for:  Cellulitis and abscess of leg, except foot   Started by:  Cydney Lomeli PA-C        Equipment being ordered: compression stockings   Quantity:  1 Units   Refills:  0            Where to get your medicines      These medications were sent to [facility] [address] [place] [address] [address] AT [place] [address], [facility] [address]     Phone:  [phone]     cephALEXin 500 MG capsule         Some of these will need a paper prescription and others can be bought over the counter.  Ask your nurse if you have questions.     Bring a paper prescription for each of these medications     order for DME                Primary Care Provider Office Phone # Fax #    Keyla Fonseca [phone] [address] [place] [address]        Equal Access to Services      [facility] Center[facility]RE[facility]OM : Hadii aad ku tam oSusa, waaxda luqadaha, qaybta kaalmada adealejandramayelin, sobeida david hayneelam meyerlorietala lorenzo . So [facility] [phone].    ATENCIÓN: Si habla español, tiene a nicholas disposición servicios gratuitos de asistencia lingüística. Llame al [phone].    We comply with applicable federal civil rights laws and [place] laws. We do not discriminate on the basis of race, color, national origin, age, disability, sex, sexual orientation, or gender identity.            Thank you!     Thank you for choosing [facility]  for your care. Our goal is always to provide you with excellent care. Hearing back from our patients is one way we can continue to improve our services. Please take a few minutes to complete the written survey that you may receive in the mail after your visit with us. Thank you!             Your Updated Medication List - Protect others around you: Learn how to safely use, store and throw away your medicines at www.disposemymeds.org.          This list is accurate as of 4/23/18  3:09 PM.  Always use your most recent med list.                   Brand Name Dispense Instructions for use Diagnosis    acetaminophen 325 MG tablet    TYLENOL    250 tablet    Take 2 tablets by mouth every 4 hours as needed.    Tear of meniscus of left knee       amLODIPine 10 MG tablet    NORVASC    90 tablet    TAKE 1 TABLET(10 MG) BY MOUTH DAILY    Essential hypertension with goal blood pressure less than 140/90       ascorbic acid 500 MG tablet    VITAMIN C     Take 500 mg by mouth daily        aspirin 81 MG EC tablet      Take 1 tablet (81 mg) by mouth daily    Chest pain, unspecified type, NSTEMI (non-ST elevated myocardial infarction) (H)       atorvastatin 80 MG tablet    LIPITOR    90 tablet    TAKE 1 TABLET(80 MG) BY MOUTH DAILY    Hyperlipidemia LDL goal <100       * blood glucose monitoring meter device kit    no brand specified    1 kit    Use to test blood sugar 2 times daily or as  directed.    Type 2 diabetes mellitus with hyperglycemia, without long-term current use of insulin (H)       * blood glucose monitoring meter device kit    no brand specified    1 kit    Use to test blood sugar 1 times daily or as directed.    Type 2 diabetes mellitus with complication, without long-term current use of insulin (H)       blood glucose monitoring test strip    ONETOUCH ULTRA    200 strip    Use to test blood sugars 2 times daily or as directed.    Type 2 diabetes mellitus with other circulatory complications       busPIRone 15 MG tablet    BUSPAR    180 tablet    TAKE 1 TABLET(15 MG) BY MOUTH TWICE DAILY    Depression with anxiety       calcium + D 600-200 MG-UNIT Tabs   Generic drug:  calcium carbonate-vitamin D     100 tablet    Take 2 tablets by mouth daily.        cephALEXin 500 MG capsule    KEFLEX    30 capsule    Take 1 capsule (500 mg) by mouth 3 times daily    Cellulitis and abscess of leg, except foot       cholecalciferol 1000 units capsule    vitamin  -D     Take 1 capsule by mouth daily        clopidogrel 75 MG tablet    PLAVIX    90 tablet    TAKE 1 TABLET BY MOUTH DAILY    Coronary artery disease involving native coronary artery with other forms of angina pectoris, Right bundle branch block, NSTEMI (non-ST elevated myocardial infarction) (H)       DULoxetine 60 MG EC capsule    CYMBALTA    30 capsule    TAKE ONE CAPSULE BY MOUTH EVERY DAY    Major depressive disorder, recurrent episode, moderate (H)       gabapentin 100 MG capsule    NEURONTIN    270 capsule    Take 1 tablet increase by 1 tablet every 2 days to 6 tablets 3 times per day    Failed back surgical syndrome, 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    Type 2 diabetes, HbA1c goal < 7% (H)       lisinopril 40 MG tablet    PRINIVIL/ZESTRIL    90 tablet    TAKE 1 TABLET(40 MG) BY MOUTH DAILY    Essential hypertension with goal blood pressure less than 140/90       Melatonin 10 MG Tbdp     90 tablet    Take 10 mg by mouth At Bedtime        metFORMIN 500 MG 24 hr tablet    GLUCOPHAGE-XR    360 tablet    TAKE 2 TABLETS BY MOUTH TWICE DAILY WITH MEALS.    Type 2 diabetes mellitus without complication, unspecified long term insulin use status (H)       nitroGLYcerin 0.4 MG sublingual tablet    NITROSTAT    25 tablet    Place 1 tablet (0.4 mg) under the tongue every 5 minutes as needed for chest pain    CAD (coronary artery disease)       omeprazole 20 MG CR capsule    priLOSEC    180 capsule    TAKE ONE CAPSULE BY MOUTH TWICE DAILY    Gastroesophageal reflux disease without esophagitis       * order for DME     1 Device    Equipment being ordered: Wheelchair motorized for patient with spinal stenosis and neurogenic claudication    Spinal stenosis, lumbar region, with neurogenic claudication       * order for DME     1 Units    Equipment being ordered: TENS    Chronic bilateral low back pain with sciatica, sciatica laterality unspecified, Hip pain, bilateral       order for DME     1 Units    Equipment being ordered: compression stockings    Cellulitis and abscess of leg, except foot       * oxyCODONE IR 5 MG tablet    ROXICODONE    45 tablet    Take 1 daily as needed  for severe pain may have 15 per month this is a 3 month supply    Lumbar back pain with radiculopathy affecting left lower extremity       * oxyCODONE 30 MG 12 hr tablet    OxyCONTIN    30 tablet    Take 1 tablet (30 mg) by mouth every morning    Hip pain, bilateral, Chronic bilateral low back pain with sciatica,  sciatica laterality unspecified       QUEtiapine 25 MG tablet    SEROquel    270 tablet    TAKE 1 TABLET BY MOUTH EVERY NIGHT AT BEDTIME. 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 Read the directions carefully, and ask your doctor or other care provider to review them with you.

## 2018-04-25 ENCOUNTER — OFFICE VISIT (OUTPATIENT)
Dept: FAMILY MEDICINE | Facility: CLINIC | Age: 78
End: 2018-04-25
Payer: COMMERCIAL

## 2018-04-25 VITALS
HEIGHT: 68 IN | HEART RATE: 64 BPM | WEIGHT: 215 LBS | BODY MASS INDEX: 32.58 KG/M2 | TEMPERATURE: 98 F | DIASTOLIC BLOOD PRESSURE: 60 MMHG | SYSTOLIC BLOOD PRESSURE: 128 MMHG

## 2018-04-25 DIAGNOSIS — E11.59 TYPE 2 DIABETES MELLITUS WITH OTHER CIRCULATORY COMPLICATION, WITHOUT LONG-TERM CURRENT USE OF INSULIN (H): ICD-10-CM

## 2018-04-25 DIAGNOSIS — H83.3X3 NOISE-INDUCED HEARING LOSS OF BOTH EARS: ICD-10-CM

## 2018-04-25 DIAGNOSIS — L03.116 CELLULITIS OF LEFT LOWER EXTREMITY: Primary | ICD-10-CM

## 2018-04-25 PROCEDURE — 99214 OFFICE O/P EST MOD 30 MIN: CPT | Performed by: FAMILY MEDICINE

## 2018-04-25 PROCEDURE — 87077 CULTURE AEROBIC IDENTIFY: CPT | Performed by: FAMILY MEDICINE

## 2018-04-25 PROCEDURE — 87070 CULTURE OTHR SPECIMN AEROBIC: CPT | Performed by: FAMILY MEDICINE

## 2018-04-25 PROCEDURE — 87186 SC STD MICRODIL/AGAR DIL: CPT | Performed by: FAMILY MEDICINE

## 2018-04-25 RX ORDER — SULFAMETHOXAZOLE/TRIMETHOPRIM 800-160 MG
1 TABLET ORAL 2 TIMES DAILY
Qty: 14 TABLET | Refills: 0 | Status: SHIPPED | OUTPATIENT
Start: 2018-04-25 | End: 2018-05-30

## 2018-04-25 NOTE — NURSING NOTE
"Chief Complaint   Patient presents with     RECHECK     follow up on leg infection and recheck sores on arms.        Initial /60  Pulse 64  Temp 98  F (36.7  C) (Tympanic)  Ht 5' 8\" (1.727 m)  Wt 215 lb (97.5 kg)  BMI 32.69 kg/m2 Estimated body mass index is 32.69 kg/(m^2) as calculated from the following:    Height as of this encounter: 5' 8\" (1.727 m).    Weight as of this encounter: 215 lb (97.5 kg).  Medication Reconciliation: complete   Kassidy Reardon CMA    "

## 2018-04-25 NOTE — PROGRESS NOTES
SUBJECTIVE:                                                    Vincent Mishra is a 78 year old male who presents to clinic today for the following health issues:    Chief Complaint   Patient presents with     RECHECK     follow up on leg infection and recheck sores on arms.          Problem list and histories reviewed & adjusted, as indicated.  Additional history: picks constantly . He has neurotic excoriations all over his arms and legs. This started recently he used to just pick a little but it has really escalated. He has been taken off some of his depression medications and his mood is better but it has unmasked this compulsion . He has a cellulitis in the left leg that has not progressed buthas not improved.   He is not febrile. nochills   He fell last night stubbing his toe   He is willing to do physical therapy again for the groin pain   I also want him to see diabetic ed he needs a new meter.     Patient Active Problem List   Diagnosis     Tension headache     Trapezius strain     Hyperlipidemia LDL goal <100     Parotid mass     Diplopia     Neuropathy     Vision loss night     Neck pain     B12 deficiency     Tear of meniscus of left knee     Hypertension goal BP (blood pressure) < 140/90     C6-7 disc with radiculopathy     Right bundle branch block     Advanced directives, info letter sent 10/4/2011     Chest pain     Anatomic airway obstruction     Abnormal antinuclear antibody titer     Osteoarthritis, knee     Moderate major depression (H)     Orchitis, epididymitis, and epididymo-orchitis     Malignant neoplasm of kidney excluding renal pelvis (H)     Renal mass, left     Vitamin D deficiency disease     Health Care Home     Insomnia     Abdominal pain, right upper quadrant     Esophageal reflux     Hard of hearing     Constipation     NSTEMI (non-ST elevated myocardial infarction) (H)     Myocardial infarction, nontransmural (H)     Acute myocardial infarction of inferolateral wall (H)     Obesity      Sinoatrial node dysfunction (H)     Right bundle branch block (RBBB)     Essential hypertension     Hyperlipidemia     Type 2 diabetes mellitus with other circulatory complication, without long-term current use of insulin (H)     Thyroid nodule     Hip pain, bilateral     Claudication (H)     Bilateral low back pain with right-sided sciatica     Bilateral low back pain with left-sided sciatica     ACS (acute coronary syndrome) (H)     Chronic bilateral low back pain with sciatica, sciatica laterality unspecified     Severe episode of recurrent major depressive disorder, without psychotic features (H)     Past Surgical History:   Procedure Laterality Date     ARTHROSCOPY KNEE  2/11/2011    ARTHROSCOPY KNEE performed by LEY, JEFFREY DUANE at [facility]     ARTHROSCOPY KNEE WITH MEDIAL MENISCECTOMY  6/26/2012    Procedure: ARTHROSCOPY KNEE WITH MEDIAL MENISCECTOMY;  Right Knee Arthroscopy With Medial Menisectomy;  Surgeon: Ley, Jeffrey Duane, MD;  Location: [facility]     BACK SURGERY      back surgery x4     BIOPSY       CARDIAC SURGERY  7/2011    stentt placed     COLONOSCOPY       CORONARY ANGIOGRAPHY ADULT ORDER  07-25-14    RCA, L.Main and CFX  had minor luminal irregularites. Mid LAD high grade stenosis 80-90%.Stent placed to mid LAD     ESOPHAGOSCOPY, GASTROSCOPY, DUODENOSCOPY (EGD), COMBINED  10/25/2012    Procedure: COMBINED ESOPHAGOSCOPY, GASTROSCOPY, DUODENOSCOPY (EGD), BIOPSY SINGLE OR MULTIPLE;  Gastroscopy  ;  Surgeon: Lucius Dasilva MD;  Location: [facility]     HEART CATH, ANGIOPLASTY  07-25-14    mid LAD      ORTHOPEDIC SURGERY       back surgery       Social History   Substance Use Topics     Smoking status: Never Smoker     Smokeless tobacco: Never Used     Alcohol use No     Family History   Problem Relation Age of Onset     CANCER Mother      Depression Mother      DIABETES Father      Hypertension Father      HEART DISEASE Father      MENTAL ILLNESS Father      HEART DISEASE Maternal Grandfather       Substance Abuse Maternal Grandfather      Alcohol/Drug Paternal Grandmother      Substance Abuse Paternal Grandmother      HEART DISEASE Paternal Grandfather      Alcohol/Drug Paternal Grandfather      Substance Abuse Paternal Grandfather      HEART DISEASE Brother      DIABETES Brother      Substance Abuse Brother      Obesity Brother      DIABETES Brother      Obesity Sister      Alcohol/Drug Daughter      Depression Daughter      Obesity Sister      DIABETES Sister      Obesity Sister      DIABETES Sister      Alcohol/Drug Sister      CANCER Sister 55     possible kidney cancer     Substance Abuse Sister      Alcohol/Drug Son      Substance Abuse Son      DIABETES Son      Alcohol/Drug Son      Substance Abuse Son      Obesity Daughter      DIABETES Daughter      Hypertension Daughter      Bipolar Disorder Other          Current Outpatient Prescriptions   Medication Sig Dispense Refill     acetaminophen (TYLENOL) 325 MG tablet Take 2 tablets by mouth every 4 hours as needed. 250 tablet 1     amLODIPine (NORVASC) 10 MG tablet TAKE 1 TABLET(10 MG) BY MOUTH DAILY 90 tablet 0     ascorbic acid (VITAMIN C) 500 MG tablet Take 500 mg by mouth daily        aspirin EC 81 MG EC tablet Take 1 tablet (81 mg) by mouth daily       atorvastatin (LIPITOR) 80 MG tablet TAKE 1 TABLET(80 MG) BY MOUTH DAILY 90 tablet 0     blood glucose monitoring (NO BRAND SPECIFIED) meter device kit Use to test blood sugar 2 times daily or as directed. 1 kit 0     blood glucose monitoring (NO BRAND SPECIFIED) meter device kit Use to test blood sugar 1 times daily or as directed. 1 kit 0     blood glucose monitoring (ONE TOUCH ULTRA) test strip Use to test blood sugars 2 times daily or as directed. 200 strip 3     busPIRone (BUSPAR) 15 MG tablet TAKE 1 TABLET(15 MG) BY MOUTH TWICE DAILY 180 tablet 0     Calcium Carbonate-Vitamin D (CALCIUM + D) 600-200 MG-UNIT per tablet Take 2 tablets by mouth daily. 100 tablet 12     cephALEXin (KEFLEX) 500 MG  capsule Take 1 capsule (500 mg) by mouth 3 times daily 30 capsule 0     cholecalciferol (VITAMIN  -D) 1000 UNITS capsule Take 1 capsule by mouth daily       clopidogrel (PLAVIX) 75 MG tablet TAKE 1 TABLET BY MOUTH DAILY 90 tablet 3     Cyanocobalamin (VITAMIN B-12 PO)        DULoxetine (CYMBALTA) 60 MG EC capsule TAKE ONE CAPSULE BY MOUTH EVERY DAY 30 capsule 0     gabapentin (NEURONTIN) 100 MG capsule Take 1 tablet increase by 1 tablet every 2 days to 6 tablets 3 times per day 270 capsule 3     hydrochlorothiazide (HYDRODIURIL) 25 MG tablet TAKE 1 TABLET BY MOUTH DAILY 90 tablet 0     ibuprofen (ADVIL/MOTRIN) 800 MG tablet TAKE 1 TABLET BY MOUTH EVERY 8 HOURS AS NEEDED FOR MODERATE PAIN 90 tablet 0     Lancets Misc. (SELECT-LITE DEVICE/LANCETS) KIT 1 kit 2 times daily 1 kit 1     3seventy FINEPOINT LANCETS MISC Use to test blood sugars 1 times daily or as directed. 100 each 12     lisinopril (PRINIVIL/ZESTRIL) 40 MG tablet TAKE 1 TABLET(40 MG) BY MOUTH DAILY 90 tablet 0     Melatonin 10 MG TBDP Take 10 mg by mouth At Bedtime 90 tablet      metFORMIN (GLUCOPHAGE-XR) 500 MG 24 hr tablet TAKE 2 TABLETS BY MOUTH TWICE DAILY WITH MEALS. 360 tablet 0     nitroglycerin (NITROSTAT) 0.4 MG SL tablet Place 1 tablet (0.4 mg) under the tongue every 5 minutes as needed for chest pain 25 tablet 1     omeprazole (PRILOSEC) 20 MG CR capsule TAKE ONE CAPSULE BY MOUTH TWICE DAILY 180 capsule 0     order for DME Equipment being ordered: Wheelchair motorized for patient with spinal stenosis and neurogenic claudication 1 Device 0     order for DME Equipment being ordered: TENS 1 Units 0     order for DME Equipment being ordered: compression stockings 1 Units 0     oxyCODONE (OXYCONTIN) 30 MG 12 hr tablet Take 1 tablet (30 mg) by mouth every morning 30 tablet 0     oxyCODONE (ROXICODONE) 5 MG IR tablet Take 1 daily as needed  for severe pain may have 15 per month this is a 3 month supply 45 tablet 0     QUEtiapine (SEROQUEL) 25 MG  "tablet TAKE 1 TABLET BY MOUTH EVERY NIGHT AT BEDTIME. EVERY THREE TO FOUR NIGHTS MAY INCREASE DOSE BY 1 TABLET UP TO A MAX OF 4 TABLETS DAILY 270 tablet 1     sertraline (ZOLOFT) 100 MG tablet Take 2 tablets (200 mg) by mouth daily 180 tablet 0     spironolactone (ALDACTONE) 25 MG tablet TAKE 1 TABLET(25 MG) BY MOUTH DAILY 90 tablet 0     sulfamethoxazole-trimethoprim (BACTRIM DS/SEPTRA DS) 800-160 MG per tablet Take 1 tablet by mouth 2 times daily 14 tablet 0     traZODone (DESYREL) 100 MG tablet TAKE 3 TABLETS(300 MG) BY MOUTH EVERY NIGHT AT BEDTIME AS NEEDED FOR SLEEP 270 tablet 0     Allergies   Allergen Reactions     Prozac [Fluoxetine] Other (See Comments)     \"I went crazy.\"       Benadryl [Diphenhydramine Hcl] Other (See Comments)     Starts to shake, and paranoid     Codeine Itching     Diphenhydramine Other (See Comments)     Hallucinations, See [facility] records scanned on 7/16/15     Labetalol Other (See Comments)     See [facility] records scanned on 7/16/15  Bradycardia down to 30 on holter, dizziness. Stopped med and symptoms resolved     Metoprolol Other (See Comments)     Bradycardia even at 12.5 mg     Morphine Visual Disturbance     Recent Labs   Lab Test  04/04/18   0933  01/08/18   1414  10/30/17   1552  09/20/17   1306   05/30/17   1410   01/24/17   1100   04/01/16   1249  01/07/16   0951   09/25/14   0954   08/06/13   0938   A1C  6.6*   --    --   6.4*   --    --    --   7.7*   --    --   6.5*   < >   --    < >  5.6   LDL  64   --    --    --    --    --    --   69   --    --   46   --   40   --   51   HDL   --    --    --    --    --    --    --    --    --    --   57   --   57   --   36*   TRIG   --    --    --    --    --    --    --    --    --    --   81   --   70   --   128   ALT   --    --   29   --    --   20   --   20   --    --    --    < >  36   < >  50   CR  1.48*  1.38*  1.36*   --    < >  1.32*   < >  1.42*   --    --   1.23   < >  1.28*   " "< >  1.47*   GFRESTIMATED  46*  50*  51*   --    < >  53*   < >  48*   < >   --   57*   < >  55*   < >  47*   GFRESTBLACK  56*  60*  62   --    < >  64   < >  59*   < >   --   69   < >  67   < >  57*   POTASSIUM  4.4  4.5  3.8   --    < >  4.3   < >  4.6   --    --   4.1   < >  4.4   < >  4.4   TSH  2.84   --    --    --    --    --    --    --    --   2.91   --    --    --    < >   --     < > = values in this interval not displayed.        ROS:  Constitutional, HEENT, cardiovascular, pulmonary, GI, , musculoskeletal, neuro, skin, endocrine and psych systems are negative, except as otherwise noted.    OBJECTIVE:                                                    /60  Pulse 64  Temp 98  F (36.7  C) (Tympanic)  Ht 5' 8\" (1.727 m)  Wt 215 lb (97.5 kg)  BMI 32.69 kg/m2 Body mass index is 32.69 kg/(m^2).   GENERAL APPEARANCE: healthy, alert and no distress  MS: abnormal gait due to back pain   SKIN: excoriation - arms legs hands  left lower leg with erythema mildly tender around the 1 cm ulcer   DIABETIC FOOT EXAM: left toenail partially off , decreased sensation throuout to monofilament   Comprehensive back pain exam:  Decreased rom   PSYCH: mentation appears normal, anxious, tangential and worried  MENTAL STATUS EXAM:  Appearance/Behavior: Distressed  Speech: Normal  Mood/Affect: anxiety and agitation  Insight: Fair and Poor       ASSESSMENT/PLAN:                                                      1. Cellulitis of left lower extremity  No much improved will  Add bactrim and will also culture wound   - sulfamethoxazole-trimethoprim (BACTRIM DS/SEPTRA DS) 800-160 MG per tablet; Take 1 tablet by mouth 2 times daily  Dispense: 14 tablet; Refill: 0  - DIABETES EDUCATOR REFERRAL  - Wound Culture Aerobic Bacterial    2. Type 2 diabetes mellitus with other circulatory complication, without long-term current use of insulin (H)  Needs refresher on the glucometer he has not been checking any   - DIABETES EDUCATOR " REFERRAL    3. Noise-induced hearing loss of both ears    - DIABETES EDUCATOR REFERRAL     reports that he has never smoked. He has never used smokeless tobacco.    Recheck in 2 days       Keyla Fonseca M.D.  [facility]

## 2018-04-25 NOTE — MR AVS SNAPSHOT
After Visit Summary   4/25/2018    Vincent Mishra    MRN: [id number]           Patient Information     Date Of Birth          1940        Visit Information        Provider Department      4/25/2018 11:30 AM Keyla Fonseca MD [facility] [place]        Today's Diagnoses     Cellulitis of left lower extremity    -  1    Type 2 diabetes mellitus with other circulatory complication, without long-term current use of insulin (H)        Noise-induced hearing loss of both ears           Follow-ups after your visit        Additional Services     DIABETES EDUCATOR REFERRAL       DIABETES SELF MANAGEMENT TRAINING (DSMT)      Your provider has referred you to Diabetes Education: FMG: Diabetes Education - All [facility] [phone]   [email or web address]     If an urgent visit is needed or A1C is above 12, Care Team to call the Diabetes  Education Team at [phone] or send an In Basket message to the Diabetes Education Pool (P DIAB ED-PATIENT CARE).    A  will call you to make your appointment. If it has been more than 3 business days since your referral was placed, please call the above phone number to schedule.    Type of training and number of hours: Previous Diagnosis: Follow-up DSMT - 2 hours.    Diabetes Type: Type 2 - On Oral Medication   Medicare covers: 10 hours of initial DSMT in 12 month period from the time of first visit, plus 2 hours of follow-up DSMT annually, and additional hours as requested for insulin training.         Diabetes Co-Morbidities: atherosclerotic cardiovascular disease, hypertension, kidney disease, mental/affective disorder and neuropathy               A1C Goal:  <8.0       A1C is: Lab Results       Component                Value               Date                       A1C                      6.6                 04/04/2018              MEDICAL NUTRITION THERAPY (MNT) for Diabetes    Medical Nutrition  Therapy with a Registered Dietitian can be provided in coordination with Diabetes Self-Management Training to assist in achieving optimal diabetes management.    MNT Type and Hours: Previous diagnosis: Annual follow-up MNT - 2 hours                       Medicare will cover: 3 hours initial MNT in 12 month period after first visit, plus 2 hours of follow-up MNT annually        Diabetes Education Topics: Blood glucose meter instruction     Special Educational Needs Requiring Individual DSMT: Hearing - Degree of Deficit: Moderate  He needs a new meter medicare will not pay for one with out a log of his blood sugar he doesn't have one so I can't give that to him he currently has celulitis and I need him to be able to check blood sugars on sick days.       Please be aware that coverage of these services is subject to the terms and limitations of your health insurance plan.  Call member services at your health plan to determine Diabetes Self-Management Training (Codes  and ) and Medical Nutrition Therapy (Codes 57180 and 65838) benefits and ask which blood glucose monitor brands are covered by your plan.  Please bring the following with you to your appointment:    (1)  List of current medications   (2)  List of Blood Glucose Monitor brands that are covered by your insurance plan  (3)  Blood Glucose Monitor and log book  (4)   Food records for the 3 days prior to your visit    The Certified Diabetes Educator may make diabetes medication adjustments per the CDE Protocol and Collaborative Practice Agreement.                  Your next 10 appointments already scheduled     Apr 26, 2018  9:15 AM CDT   Return Visit with BESSY Cannon   [facility] ([facility]    [address] [address] [phone]              Who to contact     Normal or non-critical lab and imaging results will be communicated to you by MyChart, letter or phone within 4  "business days after the clinic has received the results. If you do not hear from us within 7 days, please contact the clinic through [employer] or phone. If you have a critical or abnormal lab result, we will notify you by phone as soon as possible.  Submit refill requests through [employer] or call your pharmacy and they will forward the refill request to us. Please allow 3 business days for your refill to be completed.          If you need to speak with a  for additional information , please call: [phone]             Additional Information About Your Visit        [employer] Information     [employer] gives you secure access to your electronic health record. If you see a primary care provider, you can also send messages to your care team and make appointments. If you have questions, please call your primary care clinic.  If you do not have a primary care provider, please call [phone] and they will assist you.        Care EveryWhere ID     This is your Care EveryWhere ID. This could be used by other organizations to access your [place] medical records  [id number]        Your Vitals Were     Pulse Temperature Height BMI (Body Mass Index)          64 98  F (36.7  C) (Tympanic) 5' 8\" (1.727 m) 32.69 kg/m2         Blood Pressure from Last 3 Encounters:   04/25/18 128/60   04/23/18 136/78   04/04/18 96/58    Weight from Last 3 Encounters:   04/25/18 215 lb (97.5 kg)   04/23/18 212 lb 6.4 oz (96.3 kg)   04/04/18 205 lb (93 kg)              We Performed the Following     DIABETES EDUCATOR REFERRAL     Wound Culture Aerobic Bacterial          Today's Medication Changes          These changes are accurate as of 4/25/18 12:35 PM.  If you have any questions, ask your nurse or doctor.               Start taking these medicines.        Dose/Directions    sulfamethoxazole-trimethoprim 800-160 MG per tablet   Commonly known as:  BACTRIM DS/SEPTRA DS   Used for:  Cellulitis of left lower extremity   Started by:  " Keyla Fonseca MD        Dose:  1 tablet   Take 1 tablet by mouth 2 times daily   Quantity:  14 tablet   Refills:  0            Where to get your medicines      These medications were sent to [id number] - [place] [address] AT [facility] & [place] [address], [facility] [address]     Phone:  [phone]     sulfamethoxazole-trimethoprim 800-160 MG per tablet                Primary Care Provider Office Phone # Fax #    Keyla Fonseca [phone] [address] [facility] [address]        Equal Access to Services     [facility]: Hadii aad ku hadasho Soomaali, waaxda luqadaha, qaybta kaalmada adeegyada, waxay idiin hayaan adeeg khsimba lorenzo . So [facility] [phone].    ATENCIÓN: Si habla español, tiene a nicholas disposición servicios gratuitos de asistencia lingüística. [facility] [phone].    We comply with applicable federal civil rights laws and [place] laws. We do not discriminate on the basis of race, color, national origin, age, disability, sex, sexual orientation, or gender identity.            Thank you!     Thank you for choosing [facility]  for your care. Our goal is always to provide you with excellent care. Hearing back from our patients is one way we can continue to improve our services. Please take a few minutes to complete the written survey that you may receive in the mail after your visit with us. Thank you!             Your Updated Medication List - Protect others around you: Learn how to safely use, store and throw away your medicines at www.disposemymeds.org.          This list is accurate as of 4/25/18 12:35 PM.  Always use your most recent med list.                   Brand Name Dispense Instructions for use Diagnosis    acetaminophen 325 MG tablet    TYLENOL    250 tablet    Take 2 tablets by mouth every 4 hours as needed.    Tear of meniscus of left knee       amLODIPine 10 MG tablet    NORVASC    90  tablet    TAKE 1 TABLET(10 MG) BY MOUTH DAILY    Essential hypertension with goal blood pressure less than 140/90       ascorbic acid 500 MG tablet    VITAMIN C     Take 500 mg by mouth daily        aspirin 81 MG EC tablet      Take 1 tablet (81 mg) by mouth daily    Chest pain, unspecified type, NSTEMI (non-ST elevated myocardial infarction) (H)       atorvastatin 80 MG tablet    LIPITOR    90 tablet    TAKE 1 TABLET(80 MG) BY MOUTH DAILY    Hyperlipidemia LDL goal <100       * blood glucose monitoring meter device kit    no brand specified    1 kit    Use to test blood sugar 2 times daily or as directed.    Type 2 diabetes mellitus with hyperglycemia, without long-term current use of insulin (H)       * blood glucose monitoring meter device kit    no brand specified    1 kit    Use to test blood sugar 1 times daily or as directed.    Type 2 diabetes mellitus with complication, without long-term current use of insulin (H)       blood glucose monitoring test strip    ONETOUCH ULTRA    200 strip    Use to test blood sugars 2 times daily or as directed.    Type 2 diabetes mellitus with other circulatory complications       busPIRone 15 MG tablet    BUSPAR    180 tablet    TAKE 1 TABLET(15 MG) BY MOUTH TWICE DAILY    Depression with anxiety       calcium + D 600-200 MG-UNIT Tabs   Generic drug:  calcium carbonate-vitamin D     100 tablet    Take 2 tablets by mouth daily.        cephALEXin 500 MG capsule    KEFLEX    30 capsule    Take 1 capsule (500 mg) by mouth 3 times daily    Cellulitis and abscess of leg, except foot       cholecalciferol 1000 units capsule    vitamin  -D     Take 1 capsule by mouth daily        clopidogrel 75 MG tablet    PLAVIX    90 tablet    TAKE 1 TABLET BY MOUTH DAILY    Coronary artery disease involving native coronary artery with other forms of angina pectoris, Right bundle branch block, NSTEMI (non-ST elevated myocardial infarction) (H)       DULoxetine 60 MG EC capsule    CYMBALTA    30  capsule    TAKE ONE CAPSULE BY MOUTH EVERY DAY    Major depressive disorder, recurrent episode, moderate (H)       gabapentin 100 MG capsule    NEURONTIN    270 capsule    Take 1 tablet increase by 1 tablet every 2 days to 6 tablets 3 times per day    Failed back surgical syndrome, 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    Type 2 diabetes, HbA1c goal < 7% (H)       lisinopril 40 MG tablet    PRINIVIL/ZESTRIL    90 tablet    TAKE 1 TABLET(40 MG) BY MOUTH DAILY    Essential hypertension with goal blood pressure less than 140/90       Melatonin 10 MG Tbdp     90 tablet    Take 10 mg by mouth At Bedtime        metFORMIN 500 MG 24 hr tablet    GLUCOPHAGE-XR    360 tablet    TAKE 2 TABLETS BY MOUTH TWICE DAILY WITH MEALS.    Type 2 diabetes mellitus without complication, unspecified long term insulin use status (H)       nitroGLYcerin 0.4 MG sublingual tablet    NITROSTAT    25 tablet    Place 1 tablet (0.4 mg) under the tongue every 5 minutes as needed for chest pain    CAD (coronary artery disease)       omeprazole 20 MG CR capsule    priLOSEC    180 capsule    TAKE ONE CAPSULE BY MOUTH TWICE DAILY    Gastroesophageal reflux disease without esophagitis       * order for DME     1 Device    Equipment being ordered: Wheelchair motorized for patient with spinal stenosis and neurogenic claudication    Spinal stenosis, lumbar region, with neurogenic claudication       * order for DME     1 Units    Equipment being ordered: TENS    Chronic bilateral low back pain  with sciatica, sciatica laterality unspecified, Hip pain, bilateral       order for DME     1 Units    Equipment being ordered: compression stockings    Cellulitis and abscess of leg, except foot       * oxyCODONE IR 5 MG tablet    ROXICODONE    45 tablet    Take 1 daily as needed  for severe pain may have 15 per month this is a 3 month supply    Lumbar back pain with radiculopathy affecting left lower extremity       * oxyCODONE 30 MG 12 hr tablet    OxyCONTIN    30 tablet    Take 1 tablet (30 mg) by mouth every morning    Hip pain, bilateral, Chronic bilateral low back pain with sciatica, sciatica laterality unspecified       QUEtiapine 25 MG tablet    SEROquel    270 tablet    TAKE 1 TABLET BY MOUTH EVERY NIGHT AT BEDTIME. 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)       sertraline 100 MG tablet    ZOLOFT    180 tablet    Take 2 tablets (200 mg) by mouth daily        spironolactone 25 MG tablet    ALDACTONE    90 tablet    TAKE 1 TABLET(25 MG) BY MOUTH DAILY    Essential hypertension with goal blood pressure less than 140/90       sulfamethoxazole-trimethoprim 800-160 MG per tablet    BACTRIM DS/SEPTRA DS    14 tablet    Take 1 tablet by mouth 2 times daily    Cellulitis of left lower extremity       traZODone 100 MG tablet    DESYREL    270 tablet    TAKE 3 TABLETS(300 MG) BY MOUTH EVERY NIGHT AT BEDTIME AS NEEDED FOR SLEEP    Primary insomnia       VITAMIN B-12 PO           * Notice:  This list has 6 medication(s) that are the same as other medications prescribed for you. Read the directions carefully, and ask your doctor or other care provider to review them with you.

## 2018-04-26 ENCOUNTER — OFFICE VISIT (OUTPATIENT)
Dept: PSYCHIATRY | Facility: CLINIC | Age: 78
End: 2018-04-26
Payer: COMMERCIAL

## 2018-04-26 VITALS
SYSTOLIC BLOOD PRESSURE: 129 MMHG | HEART RATE: 65 BPM | WEIGHT: 215 LBS | DIASTOLIC BLOOD PRESSURE: 62 MMHG | BODY MASS INDEX: 32.69 KG/M2 | TEMPERATURE: 98 F

## 2018-04-26 DIAGNOSIS — F51.01 PRIMARY INSOMNIA: ICD-10-CM

## 2018-04-26 DIAGNOSIS — F43.10 PTSD (POST-TRAUMATIC STRESS DISORDER): ICD-10-CM

## 2018-04-26 DIAGNOSIS — F33.1 MAJOR DEPRESSIVE DISORDER, RECURRENT EPISODE, MODERATE (H): Primary | ICD-10-CM

## 2018-04-26 PROCEDURE — 99214 OFFICE O/P EST MOD 30 MIN: CPT | Performed by: CLINICAL NURSE SPECIALIST

## 2018-04-26 ASSESSMENT — ANXIETY QUESTIONNAIRES
3. WORRYING TOO MUCH ABOUT DIFFERENT THINGS: NOT AT ALL
2. NOT BEING ABLE TO STOP OR CONTROL WORRYING: NOT AT ALL
5. BEING SO RESTLESS THAT IT IS HARD TO SIT STILL: NOT AT ALL
6. BECOMING EASILY ANNOYED OR IRRITABLE: NOT AT ALL
GAD7 TOTAL SCORE: 0
7. FEELING AFRAID AS IF SOMETHING AWFUL MIGHT HAPPEN: NOT AT ALL
1. FEELING NERVOUS, ANXIOUS, OR ON EDGE: NOT AT ALL

## 2018-04-26 ASSESSMENT — PATIENT HEALTH QUESTIONNAIRE - PHQ9: 5. POOR APPETITE OR OVEREATING: NOT AT ALL

## 2018-04-26 NOTE — MR AVS SNAPSHOT
After Visit Summary   4/26/2018    Vincent Mishra    MRN: [id number]           Patient Information     Date Of Birth          1940        Visit Information        Provider Department      4/26/2018 9:15 AM Ellen Tariq APRN [facility]        Today's Diagnoses     Primary insomnia          Care Instructions    Treatment Plan:  Continue sertraline (Zoloft) 200 mg daily and trazodone (Desyrel) 300 mg at bedtime.     Discontinue duloxetine (Cymbalta) and quetiapine (Seroquel).     Schedule individual therapy - trauma therapy.     Follow up in one month.     - Recommend patient discuss medications with their pharmacist. Risks and benefits for medications were discussed including, but not limited to, side effects.   - Safety plan was reviewed; to the ER as needed or call after hours crisis line; [phone]  - Education and counseling was done regarding use of medications, psychotherapy options  - Call [phone] for appointment or to speak to a nurse.    -Office hours: Monday through Thursday 8:00 am to 4:30 pm.             Follow-ups after your visit        Your next 10 appointments already scheduled     Apr 27, 2018 10:00 AM CDT   SHORT with Keyla Fonseca MD   [facility] ([facility])    [address] [address] [phone]              Who to contact     If you have questions or need follow up information about today's clinic visit or your schedule please contact [facility] directly at [phone].  Normal or non-critical lab and imaging results will be communicated to you by MyChart, letter or phone within 4 business days after the clinic has received the results. If you do not hear from us within 7 days, please contact the clinic through [facility] or phone. If you have a critical or abnormal lab result, we will notify you by phone as soon as possible.  Submit refill requests through [employer] or call your  pharmacy and they will forward the refill request to us. Please allow 3 business days for your refill to be completed.          Additional Information About Your Visit        MyChart Information     [employer] gives you secure access to your electronic health record. If you see a primary care provider, you can also send messages to your care team and make appointments. If you have questions, please call your primary care clinic.  If you do not have a primary care provider, please call [phone] and they will assist you.        Care EveryWhere ID     This is your Care EveryWhere ID. This could be used by other organizations to access your [place] medical records  [id number]        Your Vitals Were     Pulse Temperature BMI (Body Mass Index)             65 98  F (36.7  C) (Tympanic) 32.69 kg/m2          Blood Pressure from Last 3 Encounters:   04/26/18 129/62   04/25/18 128/60   04/23/18 136/78    Weight from Last 3 Encounters:   04/26/18 215 lb (97.5 kg)   04/25/18 215 lb (97.5 kg)   04/23/18 212 lb 6.4 oz (96.3 kg)              Today, you had the following     No orders found for display       Primary Care Provider Office Phone # Fax #    Keyla Fonseca [phone] [address] [facility] [address]        Equal Access to Services     [facility]: Hadii aad ku hadasho Soomaali, waaxda luqadaha, qaybta kaalmada adeegyada, waxay winsomein hayaan deena lorenzo . So [facility] [phone].    ATENCIÓN: Si habla español, tiene a nicholas disposición servicios gratuitos de asistencia lingüística. Llame al [phone].    We comply with applicable federal civil rights laws and [place] laws. We do not discriminate on the basis of race, color, national origin, age, disability, sex, sexual orientation, or gender identity.            Thank you!     Thank you for choosing [facility]  for your care. Our goal is always to provide you with excellent care. Hearing back from our patients is  one way we can continue to improve our services. Please take a few minutes to complete the written survey that you may receive in the mail after your visit with us. Thank you!             Your Updated Medication List - Protect others around you: Learn how to safely use, store and throw away your medicines at www.disposemymeds.org.          This list is accurate as of 4/26/18 10:04 AM.  Always use your most recent med list.                   Brand Name Dispense Instructions for use Diagnosis    acetaminophen 325 MG tablet    TYLENOL    250 tablet    Take 2 tablets by mouth every 4 hours as needed.    Tear of meniscus of left knee       amLODIPine 10 MG tablet    NORVASC    90 tablet    TAKE 1 TABLET(10 MG) BY MOUTH DAILY    Essential hypertension with goal blood pressure less than 140/90       ascorbic acid 500 MG tablet    VITAMIN C     Take 500 mg by mouth daily        aspirin 81 MG EC tablet      Take 1 tablet (81 mg) by mouth daily    Chest pain, unspecified type, NSTEMI (non-ST elevated myocardial infarction) (H)       atorvastatin 80 MG tablet    LIPITOR    90 tablet    TAKE 1 TABLET(80 MG) BY MOUTH DAILY    Hyperlipidemia LDL goal <100       * blood glucose monitoring meter device kit    no brand specified    1 kit    Use to test blood sugar 2 times daily or as directed.    Type 2 diabetes mellitus with hyperglycemia, without long-term current use of insulin (H)       * blood glucose monitoring meter device kit    no brand specified    1 kit    Use to test blood sugar 1 times daily or as directed.    Type 2 diabetes mellitus with complication, without long-term current use of insulin (H)       blood glucose monitoring test strip    ONETOUCH ULTRA    200 strip    Use to test blood sugars 2 times daily or as directed.    Type 2 diabetes mellitus with other circulatory complications       calcium + D 600-200 MG-UNIT Tabs   Generic drug:  calcium carbonate-vitamin D     100 tablet    Take 2 tablets by mouth daily.         cephALEXin 500 MG capsule    KEFLEX    30 capsule    Take 1 capsule (500 mg) by mouth 3 times daily    Cellulitis and abscess of leg, except foot       cholecalciferol 1000 units capsule    vitamin  -D     Take 1 capsule by mouth daily        clopidogrel 75 MG tablet    PLAVIX    90 tablet    TAKE 1 TABLET BY MOUTH DAILY    Coronary artery disease involving native coronary artery with other forms of angina pectoris, Right bundle branch block, NSTEMI (non-ST elevated myocardial infarction) (H)       gabapentin 100 MG capsule    NEURONTIN    270 capsule    Take 1 tablet increase by 1 tablet every 2 days to 6 tablets 3 times per day    Failed back surgical syndrome, 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    Type 2 diabetes, HbA1c goal < 7% (H)       lisinopril 40 MG tablet    PRINIVIL/ZESTRIL    90 tablet    TAKE 1 TABLET(40 MG) BY MOUTH DAILY    Essential hypertension with goal blood pressure less than 140/90       Melatonin 10 MG Tbdp     90 tablet    Take 10 mg by mouth At Bedtime        metFORMIN 500 MG 24 hr tablet    GLUCOPHAGE-XR    360 tablet    TAKE 2 TABLETS BY MOUTH TWICE DAILY WITH MEALS.    Type 2 diabetes mellitus without complication, unspecified long term insulin use status (H)       nitroGLYcerin 0.4 MG sublingual tablet    NITROSTAT    25 tablet    Place 1 tablet (0.4 mg) under the tongue every 5 minutes as needed for chest pain    CAD (coronary artery disease)       omeprazole 20 MG CR capsule    priLOSEC    180 capsule    TAKE ONE  CAPSULE BY MOUTH TWICE DAILY    Gastroesophageal reflux disease without esophagitis       * order for DME     1 Device    Equipment being ordered: Wheelchair motorized for patient with spinal stenosis and neurogenic claudication    Spinal stenosis, lumbar region, with neurogenic claudication       * order for DME     1 Units    Equipment being ordered: TENS    Chronic bilateral low back pain with sciatica, sciatica laterality unspecified, Hip pain, bilateral       order for DME     1 Units    Equipment being ordered: compression stockings    Cellulitis and abscess of leg, except foot       * oxyCODONE IR 5 MG tablet    ROXICODONE    45 tablet    Take 1 daily as needed  for severe pain may have 15 per month this is a 3 month supply    Lumbar back pain with radiculopathy affecting left lower extremity       * oxyCODONE 30 MG 12 hr tablet    OxyCONTIN    30 tablet    Take 1 tablet (30 mg) by mouth every morning    Hip pain, bilateral, Chronic bilateral low back pain with sciatica, sciatica laterality unspecified       SELECT-LITE DEVICE/LANCETS Kit     1 kit    1 kit 2 times daily    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)       sertraline 100 MG tablet    ZOLOFT    180 tablet    Take 2 tablets (200 mg) by mouth daily        spironolactone 25 MG tablet    ALDACTONE    90 tablet    TAKE 1 TABLET(25 MG) BY MOUTH DAILY    Essential hypertension with goal blood pressure less than 140/90       sulfamethoxazole-trimethoprim 800-160 MG per tablet    BACTRIM DS/SEPTRA DS    14 tablet    Take 1 tablet by mouth 2 times daily    Cellulitis of left lower extremity       traZODone 100 MG tablet    DESYREL    270 tablet    TAKE 3 TABLETS(300 MG) BY MOUTH EVERY NIGHT AT BEDTIME AS NEEDED FOR SLEEP    Primary insomnia       VITAMIN B-12 PO           * Notice:  This list has 6 medication(s) that are the same as other medications prescribed for you. Read the directions carefully, and ask your doctor or other  care provider to review them with you.

## 2018-04-26 NOTE — PATIENT INSTRUCTIONS
Treatment Plan:  Continue sertraline (Zoloft) 200 mg daily and trazodone (Desyrel) 300 mg at bedtime.     Discontinue duloxetine (Cymbalta) and quetiapine (Seroquel).     Schedule individual therapy - trauma therapy.     Follow up in one month.     - Recommend patient discuss medications with their pharmacist. Risks and benefits for medications were discussed including, but not limited to, side effects.   - Safety plan was reviewed; to the ER as needed or call after hours crisis line; [phone]  - Education and counseling was done regarding use of medications, psychotherapy options  - Call [phone] for appointment or to speak to a nurse.    -Office hours: Monday through Thursday 8:00 am to 4:30 pm.

## 2018-04-26 NOTE — NURSING NOTE
"Chief Complaint   Patient presents with     Recheck Medication       Initial /62 (BP Location: Left arm, Patient Position: Sitting, Cuff Size: Adult Regular)  Pulse 65  Temp 98  F (36.7  C) (Tympanic)  Wt 215 lb (97.5 kg)  BMI 32.69 kg/m2 Estimated body mass index is 32.69 kg/(m^2) as calculated from the following:    Height as of 4/25/18: 5' 8\" (1.727 m).    Weight as of this encounter: 215 lb (97.5 kg).  Medication Reconciliation: complete    "
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
## 2018-04-27 ENCOUNTER — OFFICE VISIT (OUTPATIENT)
Dept: FAMILY MEDICINE | Facility: CLINIC | Age: 78
End: 2018-04-27
Payer: COMMERCIAL

## 2018-04-27 VITALS
BODY MASS INDEX: 32.58 KG/M2 | HEIGHT: 68 IN | SYSTOLIC BLOOD PRESSURE: 136 MMHG | WEIGHT: 215 LBS | TEMPERATURE: 98 F | DIASTOLIC BLOOD PRESSURE: 60 MMHG | HEART RATE: 64 BPM

## 2018-04-27 DIAGNOSIS — L03.116 CELLULITIS OF LEFT LOWER EXTREMITY: Primary | ICD-10-CM

## 2018-04-27 DIAGNOSIS — L98.1 NEUROTIC EXCORIATIONS: ICD-10-CM

## 2018-04-27 PROCEDURE — 99213 OFFICE O/P EST LOW 20 MIN: CPT | Performed by: FAMILY MEDICINE

## 2018-04-27 ASSESSMENT — PATIENT HEALTH QUESTIONNAIRE - PHQ9: SUM OF ALL RESPONSES TO PHQ QUESTIONS 1-9: 23

## 2018-04-27 ASSESSMENT — ANXIETY QUESTIONNAIRES: GAD7 TOTAL SCORE: 0

## 2018-04-27 NOTE — PROGRESS NOTES
SUBJECTIVE:                                                    Vincent Mishra is a 78 year old male who presents to clinic today for the following health issues:    Chief Complaint   Patient presents with     RECHECK     recheck sores on legs and arms       Problem list and histories reviewed & adjusted, as indicated.  Additional history: they called about starting talk tehrapy but first avail isn't until June. They will start in aug when they get back from the pow wows.   He doesn't want to go to physical therapy today even though he said he would go the other day  He is not picking at his sores. The sleeves we made for him were a bit too tight. I suggested they cut them in half.   No fever or chills  Sores on arms are improving also   Patient Active Problem List   Diagnosis     Tension headache     Trapezius strain     Hyperlipidemia LDL goal <100     Parotid mass     Diplopia     Neuropathy     Vision loss night     Neck pain     B12 deficiency     Tear of meniscus of left knee     Hypertension goal BP (blood pressure) < 140/90     C6-7 disc with radiculopathy     Right bundle branch block     Advanced directives, info letter sent 10/4/2011     Chest pain     Anatomic airway obstruction     Abnormal antinuclear antibody titer     Osteoarthritis, knee     Moderate major depression (H)     Orchitis, epididymitis, and epididymo-orchitis     Malignant neoplasm of kidney excluding renal pelvis (H)     Renal mass, left     Vitamin D deficiency disease     Health Care Home     Insomnia     Abdominal pain, right upper quadrant     Esophageal reflux     Hard of hearing     Constipation     NSTEMI (non-ST elevated myocardial infarction) (H)     Myocardial infarction, nontransmural (H)     Acute myocardial infarction of inferolateral wall (H)     Obesity     Sinoatrial node dysfunction (H)     Right bundle branch block (RBBB)     Essential hypertension     Hyperlipidemia     Type 2 diabetes mellitus with other circulatory  complication, without long-term current use of insulin (H)     Thyroid nodule     Hip pain, bilateral     Claudication (H)     Bilateral low back pain with right-sided sciatica     Bilateral low back pain with left-sided sciatica     ACS (acute coronary syndrome) (H)     Chronic bilateral low back pain with sciatica, sciatica laterality unspecified     Severe episode of recurrent major depressive disorder, without psychotic features (H)     Past Surgical History:   Procedure Laterality Date     ARTHROSCOPY KNEE  2/11/2011    ARTHROSCOPY KNEE performed by LEY, JEFFREY DUANE at [facility]     ARTHROSCOPY KNEE WITH MEDIAL MENISCECTOMY  6/26/2012    Procedure: ARTHROSCOPY KNEE WITH MEDIAL MENISCECTOMY;  Right Knee Arthroscopy With Medial Menisectomy;  Surgeon: Ley, Jeffrey Duane, MD;  Location: [facility]     BACK SURGERY      back surgery x4     BIOPSY       CARDIAC SURGERY  7/2011    stentt placed     COLONOSCOPY       CORONARY ANGIOGRAPHY ADULT ORDER  07-25-14    RCA, L.Main and CFX  had minor luminal irregularites. Mid LAD high grade stenosis 80-90%.Stent placed to mid LAD     ESOPHAGOSCOPY, GASTROSCOPY, DUODENOSCOPY (EGD), COMBINED  10/25/2012    Procedure: COMBINED ESOPHAGOSCOPY, GASTROSCOPY, DUODENOSCOPY (EGD), BIOPSY SINGLE OR MULTIPLE;  Gastroscopy  ;  Surgeon: Lucius Dasilva MD;  Location: [facility]     HEART CATH, ANGIOPLASTY  07-25-14    mid LAD      ORTHOPEDIC SURGERY       back surgery       Social History   Substance Use Topics     Smoking status: Never Smoker     Smokeless tobacco: Never Used     Alcohol use No     Family History   Problem Relation Age of Onset     CANCER Mother      Depression Mother      DIABETES Father      Hypertension Father      HEART DISEASE Father      MENTAL ILLNESS Father      HEART DISEASE Maternal Grandfather      Substance Abuse Maternal Grandfather      Alcohol/Drug Paternal Grandmother      Substance Abuse Paternal Grandmother      HEART DISEASE Paternal Grandfather      Alcohol/Drug  "Paternal Grandfather      Substance Abuse Paternal Grandfather      HEART DISEASE Brother      DIABETES Brother      Substance Abuse Brother      Obesity Brother      DIABETES Brother      Obesity Sister      Alcohol/Drug Daughter      Depression Daughter      Obesity Sister      DIABETES Sister      Obesity Sister      DIABETES Sister      Alcohol/Drug Sister      CANCER Sister 55     possible kidney cancer     Substance Abuse Sister      Alcohol/Drug Son      Substance Abuse Son      DIABETES Son      Alcohol/Drug Son      Substance Abuse Son      Obesity Daughter      DIABETES Daughter      Hypertension Daughter      Bipolar Disorder Other            ROS:  Constitutional, HEENT, cardiovascular, pulmonary, gi and gu systems are negative, except as otherwise noted.    OBJECTIVE:                                                    /60  Pulse 64  Temp 98  F (36.7  C) (Tympanic)  Ht 5' 8\" (1.727 m)  Wt 215 lb (97.5 kg)  BMI 32.69 kg/m2 Body mass index is 32.69 kg/(m^2).   GENERAL APPEARANCE: healthy, alert and no distress  SKIN: multiple excoriations arms and legs   The open wound with surrounding erythema on the left lateral calf is greatly improved with the erythema reduced. Non tender   Initial cx showed staph awaiting sensitivity        ASSESSMENT/PLAN:                                                    1. Cellulitis of left lower extremity  Cont the bactrim/keflex  If cont to improve no f/u needed if worsening rtc     2. Neurotic excoriations  Doing better right now   He will cont to work on this   He is going to schedule therapy for august.       reports that he has never smoked. He has never used smokeless tobacco.           Keyla Fonseca M.D.    [facility]    "

## 2018-04-27 NOTE — NURSING NOTE
"Chief Complaint   Patient presents with     RECHECK     recheck sores on legs and arms       Initial /60  Pulse 64  Temp 98  F (36.7  C) (Tympanic)  Ht 5' 8\" (1.727 m)  Wt 215 lb (97.5 kg)  BMI 32.69 kg/m2 Estimated body mass index is 32.69 kg/(m^2) as calculated from the following:    Height as of this encounter: 5' 8\" (1.727 m).    Weight as of this encounter: 215 lb (97.5 kg).  Medication Reconciliation: complete   Kassidy Reardon CMA       "

## 2018-04-27 NOTE — MR AVS SNAPSHOT
After Visit Summary   4/27/2018    Vincent Mishra    MRN: [id number]           Patient Information     Date Of Birth          1940        Visit Information        Provider Department      4/27/2018 10:00 AM Keyla Fonseca MD [facility]        Today's Diagnoses     Cellulitis of left lower extremity    -  1    Neurotic excoriations           Follow-ups after your visit        Follow-up notes from your care team     Return if symptoms worsen or fail to improve.      Your next 10 appointments already scheduled     May 30, 2018 11:15 AM CDT   Return Visit with BESSY Cannon [facility] ([facility])    [address] [place] [id number] [phone]              Who to contact     Normal or non-critical lab and imaging results will be communicated to you by MyChart, letter or phone within 4 business days after the clinic has received the results. If you do not hear from us within 7 days, please contact the clinic through MyChart or phone. If you have a critical or abnormal lab result, we will notify you by phone as soon as possible.  Submit refill requests through [employer] or call your pharmacy and they will forward the refill request to us. Please allow 3 business days for your refill to be completed.          If you need to speak with a  for additional information , please call: [phone]             Additional Information About Your Visit        [employer] Information     [employer] gives you secure access to your electronic health record. If you see a primary care provider, you can also send messages to your care team and make appointments. If you have questions, please call your primary care clinic.  If you do not have a primary care provider, please call [phone] and they will assist you.        Care EveryWhere ID     This is your Care EveryWhere ID. This could be used by other organizations to access  "your [place] medical records  [id number]        Your Vitals Were     Pulse Temperature Height BMI (Body Mass Index)          64 98  F (36.7  C) (Tympanic) 5' 8\" (1.727 m) 32.69 kg/m2         Blood Pressure from Last 3 Encounters:   04/27/18 136/60   04/26/18 129/62   04/25/18 128/60    Weight from Last 3 Encounters:   04/27/18 215 lb (97.5 kg)   04/26/18 215 lb (97.5 kg)   04/25/18 215 lb (97.5 kg)              Today, you had the following     No orders found for display       Primary Care Provider Office Phone # Fax #    Keyla Fonseca [phone] [address] [facility] [address]        Equal Access to Services     [facility]: Hadii rosa momin hadasho [place], waaxda luqadaha, qaybta kaalmada adeegyada, sobeida lorenzo . So [facility] [phone].    ATENCIÓN: Si habla español, tiene a nicholas disposición servicios gratuitos de asistencia lingüística. Shasta al [phone].    We comply with applicable federal civil rights laws and [place] laws. We do not discriminate on the basis of race, color, national origin, age, disability, sex, sexual orientation, or gender identity.            Thank you!     Thank you for choosing [facility]  for your care. Our goal is always to provide you with excellent care. Hearing back from our patients is one way we can continue to improve our services. Please take a few minutes to complete the written survey that you may receive in the mail after your visit with us. Thank you!             Your Updated Medication List - Protect others around you: Learn how to safely use, store and throw away your medicines at www.disposemymeds.org.          This list is accurate as of 4/27/18 10:51 AM.  Always use your most recent med list.                   Brand Name Dispense Instructions for use Diagnosis    acetaminophen 325 MG tablet    TYLENOL    250 tablet    Take 2 tablets by mouth every 4 hours as needed.    Tear of meniscus of left " knee       amLODIPine 10 MG tablet    NORVASC    90 tablet    TAKE 1 TABLET(10 MG) BY MOUTH DAILY    Essential hypertension with goal blood pressure less than 140/90       ascorbic acid 500 MG tablet    VITAMIN C     Take 500 mg by mouth daily        aspirin 81 MG EC tablet      Take 1 tablet (81 mg) by mouth daily    Chest pain, unspecified type, NSTEMI (non-ST elevated myocardial infarction) (H)       atorvastatin 80 MG tablet    LIPITOR    90 tablet    TAKE 1 TABLET(80 MG) BY MOUTH DAILY    Hyperlipidemia LDL goal <100       * blood glucose monitoring meter device kit    no brand specified    1 kit    Use to test blood sugar 2 times daily or as directed.    Type 2 diabetes mellitus with hyperglycemia, without long-term current use of insulin (H)       * blood glucose monitoring meter device kit    no brand specified    1 kit    Use to test blood sugar 1 times daily or as directed.    Type 2 diabetes mellitus with complication, without long-term current use of insulin (H)       blood glucose monitoring test strip    ONETOUCH ULTRA    200 strip    Use to test blood sugars 2 times daily or as directed.    Type 2 diabetes mellitus with other circulatory complications       calcium + D 600-200 MG-UNIT Tabs   Generic drug:  calcium carbonate-vitamin D     100 tablet    Take 2 tablets by mouth daily.        cephALEXin 500 MG capsule    KEFLEX    30 capsule    Take 1 capsule (500 mg) by mouth 3 times daily    Cellulitis and abscess of leg, except foot       cholecalciferol 1000 units capsule    vitamin  -D     Take 1 capsule by mouth daily        clopidogrel 75 MG tablet    PLAVIX    90 tablet    TAKE 1 TABLET BY MOUTH DAILY    Coronary artery disease involving native coronary artery with other forms of angina pectoris, Right bundle branch block, NSTEMI (non-ST elevated myocardial infarction) (H)       gabapentin 100 MG capsule    NEURONTIN    270 capsule    Take 1 tablet increase by 1 tablet every 2 days to 6 tablets 3  times per day    Failed back surgical syndrome, 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    Type 2 diabetes, HbA1c goal < 7% (H)       lisinopril 40 MG tablet    PRINIVIL/ZESTRIL    90 tablet    TAKE 1 TABLET(40 MG) BY MOUTH DAILY    Essential hypertension with goal blood pressure less than 140/90       Melatonin 10 MG Tbdp     90 tablet    Take 10 mg by mouth At Bedtime        metFORMIN 500 MG 24 hr tablet    GLUCOPHAGE-XR    360 tablet    TAKE 2 TABLETS BY MOUTH TWICE DAILY WITH MEALS.    Type 2 diabetes mellitus without complication, unspecified long term insulin use status (H)       nitroGLYcerin 0.4 MG sublingual tablet    NITROSTAT    25 tablet    Place 1 tablet (0.4 mg) under the tongue every 5 minutes as needed for chest pain    CAD (coronary artery disease)       omeprazole 20 MG CR capsule    priLOSEC    180 capsule    TAKE ONE CAPSULE BY MOUTH TWICE DAILY    Gastroesophageal reflux disease without esophagitis       * order for DME     1 Device    Equipment being ordered: Wheelchair motorized for patient with spinal stenosis and neurogenic claudication    Spinal stenosis, lumbar region, with neurogenic claudication       * order for DME     1 Units    Equipment being ordered: TENS    Chronic bilateral low back pain with sciatica, sciatica laterality unspecified, Hip pain, bilateral       order for DME     1 Units    Equipment being ordered: compression stockings    Cellulitis and abscess of leg, except foot       * oxyCODONE IR 5 MG tablet     ROXICODONE    45 tablet    Take 1 daily as needed  for severe pain may have 15 per month this is a 3 month supply    Lumbar back pain with radiculopathy affecting left lower extremity       * oxyCODONE 30 MG 12 hr tablet    OxyCONTIN    30 tablet    Take 1 tablet (30 mg) by mouth every morning    Hip pain, bilateral, Chronic bilateral low back pain with sciatica, sciatica laterality unspecified       SELECT-LITE DEVICE/LANCETS Kit     1 kit    1 kit 2 times daily    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)       sertraline 100 MG tablet    ZOLOFT    180 tablet    Take 2 tablets (200 mg) by mouth daily        spironolactone 25 MG tablet    ALDACTONE    90 tablet    TAKE 1 TABLET(25 MG) BY MOUTH DAILY    Essential hypertension with goal blood pressure less than 140/90       sulfamethoxazole-trimethoprim 800-160 MG per tablet    BACTRIM DS/SEPTRA DS    14 tablet    Take 1 tablet by mouth 2 times daily    Cellulitis of left lower extremity       traZODone 100 MG tablet    DESYREL    270 tablet    TAKE 3 TABLETS(300 MG) BY MOUTH EVERY NIGHT AT BEDTIME AS NEEDED FOR SLEEP    Primary insomnia       VITAMIN B-12 PO           * Notice:  This list has 6 medication(s) that are the same as other medications prescribed for you. Read the directions carefully, and ask your doctor or other care provider to review them with you.

## 2018-04-28 LAB
BACTERIA SPEC CULT: ABNORMAL
SPECIMEN SOURCE: ABNORMAL

## 2018-04-30 DIAGNOSIS — I45.10 RIGHT BUNDLE BRANCH BLOCK: ICD-10-CM

## 2018-04-30 DIAGNOSIS — E78.5 HYPERLIPIDEMIA LDL GOAL <100: ICD-10-CM

## 2018-04-30 DIAGNOSIS — I21.4 NSTEMI (NON-ST ELEVATED MYOCARDIAL INFARCTION) (H): ICD-10-CM

## 2018-04-30 DIAGNOSIS — I10 ESSENTIAL HYPERTENSION WITH GOAL BLOOD PRESSURE LESS THAN 140/90: ICD-10-CM

## 2018-04-30 NOTE — TELEPHONE ENCOUNTER
ATORVASTATIN 80MG TABLETS        Last Written Prescription Date:  2/1/18  Last Fill Quantity: 90,   # refills: 0  Last Office Visit: 4/27/18  Future Office visit:    Next 5 appointments (look out 90 days)     May 30, 2018 11:15 AM CDT   Return Visit with BESSY Cannon [facility] ([facility])    [address] [facility] [address]   [phone]                 clopidogrel (PLAVIX) 75 MG tablet      Last Written Prescription Date:  4/26/17  Last Fill Quantity: 90,   # refills: 3  Last Office Visit: 4/27/18  Future Office visit:    Next 5 appointments (look out 90 days)     May 30, 2018 11:15 AM CDT   Return Visit with BESSY Cannon [facility] ([facility])    [address] [facility] [address]   [phone]                 amLODIPine (NORVASC) 10 MG tablet      Last Written Prescription Date:  2/1/18  Last Fill Quantity: 90,   # refills: 0  Last Office Visit: 4/27/18  Future Office visit:    Next 5 appointments (look out 90 days)     May 30, 2018 11:15 AM CDT   Return Visit with BESSY Cannon [facility] ([facility])    [address] [facility] [address]   [phone]                   Requested Prescriptions   Pending Prescriptions Disp Refills     atorvastatin (LIPITOR) 80 MG tablet [Pharmacy Med Name: ATORVASTATIN 80MG TABLETS] 90 tablet 0     Sig: TAKE 1 TABLET BY MOUTH DAILY    Statins Protocol Passed    4/30/2018 10:23 AM       Passed - LDL on file in past 12 months    Recent Labs   Lab Test  04/04/18   0933   LDL  64            Passed - No abnormal creatine kinase in past 12 months    Recent Labs   Lab Test 07/25/14 07/29/11   0505   CKT   --    --   109   CKTOTAL  98   < >   --     < > = values in this interval not displayed.               Passed - Recent (12 mo) or future (30 days) visit within the authorizing provider's  "specialty    Patient had office visit in the last 12 months or has a visit in the next 30 days with authorizing provider or within the authorizing provider's specialty.  See \"Patient Info\" tab in inbasket, or \"Choose Columns\" in Meds & Orders section of the refill encounter.           Passed - Patient is age 18 or older        clopidogrel (PLAVIX) 75 MG tablet [Pharmacy Med Name: CLOPIDOGREL 75MG TABLETS] 90 tablet 0     Sig: TAKE 1 TABLET BY MOUTH DAILY    Plavix Failed    4/30/2018 10:23 AM       Failed - No active PPI on record unless is Protonix       Failed - Normal HGB on file in past 12 months    Recent Labs   Lab Test  10/30/17   1552   HGB  11.8*              Passed - Normal Platelets on file in past 12 months    Recent Labs   Lab Test  10/30/17   1552   PLT  332              Passed - Recent (12 mo) or future (30 days) visit within the authorizing provider's specialty    Patient had office visit in the last 12 months or has a visit in the next 30 days with authorizing provider or within the authorizing provider's specialty.  See \"Patient Info\" tab in inbasket, or \"Choose Columns\" in Meds & Orders section of the refill encounter.           Passed - Patient is age 18 or older        amLODIPine (NORVASC) 10 MG tablet [Pharmacy Med Name: AMLODIPINE BESYLATE 10MG TABLETS] 90 tablet 0     Sig: TAKE 1 TABLET BY MOUTH DAILY    Calcium Channel Blockers Protocol  Failed    4/30/2018 10:23 AM       Failed - Normal serum creatinine on file in past 12 months    Recent Labs   Lab Test  04/04/18   0933   CR  1.48*            Passed - Blood pressure under 140/90 in past 12 months    BP Readings from Last 3 Encounters:   04/27/18 136/60   04/26/18 129/62   04/25/18 128/60                Passed - Recent (12 mo) or future (30 days) visit within the authorizing provider's specialty    Patient had office visit in the last 12 months or has a visit in the next 30 days with authorizing provider or within the authorizing provider's " "specialty.  See \"Patient Info\" tab in inbasket, or \"Choose Columns\" in Meds & Orders section of the refill encounter.           Passed - Patient is age 18 or older          "

## 2018-05-01 DIAGNOSIS — M17.0 PRIMARY OSTEOARTHRITIS OF BOTH KNEES: ICD-10-CM

## 2018-05-01 RX ORDER — AMLODIPINE BESYLATE 10 MG/1
TABLET ORAL
Qty: 90 TABLET | Refills: 3 | Status: SHIPPED | OUTPATIENT
Start: 2018-05-01 | End: 2018-09-14 | Stop reason: DRUGHIGH

## 2018-05-01 RX ORDER — CLOPIDOGREL BISULFATE 75 MG/1
TABLET ORAL
Qty: 90 TABLET | Refills: 1 | Status: SHIPPED | OUTPATIENT
Start: 2018-05-01 | End: 2018-11-02

## 2018-05-01 RX ORDER — ATORVASTATIN CALCIUM 80 MG/1
TABLET, FILM COATED ORAL
Qty: 90 TABLET | Refills: 3 | Status: SHIPPED | OUTPATIENT
Start: 2018-05-01 | End: 2019-01-01

## 2018-05-01 NOTE — TELEPHONE ENCOUNTER
Prescription approved per [facility] Refill Protocol.  Yvon Dickens RN

## 2018-05-01 NOTE — TELEPHONE ENCOUNTER
"IBUPROFEN 800MG TABLETS        Last Written Prescription Date:  3/19/18  Last Fill Quantity: 90,   # refills: 0  Last Office Visit: 4/27/18  Future Office visit:    Next 5 appointments (look out 90 days)     May 30, 2018 11:15 AM CDT   Return Visit with BESSY Cannon   [facility]    [address] [address] [phone]                   Requested Prescriptions   Pending Prescriptions Disp Refills     ibuprofen (ADVIL/MOTRIN) 800 MG tablet [Pharmacy Med Name: IBUPROFEN 800MG TABLETS] 90 tablet 0     Sig: TAKE 1 TABLET BY MOUTH EVERY 8 HOURS AS NEEDED FOR MODERATE PAIN    NSAID Medications Failed    5/1/2018 10:22 AM       Failed - Patient is age 6-64 years       Failed - Normal serum creatinine on file in past 12 months    Recent Labs   Lab Test  04/04/18   0933   CR  1.48*            Passed - Blood pressure under 140/90 in past 12 months    BP Readings from Last 3 Encounters:   04/27/18 136/60   04/26/18 129/62   04/25/18 128/60                Passed - Normal ALT on file in past 12 months    Recent Labs   Lab Test  10/30/17   1552   ALT  29            Passed - Normal AST on file in past 12 months    Recent Labs   Lab Test  10/30/17   1552   AST  22            Passed - Recent (12 mo) or future (30 days) visit within the authorizing provider's specialty    Patient had office visit in the last 12 months or has a visit in the next 30 days with authorizing provider or within the authorizing provider's specialty.  See \"Patient Info\" tab in inbasket, or \"Choose Columns\" in Meds & Orders section of the refill encounter.           Passed - Normal CBC on file in past 12 months    Recent Labs   Lab Test  10/30/17   1552   WBC  7.7   RBC  4.42   HGB  11.8*   HCT  37.7*   PLT  332               "

## 2018-05-02 RX ORDER — IBUPROFEN 800 MG/1
TABLET, FILM COATED ORAL
Qty: 90 TABLET | Refills: 0 | Status: SHIPPED | OUTPATIENT
Start: 2018-05-02 | End: 2018-06-02

## 2018-05-06 DIAGNOSIS — E11.42 PERIPHERAL SENSORY NEUROPATHY DUE TO TYPE 2 DIABETES MELLITUS (H): ICD-10-CM

## 2018-05-06 DIAGNOSIS — M96.1 FAILED BACK SURGICAL SYNDROME: ICD-10-CM

## 2018-05-07 RX ORDER — GABAPENTIN 100 MG/1
CAPSULE ORAL
Qty: 270 CAPSULE | Refills: 0 | Status: SHIPPED | OUTPATIENT
Start: 2018-05-07 | End: 2018-06-02

## 2018-05-15 ENCOUNTER — OFFICE VISIT (OUTPATIENT)
Dept: FAMILY MEDICINE | Facility: CLINIC | Age: 78
End: 2018-05-15
Payer: COMMERCIAL

## 2018-05-15 VITALS
HEART RATE: 76 BPM | SYSTOLIC BLOOD PRESSURE: 139 MMHG | DIASTOLIC BLOOD PRESSURE: 80 MMHG | HEIGHT: 68 IN | TEMPERATURE: 99.3 F

## 2018-05-15 DIAGNOSIS — M25.551 HIP PAIN, BILATERAL: ICD-10-CM

## 2018-05-15 DIAGNOSIS — M25.552 HIP PAIN, BILATERAL: ICD-10-CM

## 2018-05-15 DIAGNOSIS — G89.29 CHRONIC BILATERAL LOW BACK PAIN WITH SCIATICA, SCIATICA LATERALITY UNSPECIFIED: ICD-10-CM

## 2018-05-15 DIAGNOSIS — R82.90 NONSPECIFIC FINDING ON EXAMINATION OF URINE: ICD-10-CM

## 2018-05-15 DIAGNOSIS — R30.0 DYSURIA: ICD-10-CM

## 2018-05-15 DIAGNOSIS — N30.01 ACUTE CYSTITIS WITH HEMATURIA: Primary | ICD-10-CM

## 2018-05-15 DIAGNOSIS — F42.4 SKIN-PICKING DISORDER: ICD-10-CM

## 2018-05-15 DIAGNOSIS — M54.40 CHRONIC BILATERAL LOW BACK PAIN WITH SCIATICA, SCIATICA LATERALITY UNSPECIFIED: ICD-10-CM

## 2018-05-15 DIAGNOSIS — C64.9 MALIGNANT NEOPLASM OF KIDNEY EXCLUDING RENAL PELVIS, UNSPECIFIED LATERALITY (H): ICD-10-CM

## 2018-05-15 LAB
ALBUMIN UR-MCNC: 100 MG/DL
APPEARANCE UR: ABNORMAL
BACTERIA #/AREA URNS HPF: ABNORMAL /HPF
BILIRUB UR QL STRIP: NEGATIVE
COLOR UR AUTO: YELLOW
GLUCOSE UR STRIP-MCNC: 100 MG/DL
HGB UR QL STRIP: ABNORMAL
KETONES UR STRIP-MCNC: NEGATIVE MG/DL
LEUKOCYTE ESTERASE UR QL STRIP: ABNORMAL
NITRATE UR QL: NEGATIVE
NON-SQ EPI CELLS #/AREA URNS LPF: ABNORMAL /LPF
PH UR STRIP: 5.5 PH (ref 5–7)
RBC #/AREA URNS AUTO: ABNORMAL /HPF
SOURCE: ABNORMAL
SP GR UR STRIP: 1.02 (ref 1–1.03)
UROBILINOGEN UR STRIP-ACNC: 1 EU/DL (ref 0.2–1)
WBC #/AREA URNS AUTO: ABNORMAL /HPF

## 2018-05-15 PROCEDURE — 81001 URINALYSIS AUTO W/SCOPE: CPT | Performed by: PHYSICIAN ASSISTANT

## 2018-05-15 PROCEDURE — 87086 URINE CULTURE/COLONY COUNT: CPT | Performed by: PHYSICIAN ASSISTANT

## 2018-05-15 PROCEDURE — 87186 SC STD MICRODIL/AGAR DIL: CPT | Mod: 59 | Performed by: PHYSICIAN ASSISTANT

## 2018-05-15 PROCEDURE — 99214 OFFICE O/P EST MOD 30 MIN: CPT | Performed by: PHYSICIAN ASSISTANT

## 2018-05-15 PROCEDURE — 87088 URINE BACTERIA CULTURE: CPT | Mod: 59 | Performed by: PHYSICIAN ASSISTANT

## 2018-05-15 RX ORDER — OXYCODONE HYDROCHLORIDE 30 MG/1
30 TABLET, FILM COATED, EXTENDED RELEASE ORAL EVERY MORNING
Qty: 30 TABLET | Refills: 0 | Status: SHIPPED | OUTPATIENT
Start: 2018-05-15 | End: 2018-06-14

## 2018-05-15 RX ORDER — CIPROFLOXACIN 500 MG/1
500 TABLET, FILM COATED ORAL 2 TIMES DAILY
Qty: 14 TABLET | Refills: 0 | Status: SHIPPED | OUTPATIENT
Start: 2018-05-15 | End: 2018-05-30

## 2018-05-15 NOTE — PATIENT INSTRUCTIONS
Antibiotic (ciprofloxacin) sent to pharmacy ([facility])      To schedule the CT abdomen/pelvis - call [phone]. I would wait until current infection clears

## 2018-05-15 NOTE — TELEPHONE ENCOUNTER
Sheyla forgot to ask for Vincent's pain medication when they were just seen.  Hoping that they would not have to make an extra trip to  script.  They are on their way back here and will wait in the lobby.    Oxycodone 30mg      Last Written Prescription Date:  4/4/18  Last Fill Quantity: 30,   # refills: 0  Last Office Visit: 5/15/18  Future Office visit:    Next 5 appointments (look out 90 days)     May 15, 2018  9:40 AM CDT   SHORT with Renetta Strickland PA-C   [facility]    [address] [address]   [phone]            May 30, 2018 11:15 AM CDT   Return Visit with BESSY Cannon [facility] ([facility])    [address] [address]   [phone]                   Routing refill request to provider for review/approval because:  Drug not on the [facility], [facility] or  Health refill protocol or controlled substance

## 2018-05-15 NOTE — MR AVS SNAPSHOT
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 I would wait until current infection clears          Follow-ups after your visit        Your next 10 appointments already scheduled     May 30, 2018 11:15 AM CDT   [facility] with BESSY Cannon   [facility]    [address] [address] [phone]              Future tests that were ordered for you today     Open Future Orders        Priority Expected Expires Ordered    CT Abdomen Pelvis w/o & w Contrast Routine  5/15/2019 5/15/2018            Who to contact     Normal or non-critical lab and imaging results will be communicated to you by [employer], letter or phone within 4 business days after the clinic has received the results. If you do not hear from us within 7 days, please contact the clinic through [employer] or phone. If you have a critical or abnormal lab result, we will notify you by phone as soon as possible.  Submit refill requests through [employer] or call your pharmacy and they will forward the refill request to us. Please allow 3 business days for your refill to be completed.          If you need to speak with a  for additional information , please call: [phone]             Additional Information About Your Visit        [employer] Information     [employer] gives you secure  "access to your electronic health record. If you see a primary care provider, you can also send messages to your care team and make appointments. If you have questions, please call your primary care clinic.  If you do not have a primary care provider, please call [phone] and they will assist you.        Care EveryWhere ID     This is your Care EveryWhere ID. This could be used by other organizations to access your [place] medical records  [id number]        Your Vitals Were     Pulse Temperature Height             76 99.3  F (37.4  C) (Tympanic) 5' 8\" (1.727 m)          Blood Pressure from Last 3 Encounters:   05/15/18 139/80   04/27/18 136/60   04/26/18 129/62    Weight from Last 3 Encounters:   04/27/18 215 lb (97.5 kg)   04/26/18 215 lb (97.5 kg)   04/25/18 215 lb (97.5 kg)              We Performed the Following     UA reflex to Microscopic and Culture     Urine Culture Aerobic Bacterial     Urine Microscopic          Today's Medication Changes          These changes are accurate as of 5/15/18 10:32 AM.  If you have any questions, ask your nurse or doctor.               Start taking these medicines.        Dose/Directions    ciprofloxacin 500 MG tablet   Commonly known as:  CIPRO   Used for:  Acute cystitis with hematuria   Started by:  Renetta Strickland PA-C        Dose:  500 mg   Take 1 tablet (500 mg) by mouth 2 times daily   Quantity:  14 tablet   Refills:  0         Stop taking these medicines if you haven't already. Please contact your care team if you have questions.     cephALEXin 500 MG capsule   Commonly known as:  KEFLEX   Stopped by:  Renetta Strickland PA-C                Where to get your medicines      These medications were sent to [id number] - [place], [place] - [address] AT [place] [address], [place] [address]     Phone:  [phone]     ciprofloxacin 500 MG tablet         Some of these will need a paper " prescription and others can be bought over the counter.  Ask your nurse if you have questions.     Bring a paper prescription for each of these medications     oxyCODONE 30 MG 12 hr tablet                Primary Care Provider Office Phone # Fax #    Keyla Fonseca [phone] [address] [facility] [address]        Equal Access to Services     OVI ZULETA : Hadii aad ku hadasho Soomaali, waaxda luqadaha, qaybta kaalmada adeegyada, waxay idiin hayaan adeeg khloriesh laherminio . So [facility] [phone].    ATENCIÓN: Si habla español, tiene a nicholas disposición servicios gratuitos de asistencia lingüística. Llame al [phone].    We comply with applicable federal civil rights laws and [place] laws. We do not discriminate on the basis of race, color, national origin, age, disability, sex, sexual orientation, or gender identity.            Thank you!     Thank you for choosing [facility]  for your care. Our goal is always to provide you with excellent care. Hearing back from our patients is one way we can continue to improve our services. Please take a few minutes to complete the written survey that you may receive in the mail after your visit with us. Thank you!             Your Updated Medication List - Protect others around you: Learn how to safely use, store and throw away your medicines at www.disposemymeds.org.          This list is accurate as of 5/15/18 10:32 AM.  Always use your most recent med list.                   Brand Name Dispense Instructions for use Diagnosis    acetaminophen 325 MG tablet    TYLENOL    250 tablet    Take 2 tablets by mouth every 4 hours as needed.    Tear of meniscus of left knee       amLODIPine 10 MG tablet    NORVASC    90 tablet    TAKE 1 TABLET BY MOUTH DAILY    Essential hypertension with goal blood pressure less than 140/90       ascorbic acid 500 MG tablet    VITAMIN C     Take 500 mg by mouth daily        aspirin 81 MG EC tablet      Take 1  tablet (81 mg) by mouth daily    Chest pain, unspecified type, NSTEMI (non-ST elevated myocardial infarction) (H)       atorvastatin 80 MG tablet    LIPITOR    90 tablet    TAKE 1 TABLET BY MOUTH DAILY    Hyperlipidemia LDL goal <100       * blood glucose monitoring meter device kit    no brand specified    1 kit    Use to test blood sugar 2 times daily or as directed.    Type 2 diabetes mellitus with hyperglycemia, without long-term current use of insulin (H)       * blood glucose monitoring meter device kit    no brand specified    1 kit    Use to test blood sugar 1 times daily or as directed.    Type 2 diabetes mellitus with complication, without long-term current use of insulin (H)       blood glucose monitoring test strip    ONETOUCH ULTRA    200 strip    Use to test blood sugars 2 times daily or as directed.    Type 2 diabetes mellitus with other circulatory complications       calcium + D 600-200 MG-UNIT Tabs   Generic drug:  calcium carbonate-vitamin D     100 tablet    Take 2 tablets by mouth daily.        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    Type 2 diabetes, HbA1c goal < 7% (H)       lisinopril 40 MG tablet    PRINIVIL/ZESTRIL    90 tablet    TAKE 1 TABLET(40 MG) BY MOUTH DAILY    Essential hypertension with goal blood pressure less than 140/90       Melatonin 10 MG Tbdp     90 tablet    Take 10 mg by mouth At Bedtime        metFORMIN 500 MG 24 hr tablet    GLUCOPHAGE-XR    360 tablet    TAKE 2 TABLETS BY MOUTH TWICE DAILY WITH MEALS.    Type 2 diabetes mellitus without complication, unspecified long term insulin use status (H)       nitroGLYcerin 0.4 MG sublingual tablet    NITROSTAT    25 tablet    Place 1 tablet (0.4 mg) under the tongue every 5 minutes as needed for chest pain    CAD (coronary artery disease)       omeprazole 20 MG CR capsule    priLOSEC    180 capsule    TAKE ONE CAPSULE BY MOUTH TWICE DAILY    Gastroesophageal reflux disease without esophagitis       * order for DME     1 Device    Equipment being ordered: Wheelchair motorized for patient with spinal stenosis and neurogenic claudication    Spinal stenosis, lumbar region, with neurogenic claudication       * order for DME     1 Units    Equipment being ordered: TENS    Chronic bilateral low back pain with sciatica, sciatica laterality unspecified, Hip pain, bilateral       order for DME     1 Units    Equipment being ordered: compression stockings    Cellulitis and abscess of leg, except foot       * oxyCODONE IR 5 MG tablet    ROXICODONE    45 tablet    Take 1 daily as needed  for severe pain may have 15 per month this is a 3 month supply    Lumbar back pain with radiculopathy affecting left lower extremity       * oxyCODONE 30 MG 12 hr tablet    OxyCONTIN    30 tablet    Take 1 tablet (30 mg) by mouth every morning    Hip pain, bilateral, Chronic bilateral low back pain with sciatica, sciatica  laterality unspecified       SELECT-LITE DEVICE/LANCETS Kit     1 kit    1 kit 2 times daily    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)       sertraline 100 MG tablet    ZOLOFT    180 tablet    Take 2 tablets (200 mg) by mouth daily        spironolactone 25 MG tablet    ALDACTONE    90 tablet    TAKE 1 TABLET(25 MG) BY MOUTH DAILY    Essential hypertension with goal blood pressure less than 140/90       sulfamethoxazole-trimethoprim 800-160 MG per tablet    BACTRIM DS/SEPTRA DS    14 tablet    Take 1 tablet by mouth 2 times daily    Cellulitis of left lower extremity       traZODone 100 MG tablet    DESYREL    270 tablet    TAKE 3 TABLETS(300 MG) BY MOUTH EVERY NIGHT AT BEDTIME AS NEEDED FOR SLEEP    Primary insomnia       VITAMIN B-12 PO           * Notice:  This list has 6 medication(s) that are the same as other medications prescribed for you. Read the directions carefully, and ask your doctor or other care provider to review them with you.

## 2018-05-15 NOTE — PROGRESS NOTES
SUBJECTIVE:   Vincent Mishra is a 78 year old male who presents to clinic today for the following health issues:      Genitourinary - Male  Onset: 3 days    Description:   Dysuria (painful urination): YES  Hematuria (blood in urine): YES  Frequency: YES  Are you urinating at night : YES- 1-3 times  Hesitancy (delay in urine): no   Retention (unable to empty): no   Decrease in urinary flow: no   Incontinence: YES    Progression of Symptoms:  intermittent    Accompanying Signs & Symptoms:  Fever: no   Back/Flank pain: YES- normal back pain  Urethral discharge: no   Testicle lumps/masses/pain: no   Nausea and/or vomiting: no   Abdominal pain: no     History:   History of frequent UTI's: no   History of kidney stones: YES, PHx kidney cancer  History of hernias: no  Personal or Family history of Prostate problems: YES- PHx prostate cancer  Sexually active: no     Precipitating factors:   None    Alleviating factors:  None      Problem list and histories reviewed & adjusted, as indicated.  Additional history:    Noticed 3 days ago when urinating that he had blood in his urine  Also had sharp pain with urination that radiated up his groin. Lasted for awhile after urination, then eventually subsided  Occurred again the next 2 days   This morning didn't notice as much blood and wasn't as painful  No fevers that his wife is aware of    H/o kidney cancer   H/o prostate removal  Followed by urology - last visit 1 year ago. Due for repeat imaging and f/u with urology. His wife says the soonest he could get in was September    Continues to pick at his skin  Several scabbed areas and open superficial wounds over both forearms  Had a larger wound on his left leg - his wife says he had to be on antibiotics for awhile for it ot improve. She would like it checked today  Has another area on his right lower leg     Current Outpatient Prescriptions   Medication Sig Dispense Refill     amLODIPine (NORVASC) 10 MG tablet TAKE 1 TABLET  BY MOUTH DAILY 90 tablet 3     ascorbic acid (VITAMIN C) 500 MG tablet Take 500 mg by mouth daily        aspirin EC 81 MG EC tablet Take 1 tablet (81 mg) by mouth daily       atorvastatin (LIPITOR) 80 MG tablet TAKE 1 TABLET BY MOUTH DAILY 90 tablet 3     Calcium Carbonate-Vitamin D (CALCIUM + D) 600-200 MG-UNIT per tablet Take 2 tablets by mouth daily. 100 tablet 12     cholecalciferol (VITAMIN  -D) 1000 UNITS capsule Take 1 capsule by mouth daily       ciprofloxacin (CIPRO) 500 MG tablet Take 1 tablet (500 mg) by mouth 2 times daily 14 tablet 0     clopidogrel (PLAVIX) 75 MG tablet TAKE 1 TABLET BY MOUTH DAILY 90 tablet 1     Cyanocobalamin (VITAMIN B-12 PO)        gabapentin (NEURONTIN) 100 MG capsule TAKE ONE CAPSULE BY MOUTH DAILY, INCREASE BY 1 CAPSULE EVERY 2 DAYS UP TO 6 CAPSULES THREE TIMES DAILY 270 capsule 0     hydrochlorothiazide (HYDRODIURIL) 25 MG tablet TAKE 1 TABLET BY MOUTH DAILY 90 tablet 0     lisinopril (PRINIVIL/ZESTRIL) 40 MG tablet TAKE 1 TABLET(40 MG) BY MOUTH DAILY 90 tablet 0     Melatonin 10 MG TBDP Take 10 mg by mouth At Bedtime 90 tablet      metFORMIN (GLUCOPHAGE-XR) 500 MG 24 hr tablet TAKE 2 TABLETS BY MOUTH TWICE DAILY WITH MEALS. 360 tablet 0     omeprazole (PRILOSEC) 20 MG CR capsule TAKE ONE CAPSULE BY MOUTH TWICE DAILY 180 capsule 0     oxyCODONE (ROXICODONE) 5 MG IR tablet Take 1 daily as needed  for severe pain may have 15 per month this is a 3 month supply 45 tablet 0     sertraline (ZOLOFT) 100 MG tablet Take 2 tablets (200 mg) by mouth daily 180 tablet 0     spironolactone (ALDACTONE) 25 MG tablet TAKE 1 TABLET(25 MG) BY MOUTH DAILY 90 tablet 0     sulfamethoxazole-trimethoprim (BACTRIM DS/SEPTRA DS) 800-160 MG per tablet Take 1 tablet by mouth 2 times daily 14 tablet 0     traZODone (DESYREL) 100 MG tablet TAKE 3 TABLETS(300 MG) BY MOUTH EVERY NIGHT AT BEDTIME AS NEEDED FOR SLEEP 270 tablet 0     acetaminophen (TYLENOL) 325 MG tablet Take 2 tablets by mouth every 4 hours  "as needed. 250 tablet 1     blood glucose monitoring (NO BRAND SPECIFIED) meter device kit Use to test blood sugar 2 times daily or as directed. 1 kit 0     blood glucose monitoring (NO BRAND SPECIFIED) meter device kit Use to test blood sugar 1 times daily or as directed. 1 kit 0     blood glucose monitoring (ONE TOUCH ULTRA) test strip Use to test blood sugars 2 times daily or as directed. 200 strip 3     ibuprofen (ADVIL/MOTRIN) 800 MG tablet TAKE 1 TABLET BY MOUTH EVERY 8 HOURS AS NEEDED FOR MODERATE PAIN 90 tablet 0     Lancets Misc. (SELECT-LITE DEVICE/LANCETS) KIT 1 kit 2 times daily 1 kit 1     LIFESCAN FINEPOINT LANCETS MISC Use to test blood sugars 1 times daily or as directed. 100 each 12     nitroglycerin (NITROSTAT) 0.4 MG SL tablet Place 1 tablet (0.4 mg) under the tongue every 5 minutes as needed for chest pain 25 tablet 1     order for DME Equipment being ordered: Wheelchair motorized for patient with spinal stenosis and neurogenic claudication 1 Device 0     order for DME Equipment being ordered: TENS 1 Units 0     order for DME Equipment being ordered: compression stockings 1 Units 0     oxyCODONE (OXYCONTIN) 30 MG 12 hr tablet Take 1 tablet (30 mg) by mouth every morning 30 tablet 0     Allergies   Allergen Reactions     Prozac [Fluoxetine] Other (See Comments)     \"I went crazy.\"       Benadryl [Diphenhydramine Hcl] Other (See Comments)     Starts to shake, and paranoid     Codeine Itching     Diphenhydramine Other (See Comments)     Hallucinations, See [facility] records scanned on 7/16/15     Labetalol Other (See Comments)     See [facility] records scanned on 7/16/15  Bradycardia down to 30 on holter, dizziness. Stopped med and symptoms resolved     Metoprolol Other (See Comments)     Bradycardia even at 12.5 mg     Morphine Visual Disturbance     BP Readings from Last 3 Encounters:   05/15/18 139/80   04/27/18 136/60   04/26/18 129/62    Wt Readings " "from Last 3 Encounters:   04/27/18 215 lb (97.5 kg)   04/26/18 215 lb (97.5 kg)   04/25/18 215 lb (97.5 kg)                    Reviewed and updated as needed this visit by clinical staff       Reviewed and updated as needed this visit by Provider         ROS:  Remainder of ROS obtained and found to be negative other than that which was documented above      OBJECTIVE:     /80  Pulse 76  Temp 99.3  F (37.4  C) (Tympanic)  Ht 5' 8\" (1.727 m)  There is no height or weight on file to calculate BMI.  GENERAL: healthy, alert and no distress  RESP: lungs clear to auscultation - no rales, rhonchi or wheezes  CV: regular rates and rhythm, normal S1 S2, no S3 or S4 and no murmur, click or rub  ABDOMEN: soft, nontender and bowel sounds normal  SKIN: several superficial wounds on both forearms - some scabbed over, others oozing  On left lower leg he is wearing a wrap to keep leg covered. Evidence of old wound on lateral side that is healing well. No sign of infection  On the right lower leg there is an open wound that he has been picking at. Pink around edges but no sign of infection    Diagnostic Test Results:  UA RESULTS:  Recent Labs   Lab Test  05/15/18   1004   COLOR  Yellow   APPEARANCE  Cloudy   URINEGLC  100*   URINEBILI  Negative   URINEKETONE  Negative   SG  1.020   UBLD  Large*   URINEPH  5.5   PROTEIN  100*   UROBILINOGEN  1.0   NITRITE  Negative   LEUKEST  Large*   RBCU  10-25*   WBCU  *         ASSESSMENT/PLAN:     (N30.01) Acute cystitis with hematuria  (primary encounter diagnosis)  Comment: Start abx based upon UA above  Plan: ciprofloxacin (CIPRO) 500 MG tablet            (F42.4) Skin-picking disorder  Comment:   Plan: has arm sleeves which he takes off. Wound on left lower leg healing well. Has kept sleeve on. Dressed wound on right lower leg and covered with a sleeve and encouraged him to keep it on to avoid infection    (C64.9) Malignant neoplasm of kidney excluding renal pelvis, unspecified " laterality (H)  Comment: h/o kidney cancer. Next yearly visit in September. Due for yearly imaging. Blood and current symptoms consistent with UTI noted on UA. Given order for yearly scans to have for f/u appointment  Plan: CT Abdomen Pelvis w/o & w Contrast            (R30.0) Dysuria  Comment:   Plan: UA reflex to Microscopic and Culture, Urine         Microscopic            (R82.90) Nonspecific finding on examination of urine  Comment:   Plan: Urine Culture Aerobic Bacterial                Renetta Strickland PA-C  [facility]

## 2018-05-18 LAB
BACTERIA SPEC CULT: ABNORMAL
BACTERIA SPEC CULT: ABNORMAL
Lab: ABNORMAL
SPECIMEN SOURCE: ABNORMAL

## 2018-05-29 RX ORDER — IOPAMIDOL 755 MG/ML
100 INJECTION, SOLUTION INTRAVASCULAR ONCE
Status: DISCONTINUED | OUTPATIENT
Start: 2018-05-30 | End: 2018-05-30

## 2018-05-30 ENCOUNTER — OFFICE VISIT (OUTPATIENT)
Dept: PSYCHIATRY | Facility: CLINIC | Age: 78
End: 2018-05-30
Payer: COMMERCIAL

## 2018-05-30 ENCOUNTER — HOSPITAL ENCOUNTER (OUTPATIENT)
Dept: CT IMAGING | Facility: CLINIC | Age: 78
Discharge: HOME | End: 2018-05-30
Attending: PHYSICIAN ASSISTANT | Admitting: PHYSICIAN ASSISTANT
Payer: MEDICARE

## 2018-05-30 VITALS — TEMPERATURE: 96.9 F | SYSTOLIC BLOOD PRESSURE: 140 MMHG | DIASTOLIC BLOOD PRESSURE: 72 MMHG | HEART RATE: 57 BPM

## 2018-05-30 DIAGNOSIS — F41.1 GAD (GENERALIZED ANXIETY DISORDER): ICD-10-CM

## 2018-05-30 DIAGNOSIS — C64.9 MALIGNANT NEOPLASM OF KIDNEY EXCLUDING RENAL PELVIS, UNSPECIFIED LATERALITY (H): ICD-10-CM

## 2018-05-30 DIAGNOSIS — F33.1 MAJOR DEPRESSIVE DISORDER, RECURRENT EPISODE, MODERATE (H): Primary | ICD-10-CM

## 2018-05-30 DIAGNOSIS — F43.10 PTSD (POST-TRAUMATIC STRESS DISORDER): ICD-10-CM

## 2018-05-30 LAB
CREAT BLD-MCNC: 2.2 MG/DL (ref 0.66–1.25)
GFR SERPL CREATININE-BSD FRML MDRD: 29 ML/MIN/1.7M2

## 2018-05-30 PROCEDURE — 25000125 ZZHC RX 250: Performed by: RADIOLOGY

## 2018-05-30 PROCEDURE — 82565 ASSAY OF CREATININE: CPT

## 2018-05-30 PROCEDURE — 99214 OFFICE O/P EST MOD 30 MIN: CPT | Performed by: CLINICAL NURSE SPECIALIST

## 2018-05-30 PROCEDURE — 25000128 H RX IP 250 OP 636: Performed by: RADIOLOGY

## 2018-05-30 PROCEDURE — 74176 CT ABD & PELVIS W/O CONTRAST: CPT

## 2018-05-30 ASSESSMENT — ANXIETY QUESTIONNAIRES
5. BEING SO RESTLESS THAT IT IS HARD TO SIT STILL: NOT AT ALL
IF YOU CHECKED OFF ANY PROBLEMS ON THIS QUESTIONNAIRE, HOW DIFFICULT HAVE THESE PROBLEMS MADE IT FOR YOU TO DO YOUR WORK, TAKE CARE OF THINGS AT HOME, OR GET ALONG WITH OTHER PEOPLE: SOMEWHAT DIFFICULT
7. FEELING AFRAID AS IF SOMETHING AWFUL MIGHT HAPPEN: SEVERAL DAYS
6. BECOMING EASILY ANNOYED OR IRRITABLE: NOT AT ALL
1. FEELING NERVOUS, ANXIOUS, OR ON EDGE: MORE THAN HALF THE DAYS
3. WORRYING TOO MUCH ABOUT DIFFERENT THINGS: SEVERAL DAYS
2. NOT BEING ABLE TO STOP OR CONTROL WORRYING: MORE THAN HALF THE DAYS
4. TROUBLE RELAXING: NOT AT ALL
GAD7 TOTAL SCORE: 6

## 2018-05-30 NOTE — PATIENT INSTRUCTIONS
Treatment Plan:  Continue sertraline (Zoloft) 200 mg daily and trazodone (Desyrel) 300 mg at bedtime.     Consider individual therapy to manage excoriation.     Follow up with primary care provider. Patient is aware of my departure from this clinic.     - Recommend patient discuss medications with their pharmacist. Risks and benefits for medications were discussed including, but not limited to, side effects.   - Safety plan was reviewed; to the ER as needed or call after hours crisis line; [phone]  - Education and counseling was done regarding use of medications, psychotherapy options  - Call [phone] for appointment or to speak to a nurse.    -Office hours: Monday through Thursday 8:00 am to 4:30 pm.

## 2018-05-30 NOTE — MR AVS SNAPSHOT
After Visit Summary   5/30/2018    Vincent Mishra    MRN: [id number]           Patient Information     Date Of Birth          1940        Visit Information        Provider Department      5/30/2018 11:15 AM Ellen Tariq APRN [facility]        Care Instructions    Treatment Plan:  Continue sertraline (Zoloft) 200 mg daily and trazodone (Desyrel) 300 mg at bedtime.     Consider individual therapy to manage excoriation.     Follow up with primary care provider. Patient is aware of my departure from this clinic.     - Recommend patient discuss medications with their pharmacist. Risks and benefits for medications were discussed including, but not limited to, side effects.   - Safety plan was reviewed; to the ER as needed or call after hours crisis line; [phone]  - Education and counseling was done regarding use of medications, psychotherapy options  - Call [phone] for appointment or to speak to a nurse.    -Office hours: Monday through Thursday 8:00 am to 4:30 pm.             Follow-ups after your visit        Future tests that were ordered for you today     Open Future Orders        Priority Expected Expires Ordered    CT Abdomen Pelvis w/o Contrast Routine  5/29/2019 5/15/2018            Who to contact     If you have questions or need follow up information about today's clinic visit or your schedule please contact [facility] directly at [phone].  Normal or non-critical lab and imaging results will be communicated to you by MyChart, letter or phone within 4 business days after the clinic has received the results. If you do not hear from us within 7 days, please contact the clinic through MyChart or phone. If you have a critical or abnormal lab result, we will notify you by phone as soon as possible.  Submit refill requests through [employer] or call your pharmacy and they will forward the refill request to us. Please allow 3 business days for  your refill to be completed.          Additional Information About Your Visit        MyChart Information     [employer] gives you secure access to your electronic health record. If you see a primary care provider, you can also send messages to your care team and make appointments. If you have questions, please call your primary care clinic.  If you do not have a primary care provider, please call [phone] and they will assist you.        Care EveryWhere ID     This is your Care EveryWhere ID. This could be used by other organizations to access your [facility] medical records  [id number]        Your Vitals Were     Pulse Temperature                57 96.9  F (36.1  C) (Tympanic)           Blood Pressure from Last 3 Encounters:   05/30/18 140/72   05/15/18 139/80   04/27/18 136/60    Weight from Last 3 Encounters:   04/27/18 215 lb (97.5 kg)   04/26/18 215 lb (97.5 kg)   04/25/18 215 lb (97.5 kg)              Today, you had the following     No orders found for display         Today's Medication Changes          These changes are accurate as of 5/30/18 11:37 AM.  If you have any questions, ask your nurse or doctor.               Stop taking these medicines if you haven't already. Please contact your care team if you have questions.     ciprofloxacin 500 MG tablet   Commonly known as:  CIPRO   Stopped by:  Ellen Tariq APRN CNS           sulfamethoxazole-trimethoprim 800-160 MG per tablet   Commonly known as:  BACTRIM DS/SEPTRA DS   Stopped by:  Ellen Tariq APRN CNS                    Primary Care Provider Office Phone # Fax #    Keyla Fonseca [phone] [id number] [address] [facility] [id number]        Equal Access to Services     [facility] : Hadii rosa turcios [place], waaxda luqadaha, qaybta kaalmasobeida lackey. So [facility] [phone].    ATENCIÓN: Si habla español, tiene a nicholas disposición servicios gratuitos de asistencia  lingüística. [place] al [phone].    We comply with applicable federal civil rights laws and [place] laws. We do not discriminate on the basis of race, color, national origin, age, disability, sex, sexual orientation, or gender identity.            Thank you!     Thank you for choosing [facility]  for your care. Our goal is always to provide you with excellent care. Hearing back from our patients is one way we can continue to improve our services. Please take a few minutes to complete the written survey that you may receive in the mail after your visit with us. Thank you!             Your Updated Medication List - Protect others around you: Learn how to safely use, store and throw away your medicines at www.disposemymeds.org.          This list is accurate as of 5/30/18 11:37 AM.  Always use your most recent med list.                   Brand Name Dispense Instructions for use Diagnosis    acetaminophen 325 MG tablet    TYLENOL    250 tablet    Take 2 tablets by mouth every 4 hours as needed.    Tear of meniscus of left knee       amLODIPine 10 MG tablet    NORVASC    90 tablet    TAKE 1 TABLET BY MOUTH DAILY    Essential hypertension with goal blood pressure less than 140/90       ascorbic acid 500 MG tablet    VITAMIN C     Take 500 mg by mouth daily        aspirin 81 MG EC tablet      Take 1 tablet (81 mg) by mouth daily    Chest pain, unspecified type, NSTEMI (non-ST elevated myocardial infarction) (H)       atorvastatin 80 MG tablet    LIPITOR    90 tablet    TAKE 1 TABLET BY MOUTH DAILY    Hyperlipidemia LDL goal <100       * blood glucose monitoring meter device kit    no brand specified    1 kit    Use to test blood sugar 2 times daily or as directed.    Type 2 diabetes mellitus with hyperglycemia, without long-term current use of insulin (H)       * blood glucose monitoring meter device kit    no brand specified    1 kit    Use to test blood sugar 1 times daily or as directed.    Type 2  diabetes mellitus with complication, without long-term current use of insulin (H)       blood glucose monitoring test strip    ONETOUCH ULTRA    200 strip    Use to test blood sugars 2 times daily or as directed.    Type 2 diabetes mellitus with other circulatory complications       calcium + D 600-200 MG-UNIT Tabs   Generic drug:  calcium carbonate-vitamin D     100 tablet    Take 2 tablets by mouth daily.        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    Type 2 diabetes, HbA1c goal < 7% (H)       lisinopril 40 MG tablet    PRINIVIL/ZESTRIL    90 tablet    TAKE 1 TABLET(40 MG) BY MOUTH DAILY    Essential hypertension with goal blood pressure less than 140/90       Melatonin 10 MG Tbdp     90 tablet    Take 10 mg by mouth At Bedtime        metFORMIN 500 MG 24 hr tablet    GLUCOPHAGE-XR    360 tablet    TAKE 2 TABLETS BY MOUTH TWICE DAILY WITH MEALS.    Type 2 diabetes mellitus without complication, unspecified long term insulin use status (H)       nitroGLYcerin 0.4 MG sublingual tablet    NITROSTAT    25 tablet     Place 1 tablet (0.4 mg) under the tongue every 5 minutes as needed for chest pain    CAD (coronary artery disease)       omeprazole 20 MG CR capsule    priLOSEC    180 capsule    TAKE ONE CAPSULE BY MOUTH TWICE DAILY    Gastroesophageal reflux disease without esophagitis       * order for DME     1 Device    Equipment being ordered: Wheelchair motorized for patient with spinal stenosis and neurogenic claudication    Spinal stenosis, lumbar region, with neurogenic claudication       * order for DME     1 Units    Equipment being ordered: TENS    Chronic bilateral low back pain with sciatica, sciatica laterality unspecified, Hip pain, bilateral       order for DME     1 Units    Equipment being ordered: compression stockings    Cellulitis and abscess of leg, except foot       * oxyCODONE IR 5 MG tablet    ROXICODONE    45 tablet    Take 1 daily as needed  for severe pain may have 15 per month this is a 3 month supply    Lumbar back pain with radiculopathy affecting left lower extremity       * oxyCODONE 30 MG 12 hr tablet    OxyCONTIN    30 tablet    Take 1 tablet (30 mg) by mouth every morning    Hip pain, bilateral, Chronic bilateral low back pain with sciatica, sciatica laterality unspecified       SELECT-LITE DEVICE/LANCETS Kit     1 kit    1 kit 2 times daily    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 Read the directions carefully, and ask your  doctor or other care provider to review them with you.

## 2018-05-30 NOTE — PROGRESS NOTES
Psychiatric Progress Note    Name: Vincent Mishra  Date: 5/30/2018  Length of Visit: Spent 30 minutes face to face with this patient, where at least 50 % of this time was spent in counseling on/or about family.     MRN: [id number]      Current Outpatient Prescriptions   Medication Sig     acetaminophen (TYLENOL) 325 MG tablet Take 2 tablets by mouth every 4 hours as needed.     amLODIPine (NORVASC) 10 MG tablet TAKE 1 TABLET BY MOUTH DAILY     ascorbic acid (VITAMIN C) 500 MG tablet Take 500 mg by mouth daily      aspirin EC 81 MG EC tablet Take 1 tablet (81 mg) by mouth daily     atorvastatin (LIPITOR) 80 MG tablet TAKE 1 TABLET BY MOUTH DAILY     Calcium Carbonate-Vitamin D (CALCIUM + D) 600-200 MG-UNIT per tablet Take 2 tablets by mouth daily.     cholecalciferol (VITAMIN  -D) 1000 UNITS capsule Take 1 capsule by mouth daily     clopidogrel (PLAVIX) 75 MG tablet TAKE 1 TABLET BY MOUTH DAILY     Cyanocobalamin (VITAMIN B-12 PO)      gabapentin (NEURONTIN) 100 MG capsule TAKE ONE CAPSULE BY MOUTH DAILY, INCREASE BY 1 CAPSULE EVERY 2 DAYS UP TO 6 CAPSULES THREE TIMES DAILY     hydrochlorothiazide (HYDRODIURIL) 25 MG tablet TAKE 1 TABLET BY MOUTH DAILY     ibuprofen (ADVIL/MOTRIN) 800 MG tablet TAKE 1 TABLET BY MOUTH EVERY 8 HOURS AS NEEDED FOR MODERATE PAIN     lisinopril (PRINIVIL/ZESTRIL) 40 MG tablet TAKE 1 TABLET(40 MG) BY MOUTH DAILY     Melatonin 10 MG TBDP Take 10 mg by mouth At Bedtime     metFORMIN (GLUCOPHAGE-XR) 500 MG 24 hr tablet TAKE 2 TABLETS BY MOUTH TWICE DAILY WITH MEALS.     nitroglycerin (NITROSTAT) 0.4 MG SL tablet Place 1 tablet (0.4 mg) under the tongue every 5 minutes as needed for chest pain     omeprazole (PRILOSEC) 20 MG CR capsule TAKE ONE CAPSULE BY MOUTH TWICE DAILY     oxyCODONE (OXYCONTIN) 30 MG 12 hr tablet Take 1 tablet (30 mg) by mouth every morning     oxyCODONE (ROXICODONE) 5 MG IR tablet Take 1 daily as needed  for severe pain may have 15 per month this is a 3 month  "supply     sertraline (ZOLOFT) 100 MG tablet Take 2 tablets (200 mg) by mouth daily     spironolactone (ALDACTONE) 25 MG tablet TAKE 1 TABLET(25 MG) BY MOUTH DAILY     blood glucose monitoring (NO BRAND SPECIFIED) meter device kit Use to test blood sugar 1 times daily or as directed.     blood glucose monitoring (NO BRAND SPECIFIED) meter device kit Use to test blood sugar 2 times daily or as directed.     blood glucose monitoring (ONE TOUCH ULTRA) test strip Use to test blood sugars 2 times daily or as directed.     Lancets Misc. (SELECT-LITE DEVICE/LANCETS) KIT 1 kit 2 times daily     Hymite FINEPOINT LANCETS MISC Use to test blood sugars 1 times daily or as directed.     order for DME Equipment being ordered: compression stockings     order for DME Equipment being ordered: TENS     order for DME Equipment being ordered: Wheelchair motorized for patient with spinal stenosis and neurogenic claudication     traZODone (DESYREL) 100 MG tablet TAKE 3 TABLETS(300 MG) BY MOUTH EVERY NIGHT AT BEDTIME AS NEEDED FOR SLEEP     No current facility-administered medications for this visit.        Therapist:  None    PHQ-9:  PHQ-9 score:    PHQ-9 SCORE 4/26/2018   Total Score -   Total Score 23       LUMA-7:  LUMA-7 SCORE 2/20/2018 3/29/2018 4/26/2018   Total Score - - -   Total Score 13 11 0           Interim History:  Patient returns for follow up from appointment 4-. Sertraline (Zoloft) 200 mg daily and trazodone (Desyrel) 300 mg at bedtime were continued. Duloxetine (Cymbalta) and quetiapine (Seroquel) were discontinued.     Patient is accompanied by his wife, Pat.      Patient and Pat agree patient's mood is much better. Patient and Pat agree he is doing much better taking fewer medications. Patient reports less fixated on memories. Patient is more engaging in conversation and at times shows a sense of humor.     Patient reports pain is \"terrible\". Patient reports walking is painful - groin, leg and back pain. " "Patient reports at times when walking his knees will buckle and falls. Patient is using CBD oil which is helpful for pain, but patient does not like the way he feels using it.     Past Medical History:   Diagnosis Date     CAD (coronary artery disease) 7/26/2011 7/2011 ([facility]): s/p MI, PTCA - RCA      Cancer of kidney (H)      Chest pain 1/12/2012     Coronary artery disease      History of angina      History of blood transfusion      Hyperlipidaemia      Hyperlipidemia LDL goal <100 9/23/2010     Malignant neoplasm (H)     Prostate CA (removed)     Myocardial infarction     s/p MI 7/29/14, stent placement     NSTEMI (non-ST elevated myocardial infarction) (H)     07-25-14 CATH- RCA, L.Main and CFX  had minor luminal irregularites. Mid LAD high grade stenosis 80-90%.Stent placed to mid LAD     Other and unspecified hyperlipidemia     MI in July 2011     Right bundle branch block      Type II or unspecified type diabetes mellitus without mention of complication, not stated as uncontrolled      Unspecified essential hypertension        10 point ROS is negative except for those listed above.     Vital Signs:   /72 (BP Location: Right arm, Patient Position: Sitting, Cuff Size: Adult Regular)  Pulse 57  Temp 96.9  F (36.1  C) (Tympanic)      Mental Status Assessment:  Appearance:  Well groomed      Behavior/relationship to examiner/demeanor:  Cooperative, engaged and pleasant  Motor activity:  Normal  Gait:  Walks with a cane  Speech:  Normal in volume, articulation, coherence   Mood (subjective report):  \"Better\"  Affect (objective appearance):  Mood congruent  Thought Process (Associations):  Logical, linear and goal directed  Thought content:  No evidence of suicidal or homicidal ideation,          no overt psychosis and                    patient does not appear to be responding to internal stimuli  Oriented to person, place, date/time   Attention Span and concentration: Intact   Memory:  Short-term " memory intact and Long-term memory; Intact  Language:  Fluent   Fund of Knowledge/Intelligence:  Average  Use of language: Intact   Abstraction:  Normal  Insight:  Adequate  Judgment:  Adequate for safety    DSM DIAGNOSIS:  296.32 (F33.1) Major Depressive Disorder, Recurrent Episode, Moderate _ and With anxious distress  300.02 (F41.1) Generalized Anxiety Disorder  309.81 (F43.10) Posttraumatic Stress Disorder (includes Posttraumatic Stress Disorder for Children 6 Years and Younger)  Without dissociative symptoms       Assessment:  Patient is doing much better with reduced numbers of medications. Patient continues to pick at his skin; however, medications have not improved this, so again therapy is recommended.     Medication side effects and alternatives were reviewed.     Treatment Plan:  Continue sertraline (Zoloft) 200 mg daily and trazodone (Desyrel) 300 mg at bedtime.     Consider individual therapy to manage excoriation.     Follow up with primary care provider. Patient is aware of my departure from this clinic.     - Recommend patient discuss medications with their pharmacist. Risks and benefits for medications were discussed including, but not limited to, side effects.   - Safety plan was reviewed; to the ER as needed or call after hours crisis line; [phone]  - Education and counseling was done regarding use of medications, psychotherapy options  - Call [phone] for appointment or to speak to a nurse.    -Office hours: Monday through Thursday 8:00 am to 4:30 pm.   - Patient received a copy of this Treatment Plan today.    The patient is being returned to the referring provider for ongoing care and medication prescribing.  The patient can be referred back to this service for further consultation as needed.      Signed:  Ellen Tariq RN, MS, CNS-BC

## 2018-05-31 ASSESSMENT — PATIENT HEALTH QUESTIONNAIRE - PHQ9: SUM OF ALL RESPONSES TO PHQ QUESTIONS 1-9: 10

## 2018-05-31 ASSESSMENT — ANXIETY QUESTIONNAIRES: GAD7 TOTAL SCORE: 6

## 2018-06-02 DIAGNOSIS — M17.0 PRIMARY OSTEOARTHRITIS OF BOTH KNEES: ICD-10-CM

## 2018-06-02 DIAGNOSIS — M96.1 FAILED BACK SURGICAL SYNDROME: ICD-10-CM

## 2018-06-02 DIAGNOSIS — E11.42 PERIPHERAL SENSORY NEUROPATHY DUE TO TYPE 2 DIABETES MELLITUS (H): ICD-10-CM

## 2018-06-04 ENCOUNTER — TELEPHONE (OUTPATIENT)
Dept: FAMILY MEDICINE | Facility: CLINIC | Age: 78
End: 2018-06-04

## 2018-06-04 DIAGNOSIS — M54.16 LUMBAR BACK PAIN WITH RADICULOPATHY AFFECTING LEFT LOWER EXTREMITY: ICD-10-CM

## 2018-06-04 DIAGNOSIS — J98.4 LUNG ABNORMALITY: Primary | ICD-10-CM

## 2018-06-04 NOTE — TELEPHONE ENCOUNTER
Routing refill request to provider for review/approval because:  Drug not on the [facility] refill protocol   Yvon Dickens RN

## 2018-06-04 NOTE — TELEPHONE ENCOUNTER
Sheyla is calling stating that they are leaving early Wednesday morning on the road and will be gone x 3 weeks.  Vincent has 14 pills left of oxycodone, however he will run out on the road.  Would like to  hard copy at  on Tuesday if at all possible.  Please fill if appropriate.  Thank you..Ebony Dickson    oxycodone      Last Written Prescription Date:  5/15/18  Last Fill Quantity: 30,   # refills: 0  Last Office Visit: 5/15/18  Future Office visit:       Routing refill request to provider for review/approval because:  Drug not on the [facility], [facility] or [facility] refill protocol or controlled substance

## 2018-06-04 NOTE — TELEPHONE ENCOUNTER
Reason for Call:  Request for results:    Name of test or procedure: Pat is calling.  They are leaving for on the road for about 3 weeks on Wednesday morning early (6/6/18) and she was hoping to get Vincent's Ct results.  Doesn't want to be on the road if he needs attention at something.  Please review and advise.  Thank you..Ebony Dickson    Date of test of procedure: 5/30/18    Location of the test or procedure: CT scan    OK to leave the result message on voice mail or with a family member? YES    Phone number Patient can be reached at:  Home number on file [phone] (home)      Call taken on 6/4/2018 at 3:12 PM by Ebony Dickson

## 2018-06-04 NOTE — TELEPHONE ENCOUNTER
IBUPROFEN 800MG TABLETS        Last Written Prescription Date:  5/2/18  Last Fill Quantity: 90,   # refills: 0  Last Office Visit: 5/15/18  Future Office visit:       gabapentin (NEURONTIN) 100 MG capsule      Last Written Prescription Date:  5/7/18  Last Fill Quantity: 270,   # refills: 0  Last Office Visit: 5/15/18  Future Office visit:       Routing refill request to provider for review/approval because:  Drug not on the [facility], [facility] or [facility] refill protocol or controlled substance

## 2018-06-05 RX ORDER — GABAPENTIN 100 MG/1
CAPSULE ORAL
Qty: 270 CAPSULE | Refills: 0 | Status: SHIPPED | OUTPATIENT
Start: 2018-06-05 | End: 2018-07-02

## 2018-06-05 RX ORDER — IBUPROFEN 800 MG/1
TABLET, FILM COATED ORAL
Qty: 90 TABLET | Refills: 0 | Status: SHIPPED | OUTPATIENT
Start: 2018-06-05 | End: 2018-07-02

## 2018-06-05 RX ORDER — OXYCODONE HYDROCHLORIDE 5 MG/1
TABLET ORAL
Qty: 45 TABLET | Refills: 0 | Status: SHIPPED | OUTPATIENT
Start: 2018-06-05 | End: 2018-09-25

## 2018-06-05 NOTE — TELEPHONE ENCOUNTER
Pat, wife, notified of CT results and all of Dr. Fonseca's instructions as noted below. Pat agreed with plan.  Yvon Dickens RN

## 2018-06-05 NOTE — TELEPHONE ENCOUNTER
The kidney stuff hasn't changed. There is a few things in the lung that look like inflammation/not cancer. The radiologist recommend follow up ct in 3 months Keyla Fonseca M.D.'

## 2018-06-14 ENCOUNTER — TELEPHONE (OUTPATIENT)
Dept: FAMILY MEDICINE | Facility: CLINIC | Age: 78
End: 2018-06-14

## 2018-06-14 DIAGNOSIS — M25.551 HIP PAIN, BILATERAL: ICD-10-CM

## 2018-06-14 DIAGNOSIS — M54.40 CHRONIC BILATERAL LOW BACK PAIN WITH SCIATICA, SCIATICA LATERALITY UNSPECIFIED: ICD-10-CM

## 2018-06-14 DIAGNOSIS — G89.29 CHRONIC BILATERAL LOW BACK PAIN WITH SCIATICA, SCIATICA LATERALITY UNSPECIFIED: ICD-10-CM

## 2018-06-14 DIAGNOSIS — M25.552 HIP PAIN, BILATERAL: ICD-10-CM

## 2018-06-14 RX ORDER — OXYCODONE HYDROCHLORIDE 30 MG/1
30 TABLET, FILM COATED, EXTENDED RELEASE ORAL EVERY MORNING
Qty: 30 TABLET | Refills: 0 | Status: SHIPPED | OUTPATIENT
Start: 2018-07-13 | End: 2018-06-14

## 2018-06-14 RX ORDER — OXYCODONE HYDROCHLORIDE 30 MG/1
30 TABLET, FILM COATED, EXTENDED RELEASE ORAL EVERY MORNING
Qty: 30 TABLET | Refills: 0 | Status: SHIPPED | OUTPATIENT
Start: 2018-06-14 | End: 2018-06-14

## 2018-06-14 RX ORDER — OXYCODONE HYDROCHLORIDE 30 MG/1
30 TABLET, FILM COATED, EXTENDED RELEASE ORAL EVERY MORNING
Qty: 30 TABLET | Refills: 0 | Status: SHIPPED | OUTPATIENT
Start: 2018-08-10 | End: 2018-09-18 | Stop reason: DRUGHIGH

## 2018-06-14 NOTE — TELEPHONE ENCOUNTER
Reason for Call:  Other call back    Detailed comments: Pt got a refill of Norco and its 5mg and patient usually gets 30mg. [place] but their son could  new script if one is left at desk. He is on his way up there tonight.    Phone Number Patient can be reached at: Cell number on file:    Telephone Information:   Mobile [phone]       Best Time: any    Can we leave a detailed message on this number?     Call taken on 6/14/2018 at 4:03 PM by Lavern Wu

## 2018-06-25 DIAGNOSIS — I10 ESSENTIAL HYPERTENSION WITH GOAL BLOOD PRESSURE LESS THAN 140/90: ICD-10-CM

## 2018-06-25 DIAGNOSIS — F51.01 PRIMARY INSOMNIA: ICD-10-CM

## 2018-06-25 DIAGNOSIS — K21.9 GASTROESOPHAGEAL REFLUX DISEASE WITHOUT ESOPHAGITIS: ICD-10-CM

## 2018-06-25 RX ORDER — LISINOPRIL 40 MG/1
TABLET ORAL
Qty: 90 TABLET | Refills: 0 | Status: SHIPPED | OUTPATIENT
Start: 2018-06-25 | End: 2018-09-27

## 2018-06-25 RX ORDER — HYDROCHLOROTHIAZIDE 25 MG/1
TABLET ORAL
Qty: 90 TABLET | Refills: 0 | Status: SHIPPED | OUTPATIENT
Start: 2018-06-25 | End: 2018-09-27

## 2018-06-25 NOTE — TELEPHONE ENCOUNTER
Routing refill request to provider for review/approval because:  Labs out of range:  5/30/18 creatinine  Yvon Dickens RN

## 2018-06-25 NOTE — TELEPHONE ENCOUNTER
"LISINOPRIL 40MG TABLETS        Last Written Prescription Date:  3/29/18  Last Fill Quantity: 90,   # refills: 0  Last Office Visit: 5/15/18  Future Office visit:       omeprazole (PRILOSEC) 20 MG CR capsule      Last Written Prescription Date:  3/29/18  Last Fill Quantity: 180,   # refills: 0  Last Office Visit: 5/15/18  Future Office visit:       hydrochlorothiazide (HYDRODIURIL) 25 MG tablet      Last Written Prescription Date:  3/29/18  Last Fill Quantity: 90,   # refills: 0  Last Office Visit: 5/15/18  Future Office visit:       Requested Prescriptions   Pending Prescriptions Disp Refills     lisinopril (PRINIVIL/ZESTRIL) 40 MG tablet [Pharmacy Med Name: LISINOPRIL 40MG TABLETS] 90 tablet 0     Sig: TAKE 1 TABLET(40 MG) BY MOUTH DAILY    ACE Inhibitors (Including Combos) Protocol Failed    6/25/2018 10:21 AM       Failed - Blood pressure under 140/90 in past 12 months    BP Readings from Last 3 Encounters:   05/30/18 140/72   05/15/18 139/80   04/27/18 136/60                Failed - Normal serum creatinine on file in past 12 months    Recent Labs   Lab Test  04/04/18   0933   CR  1.48*            Passed - Recent (12 mo) or future (30 days) visit within the authorizing provider's specialty    Patient had office visit in the last 12 months or has a visit in the next 30 days with authorizing provider or within the authorizing provider's specialty.  See \"Patient Info\" tab in inbasket, or \"Choose Columns\" in Meds & Orders section of the refill encounter.           Passed - Patient is age 18 or older       Passed - Normal serum potassium on file in past 12 months    Recent Labs   Lab Test  04/04/18   0933   POTASSIUM  4.4             omeprazole (PRILOSEC) 20 MG CR capsule [Pharmacy Med Name: OMEPRAZOLE 20MG CAPSULES] 180 capsule 0     Sig: TAKE ONE CAPSULE BY MOUTH TWICE DAILY    PPI Protocol Failed    6/25/2018 10:21 AM       Failed - Not on Clopidogrel (unless Pantoprazole ordered)       Passed - No diagnosis of " "osteoporosis on record       Passed - Recent (12 mo) or future (30 days) visit within the authorizing provider's specialty    Patient had office visit in the last 12 months or has a visit in the next 30 days with authorizing provider or within the authorizing provider's specialty.  See \"Patient Info\" tab in inbasket, or \"Choose Columns\" in Meds & Orders section of the refill encounter.           Passed - Patient is age 18 or older        hydrochlorothiazide (HYDRODIURIL) 25 MG tablet [Pharmacy Med Name: HYDROCHLOROTHIAZIDE 25MG TABLETS] 90 tablet 0     Sig: TAKE 1 TABLET BY MOUTH DAILY    Diuretics (Including Combos) Protocol Failed    6/25/2018 10:21 AM       Failed - Blood pressure under 140/90 in past 12 months    BP Readings from Last 3 Encounters:   05/30/18 140/72   05/15/18 139/80   04/27/18 136/60                Failed - Normal serum creatinine on file in past 12 months    Recent Labs   Lab Test  04/04/18   0933   CR  1.48*             Passed - Recent (12 mo) or future (30 days) visit within the authorizing provider's specialty    Patient had office visit in the last 12 months or has a visit in the next 30 days with authorizing provider or within the authorizing provider's specialty.  See \"Patient Info\" tab in inbasket, or \"Choose Columns\" in Meds & Orders section of the refill encounter.           Passed - Patient is age 18 or older       Passed - Normal serum potassium on file in past 12 months    Recent Labs   Lab Test  04/04/18   0933   POTASSIUM  4.4                   Passed - Normal serum sodium on file in past 12 months    Recent Labs   Lab Test  04/04/18   0933   NA  137                  "

## 2018-06-25 NOTE — TELEPHONE ENCOUNTER
"TRAZODONE 100MG TABLETS        Last Written Prescription Date:  4/13/18  Last Fill Quantity: 270,   # refills: 0  Last Office Visit: 5/15/18  Future Office visit:       Requested Prescriptions   Pending Prescriptions Disp Refills     traZODone (DESYREL) 100 MG tablet [Pharmacy Med Name: TRAZODONE 100MG TABLETS] 270 tablet 0     Sig: TAKE 3 TABLETS(300 MG) BY MOUTH EVERY NIGHT AT BEDTIME AS NEEDED FOR SLEEP    Serotonin Modulators Passed    6/25/2018  4:39 PM       Passed - Recent (12 mo) or future (30 days) visit within the authorizing provider's specialty    Patient had office visit in the last 12 months or has a visit in the next 30 days with authorizing provider or within the authorizing provider's specialty.  See \"Patient Info\" tab in inbasket, or \"Choose Columns\" in Meds & Orders section of the refill encounter.           Passed - Patient is age 18 or older          "

## 2018-06-26 RX ORDER — TRAZODONE HYDROCHLORIDE 100 MG/1
TABLET ORAL
Qty: 270 TABLET | Refills: 0 | Status: SHIPPED | OUTPATIENT
Start: 2018-06-26 | End: 2018-10-07

## 2018-06-28 DIAGNOSIS — E11.9 TYPE 2 DIABETES MELLITUS WITHOUT COMPLICATION, UNSPECIFIED LONG TERM INSULIN USE STATUS: ICD-10-CM

## 2018-06-28 RX ORDER — METFORMIN HCL 500 MG
TABLET, EXTENDED RELEASE 24 HR ORAL
Qty: 360 TABLET | Refills: 0 | Status: SHIPPED | OUTPATIENT
Start: 2018-06-28 | End: 2018-09-27

## 2018-06-28 NOTE — TELEPHONE ENCOUNTER
"METFORMIN ER 500MG 24HR TABS        Last Written Prescription Date:  3/30/18  Last Fill Quantity: 360,   # refills: 0  Last Office Visit: 5/15/18  Future Office visit:       Requested Prescriptions   Pending Prescriptions Disp Refills     metFORMIN (GLUCOPHAGE-XR) 500 MG 24 hr tablet [Pharmacy Med Name: METFORMIN ER 500MG 24HR TABS] 360 tablet 0     Sig: TAKE 2 TABLETS BY MOUTH TWICE DAILY WITH MEALS.    Biguanide Agents Failed    6/28/2018 10:03 AM       Failed - Blood pressure less than 140/90 in past 6 months    BP Readings from Last 3 Encounters:   05/30/18 140/72   05/15/18 139/80   04/27/18 136/60                Failed - Patient's CR is NOT>1.4 OR Patient's EGFR is NOT<45 within past 12 mos.    Recent Labs   Lab Test  05/30/18   1006   GFRESTIMATED  29*   GFRESTBLACK  35*       Recent Labs   Lab Test  04/04/18   0933   CR  1.48*            Passed - Patient has documented LDL within the past 12 mos.    Recent Labs   Lab Test  04/04/18   0933   LDL  64            Passed - Patient has had a Microalbumin in the past 12 mos.    Recent Labs   Lab Test  09/20/17   1309   MICROL  5   UMALCR  5.36            Passed - Patient is age 10 or older       Passed - Patient has documented A1c within the specified period of time.    If HgbA1C is 8 or greater, it needs to be on file within the past 3 months.  If less than 8, must be on file within the past 6 months.     Recent Labs   Lab Test  04/04/18   0933   A1C  6.6*            Passed - Patient does NOT have a diagnosis of CHF.       Passed - Recent (6 mo) or future (30 days) visit within the authorizing provider's specialty    Patient had office visit in the last 6 months or has a visit in the next 30 days with authorizing provider or within the authorizing provider's specialty.  See \"Patient Info\" tab in inbasket, or \"Choose Columns\" in Meds & Orders section of the refill encounter.              "

## 2018-07-02 DIAGNOSIS — M17.0 PRIMARY OSTEOARTHRITIS OF BOTH KNEES: ICD-10-CM

## 2018-07-02 DIAGNOSIS — M96.1 FAILED BACK SURGICAL SYNDROME: ICD-10-CM

## 2018-07-02 DIAGNOSIS — E11.42 PERIPHERAL SENSORY NEUROPATHY DUE TO TYPE 2 DIABETES MELLITUS (H): ICD-10-CM

## 2018-07-02 NOTE — TELEPHONE ENCOUNTER
IBUPROFEN 800MG TABLETS        Last Written Prescription Date:  6/5/18  Last Fill Quantity: 90,   # refills: 0  Last Office Visit: 5/15/18  Future Office visit:       gabapentin (NEURONTIN) 100 MG capsule      Last Written Prescription Date:  6/5/18  Last Fill Quantity: 270,   # refills: 0  Last Office Visit: 5/15/18  Future Office visit:       Routing refill request to provider for review/approval because:  Drug not on the [facility], [facility] or [facility] refill protocol or controlled substance

## 2018-07-03 RX ORDER — IBUPROFEN 800 MG/1
TABLET, FILM COATED ORAL
Qty: 90 TABLET | Refills: 0 | Status: SHIPPED | OUTPATIENT
Start: 2018-07-03 | End: 2018-08-05

## 2018-07-03 RX ORDER — GABAPENTIN 100 MG/1
CAPSULE ORAL
Qty: 270 CAPSULE | Refills: 0 | Status: SHIPPED | OUTPATIENT
Start: 2018-07-03 | End: 2018-08-16

## 2018-07-03 NOTE — TELEPHONE ENCOUNTER
Routing refill request to provider for review/approval because:  Labs out of range:  5/30/18 creatinine.  Yvon Dickens RN

## 2018-08-05 DIAGNOSIS — M17.0 PRIMARY OSTEOARTHRITIS OF BOTH KNEES: ICD-10-CM

## 2018-08-06 NOTE — TELEPHONE ENCOUNTER
"IBUPROFEN 800MG TABLETS        Last Written Prescription Date:  7/3/18  Last Fill Quantity: 90,   # refills: 0  Last Office Visit: 5/15/18  Future Office visit:       Requested Prescriptions   Pending Prescriptions Disp Refills     ibuprofen (ADVIL/MOTRIN) 800 MG tablet [Pharmacy Med Name: IBUPROFEN 800MG TABLETS] 90 tablet 0     Sig: TAKE 1 TABLET BY MOUTH EVERY 8 HOURS AS NEEDED FOR MODERATE PAIN    NSAID Medications Failed    8/5/2018  1:32 PM       Failed - Blood pressure under 140/90 in past 12 months    BP Readings from Last 3 Encounters:   05/30/18 140/72   05/15/18 139/80   04/27/18 136/60                Failed - Patient is age 6-64 years       Failed - Normal serum creatinine on file in past 12 months    Recent Labs   Lab Test  04/04/18   0933   CR  1.48*            Passed - Normal ALT on file in past 12 months    Recent Labs   Lab Test  10/30/17   1552   ALT  29            Passed - Normal AST on file in past 12 months    Recent Labs   Lab Test  10/30/17   1552   AST  22            Passed - Recent (12 mo) or future (30 days) visit within the authorizing provider's specialty    Patient had office visit in the last 12 months or has a visit in the next 30 days with authorizing provider or within the authorizing provider's specialty.  See \"Patient Info\" tab in inbasket, or \"Choose Columns\" in Meds & Orders section of the refill encounter.           Passed - Normal CBC on file in past 12 months    Recent Labs   Lab Test  10/30/17   1552   WBC  7.7   RBC  4.42   HGB  11.8*   HCT  37.7*   PLT  332       For [facility] ONLY: [id number] = WBC, [id number] = RBC            "

## 2018-08-06 NOTE — TELEPHONE ENCOUNTER
Routing refill request to provider for review/approval because:  Pt 77 y/o    Polly COSTELLO RN

## 2018-08-14 RX ORDER — IBUPROFEN 800 MG/1
TABLET, FILM COATED ORAL
Qty: 90 TABLET | Refills: 0 | Status: SHIPPED | OUTPATIENT
Start: 2018-08-14 | End: 2018-09-27

## 2018-08-16 DIAGNOSIS — E11.42 PERIPHERAL SENSORY NEUROPATHY DUE TO TYPE 2 DIABETES MELLITUS (H): ICD-10-CM

## 2018-08-16 DIAGNOSIS — M96.1 FAILED BACK SURGICAL SYNDROME: ICD-10-CM

## 2018-08-16 NOTE — TELEPHONE ENCOUNTER
GABAPENTIN 100MG CAPSULES        Last Written Prescription Date:  7/3/18  Last Fill Quantity: 270,   # refills: 0  Last Office Visit: 5/15/18  Future Office visit:       Routing refill request to provider for review/approval because:  Drug not on the [facility], [facility] or [facility] refill protocol or controlled substance

## 2018-08-17 RX ORDER — GABAPENTIN 100 MG/1
CAPSULE ORAL
Qty: 270 CAPSULE | Refills: 0 | Status: SHIPPED | OUTPATIENT
Start: 2018-08-17 | End: 2018-09-14

## 2018-09-14 ENCOUNTER — OFFICE VISIT (OUTPATIENT)
Dept: FAMILY MEDICINE | Facility: CLINIC | Age: 78
End: 2018-09-14
Payer: COMMERCIAL

## 2018-09-14 VITALS
DIASTOLIC BLOOD PRESSURE: 56 MMHG | TEMPERATURE: 98.1 F | SYSTOLIC BLOOD PRESSURE: 93 MMHG | WEIGHT: 190.4 LBS | HEART RATE: 59 BPM | BODY MASS INDEX: 28.95 KG/M2

## 2018-09-14 DIAGNOSIS — R42 DIZZINESS: ICD-10-CM

## 2018-09-14 DIAGNOSIS — I10 HYPERTENSION GOAL BP (BLOOD PRESSURE) < 140/90: ICD-10-CM

## 2018-09-14 DIAGNOSIS — E11.59 TYPE 2 DIABETES MELLITUS WITH OTHER CIRCULATORY COMPLICATION, WITHOUT LONG-TERM CURRENT USE OF INSULIN (H): ICD-10-CM

## 2018-09-14 DIAGNOSIS — E11.42 PERIPHERAL SENSORY NEUROPATHY DUE TO TYPE 2 DIABETES MELLITUS (H): ICD-10-CM

## 2018-09-14 DIAGNOSIS — R82.90 NONSPECIFIC FINDING ON EXAMINATION OF URINE: ICD-10-CM

## 2018-09-14 DIAGNOSIS — I10 ESSENTIAL HYPERTENSION: ICD-10-CM

## 2018-09-14 DIAGNOSIS — M96.1 FAILED BACK SURGICAL SYNDROME: ICD-10-CM

## 2018-09-14 DIAGNOSIS — G89.29 CHRONIC BILATERAL LOW BACK PAIN WITH SCIATICA, SCIATICA LATERALITY UNSPECIFIED: ICD-10-CM

## 2018-09-14 DIAGNOSIS — F33.2 SEVERE EPISODE OF RECURRENT MAJOR DEPRESSIVE DISORDER, WITHOUT PSYCHOTIC FEATURES (H): ICD-10-CM

## 2018-09-14 DIAGNOSIS — I21.4 NSTEMI (NON-ST ELEVATED MYOCARDIAL INFARCTION) (H): ICD-10-CM

## 2018-09-14 DIAGNOSIS — K59.04 CHRONIC IDIOPATHIC CONSTIPATION: ICD-10-CM

## 2018-09-14 DIAGNOSIS — M54.40 CHRONIC BILATERAL LOW BACK PAIN WITH SCIATICA, SCIATICA LATERALITY UNSPECIFIED: ICD-10-CM

## 2018-09-14 DIAGNOSIS — E78.5 HYPERLIPIDEMIA LDL GOAL <100: Primary | ICD-10-CM

## 2018-09-14 LAB
ALBUMIN SERPL-MCNC: 4.1 G/DL (ref 3.4–5)
ALBUMIN UR-MCNC: 30 MG/DL
ALP SERPL-CCNC: 90 U/L (ref 40–150)
ALT SERPL W P-5'-P-CCNC: 33 U/L (ref 0–70)
ANION GAP SERPL CALCULATED.3IONS-SCNC: 7 MMOL/L (ref 3–14)
APPEARANCE UR: CLEAR
AST SERPL W P-5'-P-CCNC: 35 U/L (ref 0–45)
BACTERIA #/AREA URNS HPF: ABNORMAL /HPF
BASOPHILS # BLD AUTO: 0 10E9/L (ref 0–0.2)
BASOPHILS NFR BLD AUTO: 0.2 %
BILIRUB SERPL-MCNC: 0.6 MG/DL (ref 0.2–1.3)
BILIRUB UR QL STRIP: NEGATIVE
BUN SERPL-MCNC: 26 MG/DL (ref 7–30)
CALCIUM SERPL-MCNC: 9.2 MG/DL (ref 8.5–10.1)
CASTS #/AREA URNS LPF: ABNORMAL /LPF (ref 0–2)
CHLORIDE SERPL-SCNC: 103 MMOL/L (ref 94–109)
CO2 SERPL-SCNC: 28 MMOL/L (ref 20–32)
COLOR UR AUTO: YELLOW
CREAT SERPL-MCNC: 1.39 MG/DL (ref 0.66–1.25)
DIFFERENTIAL METHOD BLD: ABNORMAL
EOSINOPHIL # BLD AUTO: 0.5 10E9/L (ref 0–0.7)
EOSINOPHIL NFR BLD AUTO: 6.1 %
ERYTHROCYTE [DISTWIDTH] IN BLOOD BY AUTOMATED COUNT: 16.8 % (ref 10–15)
GFR SERPL CREATININE-BSD FRML MDRD: 49 ML/MIN/1.7M2
GLUCOSE SERPL-MCNC: 125 MG/DL (ref 70–99)
GLUCOSE UR STRIP-MCNC: NEGATIVE MG/DL
HBA1C MFR BLD: 6.1 % (ref 0–5.6)
HCT VFR BLD AUTO: 35.6 % (ref 40–53)
HGB BLD-MCNC: 10.6 G/DL (ref 13.3–17.7)
HGB UR QL STRIP: NEGATIVE
KETONES UR STRIP-MCNC: 15 MG/DL
LEUKOCYTE ESTERASE UR QL STRIP: NEGATIVE
LYMPHOCYTES # BLD AUTO: 1.1 10E9/L (ref 0.8–5.3)
LYMPHOCYTES NFR BLD AUTO: 12.8 %
MCH RBC QN AUTO: 24.1 PG (ref 26.5–33)
MCHC RBC AUTO-ENTMCNC: 29.8 G/DL (ref 31.5–36.5)
MCV RBC AUTO: 81 FL (ref 78–100)
MONOCYTES # BLD AUTO: 0.6 10E9/L (ref 0–1.3)
MONOCYTES NFR BLD AUTO: 6.4 %
NEUTROPHILS # BLD AUTO: 6.4 10E9/L (ref 1.6–8.3)
NEUTROPHILS NFR BLD AUTO: 74.5 %
NITRATE UR QL: POSITIVE
NON-SQ EPI CELLS #/AREA URNS LPF: ABNORMAL /LPF
PH UR STRIP: 5.5 PH (ref 5–7)
PLATELET # BLD AUTO: 357 10E9/L (ref 150–450)
POTASSIUM SERPL-SCNC: 4.4 MMOL/L (ref 3.4–5.3)
PROT SERPL-MCNC: 7.8 G/DL (ref 6.8–8.8)
RBC # BLD AUTO: 4.39 10E12/L (ref 4.4–5.9)
RBC #/AREA URNS AUTO: ABNORMAL /HPF
SODIUM SERPL-SCNC: 138 MMOL/L (ref 133–144)
SOURCE: ABNORMAL
SP GR UR STRIP: >1.03 (ref 1–1.03)
TSH SERPL DL<=0.005 MIU/L-ACNC: 2.29 MU/L (ref 0.4–4)
UROBILINOGEN UR STRIP-ACNC: 1 EU/DL (ref 0.2–1)
WBC # BLD AUTO: 8.6 10E9/L (ref 4–11)
WBC #/AREA URNS AUTO: ABNORMAL /HPF

## 2018-09-14 PROCEDURE — 87086 URINE CULTURE/COLONY COUNT: CPT | Performed by: FAMILY MEDICINE

## 2018-09-14 PROCEDURE — 83036 HEMOGLOBIN GLYCOSYLATED A1C: CPT | Performed by: FAMILY MEDICINE

## 2018-09-14 PROCEDURE — 84443 ASSAY THYROID STIM HORMONE: CPT | Performed by: FAMILY MEDICINE

## 2018-09-14 PROCEDURE — 36415 COLL VENOUS BLD VENIPUNCTURE: CPT | Performed by: FAMILY MEDICINE

## 2018-09-14 PROCEDURE — 80053 COMPREHEN METABOLIC PANEL: CPT | Performed by: FAMILY MEDICINE

## 2018-09-14 PROCEDURE — 81001 URINALYSIS AUTO W/SCOPE: CPT | Performed by: FAMILY MEDICINE

## 2018-09-14 PROCEDURE — 85025 COMPLETE CBC W/AUTO DIFF WBC: CPT | Performed by: FAMILY MEDICINE

## 2018-09-14 PROCEDURE — 99214 OFFICE O/P EST MOD 30 MIN: CPT | Performed by: FAMILY MEDICINE

## 2018-09-14 RX ORDER — SERTRALINE HYDROCHLORIDE 100 MG/1
200 TABLET, FILM COATED ORAL DAILY
Qty: 180 TABLET | Refills: 1 | Status: ON HOLD | OUTPATIENT
Start: 2018-09-14 | End: 2018-01-01

## 2018-09-14 RX ORDER — GABAPENTIN 100 MG/1
CAPSULE ORAL
Qty: 270 CAPSULE | Refills: 11 | Status: SHIPPED | OUTPATIENT
Start: 2018-09-14 | End: 2019-01-01

## 2018-09-14 RX ORDER — OXYCODONE HYDROCHLORIDE 30 MG/1
30 TABLET, FILM COATED, EXTENDED RELEASE ORAL EVERY 12 HOURS
Qty: 60 TABLET | Refills: 0 | Status: SHIPPED | OUTPATIENT
Start: 2018-09-14 | End: 2018-11-09

## 2018-09-14 RX ORDER — POLYETHYLENE GLYCOL 3350 17 G/17G
1 POWDER, FOR SOLUTION ORAL DAILY
Qty: 119 G | Refills: 3 | Status: ON HOLD | OUTPATIENT
Start: 2018-09-14 | End: 2019-01-01

## 2018-09-14 RX ORDER — AMLODIPINE BESYLATE 5 MG/1
5 TABLET ORAL DAILY
Qty: 90 TABLET | Refills: 3 | Status: ON HOLD | OUTPATIENT
Start: 2018-09-14 | End: 2019-01-01

## 2018-09-14 NOTE — MR AVS SNAPSHOT
After Visit Summary   9/14/2018    Vincent Mishra    MRN: [id number]           Patient Information     Date Of Birth          1940        Visit Information        Provider Department      9/14/2018 11:00 AM Keyla Fonseca MD [facility]        Today's Diagnoses     Hyperlipidemia LDL goal <100    -  1    Hypertension goal BP (blood pressure) < 140/90        NSTEMI (non-ST elevated myocardial infarction) (H)        Essential hypertension        Type 2 diabetes mellitus with other circulatory complication, without long-term current use of insulin (H)        Chronic bilateral low back pain with sciatica, sciatica laterality unspecified        Severe episode of recurrent major depressive disorder, without psychotic features (H)        Dizziness        Nonspecific finding on examination of urine        Failed back surgical syndrome        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   The blood sugar looks ok   Take the new dose 5mg of the amlodipine the blood pressure should increase some   If it is still running very low it may need to be stopped   See the cardiologist also   If the urine culture is positive I will treat it          Follow-ups after your visit        Who to contact     Normal or non-critical lab and imaging results will be communicated to you by MyChart, letter or phone within 4 business days after the clinic has received the results. If you do not hear from us within 7 days, please contact the clinic through MyChart or phone. If you have a critical or abnormal lab result, we will notify you by phone as soon as possible.  Submit refill requests through [employer] or call your pharmacy and they will forward the refill request to us. Please allow 3 business days for your refill to be completed.          If you need to speak  with a  for additional information , please call: [phone]             Additional Information About Your Visit        [employer] Information     [employer] gives you secure access to your electronic health record. If you see a primary care provider, you can also send messages to your care team and make appointments. If you have questions, please call your primary care clinic.  If you do not have a primary care provider, please call [phone] and they will assist you.        Care EveryWhere ID     This is your Care EveryWhere ID. This could be used by other organizations to access your [facility] medical records  [id number]        Your Vitals Were     Pulse Temperature BMI (Body Mass Index)             59 98.1  F (36.7  C) (Tympanic) 28.95 kg/m2          Blood Pressure from Last 3 Encounters:   09/14/18 93/56   05/30/18 140/72   05/15/18 139/80    Weight from Last 3 Encounters:   09/14/18 190 lb 6.4 oz (86.4 kg)   04/27/18 215 lb (97.5 kg)   04/26/18 215 lb (97.5 kg)              We Performed the Following     *UA reflex to Microscopic and Culture ([facility] (except [place] and [place])     CBC with platelets and differential     Comprehensive metabolic panel     Hemoglobin A1c     TSH with free T4 reflex     Urine Culture Aerobic Bacterial     Urine Microscopic          Today's Medication Changes          These changes are accurate as of 9/14/18 12:56 PM.  If you have any questions, ask your nurse or doctor.               Start taking these medicines.        Dose/Directions    polyethylene glycol powder   Commonly known as:  MIRALAX/GLYCOLAX   Used for:  Chronic idiopathic constipation   Started by:  Keyla Fonseca MD        Dose:  1 capful   Take 17 g (1 capful) by mouth daily   Quantity:  119 g   Refills:  3         These medicines have changed or have updated prescriptions.        Dose/Directions    amLODIPine 5 MG tablet   Commonly known as:  NORVASC   This may have  changed:    - medication strength  - See the new instructions.   Used for:  Hypertension goal BP (blood pressure) < 140/90   Changed by:  Keyla Fonseca MD        Dose:  5 mg   Take 1 tablet (5 mg) by mouth daily   Quantity:  90 tablet   Refills:  3       * oxyCODONE IR 5 MG tablet   Commonly known as:  ROXICODONE   This may have changed:  Another medication with the same name was added. Make sure you understand how and when to take each.   Used for:  Lumbar back pain with radiculopathy affecting left lower extremity   Changed by:  Keyla Fonseca MD        Take 1 daily as needed  for severe pain may have 15 per month this is a 3 month supply   Quantity:  45 tablet   Refills:  0       * oxyCODONE 30 MG 12 hr tablet   Commonly known as:  OxyCONTIN   This may have changed:  Another medication with the same name was added. Make sure you understand how and when to take each.   Used for:  Hip pain, bilateral, Chronic bilateral low back pain with sciatica, sciatica laterality unspecified   Changed by:  Keyla Fonseca MD        Dose:  30 mg   Take 1 tablet (30 mg) by mouth every morning   Quantity:  30 tablet   Refills:  0       * oxyCODONE 30 MG 12 hr tablet   Commonly known as:  OxyCONTIN   This may have changed:  You were already taking a medication with the same name, and this prescription was added. Make sure you understand how and when to take each.   Used for:  Failed back surgical syndrome   Changed by:  Keyla Fonseca MD        Dose:  30 mg   Take 1 tablet (30 mg) by mouth every 12 hours   Quantity:  60 tablet   Refills:  0       * Notice:  This list has 3 medication(s) that are the same as other medications prescribed for you. Read the directions carefully, and ask your doctor or other care provider to review them with you.         Where to get your medicines      These medications were sent to [id number] - [place] [address] AT [place] [address],  [place] [address]     Phone:  [phone]     amLODIPine 5 MG tablet    gabapentin 100 MG capsule    polyethylene glycol powder    sertraline 100 MG tablet         Some of these will need a paper prescription and others can be bought over the counter.  Ask your nurse if you have questions.     Bring a paper prescription for each of these medications     oxyCODONE 30 MG 12 hr tablet               Information about OPIOIDS     PRESCRIPTION OPIOIDS: WHAT YOU NEED TO KNOW   We gave you an opioid (narcotic) pain medicine. It is important to manage your pain, but opioids are not always the best choice. You should first try all the other options your care team gave you. Take this medicine for as short a time (and as few doses) as possible.    Some activities can increase your pain, such as bandage changes or therapy sessions. It may help to take your pain medicine 30 to 60 minutes before these activities. Reduce your stress by getting enough sleep, working on hobbies you enjoy and practicing relaxation or meditation. Talk to your care team about ways to manage your pain beyond prescription opioids.    These medicines have risks:    DO NOT drive when on new or higher doses of pain medicine. These medicines can affect your alertness and reaction times, and you could be arrested for driving under the influence (DUI). If you need to use opioids long-term, talk to your care team about driving.    DO NOT operate heavy machinery    DO NOT do any other dangerous activities while taking these medicines.    DO NOT drink any alcohol while taking these medicines.     If the opioid prescribed includes acetaminophen, DO NOT take with any other medicines that contain acetaminophen. Read all labels carefully. Look for the word  acetaminophen  or  Tylenol.  Ask your pharmacist if you have questions or are unsure.    You can get addicted to pain medicines, especially if you have a history of addiction (chemical, alcohol or  substance dependence). Talk to your care team about ways to reduce this risk.    All opioids tend to cause constipation. Drink plenty of water and eat foods that have a lot of fiber, such as fruits, vegetables, prune juice, apple juice and high-fiber cereal. Take a laxative (Miralax, milk of magnesia, Colace, Senna) if you don t move your bowels at least every other day. Other side effects include upset stomach, sleepiness, dizziness, throwing up, tolerance (needing more of the medicine to have the same effect), physical dependence and slowed breathing.    Store your pills in a secure place, locked if possible. We will not replace any lost or stolen medicine. If you don t finish your medicine, please throw away (dispose) as directed by your pharmacist. The [place] [employer]ution [employer] has more information about safe disposal: [email or web address]://[email or web address].us/[email or web address]-medications         Primary Care Provider Office Phone # Fax #    Keyla Fonseca [phone] [address] [facility] [address]        Equal Access to Services     [facility]: Hadii aad ku hadasho Soomaali, waaxda luqadaha, qaybta kaalmada corrina, sobeida lorenzo . So [facility] [phone].    ATENCIÓN: Si habla español, tiene a nicholas disposición servicios gratuitos de asistencia lingüística. [place] al [phone].    We comply with applicable federal civil rights laws and [place] laws. We do not discriminate on the basis of race, color, national origin, age, disability, sex, sexual orientation, or gender identity.            Thank you!     Thank you for choosing [facility]  for your care. Our goal is always to provide you with excellent care. Hearing back from our patients is one way we can continue to improve our services. Please take a few minutes to complete the written survey that you may receive in the mail after your visit with us. Thank you!             Your  Updated Medication List - Protect others around you: Learn how to safely use, store and throw away your medicines at www.disposemymeds.org.          This list is accurate as of 9/14/18 12:56 PM.  Always use your most recent med list.                   Brand Name Dispense Instructions for use Diagnosis    acetaminophen 325 MG tablet    TYLENOL    250 tablet    Take 2 tablets by mouth every 4 hours as needed.    Tear of meniscus of left knee       amLODIPine 5 MG tablet    NORVASC    90 tablet    Take 1 tablet (5 mg) by mouth daily    Hypertension goal BP (blood pressure) < 140/90       ascorbic acid 500 MG tablet    VITAMIN C     Take 500 mg by mouth daily        aspirin 81 MG EC tablet      Take 1 tablet (81 mg) by mouth daily    Chest pain, unspecified type, NSTEMI (non-ST elevated myocardial infarction) (H)       atorvastatin 80 MG tablet    LIPITOR    90 tablet    TAKE 1 TABLET BY MOUTH DAILY    Hyperlipidemia LDL goal <100       * blood glucose monitoring meter device kit    no brand specified    1 kit    Use to test blood sugar 2 times daily or as directed.    Type 2 diabetes mellitus with hyperglycemia, without long-term current use of insulin (H)       * blood glucose monitoring meter device kit    no brand specified    1 kit    Use to test blood sugar 1 times daily or as directed.    Type 2 diabetes mellitus with complication, without long-term current use of insulin (H)       blood glucose monitoring test strip    ONETOUCH ULTRA    200 strip    Use to test blood sugars 2 times daily or as directed.    Type 2 diabetes mellitus with other circulatory complications       calcium + D 600-200 MG-UNIT Tabs   Generic drug:  calcium carbonate-vitamin D     100 tablet    Take 2 tablets by mouth daily.        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    Type 2 diabetes, HbA1c goal < 7% (H)       lisinopril 40 MG tablet    PRINIVIL/ZESTRIL    90 tablet    TAKE 1 TABLET(40 MG) BY MOUTH DAILY    Essential hypertension with goal blood pressure less than 140/90       Melatonin 10 MG Tbdp     90 tablet    Take 10 mg by mouth At Bedtime        metFORMIN 500 MG 24 hr tablet    GLUCOPHAGE-XR    360 tablet    TAKE 2 TABLETS BY MOUTH TWICE DAILY WITH MEALS.    Type 2 diabetes mellitus without complication, unspecified long term insulin use status (H)       nitroGLYcerin 0.4 MG sublingual tablet    NITROSTAT    25 tablet    Place 1 tablet (0.4 mg) under the tongue every 5 minutes as needed for chest pain    CAD (coronary artery disease)       omeprazole 20 MG CR capsule    priLOSEC    180 capsule    TAKE ONE CAPSULE BY MOUTH TWICE DAILY    Gastroesophageal reflux disease without esophagitis       * order for DME     1 Device    Equipment being ordered: Wheelchair motorized for patient with spinal stenosis and neurogenic claudication    Spinal stenosis, lumbar region, with neurogenic claudication       * order for DME     1 Units    Equipment being ordered: TENS    Chronic bilateral low back pain with sciatica, sciatica laterality  unspecified, Hip pain, bilateral       order for DME     1 Units    Equipment being ordered: compression stockings    Cellulitis and abscess of leg, except foot       * oxyCODONE IR 5 MG tablet    ROXICODONE    45 tablet    Take 1 daily as needed  for severe pain may have 15 per month this is a 3 month supply    Lumbar back pain with radiculopathy affecting left lower extremity       * oxyCODONE 30 MG 12 hr tablet    OxyCONTIN    30 tablet    Take 1 tablet (30 mg) by mouth every morning    Hip pain, bilateral, Chronic bilateral low back pain with sciatica, sciatica laterality unspecified       * oxyCODONE 30 MG 12 hr tablet    OxyCONTIN    60 tablet    Take 1 tablet (30 mg) by mouth every 12 hours    Failed back surgical syndrome       polyethylene glycol powder    MIRALAX/GLYCOLAX    119 g    Take 17 g (1 capful) by mouth daily    Chronic idiopathic constipation       SELECT-[employer] DEVICE/LANCETS Kit     1 kit    1 kit 2 times daily    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)       sertraline 100 MG tablet    ZOLOFT    180 tablet    Take 2 tablets (200 mg) by mouth daily    Severe episode of recurrent major depressive disorder, without psychotic features (H)       spironolactone 25 MG tablet    ALDACTONE    90 tablet    TAKE 1 TABLET(25 MG) BY MOUTH DAILY    Essential hypertension with goal blood pressure less than 140/90       traZODone 100 MG tablet    DESYREL    270 tablet    TAKE 3 TABLETS(300 MG) BY MOUTH EVERY NIGHT AT BEDTIME AS NEEDED FOR SLEEP    Primary insomnia       VITAMIN B-12 PO           * Notice:  This list has 7 medication(s) that are the same as other medications prescribed for you. Read the directions carefully, and ask your doctor or other care provider to review them with you.

## 2018-09-14 NOTE — PATIENT INSTRUCTIONS
Urology [facility] and have them check the prostate too.   The blood sugar looks ok   Take the new dose 5mg of the amlodipine the blood pressure should increase some   If it is still running very low it may need to be stopped   See the cardiologist also   If the urine culture is positive I will treat it

## 2018-09-14 NOTE — PROGRESS NOTES
"  SUBJECTIVE:   Vincent Mishra is a 78 year old male who presents to clinic today for the following health issues:    Dizziness  Onset: Going on since his last visit    Description:   Do you feel faint:  no   Does it feel like the surroundings (bed, room) are moving: YES  Unsteady/off balance: YES  Have you passed out or fallen: YES- he has passed out a couple of times    Intensity: moderate    Progression of Symptoms:  worsening    Accompanying Signs & Symptoms:  Heart palpitations: no   Nausea, vomiting: YES  Weakness in arms or legs: YES, wife states that he has fallen multiple times   Fatigue: YES  Vision or speech changes: YES- Visual Changes, describes it like someone has taken his eyeball out and rolled in the gravel   Ringing in ears (Tinnitus): YES every morning and sometimes during the day  Hearing Loss: YES  Forgets a lot, he will be talking and in the middle of the conversation he forgets what he was talking     History:   Head trauma/concussion hx: no   Previous similar symptoms: YES- the dizziness, ringing in ears, balance but things have been getting worse  Recent bleeding history: no     Precipitating factors:   Worse with activity or head movement: YES  Any new medications (BP?): no   Alcohol/drug abuse/withdrawal: no     Alleviating factors:   Does staying in a fixed position give relief:  YES- Sometimes  As above he has not been feeling well for the whole summer   Feeling pain under the rib cage when he moves then it will spasm   Therapies Tried and outcome: Non    Has more pain in the groin intermittent not using his walker and has been falling   He just refuses and the pain hits and he just falls   He has been to the back doctor, the spine doctor, painmanagement.   Nothing \"works\"   Blood pressure has been very low at home   Dicussed cutting back on some medication as he becomes lightheaded when getting up      BP Readings from Last 6 Encounters:   09/17/18 132/74   09/14/18 93/56   05/30/18 " 140/72   05/15/18 139/80   04/27/18 136/60   04/26/18 129/62       Problem list and histories reviewed & adjusted, as indicated.  Additional history: as documented    Patient Active Problem List   Diagnosis     Tension headache     Trapezius strain     Hyperlipidemia LDL goal <100     Parotid mass     Diplopia     Neuropathy     Vision loss night     Neck pain     B12 deficiency     Tear of meniscus of left knee     Hypertension goal BP (blood pressure) < 140/90     C6-7 disc with radiculopathy     Right bundle branch block     Advanced directives, info letter sent 10/4/2011     Chest pain     Anatomic airway obstruction     Abnormal antinuclear antibody titer     Osteoarthritis, knee     Moderate major depression (H)     Orchitis, epididymitis, and epididymo-orchitis     Malignant neoplasm of kidney excluding renal pelvis (H)     Renal mass, left     Vitamin D deficiency disease     Health Care Home     Insomnia     Abdominal pain, right upper quadrant     Esophageal reflux     Hard of hearing     Constipation     NSTEMI (non-ST elevated myocardial infarction) (H)     Myocardial infarction, nontransmural (H)     Acute myocardial infarction of inferolateral wall (H)     Obesity     Sinoatrial node dysfunction (H)     Right bundle branch block (RBBB)     Essential hypertension     Hyperlipidemia     Type 2 diabetes mellitus with other circulatory complication, without long-term current use of insulin (H)     Thyroid nodule     Hip pain, bilateral     Claudication (H)     Bilateral low back pain with right-sided sciatica     Bilateral low back pain with left-sided sciatica     ACS (acute coronary syndrome) (H)     Chronic bilateral low back pain with sciatica, sciatica laterality unspecified     Severe episode of recurrent major depressive disorder, without psychotic features (H)     Past Surgical History:   Procedure Laterality Date     ARTHROSCOPY KNEE  2/11/2011    ARTHROSCOPY KNEE performed by LEY, JEFFREY DUANE  at [facility]     ARTHROSCOPY KNEE WITH MEDIAL MENISCECTOMY  6/26/2012    Procedure: ARTHROSCOPY KNEE WITH MEDIAL MENISCECTOMY;  Right Knee Arthroscopy With Medial Menisectomy;  Surgeon: Ley, Jeffrey Duane, MD;  Location: [facility]     BACK SURGERY      back surgery x4     BIOPSY       CARDIAC SURGERY  7/2011    stentt placed     COLONOSCOPY       CORONARY ANGIOGRAPHY ADULT ORDER  07-25-14    RCA, L.Main and CFX  had minor luminal irregularites. Mid LAD high grade stenosis 80-90%.Stent placed to mid LAD     ESOPHAGOSCOPY, GASTROSCOPY, DUODENOSCOPY (EGD), COMBINED  10/25/2012    Procedure: COMBINED ESOPHAGOSCOPY, GASTROSCOPY, DUODENOSCOPY (EGD), BIOPSY SINGLE OR MULTIPLE;  Gastroscopy  ;  Surgeon: Lucius Dasilva MD;  Location: [facility]     HEART CATH, ANGIOPLASTY  07-25-14    mid LAD      ORTHOPEDIC SURGERY       back surgery       Social History   Substance Use Topics     Smoking status: Never Smoker     Smokeless tobacco: Never Used     Alcohol use No     Family History   Problem Relation Age of Onset     Cancer Mother      Depression Mother      Diabetes Father      Hypertension Father      HEART DISEASE Father      Mental Illness Father      HEART DISEASE Maternal Grandfather      Substance Abuse Maternal Grandfather      Alcohol/Drug Paternal Grandmother      Substance Abuse Paternal Grandmother      HEART DISEASE Paternal Grandfather      Alcohol/Drug Paternal Grandfather      Substance Abuse Paternal Grandfather      HEART DISEASE Brother      Diabetes Brother      Substance Abuse Brother      Obesity Brother      Diabetes Brother      Obesity Sister      Alcohol/Drug Daughter      Depression Daughter      Obesity Sister      Diabetes Sister      Obesity Sister      Diabetes Sister      Alcohol/Drug Sister      Cancer Sister 55     possible kidney cancer     Substance Abuse Sister      Alcohol/Drug Son      Substance Abuse Son      Diabetes Son      Alcohol/Drug Son      Substance Abuse Son      Obesity Daughter       Diabetes Daughter      Hypertension Daughter      Bipolar Disorder Other            Reviewed and updated as needed this visit by clinical staff  Tobacco  Allergies  Med Hx  Surg Hx  Fam Hx  Soc Hx      Reviewed and updated as needed this visit by Provider       Very constipated   Not on clearlax    ROS:      OBJECTIVE:     BP 93/56  Pulse 59  Temp 98.1  F (36.7  C) (Tympanic)  Wt 190 lb 6.4 oz (86.4 kg)  BMI 28.95 kg/m2  Body mass index is 28.95 kg/(m^2).  GENERAL: alert and no distress  NECK: no adenopathy, no asymmetry, masses, or scars and thyroid normal to palpation  RESP: lungs clear to auscultation - no rales, rhonchi or wheezes  CV: regular rate and rhythm, normal S1 S2, no S3 or S4, no murmur, click or rub, no peripheral edema and peripheral pulses strong  ABDOMEN: soft, nontender, no hepatosplenomegaly, no masses and bowel sounds normal  MS: muscle wasting thighs  and decreased range of motion lumbar spine   SKIN: no suspicious lesions or rashes he has stopped picking   NEURO: weakness of lower extemrities and hip flexors , sensory exam grossly normal and mentation intact  PSYCH: mentation appears normal, tearful and anxious    Diagnostic Test Results:  none     ASSESSMENT/PLAN:             1. Hyperlipidemia LDL goal <100    - Comprehensive metabolic panel  - Urine Microscopic    2. Hypertension goal BP (blood pressure) < 140/90  Decrease amlodipine to 5 mg   This should make the blood pressure a little higher but stil give good cotnrol   - Comprehensive metabolic panel  - amLODIPine (NORVASC) 5 MG tablet; Take 1 tablet (5 mg) by mouth daily  Dispense: 90 tablet; Refill: 3    3. NSTEMI (non-ST elevated myocardial infarction) (H)    - CBC with platelets and differential  - Comprehensive metabolic panel    4. Essential hypertension    - CBC with platelets and differential    5. Type 2 diabetes mellitus with other circulatory complication, without long-term current use of insulin (H)    -  Comprehensive metabolic panel  - TSH with free T4 reflex  - Hemoglobin A1c    6. Chronic bilateral low back pain with sciatica, sciatica laterality unspecified      7. Severe episode of recurrent major depressive disorder, without psychotic features (H)    - TSH with free T4 reflex  - sertraline (ZOLOFT) 100 MG tablet; Take 2 tablets (200 mg) by mouth daily  Dispense: 180 tablet; Refill: 1    8. Dizziness    - *UA reflex to Microscopic and Culture ([place] and [facility] (except [place] and [place])  - CBC with platelets and differential  - TSH with free T4 reflex  - Hemoglobin A1c    9. Nonspecific finding on examination of urine    - Urine Culture Aerobic Bacterial    10. Failed back surgical syndrome  Will increase to 2 times per day as he has poor pain control at present   - oxyCODONE (OXYCONTIN) 30 MG 12 hr tablet; Take 1 tablet (30 mg) by mouth every 12 hours  Dispense: 60 tablet; Refill: 0  - gabapentin (NEURONTIN) 100 MG capsule; TAKE ONE CAPSULE BY MOUTH DAILY, INCREASE BY 1 CAPSULE EVERY 2 DAYS UP TO 6 CAPSULES THREE TIMES DAILY  Dispense: 270 capsule; Refill: 11    11. 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 Chronic idiopathic constipation    - polyethylene glycol (MIRALAX/GLYCOLAX) powder; Take 17 g (1 capful) by mouth daily  Dispense: 119 g; Refill: 3  Patient Instructions   Urology Lucius Capone and have them check the prostate too.   The blood sugar looks ok   Take the new dose 5mg of the amlodipine the blood pressure should increase some   If it is still running very low it may need to be stopped   See the cardiologist also   If the urine culture is positive I will treat it      4-6 week s    Keyla Fonseca MD  [facility]

## 2018-09-15 LAB
BACTERIA SPEC CULT: NORMAL
SPECIMEN SOURCE: NORMAL

## 2018-09-17 ENCOUNTER — APPOINTMENT (OUTPATIENT)
Dept: CT IMAGING | Facility: CLINIC | Age: 78
End: 2018-09-17
Attending: EMERGENCY MEDICINE
Payer: MEDICARE

## 2018-09-17 ENCOUNTER — HOSPITAL ENCOUNTER (EMERGENCY)
Facility: CLINIC | Age: 78
Discharge: HOME | End: 2018-09-17
Attending: EMERGENCY MEDICINE | Admitting: EMERGENCY MEDICINE
Payer: MEDICARE

## 2018-09-17 VITALS
DIASTOLIC BLOOD PRESSURE: 74 MMHG | RESPIRATION RATE: 16 BRPM | TEMPERATURE: 97 F | SYSTOLIC BLOOD PRESSURE: 132 MMHG | OXYGEN SATURATION: 96 %
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
## 2018-09-17 DIAGNOSIS — S00.11XA PERIORBITAL ECCHYMOSIS, RIGHT, INITIAL ENCOUNTER: ICD-10-CM

## 2018-09-17 PROCEDURE — A9270 NON-COVERED ITEM OR SERVICE: HCPCS | Mod: GY | Performed by: EMERGENCY MEDICINE

## 2018-09-17 PROCEDURE — 99284 EMERGENCY DEPT VISIT MOD MDM: CPT | Mod: 25

## 2018-09-17 PROCEDURE — 70450 CT HEAD/BRAIN W/O DYE: CPT

## 2018-09-17 PROCEDURE — 25000132 ZZH RX MED GY IP 250 OP 250 PS 637: Mod: GY | Performed by: EMERGENCY MEDICINE

## 2018-09-17 PROCEDURE — 72125 CT NECK SPINE W/O DYE: CPT

## 2018-09-17 PROCEDURE — 99284 EMERGENCY DEPT VISIT MOD MDM: CPT | Mod: Z6 | Performed by: EMERGENCY MEDICINE

## 2018-09-17 RX ORDER — OXYCODONE HYDROCHLORIDE 5 MG/1
10 TABLET ORAL ONCE
Status: COMPLETED | OUTPATIENT
Start: 2018-09-17 | End: 2018-09-17

## 2018-09-17 RX ADMIN — OXYCODONE HYDROCHLORIDE 10 MG: 5 TABLET ORAL at 11:33

## 2018-09-17 NOTE — DISCHARGE INSTRUCTIONS
Take acetaminophen in your home pain medication for the pain.  Return to the emergency department if you have repeated vomiting, worsening symptoms, or other concerns, otherwise follow-up in clinic.

## 2018-09-17 NOTE — ED AVS SNAPSHOT
[facility] Emergency Department    [address] [address]    Phone:  [phone]    Fax:  [phone]                                       Vincent Mishra   MRN: [id number]    Department:  [facility] Emergency Department   Date of Visit:  9/17/2018           After Visit Summary Signature Page     I have received my discharge instructions, and my questions have been answered. I have discussed any challenges I see with this plan with the nurse or doctor.    ..........................................................................................................................................  Patient/Patient Representative Signature      ..........................................................................................................................................  Patient Representative Print Name and Relationship to Patient    ..................................................               ................................................  Date                                   Time    ..........................................................................................................................................  Reviewed by Signature/Title    ...................................................              ..............................................  Date                                               Time          22EPIC Rev 08/18

## 2018-09-17 NOTE — ED AVS SNAPSHOT
[facility]    [address] [facility] [address]    Phone:  [phone]    Fax:  [phone]                                       Vincent Mishra   MRN: [id number]    Department:  [facility] Emergency Department   Date of Visit:  9/17/2018           Patient Information     Date Of Birth          1940        Your diagnoses for this visit were:     Fall from standing, initial encounter     Periorbital ecchymosis, right, initial encounter        You were seen by Senthil Nash MD.        Discharge Instructions       Take acetaminophen in your home pain medication for the pain.  Return to the emergency department if you have repeated vomiting, worsening symptoms, or other concerns, otherwise follow-up in clinic.    Your next 10 appointments already scheduled     Oct 04, 2018 10:40 AM CDT   CT ABDOMEN PELVIS W/O CONTRAST with UCCT1   [facility] CT ([facility])    [address] [id number] [phone]           How do I prepare for my exam? (Food and drink instructions) To prepare: Do not eat or drink for 2 hours before your exam. If you need to take medicine, you may take it with small sips of water. (We may ask you to take liquid medicine as well.)  How do I prepare for my exam? (Other instructions) Please arrive 30 minutes early for your CT.  Once in the department you might be asked to drink water 15-20 minutes prior to your exam.  If indicated you may be asked to drink an oral contrast in advance of your CT.  If this is the case, the imaging team will let you know or be in contact with you prior to your appointment  Patients over 70 or patients with diabetes or kidney problems: If you haven t had a blood test (creatinine test) within the last 30 days, the Cardiologist/Radiologist may require you to get this test prior to your exam.  If you have diabetes:  Continue to take your metformin  medication on the day of your exam  What should I wear: Please wear loose clothing, such as a sweat suit or jogging clothes. Avoid snaps, zippers and other metal. We may ask you to undress and put on a hospital gown.  How long does the exam take: Most scans take less than 20 minutes.  What should I bring: Please bring any scans or X-rays taken at other hospitals, if similar tests were done. Also bring a list of your medicines, including vitamins, minerals and over-the-counter drugs. It is safest to leave personal items at home.  Do I need a : No  is needed.  What do I need to tell my doctor? Be sure to tell your doctor: * If you have any allergies. * If there s any chance you are pregnant. * If you are breastfeeding.  What should I do after the exam: No restrictions, You may resume normal activities.  What is this test: A CT (computed tomography) scan is a series of pictures that allows us to look inside your body. The scanner creates images of the body in cross sections, much like slices of bread. This helps us see any problems more clearly. You may receive contrast (X-ray dye) before or during your scan. You will be asked to drink the contrast.  Who should I call with questions: If you have any questions, please call the Imaging Department where you will have your exam. Directions, parking instructions, and other information is available on our website, [email or web address].            Oct 05, 2018  9:00 AM CDT   (Arrive by 8:45 AM)   RETURN KIDNEY MASS with Lucius Capone MD   [facility] [place] [facility]rologic [facility] ([facility])    [address] [place] [id number] [phone]              24 Hour Appointment Hotline       To make an appointment at any [place] clinic, call [phone] ([phone]). If you don't have a family doctor or clinic, we will help you find one. [facility] are conveniently located to  serve the needs of you and your family.             Review of your medicines      Our records show that you are taking the medicines listed below. If these are incorrect, please call your family doctor or clinic.        Dose / Directions Last dose taken    acetaminophen 325 MG tablet   Commonly known as:  TYLENOL   Dose:  650 mg   Quantity:  250 tablet        Take 2 tablets by mouth every 4 hours as needed.   Refills:  1        amLODIPine 5 MG tablet   Commonly known as:  NORVASC   Dose:  5 mg   Quantity:  90 tablet        Take 1 tablet (5 mg) by mouth daily   Refills:  3        ascorbic acid 500 MG tablet   Commonly known as:  VITAMIN C   Dose:  500 mg        Take 500 mg by mouth daily   Refills:  0        aspirin 81 MG EC tablet   Dose:  81 mg        Take 1 tablet (81 mg) by mouth daily   Refills:  0        atorvastatin 80 MG tablet   Commonly known as:  LIPITOR   Quantity:  90 tablet        TAKE 1 TABLET BY MOUTH DAILY   Refills:  3        * blood glucose monitoring meter device kit   Commonly known as:  no brand specified   Quantity:  1 kit        Use to test blood sugar 2 times daily or as directed.   Refills:  0        * blood glucose monitoring meter device kit   Commonly known as:  no brand specified   Quantity:  1 kit        Use to test blood sugar 1 times daily or as directed.   Refills:  0        blood glucose monitoring test strip   Commonly known as:  ONETOUCH ULTRA   Quantity:  200 strip        Use to test blood sugars 2 times daily or as directed.   Refills:  3        calcium + D 600-200 MG-UNIT Tabs   Dose:  2 tablet   Quantity:  100 tablet   Generic drug:  calcium carbonate-vitamin D        Take 2 tablets by mouth daily.   Refills:  12        cholecalciferol 1000 units capsule   Commonly known as:  vitamin  -D   Dose:  1 capsule        Take 1 capsule by mouth daily   Refills:  0        clopidogrel 75 MG tablet   Commonly known as:  PLAVIX   Quantity:  90 tablet        TAKE 1 TABLET BY MOUTH DAILY    Refills:  1        gabapentin 100 MG capsule   Commonly known as:  NEURONTIN   Quantity:  270 capsule        TAKE ONE CAPSULE BY MOUTH DAILY, INCREASE BY 1 CAPSULE EVERY 2 DAYS UP TO 6 CAPSULES THREE TIMES DAILY   Refills:  11        hydrochlorothiazide 25 MG tablet   Commonly known as:  HYDRODIURIL   Quantity:  90 tablet        TAKE 1 TABLET BY MOUTH DAILY   Refills:  0        ibuprofen 800 MG tablet   Commonly known as:  ADVIL/MOTRIN   Quantity:  90 tablet        TAKE 1 TABLET BY MOUTH EVERY 8 HOURS AS NEEDED FOR MODERATE PAIN   Refills:  0        LIFESCAN FINEPOINT LANCETS Misc   Quantity:  100 each        Use to test blood sugars 1 times daily or as directed.   Refills:  12        lisinopril 40 MG tablet   Commonly known as:  PRINIVIL/ZESTRIL   Quantity:  90 tablet        TAKE 1 TABLET(40 MG) BY MOUTH DAILY   Refills:  0        Melatonin 10 MG Tbdp   Dose:  10 mg   Quantity:  90 tablet        Take 10 mg by mouth At Bedtime   Refills:  0        metFORMIN 500 MG 24 hr tablet   Commonly known as:  GLUCOPHAGE-XR   Quantity:  360 tablet        TAKE 2 TABLETS BY MOUTH TWICE DAILY WITH MEALS.   Refills:  0        nitroGLYcerin 0.4 MG sublingual tablet   Commonly known as:  NITROSTAT   Dose:  0.4 mg   Quantity:  25 tablet        Place 1 tablet (0.4 mg) under the tongue every 5 minutes as needed for chest pain   Refills:  1        omeprazole 20 MG CR capsule   Commonly known as:  priLOSEC   Quantity:  180 capsule        TAKE ONE CAPSULE BY MOUTH TWICE DAILY   Refills:  3        * order for DME   Quantity:  1 Device        Equipment being ordered: Wheelchair motorized for patient with spinal stenosis and neurogenic claudication   Refills:  0        * order for DME   Quantity:  1 Units        Equipment being ordered: TENS   Refills:  0        order for DME   Quantity:  1 Units        Equipment being ordered: compression stockings   Refills:  0        * oxyCODONE IR 5 MG tablet   Commonly known as:  ROXICODONE   Quantity:   45 tablet        Take 1 daily as needed  for severe pain may have 15 per month this is a 3 month supply   Refills:  0        * oxyCODONE 30 MG 12 hr tablet   Commonly known as:  OxyCONTIN   Dose:  30 mg   Quantity:  30 tablet        Take 1 tablet (30 mg) by mouth every morning   Refills:  0        * oxyCODONE 30 MG 12 hr tablet   Commonly known as:  OxyCONTIN   Dose:  30 mg   Quantity:  60 tablet        Take 1 tablet (30 mg) by mouth every 12 hours   Refills:  0        polyethylene glycol powder   Commonly known as:  MIRALAX/GLYCOLAX   Dose:  1 capful   Quantity:  119 g        Take 17 g (1 capful) by mouth daily   Refills:  3        SELECT-LITE DEVICE/LANCETS Kit   Dose:  1 kit   Quantity:  1 kit        1 kit 2 times daily   Refills:  1        sertraline 100 MG tablet   Commonly known as:  ZOLOFT   Dose:  200 mg   Quantity:  180 tablet        Take 2 tablets (200 mg) by mouth daily   Refills:  1        spironolactone 25 MG tablet   Commonly known as:  ALDACTONE   Quantity:  90 tablet        TAKE 1 TABLET(25 MG) BY MOUTH DAILY   Refills:  0        traZODone 100 MG tablet   Commonly known as:  DESYREL   Quantity:  270 tablet        TAKE 3 TABLETS(300 MG) BY MOUTH EVERY NIGHT AT BEDTIME AS NEEDED FOR SLEEP   Refills:  0        VITAMIN B-12 PO        Refills:  0        * Notice:  This list has 7 medication(s) that are the same as other medications prescribed for you. Read the directions carefully, and ask your doctor or other care provider to review them with you.            Procedures and tests performed during your visit     CT Head w/o Contrast    Cervical spine CT w/o contrast      Orders Needing Specimen Collection     None      Pending Results     No orders found from 9/15/2018 to 9/18/2018.            Pending Culture Results     No orders found from 9/15/2018 to 9/18/2018.            Pending Results Instructions     If you had any lab results that were not finalized at the time of your Discharge, you can call the  ED Lab Result RN at [phone]. You will be contacted by this team for any positive Lab results or changes in treatment. The nurses are available 7 days a week from 10A to 6:30P.  You can leave a message 24 hours per day and they will return your call.        Test Results From Your Hospital Stay        9/17/2018 12:04 PM      Narrative     CT SCAN OF THE HEAD WITHOUT CONTRAST   9/17/2018 11:52 AM     HISTORY: Fall from standing, on Plavix.     TECHNIQUE:  Axial images of the head and coronal reformations without  IV contrast material. Radiation dose for this scan was reduced using  automated exposure control, adjustment of the mA and/or kV according  to patient size, or iterative reconstruction technique.    COMPARISON: Brain MR 11/19/2015.    FINDINGS: Large right frontal scalp and periorbital hematoma with soft  tissue swelling. No underlying supraorbital fracture is appreciated.    There is no evidence of intracranial hemorrhage, mass, acute infarct  or anomaly. There is generalized atrophy of the brain. There is low  attenuation in the white matter of the cerebral hemispheres consistent  with sequelae of small vessel ischemic disease. Ventricular size is  within normal limits without evidence of hydrocephalus.     The visualized portions of the sinuses and mastoids appear normal.         Impression     IMPRESSION:     1. No evidence of acute intracranial hemorrhage, mass, or herniation.  2. Large right frontal scalp and periorbital hematoma with soft tissue  swelling. No underlying skull fracture.  3. There is generalized atrophy of the brain. White matter changes are  present in the cerebral hemispheres that are consistent with small  vessel ischemic disease in this age patient.    LICO YEN MD         9/17/2018 12:47 PM      Narrative     CT CERVICAL SPINE WITHOUT CONTRAST September 17, 2018 11:53 AM     HISTORY: Fall from standing, on Plavix.     TECHNIQUE: Axial images of the cervical spine were  obtained without  intravenous contrast. Multiplanar reformations were performed.   Radiation dose for this scan was reduced using automated exposure  control, adjustment of the mA and/or kV according to patient size, or  iterative reconstruction technique.    COMPARISON: Cervical spine CT 10/7/2014.    FINDINGS: No evidence of fracture. No prevertebral soft tissue  swelling. There is reversal of the normal cervical lordosis centered  at C6. Vertebral body height is maintained. No destructive osseous  lesions. Severe loss of intervertebral disc height with degenerative  endplate changes at C6-C7. Moderate degenerative changes and loss of  disc height at the other levels. Marked asymmetric left-sided facet  hypertrophy at C2-C3, C3-C4, C4-C5, and C5-C6. 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 Superimposed disc osteophyte complexes at  C3-C4, C4-C5, C5-C6, and C6-C7 result in mild to moderate areas of  spinal canal narrowing. Varying levels of left greater than right  neural foraminal narrowing throughout the cervical spine due to facet  hypertrophy and uncinate spurring.     Visualized paraspinous tissues: Unremarkable.        Impression     IMPRESSION:   1. No evidence of acute fracture or subluxation in the cervical spine.  2. Degenerative changes in the cervical spine as described above.     LICO YEN MD                Thank you for choosing [facility]       Thank you for choosing [facility] for your care. Our goal is always to provide you with excellent care. Hearing back from our patients is one way we can continue to improve our services. Please take a few minutes to complete the written survey that you may receive in the mail after you visit with us. Thank you!        MyChart Information     [employer] gives you secure access to your electronic health record. If you see a primary care provider, you can also send messages to your care team and make appointments. If you have questions, please call your primary  care clinic.  If you do not have a primary care provider, please call [phone] and they will assist you.        Care EveryWhere ID     This is your Care EveryWhere ID. This could be used by other organizations to access your [facility] medical records  [id number]        Equal Access to Services     OVI ZULETA : Derrick Sousa, wakeegan manzanares, nitesh delgadoalsina houser, sobeida sandhu. So [facility] [phone].    ATENCIÓN: Si habla español, tiene a nicholas disposición servicios gratuitos de asistencia lingüística. Llame al [phone].    We comply with applicable federal civil rights laws and [place] laws. We do not discriminate on the basis of race, color, national origin, age, disability, sex, sexual orientation, or gender identity.            After Visit Summary       This is your record. Keep this with you and show to your community pharmacist(s) and doctor(s) at your next visit.

## 2018-09-17 NOTE — ED PROVIDER NOTES
History     Chief Complaint   Patient presents with     Fall     turned ankle on curb and hit rt forehead-no loc-on plavix     HPI  Vincent Mishra is a 78 year old male who presents for evaluation after a fall.  The patient is on clopidogrel for prior coronary artery disease.  Just prior to arrival the patient was walking when he stumbled on a curve and landed on his face.  He thinks he may be had 1 or 2 seconds of loss of consciousness but woke back up right away.  This was witnessed by his wife.  No nausea or vomiting.  He is complaining of severe headache on the right side of his face and radiating down to his neck.  He denies chest pain, difficulty breathing, abdominal pain, pelvic pain, or extremity pain.  He has not take anything for the pain.    Problem List:    Patient Active Problem List    Diagnosis Date Noted     Chest pain 01/12/2012     Priority: High     Hyperlipidemia LDL goal <100 09/23/2010     Priority: High     Severe episode of recurrent major depressive disorder, without psychotic features (H) 02/20/2018     Priority: Medium     Chronic bilateral low back pain with sciatica, sciatica laterality unspecified 09/20/2017     Priority: Medium     ACS (acute coronary syndrome) (H) 05/30/2017     Priority: Medium     Bilateral low back pain with right-sided sciatica 04/03/2017     Priority: Medium     Bilateral low back pain with left-sided sciatica 04/03/2017     Priority: Medium     Claudication (H) 05/09/2016     Priority: Medium     Hip pain, bilateral 04/05/2016     Priority: Medium     Thyroid nodule 10/30/2015     Priority: Medium     Type 2 diabetes mellitus with other circulatory complication, without long-term current use of insulin (H) 10/22/2015     Priority: Medium     Myocardial infarction, nontransmural (H) 07/16/2015     Priority: Medium     Sinoatrial node dysfunction (H) 07/16/2015     Priority: Medium     Right bundle branch block (RBBB) 07/16/2015     Priority: Medium      Essential hypertension 07/16/2015     Priority: Medium     Hyperlipidemia 07/16/2015     Priority: Medium     NSTEMI (non-ST elevated myocardial infarction) (H)      Priority: Medium     07-25-14 CATH- RCA, L.Main and CFX  had minor luminal irregularites. Mid LAD high grade stenosis 80-90%.Stent placed to mid LAD       Hard of hearing 06/05/2014     Priority: Medium     Constipation 06/05/2014     Priority: Medium     Abdominal pain, right upper quadrant 03/26/2014     Priority: Medium     Esophageal reflux 03/26/2014     Priority: Medium     Insomnia 10/21/2013     Priority: Medium     Health Care Home 10/08/2013     Priority: Medium     *See Letters for [facility] Care Plan: My Access Plan           Vitamin D deficiency disease 09/25/2013     Priority: Medium     Renal mass, left 12/05/2012     Priority: Medium     Malignant neoplasm of kidney excluding renal pelvis (H) 11/16/2012     Priority: Medium     SURGERY, PATHOLOGY, AND TREATMENT HISTORY FOR KIDNEY CANCER  11/12/12:  Left lower pole kidney mass biopsy: Renal cell carcinoma, clear cell type; Viktor grade 1  12/5/12 Left percutaneous cryoablation of two renal tumors.  These were located adjacent to one another in the lateral kidney.  Dr Asif Capone, [facility].  Problem list name updated by automated process. Provider to review       Moderate major depression (H) 09/17/2012     Priority: Medium     Orchitis, epididymitis, and epididymo-orchitis 09/17/2012     Priority: Medium     Osteoarthritis, knee 09/05/2012     Priority: Medium     Abnormal antinuclear antibody titer 03/12/2012     Priority: Medium     Anatomic airway obstruction 02/09/2012     Priority: Medium     fixed obstruction on PFTs with dyspnea       Advanced directives, info letter sent 10/4/2011 10/04/2011     Priority: Medium     Right bundle branch block 07/26/2011     Priority: Medium     Seen on EKG 2/2011.  Different configuration on 7/26/2011 s/p MI - but same at  [facility] post-MI as here        Acute myocardial infarction of inferolateral wall (H) 07/14/2011     Priority: Medium     Obesity 07/14/2011     Priority: Medium     Hypertension goal BP (blood pressure) < 140/90 02/28/2011     Priority: Medium     C6-7 disc with radiculopathy 02/28/2011     Priority: Medium     consider second Epidural steroid injection at [facility].  Continue PT.  Await accupuncture       Tear of meniscus of left knee 02/11/2011     Priority: Medium     Neck pain 11/12/2010     Priority: Medium     B12 deficiency 11/12/2010     Priority: Medium     Diplopia 11/02/2010     Priority: Medium     Neuropathy 11/02/2010     Priority: Medium     Vision loss night 11/02/2010     Priority: Medium     Parotid mass 10/04/2010     Priority: Medium     Tension headache 09/23/2010     Priority: Medium     Trapezius strain 09/23/2010     Priority: Medium        Past Medical History:    Past Medical History:   Diagnosis Date     CAD (coronary artery disease) 7/26/2011     Cancer of kidney (H)      Chest pain 1/12/2012     Coronary artery disease      History of angina      History of blood transfusion      Hyperlipidaemia      Hyperlipidemia LDL goal <100 9/23/2010     Malignant neoplasm (H)      Myocardial infarction      NSTEMI (non-ST elevated myocardial infarction) (H)      Other and unspecified hyperlipidemia      Right bundle branch block      Type II or unspecified type diabetes mellitus without mention of complication, not stated as uncontrolled      Unspecified essential hypertension        Past Surgical History:    Past Surgical History:   Procedure Laterality Date     ARTHROSCOPY KNEE  2/11/2011    ARTHROSCOPY KNEE performed by LEY, JEFFREY DUANE at [facility]     ARTHROSCOPY KNEE WITH MEDIAL MENISCECTOMY  6/26/2012    Procedure: ARTHROSCOPY KNEE WITH MEDIAL MENISCECTOMY;  Right Knee Arthroscopy With Medial Menisectomy;  Surgeon: Ley, Jeffrey Duane, MD;  Location: [facility]     BACK SURGERY      back surgery x4      BIOPSY       CARDIAC SURGERY  7/2011    stentt placed     COLONOSCOPY       CORONARY ANGIOGRAPHY ADULT ORDER  07-25-14    RCA, L.Main and CFX  had minor luminal irregularites. Mid LAD high grade stenosis 80-90%.Stent placed to mid LAD     ESOPHAGOSCOPY, GASTROSCOPY, DUODENOSCOPY (EGD), COMBINED  10/25/2012    Procedure: COMBINED ESOPHAGOSCOPY, GASTROSCOPY, DUODENOSCOPY (EGD), BIOPSY SINGLE OR MULTIPLE;  Gastroscopy  ;  Surgeon: Lucius Dasilva MD;  Location: [facility]     HEART CATH, ANGIOPLASTY  07-25-14    mid LAD      ORTHOPEDIC SURGERY       back surgery       Family History:    Family History   Problem Relation Age of Onset     Cancer Mother      Depression Mother      Diabetes Father      Hypertension Father      HEART DISEASE Father      Mental Illness Father      HEART DISEASE Maternal Grandfather      Substance Abuse Maternal Grandfather      Alcohol/Drug Paternal Grandmother      Substance Abuse Paternal Grandmother      HEART DISEASE Paternal Grandfather      Alcohol/Drug Paternal Grandfather      Substance Abuse Paternal Grandfather      HEART DISEASE Brother      Diabetes Brother      Substance Abuse Brother      Obesity Brother      Diabetes Brother      Obesity Sister      Alcohol/Drug Daughter      Depression Daughter      Obesity Sister      Diabetes Sister      Obesity Sister      Diabetes Sister      Alcohol/Drug Sister      Cancer Sister 55     possible kidney cancer     Substance Abuse Sister      Alcohol/Drug Son      Substance Abuse Son      Diabetes Son      Alcohol/Drug Son      Substance Abuse Son      Obesity Daughter      Diabetes Daughter      Hypertension Daughter      Bipolar Disorder Other        Social History:  Marital Status:   [2]  Social History   Substance Use Topics     Smoking status: Never Smoker     Smokeless tobacco: Never Used     Alcohol use No        Medications:      acetaminophen (TYLENOL) 325 MG tablet   amLODIPine (NORVASC) 5 MG tablet   ascorbic acid (VITAMIN C)  500 MG tablet   aspirin EC 81 MG EC tablet   atorvastatin (LIPITOR) 80 MG tablet   blood glucose monitoring (NO BRAND SPECIFIED) meter device kit   blood glucose monitoring (NO BRAND SPECIFIED) meter device kit   blood glucose monitoring (ONE TOUCH ULTRA) test strip   Calcium Carbonate-Vitamin D (CALCIUM + D) 600-200 MG-UNIT per tablet   cholecalciferol (VITAMIN  -D) 1000 UNITS capsule   clopidogrel (PLAVIX) 75 MG tablet   Cyanocobalamin (VITAMIN B-12 PO)   gabapentin (NEURONTIN) 100 MG capsule   hydrochlorothiazide (HYDRODIURIL) 25 MG tablet   ibuprofen (ADVIL/MOTRIN) 800 MG tablet   Lancets Misc. (SELECT-LITE DEVICE/LANCETS) KIT   [employer] FINEPOINT LANCETS MISC   lisinopril (PRINIVIL/ZESTRIL) 40 MG tablet   Melatonin 10 MG TBDP   metFORMIN (GLUCOPHAGE-XR) 500 MG 24 hr tablet   nitroglycerin (NITROSTAT) 0.4 MG SL tablet   omeprazole (PRILOSEC) 20 MG CR capsule   order for DME   order for DME   order for DME   oxyCODONE (OXYCONTIN) 30 MG 12 hr tablet   oxyCODONE (OXYCONTIN) 30 MG 12 hr tablet   oxyCODONE IR (ROXICODONE) 5 MG tablet   polyethylene glycol (MIRALAX/GLYCOLAX) powder   sertraline (ZOLOFT) 100 MG tablet   spironolactone (ALDACTONE) 25 MG tablet   traZODone (DESYREL) 100 MG tablet         Review of Systems  Pertinent positives and negatives listed in the HPI, all other systems reviewed and are negative.    Physical Exam   BP: 151/65  Heart Rate: 56  Temp: 97  F (36.1  C)  Resp: 16  SpO2: 98 %      Physical Exam  /74  Temp 97  F (36.1  C)  Resp 16  SpO2 96%   GENERAL: Alert, cooperative, moderate distress  HEAD: Large right periorbital ecchymosis and swelling, abrasion to the forehead  EYES: Conjunctivae clear, EOM intact. PERLL, Pupils are 3 mm.  Denies double vision on examination  HEENT:  Normal external ears, normal TM's without evidence of hemotympanum.  No nasal discharge or evidence of septal hematoma. No facial instability or asymmetry. No evidence of intraoral trauma.  Intact bite without  malalignment.  NECK: No midline tenderness, mild lateral tenderness.  CHEST:  No crepitus or tenderness with AP or lateral compression  CARDIOVASCULAR : Nml rate, distal pulses are normal and symmetric  LUNGS: No respiratory distress.  GI: Soft, ND, NT.   PELVIS: No crepitus or tenderness with AP or lateral compression  BACK: No step-offs palpated, no tenderness to palpation of thoracic or lumbar spine.  EXTREMITIES: No obvious deformities, no tenderness to palpation.  NEUROLOGICAL : GCS= 15.  Awake, alert, and oriented x 3.  Cranial nerves II-XII grossly intact. Normal muscle strength and tone, sensation to light touch grossly intact in all extremities.  No focal deficits.   SKIN : Normal color, no jaundice or rash. Forehead abrasions.      ED Course     ED Course     Procedures               Critical Care time:  none               Results for orders placed or performed during the hospital encounter of 09/17/18 (from the past 24 hour(s))   CT Head w/o Contrast    Narrative    CT SCAN OF THE HEAD WITHOUT CONTRAST   9/17/2018 11:52 AM     HISTORY: Fall from standing, on Plavix.     TECHNIQUE:  Axial images of the head and coronal reformations without  IV contrast material. Radiation dose for this scan was reduced using  automated exposure control, adjustment of the mA and/or kV according  to patient size, or iterative reconstruction technique.    COMPARISON: Brain MR 11/19/2015.    FINDINGS: Large right frontal scalp and periorbital hematoma with soft  tissue swelling. No underlying supraorbital fracture is appreciated.    There is no evidence of intracranial hemorrhage, mass, acute infarct  or anomaly. There is generalized atrophy of the brain. There is low  attenuation in the white matter of the cerebral hemispheres consistent  with sequelae of small vessel ischemic disease. Ventricular size is  within normal limits without evidence of hydrocephalus.     The visualized portions of the sinuses and mastoids appear  normal.       Impression    IMPRESSION:     1. No evidence of acute intracranial hemorrhage, mass, or herniation.  2. Large right frontal scalp and periorbital hematoma with soft tissue  swelling. No underlying skull fracture.  3. There is generalized atrophy of the brain. White matter changes are  present in the cerebral hemispheres that are consistent with small  vessel ischemic disease in this age patient.    LICO YEN MD   Cervical spine CT w/o contrast    Narrative    CT CERVICAL SPINE WITHOUT CONTRAST September 17, 2018 11:53 AM     HISTORY: Fall from standing, on Plavix.     TECHNIQUE: Axial images of the cervical spine were obtained without  intravenous contrast. Multiplanar reformations were performed.   Radiation dose for this scan was reduced using automated exposure  control, adjustment of the mA and/or kV according to patient size, or  iterative reconstruction technique.    COMPARISON: Cervical spine CT 10/7/2014.    FINDINGS: No evidence of fracture. No prevertebral soft tissue  swelling. There is reversal of the normal cervical lordosis centered  at C6. Vertebral body height is maintained. No destructive osseous  lesions. Severe loss of intervertebral disc height with degenerative  endplate changes at C6-C7. Moderate degenerative changes and loss of  disc height at the other levels. Marked asymmetric left-sided facet  hypertrophy at C2-C3, C3-C4, C4-C5, and C5-C6. 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 Superimposed disc osteophyte complexes at  C3-C4, C4-C5, C5-C6, and C6-C7 result in mild to moderate areas of  spinal canal narrowing. Varying levels of left greater than right  neural foraminal narrowing throughout the cervical spine due to facet  hypertrophy and uncinate spurring.     Visualized paraspinous tissues: Unremarkable.      Impression    IMPRESSION:   1. No evidence of acute fracture or subluxation in the cervical spine.  2. Degenerative changes in the cervical spine as described  above.     LICO YEN MD       Medications   oxyCODONE IR (ROXICODONE) tablet 10 mg (10 mg Oral Given 9/17/18 1133)       Assessments & Plan (with Medical Decision Making)   78-year-old male presents for evaluation after a fall.  Heart rate 56, temperature is 97  F, SPO2 is 98% on room air. Oxycodone for the pain.  CT of the head and neck obtained, images reviewed independently as well as radiology read reviewed, no signs of intracranial hemorrhage, skull fracture, or cervical spine fracture.  On recheck he is feeling well and is safe to discharge with instructions to return if he has worsening symptoms or other concerns, otherwise follow-up in clinic.  The patient is in agreement with this plan.    I have reviewed the nursing notes.    I have reviewed the findings, diagnosis, plan and need for follow up with the patient.       Discharge Medication List as of 9/17/2018 12:54 PM          Final diagnoses:   Fall from standing, initial encounter   Periorbital ecchymosis, right, initial encounter       9/17/2018   [facility] EMERGENCY DEPARTMENT     Senthil Nash MD  09/17/18 0774

## 2018-09-18 DIAGNOSIS — M17.0 PRIMARY OSTEOARTHRITIS OF BOTH KNEES: ICD-10-CM

## 2018-09-18 NOTE — TELEPHONE ENCOUNTER
I just increased his pain medication. I would like to see how he does without this especially for the next few days with all of the bruising. Keyla Fonseca M.D.;'

## 2018-09-18 NOTE — TELEPHONE ENCOUNTER
Routing refill request to provider for review/approval because:  Would like Provider to review yesterday's ED notes and  decide. Thank you.  Yvon Dickens RN

## 2018-09-25 ENCOUNTER — TELEPHONE (OUTPATIENT)
Dept: FAMILY MEDICINE | Facility: CLINIC | Age: 78
End: 2018-09-25

## 2018-09-25 DIAGNOSIS — R29.6 FALLS FREQUENTLY: Primary | ICD-10-CM

## 2018-09-25 DIAGNOSIS — M54.16 LUMBAR BACK PAIN WITH RADICULOPATHY AFFECTING LEFT LOWER EXTREMITY: ICD-10-CM

## 2018-09-25 DIAGNOSIS — R10.32 DEEP INGUINAL PAIN, LEFT: ICD-10-CM

## 2018-09-25 RX ORDER — IBUPROFEN 800 MG/1
TABLET, FILM COATED ORAL
Qty: 90 TABLET | Refills: 0 | OUTPATIENT
Start: 2018-09-25

## 2018-09-25 RX ORDER — OXYCODONE HYDROCHLORIDE 5 MG/1
TABLET ORAL
Qty: 40 TABLET | Refills: 0 | Status: SHIPPED | OUTPATIENT
Start: 2018-09-25 | End: 2018-09-25

## 2018-09-25 RX ORDER — OXYCODONE HYDROCHLORIDE 5 MG/1
TABLET ORAL
Qty: 40 TABLET | Refills: 0 | Status: ON HOLD | OUTPATIENT
Start: 2018-01-01 | End: 2018-01-01

## 2018-09-25 RX ORDER — OXYCODONE HYDROCHLORIDE 5 MG/1
TABLET ORAL
Qty: 40 TABLET | Refills: 0 | Status: SHIPPED | OUTPATIENT
Start: 2018-10-23 | End: 2018-09-25

## 2018-09-27 DIAGNOSIS — M17.0 PRIMARY OSTEOARTHRITIS OF BOTH KNEES: ICD-10-CM

## 2018-09-27 DIAGNOSIS — E11.9 TYPE 2 DIABETES MELLITUS WITHOUT COMPLICATION, UNSPECIFIED LONG TERM INSULIN USE STATUS: ICD-10-CM

## 2018-09-27 DIAGNOSIS — I10 ESSENTIAL HYPERTENSION WITH GOAL BLOOD PRESSURE LESS THAN 140/90: ICD-10-CM

## 2018-09-27 NOTE — TELEPHONE ENCOUNTER
hydrochlorothiazide (HYDRODIURIL) 25 MG tablet      Last Written Prescription Date:  6/25/18  Last Fill Quantity: 90,   # refills: 0  Last Office Visit: 9/14/18  Future Office visit:    Next 5 appointments (look out 90 days)     Dec 20, 2018 10:30 AM CST   Return Visit with Geremias Phelps MD   [facility] ([facility])    [address] [address]   [phone]                   lisinopril (PRINIVIL/ZESTRIL) 40 MG tablet      Last Written Prescription Date:  6/25/18  Last Fill Quantity: 90,   # refills: 0  Last Office Visit: 9/14/18  Future Office visit:    Next 5 appointments (look out 90 days)     Dec 20, 2018 10:30 AM CST   Return Visit with Geremias Phelps MD   [facility] ([facility])    [address] [place] [address]   [phone]                   ibuprofen (ADVIL/MOTRIN) 800 MG tablet      Last Written Prescription Date:  8/14/18  Last Fill Quantity: 90,   # refills: 0  Last Office Visit: 9/14/18  Future Office visit:    Next 5 appointments (look out 90 days)     Dec 20, 2018 10:30 AM CST   Return Visit with Geremias Phelps MD   [facility] ([facility])    [address] [place] [address]   [phone]                   Requested Prescriptions   Pending Prescriptions Disp Refills     hydrochlorothiazide (HYDRODIURIL) 25 MG tablet [Pharmacy Med Name: HYDROCHLOROTHIAZIDE 25MG TABLETS] 90 tablet 0     Sig: TAKE 1 TABLET BY MOUTH DAILY    Diuretics (Including Combos) Protocol Failed    9/27/2018  1:28 PM       Failed - Normal serum creatinine on file in past 12 months    Recent Labs   Lab Test  09/14/18   1200   CR  1.39*             Passed - Blood pressure under 140/90 in past 12 months    BP Readings from Last 3 Encounters:   09/17/18 132/74   09/14/18 93/56   05/30/18 140/72                Passed - Recent (12 mo) or future (30  "days) visit within the authorizing provider's specialty    Patient had office visit in the last 12 months or has a visit in the next 30 days with authorizing provider or within the authorizing provider's specialty.  See \"Patient Info\" tab in inbasket, or \"Choose Columns\" in Meds & Orders section of the refill encounter.           Passed - Patient is age 18 or older       Passed - Normal serum potassium on file in past 12 months    Recent Labs   Lab Test  09/14/18   1200   POTASSIUM  4.4                   Passed - Normal serum sodium on file in past 12 months    Recent Labs   Lab Test  09/14/18   1200   NA  138              lisinopril (PRINIVIL/ZESTRIL) 40 MG tablet [Pharmacy Med Name: LISINOPRIL 40MG TABLETS] 90 tablet 0     Sig: TAKE 1 TABLET(40 MG) BY MOUTH DAILY    ACE Inhibitors (Including Combos) Protocol Failed    9/27/2018  1:28 PM       Failed - Normal serum creatinine on file in past 12 months    Recent Labs   Lab Test  09/14/18   1200  05/30/18   1006   CR  1.39*   --    CREAT   --   2.2*            Passed - Blood pressure under 140/90 in past 12 months    BP Readings from Last 3 Encounters:   09/17/18 132/74   09/14/18 93/56   05/30/18 140/72                Passed - Recent (12 mo) or future (30 days) visit within the authorizing provider's specialty    Patient had office visit in the last 12 months or has a visit in the next 30 days with authorizing provider or within the authorizing provider's specialty.  See \"Patient Info\" tab in inbasket, or \"Choose Columns\" in Meds & Orders section of the refill encounter.           Passed - Patient is age 18 or older       Passed - Normal serum potassium on file in past 12 months    Recent Labs   Lab Test  09/14/18   1200   POTASSIUM  4.4             ibuprofen (ADVIL/MOTRIN) 800 MG tablet [Pharmacy Med Name: IBUPROFEN 800MG TABLETS] 90 tablet 0     Sig: TAKE 1 TABLET BY MOUTH EVERY 8 HOURS AS NEEDED FOR MODERATE PAIN    NSAID Medications Failed    9/27/2018  1:28 PM " "      Failed - Patient is age 6-64 years       Failed - Normal serum creatinine on file in past 12 months    Recent Labs   Lab Test  09/14/18   1200  05/30/18   1006   CR  1.39*   --    CREAT   --   2.2*            Passed - Blood pressure under 140/90 in past 12 months    BP Readings from Last 3 Encounters:   09/17/18 132/74   09/14/18 93/56   05/30/18 140/72                Passed - Normal ALT on file in past 12 months    Recent Labs   Lab Test  09/14/18   1200   ALT  33            Passed - Normal AST on file in past 12 months    Recent Labs   Lab Test  09/14/18   1200   AST  35            Passed - Recent (12 mo) or future (30 days) visit within the authorizing provider's specialty    Patient had office visit in the last 12 months or has a visit in the next 30 days with authorizing provider or within the authorizing provider's specialty.  See \"Patient Info\" tab in inbasket, or \"Choose Columns\" in Meds & Orders section of the refill encounter.           Passed - Normal CBC on file in past 12 months    Recent Labs   Lab Test  09/14/18   1200   WBC  8.6   RBC  4.39*   HGB  10.6*   HCT  35.6*   PLT  357       For [facility] ONLY: [id number] = WBC, [id number] = RBC            "

## 2018-09-27 NOTE — TELEPHONE ENCOUNTER
METFORMIN ER 500MG 24HR TABS        Last Written Prescription Date:  6/28/18  Last Fill Quantity: 360,   # refills: 0  Last Office Visit: 9/14/18  Future Office visit:    Next 5 appointments (look out 90 days)     Dec 20, 2018 10:30 AM CST   Return Visit with Geremias Phelps MD   [facility] ([facility])    [address] [address] [phone]                   Requested Prescriptions   Pending Prescriptions Disp Refills     metFORMIN (GLUCOPHAGE-XR) 500 MG 24 hr tablet [Pharmacy Med Name: METFORMIN ER 500MG 24HR TABS] 360 tablet 0     Sig: TAKE 2 TABLETS BY MOUTH TWICE DAILY WITH MEALS.    Biguanide Agents Passed    9/27/2018  5:11 PM       Passed - Blood pressure less than 140/90 in past 6 months    BP Readings from Last 3 Encounters:   09/17/18 132/74   09/14/18 93/56   05/30/18 140/72                Passed - Patient has documented LDL within the past 12 mos.    Recent Labs   Lab Test  04/04/18   0933   LDL  64            Passed - Patient has had a Microalbumin in the past 15 mos.    Recent Labs   Lab Test  09/20/17   1309   MICROL  5   UMALCR  5.36            Passed - Patient is age 10 or older       Passed - Patient has documented A1c within the specified period of time.    If HgbA1C is 8 or greater, it needs to be on file within the past 3 months.  If less than 8, must be on file within the past 6 months.     Recent Labs   Lab Test  09/14/18   1200   A1C  6.1*            Passed - Patient's CR is NOT>1.4 OR Patient's EGFR is NOT<45 within past 12 mos.    Recent Labs   Lab Test  09/14/18   1200   GFRESTIMATED  49*   GFRESTBLACK  60*       Recent Labs   Lab Test  09/14/18   1200   CR  1.39*            Passed - Patient does NOT have a diagnosis of CHF.       Passed - Recent (6 mo) or future (30 days) visit within the authorizing provider's specialty    Patient had office visit in the last 6 months or has a visit in the next 30 days with  "authorizing provider or within the authorizing provider's specialty.  See \"Patient Info\" tab in inbasket, or \"Choose Columns\" in Meds & Orders section of the refill encounter.              "

## 2018-10-01 RX ORDER — LISINOPRIL 40 MG/1
TABLET ORAL
Qty: 90 TABLET | Refills: 1 | Status: SHIPPED | OUTPATIENT
Start: 2018-10-01 | End: 2019-01-01

## 2018-10-01 RX ORDER — IBUPROFEN 800 MG/1
TABLET, FILM COATED ORAL
Qty: 90 TABLET | Refills: 1 | Status: ON HOLD | OUTPATIENT
Start: 2018-10-01 | End: 2018-01-01

## 2018-10-01 RX ORDER — METFORMIN HCL 500 MG
TABLET, EXTENDED RELEASE 24 HR ORAL
Qty: 360 TABLET | Refills: 1 | Status: SHIPPED | OUTPATIENT
Start: 2018-10-01 | End: 2019-01-01

## 2018-10-01 RX ORDER — HYDROCHLOROTHIAZIDE 25 MG/1
TABLET ORAL
Qty: 90 TABLET | Refills: 1 | Status: SHIPPED | OUTPATIENT
Start: 2018-10-01 | End: 2019-01-01

## 2018-10-04 ENCOUNTER — RADIANT APPOINTMENT (OUTPATIENT)
Dept: CT IMAGING | Facility: CLINIC | Age: 78
End: 2018-10-04
Attending: FAMILY MEDICINE
Payer: COMMERCIAL

## 2018-10-04 DIAGNOSIS — J98.4 LUNG ABNORMALITY: ICD-10-CM

## 2018-10-05 ENCOUNTER — HOSPITAL ENCOUNTER (OUTPATIENT)
Dept: CT IMAGING | Facility: CLINIC | Age: 78
Discharge: HOME | End: 2018-10-05
Attending: UROLOGY | Admitting: UROLOGY
Payer: MEDICARE

## 2018-10-05 ENCOUNTER — OFFICE VISIT (OUTPATIENT)
Dept: UROLOGY | Facility: CLINIC | Age: 78
End: 2018-10-05
Payer: COMMERCIAL

## 2018-10-05 ENCOUNTER — TELEPHONE (OUTPATIENT)
Dept: UROLOGY | Facility: CLINIC | Age: 78
End: 2018-10-05

## 2018-10-05 VITALS
WEIGHT: 190 LBS | SYSTOLIC BLOOD PRESSURE: 135 MMHG | HEIGHT: 68 IN | DIASTOLIC BLOOD PRESSURE: 68 MMHG | HEART RATE: 60 BPM | BODY MASS INDEX: 28.79 KG/M2

## 2018-10-05 DIAGNOSIS — N28.89 RENAL MASS: ICD-10-CM

## 2018-10-05 DIAGNOSIS — N28.89 RENAL MASS, LEFT: Primary | ICD-10-CM

## 2018-10-05 LAB
CREAT BLD-MCNC: 1.3 MG/DL (ref 0.66–1.25)
GFR SERPL CREATININE-BSD FRML MDRD: 54 ML/MIN/1.7M2

## 2018-10-05 PROCEDURE — 82565 ASSAY OF CREATININE: CPT

## 2018-10-05 PROCEDURE — 74170 CT ABD WO CNTRST FLWD CNTRST: CPT

## 2018-10-05 PROCEDURE — 25000128 H RX IP 250 OP 636: Performed by: UROLOGY

## 2018-10-05 PROCEDURE — 25000125 ZZHC RX 250: Performed by: UROLOGY

## 2018-10-05 RX ORDER — AMLODIPINE BESYLATE 10 MG/1
TABLET ORAL
Refills: 2 | COMMUNITY
Start: 2018-08-02 | End: 2018-10-05

## 2018-10-05 RX ORDER — IOPAMIDOL 755 MG/ML
93 INJECTION, SOLUTION INTRAVASCULAR ONCE
Status: COMPLETED | OUTPATIENT
Start: 2018-10-05 | End: 2018-10-05

## 2018-10-05 RX ADMIN — SODIUM CHLORIDE 63 ML: 9 INJECTION, SOLUTION INTRAVENOUS at 12:01

## 2018-10-05 RX ADMIN — IOPAMIDOL 93 ML: 755 INJECTION, SOLUTION INTRAVENOUS at 12:01

## 2018-10-05 ASSESSMENT — PAIN SCALES - GENERAL: PAINLEVEL: WORST PAIN (10)

## 2018-10-05 NOTE — TELEPHONE ENCOUNTER
[facility] Call Center    Phone Message    May a detailed message be left on voicemail: yes    Reason for Call: Other:  pt wife calling about MRI results and they want to know if it is cancer again and so that they can postpone surgery for her on Monday 10/08/2018. Please call pt today about the results     Action Taken: Message routed to:  [facility] ([facility] urology

## 2018-10-05 NOTE — LETTER
10/5/2018       RE: Vincent Mishra  [address] [place] [address]     Dear Colleague,    Thank you for referring your patient, Vincent Mishra, to the [facility] UROLOGY AND INST FOR PROSTATE AND UROLOGIC CANCERS at [facility]. Please see a copy of my visit note below.    ASSESSMENT and PLAN  Perineal Pain  Still having chronic perineal pain.  His exam and previous CTs have been negative.  MRI and Xrays demonstrate multilevel degenerative disc disease and bilateral neural foraminal stenosis. He did have a Neurosurgery consult but was determined to be a poor surgical candidate given his age and hx of 4 MIs.     Stable bilateral adrenal nodules.    Renal cell carcinoma.  Successful cryoablation.  Possible new renal mass.  CT from yesterday continues to demonstrate the presence of a complex enhancing lesion, however this could not be well evaluated without IV contrast. Will plan to obtain CT with contrast to further characterize this lesion. His most recent CR is 1.39 (9/14/18), which is adequate for receiving contrast. Will contact patient with results via mail.  _________________________________________________________________    CHIEF COMPLAINT  It was my pleasure to see Vincent Mishra who is a 78 year old male for follow-up of perineal pain.      HPI  Mr. Mishra is status post percutaneous cryoablation of the left kidney for Gr 1 renal cell carcinoma in 2012.  Today he is here for groin pain.     His pain continues to be constant, occurring every day/night.  It is located bilaterally in the low back and radiates into the groin and posterior leg bilaterally. He reports 10/10 intensity, at times he feels it is 20/10. Uses a walker to get around. Continues to take oxycodone 30mg and this helps the pain. He denies any recent hematuria.     Hx of 4 MIs, on chronic anticoagulation.    He had a Neurosurgery consult in 10/2017. MRI and Xrays obtained at this time  demonstrated multilevel degenerative disc disease and bilateral neural foraminal stenosis. Given his age and extensive cardiac history, surgical intervention was not recommended.     CT from yesterday continues to demonstrate the presence of a complex enhancing lesion, however this could not be well evaluated without IV contrast.     With regard to his kidney function, his most recent Cr is:   Creatinine   Date Value Ref Range Status   09/14/2018 1.39 (H) 0.66 - 1.25 mg/dL Final         RADIOLOGIC IMAGING    EXAMINATION: CT ABDOMEN PELVIS W/O CONTRAST, 5/12/2017 8:33 AM     TECHNIQUE: Helical CT images from the lung bases through the pubic  symphysis were obtained without IV contrast.      COMPARISON: CT 6/3/2016     HISTORY: Malignant neoplasm of kidney excluding renal pelvis,  unspecified laterality, Malignant neoplasm of unspecified kidney,  except renal pelvis     FINDINGS:     Abdomen and pelvis: Postablation changes in the lateral interpolar  left kidney, the evaluation of which is limited without intravenous  contrast. Nodular soft tissue density within treatment bed measures 17  x 22 mm, previously demonstrating nonenhancing measuring 17 x 25 mm.  Calcific foci along the peripheral aspect of the treatment bed are  unchanged. Previously seen heterogeneously enhancing lesion in the  superior pole of the left kidney is not visualized without intravenous  contrast.  No hyperdense renal calculi within either kidney, ureter or urinary  bladder. No hydronephrosis or perinephric fat stranding. Bladder is  partially distended with diffuse bladder wall thickening, not  significantly changed since prior study. Prostate is surgically  absent.     Remaining abdomen and pelvis: The liver, gallbladder, spleen, pancreas  and bowel are unremarkable on this limited unenhanced exam. Unchanged  multiple bilateral adrenal gland nodules. No ascites. No abdominal or  pelvic lymphadenopathy. Abdominal aorta is normal caliber.  Scattered  atherosclerotic calcifications in the abdomen and pelvis.     Lung bases: Right middle lobe consolidation with associated  bronchiectasis, unchanged since prior study. Coronary artery  calcifications.     Bones and soft tissues: No pathologic appearing osseous lesions  identified. Extensive degenerative changes in the spine, not clearly  changed since prior study. Postsurgical changes of laminectomy and  interbody at L3-L4 level. Grade 1 retrolisthesis of L2 on L3,  unchanged since prior study.         IMPRESSION:   1. No renal calculi over the course of the kidneys, ureters or  bladder.  2. Stable post ablation changes along the lateral aspect of the left  kidney.  3. Previously seen complex solid and cystic lesion in the superior  pole of the left kidney cannot be evaluated on today's study without  intravenous contrast.  4. Unchanged multinodular appearing adrenal glands.     I have personally reviewed the examination and initial interpretation  and I agree with the findings.     CARLOS DAVIS    I reviewed the recent radiologic imaging and reports described above. Original images were visualized independently.     Lab Results   Component Value Date    CR 1.39 09/14/2018    CR 1.48 04/04/2018    CR 1.38 01/08/2018    CR 1.36 10/30/2017    CR 1.31 05/31/2017    CR 1.32 05/30/2017    CR 1.32 03/06/2017    CR 1.42 01/24/2017    CR 1.23 01/07/2016    CR 0.94 09/25/2015       Patient Active Problem List    Diagnosis Date Noted     Chest pain 01/12/2012     Priority: High     Hyperlipidemia LDL goal <100 09/23/2010     Priority: High     Severe episode of recurrent major depressive disorder, without psychotic features (H) 02/20/2018     Priority: Medium     Chronic bilateral low back pain with sciatica, sciatica laterality unspecified 09/20/2017     Priority: Medium     ACS (acute coronary syndrome) (H) 05/30/2017     Priority: Medium     Bilateral low back pain with right-sided sciatica 04/03/2017      Priority: Medium     Bilateral low back pain with left-sided sciatica 04/03/2017     Priority: Medium     Claudication (H) 05/09/2016     Priority: Medium     Hip pain, bilateral 04/05/2016     Priority: Medium     Thyroid nodule 10/30/2015     Priority: Medium     Type 2 diabetes mellitus with other circulatory complication, without long-term current use of insulin (H) 10/22/2015     Priority: Medium     Myocardial infarction, nontransmural (H) 07/16/2015     Priority: Medium     Sinoatrial node dysfunction (H) 07/16/2015     Priority: Medium     Right bundle branch block (RBBB) 07/16/2015     Priority: Medium     Essential hypertension 07/16/2015     Priority: Medium     Hyperlipidemia 07/16/2015     Priority: Medium     NSTEMI (non-ST elevated myocardial infarction) (H)      Priority: Medium     07-25-14 CATH- RCA, L.Main and CFX  had minor luminal irregularites. Mid LAD high grade stenosis 80-90%.Stent placed to mid LAD       Hard of hearing 06/05/2014     Priority: Medium     Constipation 06/05/2014     Priority: Medium     Abdominal pain, right upper quadrant 03/26/2014     Priority: Medium     Esophageal reflux 03/26/2014     Priority: Medium     Insomnia 10/21/2013     Priority: Medium     Health Care Home 10/08/2013     Priority: Medium     *See Letters for [facility] Care Plan: My Access Plan           Vitamin D deficiency disease 09/25/2013     Priority: Medium     Renal mass, left 12/05/2012     Priority: Medium     Malignant neoplasm of kidney excluding renal pelvis (H) 11/16/2012     Priority: Medium     SURGERY, PATHOLOGY, AND TREATMENT HISTORY FOR KIDNEY CANCER  11/12/12:  Left lower pole kidney mass biopsy: Renal cell carcinoma, clear cell type; Viktor grade 1  12/5/12 Left percutaneous cryoablation of two renal tumors.  These were located adjacent to one another in the lateral kidney.  Dr Asif Capone, [facility].  Problem list name updated by automated process. Provider to  review       Moderate major depression (H) 09/17/2012     Priority: Medium     Orchitis, epididymitis, and epididymo-orchitis 09/17/2012     Priority: Medium     Osteoarthritis, knee 09/05/2012     Priority: Medium     Abnormal antinuclear antibody titer 03/12/2012     Priority: Medium     Anatomic airway obstruction 02/09/2012     Priority: Medium     fixed obstruction on PFTs with dyspnea       Advanced directives, info letter sent 10/4/2011 10/04/2011     Priority: Medium     Right bundle branch block 07/26/2011     Priority: Medium     Seen on EKG 2/2011.  Different configuration on 7/26/2011 s/p MI - but same at [facility] post-MI as here        Acute myocardial infarction of inferolateral wall (H) 07/14/2011     Priority: Medium     Obesity 07/14/2011     Priority: Medium     Hypertension goal BP (blood pressure) < 140/90 02/28/2011     Priority: Medium     C6-7 disc with radiculopathy 02/28/2011     Priority: Medium     consider second [facility] at [facility].  Continue PT.  Await accupuncture       Tear of meniscus of left knee 02/11/2011     Priority: Medium     Neck pain 11/12/2010     Priority: Medium     B12 deficiency 11/12/2010     Priority: Medium     Diplopia 11/02/2010     Priority: Medium     Neuropathy 11/02/2010     Priority: Medium     Vision loss night 11/02/2010     Priority: Medium     Parotid mass 10/04/2010     Priority: Medium     Tension headache 09/23/2010     Priority: Medium     Trapezius strain 09/23/2010     Priority: Medium     Past Medical History:   Diagnosis Date     CAD (coronary artery disease) 7/26/2011 7/2011 ([facility]): s/p MI, PTCA - RCA      Cancer of kidney (H)      Chest pain 1/12/2012     Coronary artery disease      History of angina      History of blood transfusion      Hyperlipidaemia      Hyperlipidemia LDL goal <100 9/23/2010     Malignant neoplasm (H)     Prostate CA (removed)     Myocardial infarction (H)     s/p MI 7/29/14, stent placement     NSTEMI  (non-ST elevated myocardial infarction) (H)     07-25-14 CATH- RCA, L.Main and CFX  had minor luminal irregularites. Mid LAD high grade stenosis 80-90%.Stent placed to mid LAD     Other and unspecified hyperlipidemia     MI in July 2011     Right bundle branch block      Type II or unspecified type diabetes mellitus without mention of complication, not stated as uncontrolled      Unspecified essential hypertension      Past Surgical History:   Procedure Laterality Date     ARTHROSCOPY KNEE  2/11/2011    ARTHROSCOPY KNEE performed by LEY, JEFFREY DUANE at [facility]     ARTHROSCOPY KNEE WITH MEDIAL MENISCECTOMY  6/26/2012    Procedure: ARTHROSCOPY KNEE WITH MEDIAL MENISCECTOMY;  Right Knee Arthroscopy With Medial Menisectomy;  Surgeon: Ley, Jeffrey Duane, MD;  Location: [facility]     BACK SURGERY      back surgery x4     BIOPSY       CARDIAC SURGERY  7/2011    stentt placed     COLONOSCOPY       CORONARY ANGIOGRAPHY ADULT ORDER  07-25-14    RCA, L.Main and CFX  had minor luminal irregularites. Mid LAD high grade stenosis 80-90%.Stent placed to mid LAD     ESOPHAGOSCOPY, GASTROSCOPY, DUODENOSCOPY (EGD), COMBINED  10/25/2012    Procedure: COMBINED ESOPHAGOSCOPY, GASTROSCOPY, DUODENOSCOPY (EGD), BIOPSY SINGLE OR MULTIPLE;  Gastroscopy  ;  Surgeon: Lucius Dasilva MD;  Location: [facility]     HEART CATH, ANGIOPLASTY  07-25-14    mid LAD      ORTHOPEDIC SURGERY       back surgery     Current Outpatient Prescriptions   Medication Sig Dispense Refill     acetaminophen (TYLENOL) 325 MG tablet Take 2 tablets by mouth every 4 hours as needed. 250 tablet 1     amLODIPine (NORVASC) 5 MG tablet Take 1 tablet (5 mg) by mouth daily 90 tablet 3     ascorbic acid (VITAMIN C) 500 MG tablet Take 500 mg by mouth daily        aspirin EC 81 MG EC tablet Take 1 tablet (81 mg) by mouth daily       atorvastatin (LIPITOR) 80 MG tablet TAKE 1 TABLET BY MOUTH DAILY 90 tablet 3     blood glucose monitoring (NO BRAND SPECIFIED) meter device kit Use to test  blood sugar 1 times daily or as directed. 1 kit 0     blood glucose monitoring (NO BRAND SPECIFIED) meter device kit Use to test blood sugar 2 times daily or as directed. 1 kit 0     blood glucose monitoring (ONE TOUCH ULTRA) test strip Use to test blood sugars 2 times daily or as directed. 200 strip 3     Calcium Carbonate-Vitamin D (CALCIUM + D) 600-200 MG-UNIT per tablet Take 2 tablets by mouth daily. 100 tablet 12     cholecalciferol (VITAMIN  -D) 1000 UNITS capsule Take 1 capsule by mouth daily       clopidogrel (PLAVIX) 75 MG tablet TAKE 1 TABLET BY MOUTH DAILY 90 tablet 1     Cyanocobalamin (VITAMIN B-12 PO)        gabapentin (NEURONTIN) 100 MG capsule TAKE ONE CAPSULE BY MOUTH DAILY, INCREASE BY 1 CAPSULE EVERY 2 DAYS UP TO 6 CAPSULES THREE TIMES DAILY 270 capsule 11     hydrochlorothiazide (HYDRODIURIL) 25 MG tablet TAKE 1 TABLET BY MOUTH DAILY 90 tablet 1     ibuprofen (ADVIL/MOTRIN) 800 MG tablet TAKE 1 TABLET BY MOUTH EVERY 8 HOURS AS NEEDED FOR MODERATE PAIN 90 tablet 1     Lancets Misc. (SELECT-LITE DEVICE/LANCETS) KIT 1 kit 2 times daily 1 kit 1     MCTX PropertiesCAN FINEPOINT LANCETS MISC Use to test blood sugars 1 times daily or as directed. 100 each 12     lisinopril (PRINIVIL/ZESTRIL) 40 MG tablet TAKE 1 TABLET(40 MG) BY MOUTH DAILY 90 tablet 1     Melatonin 10 MG TBDP Take 10 mg by mouth At Bedtime 90 tablet      metFORMIN (GLUCOPHAGE-XR) 500 MG 24 hr tablet TAKE 2 TABLETS BY MOUTH TWICE DAILY WITH MEALS. 360 tablet 1     nitroglycerin (NITROSTAT) 0.4 MG SL tablet Place 1 tablet (0.4 mg) under the tongue every 5 minutes as needed for chest pain 25 tablet 1     omeprazole (PRILOSEC) 20 MG CR capsule TAKE ONE CAPSULE BY MOUTH TWICE DAILY 180 capsule 3     order for DME Equipment being ordered: walker wheeled also include  With skis for indoor carpet use 1 Device 0     order for DME Equipment being ordered: compression stockings 1 Units 0     order for DME Equipment being ordered: TENS 1 Units 0     order  "for DME Equipment being ordered: Wheelchair motorized for patient with spinal stenosis and neurogenic claudication 1 Device 0     oxyCODONE (OXYCONTIN) 30 MG 12 hr tablet Take 1 tablet (30 mg) by mouth every 12 hours 60 tablet 0     [START ON 11/21/2018] oxyCODONE IR (ROXICODONE) 5 MG tablet Take 1 daily as needed  for severe pain /at bedtime may have up to 2 per day max 40 per month 40 tablet 0     polyethylene glycol (MIRALAX/GLYCOLAX) powder Take 17 g (1 capful) by mouth daily 119 g 3     sertraline (ZOLOFT) 100 MG tablet Take 2 tablets (200 mg) by mouth daily 180 tablet 1     spironolactone (ALDACTONE) 25 MG tablet TAKE 1 TABLET(25 MG) BY MOUTH DAILY 90 tablet 0     traZODone (DESYREL) 100 MG tablet TAKE 3 TABLETS(300 MG) BY MOUTH EVERY NIGHT AT BEDTIME AS NEEDED FOR SLEEP 270 tablet 0     [DISCONTINUED] amLODIPine (NORVASC) 10 MG tablet TK 1 T PO D  2      SOCIAL HISTORY: He  reports that he has never smoked. He has never used smokeless tobacco. He reports that he does not drink alcohol or use illicit drugs.    PHYSICAL EXAM  Vitals:    10/05/18 0908   BP: 135/68   Pulse: 60   Weight: 86.2 kg (190 lb)   Height: 1.727 m (5' 8\")     Constitutional: Alert, no acute distress  Psychiatric: Mood is appropriate.  Abdomen:  Soft non tender non distended.      Scribe Disclosure:   I, Silvano Negrete, am serving as a scribe; to document services personally performed by Asif Capone MD -based on data collection and the provider's statements to me.   Silvano Negrete served as the scribe for this patient's visit and documented my history and physical exam.  I performed the history and physical exam.  I have edited and agree with the katharina.  WHITLEY Capone MD        CC  Patient Care Team:  Keyla Fonseca MD as PCP - General (Family Practice)  ARTHUR ASIF    Copy to patient  ISELA AGUIRRE  [address] [place] [phone]      "

## 2018-10-05 NOTE — PATIENT INSTRUCTIONS
Schedule CT scan.  Dr. Capone will notify you of the results.    It was a pleasure meeting with you today.  Thank you for allowing me and my team the privilege of caring for you today.  YOU are the reason we are here, and I truly hope we provided you with the excellent service you deserve.  Please let us know if there is anything else we can do for you so that we can be sure you are leaving completely satisfied with your care experience.        TEMI Doherty

## 2018-10-05 NOTE — PROGRESS NOTES
ASSESSMENT and PLAN  Perineal Pain  Still having chronic perineal pain.  His exam and previous CTs have been negative.  MRI and Xrays demonstrate multilevel degenerative disc disease and bilateral neural foraminal stenosis. He did have a Neurosurgery consult but was determined to be a poor surgical candidate given his age and hx of 4 MIs.     Stable bilateral adrenal nodules.    Renal cell carcinoma.  Successful cryoablation.  Possible new renal mass.  CT from yesterday continues to demonstrate the presence of a complex enhancing lesion, however this could not be well evaluated without IV contrast. Will plan to obtain CT with contrast to further characterize this lesion. His most recent CR is 1.39 (9/14/18), which is adequate for receiving contrast. Will contact patient with results via mail.  _________________________________________________________________    CHIEF COMPLAINT  It was my pleasure to see Vincent Mishra who is a 78 year old male for follow-up of perineal pain.      HPI  Mr. Mishra is status post percutaneous cryoablation of the left kidney for Gr 1 renal cell carcinoma in 2012.  Today he is here for groin pain.     His pain continues to be constant, occurring every day/night.  It is located bilaterally in the low back and radiates into the groin and posterior leg bilaterally. He reports 10/10 intensity, at times he feels it is 20/10. Uses a walker to get around. Continues to take oxycodone 30mg and this helps the pain. He denies any recent hematuria.     Hx of 4 MIs, on chronic anticoagulation.    He had a Neurosurgery consult in 10/2017. MRI and Xrays obtained at this time demonstrated multilevel degenerative disc disease and bilateral neural foraminal stenosis. Given his age and extensive cardiac history, surgical intervention was not recommended.     CT from yesterday continues to demonstrate the presence of a complex enhancing lesion, however this could not be well evaluated without IV  contrast.     With regard to his kidney function, his most recent Cr is:   Creatinine   Date Value Ref Range Status   09/14/2018 1.39 (H) 0.66 - 1.25 mg/dL Final         RADIOLOGIC IMAGING    EXAMINATION: CT ABDOMEN PELVIS W/O CONTRAST, 5/12/2017 8:33 AM     TECHNIQUE: Helical CT images from the lung bases through the pubic  symphysis were obtained without IV contrast.      COMPARISON: CT 6/3/2016     HISTORY: Malignant neoplasm of kidney excluding renal pelvis,  unspecified laterality, Malignant neoplasm of unspecified kidney,  except renal pelvis     FINDINGS:     Abdomen and pelvis: Postablation changes in the lateral interpolar  left kidney, the evaluation of which is limited without intravenous  contrast. Nodular soft tissue density within treatment bed measures 17  x 22 mm, previously demonstrating nonenhancing measuring 17 x 25 mm.  Calcific foci along the peripheral aspect of the treatment bed are  unchanged. Previously seen heterogeneously enhancing lesion in the  superior pole of the left kidney is not visualized without intravenous  contrast.  No hyperdense renal calculi within either kidney, ureter or urinary  bladder. No hydronephrosis or perinephric fat stranding. Bladder is  partially distended with diffuse bladder wall thickening, not  significantly changed since prior study. Prostate is surgically  absent.     Remaining abdomen and pelvis: The liver, gallbladder, spleen, pancreas  and bowel are unremarkable on this limited unenhanced exam. Unchanged  multiple bilateral adrenal gland nodules. No ascites. No abdominal or  pelvic lymphadenopathy. Abdominal aorta is normal caliber. Scattered  atherosclerotic calcifications in the abdomen and pelvis.     Lung bases: Right middle lobe consolidation with associated  bronchiectasis, unchanged since prior study. Coronary artery  calcifications.     Bones and soft tissues: No pathologic appearing osseous lesions  identified. Extensive degenerative changes in  the spine, not clearly  changed since prior study. Postsurgical changes of laminectomy and  interbody at L3-L4 level. Grade 1 retrolisthesis of L2 on L3,  unchanged since prior study.         IMPRESSION:   1. No renal calculi over the course of the kidneys, ureters or  bladder.  2. Stable post ablation changes along the lateral aspect of the left  kidney.  3. Previously seen complex solid and cystic lesion in the superior  pole of the left kidney cannot be evaluated on today's study without  intravenous contrast.  4. Unchanged multinodular appearing adrenal glands.     I have personally reviewed the examination and initial interpretation  and I agree with the findings.     CARLOS DAVIS    I reviewed the recent radiologic imaging and reports described above. Original images were visualized independently.     Lab Results   Component Value Date    CR 1.39 09/14/2018    CR 1.48 04/04/2018    CR 1.38 01/08/2018    CR 1.36 10/30/2017    CR 1.31 05/31/2017    CR 1.32 05/30/2017    CR 1.32 03/06/2017    CR 1.42 01/24/2017    CR 1.23 01/07/2016    CR 0.94 09/25/2015       Patient Active Problem List    Diagnosis Date Noted     Chest pain 01/12/2012     Priority: High     Hyperlipidemia LDL goal <100 09/23/2010     Priority: High     Severe episode of recurrent major depressive disorder, without psychotic features (H) 02/20/2018     Priority: Medium     Chronic bilateral low back pain with sciatica, sciatica laterality unspecified 09/20/2017     Priority: Medium     ACS (acute coronary syndrome) (H) 05/30/2017     Priority: Medium     Bilateral low back pain with right-sided sciatica 04/03/2017     Priority: Medium     Bilateral low back pain with left-sided sciatica 04/03/2017     Priority: Medium     Claudication (H) 05/09/2016     Priority: Medium     Hip pain, bilateral 04/05/2016     Priority: Medium     Thyroid nodule 10/30/2015     Priority: Medium     Type 2 diabetes mellitus with other circulatory complication,  without long-term current use of insulin (H) 10/22/2015     Priority: Medium     Myocardial infarction, nontransmural (H) 07/16/2015     Priority: Medium     Sinoatrial node dysfunction (H) 07/16/2015     Priority: Medium     Right bundle branch block (RBBB) 07/16/2015     Priority: Medium     Essential hypertension 07/16/2015     Priority: Medium     Hyperlipidemia 07/16/2015     Priority: Medium     NSTEMI (non-ST elevated myocardial infarction) (H)      Priority: Medium     07-25-14 CATH- RCA, L.Main and CFX  had minor luminal irregularites. Mid LAD high grade stenosis 80-90%.Stent placed to mid LAD       Hard of hearing 06/05/2014     Priority: Medium     Constipation 06/05/2014     Priority: Medium     Abdominal pain, right upper quadrant 03/26/2014     Priority: Medium     Esophageal reflux 03/26/2014     Priority: Medium     Insomnia 10/21/2013     Priority: Medium     Health Care Home 10/08/2013     Priority: Medium     *See Letters for [facility] Care Plan: My Access Plan           Vitamin D deficiency disease 09/25/2013     Priority: Medium     Renal mass, left 12/05/2012     Priority: Medium     Malignant neoplasm of kidney excluding renal pelvis (H) 11/16/2012     Priority: Medium     SURGERY, PATHOLOGY, AND TREATMENT HISTORY FOR KIDNEY CANCER  11/12/12:  Left lower pole kidney mass biopsy: Renal cell carcinoma, clear cell type; Viktor grade 1  12/5/12 Left percutaneous cryoablation of two renal tumors.  These were located adjacent to one another in the lateral kidney.  Dr Asif Capone, [facility].  Problem list name updated by automated process. Provider to review       Moderate major depression (H) 09/17/2012     Priority: Medium     Orchitis, epididymitis, and epididymo-orchitis 09/17/2012     Priority: Medium     Osteoarthritis, knee 09/05/2012     Priority: Medium     Abnormal antinuclear antibody titer 03/12/2012     Priority: Medium     Anatomic airway obstruction 02/09/2012      Priority: Medium     fixed obstruction on PFTs with dyspnea       Advanced directives, info letter sent 10/4/2011 10/04/2011     Priority: Medium     Right bundle branch block 07/26/2011     Priority: Medium     Seen on EKG 2/2011.  Different configuration on 7/26/2011 s/p MI - but same at [facility] post-MI as here        Acute myocardial infarction of inferolateral wall (H) 07/14/2011     Priority: Medium     Obesity 07/14/2011     Priority: Medium     Hypertension goal BP (blood pressure) < 140/90 02/28/2011     Priority: Medium     C6-7 disc with radiculopathy 02/28/2011     Priority: Medium     consider second Epid[facility] at [facility].  Continue PT.  Await accupuncture       Tear of meniscus of left knee 02/11/2011     Priority: Medium     Neck pain 11/12/2010     Priority: Medium     B12 deficiency 11/12/2010     Priority: Medium     Diplopia 11/02/2010     Priority: Medium     Neuropathy 11/02/2010     Priority: Medium     Vision loss night 11/02/2010     Priority: Medium     Parotid mass 10/04/2010     Priority: Medium     Tension headache 09/23/2010     Priority: Medium     Trapezius strain 09/23/2010     Priority: Medium     Past Medical History:   Diagnosis Date     CAD (coronary artery disease) 7/26/2011 7/2011 ([facility] [place]): s/p MI, PTCA - RCA      Cancer of kidney (H)      Chest pain 1/12/2012     Coronary artery disease      History of angina      History of blood transfusion      Hyperlipidaemia      Hyperlipidemia LDL goal <100 9/23/2010     Malignant neoplasm (H)     Prostate CA (removed)     Myocardial infarction (H)     s/p MI 7/29/14, stent placement     NSTEMI (non-ST elevated myocardial infarction) (H)     07-25-14 CATH- RCA, L.Main and CFX  had minor luminal irregularites. Mid LAD high grade stenosis 80-90%.Stent placed to mid LAD     Other and unspecified hyperlipidemia     MI in July 2011     Right bundle branch block      Type II or unspecified type diabetes mellitus without mention  of complication, not stated as uncontrolled      Unspecified essential hypertension      Past Surgical History:   Procedure Laterality Date     ARTHROSCOPY KNEE  2/11/2011    ARTHROSCOPY KNEE performed by LEY, JEFFREY DUANE at [facility]     ARTHROSCOPY KNEE WITH MEDIAL MENISCECTOMY  6/26/2012    Procedure: ARTHROSCOPY KNEE WITH MEDIAL MENISCECTOMY;  Right Knee Arthroscopy With Medial Menisectomy;  Surgeon: Ley, Jeffrey Duane, MD;  Location: [facility]     BACK SURGERY      back surgery x4     BIOPSY       CARDIAC SURGERY  7/2011    stentt placed     COLONOSCOPY       CORONARY ANGIOGRAPHY ADULT ORDER  07-25-14    RCA, L.Main and CFX  had minor luminal irregularites. Mid LAD high grade stenosis 80-90%.Stent placed to mid LAD     ESOPHAGOSCOPY, GASTROSCOPY, DUODENOSCOPY (EGD), COMBINED  10/25/2012    Procedure: COMBINED ESOPHAGOSCOPY, GASTROSCOPY, DUODENOSCOPY (EGD), BIOPSY SINGLE OR MULTIPLE;  Gastroscopy  ;  Surgeon: Lucius Dasilva MD;  Location: [facility]     HEART CATH, ANGIOPLASTY  07-25-14    mid LAD      ORTHOPEDIC SURGERY       back surgery     Current Outpatient Prescriptions   Medication Sig Dispense Refill     acetaminophen (TYLENOL) 325 MG tablet Take 2 tablets by mouth every 4 hours as needed. 250 tablet 1     amLODIPine (NORVASC) 5 MG tablet Take 1 tablet (5 mg) by mouth daily 90 tablet 3     ascorbic acid (VITAMIN C) 500 MG tablet Take 500 mg by mouth daily        aspirin EC 81 MG EC tablet Take 1 tablet (81 mg) by mouth daily       atorvastatin (LIPITOR) 80 MG tablet TAKE 1 TABLET BY MOUTH DAILY 90 tablet 3     blood glucose monitoring (NO BRAND SPECIFIED) meter device kit Use to test blood sugar 1 times daily or as directed. 1 kit 0     blood glucose monitoring (NO BRAND SPECIFIED) meter device kit Use to test blood sugar 2 times daily or as directed. 1 kit 0     blood glucose monitoring (ONE TOUCH ULTRA) test strip Use to test blood sugars 2 times daily or as directed. 200 strip 3     Calcium Carbonate-Vitamin D  (CALCIUM + D) 600-200 MG-UNIT per tablet Take 2 tablets by mouth daily. 100 tablet 12     cholecalciferol (VITAMIN  -D) 1000 UNITS capsule Take 1 capsule by mouth daily       clopidogrel (PLAVIX) 75 MG tablet TAKE 1 TABLET BY MOUTH DAILY 90 tablet 1     Cyanocobalamin (VITAMIN B-12 PO)        gabapentin (NEURONTIN) 100 MG capsule TAKE ONE CAPSULE BY MOUTH DAILY, INCREASE BY 1 CAPSULE EVERY 2 DAYS UP TO 6 CAPSULES THREE TIMES DAILY 270 capsule 11     hydrochlorothiazide (HYDRODIURIL) 25 MG tablet TAKE 1 TABLET BY MOUTH DAILY 90 tablet 1     ibuprofen (ADVIL/MOTRIN) 800 MG tablet TAKE 1 TABLET BY MOUTH EVERY 8 HOURS AS NEEDED FOR MODERATE PAIN 90 tablet 1     Lancets Misc. (SELECT-LITE DEVICE/LANCETS) KIT 1 kit 2 times daily 1 kit 1     Pixsta FINEPOINT LANCETS MISC Use to test blood sugars 1 times daily or as directed. 100 each 12     lisinopril (PRINIVIL/ZESTRIL) 40 MG tablet TAKE 1 TABLET(40 MG) BY MOUTH DAILY 90 tablet 1     Melatonin 10 MG TBDP Take 10 mg by mouth At Bedtime 90 tablet      metFORMIN (GLUCOPHAGE-XR) 500 MG 24 hr tablet TAKE 2 TABLETS BY MOUTH TWICE DAILY WITH MEALS. 360 tablet 1     nitroglycerin (NITROSTAT) 0.4 MG SL tablet Place 1 tablet (0.4 mg) under the tongue every 5 minutes as needed for chest pain 25 tablet 1     omeprazole (PRILOSEC) 20 MG CR capsule TAKE ONE CAPSULE BY MOUTH TWICE DAILY 180 capsule 3     order for DME Equipment being ordered: walker wheeled also include  With skis for indoor carpet use 1 Device 0     order for DME Equipment being ordered: compression stockings 1 Units 0     order for DME Equipment being ordered: TENS 1 Units 0     order for DME Equipment being ordered: Wheelchair motorized for patient with spinal stenosis and neurogenic claudication 1 Device 0     oxyCODONE (OXYCONTIN) 30 MG 12 hr tablet Take 1 tablet (30 mg) by mouth every 12 hours 60 tablet 0     [START ON 11/21/2018] oxyCODONE IR (ROXICODONE) 5 MG tablet Take 1 daily as needed  for severe pain /at  "bedtime may have up to 2 per day max 40 per month 40 tablet 0     polyethylene glycol (MIRALAX/GLYCOLAX) powder Take 17 g (1 capful) by mouth daily 119 g 3     sertraline (ZOLOFT) 100 MG tablet Take 2 tablets (200 mg) by mouth daily 180 tablet 1     spironolactone (ALDACTONE) 25 MG tablet TAKE 1 TABLET(25 MG) BY MOUTH DAILY 90 tablet 0     traZODone (DESYREL) 100 MG tablet TAKE 3 TABLETS(300 MG) BY MOUTH EVERY NIGHT AT BEDTIME AS NEEDED FOR SLEEP 270 tablet 0     [DISCONTINUED] amLODIPine (NORVASC) 10 MG tablet TK 1 T PO D  2      SOCIAL HISTORY: He  reports that he has never smoked. He has never used smokeless tobacco. He reports that he does not drink alcohol or use illicit drugs.    PHYSICAL EXAM  Vitals:    10/05/18 0908   BP: 135/68   Pulse: 60   Weight: 86.2 kg (190 lb)   Height: 1.727 m (5' 8\")     Constitutional: Alert, no acute distress  Psychiatric: Mood is appropriate.  Abdomen:  Soft non tender non distended.      Scribe Disclosure:   I, Silvano Negrete, am serving as a scribe; to document services personally performed by Asif Capone MD -based on data collection and the provider's statements to me.   Silvano Negrete served as the scribe for this patient's visit and documented my history and physical exam.  I performed the history and physical exam.  I have edited and agree with the note.  WHITLEY Capone MD        CC  Patient Care Team:  Keyla Fonseca MD as PCP - General (Family Practice)  ARTHUR ASIF    Copy to patient  ISELA AGRAWAL[address] [id number]      "

## 2018-10-05 NOTE — TELEPHONE ENCOUNTER
Called patient's wife and stated dr kothari is gone for the day unable to read the latest ct scan results  Will notify him to look at result and plan of action. Josy Barry LPN Staff Nurse  ema

## 2018-10-05 NOTE — MR AVS SNAPSHOT
After Visit Summary   10/5/2018    Vincent Mishra    MRN: [id number]           Patient Information     Date Of Birth          1940        Visit Information        Provider Department      10/5/2018 9:00 AM Lucius Capone MD [facility]rology and [facility]        Today's Diagnoses     Renal mass    -  1      Care Instructions    Schedule CT scan.  Dr. Capone will notify you of the results.    It was a pleasure meeting with you today.  Thank you for allowing me and my team the privilege of caring for you today.  YOU are the reason we are here, and I truly hope we provided you with the excellent service you deserve.  Please let us know if there is anything else we can do for you so that we can be sure you are leaving completely satisfied with your care experience.        TEMI Doherty          Follow-ups after your visit        Your next 10 appointments already scheduled     Oct 05, 2018 12:00 PM CDT   (Arrive by 11:30 AM)   CT ABDOMEN W/O & W CONTRAST with WYCT1   [facility] CT ([facility])    [address] [address]   [phone]           How do I prepare for my exam? (Food and drink instructions) To prepare: Do not eat or drink for 2 hours before your exam. If you need to take medicine, you may take it with small sips of water. (We may ask you to take liquid medicine as well.)  How do I prepare for my exam? (Other instructions) Please arrive 30 minutes early for your CT.  Once in the department you might be asked to drink water 15-20 minutes prior to your exam.  If indicated you may be asked to drink an oral contrast in advance of your CT.  If this is the case, the imaging team will let you know or be in contact with you prior to your appointment  Patients over 70 or patients with diabetes or kidney problems: If you haven t had a blood test (creatinine test) within the last 30 days, the  Cardiologist/Radiologist may require you to get this test prior to your exam.  If you have diabetes:  Continue to take your metformin medication on the day of your exam  What should I wear: Please wear loose clothing, such as a sweat suit or jogging clothes. Avoid snaps, zippers and other metal. We may ask you to undress and put on a hospital gown.  How long does the exam take: Most scans take less than 20 minutes.  What should I bring: Please bring any scans or X-rays taken at other hospitals, if similar tests were done. Also bring a list of your medicines, including vitamins, minerals and over-the-counter drugs. It is safest to leave personal items at home.  Do I need a : No  is needed.  What do I need to tell my doctor? Be sure to tell your doctor: * If you have any allergies. * If there s any chance you are pregnant. * If you are breastfeeding.  What should I do after the exam: No restrictions, You may resume normal activities.  What is this test: A CT (computed tomography) scan is a series of pictures that allows us to look inside your body. The scanner creates images of the body in cross sections, much like slices of bread. This helps us see any problems more clearly. You may receive contrast (X-ray dye) before or during your scan. You will be asked to drink the contrast.  Who should I call with questions: If you have any questions, please call the Imaging Department where you will have your exam. Directions, parking instructions, and other information is available on our website, [email or web address].            Dec 20, 2018 10:30 AM CST   Return Visit with Geremias Phelps MD   [facility] ([facility])    [address] [address]   [phone]              Future tests that were ordered for you today     Open Future Orders        Priority Expected Expires Ordered    CT Abdomen w/o & w Contrast Routine  10/4/2019 10/5/2018           "  Who to contact     Please call your clinic at [phone] to:    Ask questions about your health    Make or cancel appointments    Discuss your medicines    Learn about your test results    Speak to your doctor            Additional Information About Your Visit        [employer] Information     [employer] gives you secure access to your electronic health record. If you see a primary care provider, you can also send messages to your care team and make appointments. If you have questions, please call your primary care clinic.  If you do not have a primary care provider, please call [phone] and they will assist you.      [employer] is an electronic gateway that provides easy, online access to your medical records. With [employer], you can request a clinic appointment, read your test results, renew a prescription or communicate with your care team.     To access your existing account, please contact your [facility] or call [phone] for assistance.        Care EveryWhere ID     This is your Care EveryWhere ID. This could be used by other organizations to access your [facility] medical records  [id number]        Your Vitals Were     Pulse Height BMI (Body Mass Index)             60 1.727 m (5' 8\") 28.89 kg/m2          Blood Pressure from Last 3 Encounters:   10/05/18 135/68   09/17/18 132/74   09/14/18 93/56    Weight from Last 3 Encounters:   10/05/18 86.2 kg (190 lb)   09/14/18 86.4 kg (190 lb 6.4 oz)   04/27/18 97.5 kg (215 lb)                 Today's Medication Changes          These changes are accurate as of 10/5/18  9:58 AM.  If you have any questions, ask your nurse or doctor.               These medicines have changed or have updated prescriptions.        Dose/Directions    amLODIPine 5 MG tablet   Commonly known as:  NORVASC   This may have changed:  Another medication with the same name was removed. Continue taking this medication, and follow the directions you see here.   Used " for:  Hypertension goal BP (blood pressure) < 140/90   Changed by:  Lucius Capone MD        Dose:  5 mg   Take 1 tablet (5 mg) by mouth daily   Quantity:  90 tablet   Refills:  3                Primary Care Provider Office Phone # Fax #    Keyla Fonseca [phone] [address] [place] [address]        Equal Access to Services     [facility]: Hadii aad ku hadasho Soomaali, waaxda luqadaha, qaybta kaalmada adeegyada, waxay idiin hayaan adeeg kharash la'aan ah. So [facility] [phone].    ATENCIÓN: Si habla español, tiene a nicholas disposición servicios gratuitos de asistencia lingüística. Llame al [phone].    We comply with applicable federal civil rights laws and [place] laws. We do not discriminate on the basis of race, color, national origin, age, disability, sex, sexual orientation, or gender identity.            Thank you!     Thank you for choosing [facility] UROLOGY AND [facility]  for your care. Our goal is always to provide you with excellent care. Hearing back from our patients is one way we can continue to improve our services. Please take a few minutes to complete the written survey that you may receive in the mail after your visit with us. Thank you!             Your Updated Medication List - Protect others around you: Learn how to safely use, store and throw away your medicines at www.disposemymeds.org.          This list is accurate as of 10/5/18  9:58 AM.  Always use your most recent med list.                   Brand Name Dispense Instructions for use Diagnosis    acetaminophen 325 MG tablet    TYLENOL    250 tablet    Take 2 tablets by mouth every 4 hours as needed.    Tear of meniscus of left knee       amLODIPine 5 MG tablet    NORVASC    90 tablet    Take 1 tablet (5 mg) by mouth daily    Hypertension goal BP (blood pressure) < 140/90       ascorbic acid 500 MG tablet    VITAMIN C     Take 500 mg by mouth daily        aspirin 81  MG EC tablet      Take 1 tablet (81 mg) by mouth daily    Chest pain, unspecified type, NSTEMI (non-ST elevated myocardial infarction) (H)       atorvastatin 80 MG tablet    LIPITOR    90 tablet    TAKE 1 TABLET BY MOUTH DAILY    Hyperlipidemia LDL goal <100       * blood glucose monitoring meter device kit    no brand specified    1 kit    Use to test blood sugar 2 times daily or as directed.    Type 2 diabetes mellitus with hyperglycemia, without long-term current use of insulin (H)       * blood glucose monitoring meter device kit    no brand specified    1 kit    Use to test blood sugar 1 times daily or as directed.    Type 2 diabetes mellitus with complication, without long-term current use of insulin (H)       blood glucose monitoring test strip    ONETOUCH ULTRA    200 strip    Use to test blood sugars 2 times daily or as directed.    Type 2 diabetes mellitus with other circulatory complications (H)       calcium + D 600-200 MG-UNIT Tabs   Generic drug:  calcium carbonate-vitamin D     100 tablet    Take 2 tablets by mouth daily.        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    Type 2 diabetes, HbA1c goal < 7% (H)       lisinopril 40 MG tablet    PRINIVIL/ZESTRIL    90 tablet    TAKE 1 TABLET(40 MG) BY MOUTH DAILY    Essential hypertension with goal blood pressure less than 140/90       Melatonin 10 MG Tbdp     90 tablet    Take 10 mg by mouth At Bedtime        metFORMIN 500 MG 24 hr tablet    GLUCOPHAGE-XR    360 tablet    TAKE 2 TABLETS BY MOUTH TWICE DAILY WITH MEALS.    Type 2 diabetes mellitus without complication, unspecified long term insulin use status       nitroGLYcerin 0.4 MG sublingual tablet    NITROSTAT    25 tablet    Place 1 tablet (0.4 mg) under the tongue every 5 minutes as needed for chest pain    CAD (coronary artery disease)       omeprazole 20 MG CR capsule    priLOSEC    180 capsule    TAKE ONE CAPSULE BY MOUTH TWICE DAILY    Gastroesophageal reflux disease without esophagitis       * order for DME     1 Device    Equipment being ordered: Wheelchair motorized for patient with spinal stenosis and neurogenic claudication    Spinal stenosis, lumbar region, with neurogenic claudication       * order for DME     1 Units    Equipment being ordered: TENS    Chronic bilateral low back pain with sciatica, sciatica laterality unspecified, Hip pain, bilateral       order for DME     1 Units    Equipment being ordered: compression stockings    Cellulitis and abscess of leg, except foot       order for DME     1 Device    Equipment being ordered: walker wheeled also include  With skis for indoor carpet use    Falls frequently, Deep inguinal pain, left       * oxyCODONE 30 MG 12 hr tablet    OxyCONTIN    60 tablet    Take 1 tablet (30 mg) by mouth every 12 hours    Failed back surgical syndrome       * oxyCODONE IR 5 MG tablet   Start taking on:  11/21/2018    ROXICODONE    40 tablet    Take 1 daily as needed  for severe pain /at bedtime may have up to 2 per day max 40 per month    Lumbar back pain with  radiculopathy affecting left lower extremity       polyethylene glycol powder    MIRALAX/GLYCOLAX    119 g    Take 17 g (1 capful) by mouth daily    Chronic idiopathic constipation       SELECT-LITE DEVICE/LANCETS Kit     1 kit    1 kit 2 times daily    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)       sertraline 100 MG tablet    ZOLOFT    180 tablet    Take 2 tablets (200 mg) by mouth daily    Severe episode of recurrent major depressive disorder, without psychotic features (H)       spironolactone 25 MG tablet    ALDACTONE    90 tablet    TAKE 1 TABLET(25 MG) BY MOUTH DAILY    Essential hypertension with goal blood pressure less than 140/90       traZODone 100 MG tablet    DESYREL    270 tablet    TAKE 3 TABLETS(300 MG) BY MOUTH EVERY NIGHT AT BEDTIME AS NEEDED FOR SLEEP    Primary insomnia       VITAMIN B-12 PO           * Notice:  This list has 6 medication(s) that are the same as other medications prescribed for you. Read the directions carefully, and ask your doctor or other care provider to review them with you.

## 2018-10-07 DIAGNOSIS — F51.01 PRIMARY INSOMNIA: ICD-10-CM

## 2018-10-08 RX ORDER — TRAZODONE HYDROCHLORIDE 100 MG/1
TABLET ORAL
Qty: 270 TABLET | Refills: 0 | Status: SHIPPED | OUTPATIENT
Start: 2018-10-08 | End: 2019-01-01

## 2018-10-08 NOTE — TELEPHONE ENCOUNTER
[facility] Call Center    Phone Message    May a detailed message be left on voicemail: yes    Reason for Call: Requesting Results   Name/type of test: CT  Date of test: 10.4 and 10.5  Was test done at a location other than [facility] (Please fill in the location if not [facility])?: No      Action Taken: Message routed to:  [facility] urology

## 2018-10-08 NOTE — TELEPHONE ENCOUNTER
"Requested Prescriptions   Pending Prescriptions Disp Refills     traZODone (DESYREL) 100 MG tablet [Pharmacy Med Name: TRAZODONE 100MG TABLETS] 270 tablet 0     Sig: TAKE 3 TABLETS(300 MG) BY MOUTH EVERY NIGHT AT BEDTIME AS NEEDED FOR SLEEP    Serotonin Modulators Passed    10/7/2018 11:24 AM       Passed - Recent (12 mo) or future (30 days) visit within the authorizing provider's specialty    Patient had office visit in the last 12 months or has a visit in the next 30 days with authorizing provider or within the authorizing provider's specialty.  See \"Patient Info\" tab in inbasket, or \"Choose Columns\" in Meds & Orders section of the refill encounter.           Passed - Patient is age 18 or older        Last Written Prescription Date:  6/26/18  Last Fill Quantity: 270,  # refills: 0   Last office visit: 9/14/2018 with prescribing provider:  Marian   Future Office Visit:   Next 5 appointments (look out 90 days)     Dec 20, 2018 10:30 AM CST   Return Visit with Geremias Phelps MD   [facility] ([facility])    [address] [place] [id number] [phone]                   "

## 2018-10-09 ENCOUNTER — TELEPHONE (OUTPATIENT)
Dept: UROLOGY | Facility: CLINIC | Age: 78
End: 2018-10-09

## 2018-10-09 NOTE — TELEPHONE ENCOUNTER
Message forward to Dr. Capone and his RNCC Julia Cross. Patient called stating the he wouldlike a call to discuss his CT results.      Sharon Malcolm MA

## 2018-10-10 NOTE — TELEPHONE ENCOUNTER
[facility] Call Center    Phone Message    May a detailed message be left on voicemail: yes    Reason for Call: Other: Pt has been waiting to hear one way or the other regarding his CT results, pt is getting worried and wondering if he needs to drive to clinic to speak to someone regarding his results, please call to discuss     Action Taken: Message routed to:  [facility] Urology Clinic

## 2018-10-11 NOTE — TELEPHONE ENCOUNTER
"[facility] Call Center    Phone Message    May a detailed message be left on voicemail: yes    Reason for Call: Other: Still waiting on a call.  Pt has been upset because they initially spoke with someone from care team that told them that something \"suspicious\" was noted on the report.  They have been anxious and upset for the past week.     Action Taken: Message routed to:  [facility] urology    "

## 2018-10-12 DIAGNOSIS — N28.89 LEFT RENAL MASS: Primary | ICD-10-CM

## 2018-10-12 RX ORDER — CEFAZOLIN SODIUM 2 G/50ML
2 SOLUTION INTRAVENOUS
Status: CANCELLED | OUTPATIENT
Start: 2018-10-12

## 2018-10-12 RX ORDER — CEFAZOLIN SODIUM 1 G/50ML
1 INJECTION, SOLUTION INTRAVENOUS SEE ADMIN INSTRUCTIONS
Status: CANCELLED | OUTPATIENT
Start: 2018-10-12

## 2018-10-16 ENCOUNTER — OFFICE VISIT (OUTPATIENT)
Dept: FAMILY MEDICINE | Facility: CLINIC | Age: 78
End: 2018-10-16
Payer: COMMERCIAL

## 2018-10-16 VITALS
TEMPERATURE: 98.6 F | HEART RATE: 60 BPM | BODY MASS INDEX: 29.46 KG/M2 | SYSTOLIC BLOOD PRESSURE: 135 MMHG | DIASTOLIC BLOOD PRESSURE: 70 MMHG | HEIGHT: 68 IN | WEIGHT: 194.4 LBS

## 2018-10-16 DIAGNOSIS — S81.002A OPEN WOUND OF LEFT KNEE, LEG, AND ANKLE, INITIAL ENCOUNTER: ICD-10-CM

## 2018-10-16 DIAGNOSIS — S91.002A OPEN WOUND OF LEFT KNEE, LEG, AND ANKLE, INITIAL ENCOUNTER: ICD-10-CM

## 2018-10-16 DIAGNOSIS — R30.0 DYSURIA: Primary | ICD-10-CM

## 2018-10-16 DIAGNOSIS — S81.802A OPEN WOUND OF LEFT KNEE, LEG, AND ANKLE, INITIAL ENCOUNTER: ICD-10-CM

## 2018-10-16 DIAGNOSIS — R39.89 URINARY PROBLEM: ICD-10-CM

## 2018-10-16 DIAGNOSIS — R53.81 PHYSICAL DECONDITIONING: ICD-10-CM

## 2018-10-16 DIAGNOSIS — F42.4 SKIN PICKING HABIT: ICD-10-CM

## 2018-10-16 LAB
ALBUMIN UR-MCNC: NEGATIVE MG/DL
APPEARANCE UR: CLEAR
BILIRUB UR QL STRIP: NEGATIVE
COLOR UR AUTO: YELLOW
GLUCOSE UR STRIP-MCNC: NEGATIVE MG/DL
HGB UR QL STRIP: NEGATIVE
KETONES UR STRIP-MCNC: NEGATIVE MG/DL
LEUKOCYTE ESTERASE UR QL STRIP: NEGATIVE
NITRATE UR QL: NEGATIVE
PH UR STRIP: 6 PH (ref 5–7)
SOURCE: NORMAL
SP GR UR STRIP: 1.01 (ref 1–1.03)
UROBILINOGEN UR STRIP-ACNC: 1 EU/DL (ref 0.2–1)

## 2018-10-16 PROCEDURE — 99214 OFFICE O/P EST MOD 30 MIN: CPT | Performed by: FAMILY MEDICINE

## 2018-10-16 PROCEDURE — 81003 URINALYSIS AUTO W/O SCOPE: CPT | Performed by: FAMILY MEDICINE

## 2018-10-16 NOTE — MR AVS SNAPSHOT
After Visit Summary   10/16/2018    Vincent Mishra    MRN: [id number]           Patient Information     Date Of Birth          1940        Visit Information        Provider Department      10/16/2018 10:00 AM Ramya Velez MD [facility]        Today's Diagnoses     Dysuria    -  1    Physical deconditioning        Urinary problem        Open wound of left knee, leg, and ankle, initial encounter        Skin picking habit           Follow-ups after your visit        Additional Services     [facility] PT, HAND, AND CHIROPRACTIC REFERRAL       Physical Therapy, Hand Therapy and Chiropractic Care are available through:  *[facility]  *Hand Therapy (Occupational Therapy or Physical Therapy)  *[facility]    Call one number to schedule at any of the above locations: [phone].    Physical therapy, Hand therapy and/or Chiropractic care has been recommended by your physician as an excellent treatment option to reduce pain and help people return to normal activities, including sports.  Therapy and/or chiropractic care services are a great complement or alternative to expensive and invasive surgery, injections, or long-term use of prescription medications. The primary goal is to identify the underlying problem and provide you the tools to manage your condition on your own.     Please be aware that coverage of these services is subject to the terms and limitations of your health insurance plan.  Call member services at your health plan with any benefit or coverage questions.      Please bring the following to your appointment:  *Your personal calendar for scheduling future appointments  *Comfortable clothing                  Your next 10 appointments already scheduled     Oct 18, 2018 11:55 AM CDT   (Arrive by 11:40 AM)   [facility] Spine with Jefe Wood PT   [facility] ([facility])    [address] [facility] [address]    [phone]            Oct 25, 2018 10:35 AM CDT   [facility] Spine with Jefe Wood PT   [facility]    [address] [place] [address]   [phone]            Nov 01, 2018 11:55 AM CDT   [facility] Spine with Jefe Wood PT   [facility] ([facility])    [address] [place] [address]   [phone]            Dec 20, 2018 10:30 AM CST   Return Visit with Geremias Phelps MD   [facility] ([facility])    [address] [facility] [id number] [phone]              Future tests that were ordered for you today     Open Future Orders        Priority Expected Expires Ordered    [facility] PT, HAND, AND CHIROPRACTIC REFERRAL Routine  10/16/2019 10/16/2018            Who to contact     Normal or non-critical lab and imaging results will be communicated to you by [employer], letter or phone within 4 business days after the clinic has received the results. If you do not hear from us within 7 days, please contact the clinic through [employer] or phone. If you have a critical or abnormal lab result, we will notify you by phone as soon as possible.  Submit refill requests through [employer] or call your pharmacy and they will forward the refill request to us. Please allow 3 business days for your refill to be completed.          If you need to speak with a  for additional information , please call: [phone]             Additional Information About Your Visit        [employer] Information     [employer] gives you secure access to your electronic health record. If you see a primary care provider, you can also send messages to your care team and make appointments. If you have questions, please call your primary care clinic.  If you do not have a primary care provider, please call [phone] and they will assist you.        Care EveryWhere ID     This is your Care EveryWhere ID. This could be used by  "other organizations to access your [place] medical records  [id number]        Your Vitals Were     Pulse Temperature Height BMI (Body Mass Index)          60 98.6  F (37  C) (Tympanic) 5' 8\" (1.727 m) 29.56 kg/m2         Blood Pressure from Last 3 Encounters:   10/16/18 135/70   10/05/18 135/68   09/17/18 132/74    Weight from Last 3 Encounters:   10/16/18 194 lb 6.4 oz (88.2 kg)   10/05/18 190 lb (86.2 kg)   09/14/18 190 lb 6.4 oz (86.4 kg)              We Performed the Following     UA reflex to Microscopic and Culture        Primary Care Provider Office Phone # Fax #    Keyla Fonseca [phone] [address] [facility] [address]        Equal Access to Services     [facility]: Hadii aad ku hadasho Soomaali, waaxda luqadaha, qaybta kaalmada aderjyada, sobeida lorenzo . So [facility] [phone].    ATENCIÓN: Si habla español, tiene a nicholas disposición servicios gratuitos de asistencia lingüística. Llame al [phone].    We comply with applicable federal civil rights laws and [place] laws. We do not discriminate on the basis of race, color, national origin, age, disability, sex, sexual orientation, or gender identity.            Thank you!     Thank you for choosing [facility]  for your care. Our goal is always to provide you with excellent care. Hearing back from our patients is one way we can continue to improve our services. Please take a few minutes to complete the written survey that you may receive in the mail after your visit with us. Thank you!             Your Updated Medication List - Protect others around you: Learn how to safely use, store and throw away your medicines at www.disposemymeds.org.          This list is accurate as of 10/16/18 11:59 PM.  Always use your most recent med list.                   Brand Name Dispense Instructions for use Diagnosis    acetaminophen 325 MG tablet    TYLENOL    250 tablet    Take 2 tablets by mouth " every 4 hours as needed.    Tear of meniscus of left knee       amLODIPine 5 MG tablet    NORVASC    90 tablet    Take 1 tablet (5 mg) by mouth daily    Hypertension goal BP (blood pressure) < 140/90       ascorbic acid 500 MG tablet    VITAMIN C     Take 500 mg by mouth daily        aspirin 81 MG EC tablet      Take 1 tablet (81 mg) by mouth daily    Chest pain, unspecified type, NSTEMI (non-ST elevated myocardial infarction) (H)       atorvastatin 80 MG tablet    LIPITOR    90 tablet    TAKE 1 TABLET BY MOUTH DAILY    Hyperlipidemia LDL goal <100       * blood glucose monitoring meter device kit    no brand specified    1 kit    Use to test blood sugar 2 times daily or as directed.    Type 2 diabetes mellitus with hyperglycemia, without long-term current use of insulin (H)       * blood glucose monitoring meter device kit    no brand specified    1 kit    Use to test blood sugar 1 times daily or as directed.    Type 2 diabetes mellitus with complication, without long-term current use of insulin (H)       blood glucose monitoring test strip    ONETOUCH ULTRA    200 strip    Use to test blood sugars 2 times daily or as directed.    Type 2 diabetes mellitus with other circulatory complications (H)       calcium + D 600-200 MG-UNIT Tabs   Generic drug:  calcium carbonate-vitamin D     100 tablet    Take 2 tablets by mouth daily.        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    Type 2 diabetes, HbA1c goal < 7% (H)       lisinopril 40 MG tablet    PRINIVIL/ZESTRIL    90 tablet    TAKE 1 TABLET(40 MG) BY MOUTH DAILY    Essential hypertension with goal blood pressure less than 140/90       Melatonin 10 MG Tbdp     90 tablet    Take 10 mg by mouth At Bedtime        metFORMIN 500 MG 24 hr tablet    GLUCOPHAGE-XR    360 tablet    TAKE 2 TABLETS BY MOUTH TWICE DAILY WITH MEALS.    Type 2 diabetes mellitus without complication, unspecified long term insulin use status       nitroGLYcerin 0.4 MG sublingual tablet    NITROSTAT    25 tablet    Place 1 tablet (0.4 mg) under the tongue every 5 minutes as needed for chest pain    CAD (coronary artery disease)       omeprazole 20 MG CR capsule    priLOSEC    180 capsule    TAKE ONE CAPSULE BY MOUTH TWICE DAILY    Gastroesophageal reflux disease without esophagitis       * order for DME     1 Device    Equipment being ordered: Wheelchair motorized for patient with spinal stenosis and neurogenic claudication    Spinal stenosis, lumbar region, with neurogenic claudication       * order for DME     1 Units    Equipment being ordered: TENS    Chronic bilateral low back pain with sciatica, sciatica laterality unspecified, Hip pain, bilateral       order for DME     1 Units    Equipment being ordered: compression stockings    Cellulitis and abscess of leg, except foot       order for DME     1 Device    Equipment being ordered: walker wheeled also include  With skis for indoor carpet use    Falls frequently, Deep inguinal pain, left       * oxyCODONE 30 MG 12 hr tablet    OxyCONTIN    60 tablet     Take 1 tablet (30 mg) by mouth every 12 hours    Failed back surgical syndrome       * oxyCODONE IR 5 MG tablet   Start taking on:  11/21/2018    ROXICODONE    40 tablet    Take 1 daily as needed  for severe pain /at bedtime may have up to 2 per day max 40 per month    Lumbar back pain with radiculopathy affecting left lower extremity       polyethylene glycol powder    MIRALAX/GLYCOLAX    119 g    Take 17 g (1 capful) by mouth daily    Chronic idiopathic constipation       SELECT-LITE DEVICE/LANCETS Kit     1 kit    1 kit 2 times daily    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)       sertraline 100 MG tablet    ZOLOFT    180 tablet    Take 2 tablets (200 mg) by mouth daily    Severe episode of recurrent major depressive disorder, without psychotic features (H)       spironolactone 25 MG tablet    ALDACTONE    90 tablet    TAKE 1 TABLET(25 MG) BY MOUTH DAILY    Essential hypertension with goal blood pressure less than 140/90       traZODone 100 MG tablet    DESYREL    270 tablet    TAKE 3 TABLETS(300 MG) BY MOUTH EVERY NIGHT AT BEDTIME AS NEEDED FOR SLEEP    Primary insomnia       VITAMIN B-12 PO           * Notice:  This list has 6 medication(s) that are the same as other medications prescribed for you. Read the directions carefully, and ask your doctor or other care provider to review them with you.

## 2018-10-16 NOTE — PROGRESS NOTES
"  SUBJECTIVE:   Vincent Mishra is a 78 year old male who presents to clinic today for the following health issues:      Genitourinary - Male  Onset: past 2-3 days     Description:   Dysuria (painful urination): YES- burning   Hematuria (blood in urine): no   Frequency: YES  Are you urinating at night : YES  Hesitancy (delay in urine): YES  Retention (unable to empty): YES  Decrease in urinary flow: not sure   Incontinence: YES    Progression of Symptoms:  worsening    Accompanying Signs & Symptoms:  Fever: no   Back/Flank pain: hard to tell   Urethral discharge: no  Testicle lumps/masses/pain: no  Nausea and/or vomiting: no   Abdominal pain: no    History:   History of frequent UTI's: no   History of kidney stones: YES- once. Had and now has kidney cancer   History of hernias: no   Personal or Family history of Prostate problems: Prostate cancer- prostate removed   Sexually active: no     Precipitating factors:       Alleviating factors:       Left lateral ankle wound x 2-3 weeks  Picking at it   He tends to pick open wounds. It is sore now and not healing  No leg pain, walking normally     Coffee  Drinks a lot of coffee - all day long. Minimal water     Deconditioning  Very weak. Doesn't get out of bed some days - loves to lie in bed and read. Gets NO exercise   Wondering why he is weak, frustrated by the weakness     Kidney cancer is thought to be back again  Had ct with contrast that showed enlarged mass  Is supposed to \"have it frozen\" per his wife     Review of systems:  No f/c   No n/v   He is tired       Problem list and histories reviewed & adjusted, as indicated.  Additional history: as documented    Patient Active Problem List   Diagnosis     Tension headache     Trapezius strain     Hyperlipidemia LDL goal <100     Parotid mass     Diplopia     Neuropathy     Vision loss night     Neck pain     B12 deficiency     Tear of meniscus of left knee     Hypertension goal BP (blood pressure) < 140/90     C6-7 " disc with radiculopathy     Right bundle branch block     Advanced directives, info letter sent 10/4/2011     Chest pain     Anatomic airway obstruction     Abnormal antinuclear antibody titer     Osteoarthritis, knee     Moderate major depression (H)     Orchitis, epididymitis, and epididymo-orchitis     Malignant neoplasm of kidney excluding renal pelvis (H)     Renal mass, left     Vitamin D deficiency disease     Health Care Home     Insomnia     Abdominal pain, right upper quadrant     Esophageal reflux     Hard of hearing     Constipation     NSTEMI (non-ST elevated myocardial infarction) (H)     Myocardial infarction, nontransmural (H)     Acute myocardial infarction of inferolateral wall (H)     Obesity     Sinoatrial node dysfunction (H)     Right bundle branch block (RBBB)     Essential hypertension     Hyperlipidemia     Type 2 diabetes mellitus with other circulatory complication, without long-term current use of insulin (H)     Thyroid nodule     Hip pain, bilateral     Claudication (H)     Bilateral low back pain with right-sided sciatica     Bilateral low back pain with left-sided sciatica     ACS (acute coronary syndrome) (H)     Chronic bilateral low back pain with sciatica, sciatica laterality unspecified     Severe episode of recurrent major depressive disorder, without psychotic features (H)     Current Outpatient Prescriptions   Medication     acetaminophen (TYLENOL) 325 MG tablet     amLODIPine (NORVASC) 5 MG tablet     ascorbic acid (VITAMIN C) 500 MG tablet     aspirin EC 81 MG EC tablet     atorvastatin (LIPITOR) 80 MG tablet     blood glucose monitoring (NO BRAND SPECIFIED) meter device kit     Calcium Carbonate-Vitamin D (CALCIUM + D) 600-200 MG-UNIT per tablet     cholecalciferol (VITAMIN  -D) 1000 UNITS capsule     clopidogrel (PLAVIX) 75 MG tablet     Cyanocobalamin (VITAMIN B-12 PO)     gabapentin (NEURONTIN) 100 MG capsule     hydrochlorothiazide (HYDRODIURIL) 25 MG tablet      "ibuprofen (ADVIL/MOTRIN) 800 MG tablet     lisinopril (PRINIVIL/ZESTRIL) 40 MG tablet     Melatonin 10 MG TBDP     metFORMIN (GLUCOPHAGE-XR) 500 MG 24 hr tablet     omeprazole (PRILOSEC) 20 MG CR capsule     [START ON 11/21/2018] oxyCODONE IR (ROXICODONE) 5 MG tablet     polyethylene glycol (MIRALAX/GLYCOLAX) powder     sertraline (ZOLOFT) 100 MG tablet     spironolactone (ALDACTONE) 25 MG tablet     traZODone (DESYREL) 100 MG tablet     blood glucose monitoring (NO BRAND SPECIFIED) meter device kit     blood glucose monitoring (ONE TOUCH ULTRA) test strip     Lancets Misc. (SELECT-LITE DEVICE/LANCETS) KIT     [employer] FINEPOINT LANCETS MISC     nitroglycerin (NITROSTAT) 0.4 MG SL tablet     order for DME     order for DME     order for DME     order for DME     oxyCODONE (OXYCONTIN) 30 MG 12 hr tablet     No current facility-administered medications for this visit.         Allergies   Allergen Reactions     Prozac [Fluoxetine] Other (See Comments)     \"I went crazy.\"       Benadryl [Diphenhydramine Hcl] Other (See Comments)     Starts to shake, and paranoid     Codeine Itching     Diphenhydramine Other (See Comments)     Hallucinations, See [facility] [place]note 2[place] records scanned on 7/16/15     Labetalol Other (See Comments)     See [facility] [place][note 2] records scanned on 7/16/15  Bradycardia down to 30 on holter, dizziness. Stopped med and symptoms resolved     Metoprolol Other (See Comments)     Bradycardia even at 12.5 mg     Morphine Visual Disturbance       /70 (BP Location: Right arm, Patient Position: Sitting, Cuff Size: Adult Regular)  Pulse 60  Temp 98.6  F (37  C) (Tympanic)  Ht 5' 8\" (1.727 m)  Wt 194 lb 6.4 oz (88.2 kg)  BMI 29.56 kg/m2  GENERAL - Pt is alert and oriented in no acute distress.  Affect is appropriate. Good eye contact.  NECK - Neck is supple w/o LA or thyromegaly  RESPIRATORY - Clear to auscultation bilaterally.  No wheezing noted  CV - RRR, no murmurs, rubs, " gallops.   ABD - +BS, soft, nontender, no rebound, no guarding. No palpable organomegaly.  SKIN - left lateral ankle with open sore and surrounding erythema. No induration. No warmth.   BACK - No CVA tenderness. No paraspinous tenderness      Results for orders placed or performed in visit on 10/16/18   UA reflex to Microscopic and Culture   Result Value Ref Range    Color Urine Yellow     Appearance Urine Clear     Glucose Urine Negative NEG^Negative mg/dL    Bilirubin Urine Negative NEG^Negative    Ketones Urine Negative NEG^Negative mg/dL    Specific Gravity Urine 1.015 1.003 - 1.035    Blood Urine Negative NEG^Negative    pH Urine 6.0 5.0 - 7.0 pH    Protein Albumin Urine Negative NEG^Negative mg/dL    Urobilinogen Urine 1.0 0.2 - 1.0 EU/dL    Nitrite Urine Negative NEG^Negative    Leukocyte Esterase Urine Negative NEG^Negative    Source PENDING          Assessment/Plan -    (R30.0) Dysuria  (primary encounter diagnosis)  Comment: no sign of UTI. Possibly due to high coffee intake. Reduce coffee and increase water intake. Let us know if symptoms persist.   Plan:     (R53.81) Physical deconditioning  Comment: due to lack of activity. Referral made to PT for education on exercises to get stronger.   Plan: [facility] PT, HAND, AND CHIROPRACTIC REFERRAL            (R39.89) Urinary problem  Comment:   Plan: UA reflex to Microscopic and Culture            (S81.002A,  S81.802A,  S91.002A) Open wound of left knee, leg, and ankle, initial encounter  Comment: no cellulitis but at risk. Wound was dressed for him today. Redress every 24hours for a few days. Wear tall socks or long pants   Plan:     (F42.4) Skin picking habit  Comment: cut nails. Distract hands with something else - silly putty   Plan:         ARYA Velez MD

## 2018-10-18 ENCOUNTER — THERAPY VISIT (OUTPATIENT)
Dept: PHYSICAL THERAPY | Facility: CLINIC | Age: 78
End: 2018-10-18
Attending: FAMILY MEDICINE
Payer: MEDICARE

## 2018-10-18 ENCOUNTER — TELEPHONE (OUTPATIENT)
Dept: UROLOGY | Facility: CLINIC | Age: 78
End: 2018-10-18

## 2018-10-18 DIAGNOSIS — R53.81 PHYSICAL DECONDITIONING: ICD-10-CM

## 2018-10-18 PROCEDURE — G8978 MOBILITY CURRENT STATUS: HCPCS | Mod: GP | Performed by: PHYSICAL THERAPIST

## 2018-10-18 PROCEDURE — G8979 MOBILITY GOAL STATUS: HCPCS | Mod: GP | Performed by: PHYSICAL THERAPIST

## 2018-10-18 PROCEDURE — 97161 PT EVAL LOW COMPLEX 20 MIN: CPT | Mod: GP | Performed by: PHYSICAL THERAPIST

## 2018-10-18 PROCEDURE — 97110 THERAPEUTIC EXERCISES: CPT | Mod: GP | Performed by: PHYSICAL THERAPIST

## 2018-10-18 NOTE — LETTER
DEPARTMENT OF HEALTH AND HUMAN SERVICES  CENTERS FOR MEDICARE & MEDICAID SERVICES    PLAN/UPDATED PLAN OF PROGRESS FOR OUTPATIENT REHABILITATION    PATIENTS NAME:  Vincent Mishra   : 1940  PROVIDER NUMBER:    [id number]  HICN:  [id number]   PROVIDER NAME: [facility]  MEDICAL RECORD NUMBER: [id number]   START OF CARE DATE:  SOC Date: 10/18/18   TYPE:  PT    PRIMARY/TREATMENT DIAGNOSIS: (Pertinent Medical Diagnosis)  Physical deconditioning    VISITS FROM START OF CARE:  Rxs Used: 1     [facility] Initial Evaluation  Subjective:  Patient is a 78 year old male presenting with rehab general hpi.   General   Condition requiring PT:  Deconditioning  Chronicity:  Chronic  Onset date of current episode/exacerbation comment:  Vincent reports increased time in bed and decreased walking has led to his physical deconditioning. Started having increased LBP on 10/1/18 but wants to focus on LE strengthening to help with walking and ADLs in PT.   Pain location:  Lumbar spine  Radiates to: groin.  Pain quality:  Aching  Frequency:  Intermittent  Number scale:  6/10  Associated with: n/a.  Symptoms exacerbated by:  Activity, descending stairs and ascending stairs  Symptoms relieved by:  Nothing  Progression since onset:  Unchanged  Special testing: n/a.  Previous treatment: n/a.  Improvement with previous treatment: n/a.  General health as reported by patient:  Fair  Please check all that apply to your current or past medical history:  Cancer, depression, diabetes and heart problems  Other surgeries:  Cancer surgery and heart surgery  Medications you are currently taking:  Anti-depressants, high blood pressure medication, sleep medication and pain medication  Occupation comment:  Retired    Barriers at home/work:  Stairs and requires assistance with ADL's  Red flags:  None as reported by the patient          PATIENTS NAME:  Vincent Mishra   : 1940        General  Evaluation:  Gross Strength:  Gross strength wnl general: Gross 4+/5 LE strength based on performance with LE strengthening exercises.    Balance:  Balance wnl general: Poor balance in gait and with SLS with UE support.  Gait:  Gait wnl general: Ambulates with forearm crutch on R. Able to ambualte without crutch with decerased stride length, increased lateral sway.    Assessment/Plan:    Patient is a 78 year old male with lower extremity complaints.    Patient has the following significant findings with corresponding treatment plan.                Diagnosis 1:  Physical Deconditioning  Pain -  manual therapy, self management, education and home program  Decreased ROM/flexibility - manual therapy and therapeutic exercise  Decreased joint mobility - manual therapy and therapeutic exercise  Decreased strength - therapeutic exercise and therapeutic activities  Impaired balance - neuro re-education and therapeutic activities  Decreased proprioception - neuro re-education and therapeutic activities  Impaired gait - gait training, assistive devices and home program    Therapy Evaluation Codes:   1) History comprised of:   Personal factors that impact the plan of care:      Overall behavior pattern, Past/current experiences and Time since onset of symptoms.    Comorbidity factors that impact the plan of care are:      Cancer, Chemical Dependency, Diabetes, Depression, Heart problems and High blood pressure.     Medications impacting care: Anti-depressant, High blood pressure, Pain and Sleep.  2) Examination of Body Systems comprised of:   Body structures and functions that impact the plan of care:      Hip and Knee.   Activity limitations that impact the plan of care are:      Dressing, Lifting, Squatting/kneeling, Stairs, Standing, Walking, Sleeping and Laying down.  3) Clinical presentation characteristics are:   Stable/Uncomplicated.  4) Decision-Making    Low complexity using standardized patient assessment instrument  "and/or measureable assessment of functional outcome.  Cumulative Therapy Evaluation is: Low complexity.    Previous and current functional limitations:  (See Goal Flow Sheet for this information)    Short term and Long term goals: (See Goal Flow Sheet for this information)     Communication ability:  Patient appears to be able to clearly communicate and understand verbal and written communication and follow directions correctly.      PATIENTS NAME:  Vincent Mishra   : 1940      Treatment Explanation - The following has been discussed with the patient:   RX ordered/plan of care  Anticipated outcomes  Possible risks and side effects  This patient would benefit from PT intervention to resume normal activities.   Rehab potential is fair.    Frequency:  1 X week, once daily  Duration:  for 8 weeks  Discharge Plan:  Achieve all LTG.  Independent in home treatment program.  Reach maximal therapeutic benefit.          Caregiver Signature/Credentials _____________________________ Date ________       Treating Provider: Luis Felipe Wood DPT       I have reviewed and certified the need for these services and plan of treatment while under my care.        PHYSICIAN'S SIGNATURE:   _________________________________________  Date___________   Ramya Velez MD    Certification period:  Beginning of Cert date period: 10/18/18 to  End of Cert period date: 18     Functional Level Progress Report: Please see attached \"Goal Flow sheet for Functional level.\"    ____X____ Continue Services or       ________ DC Services                Service dates: From  SOC Date: 10/18/18 date to present                         "

## 2018-10-18 NOTE — MR AVS SNAPSHOT
After Visit Summary   10/18/2018    Vincent Misrha    MRN: [id number]           Patient Information     Date Of Birth          1940        Visit Information        Provider Department      10/18/2018 11:55 AM Jefe Wood PT [facility]        Today's Diagnoses     Physical deconditioning           Follow-ups after your visit        Your next 10 appointments already scheduled     Oct 25, 2018 10:35 AM CDT   WYATT Spine with Jefe Wood PT   [facility] ([facility]    [address] [facility] [address]   [phone]            Nov 01, 2018 11:55 AM CDT   WYATT Spine with Jefe Wood PT   [facility] ([facility])    [address]  [facility] [address]   [phone]            Dec 20, 2018 10:30 AM CST   Return Visit with Geremias Phelps MD   [facility] ([facility])    [address] [place] [address] [id number] [phone]              Who to contact     If you have questions or need follow up information about today's clinic visit or your schedule please contact [facility] directly at [phone].  Normal or non-critical lab and imaging results will be communicated to you by [employer], letter or phone within 4 business days after the clinic has received the results. If you do not hear from us within 7 days, please contact the clinic through [employer] or phone. If you have a critical or abnormal lab result, we will notify you by phone as soon as possible.  Submit refill requests through [employer] or call your pharmacy and they will forward the refill request to us. Please allow 3 business days for your refill to be completed.          Additional Information About Your Visit        [employer] Information     [employer] gives you secure access to your electronic health record. If you see a primary care provider, you can also send messages to your care team and  make appointments. If you have questions, please call your primary care clinic.  If you do not have a primary care provider, please call [phone] and they will assist you.        Care EveryWhere ID     This is your Care EveryWhere ID. This could be used by other organizations to access your [facility] medical records  [id number]         Blood Pressure from Last 3 Encounters:   10/16/18 135/70   10/05/18 135/68   09/17/18 132/74    Weight from Last 3 Encounters:   10/16/18 88.2 kg (194 lb 6.4 oz)   10/05/18 86.2 kg (190 lb)   09/14/18 86.4 kg (190 lb 6.4 oz)              We Performed the Following     HC PT EVAL, LOW COMPLEXITY     WYATT CERT REPORT     WYATT INITIAL EVAL REPORT     WYATT PT, HAND, AND CHIROPRACTIC REFERRAL     THERAPEUTIC EXERCISES        Primary Care Provider Office Phone # Fax #    Keyla Fonseca [phone] [facility] [address]        Equal Access to Services     REGINO ZULETA : Hadii aad ku hadasho Soomaali, waaxda luqadaha, qaybta kaalmada adeegyada, waxay idiin hayaan deena khsimba lorenzo . So [facility] [phone].    ATENCIÓN: Si habla español, tiene a nicholas disposición servicios gratuitos de asistencia lingüística. Ll[facility] [phone].    We comply with applicable federal civil rights laws and [place] laws. We do not discriminate on the basis of race, color, national origin, age, disability, sex, sexual orientation, or gender identity.            Thank you!     Thank you for choosing [facility]  for your care. Our goal is always to provide you with excellent care. Hearing back from our patients is one way we can continue to improve our services. Please take a few minutes to complete the written survey that you may receive in the mail after your visit with us. Thank you!             Your Updated Medication List - Protect others around you: Learn how to safely use, store and throw away your medicines at www.disposemymeds.org.           This list is accurate as of 10/18/18  1:12 PM.  Always use your most recent med list.                   Brand Name Dispense Instructions for use Diagnosis    acetaminophen 325 MG tablet    TYLENOL    250 tablet    Take 2 tablets by mouth every 4 hours as needed.    Tear of meniscus of left knee       amLODIPine 5 MG tablet    NORVASC    90 tablet    Take 1 tablet (5 mg) by mouth daily    Hypertension goal BP (blood pressure) < 140/90       ascorbic acid 500 MG tablet    VITAMIN C     Take 500 mg by mouth daily        aspirin 81 MG EC tablet      Take 1 tablet (81 mg) by mouth daily    Chest pain, unspecified type, NSTEMI (non-ST elevated myocardial infarction) (H)       atorvastatin 80 MG tablet    LIPITOR    90 tablet    TAKE 1 TABLET BY MOUTH DAILY    Hyperlipidemia LDL goal <100       * blood glucose monitoring meter device kit    no brand specified    1 kit    Use to test blood sugar 2 times daily or as directed.    Type 2 diabetes mellitus with hyperglycemia, without long-term current use of insulin (H)       * blood glucose monitoring meter device kit    no brand specified    1 kit    Use to test blood sugar 1 times daily or as directed.    Type 2 diabetes mellitus with complication, without long-term current use of insulin (H)       blood glucose monitoring test strip    ONETOUCH ULTRA    200 strip    Use to test blood sugars 2 times daily or as directed.    Type 2 diabetes mellitus with other circulatory complications (H)       calcium + D 600-200 MG-UNIT Tabs   Generic drug:  calcium carbonate-vitamin D     100 tablet    Take 2 tablets by mouth daily.        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    Type 2 diabetes, HbA1c goal < 7% (H)       lisinopril 40 MG tablet    PRINIVIL/ZESTRIL    90 tablet    TAKE 1 TABLET(40 MG) BY MOUTH DAILY    Essential hypertension with goal blood pressure less than 140/90       Melatonin 10 MG Tbdp     90 tablet    Take 10 mg by mouth At Bedtime        metFORMIN 500 MG 24 hr tablet    GLUCOPHAGE-XR    360 tablet    TAKE 2 TABLETS BY MOUTH TWICE DAILY WITH MEALS.    Type 2 diabetes mellitus without complication, unspecified long term insulin use status       nitroGLYcerin 0.4 MG sublingual tablet    NITROSTAT    25 tablet    Place 1 tablet (0.4 mg) under the tongue every 5 minutes as needed for chest pain    CAD (coronary artery disease)       omeprazole 20 MG CR capsule    priLOSEC    180 capsule    TAKE ONE CAPSULE BY MOUTH TWICE DAILY    Gastroesophageal reflux disease without esophagitis       * order for DME     1 Device    Equipment being ordered: Wheelchair motorized for patient with spinal stenosis and neurogenic claudication    Spinal stenosis, lumbar region, with neurogenic claudication       * order for DME     1 Units    Equipment being ordered: TENS    Chronic bilateral low back pain with sciatica, sciatica laterality unspecified, Hip pain, bilateral       order for DME     1 Units    Equipment being ordered: compression stockings    Cellulitis and abscess of leg,  except foot       order for DME     1 Device    Equipment being ordered: walker wheeled also include  With skis for indoor carpet use    Falls frequently, Deep inguinal pain, left       * oxyCODONE 30 MG 12 hr tablet    OxyCONTIN    60 tablet    Take 1 tablet (30 mg) by mouth every 12 hours    Failed back surgical syndrome       * oxyCODONE IR 5 MG tablet   Start taking on:  11/21/2018    ROXICODONE    40 tablet    Take 1 daily as needed  for severe pain /at bedtime may have up to 2 per day max 40 per month    Lumbar back pain with radiculopathy affecting left lower extremity       polyethylene glycol powder    MIRALAX/GLYCOLAX    119 g    Take 17 g (1 capful) by mouth daily    Chronic idiopathic constipation       SELECT-LITE DEVICE/LANCETS Kit     1 kit    1 kit 2 times daily    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)       sertraline 100 MG tablet    ZOLOFT    180 tablet    Take 2 tablets (200 mg) by mouth daily    Severe episode of recurrent major depressive disorder, without psychotic features (H)       spironolactone 25 MG tablet    ALDACTONE    90 tablet    TAKE 1 TABLET(25 MG) BY MOUTH DAILY    Essential hypertension with goal blood pressure less than 140/90       traZODone 100 MG tablet    DESYREL    270 tablet    TAKE 3 TABLETS(300 MG) BY MOUTH EVERY NIGHT AT BEDTIME AS NEEDED FOR SLEEP    Primary insomnia       VITAMIN B-12 PO           * Notice:  This list has 6 medication(s) that are the same as other medications prescribed for you. Read the directions carefully, and ask your doctor or other care provider to review them with you.

## 2018-10-18 NOTE — TELEPHONE ENCOUNTER
[facility] Call Center    Phone Message    May a detailed message be left on voicemail: yes    Reason for Call: Other: Per call from PT's wife PT was told by Dr Capone that someone from the clinic would be calling PT on Monday or Tuesday this week to schedule Kidney surgery but no one has called the PT. Pt's wife if requesting for a call back to the above #     Action Taken: Message routed to:  [facility] ([facility] Urology

## 2018-10-18 NOTE — PROGRESS NOTES
[facility] Initial Evaluation  Subjective:  Patient is a 78 year old male presenting with rehab general hpi.   General   Condition requiring PT:  Deconditioning  Chronicity:  Chronic  Onset date of current episode/exacerbation comment:  Vincent reports increased time in bed and decreased walking has led to his physical deconditioning. Started having increased LBP on 10/1/18 but wants to focus on LE strengthening to help with walking and ADLs in PT.   Pain location:  Lumbar spine  Radiates to: groin.  Pain quality:  Aching  Frequency:  Intermittent  Number scale:  6/10  Associated with: n/a.  Symptoms exacerbated by:  Activity, descending stairs and ascending stairs  Symptoms relieved by:  Nothing  Progression since onset:  Unchanged  Special testing: n/a.  Previous treatment: n/a.  Improvement with previous treatment: n/a.  General health as reported by patient:  Fair  Please check all that apply to your current or past medical history:  Cancer, depression, diabetes and heart problems  Other surgeries:  Cancer surgery and heart surgery  Medications you are currently taking:  Anti-depressants, high blood pressure medication, sleep medication and pain medication  Occupation comment:  Retired    Barriers at home/work:  Stairs and requires assistance with ADL's  Red flags:  None as reported by the patient                      Objective:  System    Physical Exam        General Evaluation:      Gross Strength:  Gross strength wnl general: Gross 4+/5 LE strength based on performance with LE strengthening exercises.                              Balance:  Balance wnl general: Poor balance in gait and with SLS with UE support.                  Gait:  Gait wnl general: Ambulates with forearm crutch on R. Able to ambualte without crutch with decerased stride length, increased lateral sway.                                         ROS    Assessment/Plan:    Patient is a 78 year old male with lower extremity  complaints.    Patient has the following significant findings with corresponding treatment plan.                Diagnosis 1:  Physical Deconditioning  Pain -  manual therapy, self management, education and home program  Decreased ROM/flexibility - manual therapy and therapeutic exercise  Decreased joint mobility - manual therapy and therapeutic exercise  Decreased strength - therapeutic exercise and therapeutic activities  Impaired balance - neuro re-education and therapeutic activities  Decreased proprioception - neuro re-education and therapeutic activities  Impaired gait - gait training, assistive devices and home program    Therapy Evaluation Codes:   1) History comprised of:   Personal factors that impact the plan of care:      Overall behavior pattern, Past/current experiences and Time since onset of symptoms.    Comorbidity factors that impact the plan of care are:      Cancer, Chemical Dependency, Diabetes, Depression, Heart problems and High blood pressure.     Medications impacting care: Anti-depressant, High blood pressure, Pain and Sleep.  2) Examination of Body Systems comprised of:   Body structures and functions that impact the plan of care:      Hip and Knee.   Activity limitations that impact the plan of care are:      Dressing, Lifting, Squatting/kneeling, Stairs, Standing, Walking, Sleeping and Laying down.  3) Clinical presentation characteristics are:   Stable/Uncomplicated.  4) Decision-Making    Low complexity using standardized patient assessment instrument and/or measureable assessment of functional outcome.  Cumulative Therapy Evaluation is: Low complexity.    Previous and current functional limitations:  (See Goal Flow Sheet for this information)    Short term and Long term goals: (See Goal Flow Sheet for this information)     Communication ability:  Patient appears to be able to clearly communicate and understand verbal and written communication and follow directions correctly.  Treatment  Explanation - The following has been discussed with the patient:   RX ordered/plan of care  Anticipated outcomes  Possible risks and side effects  This patient would benefit from PT intervention to resume normal activities.   Rehab potential is fair.    Frequency:  1 X week, once daily  Duration:  for 8 weeks  Discharge Plan:  Achieve all LTG.  Independent in home treatment program.  Reach maximal therapeutic benefit.    Please refer to the daily flowsheet for treatment today, total treatment time and time spent performing 1:1 timed codes.

## 2018-10-24 ENCOUNTER — TELEPHONE (OUTPATIENT)
Dept: UROLOGY | Facility: CLINIC | Age: 78
End: 2018-10-24

## 2018-10-24 NOTE — TELEPHONE ENCOUNTER
Patient is scheduled for surgery with Dr. Capone [facility]      Spoke or left message with: Pat    Date of Surgery: 12/4/18    Location: [facility]    Informed patient they will need an adult  yes    Pre-op with surgeon (if applicable): n/a    H&P: Scheduled with PAC 11/20/18    Additional imaging/appointments: n/a    Surgery packet: mailed 10/24/18     Additional comments: n/a

## 2018-10-25 ENCOUNTER — THERAPY VISIT (OUTPATIENT)
Dept: PHYSICAL THERAPY | Facility: CLINIC | Age: 78
End: 2018-10-25
Payer: MEDICARE

## 2018-10-25 DIAGNOSIS — R29.898 LEG WEAKNESS: Primary | ICD-10-CM

## 2018-10-25 PROCEDURE — 97110 THERAPEUTIC EXERCISES: CPT | Mod: GP | Performed by: PHYSICAL THERAPIST

## 2018-10-25 NOTE — MR AVS SNAPSHOT
After Visit Summary   10/25/2018    Vincent Mishra    MRN: [id number]           Patient Information     Date Of Birth          1940        Visit Information        Provider Department      10/25/2018 10:35 AM Jefe Wood PT [facility]        Today's Diagnoses     Leg weakness    -  1       Follow-ups after your visit        Your next 10 appointments already scheduled     Nov 01, 2018 11:55 AM CDT   [facility] Spine with Jefe Wood PT   [facility] ([facility])    [address] [place] [address]   [phone]            Nov 20, 2018  1:00 PM CST   (Arrive by 12:45 PM)   PAC EVALUATION with BESSY Hayes Pending sale to [facility] ([facility])    [address]  4th Floor  [place] [address] [phone]            Dec 04, 2018   Procedure with GENERIC ANESTHESIA PROVIDER   [facility], Same Day Surgery (--)    [address] [address] [phone]            Dec 04, 2018  9:00 AM CST   CT RENAL TUMOR CRYOABLATION with UUCT2   [facility], Interventional Radiology ([facility] [place])    [address] [address] [phone]           How do I prepare for my exam? (Food and drink instructions) The day before your exam: Drink extra fluids  at least six 8-ounce glasses (unless your doctor tells you to restrict fluids).  The day of your exam: No eating or drinking for 4 hours before your test. You may take medicine with small sips of water  What should I wear: Please wear loose clothing, such as a sweat suit or jogging clothes. Avoid snaps, zippers and other metal. We may ask you to undress and put on a hospital gown.  How long does the exam take: Plan to spend up to 6 hours at the hospital. The test itself normally takes less than an hour. We will watch for side effects for 1 to 4 hours.  What  should I bring: Please bring any scans or X-rays taken at other hospitals, if they may be helpful. Also bring a list of your medicines, including vitamins, minerals and over-the-counter drugs. It is safest to leave personal items at home.  Do I need a : Plan for an adult to drive you home and stay with you until morning.  What do I need to tell my doctor? Tell your doctor in advance: * If you have any allergies. * If you are breastfeeding or there s any chance you are pregnant. * If you are taking Coumadin (or any other blood thinners) 5 days prior to the exam for any special instructions. * If you are diabetic to determine if your insulin needs have to be adjusted for the exam.  What should I do after the exam: When you get home, you ll need to take it easy for the rest of the day.  Do not drive for 24 hours. You may have slight bruising and mild pain in the area. If so, you may take acetaminophen (Tylenol) or ibuprofen (Motrin, Advil) after you get home.  Some people go home with a small tube (catheter) sewn into the skin. If this case, we will show you how to care for the tube.  What is this test: This test uses a very thin needle to remove tissue, fluid or other cells from your body. We then send the cells to a lab for testing. A biopsy tests for disease in a tissue sample. Aspiration tests for disease or infection in a fluid sample. We use pictures from a CT (computed tomography) scan to guide the needle to the right place. A CT scan is a special X-ray. The scanner creates images of the body in cross sections, much like slices of bread. You may receive contrast (X-ray dye) before or during your scan. Contrast is given through an IV (small needle in your arm) or taken by mouth.  Who should I call with questions: If you have any questions, please call the imaging department where you will have your exam. Directions, parking instructions, and other information is available on our website,  [email or web address].            Dec 20, 2018 10:30 AM CST   Return Visit with Geremias Phelps MD   [facility] ([facility])    [address] [address] [phone]            Jan 18, 2019 10:00 AM CST   (Arrive by 9:45 AM)   Post-Op with Lucius Capone MD   [facility] ([facility])    [address] [place] [address]  [address] [facility] [address] [phone]              Who to contact     If you have questions or need follow up information about today's clinic visit or your schedule please contact [facility] directly at [phone].  Normal or non-critical lab and imaging results will be communicated to you by MyChart, letter or phone within 4 business days after the clinic has received the results. If you do not hear from us within 7 days, please contact the clinic through Softricityhart or phone. If you have a critical or abnormal lab result, we will notify you by phone as soon as possible.  Submit refill requests through Precognate or call your pharmacy and they will forward the refill request to us. Please allow 3 business days for your refill to be completed.          Additional Information About Your Visit        Soft[employer]ityhart Information     [employer] gives you secure access to your electronic health record. If you see a primary care provider, you can also send messages to your care team and make appointments. If you have questions, please call your primary care clinic.  If you do not have a primary care provider, please call [phone] and they will assist you.        Care EveryWhere ID     This is your Care EveryWhere ID. This could be used by other organizations to access your [facility] medical records  [id number]         Blood Pressure from Last 3 Encounters:   10/16/18 135/70   10/05/18 135/68   09/17/18 132/74    Weight from Last 3 Encounters:    10/16/18 88.2 kg (194 lb 6.4 oz)   10/05/18 86.2 kg (190 lb)   09/14/18 86.4 kg (190 lb 6.4 oz)              We Performed the Following     THERAPEUTIC EXERCISES        Primary Care Provider Office Phone # Fax #    Keyla Fonseca [phone] [address] [facility] [address]        Equal Access to Services     OVI ZULETA : Hadii aad ku hadasho Soomaali, waaxda luqadaha, qaybta kaalmada adeegyada, waxay idiin hayaan adeeg khsimba ladennyn . So [facility] [phone].    ATENCIÓN: Si prisca devlin, tiene a nicholas disposición servicios gratuitos de asistencia lingüística. Llame al [phone].    We comply with applicable federal civil rights laws and [place] laws. We do not discriminate on the basis of race, color, national origin, age, disability, sex, sexual orientation, or gender identity.            Thank you!     Thank you for choosing [facility]  for your care. Our goal is always to provide you with excellent care. Hearing back from our patients is one way we can continue to improve our services. Please take a few minutes to complete the written survey that you may receive in the mail after your visit with us. Thank you!             Your Updated Medication List - Protect others around you: Learn how to safely use, store and throw away your medicines at www.disposemymeds.org.          This list is accurate as of 10/25/18 11:28 AM.  Always use your most recent med list.                   Brand Name Dispense Instructions for use Diagnosis    acetaminophen 325 MG tablet    TYLENOL    250 tablet    Take 2 tablets by mouth every 4 hours as needed.    Tear of meniscus of left knee       amLODIPine 5 MG tablet    NORVASC    90 tablet    Take 1 tablet (5 mg) by mouth daily    Hypertension goal BP (blood pressure) < 140/90       ascorbic acid 500 MG tablet    VITAMIN C     Take 500 mg by mouth daily        aspirin 81 MG EC tablet      Take 1 tablet (81 mg) by mouth daily     Chest pain, unspecified type, NSTEMI (non-ST elevated myocardial infarction) (H)       atorvastatin 80 MG tablet    LIPITOR    90 tablet    TAKE 1 TABLET BY MOUTH DAILY    Hyperlipidemia LDL goal <100       * blood glucose monitoring meter device kit    no brand specified    1 kit    Use to test blood sugar 2 times daily or as directed.    Type 2 diabetes mellitus with hyperglycemia, without long-term current use of insulin (H)       * blood glucose monitoring meter device kit    no brand specified    1 kit    Use to test blood sugar 1 times daily or as directed.    Type 2 diabetes mellitus with complication, without long-term current use of insulin (H)       blood glucose monitoring test strip    ONETOUCH ULTRA    200 strip    Use to test blood sugars 2 times daily or as directed.    Type 2 diabetes mellitus with other circulatory complications (H)       calcium + D 600-200 MG-UNIT Tabs   Generic drug:  calcium carbonate-vitamin D     100 tablet    Take 2 tablets by mouth daily.        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    Type 2 diabetes, HbA1c goal < 7% (H)       lisinopril 40 MG tablet    PRINIVIL/ZESTRIL    90 tablet    TAKE 1 TABLET(40 MG) BY MOUTH DAILY    Essential hypertension with goal blood pressure less than 140/90       Melatonin 10 MG Tbdp     90 tablet    Take 10 mg by mouth At Bedtime        metFORMIN 500 MG 24 hr tablet    GLUCOPHAGE-XR    360 tablet    TAKE 2 TABLETS BY MOUTH TWICE DAILY WITH MEALS.    Type 2 diabetes mellitus without complication, unspecified long term insulin use status       nitroGLYcerin 0.4 MG sublingual tablet    NITROSTAT    25 tablet    Place 1 tablet (0.4 mg) under the tongue every 5 minutes as needed for chest pain    CAD (coronary artery disease)       omeprazole 20 MG CR capsule    priLOSEC    180 capsule    TAKE ONE CAPSULE BY MOUTH TWICE DAILY    Gastroesophageal reflux disease without esophagitis       * order for DME     1 Device    Equipment being ordered: Wheelchair motorized for patient with spinal stenosis and neurogenic claudication    Spinal stenosis, lumbar region, with neurogenic claudication       * order for DME     1 Units    Equipment being ordered: TENS    Chronic bilateral low back pain with sciatica, sciatica laterality unspecified, Hip pain, bilateral       order for DME     1 Units    Equipment being ordered: compression stockings    Cellulitis and abscess of leg, except foot       order for DME     1 Device    Equipment being ordered: walker wheeled also include  With skis for indoor carpet use    Falls frequently, Deep inguinal pain, left       * oxyCODONE 30 MG 12 hr tablet    OxyCONTIN    60 tablet    Take 1 tablet (30 mg) by mouth every 12 hours    Failed back surgical syndrome       * oxyCODONE IR 5 MG tablet   Start taking on:  11/21/2018    ROXICODONE    40 tablet    Take 1 daily as needed  for severe pain /at bedtime may have up to 2 per day max 40 per month    Lumbar back pain with radiculopathy affecting left lower extremity        polyethylene glycol powder    MIRALAX/GLYCOLAX    119 g    Take 17 g (1 capful) by mouth daily    Chronic idiopathic constipation       SELECT-LITE DEVICE/LANCETS Kit     1 kit    1 kit 2 times daily    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)       sertraline 100 MG tablet    ZOLOFT    180 tablet    Take 2 tablets (200 mg) by mouth daily    Severe episode of recurrent major depressive disorder, without psychotic features (H)       spironolactone 25 MG tablet    ALDACTONE    90 tablet    TAKE 1 TABLET(25 MG) BY MOUTH DAILY    Essential hypertension with goal blood pressure less than 140/90       traZODone 100 MG tablet    DESYREL    270 tablet    TAKE 3 TABLETS(300 MG) BY MOUTH EVERY NIGHT AT BEDTIME AS NEEDED FOR SLEEP    Primary insomnia       VITAMIN B-12 PO           * Notice:  This list has 6 medication(s) that are the same as other medications prescribed for you. Read the directions carefully, and ask your doctor or other care provider to review them with you.

## 2018-10-31 DIAGNOSIS — R07.9 CHEST PAIN, UNSPECIFIED TYPE: Primary | ICD-10-CM

## 2018-10-31 DIAGNOSIS — I25.10 CAD (CORONARY ARTERY DISEASE): ICD-10-CM

## 2018-10-31 DIAGNOSIS — R07.9 CHEST PAIN, UNSPECIFIED TYPE: ICD-10-CM

## 2018-10-31 RX ORDER — NITROGLYCERIN 0.4 MG/1
0.4 TABLET SUBLINGUAL EVERY 5 MIN PRN
Qty: 25 TABLET | Refills: 1 | Status: ON HOLD | OUTPATIENT
Start: 2018-10-31 | End: 2019-01-01

## 2018-10-31 NOTE — TELEPHONE ENCOUNTER
Pat calling and stated that his current nitroglycerin are .  She hates for him to be without and hoping you could approve a refill for him.  Please review and advise.  Thank you..Ebony ARREAGA Randolph    nitroglycerin      Last Written Prescription Date:  9/28/15  Last Fill Quantity: 25,   # refills: 1  Last Office Visit: 10/16/18  Future Office visit:    Next 5 appointments (look out 90 days)     Dec 20, 2018 10:30 AM CST   Return Visit with Geremias Phelps MD   [facility] ([facility])    [address] [place] [address]   [phone]                   Routing refill request to provider for review/approval because:  Drug not on the [facility], [facility] or [facility] refill protocol or controlled substance

## 2018-11-01 RX ORDER — NITROGLYCERIN 0.4 MG/1
TABLET SUBLINGUAL
Qty: 75 TABLET | Refills: 1 | OUTPATIENT
Start: 2018-11-01

## 2018-11-02 DIAGNOSIS — I45.10 RIGHT BUNDLE BRANCH BLOCK: ICD-10-CM

## 2018-11-02 DIAGNOSIS — I21.4 NSTEMI (NON-ST ELEVATED MYOCARDIAL INFARCTION) (H): ICD-10-CM

## 2018-11-02 NOTE — TELEPHONE ENCOUNTER
"Requested Prescriptions   Pending Prescriptions Disp Refills     clopidogrel (PLAVIX) 75 MG tablet [Pharmacy Med Name: CLOPIDOGREL 75MG TABLETS] 90 tablet 0    Last Written Prescription Date:  5/1/18  Last Fill Quantity: 90,  # refills: 1   Last office visit: 10/16/2018 with prescribing provider:  leeann   Future Office Visit:   Next 5 appointments (look out 90 days)     Dec 20, 2018 10:30 AM CST   Return Visit with Geremias Phelps MD   [facility] ([facility])    [address] [place] [address]   [phone]                  Sig: TAKE 1 TABLET BY MOUTH DAILY    Plavix Failed    11/2/2018  9:49 AM       Failed - No active PPI on record unless is Protonix       Failed - Normal HGB on file in past 12 months    Recent Labs   Lab Test  09/14/18   1200   HGB  10.6*              Passed - Normal Platelets on file in past 12 months    Recent Labs   Lab Test  09/14/18   1200   PLT  357              Passed - Recent (12 mo) or future (30 days) visit within the authorizing provider's specialty    Patient had office visit in the last 12 months or has a visit in the next 30 days with authorizing provider or within the authorizing provider's specialty.  See \"Patient Info\" tab in inbasket, or \"Choose Columns\" in Meds & Orders section of the refill encounter.             Passed - Patient is age 18 or older          "

## 2018-11-05 PROBLEM — R29.898 LEG WEAKNESS: Status: RESOLVED | Noted: 2018-10-25 | Resolved: 2018-11-05

## 2018-11-05 RX ORDER — CLOPIDOGREL BISULFATE 75 MG/1
TABLET ORAL
Qty: 90 TABLET | Refills: 0 | Status: SHIPPED | OUTPATIENT
Start: 2018-11-05 | End: 2019-01-01

## 2018-11-09 DIAGNOSIS — M96.1 FAILED BACK SURGICAL SYNDROME: ICD-10-CM

## 2018-11-09 NOTE — TELEPHONE ENCOUNTER
Oxycodone 30mg    (he does have 7 days left)  Last Written Prescription Date:  9/14/18  Last Fill Quantity: 60,   # refills: 0  Last Office Visit: 10/16/18  Future Office visit:    Next 5 appointments (look out 90 days)     Dec 20, 2018 10:30 AM CST   Return Visit with Geremias Phelps MD   [facility] ([facility])    [address] [place] [address]   [phone]                   Routing refill request to provider for review/approval because:  Drug not on the [facility], [facility] or [facility] refill protocol or controlled substance

## 2018-11-13 NOTE — MR AVS SNAPSHOT
After Visit Summary   11/13/2018    Vincent Mishra    MRN: [id number]           Patient Information     Date Of Birth          1940        Visit Information        Provider Department      11/13/2018 11:00 AM Keyla Fonseca MD [facility]        Today's Diagnoses     Cellulitis of left lower extremity    -  1    Leg sore        Other specified soft tissue disorders            Follow-ups after your visit        Follow-up notes from your care team     Return in about 4 weeks (around 12/11/2018).      Your next 10 appointments already scheduled     Nov 19, 2018 10:45 AM CST   US NICO DOPPLER NO EXERCISE 1-2 LVLS BILAT with WYUS3   [facility]trasound ([facility])    [address] [address] [phone]           How do I prepare for my exam? (Food and drink instructions) No caffeine or tobacco for 1 hour prior to exam.  What should I wear: Wear comfortable clothes.  How long does the exam take: Most ultrasounds take 30 to 60 minutes.  What should I bring: Bring a list of your medicines, including vitamins, minerals and over-the-counter drugs. It is safest to leave personal items at home.  Do I need a :  No  is needed.  What do I need to tell my doctor: Tell your doctor about any allergies you may have.  What should I do after the exam: No restrictions, You may resume normal activities.  What is this test: An ultrasound uses sound waves to make pictures of the body. Sound waves do not cause pain. The only discomfort may be the pressure of the wand against your skin or full bladder.  Who should I call with questions: If you have any questions, please call the Imaging Department where you will have your exam. Directions, parking instructions, and other information is available on our website, [email or web address].            Nov 20, 2018  1:00 PM CST   (Arrive by 12:45 PM)   PAC EVALUATION with BESSY [facility]  Preoperative Assessment Center ([facility])    [address]  4th Floor  [facility] [address]   [phone]            Dec 04, 2018   Procedure with GENERIC ANESTHESIA PROVIDER   [facility]    [address] [address]   [phone]            Dec 04, 2018  9:00 AM CST   CT RENAL TUMOR CRYOABLATION with UUCT2   [facility] ([facility])    [address] [address]   [phone]           How do I prepare for my exam? (Food and drink instructions) The day before your exam: Drink extra fluids  at least six 8-ounce glasses (unless your doctor tells you to restrict fluids).  The day of your exam: No eating or drinking for 4 hours before your test. You may take medicine with small sips of water  What should I wear: Please wear loose clothing, such as a sweat suit or jogging clothes. Avoid snaps, zippers and other metal. We may ask you to undress and put on a hospital gown.  How long does the exam take: Plan to spend up to 6 hours at the hospital. The test itself normally takes less than an hour. We will watch for side effects for 1 to 4 hours.  What should I bring: Please bring any scans or X-rays taken at other hospitals, if they may be helpful. Also bring a list of your medicines, including vitamins, minerals and over-the-counter drugs. It is safest to leave personal items at home.  Do I need a : Plan for an adult to drive you home and stay with you until morning.  What do I need to tell my doctor? Tell your doctor in advance: * If you have any allergies. * If you are breastfeeding or there s any chance you are pregnant. * If you are taking Coumadin (or any other blood thinners) 5 days prior to the exam for any special instructions. * If you are diabetic to determine if your insulin needs have to be adjusted for the exam.  What should I  do after the exam: When you get home, you ll need to take it easy for the rest of the day.  Do not drive for 24 hours. You may have slight bruising and mild pain in the area. If so, you may take acetaminophen (Tylenol) or ibuprofen (Motrin, Advil) after you get home.  Some people go home with a small tube (catheter) sewn into the skin. If this case, we will show you how to care for the tube.  What is this test: This test uses a very thin needle to remove tissue, fluid or other cells from your body. We then send the cells to a lab for testing. A biopsy tests for disease in a tissue sample. Aspiration tests for disease or infection in a fluid sample. We use pictures from a CT (computed tomography) scan to guide the needle to the right place. A CT scan is a special X-ray. The scanner creates images of the body in cross sections, much like slices of bread. You may receive contrast (X-ray dye) before or during your scan. Contrast is given through an IV (small needle in your arm) or taken by mouth.  Who should I call with questions: If you have any questions, please call the imaging department where you will have your exam. Directions, parking instructions, and other information is available on our website, [email or web address].            Dec 20, 2018 10:30 AM CST   Return Visit with Geremias Phelps MD   [facility] ([facility])    [address] [place] [address]   [phone]            Jan 18, 2019 10:00 AM CST   (Arrive by 9:45 AM)   Post-Op with Lucius Capone MD   [facility] for Prostate and Urologic Cancers ([facility])    [address]  4th Floor  [facility] [address] [phone]              Future tests that were ordered for you today     Open Future Orders        Priority Expected Expires Ordered    US NICO Doppler No Exercise Routine  11/13/2019 11/13/2018            Who to contact     Normal  "or non-critical lab and imaging results will be communicated to you by MyChart, letter or phone within 4 business days after the clinic has received the results. If you do not hear from us within 7 days, please contact the clinic through [employer] or phone. If you have a critical or abnormal lab result, we will notify you by phone as soon as possible.  Submit refill requests through [employer] or call your pharmacy and they will forward the refill request to us. Please allow 3 business days for your refill to be completed.          If you need to speak with a  for additional information , please call: [phone]             Additional Information About Your Visit        [employer] Information     [employer] gives you secure access to your electronic health record. If you see a primary care provider, you can also send messages to your care team and make appointments. If you have questions, please call your primary care clinic.  If you do not have a primary care provider, please call [phone] and they will assist you.        Care EveryWhere ID     This is your Care EveryWhere ID. This could be used by other organizations to access your [facility] medical records  [id number]        Your Vitals Were     Pulse Temperature Height BMI (Body Mass Index)          76 98.5  F (36.9  C) (Tympanic) 5' 8\" (1.727 m) 31.02 kg/m2         Blood Pressure from Last 3 Encounters:   11/13/18 120/76   10/16/18 135/70   10/05/18 135/68    Weight from Last 3 Encounters:   11/13/18 204 lb (92.5 kg)   10/16/18 194 lb 6.4 oz (88.2 kg)   10/05/18 190 lb (86.2 kg)                 Today's Medication Changes          These changes are accurate as of 11/13/18  1:20 PM.  If you have any questions, ask your nurse or doctor.               Start taking these medicines.        Dose/Directions    sulfamethoxazole-trimethoprim 400-80 MG per tablet   Commonly known as:  BACTRIM/SEPTRA   Used for:  Cellulitis of left lower extremity   Started " by:  Keyla Fonseca MD        Dose:  1 tablet   Take 1 tablet by mouth 2 times daily   Quantity:  14 tablet   Refills:  0            Where to get your medicines      These medications were sent to [facility] [id number] - [address]  AT [facility] & [address] , [facility] [address]     Phone:  [phone]     sulfamethoxazole-trimethoprim 400-80 MG per tablet                Primary Care Provider Office Phone # Fax #    Keyla Fonseca [phone] [address] [facility] [address]        Equal Access to Services     [facility]: Hadii aad ku hadasho Soomaali, waaxda luqadaha, qaybta kaalmada adeegyada, waxay idiin hayaan aderj lorenzo . So [facility] [phone].    ATENCIÓN: Si habla español, tiene a nicholas disposición servicios gratuitos de asistencia lingüística. [facility] [phone].    We comply with applicable federal civil rights laws and [place] laws. We do not discriminate on the basis of race, color, national origin, age, disability, sex, sexual orientation, or gender identity.            Thank you!     Thank you for choosing [facility]  for your care. Our goal is always to provide you with excellent care. Hearing back from our patients is one way we can continue to improve our services. Please take a few minutes to complete the written survey that you may receive in the mail after your visit with us. Thank you!             Your Updated Medication List - Protect others around you: Learn how to safely use, store and throw away your medicines at www.disposemymeds.org.          This list is accurate as of 11/13/18  1:20 PM.  Always use your most recent med list.                   Brand Name Dispense Instructions for use Diagnosis    acetaminophen 325 MG tablet    TYLENOL    250 tablet    Take 2 tablets by mouth every 4 hours as needed.    Tear of meniscus of left knee       amLODIPine 5 MG tablet    NORVASC    90  tablet    Take 1 tablet (5 mg) by mouth daily    Hypertension goal BP (blood pressure) < 140/90       ascorbic acid 500 MG tablet    VITAMIN C     Take 500 mg by mouth daily        atorvastatin 80 MG tablet    LIPITOR    90 tablet    TAKE 1 TABLET BY MOUTH DAILY    Hyperlipidemia LDL goal <100       * blood glucose monitoring meter device kit    no brand specified    1 kit    Use to test blood sugar 2 times daily or as directed.    Type 2 diabetes mellitus with hyperglycemia, without long-term current use of insulin (H)       * blood glucose monitoring meter device kit    no brand specified    1 kit    Use to test blood sugar 1 times daily or as directed.    Type 2 diabetes mellitus with complication, without long-term current use of insulin (H)       blood glucose monitoring test strip    ONETOUCH ULTRA    200 strip    Use to test blood sugars 2 times daily or as directed.    Type 2 diabetes mellitus with other circulatory complications (H)       calcium + D 600-200 MG-UNIT Tabs   Generic drug:  calcium carbonate-vitamin D     100 tablet    Take 2 tablets by mouth daily.        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    Type 2 diabetes, HbA1c goal < 7% (H)       lisinopril 40 MG tablet    PRINIVIL/ZESTRIL    90 tablet    TAKE 1 TABLET(40 MG) BY MOUTH DAILY    Essential hypertension with goal blood pressure less than 140/90       Melatonin 10 MG Tbdp     90 tablet    Take 10 mg by mouth At Bedtime        metFORMIN 500 MG 24 hr tablet    GLUCOPHAGE-XR    360 tablet    TAKE 2 TABLETS BY MOUTH TWICE DAILY WITH MEALS.    Type 2 diabetes mellitus without complication, unspecified long term insulin use status       nitroGLYcerin 0.4 MG sublingual tablet    NITROSTAT    25 tablet    Place 1 tablet (0.4 mg) under the tongue every 5 minutes as needed for chest pain    Chest pain, unspecified type       omeprazole 20 MG CR capsule    priLOSEC    180 capsule    TAKE ONE CAPSULE BY MOUTH TWICE DAILY    Gastroesophageal reflux disease without esophagitis       * order for DME     1 Device    Equipment being ordered: Wheelchair motorized for patient with spinal stenosis and neurogenic claudication    Spinal stenosis, lumbar region, with neurogenic claudication       * order for DME     1 Units    Equipment being ordered: TENS    Chronic bilateral low back pain with sciatica, sciatica laterality unspecified, Hip pain, bilateral       order for DME     1 Units    Equipment being ordered: compression stockings    Cellulitis and abscess of leg, except foot       order for DME     1 Device    Equipment being ordered: walker wheeled also include  With skis for indoor carpet use    Falls frequently, Deep inguinal pain, left       * oxyCODONE 30 MG 12 hr tablet    OxyCONTIN    60 tablet    Take 1 tablet (30 mg) by mouth every 12 hours    Failed back surgical syndrome       * oxyCODONE IR 5 MG tablet   Start taking on:  11/21/2018    ROXICODONE    40 tablet    Take 1 daily as needed  for severe pain /at bedtime may have up to 2 per day max 40  per month    Lumbar back pain with radiculopathy affecting left lower extremity       polyethylene glycol powder    MIRALAX/GLYCOLAX    119 g    Take 17 g (1 capful) by mouth daily    Chronic idiopathic constipation       SELECT-LITE DEVICE/LANCETS Kit     1 kit    1 kit 2 times daily    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)       sertraline 100 MG tablet    ZOLOFT    180 tablet    Take 2 tablets (200 mg) by mouth daily    Severe episode of recurrent major depressive disorder, without psychotic features (H)       sulfamethoxazole-trimethoprim 400-80 MG per tablet    BACTRIM/SEPTRA    14 tablet    Take 1 tablet by mouth 2 times daily    Cellulitis of left lower extremity       traZODone 100 MG tablet    DESYREL    270 tablet    TAKE 3 TABLETS(300 MG) BY MOUTH EVERY NIGHT AT BEDTIME AS NEEDED FOR SLEEP    Primary insomnia       VITAMIN B-12 PO           * Notice:  This list has 6 medication(s) that are the same as other medications prescribed for you. Read the directions carefully, and ask your doctor or other care provider to review them with you.

## 2018-11-13 NOTE — PROGRESS NOTES
SUBJECTIVE:                                                    Vincent Mishra is a 78 year old male who presents to clinic today for the following health issues:    Sores on both legs, warm to the touch.  Also has a bed sore on the hip/buttocks area.  Vincent does have a pre op set up form next week and his wife is worried about the sore interfering with the upcoming surgery.   Stopped aspirin about a month ago.   Kassidy Reardon CMA        Problem list and histories reviewed & adjusted, as indicated.  Additional history:  He is here still picking he needs mittens on the hands he just can't stop picking the one area that is down on the left lower leg is red   Does better with them covered   He has the same pain in he groin but he now has the lesions down the left leg they are rather in a line no raul in the past   I don't think he has had any vascular testing on the lower extermities ? Maybe the cause for the groin pain   Having his cancer operated on next week  Patient his wife never got to have her knee surgery because no one ever got back to her from the [facility] about Vincent's results     Patient Active Problem List   Diagnosis     Tension headache     Trapezius strain     Hyperlipidemia LDL goal <100     Parotid mass     Diplopia     Neuropathy     Vision loss night     Neck pain     B12 deficiency     Tear of meniscus of left knee     Hypertension goal BP (blood pressure) < 140/90     C6-7 disc with radiculopathy     Right bundle branch block     Advanced directives, info letter sent 10/4/2011     Chest pain     Anatomic airway obstruction     Abnormal antinuclear antibody titer     Osteoarthritis, knee     Moderate major depression (H)     Orchitis, epididymitis, and epididymo-orchitis     Malignant neoplasm of kidney excluding renal pelvis (H)     Renal mass, left     Vitamin D deficiency disease     Health Care Home     Insomnia     Abdominal pain, right upper quadrant     Esophageal reflux     Hard of hearing      Constipation     NSTEMI (non-ST elevated myocardial infarction) (H)     Myocardial infarction, nontransmural (H)     Acute myocardial infarction of inferolateral wall (H)     Obesity     Sinoatrial node dysfunction (H)     Right bundle branch block (RBBB)     Essential hypertension     Hyperlipidemia     Type 2 diabetes mellitus with other circulatory complication, without long-term current use of insulin (H)     Thyroid nodule     Hip pain, bilateral     Claudication (H)     Bilateral low back pain with right-sided sciatica     Bilateral low back pain with left-sided sciatica     ACS (acute coronary syndrome) (H)     Chronic bilateral low back pain with sciatica, sciatica laterality unspecified     Severe episode of recurrent major depressive disorder, without psychotic features (H)     Past Surgical History:   Procedure Laterality Date     ARTHROSCOPY KNEE  2/11/2011    ARTHROSCOPY KNEE performed by LEY, JEFFREY DUANE at [facility]     ARTHROSCOPY KNEE WITH MEDIAL MENISCECTOMY  6/26/2012    Procedure: ARTHROSCOPY KNEE WITH MEDIAL MENISCECTOMY;  Right Knee Arthroscopy With Medial Menisectomy;  Surgeon: Ley, Jeffrey Duane, MD;  Location: [facility]     BACK SURGERY      back surgery x4     BIOPSY       CARDIAC SURGERY  7/2011    stentt placed     COLONOSCOPY       CORONARY ANGIOGRAPHY ADULT ORDER  07-25-14    RCA, L.Main and CFX  had minor luminal irregularites. Mid LAD high grade stenosis 80-90%.Stent placed to mid LAD     ESOPHAGOSCOPY, GASTROSCOPY, DUODENOSCOPY (EGD), COMBINED  10/25/2012    Procedure: COMBINED ESOPHAGOSCOPY, GASTROSCOPY, DUODENOSCOPY (EGD), BIOPSY SINGLE OR MULTIPLE;  Gastroscopy  ;  Surgeon: Lucius Dasilva MD;  Location: [facility]     HEART CATH, ANGIOPLASTY  07-25-14    mid LAD      ORTHOPEDIC SURGERY       back surgery       Social History   Substance Use Topics     Smoking status: Never Smoker     Smokeless tobacco: Never Used     Alcohol use No     Family History   Problem Relation Age of Onset      "Cancer Mother      Depression Mother      Diabetes Father      Hypertension Father      HEART DISEASE Father      Mental Illness Father      HEART DISEASE Maternal Grandfather      Substance Abuse Maternal Grandfather      Alcohol/Drug Paternal Grandmother      Substance Abuse Paternal Grandmother      HEART DISEASE Paternal Grandfather      Alcohol/Drug Paternal Grandfather      Substance Abuse Paternal Grandfather      HEART DISEASE Brother      Diabetes Brother      Substance Abuse Brother      Obesity Brother      Diabetes Brother      Obesity Sister      Alcohol/Drug Daughter      Depression Daughter      Obesity Sister      Diabetes Sister      Obesity Sister      Diabetes Sister      Alcohol/Drug Sister      Cancer Sister 55     possible kidney cancer     Substance Abuse Sister      Alcohol/Drug Son      Substance Abuse Son      Diabetes Son      Alcohol/Drug Son      Substance Abuse Son      Obesity Daughter      Diabetes Daughter      Hypertension Daughter      Bipolar Disorder Other            ROS:  Constitutional, HEENT, cardiovascular, pulmonary, gi and gu systems are negative, except as otherwise noted.    OBJECTIVE:                                                    /76  Pulse 76  Temp 98.5  F (36.9  C) (Tympanic)  Ht 5' 8\" (1.727 m)  Wt 204 lb (92.5 kg)  BMI 31.02 kg/m2 Body mass index is 31.02 kg/(m^2).   GENERAL APPEARANCE: healthy, alert and no distress  SKIN: excoriations left arm   Ulcers with excoriations left and right leg extending onto thigh left leg   There is an area lateral left shin slighty red and warm        ASSESSMENT/PLAN:                                                    1. Cellulitis of left lower extremity    - sulfamethoxazole-trimethoprim (BACTRIM/SEPTRA) 400-80 MG per tablet; Take 1 tablet by mouth 2 times daily  Dispense: 14 tablet; Refill: 0    2. Leg sore    - US NICO Doppler No Exercise; Future    3. Other specified soft tissue disorders     - US NICO Doppler No " Exercise; Future     reports that he has never smoked. He has never used smokeless tobacco.          Keyla Fonseca M.D.  [facility]

## 2018-11-19 NOTE — TELEPHONE ENCOUNTER
RECORDS RECEIVED FROM:    DATE RECEIVED:    NOTES STATUS DETAILS   OFFICE NOTE from Cardiologist Internal    OFFICE NOTE from Pulmonoligist N/A    MEDICATION LIST Internal    DIAGNOSTIC PROCEDURES     ECHO Internal    STRESS TEST Internal    PULMONARY FUNCTION TEST Internal    CORONARY ANGIOGRAM     EKG Internal    IMAGING (DISC & REPORT)      CHEST XRAY Internal

## 2018-11-20 NOTE — ANESTHESIA PREPROCEDURE EVALUATION
Anesthesia Pre-Procedure Evaluation    Patient: Vincent Mishra   MRN:     [id number] Gender:   male   Age:    78 year old :      1940        Preoperative Diagnosis: Left Renal Mass    Procedure(s):  Anesthesia out of OR CT guided Cryoablation of Left Renal Mass      Past Medical History:   Diagnosis Date     CAD (coronary artery disease) 2011 ([place]): s/p MI, PTCA - RCA      Cancer of kidney (H)      Chest pain 2012     Coronary artery disease      History of angina      History of blood transfusion      Hyperlipidaemia      Hyperlipidemia LDL goal <100 2010     Malignant neoplasm (H)     Prostate CA (removed)     Myocardial infarction (H)     s/p MI 14, stent placement     NSTEMI (non-ST elevated myocardial infarction) (H)     14 CATH- RCA, L.Main and CFX  had minor luminal irregularites. Mid LAD high grade stenosis 80-90%.Stent placed to mid LAD     Other and unspecified hyperlipidemia     MI in 2011     Right bundle branch block      Type II or unspecified type diabetes mellitus without mention of complication, not stated as uncontrolled      Unspecified essential hypertension       Past Surgical History:   Procedure Laterality Date     ARTHROSCOPY KNEE  2011    ARTHROSCOPY KNEE performed by LEY, JEFFREY DUANE at [facility]     ARTHROSCOPY KNEE WITH MEDIAL MENISCECTOMY  2012    Procedure: ARTHROSCOPY KNEE WITH MEDIAL MENISCECTOMY;  Right Knee Arthroscopy With Medial Menisectomy;  Surgeon: Ley, Jeffrey Duane, MD;  Location: [facility]     BACK SURGERY      back surgery x4     BIOPSY       CARDIAC SURGERY  2011    stentt placed     COLONOSCOPY       CORONARY ANGIOGRAPHY ADULT ORDER  14    RCA, L.Main and CFX  had minor luminal irregularites. Mid LAD high grade stenosis 80-90%.Stent placed to mid LAD     ESOPHAGOSCOPY, GASTROSCOPY, DUODENOSCOPY (EGD), COMBINED  10/25/2012    Procedure: COMBINED ESOPHAGOSCOPY, GASTROSCOPY, DUODENOSCOPY (EGD), BIOPSY SINGLE OR  MULTIPLE;  Gastroscopy  ;  Surgeon: Lucius Dasilva MD;  Location: [facility]     HEART CATH, ANGIOPLASTY  07-25-14    mid LAD      ORTHOPEDIC SURGERY       back surgery          Anesthesia Evaluation     . Pt has had prior anesthetic. Type: General and MAC    No history of anesthetic complications          ROS/MED HX    ENT/Pulmonary:  - neg pulmonary ROS     Neurologic:       Cardiovascular:     (+) Dyslipidemia, hypertension--CAD, angina-past MI,-stent,2011, 2014  2 Drug Eluting Stent .. Taking blood thinners Pt has received instructions: Instructions Given to patient: Patient has not been taking his aspirin. Will hold Plavix for 5 days prior to surgery. . . :. . Previous cardiac testing date:results:Stress Testdate:2015 results:ECG reviewed date:2/20/18 results:SR, 1st degree AVB, RBBBCath date: 2017 results:          METS/Exercise Tolerance:  1 - Eating, dressing   Hematologic:     (+) Anemia, -     (-) History of Transfusion   Musculoskeletal:   (+) , , other musculoskeletal- severe chronic back pain, s/p back surgeries      GI/Hepatic:     (+) GERD Other,       Renal/Genitourinary:  - ROS Renal section negative   (+) chronic renal disease, type: CRI, Pt does not require dialysis, Pt has no history of transplant,       Endo:     (+) type II DM Last HgA1c: 6.1 date: 9/14/18 Not using insulin - not using insulin pump thyroid problem hypothyroidism, Obesity, .      Psychiatric:     (+) depression      Infectious Disease:  - neg infectious disease ROS       Malignancy:   (+) Malignancy History of Other  Other CA Hx RCC s/p cryoablation, now new renal mass Active and Remission status post         Other:    (+) No chance of pregnancy C-spine cleared: N/A, H/O Chronic Pain,other significant disability Other (comment)                       PHYSICAL EXAM:   Mental Status/Neuro: A/A/O; Age Appropriate   Airway: Facies: Feasible  Mallampati: II  Mouth/Opening: Full  TM distance: > 6 cm  Neck ROM: Limited   Respiratory:  "Auscultation: CTAB     Resp. Rate: Normal     Resp. Effort: Normal      CV: Rhythm: Regular  Heart: Normal Sounds   Comments:      Dental:  Dentures: Upper                  Lab Results   Component Value Date    WBC 8.6 09/14/2018    HGB 10.6 (L) 09/14/2018    HCT 35.6 (L) 09/14/2018     09/14/2018    CRP <2.9 09/25/2015    SED 13 10/30/2017     09/14/2018    POTASSIUM 4.4 09/14/2018    CHLORIDE 103 09/14/2018    CO2 28 09/14/2018    BUN 26 09/14/2018    CR 1.39 (H) 09/14/2018     (H) 09/14/2018    LIOR 9.2 09/14/2018    MAG 1.5 (L) 10/08/2014    ALBUMIN 4.1 09/14/2018    PROTTOTAL 7.8 09/14/2018    ALT 33 09/14/2018    AST 35 09/14/2018    ALKPHOS 90 09/14/2018    BILITOTAL 0.6 09/14/2018    LIPASE 188 09/24/2012    PTT 31 05/30/2017    INR 0.96 05/30/2017    TSH 2.29 09/14/2018    T4 1.21 07/25/2014    CKTOTAL 98 07/25/2014    CKTOTAL 125 07/24/2014       Preop Vitals  BP Readings from Last 3 Encounters:   11/20/18 154/74   11/13/18 120/76   10/16/18 135/70    Pulse Readings from Last 3 Encounters:   11/20/18 62   11/13/18 76   10/16/18 60      Resp Readings from Last 3 Encounters:   09/17/18 16   03/29/18 14   10/03/17 16    SpO2 Readings from Last 3 Encounters:   11/20/18 97%   09/17/18 96%   04/23/18 95%      Temp Readings from Last 1 Encounters:   11/20/18 98.1  F (36.7  C) (Oral)    Ht Readings from Last 1 Encounters:   11/20/18 1.727 m (5' 8\")      Wt Readings from Last 1 Encounters:   11/20/18 92.6 kg (204 lb 1.6 oz)    Estimated body mass index is 31.03 kg/(m^2) as calculated from the following:    Height as of this encounter: 1.727 m (5' 8\").    Weight as of this encounter: 92.6 kg (204 lb 1.6 oz).     LDA:  Peripheral IV 05/30/18 Right Upper forearm (Active)   Number of days:174            Assessment:   ASA SCORE: 3    NPO Status: > 6 hours since completed [employer]   Documentation: H&P complete; Preop Testing complete; Consents complete   Proceeding: Proceed without further " delay  Tobacco Use:  NO Active use of Tobacco/UNKNOWN Tobacco use status     Plan:   Anes. Type:  General   Pre-Induction: Acetaminophen PO   Induction:  IV (Standard)   Airway: Oral ETT   Access/Monitoring: PIV   Maintenance: Balanced   Emergence: Procedure Site   Logistics: Same Day Surgery     Postop Pain/Sedation Strategy:  Standard-Options: Opioids PRN     PONV Management:  Adult Risk Factors:, Non-Smoker, Postop Opioids     Comments for Plan/Consent:  GETA   2 PIV  Balanced maintenance  PONV prophylaxis                  PAC Discussion and Assessment    ASA Classification: 3  Case is suitable for: [place]  Anesthetic techniques and relevant risks discussed: GA  Invasive monitoring and risk discussed: No  Types:   Possibility and Risk of blood transfusion discussed: Yes  NPO instructions given:   Additional anesthetic preparation and risks discussed:   Needs early admission to pre-op area:   Other:     PAC Resident/NP Anesthesia Assessment:  Vincent Mishra is a 78 year old male scheduled to undergo Left percutaneous cryoablation, possible biopsy of renal mass with Dr. Capone on 12/4/18. He has the following specific operative considerations:   - RCRI : 0.9% risk of major adverse cardiac event.   - VTE risk: 1.8-3%  - JESSIE # of risks 4/8 = Intermediate risk  - Risk of PONV score = 2.  If > 2, anti-emetic intervention recommended.    Denies issues with anesthesia.     --Renal cell carcinoma, s/p cryoablation in 2012. Concern for new left renal mass with above procedure planned.    --HLD. Atorvastatin. HTN. Will take Amlodipine but hold HCTZ and Lisinopril. Had been on ASA and Plavix but wife had stopped his aspirin. Advised to hold Plavix for 5 days prior to surgery but to start taking both aspirin and Plavix after surgery according to Cardiology recommendation. History of multiple MIs, s/p PCI X 2, 2011, 2014. Last angiogram in 2015 showed nonobstructive disease. Stress test in 2017 normal. All testing  above. Activity very limited due to back pain.     --Nonsmoker. No pulmonary symptoms.    --GERD. Will take Omeprazole on DOS.    --Anemia. Hgb today 9.0 with range up to normal in recent years. Denies history of blood transfusion. Type and screen drawn today.    --DIABETES MELLITUS II. Last A1C 6.4. Will hold Metformin on DOS.    --Recent evaluation by PCP for sores on both legs and buttock. Treatment initiated for cellulitis. Discussed strategies to avoid picking at lesions. Dr. Capone informed.    --Depression. Will take Sertraline on DOS.    --Severe chronic back pain with history of back surgery. Will take Oxycontin and Gabapentin on DOS. Will require careful positioning.     Patient was reviewed by Dr Andrade.      Reviewed and Signed by PAC Mid-Level Provider/Resident  Mid-Level Provider/Resident: BESSY Chauhan, SAVANA  Date: 11/20/18  Time: 2:59pm    Attending Anesthesiologist Anesthesia Assessment:  78 year old for cryoablation of left renal mass in the setting of multiple medical issues including prior NSTEMI with stents in LAD and RCA. Multiple stress tests since then have no demonstrated ischemia and angio showed no hemodynamically significant lesions, but patient continues to have chest pain. Followed by cardiology, on medical management. Stable DM2, HLD, GERD.     Patient/case discussed with BENJAMIN/resident; agree with above assessment. No need to see patient. Patient is appropriate for the planned procedure without further work-up or medical management.      Reviewed and Signed by PAC Anesthesiologist  Anesthesiologist: siva  Date: 11/20/2018  Time:   Pass/Fail: Pass  Disposition:     PAC Pharmacist Assessment:        Pharmacist:   Date:   Time:        BESSY Hayes CNS

## 2018-11-20 NOTE — MR AVS SNAPSHOT
After Visit Summary   2018    Vincent Mishra    MRN: [id number]           Patient Information     Date Of Birth          1940        Visit Information        Provider Department      2018 1:00 PM Carolann Shah APRN CNS M [facility]        Today's Diagnoses     Renal mass    -  1    Dysuria          Care Instructions    Preparing for Your Surgery      Name:  Vincent Mishra   MRN:  [id number]   :  1940   Today's Date:  2018     Arriving for surgery:    Surgery date:  18    Arrival time:  07:00 am    Please come to:       [facility] [address]    [address] [place]  [address]       parking is available in front of the hospital from 5:15 am to 8:00 pm    -  Stop at the Information Desk in the lobby    -   Inform the information person that you are here for surgery. An escort to 3c will be provided. If you would not like an escort, please proceed to 3C on the 3rd floor. [phone]     What can I eat or drink?  -  You may have solid food or milk products until 8 hours prior to your surgery.  -  You may have water, apple juice or 7up/Sprite until 2 hours prior to your surgery.    Which medicines can I take?    Stop Aspirin, vitamins and supplements one week prior to surgery.  Hold Ibuprofen and Naproxen for 24 hours prior to surgery.     -  Do NOT take these medications in the morning, the day of surgery:    Follow instructions for Plavix.  Hydrodiuril + lisinopril + metformin if normally taken in the morning      -  Please take these medications the day of surgery:    Tylenol if needed; take all other scheduled medications normally taken in the morning    How do I prepare myself?  -  Take two showers: one the night before surgery; and one the morning of surgery.         Use Scrubcare or Hibiclens to wash from neck down.  You may use your own     shampoo and conditioner. No other hair  products.   -  Do NOT use lotion, powder, deodorant, or antiperspirant the day of your surgery.  -  Do NOT wear any jewelry.    - Do not bring your own medications to the hospital, except for inhalers and eye   drops.  -  Bring your ID and insurance card.    Questions or Concerns:  -If you have questions or concerns regarding the day of surgery, please call [phone].     -If you are scheduled at the [facility]ulatory [facility] Center please call [phone].    -For questions after surgery please call your surgeons office.                     Follow-ups after your visit        Your next 10 appointments already scheduled     Dec 04, 2018   Procedure with GENERIC ANESTHESIA PROVIDER   [facility]    [address] [facility] [address] [facility] [address] [phone]            Dec 04, 2018  9:00 AM CST   CT RENAL TUMOR CRYOABLATION with UUCT2   [facility] ([facility])    [address] [facility] [address] [phone]           How do I prepare for my exam? (Food and drink instructions) The day before your exam: Drink extra fluids  at least six 8-ounce glasses (unless your doctor tells you to restrict fluids).  The day of your exam: No eating or drinking for 4 hours before your test. You may take medicine with small sips of water  What should I wear: Please wear loose clothing, such as a sweat suit or jogging clothes. Avoid snaps, zippers and other metal. We may ask you to undress and put on a hospital gown.  How long does the exam take: Plan to spend up to 6 hours at the hospital. The test itself normally takes less than an hour. We will watch for side effects for 1 to 4 hours.  What should I bring: Please bring any scans or X-rays taken at other hospitals, if they may be helpful. Also bring a list of your medicines, including vitamins, minerals and over-the-counter drugs. It is safest to leave personal items at home.   Do I need a : Plan for an adult to drive you home and stay with you until morning.  What do I need to tell my doctor? Tell your doctor in advance: * If you have any allergies. * If you are breastfeeding or there s any chance you are pregnant. * If you are taking Coumadin (or any other blood thinners) 5 days prior to the exam for any special instructions. * If you are diabetic to determine if your insulin needs have to be adjusted for the exam.  What should I do after the exam: When you get home, you ll need to take it easy for the rest of the day.  Do not drive for 24 hours. You may have slight bruising and mild pain in the area. If so, you may take acetaminophen (Tylenol) or ibuprofen (Motrin, Advil) after you get home.  Some people go home with a small tube (catheter) sewn into the skin. If this case, we will show you how to care for the tube.  What is this test: This test uses a very thin needle to remove tissue, fluid or other cells from your body. We then send the cells to a lab for testing. A biopsy tests for disease in a tissue sample. Aspiration tests for disease or infection in a fluid sample. We use pictures from a CT (computed tomography) scan to guide the needle to the right place. A CT scan is a special X-ray. The scanner creates images of the body in cross sections, much like slices of bread. You may receive contrast (X-ray dye) before or during your scan. Contrast is given through an IV (small needle in your arm) or taken by mouth.  Who should I call with questions: If you have any questions, please call the imaging department where you will have your exam. Directions, parking instructions, and other information is available on our website, [email or web address].            Dec 20, 2018 10:30 AM CST   Return Visit with Geremias Phelps MD   [facility] ([facility])    [address] [facility] [address]   [phone]            Jan 18, 2019  10:00 AM CST   (Arrive by 9:45 AM)   Post-Op with Lucius Capone MD   [facility] ([facility])    [address] [address] [phone]              Future tests that were ordered for you today     Open Future Orders        Priority Expected Expires Ordered    ABO/Rh type and screen Routine 11/20/2018 12/20/2018 11/20/2018    Basic metabolic panel Routine 11/20/2018 12/20/2018 11/20/2018    CBC with platelets Routine 11/20/2018 12/20/2018 11/20/2018    Urine Culture Aerobic Bacterial Routine 11/19/2018 1/19/2019 11/19/2018    US NICO with PPG wo Exercise Routine  11/13/2019 11/13/2018            Who to contact     Please call your clinic at [phone] to:    Ask questions about your health    Make or cancel appointments    Discuss your medicines    Learn about your test results    Speak to your doctor            Additional Information About Your Visit        [employer] Information     [employer] gives you secure access to your electronic health record. If you see a primary care provider, you can also send messages to your care team and make appointments. If you have questions, please call your primary care clinic.  If you do not have a primary care provider, please call [phone] and they will assist you.      [employer] is an electronic gateway that provides easy, online access to your medical records. With [employer], you can request a clinic appointment, read your test results, renew a prescription or communicate with your care team.     To access your existing account, please contact your [facility] or call [phone] for assistance.        Care EveryWhere ID     This is your Care EveryWhere ID. This could be used by other organizations to access your [facility] medical records  [id number]        Your Vitals Were     Pulse Temperature Height Pulse Oximetry BMI (Body Mass  "Index)       62 98.1  F (36.7  C) (Oral) 1.727 m (5' 8\") 97% 31.03 kg/m2        Blood Pressure from Last 3 Encounters:   11/20/18 154/74   11/13/18 120/76   10/16/18 135/70    Weight from Last 3 Encounters:   11/20/18 92.6 kg (204 lb 1.6 oz)   11/13/18 92.5 kg (204 lb)   10/16/18 88.2 kg (194 lb 6.4 oz)              We Performed the Following     Urine Culture Aerobic Bacterial        Primary Care Provider Office Phone # Fax #    Keyla Fonseca [phone] [id number]  [place] [address]        Equal Access to Services     OVI ZULETA : Hadii aad ku hadasho Soomaali, waaxda luqadaha, qaybta kaalmada adeegyada, sobeida lorenzo . So [facility] [phone].    ATENCIÓN: Si habla español, tiene a nicholas disposición servicios gratuitos de asistencia lingüística. Ll[facility] [phone].    We comply with applicable federal civil rights laws and [place] laws. We do not discriminate on the basis of race, color, national origin, age, disability, sex, sexual orientation, or gender identity.            Thank you!     Thank you for choosing [facility]  for your care. Our goal is always to provide you with excellent care. Hearing back from our patients is one way we can continue to improve our services. Please take a few minutes to complete the written survey that you may receive in the mail after your visit with us. Thank you!             Your Updated Medication List - Protect others around you: Learn how to safely use, store and throw away your medicines at www.disposemymeds.org.          This list is accurate as of 11/20/18  1:15 PM.  Always use your most recent med list.                   Brand Name Dispense Instructions for use Diagnosis    acetaminophen 325 MG tablet    TYLENOL    250 tablet    Take 2 tablets by mouth every 4 hours as needed.    Tear of meniscus of left knee       amLODIPine 5 MG tablet    NORVASC    90 tablet    Take 1 tablet (5 " mg) by mouth daily    Hypertension goal BP (blood pressure) < 140/90       ascorbic acid 500 MG tablet    VITAMIN C     Take 500 mg by mouth daily        atorvastatin 80 MG tablet    LIPITOR    90 tablet    TAKE 1 TABLET BY MOUTH DAILY    Hyperlipidemia LDL goal <100       * blood glucose monitoring meter device kit    no brand specified    1 kit    Use to test blood sugar 2 times daily or as directed.    Type 2 diabetes mellitus with hyperglycemia, without long-term current use of insulin (H)       * blood glucose monitoring meter device kit    no brand specified    1 kit    Use to test blood sugar 1 times daily or as directed.    Type 2 diabetes mellitus with complication, without long-term current use of insulin (H)       blood glucose monitoring test strip    ONETOUCH ULTRA    200 strip    Use to test blood sugars 2 times daily or as directed.    Type 2 diabetes mellitus with other circulatory complications (H)       calcium + D 600-200 MG-UNIT Tabs   Generic drug:  calcium carbonate-vitamin D     100 tablet    Take 2 tablets by mouth daily.        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    Type 2 diabetes, HbA1c goal < 7% (H)       lisinopril 40 MG tablet    PRINIVIL/ZESTRIL    90 tablet    TAKE 1 TABLET(40 MG) BY MOUTH DAILY    Essential hypertension with goal blood pressure less than 140/90       Melatonin 10 MG Tbdp     90 tablet    Take 10 mg by mouth At Bedtime        metFORMIN 500 MG 24 hr tablet    GLUCOPHAGE-XR    360 tablet    TAKE 2 TABLETS BY MOUTH TWICE DAILY WITH MEALS.    Type 2 diabetes mellitus without complication, unspecified long term insulin use status       nitroGLYcerin 0.4 MG sublingual tablet    NITROSTAT    25 tablet    Place 1 tablet (0.4 mg) under the tongue every 5 minutes as needed for chest pain    Chest pain, unspecified type       omeprazole 20 MG CR capsule    priLOSEC    180 capsule    TAKE ONE CAPSULE BY MOUTH TWICE DAILY    Gastroesophageal reflux disease without esophagitis       * order for DME     1 Device    Equipment being ordered: Wheelchair motorized for patient with spinal stenosis and neurogenic claudication    Spinal stenosis, lumbar region, with neurogenic claudication       * order for DME     1 Units    Equipment being ordered: TENS    Chronic bilateral low back pain with sciatica, sciatica laterality unspecified, Hip pain, bilateral       order for DME     1 Units    Equipment being ordered: compression stockings    Cellulitis and abscess of leg, except foot       order for DME     1 Device    Equipment being ordered: walker wheeled also include  With skis for indoor carpet use    Falls frequently, Deep inguinal pain, left       * oxyCODONE 30 MG 12 hr tablet    OxyCONTIN    60 tablet    Take 1 tablet (30 mg) by mouth every 12 hours    Failed back surgical syndrome       * oxyCODONE IR 5 MG tablet   Start taking on:  11/21/2018    ROXICODONE    40 tablet    Take 1 daily as needed  for severe pain /at bedtime may have up to 2 per day max 40 per month    Lumbar back pain with  radiculopathy affecting left lower extremity       polyethylene glycol powder    MIRALAX/GLYCOLAX    119 g    Take 17 g (1 capful) by mouth daily    Chronic idiopathic constipation       SELECT-LITE DEVICE/LANCETS Kit     1 kit    1 kit 2 times daily    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)       sertraline 100 MG tablet    ZOLOFT    180 tablet    Take 2 tablets (200 mg) by mouth daily    Severe episode of recurrent major depressive disorder, without psychotic features (H)       sulfamethoxazole-trimethoprim 400-80 MG per tablet    BACTRIM/SEPTRA    14 tablet    Take 1 tablet by mouth 2 times daily    Cellulitis of left lower extremity       traZODone 100 MG tablet    DESYREL    270 tablet    TAKE 3 TABLETS(300 MG) BY MOUTH EVERY NIGHT AT BEDTIME AS NEEDED FOR SLEEP    Primary insomnia       VITAMIN B-12 PO           * Notice:  This list has 6 medication(s) that are the same as other medications prescribed for you. Read the directions carefully, and ask your doctor or other care provider to review them with you.

## 2018-11-20 NOTE — H&P
Pre-Operative H & P     CC:  Preoperative exam to assess for increased cardiopulmonary risk while undergoing surgery and anesthesia.    Date of Encounter: 11/20/2018  Primary Care Physician:  Keyla Fonseca  Reason for visit: Left renal mass [N28.89]  - Primary   HPI  Vincent Mishra is a 78 year old male who presents for pre-operative H & P in preparation for Left percutaneous cryoablation, possible biopsy of renal mass with Dr. Capone on 12/4/18 at [facility]. History is obtained from the patient.     Patient with RCC, s/p percutaneous cryoablation of the left kidney in 2012. Follow up with Dr. Capone recently with surveillance imaging confirming a new left renal mass. He was counseled for above procedure.   Patient's history is otherwise complex with HLD, HTN, CAD, multiple MIs and is s/p PCI to RCA in 2011 and PCI to LAD in 2014, GERD, DM, DDD, and depression. Yesterday he visited his PCP with concern for sores on both legs and one on buttock. An NICO doppler was performed, with results still pending. Treatment was initiated for cellulitis. Patient and wife report today that he has less redness of his lower legs. Patient admits that he continues to pick at these areas.   For his cardiac issues, he is followed by Dr. Phelps in Cardiology, last seen 2/27/18. He has continued to have chest pain, considered stable after multiple evaluations with stress test and angiograms. His most recent angiograms have shown no worsening of his coronary artery disease. Dual antiplatelet therapy is recommended.     Past Medical History  Past Medical History:   Diagnosis Date     CAD (coronary artery disease) 7/26/2011 7/2011 ([place]uan[place]): s/p MI, PTCA - RCA      Cancer of kidney (H)      Chest pain 1/12/2012     Coronary artery disease      History of angina      History of blood transfusion      Hyperlipidaemia      Hyperlipidemia LDL goal <100 9/23/2010     Malignant neoplasm (H)      Prostate CA (removed)     Myocardial infarction (H)     s/p MI 7/29/14, stent placement     NSTEMI (non-ST elevated myocardial infarction) (H)     07-25-14 CATH- RCA, L.Main and CFX  had minor luminal irregularites. Mid LAD high grade stenosis 80-90%.Stent placed to mid LAD     Other and unspecified hyperlipidemia     MI in July 2011     Right bundle branch block      Type II or unspecified type diabetes mellitus without mention of complication, not stated as uncontrolled      Unspecified essential hypertension        Past Surgical History  Past Surgical History:   Procedure Laterality Date     ARTHROSCOPY KNEE  2/11/2011    ARTHROSCOPY KNEE performed by LEY, JEFFREY DUANE at [facility]     ARTHROSCOPY KNEE WITH MEDIAL MENISCECTOMY  6/26/2012    Procedure: ARTHROSCOPY KNEE WITH MEDIAL MENISCECTOMY;  Right Knee Arthroscopy With Medial Menisectomy;  Surgeon: Ley, Jeffrey Duane, MD;  Location: [facility]     BACK SURGERY      back surgery x4     BIOPSY       CARDIAC SURGERY  7/2011    stentt placed     COLONOSCOPY       CORONARY ANGIOGRAPHY ADULT ORDER  07-25-14    RCA, L.Main and CFX  had minor luminal irregularites. Mid LAD high grade stenosis 80-90%.Stent placed to mid LAD     ESOPHAGOSCOPY, GASTROSCOPY, DUODENOSCOPY (EGD), COMBINED  10/25/2012    Procedure: COMBINED ESOPHAGOSCOPY, GASTROSCOPY, DUODENOSCOPY (EGD), BIOPSY SINGLE OR MULTIPLE;  Gastroscopy  ;  Surgeon: Lucius Dasilva MD;  Location: [facility]     HEART CATH, ANGIOPLASTY  07-25-14    mid LAD      ORTHOPEDIC SURGERY       back surgery       Hx of Blood transfusions/reactions: Denies.      Hx of abnormal bleeding or anti-platelet use: On Plavix daily. Wife had stopped his aspirin some time ago for uncertain reasons.     Menstrual history: No LMP for male patient.    Steroid use in the last year: Unknown.    Personal or FH with difficulty with Anesthesia:  Denies.     Prior to Admission Medications  Current Outpatient Prescriptions   Medication Sig Dispense Refill      acetaminophen (TYLENOL) 325 MG tablet Take 2 tablets by mouth every 4 hours as needed. (Patient not taking: Reported on 11/20/2018) 250 tablet 1     amLODIPine (NORVASC) 5 MG tablet Take 1 tablet (5 mg) by mouth daily 90 tablet 3     ascorbic acid (VITAMIN C) 500 MG tablet Take 500 mg by mouth daily        atorvastatin (LIPITOR) 80 MG tablet TAKE 1 TABLET BY MOUTH DAILY (Patient taking differently: TAKE 1 TABLET BY MOUTH EVERY EVENING) 90 tablet 3     blood glucose monitoring (NO BRAND SPECIFIED) meter device kit Use to test blood sugar 1 times daily or as directed. 1 kit 0     blood glucose monitoring (NO BRAND SPECIFIED) meter device kit Use to test blood sugar 2 times daily or as directed. 1 kit 0     blood glucose monitoring (ONE TOUCH ULTRA) test strip Use to test blood sugars 2 times daily or as directed. 200 strip 3     Calcium Carbonate-Vitamin D (CALCIUM + D) 600-200 MG-UNIT per tablet Take 2 tablets by mouth daily. 100 tablet 12     cholecalciferol (VITAMIN  -D) 1000 UNITS capsule Take 1 capsule by mouth daily       clopidogrel (PLAVIX) 75 MG tablet TAKE 1 TABLET BY MOUTH DAILY 90 tablet 0     Cyanocobalamin (VITAMIN B-12 PO) Take 1 tablet by mouth daily        gabapentin (NEURONTIN) 100 MG capsule TAKE ONE CAPSULE BY MOUTH DAILY, INCREASE BY 1 CAPSULE EVERY 2 DAYS UP TO 6 CAPSULES THREE TIMES DAILY (Patient taking differently: Take 400 mg by mouth 2 times daily ) 270 capsule 11     hydrochlorothiazide (HYDRODIURIL) 25 MG tablet TAKE 1 TABLET BY MOUTH DAILY 90 tablet 1     ibuprofen (ADVIL/MOTRIN) 800 MG tablet TAKE 1 TABLET BY MOUTH EVERY 8 HOURS AS NEEDED FOR MODERATE PAIN (Patient not taking: Reported on 11/20/2018) 90 tablet 1     Lancets Misc. (SELECT-LITE DEVICE/LANCETS) KIT 1 kit 2 times daily 1 kit 1     LIFESCAN FINEPOINT LANCETS MISC Use to test blood sugars 1 times daily or as directed. 100 each 12     lisinopril (PRINIVIL/ZESTRIL) 40 MG tablet TAKE 1 TABLET(40 MG) BY MOUTH DAILY 90 tablet 1  "    Melatonin 10 MG TBDP Take 10 mg by mouth At Bedtime 90 tablet      metFORMIN (GLUCOPHAGE-XR) 500 MG 24 hr tablet TAKE 2 TABLETS BY MOUTH TWICE DAILY WITH MEALS. 360 tablet 1     nitroGLYcerin (NITROSTAT) 0.4 MG sublingual tablet Place 1 tablet (0.4 mg) under the tongue every 5 minutes as needed for chest pain (Patient not taking: Reported on 11/20/2018) 25 tablet 1     omeprazole (PRILOSEC) 20 MG CR capsule TAKE ONE CAPSULE BY MOUTH TWICE DAILY 180 capsule 3     order for DME Equipment being ordered: walker wheeled also include  With skis for indoor carpet use 1 Device 0     order for DME Equipment being ordered: compression stockings 1 Units 0     order for DME Equipment being ordered: TENS 1 Units 0     order for DME Equipment being ordered: Wheelchair motorized for patient with spinal stenosis and neurogenic claudication 1 Device 0     oxyCODONE (OXYCONTIN) 30 MG 12 hr tablet Take 1 tablet (30 mg) by mouth every 12 hours (Patient taking differently: Take 30 mg by mouth every morning ) 60 tablet 0     [START ON 11/21/2018] oxyCODONE IR (ROXICODONE) 5 MG tablet Take 1 daily as needed  for severe pain /at bedtime may have up to 2 per day max 40 per month 40 tablet 0     polyethylene glycol (MIRALAX/GLYCOLAX) powder Take 17 g (1 capful) by mouth daily 119 g 3     sertraline (ZOLOFT) 100 MG tablet Take 2 tablets (200 mg) by mouth daily 180 tablet 1     sulfamethoxazole-trimethoprim (BACTRIM/SEPTRA) 400-80 MG per tablet Take 1 tablet by mouth 2 times daily 14 tablet 0     traZODone (DESYREL) 100 MG tablet TAKE 3 TABLETS(300 MG) BY MOUTH EVERY NIGHT AT BEDTIME AS NEEDED FOR SLEEP 270 tablet 0       Allergies  Allergies   Allergen Reactions     Prozac [Fluoxetine] Other (See Comments)     \"I went crazy.\"       Benadryl [Diphenhydramine Hcl] Other (See Comments)     Starts to shake, and paranoid     Codeine Itching     Diphenhydramine Other (See Comments)     Hallucinations, See [facility] records " scanned on 7/16/15     Labetalol Other (See Comments)     See [facility] records scanned on 7/16/15  Bradycardia down to 30 on holter, dizziness. Stopped med and symptoms resolved     Metoprolol Other (See Comments)     Bradycardia even at 12.5 mg     Morphine Visual Disturbance       Social History  Social History     Social History     Marital status:      Spouse name: N/A     Number of children: N/A     Years of education: N/A     Occupational History     Not on file.     Social History Main Topics     Smoking status: Never Smoker     Smokeless tobacco: Never Used     Alcohol use No     Drug use: No     Sexual activity: Yes     Partners: Female     Other Topics Concern     Parent/Sibling W/ Cabg, Mi Or Angioplasty Before 65f 55m? No     Caffeine Concern No     4-5 cups per day     Special Diet Yes     smaller portions     Exercise No     Social History Narrative       Family History  Family History   Problem Relation Age of Onset     Cancer Mother      Depression Mother      Diabetes Father      Hypertension Father      HEART DISEASE Father      Mental Illness Father      HEART DISEASE Maternal Grandfather      Substance Abuse Maternal Grandfather      Alcohol/Drug Paternal Grandmother      Substance Abuse Paternal Grandmother      HEART DISEASE Paternal Grandfather      Alcohol/Drug Paternal Grandfather      Substance Abuse Paternal Grandfather      HEART DISEASE Brother      Diabetes Brother      Substance Abuse Brother      Obesity Brother      Diabetes Brother      Obesity Sister      Alcohol/Drug Daughter      Depression Daughter      Obesity Sister      Diabetes Sister      Obesity Sister      Diabetes Sister      Alcohol/Drug Sister      Cancer Sister 55     possible kidney cancer     Substance Abuse Sister      Alcohol/Drug Son      Substance Abuse Son      Diabetes Son      Alcohol/Drug Son      Substance Abuse Son      Obesity Daughter      Diabetes Daughter      Hypertension  Daughter      Bipolar Disorder Other        Review of Systems      ROS/MED HX  The complete review of systems is negative other than noted in the HPI or here.  ENT/Pulmonary:  - neg pulmonary ROS     Neurologic:       Cardiovascular:     (+) Dyslipidemia, hypertension--CAD, angina-past MI,-stent,2011, 2014  2 Drug Eluting Stent .. Taking blood thinners Pt has received instructions: Instructions Given to patient: Patient has not been taking his aspirin. Will hold Plavix for 5 days prior to surgery. . . :. . Previous cardiac testing date:results:Stress Testdate:2015 results:ECG reviewed date:2/20/18 results:SR, 1st degree AVB, RBBBCath date: 2017 results:          METS/Exercise Tolerance:  1 - Eating, dressing   Hematologic:     (+) Anemia, -     (-) History of Transfusion   Musculoskeletal:   (+) , , other musculoskeletal- severe chronic back pain, s/p back surgeries      GI/Hepatic:     (+) GERD Other,       Renal/Genitourinary:  - ROS Renal section negative   (+) chronic renal disease, type: CRI, Pt does not require dialysis, Pt has no history of transplant,       Endo:     (+) type II DM Last HgA1c: 6.1 date: 9/14/18 Not using insulin - not using insulin pump thyroid problem hypothyroidism, Obesity, .      Psychiatric:     (+) depression      Infectious Disease:  - neg infectious disease ROS       Malignancy:   (+) Malignancy History of Other  Other CA Hx RCC s/p cryoablation, now new renal mass Active and Remission status post         Other:    (+) No chance of pregnancy C-spine cleared: N/A, H/O Chronic Pain,other significant disability Other (comment)           PHYSICAL EXAM:   Mental Status/Neuro: A/A/O; Age Appropriate   Airway: Facies: Feasible  Mallampati: II  Mouth/Opening: Full  TM distance: > 6 cm  Neck ROM: Limited   Respiratory: Auscultation: CTAB     Resp. Rate: Normal     Resp. Effort: Normal      CV: Rhythm: Regular  Heart: Normal Sounds   Comments:      Dental:  Dentures: Upper               Temp:  "98.1  F (36.7  C) Temp src: Oral BP: 154/74 Pulse: 62     SpO2: 97 %         204 lbs 1.6 oz  5' 8\"   Body mass index is 31.03 kg/(m^2).       Physical Exam  Constitutional: Awake, alert, cooperative, no apparent distress, and appears stated age. In w/c. Accompanied by wife.   Eyes: Pupils equal, round and reactive to light, extra ocular muscles intact, sclera clear, conjunctiva normal. Glasses on.  HENT: Normocephalic, oral pharynx with moist mucus membranes, upper dentures. No goiter appreciated. Very hard of hearing.   Respiratory: Clear to auscultation bilaterally, no crackles or wheezing. No cough or obvious dyspnea.  Cardiovascular: Regular rate and rhythm, normal S1 and S2, and no murmur noted.  Carotids +2, no bruits. No edema. Palpable pulses to radial  DP and PT arteries. Pedal pulses weaker than radial.   GI: Normal bowel sounds, soft, non-distended, non-tender, no masses palpated.   Lymph/Hematologic: No cervical lymphadenopathy and no supraclavicular lymphadenopathy.  Genitourinary:  Deferred.   Skin: Warm and dry.  No rashes at anticipated surgical site. Patient has chronic appearing open and scabbed wounds on both legs, up to his thigh on the right. Some areas are bleeding slightly. Mild redness nevaeh few areas.   Musculoskeletal: Limiting ROM of neck. There is no redness, warmth, or swelling of the joints. Gross motor strength is limited.   Neurologic: Awake, alert, oriented to name, place and time. Cranial nerves II-XII are grossly intact. Some picking of skin during evaluation.   Neuropsychiatric: Calm, cooperative. Normal affect.     Labs: (personally reviewed)  Lab Results   Component Value Date    WBC 6.6 11/20/2018     Lab Results   Component Value Date    RBC 3.81 11/20/2018     Lab Results   Component Value Date    HGB 9.0 11/20/2018     Lab Results   Component Value Date    HCT 30.5 11/20/2018     Lab Results   Component Value Date    MCV 80 11/20/2018     Lab Results   Component Value Date    " MCH 23.6 11/20/2018     Lab Results   Component Value Date    MCHC 29.5 11/20/2018     Lab Results   Component Value Date    RDW 15.9 11/20/2018     Lab Results   Component Value Date     11/20/2018     Last Comprehensive Metabolic Panel:  Sodium   Date Value Ref Range Status   11/20/2018 141 133 - 144 mmol/L Final     Potassium   Date Value Ref Range Status   11/20/2018 4.6 3.4 - 5.3 mmol/L Final     Chloride   Date Value Ref Range Status   11/20/2018 107 94 - 109 mmol/L Final     Carbon Dioxide   Date Value Ref Range Status   11/20/2018 26 20 - 32 mmol/L Final     Anion Gap   Date Value Ref Range Status   11/20/2018 8 3 - 14 mmol/L Final     Glucose   Date Value Ref Range Status   11/20/2018 105 (H) 70 - 99 mg/dL Final     Urea Nitrogen   Date Value Ref Range Status   11/20/2018 25 7 - 30 mg/dL Final     Creatinine   Date Value Ref Range Status   11/20/2018 1.49 (H) 0.66 - 1.25 mg/dL Final     GFR Estimate   Date Value Ref Range Status   11/20/2018 46 (L) >60 mL/min/1.7m2 Final     Comment:     Non  GFR Calc     Calcium   Date Value Ref Range Status   11/20/2018 8.6 8.5 - 10.1 mg/dL Final     Lab Results   Component Value Date    AST 35 09/14/2018     Lab Results   Component Value Date    ALT 33 09/14/2018     Lab Results   Component Value Date    BILICONJ 0.0 02/28/2004      Lab Results   Component Value Date    BILITOTAL 0.6 09/14/2018     Lab Results   Component Value Date    ALBUMIN 4.1 09/14/2018     Lab Results   Component Value Date    PROTTOTAL 7.8 09/14/2018      Lab Results   Component Value Date    ALKPHOS 90 09/14/2018 11/20/18   A1c 6.4  TSH 3.06   EKG: Personally reviewed 2/20/18 Sinus rhythm, 1st degree AV block, RBBB  Stress test: 2017  Impression:  1. Normal myocardial SPECT study with a summed stress score of 3. The  apical thinning was present in 2014 and is unchanged. The apical  thinning may be physiological.  2. No changes of ischemia.  3. Normal LV ejection fraction  and wall motion.  4. Abnormal appearance of the chest. Left upper mass is likely a  lipoma but they are right lower lobe nodular and parenchymal opacities  which should be followed. Limited CT of the chest is recommended.  [Consider Follow Up: Right lower lobe nodular densities, recommend CT  follow-up]  2015 Angiogram  CONCLUSIONS  Non-Obstructive coronary disease No significant lesions  CT Abdomen/pelvis 10/5/18  IMPRESSION:  The suspected renal cell neoplasm in the upper pole of  the left kidney is significantly larger compared to 6/3/2016.  Imaging and cardiac testing reviewed by this provider  Outside records reviewed from: Care Everywhere    ASSESSMENT and PLAN  Vincent Mishra is a 78 year old male scheduled to undergo Left percutaneous cryoablation, possible biopsy of renal mass with Dr. Capone on 12/4/18. He has the following specific operative considerations:   - RCRI : 0.9% risk of major adverse cardiac event.   - Anesthesia considerations:  Refer to PAC assessment in anesthesia records  - VTE risk: 1.8-3%  - JESSIE # of risks 4/8 = Intermediate risk  - Risk of PONV score = 2.  If > 2, anti-emetic intervention recommended.     --Renal cell carcinoma, s/p cryoablation in 2012. Concern for new left renal mass with above procedure planned.    --HLD. Atorvastatin. HTN. Will take Amlodipine but hold HCTZ and Lisinopril. Had been on ASA and Plavix but wife had stopped his aspirin. Advised to hold Plavix for 5 days prior to surgery but to start taking both aspirin and Plavix after surgery according to Cardiology recommendation. History of multiple MIs, s/p PCI X 2, 2011, 2014. Last angiogram in 2015 showed nonobstructive disease. Stress test in 2017 normal. All testing above. Activity very limited due to back pain.     --Nonsmoker. No pulmonary symptoms.    --GERD. Will take Omeprazole on DOS.    --Anemia. Hgb today 9.0 with range up to normal in recent years. Denies history of blood transfusion. Type and screen  drawn today.    --DIABETES MELLITUS II. Last A1C 6.4. Will hold Metformin on DOS.    --Recent evaluation by PCP for sores on both legs and buttock. Treatment initiated for cellulitis. Discussed strategies to avoid picking at lesions. Dr. Capone informed.    --Depression. Will take Sertraline on DOS.    --Severe chronic back pain with history of back surgery. Will take Oxycontin and Gabapentin on DOS. Will require careful positioning.   Arrival time, NPO, shower and medication instructions provided by nursing staff today. Preparing For Your Surgery handout given.  Patient was reviewed by Dr Andrade.    BESSY Hayes CNS  Preoperative Assessment Center  [facility]  [facility]  Phone: [phone]  Fax: [phone]

## 2018-11-20 NOTE — MR AVS SNAPSHOT
After Visit Summary   11/20/2018    Vincent Mishra    MRN: [id number]           Patient Information     Date Of Birth          1940        Visit Information        Provider Department      11/20/2018 12:30 PM Pharmacist, [facility]        Today's Diagnoses     Preop examination    -  1       Follow-ups after your visit        Your next 10 appointments already scheduled     Dec 04, 2018   Procedure with GENERIC ANESTHESIA PROVIDER   [facility] (--)    [address] [facility] [id number] [phone]            Dec 04, 2018  9:00 AM CST   CT RENAL TUMOR CRYOABLATION with UUCT2   [facility], Interventional Radiology ([facility], [facility])    [address] [facility] [address] [phone]           How do I prepare for my exam? (Food and drink instructions) The day before your exam: Drink extra fluids  at least six 8-ounce glasses (unless your doctor tells you to restrict fluids).  The day of your exam: No eating or drinking for 4 hours before your test. You may take medicine with small sips of water  What should I wear: Please wear loose clothing, such as a sweat suit or jogging clothes. Avoid snaps, zippers and other metal. We may ask you to undress and put on a hospital gown.  How long does the exam take: Plan to spend up to 6 hours at the hospital. The test itself normally takes less than an hour. We will watch for side effects for 1 to 4 hours.  What should I bring: Please bring any scans or X-rays taken at other hospitals, if they may be helpful. Also bring a list of your medicines, including vitamins, minerals and over-the-counter drugs. It is safest to leave personal items at home.  Do I need a : Plan for an adult to drive you home and stay with you until morning.  What do I need to tell my doctor? Tell your doctor in advance: * If you have any allergies. * If you are  breastfeeding or there s any chance you are pregnant. * If you are taking Coumadin (or any other blood thinners) 5 days prior to the exam for any special instructions. * If you are diabetic to determine if your insulin needs have to be adjusted for the exam.  What should I do after the exam: When you get home, you ll need to take it easy for the rest of the day.  Do not drive for 24 hours. You may have slight bruising and mild pain in the area. If so, you may take acetaminophen (Tylenol) or ibuprofen (Motrin, Advil) after you get home.  Some people go home with a small tube (catheter) sewn into the skin. If this case, we will show you how to care for the tube.  What is this test: This test uses a very thin needle to remove tissue, fluid or other cells from your body. We then send the cells to a lab for testing. A biopsy tests for disease in a tissue sample. Aspiration tests for disease or infection in a fluid sample. We use pictures from a CT (computed tomography) scan to guide the needle to the right place. A CT scan is a special X-ray. The scanner creates images of the body in cross sections, much like slices of bread. You may receive contrast (X-ray dye) before or during your scan. Contrast is given through an IV (small needle in your arm) or taken by mouth.  Who should I call with questions: If you have any questions, please call the imaging department where you will have your exam. Directions, parking instructions, and other information is available on our website, [email or web address].            Dec 20, 2018 10:30 AM CST   Return Visit with Geremias Phelps MD   [facility] ([facility])    [address] [place] [id number]   [phone]            Jan 18, 2019 10:00 AM CST   (Arrive by 9:45 AM)   Post-Op with Lucius Capone MD   [facility] ([facility])    [address] [place] [address]  Se  4th Floor  [facility] [id number] [phone]              Future tests that were ordered for you today     Open Future Orders        Priority Expected Expires Ordered    ABO/Rh type and screen Routine 11/20/2018 12/20/2018 11/20/2018    Basic metabolic panel Routine 11/20/2018 12/20/2018 11/20/2018    CBC with platelets Routine 11/20/2018 12/20/2018 11/20/2018    Urine Culture Aerobic Bacterial Routine 11/19/2018 1/19/2019 11/19/2018    US NICO with PPG wo Exercise Routine  11/13/2019 11/13/2018            Who to contact     Please call your clinic at [phone] to:    Ask questions about your health    Make or cancel appointments    Discuss your medicines    Learn about your test results    Speak to your doctor            Additional Information About Your Visit        [employer] Information     [employer] gives you secure access to your electronic health record. If you see a primary care provider, you can also send messages to your care team and make appointments. If you have questions, please call your primary care clinic.  If you do not have a primary care provider, please call [phone] and they will assist you.      [employer] is an electronic gateway that provides easy, online access to your medical records. With [employer], you can request a clinic appointment, read your test results, renew a prescription or communicate with your care team.     To access your existing account, please contact your [facility] or call [phone] for assistance.        Care EveryWhere ID     This is your Care EveryWhere ID. This could be used by other organizations to access your [facility] medical records  [id number]         Blood Pressure from Last 3 Encounters:   11/20/18 154/74   11/13/18 120/76   10/16/18 135/70    Weight from Last 3 Encounters:   11/20/18 92.6 kg (204 lb 1.6 oz)   11/13/18 92.5 kg (204 lb)   10/16/18 88.2 kg (194 lb 6.4 oz)              Today, you had the following     No  orders found for display         Today's Medication Changes          These changes are accurate as of 11/20/18  1:19 PM.  If you have any questions, ask your nurse or doctor.               These medicines have changed or have updated prescriptions.        Dose/Directions    atorvastatin 80 MG tablet   Commonly known as:  LIPITOR   This may have changed:  See the new instructions.   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 Make sure you understand how and when to take each.   Used for:  Lumbar back pain with radiculopathy affecting left lower extremity        Start taking on:  11/21/2018   Take 1 daily as needed  for severe pain /at bedtime may have up to 2 per day max 40 per month   Quantity:  40 tablet   Refills:  0       * Notice:  This list has 2 medication(s) that are the same as other medications prescribed for you. Read the directions carefully, and ask your doctor or other care provider to review them with you.             Primary Care Provider Office Phone # Fax #    Keyla Fonseca [phone]  [phone]       [address] [address] [address]        Equal Access to Services     JAXONREGINO MERLYN : Hadii aad ku hadlindahusam [place], wastaciada lulamadaha, qaybta kalonida deenaanaidmayelin, sobeida hernandez rebeccabill bardalesjr mitchlorietala sandhu. So [facility] [phone].    ATENCIÓN: Si habla español, tiene a nicholas disposición servicios gratuitos de asistencia lingüística. Solisame al [phone].    We comply with applicable federal civil rights laws and [place] laws. We do not discriminate on the basis of race, color, national origin, age, disability, sex, sexual orientation, or gender identity.            Thank you!     Thank you for choosing [facility]  for your care. Our goal is always to provide you with excellent care. Hearing back from our patients is one way we can continue to improve our services. Please take a few minutes to complete the written survey that you may receive in the mail after your visit with us. Thank you!             Your Updated Medication List - Protect others around you: Learn how to safely use, store and throw away your medicines at www.disposemymeds.org.          This list is accurate as of 11/20/18  1:19 PM.  Always use your most recent med list.                   Brand Name Dispense Instructions for use Diagnosis    acetaminophen 325 MG tablet    TYLENOL    250 tablet    Take 2 tablets by mouth every 4 hours as needed.    Tear of meniscus of left knee       amLODIPine 5 MG tablet    NORVASC    90 tablet    Take 1 tablet (5 mg) by mouth daily    Hypertension goal BP (blood pressure) < 140/90       ascorbic acid 500 MG tablet    VITAMIN C     Take 500 mg by mouth daily        atorvastatin 80 MG tablet    LIPITOR    90 tablet    TAKE 1 TABLET BY MOUTH DAILY    Hyperlipidemia LDL goal <100       * blood glucose monitoring meter device kit    no brand specified    1 kit    Use to test blood sugar 2 times daily or as directed.    Type 2 diabetes mellitus with hyperglycemia, without  long-term current use of insulin (H)       * blood glucose monitoring meter device kit    no brand specified    1 kit    Use to test blood sugar 1 times daily or as directed.    Type 2 diabetes mellitus with complication, without long-term current use of insulin (H)       blood glucose monitoring test strip    ONETOUCH ULTRA    200 strip    Use to test blood sugars 2 times daily or as directed.    Type 2 diabetes mellitus with other circulatory complications (H)       calcium + D 600-200 MG-UNIT Tabs   Generic drug:  calcium carbonate-vitamin D     100 tablet    Take 2 tablets by mouth daily.        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    Type 2 diabetes, HbA1c goal < 7% (H)       lisinopril 40 MG tablet    PRINIVIL/ZESTRIL    90 tablet    TAKE 1 TABLET(40 MG) BY MOUTH DAILY    Essential hypertension with goal blood pressure less than 140/90       Melatonin 10 MG Tbdp     90 tablet    Take 10 mg by mouth At Bedtime        metFORMIN 500 MG 24 hr tablet    GLUCOPHAGE-XR    360 tablet    TAKE 2 TABLETS BY MOUTH TWICE DAILY  WITH MEALS.    Type 2 diabetes mellitus without complication, unspecified long term insulin use status       nitroGLYcerin 0.4 MG sublingual tablet    NITROSTAT    25 tablet    Place 1 tablet (0.4 mg) under the tongue every 5 minutes as needed for chest pain    Chest pain, unspecified type       omeprazole 20 MG CR capsule    priLOSEC    180 capsule    TAKE ONE CAPSULE BY MOUTH TWICE DAILY    Gastroesophageal reflux disease without esophagitis       * order for DME     1 Device    Equipment being ordered: Wheelchair motorized for patient with spinal stenosis and neurogenic claudication    Spinal stenosis, lumbar region, with neurogenic claudication       * order for DME     1 Units    Equipment being ordered: TENS    Chronic bilateral low back pain with sciatica, sciatica laterality unspecified, Hip pain, bilateral       order for DME     1 Units    Equipment being ordered: compression stockings    Cellulitis and abscess of leg, except foot       order for DME     1 Device    Equipment being ordered: walker wheeled also include  With skis for indoor carpet use    Falls frequently, Deep inguinal pain, left       * oxyCODONE 30 MG 12 hr tablet    OxyCONTIN    60 tablet    Take 1 tablet (30 mg) by mouth every 12 hours    Failed back surgical syndrome       * oxyCODONE IR 5 MG tablet   Start taking on:  11/21/2018    ROXICODONE    40 tablet    Take 1 daily as needed  for severe pain /at bedtime may have up to 2 per day max 40 per month    Lumbar back pain with radiculopathy affecting left lower extremity       polyethylene glycol powder    MIRALAX/GLYCOLAX    119 g    Take 17 g (1 capful) by mouth daily    Chronic idiopathic constipation       SELECT-LITE DEVICE/LANCETS Kit     1 kit    1 kit 2 times daily    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)       sertraline 100 MG tablet    ZOLOFT    180 tablet    Take 2 tablets (200 mg) by mouth daily    Severe episode of recurrent major depressive  disorder, without psychotic features (H)       sulfamethoxazole-trimethoprim 400-80 MG per tablet    BACTRIM/SEPTRA    14 tablet    Take 1 tablet by mouth 2 times daily    Cellulitis of left lower extremity       traZODone 100 MG tablet    DESYREL    270 tablet    TAKE 3 TABLETS(300 MG) BY MOUTH EVERY NIGHT AT BEDTIME AS NEEDED FOR SLEEP    Primary insomnia       VITAMIN B-12 PO      Take 1 tablet by mouth daily        * Notice:  This list has 6 medication(s) that are the same as other medications prescribed for you. Read the directions carefully, and ask your doctor or other care provider to review them with you.

## 2018-11-20 NOTE — PATIENT INSTRUCTIONS
Preparing for Your Surgery      Name:  Vincent Mishra   MRN:  [id number]   :  1940   Today's Date:  2018     Arriving for surgery:    Surgery date:  18    Arrival time:  07:00 am    Please come to:       [facility] [address]    [address] [place]  [address]    -   parking is available in front of the hospital from 5:15 am to 8:00 pm    -  Stop at the Information Desk in the lobby    -   Inform the information person that you are here for surgery. An escort to 3c will be provided. If you would not like an escort, please proceed to 3C on the 3rd floor. [phone]     What can I eat or drink?  -  You may have solid food or milk products until 8 hours prior to your surgery.  -  You may have water, apple juice or 7up/Sprite until 2 hours prior to your surgery.    Which medicines can I take?    Stop Aspirin, vitamins and supplements one week prior to surgery.  Hold Ibuprofen and Naproxen for 24 hours prior to surgery.     -  Do NOT take these medications in the morning, the day of surgery:    Follow instructions for Plavix.  Hydrodiuril + lisinopril + metformin if normally taken in the morning      -  Please take these medications the day of surgery:    Tylenol if needed; take all other scheduled medications normally taken in the morning    How do I prepare myself?  -  Take two showers: one the night before surgery; and one the morning of surgery.         Use Scrubcare or Hibiclens to wash from neck down.  You may use your own     shampoo and conditioner. No other hair products.   -  Do NOT use lotion, powder, deodorant, or antiperspirant the day of your surgery.  -  Do NOT wear any jewelry.    - Do not bring your own medications to the hospital, except for inhalers and eye   drops.  -  Bring your ID and insurance card.    Questions or Concerns:  -If you have questions or concerns regarding the day of surgery, please call [phone].     -If you are  scheduled at the Ambulatory Surgery Center please call [phone].    -For questions after surgery please call your surgeons office.

## 2018-11-20 NOTE — PROGRESS NOTES
Preoperative Assessment Center medication history for November 20, 2018 is complete.    See Epic admission navigator for prior to admission medications.   Operating room staff will still need to confirm medications and last dose information on day of surgery.     Medication history interview sources:  Patient Interview    Changes made to PTA medication list (reason)  Added: None    Deleted: None    Changed:   -- gabapentin changed to 400mg by mouth BID       Additional medication history information (including reliability of information, actions taken by pharmacist):    -- No recent (within 30 days) course of steroids  -- No recent (within 30 days) chronic daily medications stopped   -- Patient declines being on any other prescription or over-the-counter medications  -- patient will finish his course of bactrim later today 11/20/18  -- patient just takes oxyCONTIN 30mg every morning NOT BID because the evening dose keeps him up. He will also occasionally use a 5mg oxyCODONE dose if pain gets too bad averaging 45-52.5mg/day OME    Prior to Admission medications    Medication Sig Last Dose Taking? Auth Provider   amLODIPine (NORVASC) 5 MG tablet Take 1 tablet (5 mg) by mouth daily Taking Yes Keyla Fonseca MD   ascorbic acid (VITAMIN C) 500 MG tablet Take 500 mg by mouth daily  Taking Yes Reported, Patient   atorvastatin (LIPITOR) 80 MG tablet TAKE 1 TABLET BY MOUTH DAILY  Patient taking differently: TAKE 1 TABLET BY MOUTH EVERY EVENING Taking Yes Keyla Fonseca MD   Calcium Carbonate-Vitamin D (CALCIUM + D) 600-200 MG-UNIT per tablet Take 2 tablets by mouth daily. Taking Yes Joe Pretty MD   cholecalciferol (VITAMIN  -D) 1000 UNITS capsule Take 1 capsule by mouth daily Taking Yes Reported, Patient   clopidogrel (PLAVIX) 75 MG tablet TAKE 1 TABLET BY MOUTH DAILY Taking Yes Keyla Fonseca MD   Cyanocobalamin (VITAMIN B-12 PO) Take 1 tablet by mouth daily  Taking Yes Reported, Patient   gabapentin  (NEURONTIN) 100 MG capsule TAKE ONE CAPSULE BY MOUTH DAILY, INCREASE BY 1 CAPSULE EVERY 2 DAYS UP TO 6 CAPSULES THREE TIMES DAILY  Patient taking differently: Take 400 mg by mouth 2 times daily  Taking Yes Keyla Fonseca MD   hydrochlorothiazide (HYDRODIURIL) 25 MG tablet TAKE 1 TABLET BY MOUTH DAILY Taking Yes Keyla Fonseca MD   lisinopril (PRINIVIL/ZESTRIL) 40 MG tablet TAKE 1 TABLET(40 MG) BY MOUTH DAILY Taking Yes Keyla Fonseca MD   Melatonin 10 MG TBDP Take 10 mg by mouth At Bedtime Taking Yes Keyla Fonseca MD   metFORMIN (GLUCOPHAGE-XR) 500 MG 24 hr tablet TAKE 2 TABLETS BY MOUTH TWICE DAILY WITH MEALS. Taking Yes Keyla Fonseca MD   omeprazole (PRILOSEC) 20 MG CR capsule TAKE ONE CAPSULE BY MOUTH TWICE DAILY Taking Yes Keyla Fonseca MD   oxyCODONE (OXYCONTIN) 30 MG 12 hr tablet Take 1 tablet (30 mg) by mouth every 12 hours  Patient taking differently: Take 30 mg by mouth every morning  Taking Yes Keyla Fosneca MD   oxyCODONE IR (ROXICODONE) 5 MG tablet Take 1 daily as needed  for severe pain /at bedtime may have up to 2 per day max 40 per month Taking Yes Keyla Fonseca MD   polyethylene glycol (MIRALAX/GLYCOLAX) powder Take 17 g (1 capful) by mouth daily Taking Yes Keyla Fonseca MD   sertraline (ZOLOFT) 100 MG tablet Take 2 tablets (200 mg) by mouth daily Taking Yes Keyla Fonseca MD   sulfamethoxazole-trimethoprim (BACTRIM/SEPTRA) 400-80 MG per tablet Take 1 tablet by mouth 2 times daily Taking Yes Keyla Fonseca MD   traZODone (DESYREL) 100 MG tablet TAKE 3 TABLETS(300 MG) BY MOUTH EVERY NIGHT AT BEDTIME AS NEEDED FOR SLEEP Taking Yes Keyla Fonseca MD   acetaminophen (TYLENOL) 325 MG tablet Take 2 tablets by mouth every 4 hours as needed.  Patient not taking: Reported on 11/20/2018 Not Taking  Karthik Sanz MD   blood glucose monitoring (NO BRAND SPECIFIED) meter device kit Use to test blood sugar 1 times daily or as directed.   Keyla Fonseca MD   blood  glucose monitoring (NO BRAND SPECIFIED) meter device kit Use to test blood sugar 2 times daily or as directed.   Keyla Fonseca MD   blood glucose monitoring (ONE TOUCH ULTRA) test strip Use to test blood sugars 2 times daily or as directed.   Keyla Fonseca MD   ibuprofen (ADVIL/MOTRIN) 800 MG tablet TAKE 1 TABLET BY MOUTH EVERY 8 HOURS AS NEEDED FOR MODERATE PAIN  Patient not taking: Reported on 11/20/2018 Not Taking  Keyla Fonseca MD   Lancets Misc. (SELECT-LITE DEVICE/LANCETS) KIT 1 kit 2 times daily   Keyla Fonseca MD   LIFESCAN FINEPOINT LANCETS MISC Use to test blood sugars 1 times daily or as directed.   Caridad Vinson APRN CNP   nitroGLYcerin (NITROSTAT) 0.4 MG sublingual tablet Place 1 tablet (0.4 mg) under the tongue every 5 minutes as needed for chest pain  Patient not taking: Reported on 11/20/2018 Not Taking  Keyla Fonseca MD   order for DME Equipment being ordered: walker wheeled also include  With skis for indoor carpet use   Keyla Fonseca MD   order for DME Equipment being ordered: compression stockings   Cydney Lomeli PA-C   order for DME Equipment being ordered: TENS   Keyla Fonseca MD   order for DME Equipment being ordered: Wheelchair motorized for patient with spinal stenosis and neurogenic claudication   Keyla Fonseca MD         Medication history completed by: Wilfrid Martin RPH

## 2018-11-26 NOTE — TELEPHONE ENCOUNTER
He can hold his plavix for 5 days but the aspirin should not be stopped.  The patient would be at high risk for a cardiac event.  They should try to perform the procedure on aspirin.    ADDENDUM: Disc recommendation with patient's wife Sheyla. She states the surgeon's office told her to stop ASA--will have her call surgeon's office and discuss Dr Phelps's recommendations. If they cannot do procedure on ASA, she should call back. Also discuss that when she talked with surgeon's office, this note will be in patient's record so they can refer to it. Pt's wife verbalized understanding and agreed with plan. Josee Sales RN Cardiology November 26, 2018, 2:23 PM

## 2018-11-26 NOTE — TELEPHONE ENCOUNTER
Pt's wife called in stating pt is having cryoablation of kidney tumor on 12/4/18. Surgeon would like Plavix hold instructions to come from cardiology. Per Dr Phelps's note of 2/27/18--pt on lifelong DAPT for recurrent NSTEMI. Wife states they are holding his ASA for 5 days preop. Will discuss with Dr Phelps re: plavix hold instructions preop.

## 2018-12-04 NOTE — ANESTHESIA CARE TRANSFER NOTE
Patient: Vincent Mishra    Procedure(s):  Anesthesia out of OR CT guided Cryoablation of Left Renal Mass     Diagnosis: Left Renal Mass   Diagnosis Additional Information: No value filed.    Anesthesia Type:   No value filed.     Note:  Airway :Face Mask  Patient transferred to:PACU  Comments: VSS. Breathing spontaneously at a regular rate with adequate tidal volumes and maintaining O2 sats on 6L facemask. Denies nausea or pain. No apparent complications from anesthesia.   Handoff Report: Identifed the Patient, Identified the Reponsible Provider, Reviewed the pertinent medical history, Discussed the surgical course, Reviewed Intra-OP anesthesia mangement and issues during anesthesia, Set expectations for post-procedure period and Allowed opportunity for questions and acknowledgement of understanding      Vitals: (Last set prior to Anesthesia Care Transfer)    CRNA VITALS  12/4/2018 1200 - 12/4/2018 1250      12/4/2018             Resp Rate (observed): 9      CRNA VITALS  12/4/2018 1200 - 12/4/2018 1250      12/4/2018             Resp Rate (observed): 9                Electronically Signed By: Amari Ortega MD  December 4, 2018  12:50 PM

## 2018-12-04 NOTE — IP AVS SNAPSHOT
MRN:[id number]                      After Visit Summary   12/4/2018    Vincent Mishra    MRN: [id number]           Thank you!     Thank you for choosing [facility] for your care. Our goal is always to provide you with excellent care. Hearing back from our patients is one way we can continue to improve our services. Please take a few minutes to complete the written survey that you may receive in the mail after you visit with us. Thank you!        Patient Information     Date Of Birth          1940        About your hospital stay     You were admitted on:  December 4, 2018 You last received care in the:  Same Day Surgery [facility]    You were discharged on:  December 4, 2018       Who to Call     For medical emergencies, please call 911.  For non-urgent questions about your medical care, please call your primary care provider or clinic, [phone]  For questions related to your surgery, please call your surgery clinic        Attending Provider     Provider Specialty    Wiley Jules MD Radiology       Primary Care Provider Office Phone # Fax #    Keyla Fonseca [phone]      Your next 10 appointments already scheduled     Dec 20, 2018 10:30 AM CST   Return Visit with Geremias Phelps MD   [facility] ([facility])    [address] [place] [id number] [phone]            Jan 18, 2019 10:00 AM CST   (Arrive by 9:45 AM)   Post-Op with Lucius Capone MD   [facility] ([facility])    [address]  [address] [facility] [address] [phone]              Further instructions from your care team       1. Do not submerge (bath, tub, etc.) puncture site for 5 days, okay to shower tomorrow  2. No lotion or power to puncture site for 3-5 days  3. Limit lifting to less than 10 pounds for 3 days  [facility]  [facility]  Same-Day Surgery   Adult Discharge Orders & Instructions     For 24 hours after surgery    1. Get plenty of rest.  A responsible adult must stay with you for at least 24 hours after you leave the hospital.   2. Do not drive or use heavy equipment.  If you have weakness or tingling, don't drive or use heavy equipment until this feeling goes away.  3. Do not drink alcohol.  4. Avoid strenuous or risky activities.  Ask for help when climbing stairs.   5. You may feel lightheaded.  IF so, sit for a few minutes before standing.  Have someone help you get up.   6. If you have nausea (feel sick to your stomach): Drink only clear liquids such as apple juice, ginger ale, broth or 7-Up.  Rest may also help.  Be sure to drink enough fluids.  Move to a regular diet as you feel able.  7. You may have a slight fever. Call the doctor if your fever is over 100 F (37.7 C) (taken under the tongue) or lasts longer than 24 hours.  8. You may have a dry mouth, a sore throat, muscle aches or trouble sleeping.  These should go away after 24 hours.  9. Do not make important or legal decisions.   Call your doctor for any of the followin.  Signs of infection (fever, growing tenderness at the surgery site, a large amount of drainage or bleeding, severe pain, foul-smelling drainage, redness, swelling).    2. It has been over 8 to 10 hours since surgery and you are still not able to urinate (pass water).    To contact a doctor, call         [phone] and ask for the resident on call for   __IR __ (answered 24 hours a day)      Emergency Department:    [facility]: [phone]       (TTY for hearing impaired: [phone])    [facility]: [phone]       (TTY for hearing impaired: [phone])          Pending Results     Date and Time Order Name Status Description    2018 1222 Surgical pathology exam In process     2018 0906 CT Renal Biopsy Percutaneous In process     2018 7425  "CT Renal Tumor Cryoablation In process             Admission Information     Date & Time Provider Department Dept. Phone    12/4/2018 Wiley Jules MD Same Day Surgery [facility] [place] [phone]      Your Vitals Were     Blood Pressure Temperature Respirations Height Weight Pulse Oximetry    119/77 98.4  F (36.9  C) (Oral) 16 1.727 m (5' 8\") 92.9 kg (204 lb 12.9 oz) 95%    BMI (Body Mass Index)                   31.14 kg/m2           [employer] Information     [employer] gives you secure access to your electronic health record. If you see a primary care provider, you can also send messages to your care team and make appointments. If you have questions, please call your primary care clinic.  If you do not have a primary care provider, please call [phone] and they will assist you.        Care EveryWhere ID     This is your Care EveryWhere ID. This could be used by other organizations to access your [place] medical records  [id number]        Equal Access to Services     OVI ZULETA AH: Hadii rosa momin hadasho Soomaali, waaxda luqadaha, qaybta kaalmada adeegyada, waxay david lorenzo . So [facility] [phone].    ATENCIÓN: Si habla español, tiene a nicholas disposición servicios gratuitos de asistencia lingüística. Llame al [phone].    We comply with applicable federal civil rights laws and [place] laws. We do not discriminate on the basis of race, color, national origin, age, disability, sex, sexual orientation, or gender identity.               Review of your medicines      UNREVIEWED medicines. Ask your doctor about these medicines        Dose / Directions    acetaminophen 325 MG tablet   Commonly known as:  TYLENOL   Used for:  Tear of meniscus of left knee        Dose:  650 mg   Take 2 tablets by mouth every 4 hours as needed.   Quantity:  250 tablet   Refills:  1       amLODIPine 5 MG tablet   Commonly known as:  NORVASC   Used for:  Hypertension goal BP (blood pressure) < 140/90        " Dose:  5 mg   Take 1 tablet (5 mg) by mouth daily   Quantity:  90 tablet   Refills:  3       aspirin 81 MG tablet   Commonly known as:  ASA        Dose:  81 mg   Take 81 mg by mouth daily   Refills:  0       atorvastatin 80 MG tablet   Commonly known as:  LIPITOR   Used for:  Hyperlipidemia LDL goal <100        TAKE 1 TABLET BY MOUTH DAILY   Quantity:  90 tablet   Refills:  3       calcium + D 600-200 MG-UNIT Tabs   Generic drug:  calcium carbonate-vitamin D        Dose:  2 tablet   Take 2 tablets by mouth daily.   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)        TAKE ONE CAPSULE BY MOUTH DAILY, INCREASE BY 1 CAPSULE EVERY 2 DAYS UP TO 6 CAPSULES THREE TIMES DAILY   Quantity:  270 capsule   Refills:  11       hydrochlorothiazide 25 MG tablet   Commonly known as:  HYDRODIURIL   Used for:  Essential hypertension with goal blood pressure less than 140/90        TAKE 1 TABLET BY MOUTH DAILY   Quantity:  90 tablet   Refills:  1       ibuprofen 800 MG tablet   Commonly known as:  ADVIL/MOTRIN   Used for:  Primary osteoarthritis of both knees        TAKE 1 TABLET BY MOUTH EVERY 8 HOURS AS NEEDED FOR MODERATE PAIN   Quantity:  90 tablet   Refills:  1       lisinopril 40 MG tablet   Commonly known as:  PRINIVIL/ZESTRIL   Used for:  Essential hypertension with goal blood pressure less than 140/90        TAKE 1 TABLET(40 MG) BY MOUTH DAILY   Quantity:  90 tablet   Refills:  1       Melatonin  10 MG Tbdp        Dose:  10 mg   Take 10 mg by mouth At Bedtime   Quantity:  90 tablet   Refills:  0       metFORMIN 500 MG 24 hr tablet   Commonly known as:  GLUCOPHAGE-XR   Used for:  Type 2 diabetes mellitus without complication, unspecified long term insulin use status        TAKE 2 TABLETS BY MOUTH TWICE DAILY WITH MEALS.   Quantity:  360 tablet   Refills:  1       nitroGLYcerin 0.4 MG sublingual tablet   Commonly known as:  NITROSTAT   Used for:  Chest pain, unspecified type        Dose:  0.4 mg   Place 1 tablet (0.4 mg) under the tongue every 5 minutes as needed for chest pain   Quantity:  25 tablet   Refills:  1       omeprazole 20 MG DR capsule   Commonly known as:  priLOSEC   Used for:  Gastroesophageal reflux disease without esophagitis        TAKE ONE CAPSULE BY MOUTH TWICE DAILY   Quantity:  180 capsule   Refills:  3       * oxyCODONE 30 MG 12 hr tablet   Commonly known as:  OxyCONTIN   Used for:  Failed back surgical syndrome        Dose:  30 mg   Take 1 tablet (30 mg) by mouth every 12 hours   Quantity:  60 tablet   Refills:  0       * oxyCODONE 5 MG tablet   Commonly known as:  ROXICODONE   Used for:  Lumbar back pain with radiculopathy affecting left lower extremity        Take 1 daily as needed  for severe pain /at bedtime may have up to 2 per day max 40 per month   Quantity:  40 tablet   Refills:  0       polyethylene glycol powder   Commonly known as:  MIRALAX/GLYCOLAX   Used for:  Chronic idiopathic constipation        Dose:  1 capful   Take 17 g (1 capful) by mouth daily   Quantity:  119 g   Refills:  3       sertraline 100 MG tablet   Commonly known as:  ZOLOFT   Used for:  Severe episode of recurrent major depressive disorder, without psychotic features (H)        Dose:  200 mg   Take 2 tablets (200 mg) by mouth daily   Quantity:  180 tablet   Refills:  1       sulfamethoxazole-trimethoprim 400-80 MG tablet   Commonly known as:  BACTRIM/SEPTRA   Used for:  Cellulitis of left lower extremity         Dose:  1 tablet   Take 1 tablet by mouth 2 times daily   Quantity:  14 tablet   Refills:  0       traZODone 100 MG tablet   Commonly known as:  DESYREL   Used for:  Primary insomnia        TAKE 3 TABLETS(300 MG) BY MOUTH EVERY NIGHT AT BEDTIME AS NEEDED FOR SLEEP   Quantity:  270 tablet   Refills:  0       VITAMIN B-12 PO        Dose:  1 tablet   Take 1 tablet by mouth daily   Refills:  0       vitamin C 500 MG tablet   Commonly known as:  ASCORBIC ACID        Dose:  500 mg   Take 500 mg by mouth daily   Refills:  0       * Notice:  This list has 2 medication(s) that are the same as other medications prescribed for you. Read the directions carefully, and ask your doctor or other care provider to review them with you.      CONTINUE these medicines which have NOT CHANGED        Dose / Directions    blood glucose lancets   Used for:  Type 2 diabetes, HbA1c goal < 7% (H)        Use to test blood sugars 1 times daily or as directed.   Quantity:  100 each   Refills:  12       * blood glucose monitoring meter device kit   Commonly known as:  NO BRAND SPECIFIED   Used for:  Type 2 diabetes mellitus with hyperglycemia, without long-term current use of insulin (H)        Use to test blood sugar 2 times daily or as directed.   Quantity:  1 kit   Refills:  0       * blood glucose monitoring meter device kit   Commonly known as:  NO BRAND SPECIFIED   Used for:  Type 2 diabetes mellitus with complication, without long-term current use of insulin (H)        Use to test blood sugar 1 times daily or as directed.   Quantity:  1 kit   Refills:  0       blood glucose monitoring test strip   Commonly known as:  ONETOUCH ULTRA   Used for:  Type 2 diabetes mellitus with other circulatory complications (H)        Use to test blood sugars 2 times daily or as directed.   Quantity:  200 strip   Refills:  3       * order for DME   Used for:  Spinal stenosis, lumbar region, with neurogenic claudication        Equipment being ordered:  Wheelchair motorized for patient with spinal stenosis and neurogenic claudication   Quantity:  1 Device   Refills:  0       * order for DME   Used for:  Chronic bilateral low back pain with sciatica, sciatica laterality unspecified, Hip pain, bilateral        Equipment being ordered: TENS   Quantity:  1 Units   Refills:  0       order for DME   Used for:  Cellulitis and abscess of leg, except foot        Equipment being ordered: compression stockings   Quantity:  1 Units   Refills:  0       order for DME   Used for:  Falls frequently, Deep inguinal pain, left        Equipment being ordered: walker wheeled also include  With skis for indoor carpet use   Quantity:  1 Device   Refills:  0       SELECT-LITE DEVICE/LANCETS Kit   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 Read the directions carefully, and ask your doctor or other care provider to review them with you.             Protect others around you: Learn how to safely use, store and throw away your medicines at www.disposemymeds.org.             Medication List: This is a list of all your medications and when to take them. Check marks below indicate your daily home schedule. Keep this list as a reference.      Medications           Morning Afternoon Evening Bedtime As Needed    acetaminophen 325 MG tablet   Commonly known as:  TYLENOL   Take 2 tablets by mouth every 4 hours as needed.                                amLODIPine 5 MG tablet   Commonly known as:  NORVASC   Take 1 tablet (5 mg) by mouth daily                                aspirin 81 MG tablet   Commonly known as:  ASA   Take 81 mg by mouth daily                                atorvastatin 80 MG tablet   Commonly known as:  LIPITOR   TAKE 1 TABLET BY MOUTH DAILY                                 blood glucose lancets   Use to test blood sugars 1 times daily or as directed.                                * blood glucose monitoring meter device kit   Commonly known as:  NO BRAND SPECIFIED   Use to test blood sugar 2 times daily or as directed.                                * blood glucose monitoring meter device kit   Commonly known as:  NO BRAND SPECIFIED   Use to test blood sugar 1 times daily or as directed.                                blood glucose monitoring test strip   Commonly known as:  ONETOUCH ULTRA   Use to test blood sugars 2 times daily or as directed.                                calcium + D 600-200 MG-UNIT Tabs   Take 2 tablets by mouth daily.   Generic drug:  calcium carbonate-vitamin D                                cholecalciferol 1000 units (25 mcg) capsule   Commonly known as:  VITAMIN D3   Take 1 capsule by mouth daily                                clopidogrel 75 MG tablet   Commonly known as:  PLAVIX   TAKE 1 TABLET BY MOUTH DAILY                                gabapentin 100 MG capsule   Commonly known as:  NEURONTIN   TAKE ONE CAPSULE BY MOUTH DAILY, INCREASE BY 1 CAPSULE EVERY 2 DAYS UP TO 6 CAPSULES THREE TIMES DAILY                                hydrochlorothiazide 25 MG tablet   Commonly known as:  HYDRODIURIL   TAKE 1 TABLET BY MOUTH DAILY                                ibuprofen 800 MG tablet   Commonly known as:  ADVIL/MOTRIN   TAKE 1 TABLET BY MOUTH EVERY 8 HOURS AS NEEDED FOR MODERATE PAIN                                lisinopril 40 MG tablet   Commonly known as:  PRINIVIL/ZESTRIL   TAKE 1 TABLET(40 MG) BY MOUTH DAILY                                Melatonin 10 MG Tbdp   Take 10 mg by mouth At Bedtime                                metFORMIN 500 MG 24 hr tablet   Commonly known as:  GLUCOPHAGE-XR   TAKE 2 TABLETS BY MOUTH TWICE DAILY WITH MEALS.                                nitroGLYcerin 0.4 MG sublingual tablet   Commonly known as:  NITROSTAT   Place 1 tablet  (0.4 mg) under the tongue every 5 minutes as needed for chest pain                                omeprazole 20 MG DR capsule   Commonly known as:  priLOSEC   TAKE ONE CAPSULE BY MOUTH TWICE DAILY                                * order for DME   Equipment being ordered: Wheelchair motorized for patient with spinal stenosis and neurogenic claudication                                * order for DME   Equipment being ordered: TENS                                order for DME   Equipment being ordered: compression stockings                                order for DME   Equipment being ordered: walker wheeled also include  With skis for indoor carpet use                                * oxyCODONE 30 MG 12 hr tablet   Commonly known as:  OxyCONTIN   Take 1 tablet (30 mg) by mouth every 12 hours                                * oxyCODONE 5 MG tablet   Commonly known as:  ROXICODONE   Take 1 daily as needed  for severe pain /at bedtime may have up to 2 per day max 40 per month                                polyethylene glycol powder   Commonly known as:  MIRALAX/GLYCOLAX   Take 17 g (1 capful) by mouth daily                                SELECT-LITE DEVICE/LANCETS Kit   1 kit 2 times daily                                sertraline 100 MG tablet   Commonly known as:  ZOLOFT   Take 2 tablets (200 mg) by mouth daily                                sulfamethoxazole-trimethoprim 400-80 MG tablet   Commonly known as:  BACTRIM/SEPTRA   Take 1 tablet by mouth 2 times daily                                traZODone 100 MG tablet   Commonly known as:  DESYREL   TAKE 3 TABLETS(300 MG) BY MOUTH EVERY NIGHT AT BEDTIME AS NEEDED FOR SLEEP                                VITAMIN B-12 PO   Take 1 tablet by mouth daily                                vitamin C 500 MG tablet   Commonly known as:  ASCORBIC ACID   Take 500 mg by mouth daily                                * Notice:  This list has 6 medication(s) that are the same as other  medications prescribed for you. Read the directions carefully, and ask your doctor or other care provider to review them with you.

## 2018-12-04 NOTE — IP AVS SNAPSHOT
Same Day Surgery [address] [address]    Phone:  [phone]                                       After Visit Summary   12/4/2018    Vincent Mishra    MRN: [id number]           After Visit Summary Signature Page     I have received my discharge instructions, and my questions have been answered. I have discussed any challenges I see with this plan with the nurse or doctor.    ..........................................................................................................................................  Patient/Patient Representative Signature      ..........................................................................................................................................  Patient Representative Print Name and Relationship to Patient    ..................................................               ................................................  Date                                   Time    ..........................................................................................................................................  Reviewed by Signature/Title    ...................................................              ..............................................  Date                                               Time          22EPIC Rev 08/18

## 2018-12-04 NOTE — DISCHARGE INSTRUCTIONS
1. Do not submerge (bath, tub, etc.) puncture site for 5 days, okay to shower tomorrow  2. No lotion or power to puncture site for 3-5 days  3. Limit lifting to less than 10 pounds for 3 days  [facility]  Same-Day Surgery   Adult Discharge Orders & Instructions     For 24 hours after surgery    1. Get plenty of rest.  A responsible adult must stay with you for at least 24 hours after you leave the hospital.   2. Do not drive or use heavy equipment.  If you have weakness or tingling, don't drive or use heavy equipment until this feeling goes away.  3. Do not drink alcohol.  4. Avoid strenuous or risky activities.  Ask for help when climbing stairs.   5. You may feel lightheaded.  IF so, sit for a few minutes before standing.  Have someone help you get up.   6. If you have nausea (feel sick to your stomach): Drink only clear liquids such as apple juice, ginger ale, broth or 7-Up.  Rest may also help.  Be sure to drink enough fluids.  Move to a regular diet as you feel able.  7. You may have a slight fever. Call the doctor if your fever is over 100 F (37.7 C) (taken under the tongue) or lasts longer than 24 hours.  8. You may have a dry mouth, a sore throat, muscle aches or trouble sleeping.  These should go away after 24 hours.  9. Do not make important or legal decisions.   Call your doctor for any of the followin.  Signs of infection (fever, growing tenderness at the surgery site, a large amount of drainage or bleeding, severe pain, foul-smelling drainage, redness, swelling).    2. It has been over 8 to 10 hours since surgery and you are still not able to urinate (pass water).    To contact a doctor, call         [phone] and ask for the resident on call for   __IR __ (answered 24 hours a day)      Emergency Department:    [facility]: [phone]       (TTY for hearing impaired: [phone])    [facility]: [phone]       (TTY for hearing impaired:  [phone])

## 2018-12-04 NOTE — PROCEDURES
Interventional Radiology Brief Post Procedure Note    Procedure: CT RENAL TUMOR CRYOABLATION    Proceduralist: Wiley Jules MD    Assistant: IR Fellow Physician, Eric Pabon MD    Time Out: Prior to the start of the procedure and with procedural staff participation, I verbally confirmed the patient s identity using two indicators, relevant allergies, that the procedure was appropriate and matched the consent or emergent situation, and that the correct equipment/implants were available. Immediately prior to starting the procedure I conducted the Time Out with the procedural staff and re-confirmed the patient s name, procedure, and site/side. (The Joint Commission universal protocol was followed.)  Yes    Medications   Medication Event Details Admin User Admin Time       Sedation: General Endotracheal Anesthesia (GET) administered and documented by Anesthesia Care Provider    Findings: Successful cryoablation and biopsy of left renal lesion.    Estimated Blood Loss: 4ml    Fluoroscopy Time:  Please see dictation for details.    SPECIMENS: Core needle biopsies    Complications: 1. None     Condition: Stable    Plan:   - Post op care in pacu per anesthesia  - 2 hours bedrest in supine position on probe sites followed by 2 hours bedrest with bathroom privileges.     Comments: See dictated procedure note for full details.    Eric Pabon MD

## 2018-12-04 NOTE — OR NURSING
Discharge instructions complete, restart of plavix tomorrow confirmed with IR resident Liborio, all questions answered, IV dc'd. Discharge by wheelchair with transport to front door

## 2018-12-04 NOTE — OR NURSING
Dr. Cross ok'd patient ready to go to phase 2. Pt is not complaining of any neck pain or eye discomfort. Pt is having a very difficult time laying on his back per orders. Vincent keeps turning side to side. Dressing remains dry and intact and no signs of pain in lower abdomen.

## 2018-12-04 NOTE — ANESTHESIA POSTPROCEDURE EVALUATION
Anesthesia POST Procedure Evaluation    Patient: Vincent Mishra   MRN:     [id number] Gender:   male   Age:    78 year old :      1940        Preoperative Diagnosis: Left Renal Mass    Procedure(s):  Anesthesia out of OR CT guided Cryoablation of Left Renal Mass    Postop Comments: No value filed.       Anesthesia Type:  General    Reportable Event: NO     PAIN: Uncomplicated   Sign Out status: Comfortable, Well controlled pain     PONV: No PONV   Sign Out status:  No Nausea or Vomiting     Neuro/Psych: Uneventful perioperative course   Sign Out Status: Preoperative baseline; Age appropriate mentation     Airway/Resp.: Uneventful perioperative course   Sign Out Status: Non labored breathing, age appropriate RR; Resp. Status within EXPECTED Parameters     CV: Uneventful perioperative course   Sign Out status: Appropriate BP and perfusion indices; Appropriate HR/Rhythm     Disposition:   Sign Out in:  PACU  Disposition:  Phase II; Home  Recovery Course: Uneventful  Follow-Up: Not required           Last Anesthesia Record Vitals:  CRNA VITALS  2018 1200 - 2018 1300      2018             NIBP: 154/88    NIBP Mean: 74    Ht Rate: 66      CRNA VITALS  2018 1200 - 2018 1300      2018             NIBP: 154/88    NIBP Mean: 74    Ht Rate: 66          Last PACU/Preop Vitals:  Vitals:    18 1334 18 1345 18 1400   BP: 107/73 124/89 119/77   Resp: 16 16 16   Temp: 36.9  C (98.4  F)     SpO2: 92% 94% 95%         Electronically Signed By: Tino Cross MD, 2018, 2:19 PM

## 2018-12-04 NOTE — PROGRESS NOTES
Patient Name: Vincent Mishra  Medical Record Number: [id number]  Today's Date: 12/4/2018    Procedure: Left renal mass cryoablation and renal mass biopsy under general anesthesia.  Proceduralist: Dr. Jorge A Pabon    Procedure start time: 1035  Procedure end time: 1235    Report given to: PACU via anesthesia staff    Other Notes: Pt arrived to CT 2  from . Consent reviewed. Pt denies any questions or concerns regarding procedure. Pt positioned prone and monitored per anesthesia staff. Pt tolerated procedure without any noted complications. Pt transferred PACU by anesthesia staff.

## 2018-12-10 NOTE — TELEPHONE ENCOUNTER
"SERTRALINE 100MG TABLETS      Last Written Prescription Date:  9/14/18  Last Fill Quantity: 180,   # refills: 1  Last Office Visit: 11/13/18  Future Office visit:    Next 5 appointments (look out 90 days)    Dec 20, 2018 10:30 AM CST  Return Visit with Geremias Phelps MD  [facility] ([facility]) [address] [place] [id number] [phone]           Requested Prescriptions   Pending Prescriptions Disp Refills     sertraline (ZOLOFT) 100 MG tablet [Pharmacy Med Name: SERTRALINE 100MG TABLETS] 180 tablet 0     Sig: TAKE 2 TABLETS(200 MG) BY MOUTH DAILY    SSRIs Protocol Failed - 12/10/2018 12:12 PM       Failed - PHQ-9 score less than 5 in past 6 months    Please review last PHQ-9 score.   PHQ-9 SCORE 3/29/2018 4/26/2018 5/30/2018   PHQ-9 Total Score - - -   PHQ-9 Total Score 17 23 10            Passed - Patient is age 18 or older       Passed - Recent (6 mo) or future (30 days) visit within the authorizing provider's specialty    Patient had office visit in the last 6 months or has a visit in the next 30 days with authorizing provider or within the authorizing provider's specialty.  See \"Patient Info\" tab in inbasket, or \"Choose Columns\" in Meds & Orders section of the refill encounter.              "

## 2018-12-10 NOTE — TELEPHONE ENCOUNTER
Mr. Mishra's wife, Sheyla, called Urology on call to tell me that Vincent is having left flank pain since his procedure (IR cryoablation of left renal mass) on 10/4/18.     Apparently, vincent is a largely bed-bound patient (due to multiple MIs and significant low back pain) who underwent the above outpatient procedure, slept uneventfully at home the evening of POD#0 then on POD#1 he fell on his left side.      Since then he has been having persistent left flank pain that has perhaps gotten a little better but has largely been 6/10 on pain scale.  This has not been evaluated by anyone. His wife says the patient has no external bruises.  He has no F/C although has desired a fan because he feels warm.  No N/V, but is not eating as well as usual.  BMs - chronic constipation and he had a good result with an enemai, and his pain does seem to be a bit better since then. He is voiding normally and has no hematuria. He is not dizzy.     Discussed with Sheyla that we wouldn't expect her  to have spontaneously developed purely surgical pain almost a full 24 hours after this procedure.  The pain is likely related to falling on his side.  It is possible, however, that the fall could've caused a renal bleed or perinephric hematuria (and this could been formally ruled-out with a CT if there is concern).       I counseled her that if his pain isn't improving he would benefit from an evaluation and she would like to go to the local ED for this.  She wondered if he could've broken a rib, and I told her this also might be possible.      This was discussed with Dr. Liborio Puckett, PA Urology

## 2018-12-11 PROBLEM — S37.019A RENAL HEMATOMA: Status: ACTIVE | Noted: 2018-01-01

## 2018-12-11 NOTE — ED NOTES
Pt comes in today c/o of left side pain/back/rib from a fall on Wed. He is a week out from surgery. Had his left kidney froze due to a DX of CA. His surgeon is worried he may of damaged his kidney due to the freezing of the kidney or FX rib. Is also c/o of chest pain but feels this is caused from his low back/side/rib pain. The pain moves all around. He is a/o x eating and drinking well. Is taking oxycodone for a HX of back pain that is not working on the acute pain from fall. Denies N/V/D. Pt has a F/U apt Jan 18th

## 2018-12-11 NOTE — ED NOTES
"[facility], [place]   ED Nurse to Floor Handoff     Vincent Mishra is a 78 year old male who speaks English and lives with a spouse,  in a home  They arrived in the ED by ambulance from emergency room at [place], [place] as trauma transfer.    ED Chief Complaint: Flank Pain    ED Dx;   Final diagnoses:   Retroperitoneal bleed   Closed fracture of multiple ribs of left side, initial encounter         Needed?: No    Allergies:   Allergies   Allergen Reactions     Prozac [Fluoxetine] Other (See Comments)     \"I went crazy.\"       Benadryl [Diphenhydramine Hcl] Other (See Comments)     Starts to shake, and paranoid     Codeine Itching     Diphenhydramine Other (See Comments)     Hallucinations, See [facility] records scanned on 7/16/15     Labetalol Other (See Comments)     See [facility] records scanned on 7/16/15  Bradycardia down to 30 on holter, dizziness. Stopped med and symptoms resolved     Metoprolol Other (See Comments)     Bradycardia even at 12.5 mg     Morphine Visual Disturbance   .  Past Medical Hx:   Past Medical History:   Diagnosis Date     CAD (coronary artery disease) 7/26/2011 7/2011 (Juan[facility]): s/p MI, PTCA - RCA      Cancer of kidney (H)      Chest pain 1/12/2012     Coronary artery disease      History of angina      History of blood transfusion      Hyperlipidaemia      Hyperlipidemia LDL goal <100 9/23/2010     Malignant neoplasm (H)     Prostate CA (removed)     Myocardial infarction (H)     s/p MI 7/29/14, stent placement     NSTEMI (non-ST elevated myocardial infarction) (H)     07-25-14 CATH- RCA, L.Main and CFX  had minor luminal irregularites. Mid LAD high grade stenosis 80-90%.Stent placed to mid LAD     Other and unspecified hyperlipidemia     MI in July 2011     Right bundle branch block      Type II or unspecified type diabetes mellitus without mention of complication, not stated as uncontrolled      " Unspecified essential hypertension       Baseline Mental status: WDL  Current Mental Status changes: at basesline with slight drowsiness.    Infection present or suspected this encounter: no  Sepsis suspected: No  Isolation type: No active isolations     Activity level - Baseline/Home:  Stand with Assist uses crutches or walker.   Activity Level - Current:   Stand with Assist    Bariatric equipment needed?: No    In the ED these meds were given:   Medications   lactated ringers infusion ( Intravenous New Bag 12/11/18 1640)       Drips running?  Yes    Home pump  No    Current LDAs  Peripheral IV 05/30/18 Right Upper forearm (Active)   Number of days: 195       Peripheral IV 12/11/18 Left (Active)   Number of days: 0       Peripheral IV 12/11/18 Right Lower forearm (Active)   Number of days: 0       Wound 12/04/18 Left Back Surgical (Active)   Number of days: 7       Labs results: Labs Ordered and Resulted from Time of ED Arrival Up to the Time of Departure from the ED - No data to display    Imaging Studies:   Recent Results (from the past 24 hour(s))   CT Chest/Abdomen/Pelvis w Contrast   Result Value    Radiologist flags Possible active extravasation of contrast in the (AA)    Narrative    CT CHEST, ABDOMEN AND PELVIS WITH CONTRAST 12/11/2018 11:21 AM     HISTORY: See the clinical information for interpreting provider. Fall  against left side against wall, 1 week post-op cryoablation of left  kidney, pain to left flank/abdomen and over left posterior ribs. On  Plavix.     COMPARISON: CT abdomen and pelvis 10/5/2018. CT chest 6/6/2017.    TECHNIQUE: Axial images from the thoracic inlet to the symphysis are  performed with additional coronal reformatted images. 99 mL of Isovue  370 are given intravenously.  Radiation dose for this scan was reduced  using automated exposure control, adjustment of the mA and/or kV  according to patient size, or iterative reconstruction technique.    FINDINGS:     Chest: Wedge-shaped  scarring is noted in the lateral right upper lobe.  Linear scarring is also noted in the lateral lingula. Mild left lower  lobe atelectasis is present. Small lipoma is noted along the lateral  left upper chest wall measuring 5.1 x 2.5 cm, stable since prior chest  CT. Small left pleural effusion is new since prior exam. This appears  to be simple fluid. No right pleural fluid or pericardial fluid.  Coronary artery calcification is present. Thoracic aorta demonstrates  calcified plaque without aneurysm or dissection. Esophagus is  unremarkable. Thyroid gland appears normal. No enlarged lymph nodes in  the chest or axillas.    Abdomen: There is probable fatty liver infiltration. Calcified  gallstone is likely present in the gallbladder which is otherwise  unremarkable. The spleen, pancreas and right kidney are unremarkable.  Cysts are again noted in the right kidney, but there is no  hydronephrosis or renal calculi. Thickened adrenal glands bilaterally  appear stable. Probable colonic constipation but no bowel obstruction  or diverticulitis. Appendix not visualized. Aorta demonstrates  scattered calcified plaque without aneurysm or dissection.    Changes related to cryoablation of the previously described enhancing  upper pole left renal lesion are noted. Area of mixed density on  series 2, image 61 measures 6.5 x 5.0 cm. Possible extravasation of  contrast outside the renal parenchyma as noted on image 62. Remainder  of the kidney enhances normally. Probable changes of previous  cryoablation posterior left kidney are noted on series 2, image 69  which overall appears stable. Scattered peripheral calcification is  noted in this area of previous ablation. Probable small cortical cyst  series 2, image 69 in the left kidney as well. Perinephric hemorrhage  is present between the kidney and thoracic spine on image 67 measuring  6.7 x 2.7 cm. This extends along the anterior aspect of the left psoas  muscle.    Pelvis:  "Bladder is partially decompressed but otherwise unremarkable.  Rectum is unremarkable. No enlarged pelvic or inguinal lymph nodes.  Bone window examination demonstrates degenerative mid thoracic and mid  lumbar spine changes. No aggressive appearing bone lesions are noted.  Prior lumbar fusion changes are also noted. Probable nondisplaced left  lateral and posterolateral 10th and 11th rib fractures.      Impression    IMPRESSION:  1. Changes likely related to recent cryoablation upper pole left renal  lesion. Small left pleural effusion and left perinephric hemorrhage  are noted possibly sequela from cryoablation. Area of increased  density within the area of cryoablation could represent active  hemorrhage versus residual enhancement of the left renal mass. Urology  follow-up recommended.  2. No enlarged lymph nodes. Remainder of the chest, abdomen and pelvis  are unremarkable. Renal cortical cysts and mild thickening of the  adrenal glands appear stable. Probable cholelithiasis without evidence  of cholecystitis.  3. Probable nondisplaced left 10th and 11th rib fractures as  described.    [Critical Result: Possible active extravasation of contrast in the  area of left renal cryoablation. Follow-up with urology recommended.]    Finding was identified on 12/11/2018 11:29 AM.     Dr. Garcia was contacted by me on 12/11/2018 11:42 AM and verbalized  understanding of the critical result.     EUFEMIA CORTEZ MD       Recent vital signs:   /81   Pulse 88   Temp 98.9  F (37.2  C) (Oral)   Resp 12   Ht 1.727 m (5' 8\")   Wt 99.8 kg (220 lb)   SpO2 94%   BMI 33.45 kg/m      Cardiac Rhythm: Other bundle branch block.  Pt needs tele? Yes  Skin/wound Issues: small superficial scabs healing throughout body.    Code Status: Full Code    Pain control: fair    Nausea control: pt had none    Abnormal labs/tests/findings requiring intervention:     Family present during ED course? Yes   Family Comments/Social Situation " "comments:  Pt states feels like he is on \"strong medication\". Family states pt does appear to become at confused when receiving large amounts of pain medications. when on dilaudid. Pt presently obeying commands may need bed alarm/bed close to nursing station.    Tasks needing completion: None    Ang Church, RN  [phone] [facility]      "

## 2018-12-11 NOTE — ED PROVIDER NOTES
History     Chief Complaint   Patient presents with     Flank Pain     HPI  Vincent Mishra is a 78 year old male with a history of ACS s/p stent placement, HTN, DM2, and cancer of the kidney who presents for evaluation of flank pain. The patient is one week status post cryoablation of a left renal mass done on 12/04/18. Per chart review, the patient initially presented to the [facility] Emergency Department with complaint of left flank and left-sided back pain that has been ongoing for the past six days. The patient reported that six days ago, one day post-op, he got out of bed to use the bathroom, but after standing and taking a few steps he fell hitting his left side against the corner of a wall. Evaluation at [facility] included CT Chest/Abdomen/Pelvis which revealed a small left pleural effusion and a left perinephric hemorrhage. Due to these findings the patient was transferred here to the [facility] for further evaluation and management. Here in the ED, the patient reports he has had constant left-sided back and left flank pain since the fall occurred six days ago. He also notes an abrasion on his nose. No other symptoms noted.       CT Chest/Abdomen/Pelvis w/contrast @ [facility] (12/11/18) Impression:  1. Changes likely related to recent cryoablation upper pole left renal  lesion. Small left pleural effusion and left perinephric hemorrhage  are noted possibly sequela from cryoablation. Area of increased  density within the area of cryoablation could represent active  hemorrhage versus residual enhancement of the left renal mass. Urology  follow-up recommended.  2. No enlarged lymph nodes. Remainder of the chest, abdomen and pelvis  are unremarkable. Renal cortical cysts and mild thickening of the  adrenal glands appear stable. Probable cholelithiasis without evidence  of cholecystitis.  3. Probable nondisplaced left 10th and 11th rib fractures as  described.    I have reviewed the  Medications, Allergies, Past Medical and Surgical History, and Social History in the [employer] system.  Past Medical History:   Diagnosis Date     CAD (coronary artery disease) 7/26/2011 7/2011 ([place]): s/p MI, PTCA - RCA      Cancer of kidney (H)      Chest pain 1/12/2012     Coronary artery disease      History of angina      History of blood transfusion      Hyperlipidaemia      Hyperlipidemia LDL goal <100 9/23/2010     Malignant neoplasm (H)     Prostate CA (removed)     Myocardial infarction (H)     s/p MI 7/29/14, stent placement     NSTEMI (non-ST elevated myocardial infarction) (H)     07-25-14 CATH- RCA, L.Main and CFX  had minor luminal irregularites. Mid LAD high grade stenosis 80-90%.Stent placed to mid LAD     Other and unspecified hyperlipidemia     MI in July 2011     Right bundle branch block      Type II or unspecified type diabetes mellitus without mention of complication, not stated as uncontrolled      Unspecified essential hypertension        Past Surgical History:   Procedure Laterality Date     ARTHROSCOPY KNEE  2/11/2011    ARTHROSCOPY KNEE performed by LEY, JEFFREY DUANE at [facility]     ARTHROSCOPY KNEE WITH MEDIAL MENISCECTOMY  6/26/2012    Procedure: ARTHROSCOPY KNEE WITH MEDIAL MENISCECTOMY;  Right Knee Arthroscopy With Medial Menisectomy;  Surgeon: Ley, Jeffrey Duane, MD;  Location: [facility]     BACK SURGERY      back surgery x4     BIOPSY       CARDIAC SURGERY  7/2011    stentt placed     COLONOSCOPY       CORONARY ANGIOGRAPHY ADULT ORDER  07-25-14    RCA, L.Main and CFX  had minor luminal irregularites. Mid LAD high grade stenosis 80-90%.Stent placed to mid LAD     ESOPHAGOSCOPY, GASTROSCOPY, DUODENOSCOPY (EGD), COMBINED  10/25/2012    Procedure: COMBINED ESOPHAGOSCOPY, GASTROSCOPY, DUODENOSCOPY (EGD), BIOPSY SINGLE OR MULTIPLE;  Gastroscopy  ;  Surgeon: Lucius Dasilva MD;  Location: [facility]     HEART CATH, ANGIOPLASTY  07-25-14    mid LAD      ORTHOPEDIC SURGERY       back surgery        Family History   Problem Relation Age of Onset     Cancer Mother      Depression Mother      Diabetes Father      Hypertension Father      Heart Disease Father      Mental Illness Father      Heart Disease Maternal Grandfather      Substance Abuse Maternal Grandfather      Alcohol/Drug Paternal Grandmother      Substance Abuse Paternal Grandmother      Heart Disease Paternal Grandfather      Alcohol/Drug Paternal Grandfather      Substance Abuse Paternal Grandfather      Heart Disease Brother      Diabetes Brother      Substance Abuse Brother      Obesity Brother      Diabetes Brother      Obesity Sister      Alcohol/Drug Daughter      Depression Daughter      Obesity Sister      Diabetes Sister      Obesity Sister      Diabetes Sister      Alcohol/Drug Sister      Cancer Sister 55        possible kidney cancer     Substance Abuse Sister      Alcohol/Drug Son      Substance Abuse Son      Diabetes Son      Alcohol/Drug Son      Substance Abuse Son      Obesity Daughter      Diabetes Daughter      Hypertension Daughter      Bipolar Disorder Other        Social History     Tobacco Use     Smoking status: Never Smoker     Smokeless tobacco: Never Used   Substance Use Topics     Alcohol use: No     Alcohol/week: 0.0 oz       No current facility-administered medications for this encounter.      Current Outpatient Medications   Medication     acetaminophen (TYLENOL) 325 MG tablet     amLODIPine (NORVASC) 5 MG tablet     ascorbic acid (VITAMIN C) 500 MG tablet     aspirin (ASA) 81 MG tablet     atorvastatin (LIPITOR) 80 MG tablet     blood glucose monitoring (NO BRAND SPECIFIED) meter device kit     blood glucose monitoring (ONE TOUCH ULTRA) test strip     Calcium Carbonate-Vitamin D (CALCIUM + D) 600-200 MG-UNIT per tablet     cholecalciferol (VITAMIN  -D) 1000 UNITS capsule     clopidogrel (PLAVIX) 75 MG tablet     Cyanocobalamin (VITAMIN B-12 PO)     gabapentin (NEURONTIN) 100 MG capsule     hydrochlorothiazide  "(HYDRODIURIL) 25 MG tablet     ibuprofen (ADVIL/MOTRIN) 800 MG tablet     Lancets Misc. (SELECT-LITE DEVICE/LANCETS) KIT     [employer] FINEPOINT LANCETS MISC     lisinopril (PRINIVIL/ZESTRIL) 40 MG tablet     Melatonin 10 MG TBDP     metFORMIN (GLUCOPHAGE-XR) 500 MG 24 hr tablet     nitroGLYcerin (NITROSTAT) 0.4 MG sublingual tablet     omeprazole (PRILOSEC) 20 MG CR capsule     order for DME     order for DME     order for DME     order for DME     oxyCODONE (OXYCONTIN) 30 MG 12 hr tablet     oxyCODONE IR (ROXICODONE) 5 MG tablet     polyethylene glycol (MIRALAX/GLYCOLAX) powder     sertraline (ZOLOFT) 100 MG tablet     traZODone (DESYREL) 100 MG tablet        Allergies   Allergen Reactions     Prozac [Fluoxetine] Other (See Comments)     \"I went crazy.\"       Benadryl [Diphenhydramine Hcl] Other (See Comments)     Starts to shake, and paranoid     Codeine Itching     Diphenhydramine Other (See Comments)     Hallucinations, See [facility] records scanned on 7/16/15     Labetalol Other (See Comments)     See [facility] records scanned on 7/16/15  Bradycardia down to 30 on holter, dizziness. Stopped med and symptoms resolved     Metoprolol Other (See Comments)     Bradycardia even at 12.5 mg     Morphine Visual Disturbance       Review of Systems   Constitutional: Negative for fever.   HENT: Negative for congestion.    Eyes: Negative for redness.   Respiratory: Negative for shortness of breath.    Cardiovascular: Negative for chest pain.   Gastrointestinal: Negative for abdominal pain.   Genitourinary: Positive for flank pain (left). Negative for difficulty urinating.   Musculoskeletal: Positive for back pain (left-sided). Negative for arthralgias and neck stiffness.   Skin: Positive for wound (abrasion of nose). Negative for color change.   Neurological: Negative for headaches.   Psychiatric/Behavioral: Negative for confusion.   All other systems reviewed and are " "negative.      Physical Exam   BP: 156/80  Pulse: 69  Heart Rate: 61  Temp: 98.9  F (37.2  C)  Resp: 14  Height: 172.7 cm (5' 8\")  Weight: 99.8 kg (220 lb)  SpO2: 96 %      Physical Exam   Constitutional: He is oriented to person, place, and time. He appears well-developed and well-nourished. No distress.   HENT:   Head: Normocephalic and atraumatic.   Mouth/Throat: No oropharyngeal exudate.   Abrasion on nose.   Eyes: Pupils are equal, round, and reactive to light. Right eye exhibits no discharge. Left eye exhibits no discharge. No scleral icterus.   Pupils pinpoint bilaterally   Neck: Normal range of motion. Neck supple.   Cardiovascular: Normal rate, regular rhythm, normal heart sounds and intact distal pulses. Exam reveals no gallop and no friction rub.   No murmur heard.  Pulmonary/Chest: Effort normal and breath sounds normal. No respiratory distress. He has no wheezes. He exhibits no tenderness.   Abdominal: Soft. Bowel sounds are normal. He exhibits no distension. There is no tenderness.   Musculoskeletal: Normal range of motion. He exhibits no edema, tenderness or deformity.   Tenderness along left flank.   Neurological: He is alert and oriented to person, place, and time. No cranial nerve deficit.   Skin: Skin is warm and dry. No rash noted. He is not diaphoretic. No erythema. No pallor.   Psychiatric: He has a normal mood and affect.   Nursing note and vitals reviewed.      ED Course        Procedures       3:15 PM  The patient was seen and examined by Dr. Hoyos in Room 18.          EKG Interpretation:      Interpreted by Winston Hoyos  Time reviewed: 1650  Symptoms at time of EKG: None   Rhythm: normal sinus   Rate: 62  Axis: Normal  Ectopy: none  Conduction: right bundle branch block (complete)  ST Segments/ T Waves: No ST-T wave changes  Q Waves: III and aVr  Comparison to prior: Unchanged from 5/30/2017    Clinical Impression: no acute changes                Critical Care time:  none  Trauma:  Level " of trauma activation: Emergency Department evaluation  C-collar and immobilization: not indicated, cleared.  CSpine Clearance: C spine cleared clinically  GCS at arrival: 15  GCS at disposition: unchanged  Full Primary and Secondary survey with appropriate immobilization of spine completed in exam section.  Consults prior to admission or transfer: IR called   Procedures done in the ED: none          Labs Ordered and Resulted from Time of ED Arrival Up to the Time of Departure from the ED - No data to display         Assessments & Plan (with Medical Decision Making)   This 78-year-old male with a recent IR cryoablation for left renal cell cancer.  He had a fall a day after his procedure and has been having considerable left flank pain since that time.  Patient presented to [facility] [place] where a CT was done.  He was found to have new hemorrhage along the site of his prior ablation.  JANENE globin was 8.3, previous was 9.  He was also found to have a fracture of left 10th and 11th ribs.  His troponin was elevated at 0.413, however, he has had elevated troponin in the past.  ECG showed no acute changes.  Patient was seen by trauma who agrees to admit the patient.  They recommend monitoring H&H and will obtain a second troponin.  Patient was seen by IR who will monitor the patient and intervene if needed.    I have reviewed the nursing notes.    I have reviewed the findings, diagnosis, plan and need for follow up with the patient.    Current Discharge Medication List          Final diagnoses:   None   Dilia PITT, am serving as a trained medical scribe to document services personally performed by Winston Hoyos DO, based on the provider's statements to me.      Winston PITT DO, was physically present and have reviewed and verified the accuracy of this note documented by Dilia Bolton.      12/11/2018   [facility], [place], EMERGENCY DEPARTMENT     Winston Hoyos DO  12/11/18 2036

## 2018-12-11 NOTE — ED PROVIDER NOTES
History     Chief Complaint   Patient presents with     Flank Pain     left flank and lower abd pain-hx left kidney ca     HPI   Vincent Mishra is a 78 year old male with history of ACS (with stents in LAD and RCA), hypertension, type 2 diabetes mellitus who presents with complaints of left-sided back and flank pain since he fell 6 days ago.  Patient states he got out of bed in order to use the bathroom when he fell, landing against the corner of a wall with his left side and back.  Patient has had persistent pain throughout his left side and back as well as diffusely through his chest and abdomen since that time.  Patient is 1 week post-op percutaneous cryoablation of a left renal mass.  He contacted his surgeon who is worried patient may have damaged his kidney or fractured a rib.  Denies fevers, chills, cough, nausea, vomiting, diarrhea, shortness of breath, hematuria, or other urinary symptoms.  Patient has history of renal cell carcinoma status post cryoablation in 2012.  There is concern for new left renal mass and therefore patient underwent this new procedure 1 week ago.  Patient is on Plavix.  Patient reports frequent falls secondary to spinal stenosis and chronic lumbar back problems.  He struck his nose in the fall; he denies LOC, headaches, or neck pain.      Problem List:    Patient Active Problem List    Diagnosis Date Noted     Chest pain 01/12/2012     Priority: High     Hyperlipidemia LDL goal <100 09/23/2010     Priority: High     Severe episode of recurrent major depressive disorder, without psychotic features (H) 02/20/2018     Priority: Medium     Chronic bilateral low back pain with sciatica, sciatica laterality unspecified 09/20/2017     Priority: Medium     ACS (acute coronary syndrome) (H) 05/30/2017     Priority: Medium     Bilateral low back pain with right-sided sciatica 04/03/2017     Priority: Medium     Bilateral low back pain with left-sided sciatica 04/03/2017     Priority:  Medium     Claudication (H) 05/09/2016     Priority: Medium     Hip pain, bilateral 04/05/2016     Priority: Medium     Thyroid nodule 10/30/2015     Priority: Medium     Type 2 diabetes mellitus with other circulatory complication, without long-term current use of insulin (H) 10/22/2015     Priority: Medium     Myocardial infarction, nontransmural (H) 07/16/2015     Priority: Medium     Sinoatrial node dysfunction (H) 07/16/2015     Priority: Medium     Right bundle branch block (RBBB) 07/16/2015     Priority: Medium     Essential hypertension 07/16/2015     Priority: Medium     Hyperlipidemia 07/16/2015     Priority: Medium     NSTEMI (non-ST elevated myocardial infarction) (H)      Priority: Medium     07-25-14 CATH- RCA, L.Main and CFX  had minor luminal irregularites. Mid LAD high grade stenosis 80-90%.Stent placed to mid LAD       Hard of hearing 06/05/2014     Priority: Medium     Constipation 06/05/2014     Priority: Medium     Abdominal pain, right upper quadrant 03/26/2014     Priority: Medium     Esophageal reflux 03/26/2014     Priority: Medium     Insomnia 10/21/2013     Priority: Medium     Health Care Home 10/08/2013     Priority: Medium     *See Letters for [facility] Care Plan: My Access Plan           Vitamin D deficiency disease 09/25/2013     Priority: Medium     Renal mass, left 12/05/2012     Priority: Medium     Malignant neoplasm of kidney excluding renal pelvis (H) 11/16/2012     Priority: Medium     SURGERY, PATHOLOGY, AND TREATMENT HISTORY FOR KIDNEY CANCER  11/12/12:  Left lower pole kidney mass biopsy: Renal cell carcinoma, clear cell type; Viktor grade 1  12/5/12 Left percutaneous cryoablation of two renal tumors.  These were located adjacent to one another in the lateral kidney.  Dr Asif Capone, [facility].  Problem list name updated by automated process. Provider to review       Moderate major depression (H) 09/17/2012     Priority: Medium     Orchitis,  epididymitis, and epididymo-orchitis 09/17/2012     Priority: Medium     Osteoarthritis, knee 09/05/2012     Priority: Medium     Abnormal antinuclear antibody titer 03/12/2012     Priority: Medium     Anatomic airway obstruction 02/09/2012     Priority: Medium     fixed obstruction on PFTs with dyspnea       Advanced directives, info letter sent 10/4/2011 10/04/2011     Priority: Medium     Right bundle branch block 07/26/2011     Priority: Medium     Seen on EKG 2/2011.  Different configuration on 7/26/2011 s/p MI - but same at [place] post-MI as here        Acute myocardial infarction of inferolateral wall (H) 07/14/2011     Priority: Medium     Obesity 07/14/2011     Priority: Medium     Hypertension goal BP (blood pressure) < 140/90 02/28/2011     Priority: Medium     C6-7 disc with radiculopathy 02/28/2011     Priority: Medium     consider second Epidural steroid injection at [facility].  Continue PT.  Await accupuncture       Tear of meniscus of left knee 02/11/2011     Priority: Medium     Neck pain 11/12/2010     Priority: Medium     B12 deficiency 11/12/2010     Priority: Medium     Diplopia 11/02/2010     Priority: Medium     Neuropathy 11/02/2010     Priority: Medium     Vision loss night 11/02/2010     Priority: Medium     Parotid mass 10/04/2010     Priority: Medium     Tension headache 09/23/2010     Priority: Medium     Trapezius strain 09/23/2010     Priority: Medium        Past Medical History:    Past Medical History:   Diagnosis Date     CAD (coronary artery disease) 7/26/2011     Cancer of kidney (H)      Chest pain 1/12/2012     Coronary artery disease      History of angina      History of blood transfusion      Hyperlipidaemia      Hyperlipidemia LDL goal <100 9/23/2010     Malignant neoplasm (H)      Myocardial infarction (H)      NSTEMI (non-ST elevated myocardial infarction) (H)      Other and unspecified hyperlipidemia      Right bundle branch block      Type II or unspecified type diabetes  mellitus without mention of complication, not stated as uncontrolled      Unspecified essential hypertension        Past Surgical History:    Past Surgical History:   Procedure Laterality Date     ARTHROSCOPY KNEE  2/11/2011    ARTHROSCOPY KNEE performed by LEY, JEFFREY DUANE at [facility]     ARTHROSCOPY KNEE WITH MEDIAL MENISCECTOMY  6/26/2012    Procedure: ARTHROSCOPY KNEE WITH MEDIAL MENISCECTOMY;  Right Knee Arthroscopy With Medial Menisectomy;  Surgeon: Ley, Jeffrey Duane, MD;  Location: [facility]     BACK SURGERY      back surgery x4     BIOPSY       CARDIAC SURGERY  7/2011    stentt placed     COLONOSCOPY       CORONARY ANGIOGRAPHY ADULT ORDER  07-25-14    RCA, L.Main and CFX  had minor luminal irregularites. Mid LAD high grade stenosis 80-90%.Stent placed to mid LAD     ESOPHAGOSCOPY, GASTROSCOPY, DUODENOSCOPY (EGD), COMBINED  10/25/2012    Procedure: COMBINED ESOPHAGOSCOPY, GASTROSCOPY, DUODENOSCOPY (EGD), BIOPSY SINGLE OR MULTIPLE;  Gastroscopy  ;  Surgeon: Lucius Dasilva MD;  Location: [facility]     HEART CATH, ANGIOPLASTY  07-25-14    mid LAD      ORTHOPEDIC SURGERY       back surgery       Family History:    Family History   Problem Relation Age of Onset     Cancer Mother      Depression Mother      Diabetes Father      Hypertension Father      Heart Disease Father      Mental Illness Father      Heart Disease Maternal Grandfather      Substance Abuse Maternal Grandfather      Alcohol/Drug Paternal Grandmother      Substance Abuse Paternal Grandmother      Heart Disease Paternal Grandfather      Alcohol/Drug Paternal Grandfather      Substance Abuse Paternal Grandfather      Heart Disease Brother      Diabetes Brother      Substance Abuse Brother      Obesity Brother      Diabetes Brother      Obesity Sister      Alcohol/Drug Daughter      Depression Daughter      Obesity Sister      Diabetes Sister      Obesity Sister      Diabetes Sister      Alcohol/Drug Sister      Cancer Sister 55        possible kidney cancer      Substance Abuse Sister      Alcohol/Drug Son      Substance Abuse Son      Diabetes Son      Alcohol/Drug Son      Substance Abuse Son      Obesity Daughter      Diabetes Daughter      Hypertension Daughter      Bipolar Disorder Other        Social History:  Marital Status:   [2]  Social History     Tobacco Use     Smoking status: Never Smoker     Smokeless tobacco: Never Used   Substance Use Topics     Alcohol use: No     Alcohol/week: 0.0 oz     Drug use: No        Medications:      amLODIPine (NORVASC) 5 MG tablet   ascorbic acid (VITAMIN C) 500 MG tablet   aspirin (ASA) 81 MG tablet   atorvastatin (LIPITOR) 80 MG tablet   Calcium Carbonate-Vitamin D (CALCIUM + D) 600-200 MG-UNIT per tablet   cholecalciferol (VITAMIN  -D) 1000 UNITS capsule   clopidogrel (PLAVIX) 75 MG tablet   Cyanocobalamin (VITAMIN B-12 PO)   gabapentin (NEURONTIN) 100 MG capsule   hydrochlorothiazide (HYDRODIURIL) 25 MG tablet   ibuprofen (ADVIL/MOTRIN) 800 MG tablet   lisinopril (PRINIVIL/ZESTRIL) 40 MG tablet   Melatonin 10 MG TBDP   metFORMIN (GLUCOPHAGE-XR) 500 MG 24 hr tablet   omeprazole (PRILOSEC) 20 MG CR capsule   oxyCODONE (OXYCONTIN) 30 MG 12 hr tablet   oxyCODONE IR (ROXICODONE) 5 MG tablet   polyethylene glycol (MIRALAX/GLYCOLAX) powder   sertraline (ZOLOFT) 100 MG tablet   traZODone (DESYREL) 100 MG tablet   acetaminophen (TYLENOL) 325 MG tablet   blood glucose monitoring (NO BRAND SPECIFIED) meter device kit   blood glucose monitoring (ONE TOUCH ULTRA) test strip   Lancets Misc. (SELECT-LITE DEVICE/LANCETS) KIT   AvanseraCAN FINEPOINT LANCETS MISC   nitroGLYcerin (NITROSTAT) 0.4 MG sublingual tablet   order for DME   order for DME   order for DME   order for DME         Review of Systems   Constitutional: Negative.  Negative for fever.   HENT: Negative.    Respiratory: Negative.  Negative for cough and shortness of breath.    Cardiovascular: Positive for chest pain.   Gastrointestinal: Positive for abdominal pain.  "Negative for nausea and vomiting.   Genitourinary: Positive for flank pain. Negative for difficulty urinating, dysuria and hematuria.   Musculoskeletal: Positive for back pain (chronic). Negative for neck pain.   Skin: Negative.    Neurological: Negative.  Negative for headaches.   All other systems reviewed and are negative.      Physical Exam   BP: 157/74  Heart Rate: 68  Temp: 97.8  F (36.6  C)  Resp: 16  Height: 172.7 cm (5' 8\")  Weight: 92.5 kg (204 lb)  SpO2: 98 %      Physical Exam   Constitutional: He is oriented to person, place, and time. He appears well-developed and well-nourished.  Non-toxic appearance. He appears distressed.   HENT:   Head: Normocephalic.   Right Ear: Tympanic membrane, external ear and ear canal normal. No hemotympanum.   Left Ear: Tympanic membrane, external ear and ear canal normal. No hemotympanum.   Nose: Nose normal. No rhinorrhea.   Mouth/Throat: Oropharynx is clear and moist. No oropharyngeal exudate or posterior oropharyngeal erythema.   Abrasion to bridge of nose   Eyes: Conjunctivae and EOM are normal. Pupils are equal, round, and reactive to light.   Neck: Normal range of motion and full passive range of motion without pain. Neck supple. No spinous process tenderness present. No neck rigidity.   Cardiovascular: Normal rate, regular rhythm and normal heart sounds.   Pulmonary/Chest: Effort normal and breath sounds normal. No stridor. No respiratory distress. He has no decreased breath sounds. He has no wheezes. He has no rhonchi. He has no rales. He exhibits tenderness. He exhibits no crepitus, no deformity and no swelling.   Tenderness overlying left posterior inferior ribs   Abdominal: Soft. He exhibits no distension. There is tenderness. There is no rebound and no guarding.   Left flank tenderness to palpation.  Surgical incisions appear to be healing well.  No evidence of infection.  No ecchymosis.   Musculoskeletal: Normal range of motion.   Lymphadenopathy:     He has " no cervical adenopathy.   Neurological: He is alert and oriented to person, place, and time. He has normal strength. No sensory deficit.   Skin: Skin is warm and dry. No rash noted. He is not diaphoretic.       ED Course        Procedures               EKG Interpretation:      Interpreted by Dory Garcia  Time reviewed:1101  Symptoms at time of EKG: Chest pain, abdominal pain   Rhythm: Normal sinus   Rate: Normal  Axis: Normal  Ectopy: None  Conduction: Right bundle branch block  ST Segments/ T Waves: T wave inversions in V4-V6  Q Waves: None  Comparison to prior: Unchanged from 2/20/18    Clinical Impression: No acute changes      Results for orders placed or performed during the hospital encounter of 12/11/18 (from the past 24 hour(s))   CBC with platelets differential   Result Value Ref Range    WBC 9.4 4.0 - 11.0 10e9/L    RBC Count 3.49 (L) 4.4 - 5.9 10e12/L    Hemoglobin 8.3 (L) 13.3 - 17.7 g/dL    Hematocrit 27.6 (L) 40.0 - 53.0 %    MCV 79 78 - 100 fl    MCH 23.8 (L) 26.5 - 33.0 pg    MCHC 30.1 (L) 31.5 - 36.5 g/dL    RDW 16.3 (H) 10.0 - 15.0 %    Platelet Count 445 150 - 450 10e9/L    Diff Method Automated Method     % Neutrophils 79.5 %    % Lymphocytes 7.8 %    % Monocytes 7.0 %    % Eosinophils 5.0 %    % Basophils 0.3 %    % Immature Granulocytes 0.4 %    Nucleated RBCs 0 0 /100    Absolute Neutrophil 7.5 1.6 - 8.3 10e9/L    Absolute Lymphocytes 0.7 (L) 0.8 - 5.3 10e9/L    Absolute Monocytes 0.7 0.0 - 1.3 10e9/L    Absolute Eosinophils 0.5 0.0 - 0.7 10e9/L    Absolute Basophils 0.0 0.0 - 0.2 10e9/L    Abs Immature Granulocytes 0.0 0 - 0.4 10e9/L    Absolute Nucleated RBC 0.0    Comprehensive metabolic panel   Result Value Ref Range    Sodium 137 133 - 144 mmol/L    Potassium 3.8 3.4 - 5.3 mmol/L    Chloride 104 94 - 109 mmol/L    Carbon Dioxide 25 20 - 32 mmol/L    Anion Gap 8 3 - 14 mmol/L    Glucose 156 (H) 70 - 99 mg/dL    Urea Nitrogen 20 7 - 30 mg/dL    Creatinine 1.31 (H) 0.66 - 1.25 mg/dL     GFR Estimate 53 (L) >60 mL/min/1.7m2    GFR Estimate If Black 64 >60 mL/min/1.7m2    Calcium 8.3 (L) 8.5 - 10.1 mg/dL    Bilirubin Total 1.2 0.2 - 1.3 mg/dL    Albumin 3.1 (L) 3.4 - 5.0 g/dL    Protein Total 7.1 6.8 - 8.8 g/dL    Alkaline Phosphatase 99 40 - 150 U/L    ALT 16 0 - 70 U/L    AST 22 0 - 45 U/L   Troponin I   Result Value Ref Range    Troponin I ES 0.415 (HH) 0.000 - 0.045 ug/L   INR   Result Value Ref Range    INR 1.16 (H) 0.86 - 1.14   CT Chest/Abdomen/Pelvis w Contrast   Result Value Ref Range    Radiologist flags Possible active extravasation of contrast in the (AA)     Narrative    CT CHEST, ABDOMEN AND PELVIS WITH CONTRAST 12/11/2018 11:21 AM     HISTORY: See the clinical information for interpreting provider. Fall  against left side against wall, 1 week post-op cryoablation of left  kidney, pain to left flank/abdomen and over left posterior ribs. On  Plavix.     COMPARISON: CT abdomen and pelvis 10/5/2018. CT chest 6/6/2017.    TECHNIQUE: Axial images from the thoracic inlet to the symphysis are  performed with additional coronal reformatted images. 99 mL of Isovue  370 are given intravenously.  Radiation dose for this scan was reduced  using automated exposure control, adjustment of the mA and/or kV  according to patient size, or iterative reconstruction technique.    FINDINGS:     Chest: Wedge-shaped scarring is noted in the lateral right upper lobe.  Linear scarring is also noted in the lateral lingula. Mild left lower  lobe atelectasis is present. Small lipoma is noted along the lateral  left upper chest wall measuring 5.1 x 2.5 cm, stable since prior chest  CT. Small left pleural effusion is new since prior exam. This appears  to be simple fluid. No right pleural fluid or pericardial fluid.  Coronary artery calcification is present. Thoracic aorta demonstrates  calcified plaque without aneurysm or dissection. Esophagus is  unremarkable. Thyroid gland appears normal. No enlarged lymph nodes  in  the chest or axillas.    Abdomen: There is probable fatty liver infiltration. Calcified  gallstone is likely present in the gallbladder which is otherwise  unremarkable. The spleen, pancreas and right kidney are unremarkable.  Cysts are again noted in the right kidney, but there is no  hydronephrosis or renal calculi. Thickened adrenal glands bilaterally  appear stable. Probable colonic constipation but no bowel obstruction  or diverticulitis. Appendix not visualized. Aorta demonstrates  scattered calcified plaque without aneurysm or dissection.    Changes related to cryoablation of the previously described enhancing  upper pole left renal lesion are noted. Area of mixed density on  series 2, image 61 measures 6.5 x 5.0 cm. Possible extravasation of  contrast outside the renal parenchyma as noted on image 62. Remainder  of the kidney enhances normally. Probable changes of previous  cryoablation posterior left kidney are noted on series 2, image 69  which overall appears stable. Scattered peripheral calcification is  noted in this area of previous ablation. Probable small cortical cyst  series 2, image 69 in the left kidney as well. Perinephric hemorrhage  is present between the kidney and thoracic spine on image 67 measuring  6.7 x 2.7 cm. This extends along the anterior aspect of the left psoas  muscle.    Pelvis: Bladder is partially decompressed but otherwise unremarkable.  Rectum is unremarkable. No enlarged pelvic or inguinal lymph nodes.  Bone window examination demonstrates degenerative mid thoracic and mid  lumbar spine changes. No aggressive appearing bone lesions are noted.  Prior lumbar fusion changes are also noted. Probable nondisplaced left  lateral and posterolateral 10th and 11th rib fractures.      Impression    IMPRESSION:  1. Changes likely related to recent cryoablation upper pole left renal  lesion. Small left pleural effusion and left perinephric hemorrhage  are noted possibly sequela from  cryoablation. Area of increased  density within the area of cryoablation could represent active  hemorrhage versus residual enhancement of the left renal mass. Urology  follow-up recommended.  2. No enlarged lymph nodes. Remainder of the chest, abdomen and pelvis  are unremarkable. Renal cortical cysts and mild thickening of the  adrenal glands appear stable. Probable cholelithiasis without evidence  of cholecystitis.  3. Probable nondisplaced left 10th and 11th rib fractures as  described.    [Critical Result: Possible active extravasation of contrast in the  area of left renal cryoablation. Follow-up with urology recommended.]    Finding was identified on 12/11/2018 11:29 AM.     Dr. Garcia was contacted by me on 12/11/2018 11:42 AM and verbalized  understanding of the critical result.     EUFEMIA CORTEZ MD   Lactic acid whole blood   Result Value Ref Range    Lactic Acid 0.7 0.7 - 2.0 mmol/L       Medications   iopamidol (ISOVUE-370) solution 99 mL (99 mLs Intravenous Given 12/11/18 1111)   sodium chloride 0.9 % bag 500mL for CT scan flush use (65 mLs Intravenous Given 12/11/18 1112)   HYDROmorphone (DILAUDID) injection 0.2 mg (0.2 mg Intravenous Given 12/11/18 1124)   HYDROmorphone (PF) (DILAUDID) injection 0.5 mg (0.5 mg Intravenous Given 12/11/18 1252)       Assessments & Plan (with Medical Decision Making)     Pt is a 78 year old male with history of ACS (with stents in LAD and RCA), hypertension, type 2 diabetes mellitus who presents with complaints of left-sided back and flank pain since he fell 6 days ago.  Patient states he got out of bed in order to use the bathroom when he fell, landing against the corner of a wall with his left side and back.  Patient has had persistent pain throughout his left side and back as well as diffusely through his chest and abdomen since that time.  Patient is 1 week post-op percutaneous cryoablation of a left renal mass.  He contacted his surgeon who is worried patient may have  damaged his kidney or fractured a rib.  Patient has history of renal cell carcinoma status post cryoablation in 2012.  There is concern for new left renal mass and therefore patient underwent this new procedure 1 week ago.  Patient is on Plavix.  Patient reports frequent falls secondary to spinal stenosis and chronic lumbar back problems.  Pt is afebrile on arrival.  Exam as above.  Patient has been vitally stable here with normal blood pressures.  Troponin is elevated at 0.415 however patient has history of elevated troponins in the past and EKG appears largely unchanged from his most recent aside from nonspecific T wave inversions in V4 through V6.  Lactic acid is not elevated.  Patient's hemoglobin is 8.3 which is decreased from a hemoglobin of 9 approximately 1 month ago.  CT of the chest abdomen and pelvis was obtained and reveals an area of new hemorrhage to patient's cryo-oblation area in the left kidney that appears to be actively bleeding.  Patient is also noted to have 1/10 rib fracture and possibly 1/11 rib fracture as well.  No evidence of pneumothorax.  There is also a left small pleural effusion.  Given patient's recent cryoablation and biopsy of the left kidney now with new hemorrhage of the area secondary to a fall, discussed patient's case with ED physician at the [facility], Dr. Hoyos, who accepts the transfer and care of the patient.  We discussed that patient may benefit from urology/renal consult versus interventional radiology to embolize the area of hemorrhage.  Discussed results and plan with patient and his wife.  We are awaiting transport for patient to be transferred to the [facility].    Discussed pt's case with the ED physician on staff (Dr. Oglesby).  He also evaluated patient and was involved in the care of the patient throughout his ED visit including assessment, diagnosis, treatment, and plan of the patient.    I have reviewed the nursing notes.    I have  reviewed the findings, diagnosis, plan with the patient.    ED to Inpatient Handoff:    Discussed with Dr. Hoyos at 1200  Patient accepted for Inpatient Stay  Pending studies include None  Code Status: Full Code           Current Discharge Medication List          Final diagnoses:   Renal hemorrhage, left - s/p renal cryoablation and biopsy 1 week ago   Closed fracture of one rib of left side, initial encounter - 10th and possibly 11th rib       12/11/2018   [facility] EMERGENCY DEPARTMENT      Disclaimer:  This note consists of symbols derived from keyboarding, dictation and/or voice recognition software.  As a result, there may be errors in the script that have gone undetected.  Please consider this when interpreting information found in this chart.     Dory Garcia PA-C  12/11/18 1029       Dory Garcia PA-C  12/11/18 [id number]

## 2018-12-11 NOTE — ED NOTES
DATE:  12/11/2018   TIME OF RECEIPT FROM LAB:  1110  LAB TEST:  Troponin  LAB VALUE:  0.415  RESULTS GIVEN WITH READ-BACK TO (PROVIDER):  Data Unavailable  TIME LAB VALUE REPORTED TO PROVIDER:   1110

## 2018-12-11 NOTE — PHARMACY-ADMISSION MEDICATION HISTORY
Admission medication history interview status for the 12/11/2018 admission is complete. See Epic admission navigator for allergy information, pharmacy, prior to admission medications and immunization status.     Medication history interview sources:  Spouse, [place] , [place]    Changes made to PTA medication list (reason)  Added: none  Deleted: none  Changed: none    Additional medication history information (including reliability of information, actions taken by pharmacist):  -spouse manages medications and knows them very well being able to provide dose, frequency, indication, and history  -clopidogrel was held last week for 5 days for surgery, but spouse states it has been restarted since 12/5/18 with last dose this morning  -patient was being titrated up on gabapentin, but spouse says she stopped increasing dose at 400 mg twice a day, which is what the patient is currently on.  -spouse reports that patient has trouble sleeping  -patient takes Oxycontin 30 mg once daily per spouse because she says when he takes the medication at night it keeps him up. She is aware that opioids usually make you drowsy, but states this is why he gets only once daily in the morning. She says he can take oxycodone 5 mg at night without issue. This information is consistent with the [place] , which shows patient is receiving Oxycontin 30 mg, 60 tablets about every 2 months.       Prior to Admission medications    Medication Sig Last Dose Taking? Auth Provider   acetaminophen (TYLENOL) 325 MG tablet Take 2 tablets by mouth every 4 hours as needed. Past Week at Unknown time Yes Karthik Sanz MD   amLODIPine (NORVASC) 5 MG tablet Take 1 tablet (5 mg) by mouth daily 12/11/2018 at AM Yes Keyla Fonseca MD   ascorbic acid (VITAMIN C) 500 MG tablet Take 500 mg by mouth daily  12/11/2018 at AM Yes Reported, Patient   aspirin (ASA) 81 MG tablet Take 81 mg by mouth daily 12/11/2018 at AM Yes Reported, Patient   atorvastatin (LIPITOR) 80  MG tablet TAKE 1 TABLET BY MOUTH DAILY  Patient taking differently: TAKE 1 TABLET BY MOUTH EVERY EVENING 12/10/2018 at PM Yes Keyla Fonseca MD   Calcium Carbonate-Vitamin D (CALCIUM + D) 600-200 MG-UNIT per tablet Take 2 tablets by mouth daily. 12/11/2018 at AM Yes Joe Pretty MD   cholecalciferol (VITAMIN  -D) 1000 UNITS capsule Take 1 capsule by mouth daily 12/11/2018 at AM Yes Reported, Patient   clopidogrel (PLAVIX) 75 MG tablet TAKE 1 TABLET BY MOUTH DAILY 12/11/2018 at AM Yes Keyla Fonseca MD   Cyanocobalamin (VITAMIN B-12 PO) Take 500 mcg by mouth daily  12/11/2018 at AM Yes Reported, Patient   gabapentin (NEURONTIN) 100 MG capsule TAKE ONE CAPSULE BY MOUTH DAILY, INCREASE BY 1 CAPSULE EVERY 2 DAYS UP TO 6 CAPSULES THREE TIMES DAILY  Patient taking differently: Take 400 mg by mouth 2 times daily  12/11/2018 at AM Yes Keyla Fonseca MD   hydrochlorothiazide (HYDRODIURIL) 25 MG tablet TAKE 1 TABLET BY MOUTH DAILY 12/11/2018 at AM Yes Keyla Fonseca MD   ibuprofen (ADVIL/MOTRIN) 800 MG tablet TAKE 1 TABLET BY MOUTH EVERY 8 HOURS AS NEEDED FOR MODERATE PAIN Past Month at Unknown time Yes Keyla Fonseca MD   lisinopril (PRINIVIL/ZESTRIL) 40 MG tablet TAKE 1 TABLET(40 MG) BY MOUTH DAILY 12/11/2018 at AM Yes Keyla Fonseca MD   Melatonin 10 MG TBDP Take 10 mg by mouth At Bedtime 12/10/2018 at HS Yes Keyla Fonseca MD   metFORMIN (GLUCOPHAGE-XR) 500 MG 24 hr tablet TAKE 2 TABLETS BY MOUTH TWICE DAILY WITH MEALS. 12/11/2018 at AM Yes Keyla Fonseca MD   omeprazole (PRILOSEC) 20 MG CR capsule TAKE ONE CAPSULE BY MOUTH TWICE DAILY 12/11/2018 at AM Yes Keyla Fonseca MD   oxyCODONE (OXYCONTIN) 30 MG 12 hr tablet Take 1 tablet (30 mg) by mouth every 12 hours  Patient taking differently: Take 30 mg by mouth every morning  12/11/2018 at AM Yes Keyla Fonseca MD   oxyCODONE IR (ROXICODONE) 5 MG tablet Take 1 daily as needed  for severe pain /at bedtime may have up to 2 per day max 40 per  month 12/10/2018 at PM Yes Keyla Fonseca MD   polyethylene glycol (MIRALAX/GLYCOLAX) powder Take 17 g (1 capful) by mouth daily 12/11/2018 at AM Yes Keyla Fonseca MD   sertraline (ZOLOFT) 100 MG tablet Take 2 tablets (200 mg) by mouth daily 12/11/2018 at AM Yes Keyla Fonseca MD   traZODone (DESYREL) 100 MG tablet TAKE 3 TABLETS(300 MG) BY MOUTH EVERY NIGHT AT BEDTIME AS NEEDED FOR SLEEP 12/10/2018 at PM Yes Keyla Fonseca MD   blood glucose monitoring (NO BRAND SPECIFIED) meter device kit Use to test blood sugar 1 times daily or as directed.   Keyla Fonseca MD   blood glucose monitoring (ONE TOUCH ULTRA) test strip Use to test blood sugars 2 times daily or as directed.   Keyla Fonseca MD   Lancets Misc. (SELECT-LITE DEVICE/LANCETS) KIT 1 kit 2 times daily   Keyla Fonseca MD   [employer] FINEPOINT LANCETS MISC Use to test blood sugars 1 times daily or as directed.   Caridad Vinson APRN CNP   nitroGLYcerin (NITROSTAT) 0.4 MG sublingual tablet Place 1 tablet (0.4 mg) under the tongue every 5 minutes as needed for chest pain  Patient not taking: Reported on 11/20/2018 Unknown at Unknown time  Keyla Fonseca MD   order for DME Equipment being ordered: walker wheeled also include  With skis for indoor carpet use   Keyla Fonseca MD   order for DME Equipment being ordered: compression stockings   Cydney Lomeli PA-C   order for DME Equipment being ordered: TENS   Keyla Fonseca MD   order for DME Equipment being ordered: Wheelchair motorized for patient with spinal stenosis and neurogenic claudication   Keyla Fonseca MD         Medication history completed by:   Tk Gil, PharmD

## 2018-12-11 NOTE — H&P
[facility], [place]    History and Physical / Consult note: Trauma Service    Date of Admission:  12/11/2018    Time of Admission/Consult Request (page/call): 12/11/2018 @ 1518   Time of my evaluation: 12/11 @1530  Consulting services:  Interventional Radiology - Called by ED  Urology    Assessment & Plan   Trauma mechanism:Fall from standing height 5 days ago onto left side  Time/date of injury:12/06 at night per family  Known Injuries:  1. Possible left 10th and 11th rib fractures  2. Small left pleural effusion  3. Left perinephric hemorrhage with active extravascation  Other diagnoses:   1. Acute traumatic pain  2. Acute blood loss anemia  3. SHIRA  4. Troponinemia  5. Malignant neoplasm of the kidney  6. NSTEMI, PCI x2 with HOMERO 2011 and 2014 and dual antiplatelet therapy  7. SA node dysfunction  8. HTN  9. HLD  10. DM2  11. Chronic back pain, multiple back surgeries  12. Chronic pain      Procedure: To be determined  Plan:  1. Admit to trauma- Dr. Wilson  2. Rib fractures: - Pain control, Aggressive pulmonary hygiene: IS, Flutter valve, and NIF/FVC (ordered to be completed by RT)  3. Left Pleural effusion: Repeat CXR in AM to monitor   4. Renal hemorrhage: Interventional Radiology consulted to evaluate for possible embolization vs conservative management for now. Urology consult given recent cryoablation with biopsy  1. Obtain UA now.  2. Strict I/O  3. Monitor and document color of urine  5. NPO for now until IR plan determined- IVF ordered  6. Repeat CBC now, hold antiplatelet therapy and anticoagulation for now  7. Troponin elevation: Repeat now. Denies chest pain or shortness of breath. EKG obtained at OSH. Keep on telemetry overnight. Plan to resume antiplatelet therapy as soon able pending no IR plan, hemoglobin stability  8. PT/OT eval in am    Code status: Full code confirmed with patient .     General Cares:  GI Prophylaxis: resume home PPI  DVT Prophylaxis: Mechanical for  now  Date of last stool/Bowel Regimen:PTa/ ordered  Pulmonary toilet:Aggessive as above    ETOH: This patient was asked if in the last 3-6 months there has been a time when he had  5 or more drinks in a single day/outing.. Patient answer to the screening question was in the negative. No intervention needed.  Primary Care Physician   Keyla Fonseca    Chief Complaint   Left back pain    History is obtained from the patient, electronic health record and emergency department physician    History of Present Illness   Vincent Mishra is a 78 year old male with a history of ACS s/p stent placement, HTN, DM2, and cancer of the kidney who presents for evaluation of flank pain. The patient is one week status post cryoablation and biopsy of a left renal mass done on 12/04/18. Per chart review, the patient initially presented to the [facility] Emergency Department with complaint of left flank and left-sided back pain that has been ongoing for the past six days. The patient reported that six days ago, one day post-op, he got out of bed to use the bathroom, but after standing and taking a few steps he fell hitting his left side against the corner of a wall. Evaluation at [facility] included CT Chest/Abdomen/Pelvis which revealed a small left pleural effusion and a left perinephric hemorrhage. Due to these findings the patient was transferred here to the [facility] for further evaluation and management. Here in the ED, the patient reports he has had constant left-sided back and left flank pain since the fall occurred six days ago. He also notes an abrasion on his nose. He denies chest pain or shortness of breath. Wife states he was able to ambulate to the wheelchair when they got to the ED, he denies any extremity pain or abdominal pain. Denies any hematuria, hemoptysis, hematochezia.     Past Medical History    I have reviewed this patient's medical history and updated it with pertinent information if needed.    Past Medical History:   Diagnosis Date     CAD (coronary artery disease) 7/26/2011 7/2011 ([place]): s/p MI, PTCA - RCA      Cancer of kidney (H)      Chest pain 1/12/2012     Coronary artery disease      History of angina      History of blood transfusion      Hyperlipidaemia      Hyperlipidemia LDL goal <100 9/23/2010     Malignant neoplasm (H)     Prostate CA (removed)     Myocardial infarction (H)     s/p MI 7/29/14, stent placement     NSTEMI (non-ST elevated myocardial infarction) (H)     07-25-14 CATH- RCA, L.Main and CFX  had minor luminal irregularites. Mid LAD high grade stenosis 80-90%.Stent placed to mid LAD     Other and unspecified hyperlipidemia     MI in July 2011     Right bundle branch block      Type II or unspecified type diabetes mellitus without mention of complication, not stated as uncontrolled      Unspecified essential hypertension        Past Surgical History   I have reviewed this patient's surgical history and updated it with pertinent information if needed.  Past Surgical History:   Procedure Laterality Date     ARTHROSCOPY KNEE  2/11/2011    ARTHROSCOPY KNEE performed by LEY, JEFFREY DUANE at [facility]     ARTHROSCOPY KNEE WITH MEDIAL MENISCECTOMY  6/26/2012    Procedure: ARTHROSCOPY KNEE WITH MEDIAL MENISCECTOMY;  Right Knee Arthroscopy With Medial Menisectomy;  Surgeon: Ley, Jeffrey Duane, MD;  Location: [facility] OR     BACK SURGERY      back surgery x4     BIOPSY       CARDIAC SURGERY  7/2011    stentt placed     COLONOSCOPY       CORONARY ANGIOGRAPHY ADULT ORDER  07-25-14    RCA, L.Main and CFX  had minor luminal irregularites. Mid LAD high grade stenosis 80-90%.Stent placed to mid LAD     ESOPHAGOSCOPY, GASTROSCOPY, DUODENOSCOPY (EGD), COMBINED  10/25/2012    Procedure: COMBINED ESOPHAGOSCOPY, GASTROSCOPY, DUODENOSCOPY (EGD), BIOPSY SINGLE OR MULTIPLE;  Gastroscopy  ;  Surgeon: Lucius Dasilva MD;  Location: [facility] GI     HEART CATH, ANGIOPLASTY  07-25-14    mid LAD      ORTHOPEDIC SURGERY        back surgery     Prior to Admission Medications   Prior to Admission Medications   Prescriptions Last Dose Informant Patient Reported? Taking?   Calcium Carbonate-Vitamin D (CALCIUM + D) 600-200 MG-UNIT per tablet  Spouse/Significant Other Yes No   Sig: Take 2 tablets by mouth daily.   Cyanocobalamin (VITAMIN B-12 PO)   Yes No   Sig: Take 500 mcg by mouth daily    [place] LANCETS MISC  Spouse/Significant Other No No   Sig: Use to test blood sugars 1 times daily or as directed.   Lancets Misc. (SELECT-LITE DEVICE/LANCETS) KIT   No No   Si kit 2 times daily   Melatonin 10 MG TBDP   Yes No   Sig: Take 10 mg by mouth At Bedtime   acetaminophen (TYLENOL) 325 MG tablet  Spouse/Significant Other No No   Sig: Take 2 tablets by mouth every 4 hours as needed.   amLODIPine (NORVASC) 5 MG tablet   No No   Sig: Take 1 tablet (5 mg) by mouth daily   ascorbic acid (VITAMIN C) 500 MG tablet  Spouse/Significant Other Yes No   Sig: Take 500 mg by mouth daily    aspirin (ASA) 81 MG tablet   Yes No   Sig: Take 81 mg by mouth daily   atorvastatin (LIPITOR) 80 MG tablet   No No   Sig: TAKE 1 TABLET BY MOUTH DAILY   Patient taking differently: TAKE 1 TABLET BY MOUTH EVERY EVENING   blood glucose monitoring (NO BRAND SPECIFIED) meter device kit   No No   Sig: Use to test blood sugar 1 times daily or as directed.   blood glucose monitoring (ONE TOUCH ULTRA) test strip  Spouse/Significant Other No No   Sig: Use to test blood sugars 2 times daily or as directed.   cholecalciferol (VITAMIN  -D) 1000 UNITS capsule  Spouse/Significant Other Yes No   Sig: Take 1 capsule by mouth daily   clopidogrel (PLAVIX) 75 MG tablet   No No   Sig: TAKE 1 TABLET BY MOUTH DAILY   gabapentin (NEURONTIN) 100 MG capsule   No No   Sig: TAKE ONE CAPSULE BY MOUTH DAILY, INCREASE BY 1 CAPSULE EVERY 2 DAYS UP TO 6 CAPSULES THREE TIMES DAILY   Patient taking differently: Take 400 mg by mouth 2 times daily    hydrochlorothiazide (HYDRODIURIL) 25 MG  "tablet   No No   Sig: TAKE 1 TABLET BY MOUTH DAILY   ibuprofen (ADVIL/MOTRIN) 800 MG tablet   No No   Sig: TAKE 1 TABLET BY MOUTH EVERY 8 HOURS AS NEEDED FOR MODERATE PAIN   lisinopril (PRINIVIL/ZESTRIL) 40 MG tablet   No No   Sig: TAKE 1 TABLET(40 MG) BY MOUTH DAILY   metFORMIN (GLUCOPHAGE-XR) 500 MG 24 hr tablet   No No   Sig: TAKE 2 TABLETS BY MOUTH TWICE DAILY WITH MEALS.   nitroGLYcerin (NITROSTAT) 0.4 MG sublingual tablet   No No   Sig: Place 1 tablet (0.4 mg) under the tongue every 5 minutes as needed for chest pain   Patient not taking: Reported on 11/20/2018   omeprazole (PRILOSEC) 20 MG CR capsule   No No   Sig: TAKE ONE CAPSULE BY MOUTH TWICE DAILY   order for DME  Spouse/Significant Other No No   Sig: Equipment being ordered: Wheelchair motorized for patient with spinal stenosis and neurogenic claudication   order for DME  Spouse/Significant Other No No   Sig: Equipment being ordered: TENS   order for DME   No No   Sig: Equipment being ordered: compression stockings   order for DME   No No   Sig: Equipment being ordered: walker wheeled also include  With skis for indoor carpet use   oxyCODONE (OXYCONTIN) 30 MG 12 hr tablet   No No   Sig: Take 1 tablet (30 mg) by mouth every 12 hours   Patient taking differently: Take 30 mg by mouth every morning    oxyCODONE IR (ROXICODONE) 5 MG tablet   No No   Sig: Take 1 daily as needed  for severe pain /at bedtime may have up to 2 per day max 40 per month   polyethylene glycol (MIRALAX/GLYCOLAX) powder   No No   Sig: Take 17 g (1 capful) by mouth daily   sertraline (ZOLOFT) 100 MG tablet   No No   Sig: Take 2 tablets (200 mg) by mouth daily   traZODone (DESYREL) 100 MG tablet   No No   Sig: TAKE 3 TABLETS(300 MG) BY MOUTH EVERY NIGHT AT BEDTIME AS NEEDED FOR SLEEP      Facility-Administered Medications: None     Allergies   Allergies   Allergen Reactions     Prozac [Fluoxetine] Other (See Comments)     \"I went crazy.\"       Benadryl [Diphenhydramine Hcl] Other (See " Comments)     Starts to shake, and paranoid     Codeine Itching     Diphenhydramine Other (See Comments)     Hallucinations, See [facility] records scanned on 7/16/15     Labetalol Other (See Comments)     See [facility] records scanned on 7/16/15  Bradycardia down to 30 on holter, dizziness. Stopped med and symptoms resolved     Metoprolol Other (See Comments)     Bradycardia even at 12.5 mg     Morphine Visual Disturbance       Social History   Social History     Socioeconomic History     Marital status:      Spouse name: Not on file     Number of children: Not on file     Years of education: Not on file     Highest education level: Not on file   Social Needs     Financial resource strain: Not on file     Food insecurity - worry: Not on file     Food insecurity - inability: Not on file     Transportation needs - medical: Not on file     Transportation needs - non-medical: Not on file   Occupational History     Not on file   Tobacco Use     Smoking status: Never Smoker     Smokeless tobacco: Never Used   Substance and Sexual Activity     Alcohol use: No     Alcohol/week: 0.0 oz     Drug use: No     Sexual activity: Yes     Partners: Female   Other Topics Concern     Parent/sibling w/ CABG, MI or angioplasty before 65F 55M? No      Service Not Asked     Blood Transfusions Not Asked     Caffeine Concern No     Comment: 4-5 cups per day     Occupational Exposure Not Asked     Hobby Hazards Not Asked     Sleep Concern Not Asked     Stress Concern Not Asked     Weight Concern Not Asked     Special Diet Yes     Comment: smaller portions     Back Care Not Asked     Exercise No     Bike Helmet Not Asked     Seat Belt Not Asked     Self-Exams Not Asked   Social History Narrative     Not on file       Family History   Family history reviewed with patient and is noncontributory.    Review of Systems   CONSTITUTIONAL: No fever, chills, sweats, fatigue   EYES: no visual  blurring, no double vision or visual loss  ENT: no decrease in hearing, no tinnitus, no vertigo, no hoarseness  RESPIRATORY: no shortness of breath, no cough, no sputum   CARDIOVASCULAR: no palpitations, no chest  pain, no exertional chest pain or pressure  GASTROINTESTINAL: no nausea or vomiting, or abd pain  GENITOURINARY: no dysuria, no frequency or hesitancy, no hematuria  MUSCULOSKELETAL: no weakness, no redness, no swelling, no joint pain,   SKIN: no rashes, ecchymoses, abrasions or lacerations  NEUROLOGIC: no numbness or tingling of hands, no numbness or tingling  of feet, no syncope, no tremors or weakness  PSYCHIATRIC: no sleep disturbances, no anxiety or depression    Physical Exam   Temp: 98.9  F (37.2  C) Temp src: Oral BP: 156/79 Pulse: 88 Heart Rate: 61 Resp: 16 SpO2: 94 % O2 Device: None (Room air)    Vital Signs with Ranges  Temp:  [97.8  F (36.6  C)-98.9  F (37.2  C)] 98.9  F (37.2  C)  Pulse:  [69-88] 88  Heart Rate:  [55-68] 61  Resp:  [11-22] 16  BP: (118-157)/(63-96) 156/79  SpO2:  [89 %-98 %] 94 % 220 lbs 0 oz    Primary Survey:  Airway: patient talking  Breathing: symmetric respiratory effort bilaterally  Circulation: central pulses present and peripheral pulses present  Disability: Pupils - left 2 mm and brisk, right 2 mm and brisk     Cassie Coma Scale - Total 14/15  Eye Response (E): 4  4= spontaneous,  3= to verbal/voice, 2=  to pain, 1= No response   Verbal Response (V): 4   5= Orientated, converses,  4= Confused, converses, 3= Inappropriate words,  2= Incomprehensible sounds,  1=No response   Motor Response (M): 6   6= Obeys commands, 5= Localizes to pain, 4= Withdrawal to pain, 3=Fexion to pain, 2= Extension to pain, 1= No response    Secondary Survey:  General: alert, confused at times  Neuro: PERRLA. EOMI. CN II-XII grossly intact. No focal deficits. Strength 5/5 x 4 extremities.  Sensation intact.  Head: atraumatic, normocephalic, trachea midline  Eyes:  Pupils 2mm, EOMI, corneas  and conjunctivae clear  Ears: pearly grey bilateral TMs and non-inflamed external ear canals  Nose: nares patent, no drainage, nasal septum non-tender  Mouth/Throat: no exudates or erythema,  no dental tenderness or malocclusions, no tongue lacerations  Neck:  no cervical collar present. No midline posterior tenderness, full AROM without pain.   Chest/Pulmonary: normal respiratory rate and rhythm,  bilateral clear breath sounds, no wheezes, rales or rhonchi, no chest wall tenderness or deformities,   Cardiovascular: S1, S2,  normal and regular rate and rhythm, no murmurs  Abdomen: soft, non-tender, no guarding, no rebound tenderness and no tenderness to palpation  : normal external genitalia, pelvis stable to lateral compression, no urine to assess  Musculoskel/Extremities: normal extremities, full AROM of major joints  Back/Spine: no deformity, no midline tenderness, no sacral tenderness, no step-offs and no abrasions or contusions  Hands: no gross deformities of hands or fingers. Full AROM of hand and fingers in flexion and extension.  strength equal and symmetric.   Psychiatric: affect/mood normal, cooperative, normal judgement/insight and memory intact  Skin:multiple scattered scabs on arms and legs from scratching per family report    Last Comprehensive Metabolic Panel:  Sodium   Date Value Ref Range Status   12/11/2018 137 133 - 144 mmol/L Final     Potassium   Date Value Ref Range Status   12/11/2018 3.8 3.4 - 5.3 mmol/L Final     Chloride   Date Value Ref Range Status   12/11/2018 104 94 - 109 mmol/L Final     Carbon Dioxide   Date Value Ref Range Status   12/11/2018 25 20 - 32 mmol/L Final     Anion Gap   Date Value Ref Range Status   12/11/2018 8 3 - 14 mmol/L Final     Glucose   Date Value Ref Range Status   12/11/2018 156 (H) 70 - 99 mg/dL Final     Urea Nitrogen   Date Value Ref Range Status   12/11/2018 20 7 - 30 mg/dL Final     Creatinine   Date Value Ref Range Status   12/11/2018 1.31 (H) 0.66  - 1.25 mg/dL Final     GFR Estimate   Date Value Ref Range Status   12/11/2018 53 (L) >60 mL/min/1.7m2 Final     Comment:     Non  GFR Calc     Calcium   Date Value Ref Range Status   12/11/2018 8.3 (L) 8.5 - 10.1 mg/dL Final     Results for orders placed or performed during the hospital encounter of 12/11/18 (from the past 24 hour(s))   CBC with platelets   Result Value Ref Range    WBC 10.3 4.0 - 11.0 10e9/L    RBC Count 3.57 (L) 4.4 - 5.9 10e12/L    Hemoglobin 8.3 (L) 13.3 - 17.7 g/dL    Hematocrit 28.6 (L) 40.0 - 53.0 %    MCV 80 78 - 100 fl    MCH 23.2 (L) 26.5 - 33.0 pg    MCHC 29.0 (L) 31.5 - 36.5 g/dL    RDW 16.4 (H) 10.0 - 15.0 %    Platelet Count 451 (H) 150 - 450 10e9/L     Studies:  No orders to display       Teo Nuñez CNP

## 2018-12-11 NOTE — ED TRIAGE NOTES
Patient transferred from [facility] for rib fractures and evaluation of flank pain.

## 2018-12-11 NOTE — TELEPHONE ENCOUNTER
Routing refill request to provider for review/approval because:  PHQ9=10.  Jessica Ball RN LL/HU

## 2018-12-12 NOTE — PROVIDER NOTIFICATION
Time of notification: 2:38 AM  Provider notified: Trauma team  Patient status: Troponin level .349 and pt bradycardic 40-50's   Temp:  [97.8  F (36.6  C)-98.9  F (37.2  C)] 98.7  F (37.1  C)  Pulse:  [69-88] 88  Heart Rate:  [55-71] 61  Resp:  [11-22] 18  BP: (109-157)/(62-99) 131/62  SpO2:  [89 %-98 %] 92 %  Orders received: no new orders

## 2018-12-12 NOTE — PROGRESS NOTES
Care Coordinator - Discharge Planning    Admission Date/Time:  12/11/2018  Attending MD:  Lucius Wilson MD     Data  Chart reviewed, discussed with interdisciplinary team.   Patient was admitted for:   1. Retroperitoneal bleed    2. Closed fracture of multiple ribs of left side, initial encounter    3. Lumbar back pain with radiculopathy affecting left lower extremity    4. Tear of meniscus of left knee    5. Severe episode of recurrent major depressive disorder, without psychotic features (H)         Assessment   Pt admitted with pain, found to have rib fractures sustained from a fall several days ago. Per Trauma Team, Pt medically stable for discharge. Pt has been evaluated by Therapy with a home recommendation. Pt lives with his Wife and numerous Family members who can assist as needed.        Plan  Anticipated Discharge Date:  12/12/18  Anticipated Discharge Plan:  Discharge to home with Family support      Chrystal Ahmadi RN   6B care coordinator #[phone]

## 2018-12-12 NOTE — CONSULTS
"Urology Consult    Name: Vincent Mishra    MRN: [id number]   YOB: 1940               Chief Complaint:   Flank pain    History is obtained from the patient and chart review          History of Present Illness:   Vincent Mishra is a 78 year old male with a PMH of HTN, DM2, CAD and a  urologic history of RCC who presents with flank pain following cryoablation of a renal tumor. The pt underwent cryoablation of a left upper pole renal mass on 12/4/2018. The following day he suffered a mechanical fall at his home on his left side. Since then he has had worsening left-sided flank pain that prompted him to come to the ED today. He describes the pain as intermittent, \"15 out of 10\", and radiating from his left flank to LLQ. He denies hematuria, dysuria, n/v, CP, or SOB.       Past Medical History:     Past Medical History:   Diagnosis Date     CAD (coronary artery disease) 7/26/2011 7/2011 ([facility]): s/p MI, PTCA - RCA      Cancer of kidney (H)      Chest pain 1/12/2012     Coronary artery disease      History of angina      History of blood transfusion      Hyperlipidaemia      Hyperlipidemia LDL goal <100 9/23/2010     Malignant neoplasm (H)     Prostate CA (removed)     Myocardial infarction (H)     s/p MI 7/29/14, stent placement     NSTEMI (non-ST elevated myocardial infarction) (H)     07-25-14 CATH- RCA, L.Main and CFX  had minor luminal irregularites. Mid LAD high grade stenosis 80-90%.Stent placed to mid LAD     Other and unspecified hyperlipidemia     MI in July 2011     Right bundle branch block      Type II or unspecified type diabetes mellitus without mention of complication, not stated as uncontrolled      Unspecified essential hypertension             Past Surgical History:     Past Surgical History:   Procedure Laterality Date     ARTHROSCOPY KNEE  2/11/2011    ARTHROSCOPY KNEE performed by LEY, JEFFREY DUANE at [facility]     ARTHROSCOPY KNEE WITH MEDIAL MENISCECTOMY  6/26/2012    Procedure: " ARTHROSCOPY KNEE WITH MEDIAL MENISCECTOMY;  Right Knee Arthroscopy With Medial Menisectomy;  Surgeon: Ley, Jeffrey Duane, MD;  Location: [facility]     BACK SURGERY      back surgery x4     BIOPSY       CARDIAC SURGERY  7/2011    stentt placed     COLONOSCOPY       CORONARY ANGIOGRAPHY ADULT ORDER  07-25-14    RCA, L.Main and CFX  had minor luminal irregularites. Mid LAD high grade stenosis 80-90%.Stent placed to mid LAD     ESOPHAGOSCOPY, GASTROSCOPY, DUODENOSCOPY (EGD), COMBINED  10/25/2012    Procedure: COMBINED ESOPHAGOSCOPY, GASTROSCOPY, DUODENOSCOPY (EGD), BIOPSY SINGLE OR MULTIPLE;  Gastroscopy  ;  Surgeon: Lucius Dasilva MD;  Location: [facility]     HEART CATH, ANGIOPLASTY  07-25-14    mid LAD      ORTHOPEDIC SURGERY       back surgery            Social History:     Social History     Tobacco Use     Smoking status: Never Smoker     Smokeless tobacco: Never Used   Substance Use Topics     Alcohol use: No     Alcohol/week: 0.0 oz            Family History:     Family History   Problem Relation Age of Onset     Cancer Mother      Depression Mother      Diabetes Father      Hypertension Father      Heart Disease Father      Mental Illness Father      Heart Disease Maternal Grandfather      Substance Abuse Maternal Grandfather      Alcohol/Drug Paternal Grandmother      Substance Abuse Paternal Grandmother      Heart Disease Paternal Grandfather      Alcohol/Drug Paternal Grandfather      Substance Abuse Paternal Grandfather      Heart Disease Brother      Diabetes Brother      Substance Abuse Brother      Obesity Brother      Diabetes Brother      Obesity Sister      Alcohol/Drug Daughter      Depression Daughter      Obesity Sister      Diabetes Sister      Obesity Sister      Diabetes Sister      Alcohol/Drug Sister      Cancer Sister 55        possible kidney cancer     Substance Abuse Sister      Alcohol/Drug Son      Substance Abuse Son      Diabetes Son      Alcohol/Drug Son      Substance Abuse Son      Obesity  "Daughter      Diabetes Daughter      Hypertension Daughter      Bipolar Disorder Other             Allergies:     Allergies   Allergen Reactions     Prozac [Fluoxetine] Other (See Comments)     \"I went crazy.\"       Benadryl [Diphenhydramine Hcl] Other (See Comments)     Starts to shake, and paranoid     Codeine Itching     Diphenhydramine Other (See Comments)     Hallucinations, See [facility] records scanned on 7/16/15     Labetalol Other (See Comments)     See [facility] records scanned on 7/16/15  Bradycardia down to 30 on holter, dizziness. Stopped med and symptoms resolved     Metoprolol Other (See Comments)     Bradycardia even at 12.5 mg     Morphine Visual Disturbance            Medications:     Current Facility-Administered Medications   Medication     acetaminophen (TYLENOL) tablet 975 mg     [START ON 12/12/2018] amLODIPine (NORVASC) tablet 5 mg     atorvastatin (LIPITOR) tablet 80 mg     bisacodyl (DULCOLAX) Suppository 10 mg     gabapentin (NEURONTIN) capsule 400 mg     HYDROmorphone (PF) (DILAUDID) injection 0.3-0.5 mg     lactated ringers infusion     Lidocaine (LIDOCARE) 4 % Patch 2 patch    And     [START ON 12/12/2018] lidocaine patch REMOVAL    And     lidocaine patch in PLACE     [START ON 12/12/2018] lisinopril (PRINIVIL/ZESTRIL) tablet 40 mg     melatonin tablet 10 mg     methocarbamol (ROBAXIN) tablet 750 mg     [START ON 12/12/2018] multivitamin w/minerals (THERA-VIT-M) tablet 1 tablet     naloxone (NARCAN) injection 0.1-0.4 mg     nitroGLYcerin (NITROSTAT) sublingual tablet 0.4 mg     omeprazole (priLOSEC) CR capsule 20 mg     ondansetron (ZOFRAN-ODT) ODT tab 4 mg    Or     ondansetron (ZOFRAN) injection 4 mg     [START ON 12/12/2018] oxyCODONE (OxyCONTIN) 12 hr tablet 30 mg     oxyCODONE (ROXICODONE) tablet 5-10 mg     polyethylene glycol (MIRALAX/GLYCOLAX) Packet 17 g     senna-docusate (SENOKOT-S/PERICOLACE) 8.6-50 MG per tablet 1-2 tablet     " [START ON 12/12/2018] sertraline (ZOLOFT) tablet 200 mg     traZODone (DESYREL) tablet 100 mg     [START ON 12/12/2018] vitamin D3 (CHOLECALCIFEROL) 1000 units (25 mcg) tablet 1,000 Units             Review of Systems:    ROS: 10 point ROS neg other than the symptoms noted above in the HPI           Physical Exam:   VS:  T: 98.2    HR: 88    BP: 145/91    RR: 20   GEN:  AOx3.  NAD.    CV:  RRR  LUNGS: Non-labored breathing.   BACK:  No bruising or ecchymosis. No midline or CVA tenderness.  ABD:  Soft.  NT.  ND.  No rebound or guarding.  No masses.  EXT:  Warm, well perfused.  No edema.  SKIN:  Warm.  Dry.  No rashes.  NEURO:  CN grossly intact.            Data:   All laboratory data reviewed:    Recent Labs   Lab 12/11/18  1638 12/11/18  1028   WBC 10.3 9.4   HGB 8.3* 8.3*   * 445     Recent Labs   Lab 12/11/18  1028      POTASSIUM 3.8   CHLORIDE 104   CO2 25   BUN 20   CR 1.31*   *   LIOR 8.3*     No lab results found in last 7 days.    Invalid input(s): URINEBLOOD    All pertinent imaging reviewed:    CT scan of the abdomen:  IMPRESSION:  1. Changes likely related to recent cryoablation upper pole left renal  lesion. Small left pleural effusion and left perinephric hemorrhage  are noted possibly sequela from cryoablation. Area of increased  density within the area of cryoablation could represent active  hemorrhage versus residual enhancement of the left renal mass. Urology  follow-up recommended.  2. No enlarged lymph nodes. Remainder of the chest, abdomen and pelvis  are unremarkable. Renal cortical cysts and mild thickening of the  adrenal glands appear stable. Probable cholelithiasis without evidence  of cholecystitis.  3. Probable nondisplaced left 10th and 11th rib fractures as  described.         Impression and Plan:   Impression:   Vincent Mishra is a 78 year old male with PMH of CAD, HTN, DM2 and a urologic hx of RCC s/p cryoablation 12/4/2018 who presents with flank pain 6 days  after a mechanical fall and possible small perinephric hematoma/hemorrhage. The patient is hemodynamically stable does not appear to be actively bleeding (Hgb 8.3 from 9 preop). While there is no delayed phase on the CT A/P, the collecting system does not appear to be involved. In light of the above we recommend conservative management.       Plan:     - Trend Hgb, serial abdominal exams     - If pt become hemodynamically unstable we would take to the operating room, otherwise if he drifts downward and becomes anemic, recommend IR consult for possible embolization    - Delayed phase CT is likely of low clinical value at this time, however would recommend if pt develops SHIRA, decreased UOP, or acutely worsening flank pain.    - Urology will continue to follow. Please contact resident/PA on call with any questions or concerns.     Jefe Manzano MD  Urology Resident    This patient's exam findings, labs, and imaging discussed with urology staff surgeon Dr. Palacios, who developed the treatment plan.             I did not see the patient, but I reviewed the pertinent labs, images and medical history and exam with Dr. manzano and agree with the plan formulated.    Jameel Palacios MD  Department of Urology Staff  [facility]

## 2018-12-12 NOTE — PLAN OF CARE
Neuro: A&Ox4.   Cardiac: Sinus Mele with 1st degree block, BP's elevated 169/81 service notified    Respiratory: Sating  on RA.  GI/: Adequate urine output. Pt reports BM 12/11 at home.   Diet/appetite: NPO for procedure.   Activity:  Assist of x1 up to bathroom  Pain: Complaining of L abdominal pain radiating down side. Taking oxycodone, tylenol, and IV dilaudid   Skin: Multiple scabs on nose, arms and legs from falling at home.   LDA's: PIV x2 with LR at 100ml/hr     Plan: Continue to trend troponin levels and cardiac monitor for changes. Continue with POC. Notify primary team with changes.           No Change  Adult Inpatient Plan of Care  Plan of Care Review  12/12/2018 0338 - No Change by Nevaeh Olson RN  Patient-Specific Goal (Individualization)  12/12/2018 0338 - No Change by Nevaeh Olson RN  Absence of Hospital-Acquired Illness or Injury  12/12/2018 0338 - No Change by Nevaeh Olson RN  Optimal Comfort and Wellbeing  12/12/2018 0338 - No Change by Nevaeh Olson RN  Readiness for Transition of Care  12/12/2018 0338 - No Change by Nevaeh Olson RN  Rounds/Family Conference  12/12/2018 0338 - No Change by Nevaeh Olson RN  Pain Acute  Optimal Pain Control  12/12/2018 0338 - No Change by Nevaeh Olson RN  Fall Injury Risk  Absence of Fall and Fall-Related Injury  12/12/2018 0338 - No Change by Nevaeh Olson RN

## 2018-12-12 NOTE — PLAN OF CARE
PT / 6B -     Discharge Planner PT   Patient plan for discharge: home with 24/7 supervision/assistance  Current status: PT evaluation completed.  Demonstrating MOD I with bed mobility and sit>stand transfers.  Ambulated in hallway with FWW + CGA.  Climbed 8 stairs + CGA.   Barriers to return to prior living situation: pain  Recommendations for discharge: home with 24/7 supervision/assistance  Rationale for recommendations: Vincent has strong family support and receives assistance for ADLs/iADLs and functional mobility as needed.  Vincent is limited most by pain and reports falls are related to baseline back pain.  Vincent has adaptive equipment at home, shower chair, elbow crutches, 4WW; encouraged to use 4WW for all functional mobility.        Entered by: Marilu Saldivar 12/12/2018 2:12 PM

## 2018-12-12 NOTE — PROVIDER NOTIFICATION
Time of notification: 6:37 AM  Provider notified: Trauma  Patient status: BP elevated 169/81  Temp:  [97.8  F (36.6  C)-98.9  F (37.2  C)] 98.3  F (36.8  C)  Pulse:  [69-88] 88  Heart Rate:  [55-71] 71  Resp:  [11-22] 20  BP: (109-177)/() 169/81  SpO2:  [89 %-98 %] 94 %  Orders received: waiting on orders

## 2018-12-12 NOTE — PROGRESS NOTES
[facility], [place]  Trauma Service Progress Note    Date of Service (when I saw the patient): 12/12/2018     Assessment & Plan   Trauma mechanism:Fall from standing height 5 days ago onto left side  Time/date of injury:12/06 at night per family  Known Injuries:  1. Possible left 10th and 11th rib fractures  2. Small left pleural effusion  3. Left perinephric hemorrhage with active extravascation  Other diagnoses:   1. Acute traumatic pain  2. Acute blood loss anemia  3. SHIRA   4. Troponinemia  5. Malignant neoplasm of the kidney  6. NSTEMI, PCI x2 with [facility] 2011 and 2014 on dual antiplatelet therapy  7. SA node dysfunction  8. HTN  9. HLD  10. DM2  11. Chronic back pain, multiple back surgeries  12. Chronic pain        Procedure: To be determined  Plan:  1. Tertiary exam completed today with no other trauma noted.  2. Rib fractures: - Pain control, Aggressive pulmonary hygiene: IS, Flutter valve, and NIF/FVC.   1. IS: 1200  2. NIF/VC -30/2.17  3. Left Pleural effusion: Question if this is a reactive effusion vs hemothorax given recent trauma. Stable on room air. Discussed with Dr. Wilson, will consult thoracic for possible drainage. If decline in resp status, reimage and follow up as needed.  4. Pain control: Resumed home dose of OxyContin  1. PRN Oxycodone 5-10mg, Hydromorphone IV breakthrough pain  2. Lidoderm patches  3. Gabapentin 400mg TID (home dose 400 mg BID), PRN Methocarbamol available  4. Warm packs, PT  5. Renal hemorrhage: CT imaging with some perinephric fluid and possible hematoma. 7 days S/P left renal cryoablation with biopsy, fall with left flank strike, on dual antiplatelet therapy. Patient presented with increased left flank pain, denies any hematuria, difficulty urinating, UA with mod blood in urine, UOP adequate. Hemoglobin pre-procedure 9.0, stable at 8.6 today.  1.  Interventional Radiology consulted to evaluate for possible embolization vs conservative management  for now. No intervention for now given less likely actively bleeding given stable hemoglobin and stable hemodynamics. If patient becomes unstable or experienced increased pain, difficulty with urination ,hematuria would obtain further imaging and manage as needed.  2. Urology consult: please see notes. No acute need for further intervention at this time. Follow up plan pending  3. Strict I/O  4. Continue to monitor and document color of urine  5. Repeat hemoglobin in the am or sooner if concern for active bleeding  6. Will continue to hold antiplatelet therapy for now, anticipate resuming prior to discharge  6. SHIRA: baseline creatinine 1.3-1.4, kept on MIVF, overnight given recent contrast load with imaging, UOP. Discontinue when oral intake is adequate. Holding PTA diuretic and oral hypoglycemic for now.  7. Troponin elevation: Denies chest pain or shortness of breath. EKG obtained at OSH same from prior. Troponin down trending no need for further rechecks at this time. Plan to resume antiplatelet therapy as soon as able. Continue PTA cardiac meds. PRN hydralazine available for SBP >160. Unable to utilize beta blockers secondary to bradycardia, SA ramana dysfunction  8. Endocrine: Holding PTA oral hypoglycemic. Started Med correction Novolog insulin sliding scale.  9. PT/OT. Wife reports patient is mostly sedentary, secondary to chronic back pain issues and bilateral lower extremity weakness. No very complaint with outpatient therapies. Able to ambulate to the bathroom with assistive devices. Anticipate discharge to home with recommend therapies.  10. Social work/ Care coordinator discharge planning.     Code status: Full code confirmed with patient .      General Cares:  GI Prophylaxis: resumed home PPI  DVT Prophylaxis: Mechanical for now  Date of last stool/Bowel Regimen:PTa/ ordered  Pulmonary toilet:Aggessive as above    Discharge goals:     Adequate pain management: Ongoing    VSS x24 hours: Yes    Hemoglobin  stable x 48 hours: Yes    Ambulating safely and/or therapy evals complete: Pending    Drains/lines removed or plan in place to manage: N/A    Teaching done: ongoing    Other:  Expected D/C date: Thursday vs Friday.    Interval History   No acute events reported overnight. Reports ongoing muscle spasms, left flank pain. Denies chest pain, shortness of breath, lightheadedness or dizziness. Denies abdominal pain or nausea.  ROS x 8 negative with exception of those things listed in interval hx    Physical Exam   Temp: 98.4  F (36.9  C) Temp src: Oral BP: 155/83 Pulse: 88 Heart Rate: 59 Resp: 20 SpO2: 94 % O2 Device: None (Room air)    Vitals:    12/11/18 1421 12/11/18 1948 12/12/18 0545   Weight: 99.8 kg (220 lb) 88.9 kg (196 lb) 89.1 kg (196 lb 6.9 oz)     Vital Signs with Ranges  Temp:  [98.2  F (36.8  C)-98.9  F (37.2  C)] 98.4  F (36.9  C)  Pulse:  [69-88] 88  Heart Rate:  [55-71] 59  Resp:  [11-22] 20  BP: (109-177)/() 155/83  SpO2:  [89 %-97 %] 94 %  I/O last 3 completed shifts:  In: 1373.33 [P.O.:240; I.V.:1133.33]  Out: 450 [Urine:450]    Cassie Coma Scale - Total 14/15  Eye Response (E): 4   4= spontaneous, 3= to verbal/voice, 2= to pain, 1= No response   Verbal Response (V): 4 - forgetful  5= Orientated, converses, 4= Confused, converses, 3= Inappropriate words, 2= Incomprehensible sounds, 1=No response   Motor Response (M): 6   6= Obeys commands, 5= Localizes to pain, 4= Withdrawal to pain, 3=Fexion to pain, 2= Extension to pain, 1= No response     Constitutional: Awake, alert, cooperative, no apparent distress.  Eyes: Lids and lashes normal, pupils equal, round and reactive to light, extra ocular muscles intact, sclera clear, conjunctiva normal.  ENT: Normocephalic, atraumatic  Respiratory: No increased work of breathing, good air exchange, clear to auscultation bilaterally, no crackles or wheezing.  Cardiovascular:  regular rate and rhythm, normal S1 and S2, no S3 or S4, and no murmur.   GI: Normal  bowel sounds, abdomen soft, non-distended, non-tender, no guarding  Genitourinary:  Not assessed- documented as clear yellow  Skin: Multiple scabs on arms and legs from scratching.   Musculoskeletal: There is no redness, warmth, or swelling of the joints. +2 Pedal pulse palpated.  Neurologic: Awake, alert, oriented/forgetful. Cranial nerves II-XII are grossly intact.  Strength and sensory is intact. No focal deficits.  Neuropsychiatric: Calm, normal eye contact, alert, affect appropriate to situation    BESSY Yeung CNP  To contact the trauma service use job code pager [phone],   Numeric texts or alpha text through [facility]

## 2018-12-12 NOTE — DISCHARGE SUMMARY
[facility], [place]    Discharge Summary  Trauma Surgery Service    Date of Admission:  12/11/2018  Date of Discharge:  12/12/2018  Discharging Provider: Teo Nuñez CNP/ Dr. Lucius Wilson  Date of Service (when I saw the patient): 12/12/18    Primary Provider: Keyla Fonseca  Primary Care clinic: [id number] [facility]  [place] [address]  Phone: [phone]  Fax number: [phone]     Discharge Diagnoses      Renal hemorrhage vs hematoma vs enhancement of renal lesion  Closed fracture of multiple ribs of left side    Hospital Course   Mr Mishra is a 78-year-old male with history of acute coronary syndrome and renal cell carcinoma postop day 7 status post cryoablation of a left RCC.  He was admitted today having fallen 6 days ago and CT chest abdomen and pelvis obtained in outside hospital revealed left-sided rib fractures and possible hemorrhage involving the left kidney. During his stay here  he appears hemodynamically stable.  His hemoglobin is 8.3 previously 9.0 on 11/20/2018.  Review of imaging demonstrates postoperative appearance of left kidney status post cryoablation with some perinephric fluid and possible hematoma.  Potentially there is enhancement of the treated lesion. Hospital course and plan as below:     Fall  The patient sustained the above injury as a result of fall 5 days prior to presentation.  He was seen by the Trauma Resource Nurse and injury prevention education was performed.  The mechanism of injury and factors contributing to the accident were discussed with the patient.  Strategies on how to prevent future accidents were reviewed.  The patient underwent tertiary examination to evaluate for additional injuries.  The systematic review did not find any other injuries.    Rib Fractures:  In rib fracture management, pulmonary toilet and good pain control allowing for good pulmonary toilet is paramount.  Prior to discharge, his pain was controlled for Vincent W  Tad with scheduled acetaminophen, topical pain medications and PRN oral analgesics.     There was concern for left hemothorax given his recent rib fractures. He was evaluated by the Thoracic service for possible drainage. Upon further evaluation, this was deemed to be a simple pleural effusion and the decision was made drain at this time.    In order to prevent common pulmonary complications found with rib fracture patients, Vincent Mishra will need to continue with aggressive pulmonary toileting that includes, incentive spirometry, coughing and deep breathing exercises.  We recommend these continue at a minimum of QID for one month after discharge.  Patient has been provided for handout with instructions regarding this.     Left Perinephric hematoma  CT imaging on admission with concern for perinephric fluid and possible hematoma. He is 7 days S/P left renal cryoablation with biopsy, and reported a fall with left flank strike. He was on dual antiplatelet therapy prior to arrival. He denies any hematuria, difficulty urinating. A urinalysis was obtained which showed moderate blood in urine, and his urine output was adequate during his stay. His hemoglobin pre cryoablation procedure was 9.0 and 8.6 today. He was evaluated by the Urology service during his stay with the following recommendations:  -Trend hemoglobin, and continue with serial exams  - Interventional consult if he becomes unstable for possible embolization  - Imaging does not suggest involvement of collecting system, so a delayed phase CT (CT urogram) is not currently needed, but consider if there is suspicion for urine leak (low UOP, SHIRA, worsening flank pain)  His hemoglobin and hemodynamics were stable prior to discharge. He is recommended to follow up with Urology upon discharge.    Acute pain  # Discharge Pain Plan:   - During his hospitalization, Vincent experienced pain due to rib fractures.  The pain plan for discharge was discussed with  Vincent and his spouse and the plan was created in a collaborative fashion.    - Opioids prescribed on discharge: Oxycodone  - Duration of opioids after discharge: Per CDC opioid prescribing guidelines, a 3 day prescription of opioids was provided.  - Bowel regimen: miralax  - Chronic/continued opioids: Oxycontin  - Pharmacologic adjuvants:  Acetaminophen and Lidocaine patch  - Non-pharmacologic adjuvants: Ice     Adequate pain control was achieved with this regimen.  We anticipate that they will taper off this regimen over the next several weeks..    Elevated troponin:  Denies chest pain or shortness of breath. EKG obtained at OSH same from prior. Troponin down trending no need for further rechecks at this time. He was recommended to resume his dual antiplatelet therapy upon discharge and follow up with his Cardiologist 12/17 as previously scheduled.  Therapy Recommendations:   Current status of physical therapies on discharge:   Discharge Planner PT   Patient plan for discharge: home with 24/7 supervision/assistance  Current status: PT evaluation completed.  Demonstrating MOD I with bed mobility and sit>stand transfers.  Ambulated in hallway with FWW + CGA.  Climbed 8 stairs + CGA.   Barriers to return to prior living situation: pain  Recommendations for discharge: home with 24/7 supervision/assistance  Rationale for recommendations: Vincent has strong family support and receives assistance for ADLs/iADLs and functional mobility as needed.  Vincent is limited most by pain and reports falls are related to baseline back pain.  Vincent has adaptive equipment at home, shower chair, elbow crutches, 4WW; encouraged to use 4WW for all functional mobility.     Code Status   Full Code    SUBJECTIVE: There were no acute events reported during his stay here. He denies chest pain, shortness of breath, abdominal pain or nausea. He was able to tolerate his diet and ambulated in the cross with therapy prior to discharge.    Physical  "Exam:  Constitutional: Awake, alert, cooperative, no apparent distress.  Eyes: Lids and lashes normal, pupils equal, round and reactive to light, extra ocular muscles intact, sclera clear, conjunctiva normal.  ENT: Normocephalic, atraumatic  Respiratory: No increased work of breathing, good air exchange, clear to auscultation bilaterally, no crackles or wheezing.  Cardiovascular:  regular rate and rhythm, normal S1 and S2, no S3 or S4, and no murmur.   GI: Normal bowel sounds, abdomen soft, non-distended, non-tender, no guarding  Genitourinary:  Not assessed- documented as clear yellow  Skin: Multiple scabs on arms and legs from scratching.   Musculoskeletal: There is no redness, warmth, or swelling of the joints. +2 Pedal pulse palpated.  Neurologic: Awake, alert, oriented/forgetful. Cranial nerves II-XII are grossly intact.  Strength and sensory is intact. No focal deficits.  Neuropsychiatric: Calm, normal eye contact, alert, affect appropriate to situation    Discharge Disposition   Discharged to home  Condition at discharge: Stable  Discharge VS: Blood pressure 156/86, pulse 88, temperature 97.8  F (36.6  C), temperature source Oral, resp. rate 18, height 1.727 m (5' 8\"), weight 89.1 kg (196 lb 6.9 oz), SpO2 96 %.    Consultations This Hospital Stay   RESPIRATORY CARE IP CONSULT  MEDICATION HISTORY IP PHARMACY CONSULT  PHYSICAL THERAPY ADULT IP CONSULT  OCCUPATIONAL THERAPY ADULT IP CONSULT  THORACIC SURGERY ADULT IP CONSULT    Discharge Orders      Reason for your hospital stay    Known Injuries:  1. Possible left 10th and 11th rib fractures  2. Small left pleural effusion  3. Left perinephric hemorrhage vs hematoma     Follow Up and recommended labs and tests    1. Follow up with your primary care provider for continued medical care and hospital follow up in 5-10 days.   2. Follow up with Nephrology- Urology will call for follow up  3. Continue routine follow up with you Cardiologist for management of your " "cardiac medications    You have been involved in a recent trauma incident resulting in an injury.  Studies show us that people affected by trauma have higher levels of post-traumatic stress disorder (PTSD) and/or depressive symptoms during the year following an injury.     Please answer the following:  Had migraines about the event(s) or thought about the event(s) when you didn't want to?  Tried hard not to think about the event(s) or went out of your way to avoid situations that reminded you of the event(s)?  Been constantly on guard, watchful, or easily startled?  Felt numb or detached from people, activities, or your surroundings?   Felt guilty or unable to stop blaming yourself or others for the event(s) or any problems the event (s) may have caused?    If you answered \"yes\" to 3 or more of these questions, or if you simply want to discuss any of your feelings further, we recommend that you talk with your Primary Care Provider or a mental health professional.     Activity    Your activity upon discharge: activity as tolerated     When to contact your care team    Call Urology Clinic if you have any of the following: blood in the urine, inability to urinate or worsening pain.     Full Code     Diet    Follow this diet upon discharge: Regular     Discharge Medications   Current Discharge Medication List      START taking these medications    Details   Lidocaine (LIDOCARE) 4 % Patch Place 2 patches onto the skin every 24 hours  Qty: 15 patch, Refills: 0    Associated Diagnoses: Closed fracture of multiple ribs of left side, initial encounter         CONTINUE these medications which have CHANGED    Details   acetaminophen (TYLENOL) 325 MG tablet Take 2 tablets (650 mg) by mouth every 4 hours as needed for mild pain  Qty: 30 tablet, Refills: 1    Associated Diagnoses: Closed fracture of multiple ribs of left side, initial encounter      oxyCODONE (ROXICODONE) 5 MG tablet Take 1 daily as needed  for severe pain /at " bedtime may have up to 2 per day max 40 per month  Qty: 10 tablet, Refills: 0    Associated Diagnoses: Lumbar back pain with radiculopathy affecting left lower extremity         CONTINUE these medications which have NOT CHANGED    Details   amLODIPine (NORVASC) 5 MG tablet Take 1 tablet (5 mg) by mouth daily  Qty: 90 tablet, Refills: 3    Associated Diagnoses: Hypertension goal BP (blood pressure) < 140/90      ascorbic acid (VITAMIN C) 500 MG tablet Take 500 mg by mouth daily       aspirin (ASA) 81 MG tablet Take 81 mg by mouth daily      atorvastatin (LIPITOR) 80 MG tablet TAKE 1 TABLET BY MOUTH DAILY  Qty: 90 tablet, Refills: 3    Associated Diagnoses: Hyperlipidemia LDL goal <100      Calcium Carbonate-Vitamin D (CALCIUM + D) 600-200 MG-UNIT per tablet Take 2 tablets by mouth daily.  Qty: 100 tablet, Refills: 12      cholecalciferol (VITAMIN  -D) 1000 UNITS capsule Take 1 capsule by mouth daily      clopidogrel (PLAVIX) 75 MG tablet TAKE 1 TABLET BY MOUTH DAILY  Qty: 90 tablet, Refills: 0    Associated Diagnoses: Right bundle branch block; NSTEMI (non-ST elevated myocardial infarction) (H)      Cyanocobalamin (VITAMIN B-12 PO) Take 500 mcg by mouth daily       gabapentin (NEURONTIN) 100 MG capsule TAKE ONE CAPSULE BY MOUTH DAILY, INCREASE BY 1 CAPSULE EVERY 2 DAYS UP TO 6 CAPSULES THREE TIMES DAILY  Qty: 270 capsule, Refills: 11    Associated Diagnoses: Failed back surgical syndrome; 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  Qty: 360 tablet, Refills: 1    Associated Diagnoses: Type 2 diabetes mellitus without complication, unspecified long term insulin use status      omeprazole (PRILOSEC) 20 MG CR capsule TAKE ONE CAPSULE BY MOUTH TWICE DAILY  Qty: 180 capsule, Refills: 3    Associated Diagnoses: Gastroesophageal reflux disease without esophagitis      oxyCODONE (OXYCONTIN) 30 MG 12 hr tablet Take 1 tablet (30 mg) by mouth every 12 hours  Qty: 60 tablet, Refills: 0    Associated Diagnoses: Failed back surgical syndrome      polyethylene glycol (MIRALAX/GLYCOLAX) powder Take 17 g (1 capful) by mouth daily  Qty: 119 g, Refills: 3    Associated Diagnoses: Chronic idiopathic constipation      traZODone (DESYREL) 100 MG tablet TAKE 3 TABLETS(300 MG) BY MOUTH EVERY NIGHT AT BEDTIME AS NEEDED FOR SLEEP  Qty: 270 tablet, Refills: 0    Associated Diagnoses: Primary insomnia      blood glucose monitoring (NO BRAND SPECIFIED) meter device kit Use to test blood sugar 1 times daily or as directed.  Qty: 1 kit, Refills: 0    Associated Diagnoses: Type 2 diabetes mellitus with complication, without long-term current use of insulin (H)      blood glucose monitoring (ONE TOUCH ULTRA) test strip Use to test blood sugars 2 times daily or as directed.  Qty: 200 strip, Refills: 3    Associated Diagnoses: Type 2 diabetes mellitus with other circulatory complications (H)      Lancets Misc. 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  Qty: 100 each, Refills: 12    Associated Diagnoses: Type 2 diabetes, HbA1c goal < 7% (H)      nitroGLYcerin (NITROSTAT) 0.4 MG sublingual tablet Place 1 tablet (0.4 mg) under the tongue every 5 minutes as needed  "for chest pain  Qty: 25 tablet, Refills: 1    Associated Diagnoses: Chest pain, unspecified type      !! order for DME Equipment being ordered: walker wheeled also include  With skis for indoor carpet use  Qty: 1 Device, Refills: 0    Associated Diagnoses: Falls frequently; Deep inguinal pain, left      !! order for DME Equipment being ordered: compression stockings  Qty: 1 Units, Refills: 0    Associated Diagnoses: Cellulitis and abscess of leg, except foot      !! order for DME Equipment being ordered: TENS  Qty: 1 Units, Refills: 0    Associated Diagnoses: Chronic bilateral low back pain with sciatica, sciatica laterality unspecified; Hip pain, bilateral      !! order for DME Equipment being ordered: Wheelchair motorized for patient with spinal stenosis and neurogenic claudication  Qty: 1 Device, Refills: 0    Associated Diagnoses: Spinal stenosis, lumbar region, with neurogenic claudication      sertraline (ZOLOFT) 100 MG tablet TAKE 2 TABLETS(200 MG) BY MOUTH DAILY  Qty: 180 tablet, Refills: 0    Associated Diagnoses: Severe episode of recurrent major depressive disorder, without psychotic features (H)       !! - Potential duplicate medications found. Please discuss with provider.      STOP taking these medications       ibuprofen (ADVIL/MOTRIN) 800 MG tablet Comments:   Reason for Stopping:             Allergies   Allergies   Allergen Reactions     Prozac [Fluoxetine] Other (See Comments)     \"I went crazy.\"       Benadryl [Diphenhydramine Hcl] Other (See Comments)     Starts to shake, and paranoid     Codeine Itching     Diphenhydramine Other (See Comments)     Hallucinations, See [facility] records scanned on 7/16/15     Labetalol Other (See Comments)     See [facility] records scanned on 7/16/15  Bradycardia down to 30 on holter, dizziness. Stopped med and symptoms resolved     Metoprolol Other (See Comments)     Bradycardia even at 12.5 mg     Morphine Visual " Disturbance     Data   Most Recent 3 CBC's:  Recent Labs   Lab Test 12/12/18  1251 12/12/18  0601 12/11/18  1638 12/11/18  1028   WBC  --  9.6 10.3 9.4   HGB 8.7* 8.6* 8.3* 8.3*   MCV  --  80 80 79   PLT  --  472* 451* 445      Most Recent 3 BMP's:  Recent Labs   Lab Test 12/12/18  0601 12/11/18  1028 12/04/18  0851 11/20/18  1418    137  --  141   POTASSIUM 4.4 3.8 4.2 4.6   CHLORIDE 104 104  --  107   CO2 25 25  --  26   BUN 22 20  --  25   CR 1.40* 1.31* 1.27* 1.49*   ANIONGAP 8 8  --  8   LIOR 9.3 8.3*  --  8.6   * 156*  --  105*     Most Recent 2 LFT's:  Recent Labs   Lab Test 12/12/18  0601 12/11/18  1028   AST 22 22   ALT 19 16   ALKPHOS 96 99   BILITOTAL 1.2 1.2     Most Recent INR's and Anticoagulation Dosing History:  Anticoagulation Dose History     Recent Dosing and Labs Latest Ref Rng & Units 11/12/2012 10/11/2013 7/24/2014 10/7/2014 5/30/2017 12/4/2018 12/11/2018    INR 0.86 - 1.14 0.97 1.03 1.1 1.01 0.96 1.07 1.16(H)        Most Recent 3 Troponin's:  Recent Labs   Lab Test 12/12/18  1251 12/12/18  0601 12/11/18  2346  07/25/14 07/24/14   TROPI 0.372* 0.352* 0.349*   < >  --   --    TROPONIN  --   --   --   --  <0.012 0.012    < > = values in this interval not displayed.     Most Recent 6 Bacteria Isolates From Any Culture (See EPIC Reports for Culture Details):  Recent Labs   Lab Test 11/20/18  1245 09/14/18  1200 05/15/18  1004 04/25/18  1215 07/28/11  1855 07/28/11  1851   CULT 10,000 to 50,000 colonies/mL  mixed urogenital caitlin  Susceptibility testing not routinely done   <10,000 colonies/mL  mixed urogenital caitlin   >100,000 colonies/mL  Escherichia coli  *  10,000 to 50,000 colonies/mL  Enterococcus faecalis  * Moderate growth  Staphylococcus aureus  * No growth after 6 days No growth after 6 days     Most Recent TSH, T4 and A1c Labs:  Recent Labs   Lab Test 11/20/18  1418  07/25/14   TSH 3.06   < > 2.15   T4  --   --  1.21   A1C 6.4*   < >  --     < > = values in this interval not  displayed.     Results for orders placed or performed during the hospital encounter of 12/11/18   CT Chest/Abdomen/Pelvis w Contrast     Value    Radiologist flags Possible active extravasation of contrast in the (AA)    Narrative    CT CHEST, ABDOMEN AND PELVIS WITH CONTRAST 12/11/2018 11:21 AM     HISTORY: See the clinical information for interpreting provider. Fall  against left side against wall, 1 week post-op cryoablation of left  kidney, pain to left flank/abdomen and over left posterior ribs. On  Plavix.     COMPARISON: CT abdomen and pelvis 10/5/2018. CT chest 6/6/2017.    TECHNIQUE: Axial images from the thoracic inlet to the symphysis are  performed with additional coronal reformatted images. 99 mL of Isovue  370 are given intravenously.  Radiation dose for this scan was reduced  using automated exposure control, adjustment of the mA and/or kV  according to patient size, or iterative reconstruction technique.    FINDINGS:     Chest: Wedge-shaped scarring is noted in the lateral right upper lobe.  Linear scarring is also noted in the lateral lingula. Mild left lower  lobe atelectasis is present. Small lipoma is noted along the lateral  left upper chest wall measuring 5.1 x 2.5 cm, stable since prior chest  CT. Small left pleural effusion is new since prior exam. This appears  to be simple fluid. No right pleural fluid or pericardial fluid.  Coronary artery calcification is present. Thoracic aorta demonstrates  calcified plaque without aneurysm or dissection. Esophagus is  unremarkable. Thyroid gland appears normal. No enlarged lymph nodes in  the chest or axillas.    Abdomen: There is probable fatty liver infiltration. Calcified  gallstone is likely present in the gallbladder which is otherwise  unremarkable. The spleen, pancreas and right kidney are unremarkable.  Cysts are again noted in the right kidney, but there is no  hydronephrosis or renal calculi. Thickened adrenal glands bilaterally  appear  stable. Probable colonic constipation but no bowel obstruction  or diverticulitis. Appendix not visualized. Aorta demonstrates  scattered calcified plaque without aneurysm or dissection.    Changes related to cryoablation of the previously described enhancing  upper pole left renal lesion are noted. Area of mixed density on  series 2, image 61 measures 6.5 x 5.0 cm. Possible extravasation of  contrast outside the renal parenchyma as noted on image 62. Remainder  of the kidney enhances normally. Probable changes of previous  cryoablation posterior left kidney are noted on series 2, image 69  which overall appears stable. Scattered peripheral calcification is  noted in this area of previous ablation. Probable small cortical cyst  series 2, image 69 in the left kidney as well. Perinephric hemorrhage  is present between the kidney and thoracic spine on image 67 measuring  6.7 x 2.7 cm. This extends along the anterior aspect of the left psoas  muscle.    Pelvis: Bladder is partially decompressed but otherwise unremarkable.  Rectum is unremarkable. No enlarged pelvic or inguinal lymph nodes.  Bone window examination demonstrates degenerative mid thoracic and mid  lumbar spine changes. No aggressive appearing bone lesions are noted.  Prior lumbar fusion changes are also noted. Probable nondisplaced left  lateral and posterolateral 10th and 11th rib fractures.      Impression    IMPRESSION:  1. Changes likely related to recent cryoablation upper pole left renal  lesion. Small left pleural effusion and left perinephric hemorrhage  are noted possibly sequela from cryoablation. Area of increased  density within the area of cryoablation could represent active  hemorrhage versus residual enhancement of the left renal mass. Urology  follow-up recommended.  2. No enlarged lymph nodes. Remainder of the chest, abdomen and pelvis  are unremarkable. Renal cortical cysts and mild thickening of the  adrenal glands appear stable.  Probable cholelithiasis without evidence  of cholecystitis.  3. Probable nondisplaced left 10th and 11th rib fractures as  described.    [Critical Result: Possible active extravasation of contrast in the  area of left renal cryoablation. Follow-up with urology recommended.]    Finding was identified on 12/11/2018 11:29 AM.     Dr. Garcia was contacted by me on 12/11/2018 11:42 AM and verbalized  understanding of the critical result.     EUFEMIA CORTEZ MD       Time Spent on this Encounter   I, Teo Nuñez, personally saw the patient today and spent less than or equal to 30 minutes discharging this patient.    We appreciate the opportunity to care for your patient while in the hospital.  Should you have any questions about their injuries or this discharge summary our contact information is below.    Trauma Services  [facility]   Department of Critical Care and Acute Care Surgery  [address] [place] [address] [address]  [place] [address]  Office: [phone]

## 2018-12-12 NOTE — PROGRESS NOTES
[facility], [place]  Trauma Service Education Note     Date of Service (when I saw the patient): 12/12/2018       Trauma mechanism:Fall from standing height 5 days ago onto left side  Time/date of injury:12/06 at night per family  Known Injuries:  1. Possible left 10th and 11th rib fractures  2. Small left pleural effusion  3. Left perinephric hemorrhage with active extravascation      Education Provided:  #Pulmonary hygiene - patient able to demonstrate max volume of 2000ml with incentive spirometer usage.      Patient coached on frequency 1 q hours with 10 repetitions.  Additional reminder placed on white board for patient.    Acapella device not found at patient bedside.  BENJAMIN alerted.    Handouts Provided:  Chest injury specific; fall prevention in the home; opioid safe usage; home medical equipment flyer

## 2018-12-12 NOTE — PROGRESS NOTES
Interventional Radiology Progress Note     December 11, 2018    Mr Mishra is a 78-year-old male with history of acute coronary syndrome and renal cell carcinoma postop day 7 status post cryoablation of a left RCC.  He was admitted today having fallen 6 days ago and CT chest abdomen and pelvis obtained in outside hospital revealed left-sided rib fractures and possible hemorrhage involving the left kidney.    Today in the emergency department he appears hemodynamically stable.  His hemoglobin is 8.3 previously 9.0 on 11/20/2018.  Review of imaging demonstrates postoperative appearance of left kidney status post cryoablation with some perinephric fluid and possible hematoma.  Potentially there is enhancement of the treated lesion.    While in pain, he does not appear to be actively bleeding at this time.  Should he become hemodynamically unstable a CT angiogram should be obtained to evaluate for active extravasation.These findings were discussed with the patient and his family in the emergency department.  Please feel free to contact interventional radiology as needed.      Eric Pabon  Interventional Radiology Fellow  December 11, 2018    [phone] After Hours

## 2018-12-12 NOTE — PROGRESS NOTES
"Urology Daily Progress Note    24 hour events/Subjective:     - Admitted overnight    - Pain well controlled on current regimen   - Tolerating regular diet ; no nausea or vomiting   - overnight labs noted trop leak     O:  Vitals: /62   Pulse 88   Temp 98.7  F (37.1  C) (Oral)   Resp 18   Ht 1.727 m (5' 8\")   Wt 88.9 kg (196 lb)   SpO2 92%   BMI 29.80 kg/m    General: Alert, interactive, in NAD  Resp: Non-labored breathing on RA  Abdomen: Soft, appropriately tender  Ext: Warm and well perfused    I/O  UOP  200/      Labs  Pending this am    Heme:  Recent Labs   Lab 12/11/18  1638 12/11/18  1028   WBC 10.3 9.4   HGB 8.3* 8.3*   * 445     Chem:  Recent Labs   Lab 12/11/18  1028   POTASSIUM 3.8   CR 1.31*       Assessment/Plan  78 year old y/o male who recently underwent left renal mass cryoablation  (12/4/2018) then presented to the ED on 12/11 with flank pain after a fall 6 days prior. Imaging noted small perinephric hematoma/hemorrhage but there was no evidence of active bleeding on further workup.     - Trend Hgb, continue with serial exams  - IR consult if pt becomes unstable for possible embolization  - Imaging does not suggest involvement of collecting system, so a delayed phase CT (CT urogram) is not currently needed, but consider if there is suspicion for urine leak (low UOP, SHIRA, worsening flank pain)  - Defer remainder of cares to primary team   - Urology will continue to follow     Seen and examined with chief resident, to be discussed with Dr. Capone    --    Arlen Szymanski MD MS  Urology Resident    For questions re this patient, please see \"contacting urology team\" instructions below, or refer to the call sheet           Contacting the Urology Team     Please use the following job codes to reach the Urology Team. Note that you must use an in house phone and that job codes cannot receive text pages.     On weekdays, dial [phone] (or star-star-star 777 on the new [employer] telephones) then " [phone] to reach the Adult Urology resident or PA on call    On weekdays, dial [phone] (or star-star-star 777 on the new [employer] telephones) then [phone] to reach the Pediatric Urology resident    On weeknights and weekends, dial [phone] (or star-star-star 777 on the new [employer] telephones) then [phone] to reach the Urology resident on call (for both Adult and Pediatrics)

## 2018-12-12 NOTE — PROGRESS NOTES
"/62   Pulse 88   Temp 98.7  F (37.1  C) (Oral)   Resp 18   Ht 1.727 m (5' 8\")   Wt 88.9 kg (196 lb)   SpO2 92%   BMI 29.80 kg/m    Pt arrived to  from ED at 1930. Pt came to  for increased flank pain after a fall at home. Recently had a cryoablation of a renal tumor. Pt oriented to room and given call light. Instructed of its use. Two RN skin assessment completed by ROQUE Michel and Taryn BERMUDEZ Nursing will monitor.    "

## 2018-12-12 NOTE — PROGRESS NOTES
DISCHARGE                         No discharge date for patient encounter.  ----------------------------------------------------------------------------  Discharged to: Home  Via: private transportation per wife  Accompanied by: Family  Discharge Instructions: diet, activity, medications, follow up appointments, when to call the MD, aftercare instructions.  Prescriptions: To be filled by out patient [facility]'s pharmacy; medication list reviewed & sent with pt  Follow Up Appointments: arranged; information given  Belongings: All sent with pt  IV: d/c'd  Telemetry: d/c'd  Pt and wife exhibits understanding of above discharge instructions; all questions answered.    Discharge Paperwork: Signed, copied, and sent home with patient.

## 2018-12-12 NOTE — PROGRESS NOTES
" 12/12/18 1146   Quick Adds   Type of Visit Initial PT Evaluation       Present no   Language English   Living Environment   Lives With spouse;other relative(s);child(adán), adult   Living Arrangements house   Home Accessibility stairs within home   Living Environment Comment Wife reports her and patient live in home with 3 stairs to enter.  10 individuals live in the house so patient always has assistance when needed.    Self-Care   Usual Activity Tolerance moderate   Current Activity Tolerance moderate   Equipment Currently Used at Home crutches;shower chair  ([employer]strand (elbow) Crutches)   Activity/Exercise/Self-Care Comment Wife reports patient is very stationary throughout the day; patient is happy in bed watching television.    Functional Level Prior   Ambulation 1-->assistive equipment  ([employer] Crutches)   Transferring 0-->independent   Toileting 0-->independent   Bathing 3-->assistive equipment and person  (shower chair and wife)   Fall history within last six months yes   Number of times patient has fallen within last six months (Unable to provide number; reports \"a lot\")   Which of the above functional risks had a recent onset or change? none   Prior Functional Level Comment Wife reports falls secondary to \"bad back\"; patient is not a surgical candidate.   General Information   Onset of Illness/Injury or Date of Surgery - Date 12/11/18   Referring Physician Teo Nuñez APRN CNP   Patient/Family Goals Statement Return home   Pertinent History of Current Problem (include personal factors and/or comorbidities that impact the POC) 78M with PMH of HTN, DM2, CAD and left renal cell carcinoma who presented to the hospital with flank pain   Precautions/Limitations fall precautions   Weight-Bearing Status - LUE full weight-bearing   Weight-Bearing Status - RUE full weight-bearing   Weight-Bearing Status - LLE full weight-bearing   Weight-Bearing Status - RLE full weight-bearing " "  General Info Comments Activity: up with assist   Cognitive Status Examination   Level of Consciousness alert   Follows Commands and Answers Questions 100% of the time   Personal Safety and Judgment at risk behaviors demonstrated;impulsive   Cognitive Comment Patient receives 24/7 superivison, wife assists with iADLs.    Pain Assessment   Patient Currently in Pain No   Integumentary/Edema   Integumentary/Edema Comments Reports itchy from medication; noted scabs on all extremities.    Posture    Posture Forward head position;Protracted shoulders   Range of Motion (ROM)   ROM Comment B LE WNL/WFL   Strength   Strength Comments B LE > 3/5 secondary to functional mobility   Bed Mobility   Bed Mobility Comments MOD I   Transfer Skills   Transfer Comments CGA/SBA   Gait   Gait Comments FWW + CGA/SBA   Balance   Balance Comments No overt balance deficits noted   Coordination   Coordination no deficits were identified   Muscle Tone   Muscle Tone no deficits were identified   Clinical Impression   Criteria for Skilled Therapeutic Intervention yes, treatment indicated   PT Diagnosis Impaired functional mobility   Influenced by the following impairments pain, strength, activity tolerance   Functional limitations due to impairments impaired bed mobility, gait, stairs   Clinical Presentation Stable/Uncomplicated   Clinical Presentation Rationale clinical judgement; current mobility   Clinical Decision Making (Complexity) Low complexity   Therapy Frequency` daily   Predicted Duration of Therapy Intervention (days/wks) 12/13/18   Anticipated Discharge Disposition Home with Assist   Risk & Benefits of therapy have been explained Yes   Patient, Family & other staff in agreement with plan of care Yes   [facility] Cook Taste Eat-PAC TM \"6 Clicks\"   2016, Trustees of [facility], under license to [employer].  All rights reserved.   6 Clicks Short Forms Basic Mobility Inpatient Short Form   [facility] AM-PAC  \"6 Clicks\" V.2 " Basic Mobility Inpatient Short Form   1. Turning from your back to your side while in a flat bed without using bedrails? 4 - None   2. Moving from lying on your back to sitting on the side of a flat bed without using bedrails? 4 - None   3. Moving to and from a bed to a chair (including a wheelchair)? 4 - None   4. Standing up from a chair using your arms (e.g., wheelchair, or bedside chair)? 4 - None   5. To walk in hospital room? 3 - A Little   6. Climbing 3-5 steps with a railing? 3 - A Little   Basic Mobility Raw Score (Score out of 24.Lower scores equate to lower levels of function) 22   Total Evaluation Time   Total Evaluation Time (Minutes) 8

## 2018-12-12 NOTE — PROGRESS NOTES
Clinical Nutrition Services- Brief Note    Reviewed nutrition risk factors due to unintentional weight lossof 10# or more overthe past 2 months. Per wife, patient had lost some weight towards the end of the summer, but has since gained it back. Pt reports no changes in PO intakes or appetite and wife agreed with pt report. Pt is tolerating a Regular diet with moderate consistent CHO diet, eating well per nursing documentation and patient report. No nutrition issues identified at this time. RD will continue to follow per nutrition protocol.  Alida García RD, MS, LD  6B- Pager: [phone]

## 2018-12-12 NOTE — CONSULTS
History and Physical     Vincent Mishra MRN# [id number]   YOB: 1940 Age: 78 year old      Date of Admission:  12/11/2018        Chief Complaint:   CC:  Flank pain          History of Present Illnes:     78M with PMH of HTN, DM2, CAD and left renal cell carcinoma who presented to the hospital with flank pain. He is one week post cryoablation and biopsy of left renal mass done on 12/4/18. He initially went to [facility] ER  with left flank and left sided back pain for the last 6 days. He reports a remote fall 6 days ago after getting out of bed. He fell on his left side. CT in the ER at [facility] revealed a small pleural effusion and a left perinephric hemorrhage. He recently underwent a cryoablation and biopsy of his left renal cell carcinoma on 12/4/18. He is on room air, complaining of pain in his left flank.     Past Medical History:  Past Medical History:   Diagnosis Date     CAD (coronary artery disease) 7/26/2011 7/2011 ([facility]): s/p MI, PTCA - RCA      Cancer of kidney (H)      Chest pain 1/12/2012     Coronary artery disease      History of angina      History of blood transfusion      Hyperlipidaemia      Hyperlipidemia LDL goal <100 9/23/2010     Malignant neoplasm (H)     Prostate CA (removed)     Myocardial infarction (H)     s/p MI 7/29/14, stent placement     NSTEMI (non-ST elevated myocardial infarction) (H)     07-25-14 CATH- RCA, L.Main and CFX  had minor luminal irregularites. Mid LAD high grade stenosis 80-90%.Stent placed to mid LAD     Other and unspecified hyperlipidemia     MI in July 2011     Right bundle branch block      Type II or unspecified type diabetes mellitus without mention of complication, not stated as uncontrolled      Unspecified essential hypertension        Past Surgical History:  Past Surgical History:   Procedure Laterality Date     ARTHROSCOPY KNEE  2/11/2011    ARTHROSCOPY KNEE performed by LEY, JEFFREY DUANE at [facility]      "ARTHROSCOPY KNEE WITH MEDIAL MENISCECTOMY  6/26/2012    Procedure: ARTHROSCOPY KNEE WITH MEDIAL MENISCECTOMY;  Right Knee Arthroscopy With Medial Menisectomy;  Surgeon: Ley, Jeffrey Duane, MD;  Location: [facility]     BACK SURGERY      back surgery x4     BIOPSY       CARDIAC SURGERY  7/2011    stentt placed     COLONOSCOPY       CORONARY ANGIOGRAPHY ADULT ORDER  07-25-14    RCA, L.Main and CFX  had minor luminal irregularites. Mid LAD high grade stenosis 80-90%.Stent placed to mid LAD     ESOPHAGOSCOPY, GASTROSCOPY, DUODENOSCOPY (EGD), COMBINED  10/25/2012    Procedure: COMBINED ESOPHAGOSCOPY, GASTROSCOPY, DUODENOSCOPY (EGD), BIOPSY SINGLE OR MULTIPLE;  Gastroscopy  ;  Surgeon: Lucius Dasilva MD;  Location: [facility]     HEART CATH, ANGIOPLASTY  07-25-14    mid LAD      ORTHOPEDIC SURGERY       back surgery       Allergies:     Allergies   Allergen Reactions     Prozac [Fluoxetine] Other (See Comments)     \"I went crazy.\"       Benadryl [Diphenhydramine Hcl] Other (See Comments)     Starts to shake, and paranoid     Codeine Itching     Diphenhydramine Other (See Comments)     Hallucinations, See [facility] records scanned on 7/16/15     Labetalol Other (See Comments)     See [facility] records scanned on 7/16/15  Bradycardia down to 30 on holter, dizziness. Stopped med and symptoms resolved     Metoprolol Other (See Comments)     Bradycardia even at 12.5 mg     Morphine Visual Disturbance       Medications:    Current Facility-Administered Medications on File Prior to Encounter:  [COMPLETED] HYDROmorphone (DILAUDID) injection 0.2 mg   [COMPLETED] HYDROmorphone (PF) (DILAUDID) injection 0.5 mg   [COMPLETED] iopamidol (ISOVUE-370) solution 99 mL   [COMPLETED] sodium chloride 0.9 % bag 500mL for CT scan flush use     Current Outpatient Medications on File Prior to Encounter:  acetaminophen (TYLENOL) 325 MG tablet Take 2 tablets by mouth every 4 hours as needed.   amLODIPine (NORVASC) 5 " MG tablet Take 1 tablet (5 mg) by mouth daily   ascorbic acid (VITAMIN C) 500 MG tablet Take 500 mg by mouth daily    aspirin (ASA) 81 MG tablet Take 81 mg by mouth daily   atorvastatin (LIPITOR) 80 MG tablet TAKE 1 TABLET BY MOUTH DAILY (Patient taking differently: TAKE 1 TABLET BY MOUTH EVERY EVENING)   Calcium Carbonate-Vitamin D (CALCIUM + D) 600-200 MG-UNIT per tablet Take 2 tablets by mouth daily.   cholecalciferol (VITAMIN  -D) 1000 UNITS capsule Take 1 capsule by mouth daily   clopidogrel (PLAVIX) 75 MG tablet TAKE 1 TABLET BY MOUTH DAILY   Cyanocobalamin (VITAMIN B-12 PO) Take 500 mcg by mouth daily    gabapentin (NEURONTIN) 100 MG capsule TAKE ONE CAPSULE BY MOUTH DAILY, INCREASE BY 1 CAPSULE EVERY 2 DAYS UP TO 6 CAPSULES THREE TIMES DAILY (Patient taking differently: Take 400 mg by mouth 2 times daily )   hydrochlorothiazide (HYDRODIURIL) 25 MG tablet TAKE 1 TABLET BY MOUTH DAILY   ibuprofen (ADVIL/MOTRIN) 800 MG tablet TAKE 1 TABLET BY MOUTH EVERY 8 HOURS AS NEEDED FOR MODERATE PAIN   lisinopril (PRINIVIL/ZESTRIL) 40 MG tablet TAKE 1 TABLET(40 MG) BY MOUTH DAILY   Melatonin 10 MG TBDP Take 10 mg by mouth At Bedtime   metFORMIN (GLUCOPHAGE-XR) 500 MG 24 hr tablet TAKE 2 TABLETS BY MOUTH TWICE DAILY WITH MEALS.   omeprazole (PRILOSEC) 20 MG CR capsule TAKE ONE CAPSULE BY MOUTH TWICE DAILY   oxyCODONE (OXYCONTIN) 30 MG 12 hr tablet Take 1 tablet (30 mg) by mouth every 12 hours (Patient taking differently: Take 30 mg by mouth every morning )   oxyCODONE IR (ROXICODONE) 5 MG tablet Take 1 daily as needed  for severe pain /at bedtime may have up to 2 per day max 40 per month   polyethylene glycol (MIRALAX/GLYCOLAX) powder Take 17 g (1 capful) by mouth daily   traZODone (DESYREL) 100 MG tablet TAKE 3 TABLETS(300 MG) BY MOUTH EVERY NIGHT AT BEDTIME AS NEEDED FOR SLEEP   blood glucose monitoring (NO BRAND SPECIFIED) meter device kit Use to test blood sugar 1 times daily or as directed.   blood glucose monitoring  (ONE TOUCH ULTRA) test strip Use to test blood sugars 2 times daily or as directed.   Lancets Misc. (SELECT-LITE DEVICE/LANCETS) KIT 1 kit 2 times daily   LIFESCAN FINEPOINT LANCETS MISC Use to test blood sugars 1 times daily or as directed.   nitroGLYcerin (NITROSTAT) 0.4 MG sublingual tablet Place 1 tablet (0.4 mg) under the tongue every 5 minutes as needed for chest pain (Patient not taking: Reported on 11/20/2018)   order for DME Equipment being ordered: walker wheeled also include  With skis for indoor carpet use   order for DME Equipment being ordered: compression stockings   order for DME Equipment being ordered: TENS   order for DME Equipment being ordered: Wheelchair motorized for patient with spinal stenosis and neurogenic claudication   sertraline (ZOLOFT) 100 MG tablet TAKE 2 TABLETS(200 MG) BY MOUTH DAILY       Social History:  Social History     Socioeconomic History     Marital status:      Spouse name: Not on file     Number of children: Not on file     Years of education: Not on file     Highest education level: Not on file   Social Needs     Financial resource strain: Not on file     Food insecurity - worry: Not on file     Food insecurity - inability: Not on file     Transportation needs - medical: Not on file     Transportation needs - non-medical: Not on file   Occupational History     Not on file   Tobacco Use     Smoking status: Never Smoker     Smokeless tobacco: Never Used   Substance and Sexual Activity     Alcohol use: No     Alcohol/week: 0.0 oz     Drug use: No     Sexual activity: Yes     Partners: Female   Other Topics Concern     Parent/sibling w/ CABG, MI or angioplasty before 65F 55M? No      Service Not Asked     Blood Transfusions Not Asked     Caffeine Concern No     Comment: 4-5 cups per day     Occupational Exposure Not Asked     Hobby Hazards Not Asked     Sleep Concern Not Asked     Stress Concern Not Asked     Weight Concern Not Asked     Special Diet Yes      "Comment: smaller portions     Back Care Not Asked     Exercise No     Bike Helmet Not Asked     Seat Belt Not Asked     Self-Exams Not Asked   Social History Narrative     Not on file       Family History:  Family History   Problem Relation Age of Onset     Cancer Mother      Depression Mother      Diabetes Father      Hypertension Father      Heart Disease Father      Mental Illness Father      Heart Disease Maternal Grandfather      Substance Abuse Maternal Grandfather      Alcohol/Drug Paternal Grandmother      Substance Abuse Paternal Grandmother      Heart Disease Paternal Grandfather      Alcohol/Drug Paternal Grandfather      Substance Abuse Paternal Grandfather      Heart Disease Brother      Diabetes Brother      Substance Abuse Brother      Obesity Brother      Diabetes Brother      Obesity Sister      Alcohol/Drug Daughter      Depression Daughter      Obesity Sister      Diabetes Sister      Obesity Sister      Diabetes Sister      Alcohol/Drug Sister      Cancer Sister 55        possible kidney cancer     Substance Abuse Sister      Alcohol/Drug Son      Substance Abuse Son      Diabetes Son      Alcohol/Drug Son      Substance Abuse Son      Obesity Daughter      Diabetes Daughter      Hypertension Daughter      Bipolar Disorder Other        ROS:  The remainder of the complete ROS was negative unless noted in the HPI.    Exam:  /83 (BP Location: Right arm)   Pulse 88   Temp 98.4  F (36.9  C) (Oral)   Resp 20   Ht 1.727 m (5' 8\")   Wt 89.1 kg (196 lb 6.9 oz)   SpO2 94%   BMI 29.87 kg/m    General: Alert, interactive, NAD  HEENT: AT/NC, sclera anicteric, PERRL, EOMI, OP clear with MMM  Resp: nonlabored breathing on room air   Cardiac: regular rate and rhythm, no murmur  Abdomen: Soft, nontender, nondistended. +BS.  No HSM or masses, no rebound or guarding.  Extremities: No LE edema  Skin: Warm and dry, no jaundice or rash  Neuro: Alert & oriented x 3, Cns 2-12 intact, moves all extremities " equally    Labs:  WBC 9.6  Hg 8.6 from 8.3   Plt  472  Trop 0.352  Cr 1.4    Imaging:  IMPRESSION:  1. Changes likely related to recent cryoablation upper pole left renal  lesion. Small left pleural effusion and left perinephric hemorrhage  are noted possibly sequela from cryoablation. Area of increased  density within the area of cryoablation could represent active  hemorrhage versus residual enhancement of the left renal mass. Urology  follow-up recommended.  2. No enlarged lymph nodes. Remainder of the chest, abdomen and pelvis  are unremarkable. Renal cortical cysts and mild thickening of the  adrenal glands appear stable. Probable cholelithiasis without evidence  of cholecystitis.  3. Probable nondisplaced left 10th and 11th rib fractures as  described.      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 Hounsfield units of the pleural effusion are single digits consistent with simple fluid, not hemorrhage.  Unclear timing of rib fractures but unlikely to be related to effusion.    -no need for a chest tube  -recommend trending hg for perinephric hematoma  -trend troponins  -no further recommendations from thoracic surgery at this time, please call with questions    Discussed with Dr. Heladio Kenny.    Rosanne Gary MD PGY3  General Surgery

## 2018-12-13 NOTE — PLAN OF CARE
Physical Therapy Discharge Summary    Reason for therapy discharge:    Discharged to home.    Progress towards therapy goal(s). See goals on Care Plan in [facility] electronic health record for goal details.  Goals met    Therapy recommendation(s):    No further therapy is recommended.

## 2018-12-18 NOTE — PROGRESS NOTES
SUBJECTIVE:                                                    Vincent Mishra is a 78 year old male who presents to clinic today for the following health issues:    Fell 13 days ago, pain on the left side constant 10/10, can't sleep.       Problem list and histories reviewed & adjusted, as indicated.  Additional history: was in the emergency room   Has 2 fractured ribs   Has been taking the oxy at night but it keeps him all night     He has beent aking the oxy in am it hwlps some   He does not have much for breakthrough. He is in a lot of pain a lot has happened in the last few weeks           Patient Active Problem List   Diagnosis     Tension headache     Trapezius strain     Hyperlipidemia LDL goal <100     Parotid mass     Diplopia     Neuropathy     Vision loss night     Neck pain     B12 deficiency     Tear of meniscus of left knee     Hypertension goal BP (blood pressure) < 140/90     C6-7 disc with radiculopathy     Right bundle branch block     Chest pain     Anatomic airway obstruction     Abnormal antinuclear antibody titer     Osteoarthritis, knee     Moderate major depression (H)     Orchitis, epididymitis, and epididymo-orchitis     Malignant neoplasm of kidney excluding renal pelvis (H)     Renal mass, left     Vitamin D deficiency disease     Health Care Home     Insomnia     Abdominal pain, right upper quadrant     Esophageal reflux     Hard of hearing     Constipation     NSTEMI (non-ST elevated myocardial infarction) (H)     Myocardial infarction, nontransmural (H)     Acute myocardial infarction of inferolateral wall (H)     Obesity     Sinoatrial node dysfunction (H)     Right bundle branch block (RBBB)     Essential hypertension     Hyperlipidemia     Type 2 diabetes mellitus with other circulatory complication, without long-term current use of insulin (H)     Thyroid nodule     Hip pain, bilateral     Claudication (H)     Bilateral low back pain with right-sided sciatica     Bilateral low  back pain with left-sided sciatica     ACS (acute coronary syndrome) (H)     Chronic bilateral low back pain with sciatica, sciatica laterality unspecified     Severe episode of recurrent major depressive disorder, without psychotic features (H)     Renal hematoma     Past Surgical History:   Procedure Laterality Date     ARTHROSCOPY KNEE  2/11/2011    ARTHROSCOPY KNEE performed by LEY, JEFFREY DUANE at [facility]     ARTHROSCOPY KNEE WITH MEDIAL MENISCECTOMY  6/26/2012    Procedure: ARTHROSCOPY KNEE WITH MEDIAL MENISCECTOMY;  Right Knee Arthroscopy With Medial Menisectomy;  Surgeon: Ley, Jeffrey Duane, MD;  Location: [facility]     BACK SURGERY      back surgery x4     BIOPSY       CARDIAC SURGERY  7/2011    stentt placed     COLONOSCOPY       CORONARY ANGIOGRAPHY ADULT ORDER  07-25-14    RCA, L.Main and CFX  had minor luminal irregularites. Mid LAD high grade stenosis 80-90%.Stent placed to mid LAD     ESOPHAGOSCOPY, GASTROSCOPY, DUODENOSCOPY (EGD), COMBINED  10/25/2012    Procedure: COMBINED ESOPHAGOSCOPY, GASTROSCOPY, DUODENOSCOPY (EGD), BIOPSY SINGLE OR MULTIPLE;  Gastroscopy  ;  Surgeon: Lucisu Dasilva MD;  Location: [facility]     HEART CATH, ANGIOPLASTY  07-25-14    mid LAD      ORTHOPEDIC SURGERY       back surgery       Social History     Tobacco Use     Smoking status: Never Smoker     Smokeless tobacco: Never Used   Substance Use Topics     Alcohol use: No     Alcohol/week: 0.0 oz     Family History   Problem Relation Age of Onset     Cancer Mother      Depression Mother      Diabetes Father      Hypertension Father      Heart Disease Father      Mental Illness Father      Heart Disease Maternal Grandfather      Substance Abuse Maternal Grandfather      Alcohol/Drug Paternal Grandmother      Substance Abuse Paternal Grandmother      Heart Disease Paternal Grandfather      Alcohol/Drug Paternal Grandfather      Substance Abuse Paternal Grandfather      Heart Disease Brother      Diabetes Brother      Substance Abuse  "Brother      Obesity Brother      Diabetes Brother      Obesity Sister      Alcohol/Drug Daughter      Depression Daughter      Obesity Sister      Diabetes Sister      Obesity Sister      Diabetes Sister      Alcohol/Drug Sister      Cancer Sister 55        possible kidney cancer     Substance Abuse Sister      Alcohol/Drug Son      Substance Abuse Son      Diabetes Son      Alcohol/Drug Son      Substance Abuse Son      Obesity Daughter      Diabetes Daughter      Hypertension Daughter      Bipolar Disorder Other            ROS:  Constitutional, HEENT, cardiovascular, pulmonary, gi and gu systems are negative, except as otherwise noted.    OBJECTIVE:                                                    /80   Pulse 72   Temp 98.9  F (37.2  C) (Tympanic)   Ht 1.727 m (5' 8\")   Wt 87.1 kg (192 lb)   BMI 29.19 kg/m   Body mass index is 29.19 kg/m .   GENERAL APPEARANCE: alert and moderate distress  RESP: lungs clear to auscultation - no rales, rhonchi or wheezes  CV: regular rates and rhythm, normal S1 S2, no S3 or S4 and no murmur, click or rub  PSYCH: mentation appears normal and anxious  MENTAL STATUS EXAM:  Appearance/Behavior: Casually groomed and Dressed appropriately for weather  Speech: Normal  Mood/Affect: anxiety  Insight: Adequate       ASSESSMENT/PLAN:                                                    1. Closed fracture of multiple ribs of left side with routine healing, subsequent encounter  Will use this short term will give a trial he does not do well witht he nighttime oxy  - HYDROmorphone (DILAUDID) 2 MG tablet; Take 1 tablet (2 mg) by mouth every 6 hours as needed for breakthrough pain or pain  Dispense: 22 tablet; Refill: 0    2. Acute post-operative pain  dilaudid    3. Recurrent renal cell carcinoma of kidney, left (H)  Recently with surgery to ablate. If pain is uncontrollable consider palliative care referral.        reports that  has never smoked. he has never used smokeless " tobacco.          Keyla Fonseca M.D.  [facility]

## 2018-12-20 NOTE — PROGRESS NOTES
Service Date: 12/20/2018      HISTORY OF PRESENT ILLNESS:  Mr. Mishra is a pleasant 78-year-old gentleman with a history of coronary artery disease with prior PCI to the RCA in 2011 at [facility] in [place], PCI to the LAD in 2014 in [place], [place], history of recurrent non-ST segment elevation MI for which he underwent repeat cardiac catheterization in 2014 at the [facility] with no evidence of worsening obstructive disease, repeat admission for a non-STEMI in [place] in 07/2015 for which he was eventually placed on dual antiplatelet therapy.  At that time his cardiac catheterization also did not detail worsening of his coronary disease.  It is felt that he has got chronic elevation of his troponins and has remained on dual antiplatelet therapy since.  He returns in followup.  He was scheduled to be seen in August, but is being seen late for unclear reasons.      Unfortunately, since I have seen Lawrence last, his renal cell carcinoma has reoccurred.  He underwent cryoablation on the 4th of this month.  No cardiovascular complications were noted at that time.  He previously underwent cryoablation 5 years ago and without chemotherapy had been cancer free for the last 5 years.      Following his procedure, he had an episode where he fell and broke ribs on his left side.  He was eventually sent to the [facility] with no further treatment having been performed.      From a cardiovascular standpoint, the patient had no cardiovascular complications with either admission.  He is not manifesting any anginal symptoms.  His dyspnea on exertion is unchanged from baseline.  His weight has been relatively stable at 192-194 pounds, dating back to October.  In November, his weight had climbed, but I think this was likely erroneous values.      The patient is noted to have a low hemoglobin at today's visit.  His most recent hemoglobin from his discharge on the 12th was 8.7.  Looking at his hemoglobins from the early fall,  he was in the 11's.  Despite this, he denies any bleeding in his stool or urine.  He has been kept on dual antiplatelet therapy throughout both admissions.      IMPRESSION AND PLAN:   1.  Anemia - given the patient's continued use of dual antiplatelet therapy which is essential for his underlying coronary disease, I have asked him to discuss his anemia with Dr. Fonseca.  Hopefully, we can continue his dual antiplatelet therapy unchanged.  If they feel that this is not possible, then I would likely stop his aspirin in lieu of clopidogrel alone.   2.  Coronary artery disease - the patient is clinically asymptomatic and fortunately has done well from a cardiovascular standpoint since I have seen him last.  He has not had readmission to the hospital in the last 10 months.  I will continue with dual antiplatelet therapy unchanged for now.  He will also remain on atorvastatin 80 mg daily unchanged for secondary prevention.  His blood pressure and lipids are both at goal.   3.  Recurrent renal cell carcinoma.   4.  Recent mechanical fall resulting in left rib fractures.      Mr. Aguirre will return in routine followup in 6 months or sooner if needed.         ZAHEER BONNER MD             D: 2018   T: 2018   MT: [place]IO[place]      Name:     ISELA AGUIRRE   MRN:      [id number]        Account:      [id number]   :      1940           Service Date: 2018      Document: [id number]

## 2018-12-20 NOTE — LETTER
12/20/2018      Keyla Fonseca MD  [address] [facility] [address]      RE: Vincent Mishra       Dear Colleague,    I had the pleasure of seeing Vincent Mishra in the [facility].    Service Date: 12/20/2018      HISTORY OF PRESENT ILLNESS:  Mr. Mishra is a pleasant 78-year-old gentleman with a history of coronary artery disease with prior PCI to the RCA in 2011 at [facility] in [place], PCI to the LAD in 2014 in [place], [place], history of recurrent non-ST segment elevation MI for which he underwent repeat cardiac catheterization in 2014 at the [facility] with no evidence of worsening obstructive disease, repeat admission for a non-STEMI in [place] in 07/2015 for which he was eventually placed on dual antiplatelet therapy.  At that time his cardiac catheterization also did not detail worsening of his coronary disease.  It is felt that he has got chronic elevation of his troponins and has remained on dual antiplatelet therapy since.  He returns in followup.  He was scheduled to be seen in August, but is being seen late for unclear reasons.      Unfortunately, since I have seen [place], his renal cell carcinoma has reoccurred.  He underwent cryoablation on the 4th of this month.  No cardiovascular complications were noted at that time.  He previously underwent cryoablation 5 years ago and without chemotherapy had been cancer free for the last 5 years.      Following his procedure, he had an episode where he fell and broke ribs on his left side.  He was eventually sent to the [facility] with no further treatment having been performed.      From a cardiovascular standpoint, the patient had no cardiovascular complications with either admission.  He is not manifesting any anginal symptoms.  His dyspnea on exertion is unchanged from baseline.  His weight has been relatively stable at 192-194 pounds, dating back to October.  In November, his weight had climbed, but I  think this was likely erroneous values.      The patient is noted to have a low hemoglobin at today's visit.  His most recent hemoglobin from his discharge on the  was 8.7.  Looking at his hemoglobins from the early fall, he was in the 11's.  Despite this, he denies any bleeding in his stool or urine.  He has been kept on dual antiplatelet therapy throughout both admissions.      IMPRESSION AND PLAN:   1.  Anemia - given the patient's continued use of dual antiplatelet therapy which is essential for his underlying coronary disease, I have asked him to discuss his anemia with Dr. Fonseca.  Hopefully, we can continue his dual antiplatelet therapy unchanged.  If they feel that this is not possible, then I would likely stop his aspirin in lieu of clopidogrel alone.   2.  Coronary artery disease - the patient is clinically asymptomatic and fortunately has done well from a cardiovascular standpoint since I have seen him last.  He has not had readmission to the hospital in the last 10 months.  I will continue with dual antiplatelet therapy unchanged for now.  He will also remain on atorvastatin 80 mg daily unchanged for secondary prevention.  His blood pressure and lipids are both at goal.   3.  Recurrent renal cell carcinoma.   4.  Recent mechanical fall resulting in left rib fractures.      Mr. Aguirre will return in routine followup in 6 months or sooner if needed.         ZAHEER BONNER MD             D: 2018   T: 2018   MT: VIOLET      Name:     ISELA AGUIRRE   MRN:      [id number]        Account:      [id number]   :      1940           Service Date: 2018      Document: [id number]           Outpatient Encounter Medications as of 2018   Medication Sig Dispense Refill     acetaminophen (TYLENOL) 325 MG tablet Take 2 tablets (650 mg) by mouth every 4 hours as needed for mild pain 30 tablet 1     amLODIPine (NORVASC) 5 MG tablet Take 1 tablet (5 mg) by mouth daily 90 tablet 3      ascorbic acid (VITAMIN C) 500 MG tablet Take 500 mg by mouth daily        aspirin (ASA) 81 MG tablet Take 81 mg by mouth daily       atorvastatin (LIPITOR) 80 MG tablet TAKE 1 TABLET BY MOUTH DAILY (Patient taking differently: TAKE 1 TABLET BY MOUTH EVERY EVENING) 90 tablet 3     Calcium Carbonate-Vitamin D (CALCIUM + D) 600-200 MG-UNIT per tablet Take 2 tablets by mouth daily. 100 tablet 12     cholecalciferol (VITAMIN  -D) 1000 UNITS capsule Take 1 capsule by mouth daily       clopidogrel (PLAVIX) 75 MG tablet TAKE 1 TABLET BY MOUTH DAILY 90 tablet 0     Cyanocobalamin (VITAMIN B-12 PO) Take 500 mcg by mouth daily        gabapentin (NEURONTIN) 100 MG capsule TAKE ONE CAPSULE BY MOUTH DAILY, INCREASE BY 1 CAPSULE EVERY 2 DAYS UP TO 6 CAPSULES THREE TIMES DAILY (Patient taking differently: Take 400 mg by mouth 2 times daily ) 270 capsule 11     hydrochlorothiazide (HYDRODIURIL) 25 MG tablet TAKE 1 TABLET BY MOUTH DAILY 90 tablet 1     HYDROmorphone (DILAUDID) 2 MG tablet Take 1 tablet (2 mg) by mouth every 6 hours as needed for breakthrough pain or pain 22 tablet 0     Lidocaine (LIDOCARE) 4 % Patch Place 2 patches onto the skin every 24 hours 15 patch 0     lisinopril (PRINIVIL/ZESTRIL) 40 MG tablet TAKE 1 TABLET(40 MG) BY MOUTH DAILY 90 tablet 1     Melatonin 10 MG TBDP Take 10 mg by mouth At Bedtime 90 tablet      metFORMIN (GLUCOPHAGE-XR) 500 MG 24 hr tablet TAKE 2 TABLETS BY MOUTH TWICE DAILY WITH MEALS. 360 tablet 1     nitroGLYcerin (NITROSTAT) 0.4 MG sublingual tablet Place 1 tablet (0.4 mg) under the tongue every 5 minutes as needed for chest pain 25 tablet 1     omeprazole (PRILOSEC) 20 MG CR capsule TAKE ONE CAPSULE BY MOUTH TWICE DAILY 180 capsule 3     oxyCODONE (OXYCONTIN) 30 MG 12 hr tablet Take 1 tablet (30 mg) by mouth every 12 hours (Patient taking differently: Take 30 mg by mouth every morning ) 60 tablet 0     oxyCODONE (ROXICODONE) 5 MG tablet Take 1 daily as needed  for severe pain /at bedtime  may have up to 2 per day max 40 per month 10 tablet 0     polyethylene glycol (MIRALAX/GLYCOLAX) powder Take 17 g (1 capful) by mouth daily 119 g 3     sertraline (ZOLOFT) 100 MG tablet TAKE 2 TABLETS(200 MG) BY MOUTH DAILY 180 tablet 0     traZODone (DESYREL) 100 MG tablet TAKE 3 TABLETS(300 MG) BY MOUTH EVERY NIGHT AT BEDTIME AS NEEDED FOR SLEEP 270 tablet 0     blood glucose monitoring (NO BRAND SPECIFIED) meter device kit Use to test blood sugar 1 times daily or as directed. (Patient not taking: Reported on 12/20/2018) 1 kit 0     blood glucose monitoring (ONE TOUCH ULTRA) test strip Use to test blood sugars 2 times daily or as directed. (Patient not taking: Reported on 12/20/2018) 200 strip 3     Lancets Misc. (SELECT-LITE DEVICE/LANCETS) KIT 1 kit 2 times daily (Patient not taking: Reported on 12/20/2018) 1 kit 1     LIFESCAN FINEPOINT LANCETS MISC Use to test blood sugars 1 times daily or as directed. (Patient not taking: Reported on 12/20/2018) 100 each 12     order for DME Equipment being ordered: walker wheeled also include  With skis for indoor carpet use (Patient not taking: Reported on 12/20/2018) 1 Device 0     order for DME Equipment being ordered: compression stockings (Patient not taking: Reported on 12/20/2018) 1 Units 0     order for DME Equipment being ordered: TENS (Patient not taking: Reported on 12/20/2018) 1 Units 0     order for DME Equipment being ordered: Wheelchair motorized for patient with spinal stenosis and neurogenic claudication (Patient not taking: Reported on 12/20/2018) 1 Device 0     No facility-administered encounter medications on file as of 12/20/2018.        Again, thank you for allowing me to participate in the care of your patient.      Sincerely,    Geremias Phelps MD     [facility]

## 2018-12-20 NOTE — PATIENT INSTRUCTIONS
"[facility]  [facility]~[address]~[place], [place]~[address]  Thank you for your [facility] visit today. If you have questions regarding your visit, please contact your cardiology RN's, Radha Meraz or Josee Sales, at [phone]. Your provider has recommended the following:    Medication Changes:  No Medication Changes at your Heart Care appointment today 12/20/2018    Recommendations:  No further recommendations at this time    Follow-up:  See Dr. Phelps for cardiology follow up in 6 months.    To schedule a future appointment, we kindly ask that you call cardiology scheduling at [phone] three months prior to requested revisit date  [facility] cardiology clinic is staffed with \"Advance Practice Providers\". These are our cardiology Physician Assistants and Nurse Practitioners. Please call cardiology scheduling if you feel you need clinical evaluation with them at any time for any cardiac reason.                 Reminder:  For your safety, we ask that you bring in your current medication(s) or an updated list of your medications with you to EACH office visit. Include the medication name, dose of pill on bottle and how you are taking it. Include over-the-counter medications or supplements. Your provider will review this at each visit and plan your care based on your current information.   "

## 2018-12-20 NOTE — PROGRESS NOTES
HPI and Plan:   See dictation    Orders Placed This Encounter   Procedures     Follow-Up with Cardiologist       No orders of the defined types were placed in this encounter.      There are no discontinued medications.      Encounter Diagnosis   Name Primary?     Coronary artery disease involving native coronary artery of native heart without angina pectoris        CURRENT MEDICATIONS:  Current Outpatient Medications   Medication Sig Dispense Refill     acetaminophen (TYLENOL) 325 MG tablet Take 2 tablets (650 mg) by mouth every 4 hours as needed for mild pain 30 tablet 1     amLODIPine (NORVASC) 5 MG tablet Take 1 tablet (5 mg) by mouth daily 90 tablet 3     ascorbic acid (VITAMIN C) 500 MG tablet Take 500 mg by mouth daily        aspirin (ASA) 81 MG tablet Take 81 mg by mouth daily       atorvastatin (LIPITOR) 80 MG tablet TAKE 1 TABLET BY MOUTH DAILY (Patient taking differently: TAKE 1 TABLET BY MOUTH EVERY EVENING) 90 tablet 3     Calcium Carbonate-Vitamin D (CALCIUM + D) 600-200 MG-UNIT per tablet Take 2 tablets by mouth daily. 100 tablet 12     cholecalciferol (VITAMIN  -D) 1000 UNITS capsule Take 1 capsule by mouth daily       clopidogrel (PLAVIX) 75 MG tablet TAKE 1 TABLET BY MOUTH DAILY 90 tablet 0     Cyanocobalamin (VITAMIN B-12 PO) Take 500 mcg by mouth daily        gabapentin (NEURONTIN) 100 MG capsule TAKE ONE CAPSULE BY MOUTH DAILY, INCREASE BY 1 CAPSULE EVERY 2 DAYS UP TO 6 CAPSULES THREE TIMES DAILY (Patient taking differently: Take 400 mg by mouth 2 times daily ) 270 capsule 11     hydrochlorothiazide (HYDRODIURIL) 25 MG tablet TAKE 1 TABLET BY MOUTH DAILY 90 tablet 1     HYDROmorphone (DILAUDID) 2 MG tablet Take 1 tablet (2 mg) by mouth every 6 hours as needed for breakthrough pain or pain 22 tablet 0     Lidocaine (LIDOCARE) 4 % Patch Place 2 patches onto the skin every 24 hours 15 patch 0     lisinopril (PRINIVIL/ZESTRIL) 40 MG tablet TAKE 1 TABLET(40 MG) BY MOUTH DAILY 90 tablet 1     Melatonin  10 MG TBDP Take 10 mg by mouth At Bedtime 90 tablet      metFORMIN (GLUCOPHAGE-XR) 500 MG 24 hr tablet TAKE 2 TABLETS BY MOUTH TWICE DAILY WITH MEALS. 360 tablet 1     nitroGLYcerin (NITROSTAT) 0.4 MG sublingual tablet Place 1 tablet (0.4 mg) under the tongue every 5 minutes as needed for chest pain 25 tablet 1     omeprazole (PRILOSEC) 20 MG CR capsule TAKE ONE CAPSULE BY MOUTH TWICE DAILY 180 capsule 3     oxyCODONE (OXYCONTIN) 30 MG 12 hr tablet Take 1 tablet (30 mg) by mouth every 12 hours (Patient taking differently: Take 30 mg by mouth every morning ) 60 tablet 0     oxyCODONE (ROXICODONE) 5 MG tablet Take 1 daily as needed  for severe pain /at bedtime may have up to 2 per day max 40 per month 10 tablet 0     polyethylene glycol (MIRALAX/GLYCOLAX) powder Take 17 g (1 capful) by mouth daily 119 g 3     sertraline (ZOLOFT) 100 MG tablet TAKE 2 TABLETS(200 MG) BY MOUTH DAILY 180 tablet 0     traZODone (DESYREL) 100 MG tablet TAKE 3 TABLETS(300 MG) BY MOUTH EVERY NIGHT AT BEDTIME AS NEEDED FOR SLEEP 270 tablet 0     blood glucose monitoring (NO BRAND SPECIFIED) meter device kit Use to test blood sugar 1 times daily or as directed. (Patient not taking: Reported on 12/20/2018) 1 kit 0     blood glucose monitoring (ONE TOUCH ULTRA) test strip Use to test blood sugars 2 times daily or as directed. (Patient not taking: Reported on 12/20/2018) 200 strip 3     Lancets Misc. (SELECT-LITE DEVICE/LANCETS) KIT 1 kit 2 times daily (Patient not taking: Reported on 12/20/2018) 1 kit 1     LIFESCAN FINEPOINT LANCETS MISC Use to test blood sugars 1 times daily or as directed. (Patient not taking: Reported on 12/20/2018) 100 each 12     order for DME Equipment being ordered: walker wheeled also include  With skis for indoor carpet use (Patient not taking: Reported on 12/20/2018) 1 Device 0     order for DME Equipment being ordered: compression stockings (Patient not taking: Reported on 12/20/2018) 1 Units 0     order for DME  "Equipment being ordered: TENS (Patient not taking: Reported on 12/20/2018) 1 Units 0     order for DME Equipment being ordered: Wheelchair motorized for patient with spinal stenosis and neurogenic claudication (Patient not taking: Reported on 12/20/2018) 1 Device 0       ALLERGIES     Allergies   Allergen Reactions     Prozac [Fluoxetine] Other (See Comments)     \"I went crazy.\"       Benadryl [Diphenhydramine Hcl] Other (See Comments)     Starts to shake, and paranoid     Codeine Itching     Diphenhydramine Other (See Comments)     Hallucinations, See [facility] records scanned on 7/16/15     Labetalol Other (See Comments)     See [facility] records scanned on 7/16/15  Bradycardia down to 30 on holter, dizziness. Stopped med and symptoms resolved     Metoprolol Other (See Comments)     Bradycardia even at 12.5 mg     Morphine Visual Disturbance       PAST MEDICAL HISTORY:  Past Medical History:   Diagnosis Date     CAD (coronary artery disease) 7/26/2011 7/2011 ([place]): s/p MI, PTCA - RCA      Cancer of kidney (H)      Chest pain 1/12/2012     Coronary artery disease      History of angina      History of blood transfusion      Hyperlipidaemia      Hyperlipidemia LDL goal <100 9/23/2010     Malignant neoplasm (H)     Prostate CA (removed)     Myocardial infarction (H)     s/p MI 7/29/14, stent placement     NSTEMI (non-ST elevated myocardial infarction) (H)     07-25-14 CATH- RCA, L.Main and CFX  had minor luminal irregularites. Mid LAD high grade stenosis 80-90%.Stent placed to mid LAD     Other and unspecified hyperlipidemia     MI in July 2011     Right bundle branch block      Type II or unspecified type diabetes mellitus without mention of complication, not stated as uncontrolled      Unspecified essential hypertension        PAST SURGICAL HISTORY:  Past Surgical History:   Procedure Laterality Date     ARTHROSCOPY KNEE  2/11/2011    ARTHROSCOPY KNEE performed by " LEY, JEFFREY DUANE at [facility]     ARTHROSCOPY KNEE WITH MEDIAL MENISCECTOMY  6/26/2012    Procedure: ARTHROSCOPY KNEE WITH MEDIAL MENISCECTOMY;  Right Knee Arthroscopy With Medial Menisectomy;  Surgeon: Ley, Jeffrey Duane, MD;  Location: [facility]     BACK SURGERY      back surgery x4     BIOPSY       CARDIAC SURGERY  7/2011    stentt placed     COLONOSCOPY       CORONARY ANGIOGRAPHY ADULT ORDER  07-25-14    RCA, L.Main and CFX  had minor luminal irregularites. Mid LAD high grade stenosis 80-90%.Stent placed to mid LAD     ESOPHAGOSCOPY, GASTROSCOPY, DUODENOSCOPY (EGD), COMBINED  10/25/2012    Procedure: COMBINED ESOPHAGOSCOPY, GASTROSCOPY, DUODENOSCOPY (EGD), BIOPSY SINGLE OR MULTIPLE;  Gastroscopy  ;  Surgeon: Lucius Dasilva MD;  Location: [facility]     HEART CATH, ANGIOPLASTY  07-25-14    mid LAD      ORTHOPEDIC SURGERY       back surgery       FAMILY HISTORY:  Family History   Problem Relation Age of Onset     Cancer Mother      Depression Mother      Diabetes Father      Hypertension Father      Heart Disease Father      Mental Illness Father      Heart Disease Maternal Grandfather      Substance Abuse Maternal Grandfather      Alcohol/Drug Paternal Grandmother      Substance Abuse Paternal Grandmother      Heart Disease Paternal Grandfather      Alcohol/Drug Paternal Grandfather      Substance Abuse Paternal Grandfather      Heart Disease Brother      Diabetes Brother      Substance Abuse Brother      Obesity Brother      Diabetes Brother      Obesity Sister      Alcohol/Drug Daughter      Depression Daughter      Obesity Sister      Diabetes Sister      Obesity Sister      Diabetes Sister      Alcohol/Drug Sister      Cancer Sister 55        possible kidney cancer     Substance Abuse Sister      Alcohol/Drug Son      Substance Abuse Son      Diabetes Son      Alcohol/Drug Son      Substance Abuse Son      Obesity Daughter      Diabetes Daughter      Hypertension Daughter      Bipolar Disorder Other        SOCIAL  HISTORY:  Social History     Socioeconomic History     Marital status:      Spouse name: None     Number of children: None     Years of education: None     Highest education level: None   Social Needs     Financial resource strain: None     Food insecurity - worry: None     Food insecurity - inability: None     Transportation needs - medical: None     Transportation needs - non-medical: None   Occupational History     None   Tobacco Use     Smoking status: Never Smoker     Smokeless tobacco: Never Used   Substance and Sexual Activity     Alcohol use: No     Alcohol/week: 0.0 oz     Drug use: No     Sexual activity: Yes     Partners: Female   Other Topics Concern     Parent/sibling w/ CABG, MI or angioplasty before 65F 55M? No      Service Not Asked     Blood Transfusions Not Asked     Caffeine Concern No     Comment: 4-5 cups per day     Occupational Exposure Not Asked     Hobby Hazards Not Asked     Sleep Concern Not Asked     Stress Concern Not Asked     Weight Concern Not Asked     Special Diet Yes     Comment: smaller portions     Back Care Not Asked     Exercise No     Bike Helmet Not Asked     Seat Belt Not Asked     Self-Exams Not Asked   Social History Narrative     None       Review of Systems:  Skin:  Positive for bruising     Eyes:  Positive for visual blurring;double vision    ENT:  Positive for hearing loss    Respiratory:  Positive for dyspnea on exertion     Cardiovascular:    Positive for;chest pain;fatigue;lightheadedness;dizziness;palpitations;edema;syncope or near-syncope    Gastroenterology: Positive for constipation    Genitourinary:  Negative      Musculoskeletal:  Positive for back pain;joint pain;joint swelling;joint stiffness    Neurologic:  Negative      Psychiatric:  Positive for anxiety;depression;excessive stress;sleep disturbances    Heme/Lymph/Imm:  Negative      Endocrine:  Positive for diabetes      Physical Exam:  Vitals: /61 (BP Location: Right arm, Patient  Position: Sitting, Cuff Size: Adult Regular)   Pulse 63   Wt 88 kg (194 lb)   SpO2 98%   BMI 29.50 kg/m      Constitutional:    appears older than stated age;frail;chronically ill      Skin:  warm and dry to the touch     had a bruise on bridge nose    Head:  normocephalic        Eyes:  sclera white        Lymph:      ENT:  no pallor or cyanosis        Neck:  JVP normal        Respiratory:  clear to auscultation         Cardiac: regular rhythm         systolic ejection murmur      pulses full and equal                                        GI:  abdomen soft obese      Extremities and Muscular Skeletal:  no deformities, clubbing, cyanosis, erythema observed     trace trace      Neurological:  affect appropriate        Psych:    Depression      CC  Geremias Phelps MD  [address]  [place] [id number]

## 2018-12-20 NOTE — TELEPHONE ENCOUNTER
Reason for Call:  Other Hemoglobin    Detailed comments: Vincent had his hemoglobin checked on 12/12/18 and he is worried about it as it's only 8.7.  In October he states it was 10.6.  Please review and advise. Thank you....Ebony Dickson     Phone Number Patient can be reached at: Home number on file [phone] (home)    Best Time: any time    Can we leave a detailed message on this number? YES    Call taken on 12/20/2018 at 11:54 AM by Ebony Dickson

## 2018-12-20 NOTE — LETTER
12/20/2018    Keyla Fonseca MD  [id number] [facility] [address]    RE: Vincent Mishra       Dear Colleague,    I had the pleasure of seeing Vincent Mishra in the [facility].        HPI and Plan:   See dictation    Orders Placed This Encounter   Procedures     Follow-Up with Cardiologist       No orders of the defined types were placed in this encounter.      There are no discontinued medications.      Encounter Diagnosis   Name Primary?     Coronary artery disease involving native coronary artery of native heart without angina pectoris        CURRENT MEDICATIONS:  Current Outpatient Medications   Medication Sig Dispense Refill     acetaminophen (TYLENOL) 325 MG tablet Take 2 tablets (650 mg) by mouth every 4 hours as needed for mild pain 30 tablet 1     amLODIPine (NORVASC) 5 MG tablet Take 1 tablet (5 mg) by mouth daily 90 tablet 3     ascorbic acid (VITAMIN C) 500 MG tablet Take 500 mg by mouth daily        aspirin (ASA) 81 MG tablet Take 81 mg by mouth daily       atorvastatin (LIPITOR) 80 MG tablet TAKE 1 TABLET BY MOUTH DAILY (Patient taking differently: TAKE 1 TABLET BY MOUTH EVERY EVENING) 90 tablet 3     Calcium Carbonate-Vitamin D (CALCIUM + D) 600-200 MG-UNIT per tablet Take 2 tablets by mouth daily. 100 tablet 12     cholecalciferol (VITAMIN  -D) 1000 UNITS capsule Take 1 capsule by mouth daily       clopidogrel (PLAVIX) 75 MG tablet TAKE 1 TABLET BY MOUTH DAILY 90 tablet 0     Cyanocobalamin (VITAMIN B-12 PO) Take 500 mcg by mouth daily        gabapentin (NEURONTIN) 100 MG capsule TAKE ONE CAPSULE BY MOUTH DAILY, INCREASE BY 1 CAPSULE EVERY 2 DAYS UP TO 6 CAPSULES THREE TIMES DAILY (Patient taking differently: Take 400 mg by mouth 2 times daily ) 270 capsule 11     hydrochlorothiazide (HYDRODIURIL) 25 MG tablet TAKE 1 TABLET BY MOUTH DAILY 90 tablet 1     HYDROmorphone (DILAUDID) 2 MG tablet Take 1 tablet (2 mg) by mouth every 6 hours as needed for  breakthrough pain or pain 22 tablet 0     Lidocaine (LIDOCARE) 4 % Patch Place 2 patches onto the skin every 24 hours 15 patch 0     lisinopril (PRINIVIL/ZESTRIL) 40 MG tablet TAKE 1 TABLET(40 MG) BY MOUTH DAILY 90 tablet 1     Melatonin 10 MG TBDP Take 10 mg by mouth At Bedtime 90 tablet      metFORMIN (GLUCOPHAGE-XR) 500 MG 24 hr tablet TAKE 2 TABLETS BY MOUTH TWICE DAILY WITH MEALS. 360 tablet 1     nitroGLYcerin (NITROSTAT) 0.4 MG sublingual tablet Place 1 tablet (0.4 mg) under the tongue every 5 minutes as needed for chest pain 25 tablet 1     omeprazole (PRILOSEC) 20 MG CR capsule TAKE ONE CAPSULE BY MOUTH TWICE DAILY 180 capsule 3     oxyCODONE (OXYCONTIN) 30 MG 12 hr tablet Take 1 tablet (30 mg) by mouth every 12 hours (Patient taking differently: Take 30 mg by mouth every morning ) 60 tablet 0     oxyCODONE (ROXICODONE) 5 MG tablet Take 1 daily as needed  for severe pain /at bedtime may have up to 2 per day max 40 per month 10 tablet 0     polyethylene glycol (MIRALAX/GLYCOLAX) powder Take 17 g (1 capful) by mouth daily 119 g 3     sertraline (ZOLOFT) 100 MG tablet TAKE 2 TABLETS(200 MG) BY MOUTH DAILY 180 tablet 0     traZODone (DESYREL) 100 MG tablet TAKE 3 TABLETS(300 MG) BY MOUTH EVERY NIGHT AT BEDTIME AS NEEDED FOR SLEEP 270 tablet 0     blood glucose monitoring (NO BRAND SPECIFIED) meter device kit Use to test blood sugar 1 times daily or as directed. (Patient not taking: Reported on 12/20/2018) 1 kit 0     blood glucose monitoring (ONE TOUCH ULTRA) test strip Use to test blood sugars 2 times daily or as directed. (Patient not taking: Reported on 12/20/2018) 200 strip 3     Lancets Misc. (SELECT-LITE DEVICE/LANCETS) KIT 1 kit 2 times daily (Patient not taking: Reported on 12/20/2018) 1 kit 1     LIFESCAN FINEPOINT LANCETS MISC Use to test blood sugars 1 times daily or as directed. (Patient not taking: Reported on 12/20/2018) 100 each 12     order for DME Equipment being ordered: walker wheeled also  "include  With skis for indoor carpet use (Patient not taking: Reported on 12/20/2018) 1 Device 0     order for DME Equipment being ordered: compression stockings (Patient not taking: Reported on 12/20/2018) 1 Units 0     order for DME Equipment being ordered: TENS (Patient not taking: Reported on 12/20/2018) 1 Units 0     order for DME Equipment being ordered: Wheelchair motorized for patient with spinal stenosis and neurogenic claudication (Patient not taking: Reported on 12/20/2018) 1 Device 0       ALLERGIES     Allergies   Allergen Reactions     Prozac [Fluoxetine] Other (See Comments)     \"I went crazy.\"       Benadryl [Diphenhydramine Hcl] Other (See Comments)     Starts to shake, and paranoid     Codeine Itching     Diphenhydramine Other (See Comments)     Hallucinations, See [facility] records scanned on 7/16/15     Labetalol Other (See Comments)     See [facility] records scanned on 7/16/15  Bradycardia down to 30 on holter, dizziness. Stopped med and symptoms resolved     Metoprolol Other (See Comments)     Bradycardia even at 12.5 mg     Morphine Visual Disturbance       PAST MEDICAL HISTORY:  Past Medical History:   Diagnosis Date     CAD (coronary artery disease) 7/26/2011 7/2011 ([place]): s/p MI, PTCA - RCA      Cancer of kidney (H)      Chest pain 1/12/2012     Coronary artery disease      History of angina      History of blood transfusion      Hyperlipidaemia      Hyperlipidemia LDL goal <100 9/23/2010     Malignant neoplasm (H)     Prostate CA (removed)     Myocardial infarction (H)     s/p MI 7/29/14, stent placement     NSTEMI (non-ST elevated myocardial infarction) (H)     07-25-14 CATH- RCA, L.Main and CFX  had minor luminal irregularites. Mid LAD high grade stenosis 80-90%.Stent placed to mid LAD     Other and unspecified hyperlipidemia     MI in July 2011     Right bundle branch block      Type II or unspecified type diabetes mellitus without mention " of complication, not stated as uncontrolled      Unspecified essential hypertension        PAST SURGICAL HISTORY:  Past Surgical History:   Procedure Laterality Date     ARTHROSCOPY KNEE  2/11/2011    ARTHROSCOPY KNEE performed by LEY, JEFFREY DUANE at [facility]     ARTHROSCOPY KNEE WITH MEDIAL MENISCECTOMY  6/26/2012    Procedure: ARTHROSCOPY KNEE WITH MEDIAL MENISCECTOMY;  Right Knee Arthroscopy With Medial Menisectomy;  Surgeon: Ley, Jeffrey Duane, MD;  Location: [facility]     BACK SURGERY      back surgery x4     BIOPSY       CARDIAC SURGERY  7/2011    stentt placed     COLONOSCOPY       CORONARY ANGIOGRAPHY ADULT ORDER  07-25-14    RCA, L.Main and CFX  had minor luminal irregularites. Mid LAD high grade stenosis 80-90%.Stent placed to mid LAD     ESOPHAGOSCOPY, GASTROSCOPY, DUODENOSCOPY (EGD), COMBINED  10/25/2012    Procedure: COMBINED ESOPHAGOSCOPY, GASTROSCOPY, DUODENOSCOPY (EGD), BIOPSY SINGLE OR MULTIPLE;  Gastroscopy  ;  Surgeon: Lucius Dasilva MD;  Location: [facility]     HEART CATH, ANGIOPLASTY  07-25-14    mid LAD      ORTHOPEDIC SURGERY       back surgery       FAMILY HISTORY:  Family History   Problem Relation Age of Onset     Cancer Mother      Depression Mother      Diabetes Father      Hypertension Father      Heart Disease Father      Mental Illness Father      Heart Disease Maternal Grandfather      Substance Abuse Maternal Grandfather      Alcohol/Drug Paternal Grandmother      Substance Abuse Paternal Grandmother      Heart Disease Paternal Grandfather      Alcohol/Drug Paternal Grandfather      Substance Abuse Paternal Grandfather      Heart Disease Brother      Diabetes Brother      Substance Abuse Brother      Obesity Brother      Diabetes Brother      Obesity Sister      Alcohol/Drug Daughter      Depression Daughter      Obesity Sister      Diabetes Sister      Obesity Sister      Diabetes Sister      Alcohol/Drug Sister      Cancer Sister 55        possible kidney cancer     Substance Abuse Sister       Alcohol/Drug Son      Substance Abuse Son      Diabetes Son      Alcohol/Drug Son      Substance Abuse Son      Obesity Daughter      Diabetes Daughter      Hypertension Daughter      Bipolar Disorder Other        SOCIAL HISTORY:  Social History     Socioeconomic History     Marital status:      Spouse name: None     Number of children: None     Years of education: None     Highest education level: None   Social Needs     Financial resource strain: None     Food insecurity - worry: None     Food insecurity - inability: None     Transportation needs - medical: None     Transportation needs - non-medical: None   Occupational History     None   Tobacco Use     Smoking status: Never Smoker     Smokeless tobacco: Never Used   Substance and Sexual Activity     Alcohol use: No     Alcohol/week: 0.0 oz     Drug use: No     Sexual activity: Yes     Partners: Female   Other Topics Concern     Parent/sibling w/ CABG, MI or angioplasty before 65F 55M? No      Service Not Asked     Blood Transfusions Not Asked     Caffeine Concern No     Comment: 4-5 cups per day     Occupational Exposure Not Asked     Hobby Hazards Not Asked     Sleep Concern Not Asked     Stress Concern Not Asked     Weight Concern Not Asked     Special Diet Yes     Comment: smaller portions     Back Care Not Asked     Exercise No     Bike Helmet Not Asked     Seat Belt Not Asked     Self-Exams Not Asked   Social History Narrative     None       Review of Systems:  Skin:  Positive for bruising     Eyes:  Positive for visual blurring;double vision    ENT:  Positive for hearing loss    Respiratory:  Positive for dyspnea on exertion     Cardiovascular:    Positive for;chest pain;fatigue;lightheadedness;dizziness;palpitations;edema;syncope or near-syncope    Gastroenterology: Positive for constipation    Genitourinary:  Negative      Musculoskeletal:  Positive for back pain;joint pain;joint swelling;joint stiffness    Neurologic:  Negative       Psychiatric:  Positive for anxiety;depression;excessive stress;sleep disturbances    Heme/Lymph/Imm:  Negative      Endocrine:  Positive for diabetes      Physical Exam:  Vitals: /61 (BP Location: Right arm, Patient Position: Sitting, Cuff Size: Adult Regular)   Pulse 63   Wt 88 kg (194 lb)   SpO2 98%   BMI 29.50 kg/m       Constitutional:    appears older than stated age;frail;chronically ill      Skin:  warm and dry to the touch     had a bruise on bridge nose    Head:  normocephalic        Eyes:  sclera white        Lymph:      ENT:  no pallor or cyanosis        Neck:  JVP normal        Respiratory:  clear to auscultation         Cardiac: regular rhythm         systolic ejection murmur      pulses full and equal                                        GI:  abdomen soft obese      Extremities and Muscular Skeletal:  no deformities, clubbing, cyanosis, erythema observed     trace trace      Neurological:  affect appropriate        Psych:    Depression      CC  Geremias Phelps MD  [address]  [place] [id number]            Thank you for allowing me to participate in the care of your patient.      Sincerely,     Geremias Phelps MD     [facility]    cc:   Geremias Phelps MD  [address]  [place] [id number]

## 2018-12-21 NOTE — TELEPHONE ENCOUNTER
Pat is asking if you could send this as a script for them?  If OK'd,  to [facility]. Thank you..Ebony Dickson

## 2018-12-26 NOTE — TELEPHONE ENCOUNTER
Pat notified.  She went to  script and it was over $30.00 for the script.  She went to [facility] and it was only $3.00 OTC.  She decided to just get OTC.  Thank you..Ebony Dickson

## 2019-01-01 ENCOUNTER — APPOINTMENT (OUTPATIENT)
Dept: PHYSICAL THERAPY | Facility: CLINIC | Age: 79
DRG: 884 | End: 2019-01-01
Payer: MEDICARE

## 2019-01-01 ENCOUNTER — MEDICAL CORRESPONDENCE (OUTPATIENT)
Dept: HEALTH INFORMATION MANAGEMENT | Facility: CLINIC | Age: 79
End: 2019-01-01

## 2019-01-01 ENCOUNTER — THERAPY VISIT (OUTPATIENT)
Dept: PHYSICAL THERAPY | Facility: CLINIC | Age: 79
End: 2019-01-01
Payer: MEDICARE

## 2019-01-01 ENCOUNTER — HOSPITAL ENCOUNTER (EMERGENCY)
Facility: CLINIC | Age: 79
Discharge: ANOTHER HOSPITAL | End: 2019-03-27
Attending: EMERGENCY MEDICINE | Admitting: EMERGENCY MEDICINE
Payer: MEDICARE

## 2019-01-01 ENCOUNTER — TELEPHONE (OUTPATIENT)
Dept: FAMILY MEDICINE | Facility: CLINIC | Age: 79
End: 2019-01-01

## 2019-01-01 ENCOUNTER — APPOINTMENT (OUTPATIENT)
Dept: ULTRASOUND IMAGING | Facility: CLINIC | Age: 79
DRG: 280 | End: 2019-01-01
Attending: INTERNAL MEDICINE
Payer: MEDICARE

## 2019-01-01 ENCOUNTER — APPOINTMENT (OUTPATIENT)
Dept: OCCUPATIONAL THERAPY | Facility: CLINIC | Age: 79
DRG: 280 | End: 2019-01-01
Attending: INTERNAL MEDICINE
Payer: MEDICARE

## 2019-01-01 ENCOUNTER — HOSPITAL ENCOUNTER (OUTPATIENT)
Dept: MRI IMAGING | Facility: CLINIC | Age: 79
Discharge: HOME | End: 2019-04-17
Attending: PSYCHIATRY & NEUROLOGY | Admitting: PSYCHIATRY & NEUROLOGY
Payer: MEDICARE

## 2019-01-01 ENCOUNTER — OFFICE VISIT (OUTPATIENT)
Dept: FAMILY MEDICINE | Facility: CLINIC | Age: 79
End: 2019-01-01
Payer: MEDICARE

## 2019-01-01 ENCOUNTER — TELEPHONE (OUTPATIENT)
Dept: PALLIATIVE MEDICINE | Facility: CLINIC | Age: 79
End: 2019-01-01

## 2019-01-01 ENCOUNTER — OFFICE VISIT (OUTPATIENT)
Dept: NEUROLOGY | Facility: CLINIC | Age: 79
End: 2019-01-01
Payer: MEDICARE

## 2019-01-01 ENCOUNTER — TELEPHONE (OUTPATIENT)
Dept: NEUROSURGERY | Facility: OTHER | Age: 79
End: 2019-01-01

## 2019-01-01 ENCOUNTER — APPOINTMENT (OUTPATIENT)
Dept: CT IMAGING | Facility: CLINIC | Age: 79
DRG: 280 | End: 2019-01-01
Attending: INTERNAL MEDICINE
Payer: MEDICARE

## 2019-01-01 ENCOUNTER — RADIOLOGY INJECTION OFFICE VISIT (OUTPATIENT)
Dept: PALLIATIVE MEDICINE | Facility: CLINIC | Age: 79
End: 2019-01-01
Payer: MEDICARE

## 2019-01-01 ENCOUNTER — APPOINTMENT (OUTPATIENT)
Dept: PHYSICAL THERAPY | Facility: CLINIC | Age: 79
DRG: 280 | End: 2019-01-01
Attending: INTERNAL MEDICINE
Payer: MEDICARE

## 2019-01-01 ENCOUNTER — APPOINTMENT (OUTPATIENT)
Dept: OCCUPATIONAL THERAPY | Facility: CLINIC | Age: 79
End: 2019-01-01
Attending: INTERNAL MEDICINE
Payer: MEDICARE

## 2019-01-01 ENCOUNTER — OFFICE VISIT (OUTPATIENT)
Dept: PHARMACY | Facility: CLINIC | Age: 79
End: 2019-01-01
Payer: COMMERCIAL

## 2019-01-01 ENCOUNTER — HOSPITAL ENCOUNTER (OUTPATIENT)
Facility: CLINIC | Age: 79
End: 2019-01-01
Attending: SURGERY | Admitting: SURGERY
Payer: MEDICARE

## 2019-01-01 ENCOUNTER — APPOINTMENT (OUTPATIENT)
Dept: GENERAL RADIOLOGY | Facility: CLINIC | Age: 79
DRG: 884 | End: 2019-01-01
Payer: MEDICARE

## 2019-01-01 ENCOUNTER — APPOINTMENT (OUTPATIENT)
Dept: ULTRASOUND IMAGING | Facility: CLINIC | Age: 79
DRG: 884 | End: 2019-01-01
Payer: MEDICARE

## 2019-01-01 ENCOUNTER — HOSPITAL ENCOUNTER (EMERGENCY)
Facility: CLINIC | Age: 79
Discharge: ANOTHER HOSPITAL | End: 2019-08-22
Attending: STUDENT IN AN ORGANIZED HEALTH CARE EDUCATION/TRAINING PROGRAM | Admitting: STUDENT IN AN ORGANIZED HEALTH CARE EDUCATION/TRAINING PROGRAM
Payer: MEDICARE

## 2019-01-01 ENCOUNTER — APPOINTMENT (OUTPATIENT)
Dept: OCCUPATIONAL THERAPY | Facility: CLINIC | Age: 79
DRG: 884 | End: 2019-01-01
Payer: MEDICARE

## 2019-01-01 ENCOUNTER — APPOINTMENT (OUTPATIENT)
Dept: CT IMAGING | Facility: CLINIC | Age: 79
DRG: 280 | End: 2019-01-01
Attending: NURSE PRACTITIONER
Payer: MEDICARE

## 2019-01-01 ENCOUNTER — APPOINTMENT (OUTPATIENT)
Dept: SPEECH THERAPY | Facility: CLINIC | Age: 79
DRG: 884 | End: 2019-01-01
Payer: MEDICARE

## 2019-01-01 ENCOUNTER — APPOINTMENT (OUTPATIENT)
Dept: CARDIOLOGY | Facility: CLINIC | Age: 79
End: 2019-01-01
Attending: INTERNAL MEDICINE
Payer: MEDICARE

## 2019-01-01 ENCOUNTER — APPOINTMENT (OUTPATIENT)
Dept: GENERAL RADIOLOGY | Facility: CLINIC | Age: 79
End: 2019-01-01
Attending: EMERGENCY MEDICINE
Payer: MEDICARE

## 2019-01-01 ENCOUNTER — HOSPITAL ENCOUNTER (EMERGENCY)
Facility: CLINIC | Age: 79
Discharge: HOME | End: 2019-09-06
Attending: FAMILY MEDICINE | Admitting: FAMILY MEDICINE
Payer: MEDICARE

## 2019-01-01 ENCOUNTER — PATIENT OUTREACH (OUTPATIENT)
Dept: CARE COORDINATION | Facility: CLINIC | Age: 79
End: 2019-01-01

## 2019-01-01 ENCOUNTER — DOCUMENTATION ONLY (OUTPATIENT)
Dept: OTHER | Facility: CLINIC | Age: 79
End: 2019-01-01

## 2019-01-01 ENCOUNTER — APPOINTMENT (OUTPATIENT)
Dept: NUCLEAR MEDICINE | Facility: CLINIC | Age: 79
DRG: 280 | End: 2019-01-01
Attending: NURSE PRACTITIONER
Payer: MEDICARE

## 2019-01-01 ENCOUNTER — OFFICE VISIT (OUTPATIENT)
Dept: NEUROSURGERY | Facility: CLINIC | Age: 79
End: 2019-01-01
Payer: MEDICARE

## 2019-01-01 ENCOUNTER — HOSPITAL ENCOUNTER (INPATIENT)
Facility: CLINIC | Age: 79
LOS: 11 days | Discharge: HOME HEALTH | DRG: 280 | End: 2019-09-03
Attending: INTERNAL MEDICINE | Admitting: HOSPITALIST
Payer: MEDICARE

## 2019-01-01 ENCOUNTER — HOSPITAL ENCOUNTER (OUTPATIENT)
Dept: CT IMAGING | Facility: CLINIC | Age: 79
Discharge: HOME | End: 2019-06-05
Attending: NEUROLOGICAL SURGERY | Admitting: NEUROLOGICAL SURGERY
Payer: MEDICARE

## 2019-01-01 ENCOUNTER — APPOINTMENT (OUTPATIENT)
Dept: CARDIOLOGY | Facility: CLINIC | Age: 79
DRG: 884 | End: 2019-01-01
Payer: MEDICARE

## 2019-01-01 ENCOUNTER — ALLIED HEALTH/NURSE VISIT (OUTPATIENT)
Dept: PHARMACY | Facility: CLINIC | Age: 79
End: 2019-01-01
Payer: COMMERCIAL

## 2019-01-01 ENCOUNTER — HEALTH MAINTENANCE LETTER (OUTPATIENT)
Age: 79
End: 2019-01-01

## 2019-01-01 ENCOUNTER — HOSPITAL ENCOUNTER (OUTPATIENT)
Facility: CLINIC | Age: 79
Setting detail: OBSERVATION
Discharge: HOME | End: 2019-03-28
Attending: INTERNAL MEDICINE | Admitting: INTERNAL MEDICINE
Payer: MEDICARE

## 2019-01-01 ENCOUNTER — NURSE TRIAGE (OUTPATIENT)
Dept: NURSING | Facility: CLINIC | Age: 79
End: 2019-01-01

## 2019-01-01 ENCOUNTER — ANCILLARY PROCEDURE (OUTPATIENT)
Dept: GENERAL RADIOLOGY | Facility: CLINIC | Age: 79
End: 2019-01-01
Attending: PHYSICIAN ASSISTANT
Payer: MEDICARE

## 2019-01-01 ENCOUNTER — ANCILLARY PROCEDURE (OUTPATIENT)
Dept: CT IMAGING | Facility: CLINIC | Age: 79
End: 2019-01-01
Attending: UROLOGY
Payer: MEDICARE

## 2019-01-01 ENCOUNTER — OFFICE VISIT (OUTPATIENT)
Dept: UROLOGY | Facility: CLINIC | Age: 79
End: 2019-01-01
Payer: MEDICARE

## 2019-01-01 ENCOUNTER — HOSPITAL ENCOUNTER (OUTPATIENT)
Dept: ULTRASOUND IMAGING | Facility: CLINIC | Age: 79
Discharge: HOME | End: 2019-07-06
Attending: FAMILY MEDICINE | Admitting: FAMILY MEDICINE
Payer: MEDICARE

## 2019-01-01 ENCOUNTER — ANCILLARY PROCEDURE (OUTPATIENT)
Dept: GENERAL RADIOLOGY | Facility: CLINIC | Age: 79
End: 2019-01-01
Attending: UROLOGY
Payer: MEDICARE

## 2019-01-01 ENCOUNTER — OFFICE VISIT (OUTPATIENT)
Dept: NEUROSURGERY | Facility: CLINIC | Age: 79
End: 2019-01-01
Attending: FAMILY MEDICINE
Payer: MEDICARE

## 2019-01-01 ENCOUNTER — ANCILLARY PROCEDURE (OUTPATIENT)
Dept: CT IMAGING | Facility: CLINIC | Age: 79
End: 2019-01-01
Payer: MEDICARE

## 2019-01-01 ENCOUNTER — APPOINTMENT (OUTPATIENT)
Dept: CARDIOLOGY | Facility: CLINIC | Age: 79
DRG: 280 | End: 2019-01-01
Attending: NURSE PRACTITIONER
Payer: MEDICARE

## 2019-01-01 ENCOUNTER — APPOINTMENT (OUTPATIENT)
Dept: MRI IMAGING | Facility: CLINIC | Age: 79
End: 2019-01-01
Attending: EMERGENCY MEDICINE
Payer: MEDICARE

## 2019-01-01 ENCOUNTER — HOSPITAL ENCOUNTER (OUTPATIENT)
Dept: MRI IMAGING | Facility: CLINIC | Age: 79
End: 2019-06-05
Attending: NEUROLOGICAL SURGERY
Payer: MEDICARE

## 2019-01-01 ENCOUNTER — PRE VISIT (OUTPATIENT)
Dept: UROLOGY | Facility: CLINIC | Age: 79
End: 2019-01-01

## 2019-01-01 ENCOUNTER — HOSPITAL ENCOUNTER (INPATIENT)
Facility: CLINIC | Age: 79
LOS: 6 days | Discharge: HOSPICE | DRG: 884 | End: 2019-09-18
Attending: EMERGENCY MEDICINE | Admitting: FAMILY MEDICINE
Payer: MEDICARE

## 2019-01-01 ENCOUNTER — ANCILLARY PROCEDURE (OUTPATIENT)
Dept: RADIOLOGY | Facility: CLINIC | Age: 79
End: 2019-01-01
Attending: PSYCHIATRY & NEUROLOGY
Payer: MEDICARE

## 2019-01-01 VITALS
DIASTOLIC BLOOD PRESSURE: 72 MMHG | SYSTOLIC BLOOD PRESSURE: 140 MMHG | HEIGHT: 69 IN | HEART RATE: 54 BPM | WEIGHT: 183 LBS | BODY MASS INDEX: 27.11 KG/M2

## 2019-01-01 VITALS
RESPIRATION RATE: 16 BRPM | WEIGHT: 194 LBS | BODY MASS INDEX: 29.5 KG/M2 | DIASTOLIC BLOOD PRESSURE: 80 MMHG | OXYGEN SATURATION: 97 % | HEART RATE: 66 BPM | TEMPERATURE: 98.3 F | SYSTOLIC BLOOD PRESSURE: 136 MMHG

## 2019-01-01 VITALS
HEART RATE: 66 BPM | WEIGHT: 188.9 LBS | SYSTOLIC BLOOD PRESSURE: 141 MMHG | DIASTOLIC BLOOD PRESSURE: 77 MMHG | OXYGEN SATURATION: 96 % | RESPIRATION RATE: 16 BRPM | BODY MASS INDEX: 28.72 KG/M2 | TEMPERATURE: 98.6 F

## 2019-01-01 VITALS
SYSTOLIC BLOOD PRESSURE: 146 MMHG | DIASTOLIC BLOOD PRESSURE: 73 MMHG | HEART RATE: 57 BPM | HEIGHT: 68 IN | BODY MASS INDEX: 29.4 KG/M2 | WEIGHT: 194 LBS

## 2019-01-01 VITALS
HEART RATE: 62 BPM | RESPIRATION RATE: 14 BRPM | OXYGEN SATURATION: 97 % | TEMPERATURE: 96.9 F | WEIGHT: 199.5 LBS | SYSTOLIC BLOOD PRESSURE: 146 MMHG | BODY MASS INDEX: 30.23 KG/M2 | DIASTOLIC BLOOD PRESSURE: 82 MMHG | HEIGHT: 68 IN

## 2019-01-01 VITALS
HEIGHT: 68 IN | TEMPERATURE: 98.1 F | HEART RATE: 49 BPM | OXYGEN SATURATION: 96 % | SYSTOLIC BLOOD PRESSURE: 157 MMHG | BODY MASS INDEX: 28.79 KG/M2 | RESPIRATION RATE: 13 BRPM | DIASTOLIC BLOOD PRESSURE: 69 MMHG | WEIGHT: 190 LBS

## 2019-01-01 VITALS
TEMPERATURE: 98.2 F | WEIGHT: 192.1 LBS | DIASTOLIC BLOOD PRESSURE: 69 MMHG | SYSTOLIC BLOOD PRESSURE: 141 MMHG | OXYGEN SATURATION: 93 % | HEIGHT: 68 IN | HEART RATE: 71 BPM | RESPIRATION RATE: 18 BRPM | BODY MASS INDEX: 29.12 KG/M2

## 2019-01-01 VITALS
WEIGHT: 182 LBS | DIASTOLIC BLOOD PRESSURE: 65 MMHG | SYSTOLIC BLOOD PRESSURE: 105 MMHG | TEMPERATURE: 97.2 F | HEART RATE: 59 BPM | RESPIRATION RATE: 16 BRPM | HEIGHT: 69 IN | BODY MASS INDEX: 26.96 KG/M2

## 2019-01-01 VITALS
DIASTOLIC BLOOD PRESSURE: 62 MMHG | SYSTOLIC BLOOD PRESSURE: 142 MMHG | RESPIRATION RATE: 16 BRPM | HEART RATE: 58 BPM | OXYGEN SATURATION: 100 %

## 2019-01-01 VITALS
HEART RATE: 60 BPM | BODY MASS INDEX: 27.11 KG/M2 | SYSTOLIC BLOOD PRESSURE: 127 MMHG | WEIGHT: 183 LBS | DIASTOLIC BLOOD PRESSURE: 71 MMHG | HEIGHT: 69 IN | TEMPERATURE: 98.2 F

## 2019-01-01 VITALS
TEMPERATURE: 98.7 F | HEIGHT: 69 IN | HEART RATE: 68 BPM | SYSTOLIC BLOOD PRESSURE: 130 MMHG | DIASTOLIC BLOOD PRESSURE: 63 MMHG | OXYGEN SATURATION: 97 % | BODY MASS INDEX: 28.73 KG/M2 | WEIGHT: 194 LBS

## 2019-01-01 VITALS
DIASTOLIC BLOOD PRESSURE: 75 MMHG | SYSTOLIC BLOOD PRESSURE: 144 MMHG | TEMPERATURE: 98.3 F | HEIGHT: 68 IN | WEIGHT: 194 LBS | BODY MASS INDEX: 29.4 KG/M2 | RESPIRATION RATE: 16 BRPM | HEART RATE: 66 BPM

## 2019-01-01 VITALS
RESPIRATION RATE: 14 BRPM | TEMPERATURE: 98.2 F | WEIGHT: 194.2 LBS | HEIGHT: 68 IN | SYSTOLIC BLOOD PRESSURE: 153 MMHG | HEART RATE: 65 BPM | DIASTOLIC BLOOD PRESSURE: 79 MMHG | BODY MASS INDEX: 29.43 KG/M2

## 2019-01-01 VITALS
WEIGHT: 183 LBS | TEMPERATURE: 97.7 F | HEART RATE: 65 BPM | RESPIRATION RATE: 18 BRPM | BODY MASS INDEX: 27.74 KG/M2 | DIASTOLIC BLOOD PRESSURE: 75 MMHG | HEIGHT: 68 IN | SYSTOLIC BLOOD PRESSURE: 156 MMHG | OXYGEN SATURATION: 96 %

## 2019-01-01 VITALS
BODY MASS INDEX: 27.15 KG/M2 | WEIGHT: 178.57 LBS | RESPIRATION RATE: 18 BRPM | SYSTOLIC BLOOD PRESSURE: 139 MMHG | TEMPERATURE: 98.3 F | OXYGEN SATURATION: 94 % | HEART RATE: 91 BPM | DIASTOLIC BLOOD PRESSURE: 77 MMHG

## 2019-01-01 VITALS
WEIGHT: 192 LBS | RESPIRATION RATE: 16 BRPM | DIASTOLIC BLOOD PRESSURE: 74 MMHG | TEMPERATURE: 97.6 F | BODY MASS INDEX: 29.19 KG/M2 | OXYGEN SATURATION: 97 % | HEART RATE: 68 BPM | SYSTOLIC BLOOD PRESSURE: 132 MMHG

## 2019-01-01 VITALS
HEART RATE: 72 BPM | BODY MASS INDEX: 29.58 KG/M2 | OXYGEN SATURATION: 97 % | DIASTOLIC BLOOD PRESSURE: 69 MMHG | TEMPERATURE: 98 F | HEIGHT: 68 IN | WEIGHT: 195.2 LBS | SYSTOLIC BLOOD PRESSURE: 149 MMHG | RESPIRATION RATE: 16 BRPM

## 2019-01-01 VITALS
DIASTOLIC BLOOD PRESSURE: 80 MMHG | OXYGEN SATURATION: 96 % | HEART RATE: 60 BPM | TEMPERATURE: 98.4 F | SYSTOLIC BLOOD PRESSURE: 139 MMHG

## 2019-01-01 VITALS
RESPIRATION RATE: 14 BRPM | DIASTOLIC BLOOD PRESSURE: 77 MMHG | HEIGHT: 68 IN | BODY MASS INDEX: 29.12 KG/M2 | OXYGEN SATURATION: 99 % | SYSTOLIC BLOOD PRESSURE: 146 MMHG | TEMPERATURE: 97.6 F | WEIGHT: 192.1 LBS | HEART RATE: 54 BPM

## 2019-01-01 VITALS
TEMPERATURE: 98 F | HEART RATE: 77 BPM | SYSTOLIC BLOOD PRESSURE: 148 MMHG | RESPIRATION RATE: 16 BRPM | BODY MASS INDEX: 29.35 KG/M2 | DIASTOLIC BLOOD PRESSURE: 76 MMHG | OXYGEN SATURATION: 96 % | WEIGHT: 193 LBS

## 2019-01-01 DIAGNOSIS — E78.5 HYPERLIPIDEMIA, UNSPECIFIED HYPERLIPIDEMIA TYPE: ICD-10-CM

## 2019-01-01 DIAGNOSIS — K21.00 GASTROESOPHAGEAL REFLUX DISEASE WITH ESOPHAGITIS: ICD-10-CM

## 2019-01-01 DIAGNOSIS — R07.89 OTHER CHEST PAIN: Primary | ICD-10-CM

## 2019-01-01 DIAGNOSIS — K21.9 GASTROESOPHAGEAL REFLUX DISEASE WITHOUT ESOPHAGITIS: ICD-10-CM

## 2019-01-01 DIAGNOSIS — K21.00 GASTROESOPHAGEAL REFLUX DISEASE WITH ESOPHAGITIS: Primary | ICD-10-CM

## 2019-01-01 DIAGNOSIS — Z51.5 HOSPICE CARE PATIENT: ICD-10-CM

## 2019-01-01 DIAGNOSIS — M54.2 CERVICALGIA: ICD-10-CM

## 2019-01-01 DIAGNOSIS — F32.1 MODERATE MAJOR DEPRESSION (H): ICD-10-CM

## 2019-01-01 DIAGNOSIS — R53.81 PHYSICAL DECONDITIONING: ICD-10-CM

## 2019-01-01 DIAGNOSIS — M54.2 CERVICAL PAIN: ICD-10-CM

## 2019-01-01 DIAGNOSIS — F51.01 PRIMARY INSOMNIA: ICD-10-CM

## 2019-01-01 DIAGNOSIS — R25.9 ABNORMAL MOVEMENTS: ICD-10-CM

## 2019-01-01 DIAGNOSIS — Z79.84 LONG TERM CURRENT USE OF ORAL HYPOGLYCEMIC DRUG: ICD-10-CM

## 2019-01-01 DIAGNOSIS — M96.1 FAILED BACK SURGICAL SYNDROME: ICD-10-CM

## 2019-01-01 DIAGNOSIS — F33.2 SEVERE EPISODE OF RECURRENT MAJOR DEPRESSIVE DISORDER, WITHOUT PSYCHOTIC FEATURES (H): Primary | ICD-10-CM

## 2019-01-01 DIAGNOSIS — Z76.89 HEALTH CARE HOME: ICD-10-CM

## 2019-01-01 DIAGNOSIS — M50.10 CERVICAL DISC DISORDER WITH RADICULOPATHY: ICD-10-CM

## 2019-01-01 DIAGNOSIS — G89.29 CHRONIC BILATERAL LOW BACK PAIN WITH RIGHT-SIDED SCIATICA: ICD-10-CM

## 2019-01-01 DIAGNOSIS — I10 HYPERTENSION GOAL BP (BLOOD PRESSURE) < 140/90: ICD-10-CM

## 2019-01-01 DIAGNOSIS — I10 ESSENTIAL HYPERTENSION WITH GOAL BLOOD PRESSURE LESS THAN 140/90: ICD-10-CM

## 2019-01-01 DIAGNOSIS — F32.A DEPRESSION, UNSPECIFIED DEPRESSION TYPE: ICD-10-CM

## 2019-01-01 DIAGNOSIS — E11.59 TYPE 2 DIABETES MELLITUS WITH OTHER CIRCULATORY COMPLICATIONS (H): ICD-10-CM

## 2019-01-01 DIAGNOSIS — M54.16 LUMBAR RADICULOPATHY: Primary | ICD-10-CM

## 2019-01-01 DIAGNOSIS — R25.9 ABNORMAL INVOLUNTARY MOVEMENT: Primary | ICD-10-CM

## 2019-01-01 DIAGNOSIS — D50.9 IRON DEFICIENCY ANEMIA, UNSPECIFIED IRON DEFICIENCY ANEMIA TYPE: ICD-10-CM

## 2019-01-01 DIAGNOSIS — E78.5 HYPERLIPIDEMIA LDL GOAL <100: ICD-10-CM

## 2019-01-01 DIAGNOSIS — M54.12 CERVICAL RADICULOPATHY: ICD-10-CM

## 2019-01-01 DIAGNOSIS — E11.59 TYPE 2 DIABETES MELLITUS WITH OTHER CIRCULATORY COMPLICATION, WITHOUT LONG-TERM CURRENT USE OF INSULIN (H): ICD-10-CM

## 2019-01-01 DIAGNOSIS — R26.9 GAIT DIFFICULTY: ICD-10-CM

## 2019-01-01 DIAGNOSIS — M17.0 PRIMARY OSTEOARTHRITIS OF BOTH KNEES: ICD-10-CM

## 2019-01-01 DIAGNOSIS — R10.13 ABDOMINAL PAIN, EPIGASTRIC: ICD-10-CM

## 2019-01-01 DIAGNOSIS — C64.2 MALIGNANT NEOPLASM OF LEFT KIDNEY EXCLUDING RENAL PELVIS (H): ICD-10-CM

## 2019-01-01 DIAGNOSIS — L03.115 CELLULITIS OF RIGHT LOWER EXTREMITY: ICD-10-CM

## 2019-01-01 DIAGNOSIS — N18.30 CKD (CHRONIC KIDNEY DISEASE) STAGE 3, GFR 30-59 ML/MIN (H): ICD-10-CM

## 2019-01-01 DIAGNOSIS — H83.3X3 NOISE-INDUCED HEARING LOSS OF BOTH EARS: ICD-10-CM

## 2019-01-01 DIAGNOSIS — E11.59 TYPE 2 DIABETES MELLITUS WITH OTHER CIRCULATORY COMPLICATION, WITHOUT LONG-TERM CURRENT USE OF INSULIN (H): Chronic | ICD-10-CM

## 2019-01-01 DIAGNOSIS — D64.9 ANEMIA, UNSPECIFIED TYPE: ICD-10-CM

## 2019-01-01 DIAGNOSIS — M50.10 CERVICAL DISC DISORDER WITH RADICULOPATHY: Primary | ICD-10-CM

## 2019-01-01 DIAGNOSIS — M54.40 CHRONIC BILATERAL LOW BACK PAIN WITH SCIATICA, SCIATICA LATERALITY UNSPECIFIED: ICD-10-CM

## 2019-01-01 DIAGNOSIS — I21.4 NSTEMI (NON-ST ELEVATED MYOCARDIAL INFARCTION) (H): ICD-10-CM

## 2019-01-01 DIAGNOSIS — I10 ESSENTIAL HYPERTENSION: ICD-10-CM

## 2019-01-01 DIAGNOSIS — N40.1 BENIGN PROSTATIC HYPERPLASIA WITH URINARY RETENTION: ICD-10-CM

## 2019-01-01 DIAGNOSIS — R41.0 DELIRIUM: ICD-10-CM

## 2019-01-01 DIAGNOSIS — R26.2 DIFFICULTY WALKING: ICD-10-CM

## 2019-01-01 DIAGNOSIS — M48.061 SPINAL STENOSIS OF LUMBAR REGION WITHOUT NEUROGENIC CLAUDICATION: ICD-10-CM

## 2019-01-01 DIAGNOSIS — M54.16 LUMBAR BACK PAIN WITH RADICULOPATHY AFFECTING LEFT LOWER EXTREMITY: ICD-10-CM

## 2019-01-01 DIAGNOSIS — M79.661 PAIN OF RIGHT LOWER LEG: ICD-10-CM

## 2019-01-01 DIAGNOSIS — K59.03 DRUG-INDUCED CONSTIPATION: ICD-10-CM

## 2019-01-01 DIAGNOSIS — C64.2 MALIGNANT NEOPLASM OF KIDNEY EXCLUDING RENAL PELVIS, LEFT (H): Primary | ICD-10-CM

## 2019-01-01 DIAGNOSIS — C64.9 MALIGNANT NEOPLASM OF KIDNEY EXCLUDING RENAL PELVIS (H): Primary | ICD-10-CM

## 2019-01-01 DIAGNOSIS — R07.9 CHEST PAIN, UNSPECIFIED TYPE: ICD-10-CM

## 2019-01-01 DIAGNOSIS — R11.2 NON-INTRACTABLE VOMITING WITH NAUSEA, UNSPECIFIED VOMITING TYPE: ICD-10-CM

## 2019-01-01 DIAGNOSIS — C61 PROSTATE CANCER (H): Primary | ICD-10-CM

## 2019-01-01 DIAGNOSIS — M54.16 LUMBAR RADICULOPATHY: ICD-10-CM

## 2019-01-01 DIAGNOSIS — Z78.9 TAKES DIETARY SUPPLEMENTS: ICD-10-CM

## 2019-01-01 DIAGNOSIS — C64.2 MALIGNANT NEOPLASM OF LEFT KIDNEY EXCLUDING RENAL PELVIS (H): Primary | ICD-10-CM

## 2019-01-01 DIAGNOSIS — E11.9 TYPE 2 DIABETES MELLITUS WITHOUT COMPLICATION (H): ICD-10-CM

## 2019-01-01 DIAGNOSIS — M62.81 GENERALIZED MUSCLE WEAKNESS: ICD-10-CM

## 2019-01-01 DIAGNOSIS — I10 BENIGN ESSENTIAL HYPERTENSION: ICD-10-CM

## 2019-01-01 DIAGNOSIS — G89.29 CHRONIC BILATERAL LOW BACK PAIN WITH SCIATICA, SCIATICA LATERALITY UNSPECIFIED: ICD-10-CM

## 2019-01-01 DIAGNOSIS — F42.4 SKIN PICKING HABIT: ICD-10-CM

## 2019-01-01 DIAGNOSIS — I24.9 ACS (ACUTE CORONARY SYNDROME) (H): ICD-10-CM

## 2019-01-01 DIAGNOSIS — S22.42XA CLOSED FRACTURE OF MULTIPLE RIBS OF LEFT SIDE, INITIAL ENCOUNTER: ICD-10-CM

## 2019-01-01 DIAGNOSIS — L85.3 XEROSIS CUTIS: ICD-10-CM

## 2019-01-01 DIAGNOSIS — L03.114 CELLULITIS OF LEFT ARM: ICD-10-CM

## 2019-01-01 DIAGNOSIS — R52 PAIN: ICD-10-CM

## 2019-01-01 DIAGNOSIS — M54.41 CHRONIC BILATERAL LOW BACK PAIN WITH RIGHT-SIDED SCIATICA: ICD-10-CM

## 2019-01-01 DIAGNOSIS — R07.89 OTHER CHEST PAIN: ICD-10-CM

## 2019-01-01 DIAGNOSIS — F11.90 CHRONIC, CONTINUOUS USE OF OPIOIDS: ICD-10-CM

## 2019-01-01 DIAGNOSIS — F42.4 PICKING OWN SKIN: ICD-10-CM

## 2019-01-01 DIAGNOSIS — G62.9 NEUROPATHY: ICD-10-CM

## 2019-01-01 DIAGNOSIS — R11.2 NON-INTRACTABLE VOMITING WITH NAUSEA, UNSPECIFIED VOMITING TYPE: Primary | ICD-10-CM

## 2019-01-01 DIAGNOSIS — I73.9 CLAUDICATION (H): ICD-10-CM

## 2019-01-01 DIAGNOSIS — E11.59 TYPE 2 DIABETES MELLITUS WITH OTHER CIRCULATORY COMPLICATION, WITHOUT LONG-TERM CURRENT USE OF INSULIN (H): Primary | Chronic | ICD-10-CM

## 2019-01-01 DIAGNOSIS — I45.10 RIGHT BUNDLE BRANCH BLOCK: ICD-10-CM

## 2019-01-01 DIAGNOSIS — F33.2 SEVERE EPISODE OF RECURRENT MAJOR DEPRESSIVE DISORDER, WITHOUT PSYCHOTIC FEATURES (H): ICD-10-CM

## 2019-01-01 DIAGNOSIS — C64.9 MALIGNANT NEOPLASM OF KIDNEY EXCLUDING RENAL PELVIS (H): ICD-10-CM

## 2019-01-01 DIAGNOSIS — F02.818 DEMENTIA DUE TO MEDICAL CONDITION WITH BEHAVIORAL DISTURBANCE (H): ICD-10-CM

## 2019-01-01 DIAGNOSIS — E11.9 TYPE 2 DIABETES MELLITUS WITHOUT COMPLICATION, UNSPECIFIED WHETHER LONG TERM INSULIN USE (H): ICD-10-CM

## 2019-01-01 DIAGNOSIS — R25.9 ABNORMAL INVOLUNTARY MOVEMENT: ICD-10-CM

## 2019-01-01 DIAGNOSIS — Z71.89 OTHER SPECIFIED COUNSELING: ICD-10-CM

## 2019-01-01 DIAGNOSIS — G62.9 NEUROPATHY: Primary | ICD-10-CM

## 2019-01-01 DIAGNOSIS — R33.8 BENIGN PROSTATIC HYPERPLASIA WITH URINARY RETENTION: ICD-10-CM

## 2019-01-01 DIAGNOSIS — M25.572 PAIN IN JOINT, ANKLE AND FOOT, LEFT: ICD-10-CM

## 2019-01-01 DIAGNOSIS — E11.42 PERIPHERAL SENSORY NEUROPATHY DUE TO TYPE 2 DIABETES MELLITUS (H): ICD-10-CM

## 2019-01-01 DIAGNOSIS — M48.02 CERVICAL STENOSIS OF SPINAL CANAL: Primary | ICD-10-CM

## 2019-01-01 DIAGNOSIS — M79.661 PAIN OF RIGHT LOWER LEG: Primary | ICD-10-CM

## 2019-01-01 DIAGNOSIS — R41.0 CONFUSION: ICD-10-CM

## 2019-01-01 DIAGNOSIS — I25.118 ATHEROSCLEROSIS OF NATIVE CORONARY ARTERY OF NATIVE HEART WITH OTHER FORM OF ANGINA PECTORIS (H): ICD-10-CM

## 2019-01-01 DIAGNOSIS — R30.9 PAINFUL URINATION: ICD-10-CM

## 2019-01-01 DIAGNOSIS — L03.114 CELLULITIS OF LEFT UPPER EXTREMITY: ICD-10-CM

## 2019-01-01 DIAGNOSIS — M48.02 SPINAL STENOSIS IN CERVICAL REGION: Primary | ICD-10-CM

## 2019-01-01 DIAGNOSIS — R41.0 CONFUSION: Primary | ICD-10-CM

## 2019-01-01 LAB
ALBUMIN SERPL-MCNC: 2.4 G/DL (ref 3.4–5)
ALBUMIN SERPL-MCNC: 2.6 G/DL (ref 3.4–5)
ALBUMIN SERPL-MCNC: 2.8 G/DL (ref 3.4–5)
ALBUMIN SERPL-MCNC: 2.9 G/DL (ref 3.4–5)
ALBUMIN SERPL-MCNC: 3.4 G/DL (ref 3.4–5)
ALBUMIN SERPL-MCNC: 3.6 G/DL (ref 3.4–5)
ALBUMIN SERPL-MCNC: 3.6 G/DL (ref 3.4–5)
ALBUMIN SERPL-MCNC: 3.7 G/DL (ref 3.4–5)
ALBUMIN UR-MCNC: 10 MG/DL
ALBUMIN UR-MCNC: 10 MG/DL
ALBUMIN UR-MCNC: 30 MG/DL
ALBUMIN UR-MCNC: NEGATIVE MG/DL
ALBUMIN UR-MCNC: NEGATIVE MG/DL
ALP SERPL-CCNC: 108 U/L (ref 40–150)
ALP SERPL-CCNC: 118 U/L (ref 40–150)
ALP SERPL-CCNC: 122 U/L (ref 40–150)
ALP SERPL-CCNC: 124 U/L (ref 40–150)
ALP SERPL-CCNC: 62 U/L (ref 40–150)
ALP SERPL-CCNC: 63 U/L (ref 40–150)
ALP SERPL-CCNC: 81 U/L (ref 40–150)
ALP SERPL-CCNC: 85 U/L (ref 40–150)
ALP SERPL-CCNC: 86 U/L (ref 40–150)
ALP SERPL-CCNC: 89 U/L (ref 40–150)
ALP SERPL-CCNC: 97 U/L (ref 40–150)
ALP SERPL-CCNC: 97 U/L (ref 40–150)
ALT SERPL W P-5'-P-CCNC: 19 U/L (ref 0–70)
ALT SERPL W P-5'-P-CCNC: 28 U/L (ref 0–70)
ALT SERPL W P-5'-P-CCNC: 31 U/L (ref 0–70)
ALT SERPL W P-5'-P-CCNC: 35 U/L (ref 0–70)
ALT SERPL W P-5'-P-CCNC: 35 U/L (ref 0–70)
ALT SERPL W P-5'-P-CCNC: 39 U/L (ref 0–70)
ALT SERPL W P-5'-P-CCNC: 39 U/L (ref 0–70)
ALT SERPL W P-5'-P-CCNC: 49 U/L (ref 0–70)
ALT SERPL W P-5'-P-CCNC: 54 U/L (ref 0–70)
ALT SERPL W P-5'-P-CCNC: 56 U/L (ref 0–70)
ALT SERPL W P-5'-P-CCNC: 56 U/L (ref 0–70)
ALT SERPL W P-5'-P-CCNC: 67 U/L (ref 0–70)
AMMONIA PLAS-SCNC: 17 UMOL/L (ref 10–50)
AMMONIA PLAS-SCNC: 18 UMOL/L (ref 10–50)
AMMONIA PLAS-SCNC: 30 UMOL/L (ref 10–50)
AMYLASE SERPL-CCNC: 118 U/L (ref 30–110)
ANION GAP SERPL CALCULATED.3IONS-SCNC: 10 MMOL/L (ref 3–14)
ANION GAP SERPL CALCULATED.3IONS-SCNC: 10 MMOL/L (ref 3–14)
ANION GAP SERPL CALCULATED.3IONS-SCNC: 11 MMOL/L (ref 3–14)
ANION GAP SERPL CALCULATED.3IONS-SCNC: 11 MMOL/L (ref 3–14)
ANION GAP SERPL CALCULATED.3IONS-SCNC: 14 MMOL/L (ref 3–14)
ANION GAP SERPL CALCULATED.3IONS-SCNC: 20 MMOL/L (ref 3–14)
ANION GAP SERPL CALCULATED.3IONS-SCNC: 3 MMOL/L (ref 3–14)
ANION GAP SERPL CALCULATED.3IONS-SCNC: 4 MMOL/L (ref 3–14)
ANION GAP SERPL CALCULATED.3IONS-SCNC: 4 MMOL/L (ref 3–14)
ANION GAP SERPL CALCULATED.3IONS-SCNC: 5 MMOL/L (ref 3–14)
ANION GAP SERPL CALCULATED.3IONS-SCNC: 5 MMOL/L (ref 3–14)
ANION GAP SERPL CALCULATED.3IONS-SCNC: 6 MMOL/L (ref 3–14)
ANION GAP SERPL CALCULATED.3IONS-SCNC: 6 MMOL/L (ref 3–14)
ANION GAP SERPL CALCULATED.3IONS-SCNC: 7 MMOL/L (ref 3–14)
ANION GAP SERPL CALCULATED.3IONS-SCNC: 8 MMOL/L (ref 3–14)
APPEARANCE UR: ABNORMAL
APPEARANCE UR: ABNORMAL
APPEARANCE UR: CLEAR
AST SERPL W P-5'-P-CCNC: 105 U/L (ref 0–45)
AST SERPL W P-5'-P-CCNC: 19 U/L (ref 0–45)
AST SERPL W P-5'-P-CCNC: 27 U/L (ref 0–45)
AST SERPL W P-5'-P-CCNC: 31 U/L (ref 0–45)
AST SERPL W P-5'-P-CCNC: 31 U/L (ref 0–45)
AST SERPL W P-5'-P-CCNC: 39 U/L (ref 0–45)
AST SERPL W P-5'-P-CCNC: 45 U/L (ref 0–45)
AST SERPL W P-5'-P-CCNC: 47 U/L (ref 0–45)
AST SERPL W P-5'-P-CCNC: 53 U/L (ref 0–45)
AST SERPL W P-5'-P-CCNC: 80 U/L (ref 0–45)
AST SERPL W P-5'-P-CCNC: 83 U/L (ref 0–45)
AST SERPL W P-5'-P-CCNC: 92 U/L (ref 0–45)
BACTERIA #/AREA URNS HPF: ABNORMAL /HPF
BACTERIA SPEC CULT: ABNORMAL
BACTERIA SPEC CULT: ABNORMAL
BACTERIA SPEC CULT: NO GROWTH
BASE DEFICIT BLDA-SCNC: 1.3 MMOL/L
BASOPHILS # BLD AUTO: 0 10E9/L (ref 0–0.2)
BASOPHILS NFR BLD AUTO: 0 %
BASOPHILS NFR BLD AUTO: 0.1 %
BASOPHILS NFR BLD AUTO: 0.3 %
BASOPHILS NFR BLD AUTO: 0.4 %
BILIRUB DIRECT SERPL-MCNC: 0.2 MG/DL (ref 0–0.2)
BILIRUB DIRECT SERPL-MCNC: 0.3 MG/DL (ref 0–0.2)
BILIRUB DIRECT SERPL-MCNC: 0.4 MG/DL (ref 0–0.2)
BILIRUB DIRECT SERPL-MCNC: 0.4 MG/DL (ref 0–0.2)
BILIRUB SERPL-MCNC: 0.3 MG/DL (ref 0.2–1.3)
BILIRUB SERPL-MCNC: 0.4 MG/DL (ref 0.2–1.3)
BILIRUB SERPL-MCNC: 0.4 MG/DL (ref 0.2–1.3)
BILIRUB SERPL-MCNC: 0.5 MG/DL (ref 0.2–1.3)
BILIRUB SERPL-MCNC: 0.7 MG/DL (ref 0.2–1.3)
BILIRUB SERPL-MCNC: 0.8 MG/DL (ref 0.2–1.3)
BILIRUB SERPL-MCNC: 0.9 MG/DL (ref 0.2–1.3)
BILIRUB SERPL-MCNC: 1 MG/DL (ref 0.2–1.3)
BILIRUB UR QL STRIP: NEGATIVE
BUN SERPL-MCNC: 11 MG/DL (ref 7–30)
BUN SERPL-MCNC: 11 MG/DL (ref 7–30)
BUN SERPL-MCNC: 12 MG/DL (ref 7–30)
BUN SERPL-MCNC: 13 MG/DL (ref 7–30)
BUN SERPL-MCNC: 13 MG/DL (ref 7–30)
BUN SERPL-MCNC: 14 MG/DL (ref 7–30)
BUN SERPL-MCNC: 16 MG/DL (ref 7–30)
BUN SERPL-MCNC: 17 MG/DL (ref 7–30)
BUN SERPL-MCNC: 18 MG/DL (ref 7–30)
BUN SERPL-MCNC: 18 MG/DL (ref 7–30)
BUN SERPL-MCNC: 22 MG/DL (ref 7–30)
BUN SERPL-MCNC: 25 MG/DL (ref 7–30)
BUN SERPL-MCNC: 27 MG/DL (ref 7–30)
BUN SERPL-MCNC: 28 MG/DL (ref 7–30)
BUN SERPL-MCNC: 29 MG/DL (ref 7–30)
BUN SERPL-MCNC: 33 MG/DL (ref 7–30)
BUN SERPL-MCNC: 36 MG/DL (ref 7–30)
BUN SERPL-MCNC: 36 MG/DL (ref 7–30)
CALCIUM SERPL-MCNC: 7.9 MG/DL (ref 8.5–10.1)
CALCIUM SERPL-MCNC: 8 MG/DL (ref 8.5–10.1)
CALCIUM SERPL-MCNC: 8.1 MG/DL (ref 8.5–10.1)
CALCIUM SERPL-MCNC: 8.1 MG/DL (ref 8.5–10.1)
CALCIUM SERPL-MCNC: 8.2 MG/DL (ref 8.5–10.1)
CALCIUM SERPL-MCNC: 8.3 MG/DL (ref 8.5–10.1)
CALCIUM SERPL-MCNC: 8.4 MG/DL (ref 8.5–10.1)
CALCIUM SERPL-MCNC: 8.5 MG/DL (ref 8.5–10.1)
CALCIUM SERPL-MCNC: 8.6 MG/DL (ref 8.5–10.1)
CALCIUM SERPL-MCNC: 8.7 MG/DL (ref 8.5–10.1)
CALCIUM SERPL-MCNC: 8.8 MG/DL (ref 8.5–10.1)
CALCIUM SERPL-MCNC: 8.8 MG/DL (ref 8.5–10.1)
CALCIUM SERPL-MCNC: 9 MG/DL (ref 8.5–10.1)
CALCIUM SERPL-MCNC: 9 MG/DL (ref 8.5–10.1)
CHLORIDE SERPL-SCNC: 102 MMOL/L (ref 94–109)
CHLORIDE SERPL-SCNC: 102 MMOL/L (ref 94–109)
CHLORIDE SERPL-SCNC: 103 MMOL/L (ref 94–109)
CHLORIDE SERPL-SCNC: 103 MMOL/L (ref 94–109)
CHLORIDE SERPL-SCNC: 104 MMOL/L (ref 94–109)
CHLORIDE SERPL-SCNC: 105 MMOL/L (ref 94–109)
CHLORIDE SERPL-SCNC: 105 MMOL/L (ref 94–109)
CHLORIDE SERPL-SCNC: 106 MMOL/L (ref 94–109)
CHLORIDE SERPL-SCNC: 107 MMOL/L (ref 94–109)
CHLORIDE SERPL-SCNC: 107 MMOL/L (ref 94–109)
CHLORIDE SERPL-SCNC: 108 MMOL/L (ref 94–109)
CHLORIDE SERPL-SCNC: 109 MMOL/L (ref 94–109)
CHLORIDE SERPL-SCNC: 98 MMOL/L (ref 94–109)
CHOLEST SERPL-MCNC: 120 MG/DL
CK SERPL-CCNC: 98 U/L (ref 30–300)
CO2 SERPL-SCNC: 13 MMOL/L (ref 20–32)
CO2 SERPL-SCNC: 19 MMOL/L (ref 20–32)
CO2 SERPL-SCNC: 22 MMOL/L (ref 20–32)
CO2 SERPL-SCNC: 22 MMOL/L (ref 20–32)
CO2 SERPL-SCNC: 23 MMOL/L (ref 20–32)
CO2 SERPL-SCNC: 24 MMOL/L (ref 20–32)
CO2 SERPL-SCNC: 25 MMOL/L (ref 20–32)
CO2 SERPL-SCNC: 26 MMOL/L (ref 20–32)
CO2 SERPL-SCNC: 27 MMOL/L (ref 20–32)
CO2 SERPL-SCNC: 28 MMOL/L (ref 20–32)
CO2 SERPL-SCNC: 29 MMOL/L (ref 20–32)
CO2 SERPL-SCNC: 29 MMOL/L (ref 20–32)
CO2 SERPL-SCNC: 30 MMOL/L (ref 20–32)
CO2 SERPL-SCNC: 31 MMOL/L (ref 20–32)
CO2 SERPL-SCNC: 31 MMOL/L (ref 20–32)
COLOR UR AUTO: YELLOW
CREAT BLD-MCNC: 1.3 MG/DL (ref 0.66–1.25)
CREAT SERPL-MCNC: 0.69 MG/DL (ref 0.66–1.25)
CREAT SERPL-MCNC: 0.71 MG/DL (ref 0.66–1.25)
CREAT SERPL-MCNC: 0.73 MG/DL (ref 0.66–1.25)
CREAT SERPL-MCNC: 0.77 MG/DL (ref 0.66–1.25)
CREAT SERPL-MCNC: 0.77 MG/DL (ref 0.66–1.25)
CREAT SERPL-MCNC: 0.81 MG/DL (ref 0.66–1.25)
CREAT SERPL-MCNC: 0.87 MG/DL (ref 0.66–1.25)
CREAT SERPL-MCNC: 0.87 MG/DL (ref 0.66–1.25)
CREAT SERPL-MCNC: 0.88 MG/DL (ref 0.66–1.25)
CREAT SERPL-MCNC: 0.89 MG/DL (ref 0.66–1.25)
CREAT SERPL-MCNC: 0.92 MG/DL (ref 0.66–1.25)
CREAT SERPL-MCNC: 0.92 MG/DL (ref 0.66–1.25)
CREAT SERPL-MCNC: 0.94 MG/DL (ref 0.66–1.25)
CREAT SERPL-MCNC: 0.95 MG/DL (ref 0.66–1.25)
CREAT SERPL-MCNC: 1.04 MG/DL (ref 0.66–1.25)
CREAT SERPL-MCNC: 1.07 MG/DL (ref 0.66–1.25)
CREAT SERPL-MCNC: 1.28 MG/DL (ref 0.66–1.25)
CREAT SERPL-MCNC: 1.31 MG/DL (ref 0.66–1.25)
CREAT SERPL-MCNC: 1.32 MG/DL (ref 0.66–1.25)
CREAT SERPL-MCNC: 1.32 MG/DL (ref 0.66–1.25)
CREAT SERPL-MCNC: 1.34 MG/DL (ref 0.66–1.25)
CREAT SERPL-MCNC: 1.37 MG/DL (ref 0.66–1.25)
CREAT SERPL-MCNC: 1.41 MG/DL (ref 0.66–1.25)
CRP SERPL-MCNC: 120 MG/L (ref 0–8)
CRP SERPL-MCNC: 49 MG/L (ref 0–8)
CRP SERPL-MCNC: 62 MG/L (ref 0–8)
CRP SERPL-MCNC: 89 MG/L (ref 0–8)
DIFFERENTIAL METHOD BLD: ABNORMAL
EOSINOPHIL # BLD AUTO: 0 10E9/L (ref 0–0.7)
EOSINOPHIL # BLD AUTO: 0 10E9/L (ref 0–0.7)
EOSINOPHIL # BLD AUTO: 0.1 10E9/L (ref 0–0.7)
EOSINOPHIL # BLD AUTO: 0.1 10E9/L (ref 0–0.7)
EOSINOPHIL # BLD AUTO: 0.2 10E9/L (ref 0–0.7)
EOSINOPHIL # BLD AUTO: 0.3 10E9/L (ref 0–0.7)
EOSINOPHIL # BLD AUTO: 0.6 10E9/L (ref 0–0.7)
EOSINOPHIL # BLD AUTO: 0.7 10E9/L (ref 0–0.7)
EOSINOPHIL NFR BLD AUTO: 0.2 %
EOSINOPHIL NFR BLD AUTO: 0.3 %
EOSINOPHIL NFR BLD AUTO: 0.9 %
EOSINOPHIL NFR BLD AUTO: 1.4 %
EOSINOPHIL NFR BLD AUTO: 1.6 %
EOSINOPHIL NFR BLD AUTO: 10.4 %
EOSINOPHIL NFR BLD AUTO: 4.2 %
EOSINOPHIL NFR BLD AUTO: 7.9 %
ERYTHROCYTE [DISTWIDTH] IN BLOOD BY AUTOMATED COUNT: 14.7 % (ref 10–15)
ERYTHROCYTE [DISTWIDTH] IN BLOOD BY AUTOMATED COUNT: 14.8 % (ref 10–15)
ERYTHROCYTE [DISTWIDTH] IN BLOOD BY AUTOMATED COUNT: 14.8 % (ref 10–15)
ERYTHROCYTE [DISTWIDTH] IN BLOOD BY AUTOMATED COUNT: 14.9 % (ref 10–15)
ERYTHROCYTE [DISTWIDTH] IN BLOOD BY AUTOMATED COUNT: 15 % (ref 10–15)
ERYTHROCYTE [DISTWIDTH] IN BLOOD BY AUTOMATED COUNT: 15.2 % (ref 10–15)
ERYTHROCYTE [DISTWIDTH] IN BLOOD BY AUTOMATED COUNT: 15.3 % (ref 10–15)
ERYTHROCYTE [DISTWIDTH] IN BLOOD BY AUTOMATED COUNT: 15.3 % (ref 10–15)
ERYTHROCYTE [DISTWIDTH] IN BLOOD BY AUTOMATED COUNT: 15.5 % (ref 10–15)
ERYTHROCYTE [DISTWIDTH] IN BLOOD BY AUTOMATED COUNT: 15.5 % (ref 10–15)
ERYTHROCYTE [DISTWIDTH] IN BLOOD BY AUTOMATED COUNT: 15.7 % (ref 10–15)
ERYTHROCYTE [DISTWIDTH] IN BLOOD BY AUTOMATED COUNT: 15.7 % (ref 10–15)
ERYTHROCYTE [DISTWIDTH] IN BLOOD BY AUTOMATED COUNT: 15.9 % (ref 10–15)
ERYTHROCYTE [DISTWIDTH] IN BLOOD BY AUTOMATED COUNT: 16.8 % (ref 10–15)
ERYTHROCYTE [DISTWIDTH] IN BLOOD BY AUTOMATED COUNT: 17 % (ref 10–15)
ERYTHROCYTE [DISTWIDTH] IN BLOOD BY AUTOMATED COUNT: 17.1 % (ref 10–15)
ERYTHROCYTE [SEDIMENTATION RATE] IN BLOOD BY WESTERGREN METHOD: 74 MM/H (ref 0–20)
ETHANOL SERPL-MCNC: <0.01 G/DL
FERRITIN SERPL-MCNC: 17 NG/ML (ref 26–388)
GFR SERPL CREATININE-BSD FRML MDRD: 47 ML/MIN/{1.73_M2}
GFR SERPL CREATININE-BSD FRML MDRD: 49 ML/MIN/{1.73_M2}
GFR SERPL CREATININE-BSD FRML MDRD: 50 ML/MIN/{1.73_M2}
GFR SERPL CREATININE-BSD FRML MDRD: 51 ML/MIN/{1.73_M2}
GFR SERPL CREATININE-BSD FRML MDRD: 51 ML/MIN/{1.73_M2}
GFR SERPL CREATININE-BSD FRML MDRD: 52 ML/MIN/{1.73_M2}
GFR SERPL CREATININE-BSD FRML MDRD: 53 ML/MIN/{1.73_M2}
GFR SERPL CREATININE-BSD FRML MDRD: 54 ML/MIN/{1.73_M2}
GFR SERPL CREATININE-BSD FRML MDRD: 66 ML/MIN/{1.73_M2}
GFR SERPL CREATININE-BSD FRML MDRD: 68 ML/MIN/{1.73_M2}
GFR SERPL CREATININE-BSD FRML MDRD: 76 ML/MIN/{1.73_M2}
GFR SERPL CREATININE-BSD FRML MDRD: 77 ML/MIN/{1.73_M2}
GFR SERPL CREATININE-BSD FRML MDRD: 79 ML/MIN/{1.73_M2}
GFR SERPL CREATININE-BSD FRML MDRD: 79 ML/MIN/{1.73_M2}
GFR SERPL CREATININE-BSD FRML MDRD: 82 ML/MIN/{1.73_M2}
GFR SERPL CREATININE-BSD FRML MDRD: 85 ML/MIN/{1.73_M2}
GFR SERPL CREATININE-BSD FRML MDRD: 86 ML/MIN/{1.73_M2}
GFR SERPL CREATININE-BSD FRML MDRD: 87 ML/MIN/{1.73_M2}
GFR SERPL CREATININE-BSD FRML MDRD: 88 ML/MIN/{1.73_M2}
GFR SERPL CREATININE-BSD FRML MDRD: 89 ML/MIN/{1.73_M2}
GFR SERPL CREATININE-BSD FRML MDRD: >90 ML/MIN/{1.73_M2}
GLUCOSE BLDC GLUCOMTR-MCNC: 104 MG/DL (ref 70–99)
GLUCOSE BLDC GLUCOMTR-MCNC: 114 MG/DL (ref 70–99)
GLUCOSE BLDC GLUCOMTR-MCNC: 114 MG/DL (ref 70–99)
GLUCOSE BLDC GLUCOMTR-MCNC: 117 MG/DL (ref 70–99)
GLUCOSE BLDC GLUCOMTR-MCNC: 127 MG/DL (ref 70–99)
GLUCOSE BLDC GLUCOMTR-MCNC: 129 MG/DL (ref 70–99)
GLUCOSE BLDC GLUCOMTR-MCNC: 129 MG/DL (ref 70–99)
GLUCOSE BLDC GLUCOMTR-MCNC: 130 MG/DL (ref 70–99)
GLUCOSE BLDC GLUCOMTR-MCNC: 134 MG/DL (ref 70–99)
GLUCOSE BLDC GLUCOMTR-MCNC: 135 MG/DL (ref 70–99)
GLUCOSE BLDC GLUCOMTR-MCNC: 138 MG/DL (ref 70–99)
GLUCOSE BLDC GLUCOMTR-MCNC: 138 MG/DL (ref 70–99)
GLUCOSE BLDC GLUCOMTR-MCNC: 140 MG/DL (ref 70–99)
GLUCOSE BLDC GLUCOMTR-MCNC: 140 MG/DL (ref 70–99)
GLUCOSE BLDC GLUCOMTR-MCNC: 142 MG/DL (ref 70–99)
GLUCOSE BLDC GLUCOMTR-MCNC: 145 MG/DL (ref 70–99)
GLUCOSE BLDC GLUCOMTR-MCNC: 146 MG/DL (ref 70–99)
GLUCOSE BLDC GLUCOMTR-MCNC: 146 MG/DL (ref 70–99)
GLUCOSE BLDC GLUCOMTR-MCNC: 147 MG/DL (ref 70–99)
GLUCOSE BLDC GLUCOMTR-MCNC: 151 MG/DL (ref 70–99)
GLUCOSE BLDC GLUCOMTR-MCNC: 151 MG/DL (ref 70–99)
GLUCOSE BLDC GLUCOMTR-MCNC: 154 MG/DL (ref 70–99)
GLUCOSE BLDC GLUCOMTR-MCNC: 156 MG/DL (ref 70–99)
GLUCOSE BLDC GLUCOMTR-MCNC: 159 MG/DL (ref 70–99)
GLUCOSE BLDC GLUCOMTR-MCNC: 160 MG/DL (ref 70–99)
GLUCOSE BLDC GLUCOMTR-MCNC: 164 MG/DL (ref 70–99)
GLUCOSE BLDC GLUCOMTR-MCNC: 166 MG/DL (ref 70–99)
GLUCOSE BLDC GLUCOMTR-MCNC: 167 MG/DL (ref 70–99)
GLUCOSE BLDC GLUCOMTR-MCNC: 168 MG/DL (ref 70–99)
GLUCOSE BLDC GLUCOMTR-MCNC: 172 MG/DL (ref 70–99)
GLUCOSE BLDC GLUCOMTR-MCNC: 172 MG/DL (ref 70–99)
GLUCOSE BLDC GLUCOMTR-MCNC: 174 MG/DL (ref 70–99)
GLUCOSE BLDC GLUCOMTR-MCNC: 176 MG/DL (ref 70–99)
GLUCOSE BLDC GLUCOMTR-MCNC: 178 MG/DL (ref 70–99)
GLUCOSE BLDC GLUCOMTR-MCNC: 179 MG/DL (ref 70–99)
GLUCOSE BLDC GLUCOMTR-MCNC: 180 MG/DL (ref 70–99)
GLUCOSE BLDC GLUCOMTR-MCNC: 182 MG/DL (ref 70–99)
GLUCOSE BLDC GLUCOMTR-MCNC: 183 MG/DL (ref 70–99)
GLUCOSE BLDC GLUCOMTR-MCNC: 186 MG/DL (ref 70–99)
GLUCOSE BLDC GLUCOMTR-MCNC: 186 MG/DL (ref 70–99)
GLUCOSE BLDC GLUCOMTR-MCNC: 187 MG/DL (ref 70–99)
GLUCOSE BLDC GLUCOMTR-MCNC: 189 MG/DL (ref 70–99)
GLUCOSE BLDC GLUCOMTR-MCNC: 190 MG/DL (ref 70–99)
GLUCOSE BLDC GLUCOMTR-MCNC: 190 MG/DL (ref 70–99)
GLUCOSE BLDC GLUCOMTR-MCNC: 196 MG/DL (ref 70–99)
GLUCOSE BLDC GLUCOMTR-MCNC: 197 MG/DL (ref 70–99)
GLUCOSE BLDC GLUCOMTR-MCNC: 198 MG/DL (ref 70–99)
GLUCOSE BLDC GLUCOMTR-MCNC: 199 MG/DL (ref 70–99)
GLUCOSE BLDC GLUCOMTR-MCNC: 200 MG/DL (ref 70–99)
GLUCOSE BLDC GLUCOMTR-MCNC: 202 MG/DL (ref 70–99)
GLUCOSE BLDC GLUCOMTR-MCNC: 203 MG/DL (ref 70–99)
GLUCOSE BLDC GLUCOMTR-MCNC: 203 MG/DL (ref 70–99)
GLUCOSE BLDC GLUCOMTR-MCNC: 206 MG/DL (ref 70–99)
GLUCOSE BLDC GLUCOMTR-MCNC: 207 MG/DL (ref 70–99)
GLUCOSE BLDC GLUCOMTR-MCNC: 222 MG/DL (ref 70–99)
GLUCOSE BLDC GLUCOMTR-MCNC: 227 MG/DL (ref 70–99)
GLUCOSE BLDC GLUCOMTR-MCNC: 232 MG/DL (ref 70–99)
GLUCOSE BLDC GLUCOMTR-MCNC: 243 MG/DL (ref 70–99)
GLUCOSE BLDC GLUCOMTR-MCNC: 244 MG/DL (ref 70–99)
GLUCOSE BLDC GLUCOMTR-MCNC: 258 MG/DL (ref 70–99)
GLUCOSE BLDC GLUCOMTR-MCNC: 261 MG/DL (ref 70–99)
GLUCOSE BLDC GLUCOMTR-MCNC: 273 MG/DL (ref 70–99)
GLUCOSE BLDC GLUCOMTR-MCNC: 298 MG/DL (ref 70–99)
GLUCOSE BLDC GLUCOMTR-MCNC: 312 MG/DL (ref 70–99)
GLUCOSE BLDC GLUCOMTR-MCNC: 354 MG/DL (ref 70–99)
GLUCOSE BLDC GLUCOMTR-MCNC: 72 MG/DL (ref 70–99)
GLUCOSE BLDC GLUCOMTR-MCNC: 76 MG/DL (ref 70–99)
GLUCOSE BLDC GLUCOMTR-MCNC: 76 MG/DL (ref 70–99)
GLUCOSE BLDC GLUCOMTR-MCNC: 78 MG/DL (ref 70–99)
GLUCOSE BLDC GLUCOMTR-MCNC: 79 MG/DL (ref 70–99)
GLUCOSE BLDC GLUCOMTR-MCNC: 81 MG/DL (ref 70–99)
GLUCOSE BLDC GLUCOMTR-MCNC: 87 MG/DL (ref 70–99)
GLUCOSE BLDC GLUCOMTR-MCNC: 87 MG/DL (ref 70–99)
GLUCOSE BLDC GLUCOMTR-MCNC: 89 MG/DL (ref 70–99)
GLUCOSE BLDC GLUCOMTR-MCNC: 90 MG/DL (ref 70–99)
GLUCOSE BLDC GLUCOMTR-MCNC: 90 MG/DL (ref 70–99)
GLUCOSE BLDC GLUCOMTR-MCNC: 95 MG/DL (ref 70–99)
GLUCOSE BLDC GLUCOMTR-MCNC: 96 MG/DL (ref 70–99)
GLUCOSE BLDC GLUCOMTR-MCNC: 97 MG/DL (ref 70–99)
GLUCOSE BLDC GLUCOMTR-MCNC: 98 MG/DL (ref 70–99)
GLUCOSE BLDC GLUCOMTR-MCNC: 99 MG/DL (ref 70–99)
GLUCOSE SERPL-MCNC: 100 MG/DL (ref 70–99)
GLUCOSE SERPL-MCNC: 104 MG/DL (ref 70–99)
GLUCOSE SERPL-MCNC: 110 MG/DL (ref 70–99)
GLUCOSE SERPL-MCNC: 117 MG/DL (ref 70–99)
GLUCOSE SERPL-MCNC: 120 MG/DL (ref 70–99)
GLUCOSE SERPL-MCNC: 132 MG/DL (ref 70–99)
GLUCOSE SERPL-MCNC: 134 MG/DL (ref 70–99)
GLUCOSE SERPL-MCNC: 141 MG/DL (ref 70–99)
GLUCOSE SERPL-MCNC: 144 MG/DL (ref 70–99)
GLUCOSE SERPL-MCNC: 146 MG/DL (ref 70–99)
GLUCOSE SERPL-MCNC: 148 MG/DL (ref 70–99)
GLUCOSE SERPL-MCNC: 153 MG/DL (ref 70–99)
GLUCOSE SERPL-MCNC: 157 MG/DL (ref 70–99)
GLUCOSE SERPL-MCNC: 158 MG/DL (ref 70–99)
GLUCOSE SERPL-MCNC: 172 MG/DL (ref 70–99)
GLUCOSE SERPL-MCNC: 183 MG/DL (ref 70–99)
GLUCOSE SERPL-MCNC: 185 MG/DL (ref 70–99)
GLUCOSE SERPL-MCNC: 186 MG/DL (ref 70–99)
GLUCOSE SERPL-MCNC: 189 MG/DL (ref 70–99)
GLUCOSE SERPL-MCNC: 199 MG/DL (ref 70–99)
GLUCOSE SERPL-MCNC: 86 MG/DL (ref 70–99)
GLUCOSE SERPL-MCNC: 92 MG/DL (ref 70–99)
GLUCOSE UR STRIP-MCNC: NEGATIVE MG/DL
HBA1C MFR BLD: 5.3 % (ref 0–5.6)
HBA1C MFR BLD: 5.9 % (ref 0–5.6)
HBA1C MFR BLD: 6 % (ref 0–5.6)
HCO3 BLD-SCNC: 23 MMOL/L (ref 21–28)
HCT VFR BLD AUTO: 27 % (ref 40–53)
HCT VFR BLD AUTO: 27.5 % (ref 40–53)
HCT VFR BLD AUTO: 27.6 % (ref 40–53)
HCT VFR BLD AUTO: 27.8 % (ref 40–53)
HCT VFR BLD AUTO: 28.1 % (ref 40–53)
HCT VFR BLD AUTO: 28.4 % (ref 40–53)
HCT VFR BLD AUTO: 28.9 % (ref 40–53)
HCT VFR BLD AUTO: 29.4 % (ref 40–53)
HCT VFR BLD AUTO: 29.6 % (ref 40–53)
HCT VFR BLD AUTO: 30.5 % (ref 40–53)
HCT VFR BLD AUTO: 31.8 % (ref 40–53)
HCT VFR BLD AUTO: 32.3 % (ref 40–53)
HCT VFR BLD AUTO: 33 % (ref 40–53)
HCT VFR BLD AUTO: 34.1 % (ref 40–53)
HCT VFR BLD AUTO: 35 % (ref 40–53)
HCT VFR BLD AUTO: 35.4 % (ref 40–53)
HCT VFR BLD AUTO: 35.7 % (ref 40–53)
HCT VFR BLD AUTO: 37 % (ref 40–53)
HDLC SERPL-MCNC: 63 MG/DL
HGB BLD-MCNC: 10.1 G/DL (ref 13.3–17.7)
HGB BLD-MCNC: 10.2 G/DL (ref 13.3–17.7)
HGB BLD-MCNC: 10.2 G/DL (ref 13.3–17.7)
HGB BLD-MCNC: 10.3 G/DL (ref 13.3–17.7)
HGB BLD-MCNC: 10.4 G/DL (ref 13.3–17.7)
HGB BLD-MCNC: 10.4 G/DL (ref 13.3–17.7)
HGB BLD-MCNC: 10.6 G/DL (ref 13.3–17.7)
HGB BLD-MCNC: 11 G/DL (ref 13.3–17.7)
HGB BLD-MCNC: 11.6 G/DL (ref 13.3–17.7)
HGB BLD-MCNC: 8.3 G/DL (ref 13.3–17.7)
HGB BLD-MCNC: 8.3 G/DL (ref 13.3–17.7)
HGB BLD-MCNC: 8.4 G/DL (ref 13.3–17.7)
HGB BLD-MCNC: 8.5 G/DL (ref 13.3–17.7)
HGB BLD-MCNC: 8.8 G/DL (ref 13.3–17.7)
HGB BLD-MCNC: 8.9 G/DL (ref 13.3–17.7)
HGB BLD-MCNC: 9.1 G/DL (ref 13.3–17.7)
HGB BLD-MCNC: 9.2 G/DL (ref 13.3–17.7)
HGB BLD-MCNC: 9.2 G/DL (ref 13.3–17.7)
HGB BLD-MCNC: 9.4 G/DL (ref 13.3–17.7)
HGB UR QL STRIP: ABNORMAL
HGB UR QL STRIP: NEGATIVE
HGB UR QL STRIP: NEGATIVE
HYALINE CASTS #/AREA URNS LPF: 1 /LPF (ref 0–2)
IMM GRANULOCYTES # BLD: 0 10E9/L (ref 0–0.4)
IMM GRANULOCYTES # BLD: 0.1 10E9/L (ref 0–0.4)
IMM GRANULOCYTES # BLD: 0.1 10E9/L (ref 0–0.4)
IMM GRANULOCYTES NFR BLD: 0.2 %
IMM GRANULOCYTES NFR BLD: 0.2 %
IMM GRANULOCYTES NFR BLD: 0.3 %
IMM GRANULOCYTES NFR BLD: 0.3 %
IMM GRANULOCYTES NFR BLD: 0.4 %
IMM GRANULOCYTES NFR BLD: 0.5 %
INTERPRETATION ECG - MUSE: NORMAL
KETONES BLD-SCNC: 3.3 MMOL/L (ref 0–0.6)
KETONES BLD-SCNC: 4.4 MMOL/L (ref 0–0.6)
KETONES UR STRIP-MCNC: 10 MG/DL
KETONES UR STRIP-MCNC: 40 MG/DL
KETONES UR STRIP-MCNC: NEGATIVE MG/DL
LACTATE BLD-SCNC: 0.5 MMOL/L (ref 0.7–2)
LACTATE BLD-SCNC: 0.6 MMOL/L (ref 0.7–2)
LDLC SERPL CALC-MCNC: 39 MG/DL
LEUKOCYTE ESTERASE UR QL STRIP: ABNORMAL
LEUKOCYTE ESTERASE UR QL STRIP: NEGATIVE
LIPASE SERPL-CCNC: 127 U/L (ref 73–393)
LIPASE SERPL-CCNC: 1339 U/L (ref 73–393)
LIPASE SERPL-CCNC: 155 U/L (ref 73–393)
LIPASE SERPL-CCNC: 1629 U/L (ref 73–393)
LIPASE SERPL-CCNC: 1899 U/L (ref 73–393)
LIPASE SERPL-CCNC: 290 U/L (ref 73–393)
LIPASE SERPL-CCNC: 389 U/L (ref 73–393)
LIPASE SERPL-CCNC: 4177 U/L (ref 73–393)
LIPASE SERPL-CCNC: 736 U/L (ref 73–393)
LIPASE SERPL-CCNC: 752 U/L (ref 73–393)
LMWH PPP CHRO-ACNC: 0.17 IU/ML
LMWH PPP CHRO-ACNC: 0.18 IU/ML
LMWH PPP CHRO-ACNC: 0.21 IU/ML
LMWH PPP CHRO-ACNC: 0.22 IU/ML
LMWH PPP CHRO-ACNC: 0.22 IU/ML
LMWH PPP CHRO-ACNC: 0.23 IU/ML
LMWH PPP CHRO-ACNC: 0.24 IU/ML
LYMPHOCYTES # BLD AUTO: 0.3 10E9/L (ref 0.8–5.3)
LYMPHOCYTES # BLD AUTO: 0.8 10E9/L (ref 0.8–5.3)
LYMPHOCYTES # BLD AUTO: 0.9 10E9/L (ref 0.8–5.3)
LYMPHOCYTES # BLD AUTO: 0.9 10E9/L (ref 0.8–5.3)
LYMPHOCYTES # BLD AUTO: 1 10E9/L (ref 0.8–5.3)
LYMPHOCYTES # BLD AUTO: 1.3 10E9/L (ref 0.8–5.3)
LYMPHOCYTES NFR BLD AUTO: 12.5 %
LYMPHOCYTES NFR BLD AUTO: 13.8 %
LYMPHOCYTES NFR BLD AUTO: 19.1 %
LYMPHOCYTES NFR BLD AUTO: 5 %
LYMPHOCYTES NFR BLD AUTO: 6 %
LYMPHOCYTES NFR BLD AUTO: 6.8 %
LYMPHOCYTES NFR BLD AUTO: 7.7 %
LYMPHOCYTES NFR BLD AUTO: 8.8 %
Lab: NORMAL
MAGNESIUM SERPL-MCNC: 1.6 MG/DL (ref 1.6–2.3)
MCH RBC QN AUTO: 23.7 PG (ref 26.5–33)
MCH RBC QN AUTO: 23.7 PG (ref 26.5–33)
MCH RBC QN AUTO: 26 PG (ref 26.5–33)
MCH RBC QN AUTO: 26.3 PG (ref 26.5–33)
MCH RBC QN AUTO: 26.5 PG (ref 26.5–33)
MCH RBC QN AUTO: 26.8 PG (ref 26.5–33)
MCH RBC QN AUTO: 26.9 PG (ref 26.5–33)
MCH RBC QN AUTO: 27.1 PG (ref 26.5–33)
MCH RBC QN AUTO: 27.3 PG (ref 26.5–33)
MCH RBC QN AUTO: 27.4 PG (ref 26.5–33)
MCH RBC QN AUTO: 27.5 PG (ref 26.5–33)
MCH RBC QN AUTO: 28.1 PG (ref 26.5–33)
MCH RBC QN AUTO: 28.2 PG (ref 26.5–33)
MCH RBC QN AUTO: 28.2 PG (ref 26.5–33)
MCH RBC QN AUTO: 28.3 PG (ref 26.5–33)
MCH RBC QN AUTO: 28.4 PG (ref 26.5–33)
MCH RBC QN AUTO: 28.6 PG (ref 26.5–33)
MCH RBC QN AUTO: 29.2 PG (ref 26.5–33)
MCHC RBC AUTO-ENTMCNC: 29.1 G/DL (ref 31.5–36.5)
MCHC RBC AUTO-ENTMCNC: 29.1 G/DL (ref 31.5–36.5)
MCHC RBC AUTO-ENTMCNC: 29.9 G/DL (ref 31.5–36.5)
MCHC RBC AUTO-ENTMCNC: 30.1 G/DL (ref 31.5–36.5)
MCHC RBC AUTO-ENTMCNC: 30.2 G/DL (ref 31.5–36.5)
MCHC RBC AUTO-ENTMCNC: 30.2 G/DL (ref 31.5–36.5)
MCHC RBC AUTO-ENTMCNC: 30.7 G/DL (ref 31.5–36.5)
MCHC RBC AUTO-ENTMCNC: 31.1 G/DL (ref 31.5–36.5)
MCHC RBC AUTO-ENTMCNC: 31.1 G/DL (ref 31.5–36.5)
MCHC RBC AUTO-ENTMCNC: 31.3 G/DL (ref 31.5–36.5)
MCHC RBC AUTO-ENTMCNC: 31.4 G/DL (ref 31.5–36.5)
MCHC RBC AUTO-ENTMCNC: 31.5 G/DL (ref 31.5–36.5)
MCHC RBC AUTO-ENTMCNC: 32.1 G/DL (ref 31.5–36.5)
MCHC RBC AUTO-ENTMCNC: 32.5 G/DL (ref 31.5–36.5)
MCHC RBC AUTO-ENTMCNC: 33 G/DL (ref 31.5–36.5)
MCHC RBC AUTO-ENTMCNC: 33.1 G/DL (ref 31.5–36.5)
MCV RBC AUTO: 81 FL (ref 78–100)
MCV RBC AUTO: 82 FL (ref 78–100)
MCV RBC AUTO: 85 FL (ref 78–100)
MCV RBC AUTO: 86 FL (ref 78–100)
MCV RBC AUTO: 86 FL (ref 78–100)
MCV RBC AUTO: 87 FL (ref 78–100)
MCV RBC AUTO: 87 FL (ref 78–100)
MCV RBC AUTO: 88 FL (ref 78–100)
MCV RBC AUTO: 88 FL (ref 78–100)
MCV RBC AUTO: 89 FL (ref 78–100)
MCV RBC AUTO: 89 FL (ref 78–100)
MCV RBC AUTO: 90 FL (ref 78–100)
MCV RBC AUTO: 91 FL (ref 78–100)
MCV RBC AUTO: 91 FL (ref 78–100)
MCV RBC AUTO: 92 FL (ref 78–100)
MONOCYTES # BLD AUTO: 0.4 10E9/L (ref 0–1.3)
MONOCYTES # BLD AUTO: 0.4 10E9/L (ref 0–1.3)
MONOCYTES # BLD AUTO: 0.5 10E9/L (ref 0–1.3)
MONOCYTES # BLD AUTO: 0.5 10E9/L (ref 0–1.3)
MONOCYTES # BLD AUTO: 0.6 10E9/L (ref 0–1.3)
MONOCYTES # BLD AUTO: 0.7 10E9/L (ref 0–1.3)
MONOCYTES # BLD AUTO: 0.8 10E9/L (ref 0–1.3)
MONOCYTES # BLD AUTO: 1 10E9/L (ref 0–1.3)
MONOCYTES NFR BLD AUTO: 4.8 %
MONOCYTES NFR BLD AUTO: 5.9 %
MONOCYTES NFR BLD AUTO: 6.1 %
MONOCYTES NFR BLD AUTO: 6.4 %
MONOCYTES NFR BLD AUTO: 7 %
MONOCYTES NFR BLD AUTO: 7.1 %
MONOCYTES NFR BLD AUTO: 7.8 %
MONOCYTES NFR BLD AUTO: 8.2 %
MUCOUS THREADS #/AREA URNS LPF: PRESENT /LPF
MUCOUS THREADS #/AREA URNS LPF: PRESENT /LPF
NEUTROPHILS # BLD AUTO: 10.6 10E9/L (ref 1.6–8.3)
NEUTROPHILS # BLD AUTO: 11.8 10E9/L (ref 1.6–8.3)
NEUTROPHILS # BLD AUTO: 4.2 10E9/L (ref 1.6–8.3)
NEUTROPHILS # BLD AUTO: 5.1 10E9/L (ref 1.6–8.3)
NEUTROPHILS # BLD AUTO: 5.1 10E9/L (ref 1.6–8.3)
NEUTROPHILS # BLD AUTO: 5.6 10E9/L (ref 1.6–8.3)
NEUTROPHILS # BLD AUTO: 8 10E9/L (ref 1.6–8.3)
NEUTROPHILS # BLD AUTO: 8.4 10E9/L (ref 1.6–8.3)
NEUTROPHILS NFR BLD AUTO: 63.9 %
NEUTROPHILS NFR BLD AUTO: 70.9 %
NEUTROPHILS NFR BLD AUTO: 76.2 %
NEUTROPHILS NFR BLD AUTO: 82.5 %
NEUTROPHILS NFR BLD AUTO: 82.8 %
NEUTROPHILS NFR BLD AUTO: 84.8 %
NEUTROPHILS NFR BLD AUTO: 87.7 %
NEUTROPHILS NFR BLD AUTO: 87.8 %
NITRATE UR QL: NEGATIVE
NITRATE UR QL: POSITIVE
NONHDLC SERPL-MCNC: 57 MG/DL
NRBC # BLD AUTO: 0 10*3/UL
NRBC BLD AUTO-RTO: 0 /100
NT-PROBNP SERPL-MCNC: 251 PG/ML (ref 0–1800)
NT-PROBNP SERPL-MCNC: ABNORMAL PG/ML (ref 0–1800)
O2/TOTAL GAS SETTING VFR VENT: ABNORMAL %
PCO2 BLD: 36 MM HG (ref 35–45)
PH BLD: 7.42 PH (ref 7.35–7.45)
PH UR STRIP: 5 PH (ref 5–7)
PH UR STRIP: 5.5 PH (ref 5–7)
PH UR STRIP: 5.5 PH (ref 5–7)
PH UR STRIP: 6 PH (ref 5–7)
PH UR STRIP: 8 PH (ref 5–7)
PLATELET # BLD AUTO: 236 10E9/L (ref 150–450)
PLATELET # BLD AUTO: 244 10E9/L (ref 150–450)
PLATELET # BLD AUTO: 254 10E9/L (ref 150–450)
PLATELET # BLD AUTO: 258 10E9/L (ref 150–450)
PLATELET # BLD AUTO: 279 10E9/L (ref 150–450)
PLATELET # BLD AUTO: 288 10E9/L (ref 150–450)
PLATELET # BLD AUTO: 300 10E9/L (ref 150–450)
PLATELET # BLD AUTO: 306 10E9/L (ref 150–450)
PLATELET # BLD AUTO: 307 10E9/L (ref 150–450)
PLATELET # BLD AUTO: 341 10E9/L (ref 150–450)
PLATELET # BLD AUTO: 420 10E9/L (ref 150–450)
PLATELET # BLD AUTO: 426 10E9/L (ref 150–450)
PLATELET # BLD AUTO: 431 10E9/L (ref 150–450)
PLATELET # BLD AUTO: 455 10E9/L (ref 150–450)
PLATELET # BLD AUTO: 495 10E9/L (ref 150–450)
PLATELET # BLD AUTO: 506 10E9/L (ref 150–450)
PLATELET # BLD AUTO: 514 10E9/L (ref 150–450)
PLATELET # BLD AUTO: 563 10E9/L (ref 150–450)
PLATELET # BLD AUTO: 634 10E9/L (ref 150–450)
PO2 BLD: 76 MM HG (ref 80–105)
POTASSIUM SERPL-SCNC: 2.9 MMOL/L (ref 3.4–5.3)
POTASSIUM SERPL-SCNC: 3.2 MMOL/L (ref 3.4–5.3)
POTASSIUM SERPL-SCNC: 3.2 MMOL/L (ref 3.4–5.3)
POTASSIUM SERPL-SCNC: 3.3 MMOL/L (ref 3.4–5.3)
POTASSIUM SERPL-SCNC: 3.4 MMOL/L (ref 3.4–5.3)
POTASSIUM SERPL-SCNC: 3.4 MMOL/L (ref 3.4–5.3)
POTASSIUM SERPL-SCNC: 3.5 MMOL/L (ref 3.4–5.3)
POTASSIUM SERPL-SCNC: 3.6 MMOL/L (ref 3.4–5.3)
POTASSIUM SERPL-SCNC: 3.7 MMOL/L (ref 3.4–5.3)
POTASSIUM SERPL-SCNC: 3.9 MMOL/L (ref 3.4–5.3)
POTASSIUM SERPL-SCNC: 4.1 MMOL/L (ref 3.4–5.3)
POTASSIUM SERPL-SCNC: 4.1 MMOL/L (ref 3.4–5.3)
POTASSIUM SERPL-SCNC: 4.2 MMOL/L (ref 3.4–5.3)
POTASSIUM SERPL-SCNC: 4.4 MMOL/L (ref 3.4–5.3)
POTASSIUM SERPL-SCNC: 4.5 MMOL/L (ref 3.4–5.3)
POTASSIUM SERPL-SCNC: 4.5 MMOL/L (ref 3.4–5.3)
PROCALCITONIN SERPL-MCNC: 0.05 NG/ML
PROT SERPL-MCNC: 5.6 G/DL (ref 6.8–8.8)
PROT SERPL-MCNC: 5.7 G/DL (ref 6.8–8.8)
PROT SERPL-MCNC: 5.8 G/DL (ref 6.8–8.8)
PROT SERPL-MCNC: 5.8 G/DL (ref 6.8–8.8)
PROT SERPL-MCNC: 6.1 G/DL (ref 6.8–8.8)
PROT SERPL-MCNC: 6.3 G/DL (ref 6.8–8.8)
PROT SERPL-MCNC: 6.3 G/DL (ref 6.8–8.8)
PROT SERPL-MCNC: 6.7 G/DL (ref 6.8–8.8)
PROT SERPL-MCNC: 6.7 G/DL (ref 6.8–8.8)
PROT SERPL-MCNC: 6.8 G/DL (ref 6.8–8.8)
PROT SERPL-MCNC: 6.8 G/DL (ref 6.8–8.8)
PROT SERPL-MCNC: 7.1 G/DL (ref 6.8–8.8)
RBC # BLD AUTO: 3.06 10E12/L (ref 4.4–5.9)
RBC # BLD AUTO: 3.1 10E12/L (ref 4.4–5.9)
RBC # BLD AUTO: 3.1 10E12/L (ref 4.4–5.9)
RBC # BLD AUTO: 3.17 10E12/L (ref 4.4–5.9)
RBC # BLD AUTO: 3.21 10E12/L (ref 4.4–5.9)
RBC # BLD AUTO: 3.22 10E12/L (ref 4.4–5.9)
RBC # BLD AUTO: 3.27 10E12/L (ref 4.4–5.9)
RBC # BLD AUTO: 3.37 10E12/L (ref 4.4–5.9)
RBC # BLD AUTO: 3.38 10E12/L (ref 4.4–5.9)
RBC # BLD AUTO: 3.38 10E12/L (ref 4.4–5.9)
RBC # BLD AUTO: 3.57 10E12/L (ref 4.4–5.9)
RBC # BLD AUTO: 3.58 10E12/L (ref 4.4–5.9)
RBC # BLD AUTO: 3.66 10E12/L (ref 4.4–5.9)
RBC # BLD AUTO: 3.86 10E12/L (ref 4.4–5.9)
RBC # BLD AUTO: 3.9 10E12/L (ref 4.4–5.9)
RBC # BLD AUTO: 4.3 10E12/L (ref 4.4–5.9)
RBC # BLD AUTO: 4.32 10E12/L (ref 4.4–5.9)
RBC # BLD AUTO: 4.38 10E12/L (ref 4.4–5.9)
RBC #/AREA URNS AUTO: 10 /HPF (ref 0–2)
RBC #/AREA URNS AUTO: 10 /HPF (ref 0–2)
RBC #/AREA URNS AUTO: 20 /HPF (ref 0–2)
RBC #/AREA URNS AUTO: 4 /HPF (ref 0–2)
SODIUM SERPL-SCNC: 132 MMOL/L (ref 133–144)
SODIUM SERPL-SCNC: 134 MMOL/L (ref 133–144)
SODIUM SERPL-SCNC: 136 MMOL/L (ref 133–144)
SODIUM SERPL-SCNC: 136 MMOL/L (ref 133–144)
SODIUM SERPL-SCNC: 138 MMOL/L (ref 133–144)
SODIUM SERPL-SCNC: 139 MMOL/L (ref 133–144)
SODIUM SERPL-SCNC: 140 MMOL/L (ref 133–144)
SODIUM SERPL-SCNC: 141 MMOL/L (ref 133–144)
SODIUM SERPL-SCNC: 142 MMOL/L (ref 133–144)
SOURCE: ABNORMAL
SOURCE: NORMAL
SP GR UR STRIP: 1.01 (ref 1–1.03)
SP GR UR STRIP: 1.01 (ref 1–1.03)
SP GR UR STRIP: 1.02 (ref 1–1.03)
SP GR UR STRIP: 1.02 (ref 1–1.03)
SP GR UR STRIP: <=1.005 (ref 1–1.03)
SPECIMEN SOURCE: ABNORMAL
SPECIMEN SOURCE: NORMAL
SQUAMOUS #/AREA URNS AUTO: <1 /HPF (ref 0–1)
T4 FREE SERPL-MCNC: 1.33 NG/DL (ref 0.76–1.46)
TRANS CELLS #/AREA URNS HPF: 1 /HPF (ref 0–1)
TRANS CELLS #/AREA URNS HPF: <1 /HPF (ref 0–1)
TRIGL SERPL-MCNC: 90 MG/DL
TROPONIN I SERPL-MCNC: 0.23 UG/L (ref 0–0.04)
TROPONIN I SERPL-MCNC: 0.25 UG/L (ref 0–0.04)
TROPONIN I SERPL-MCNC: 0.56 UG/L (ref 0–0.04)
TROPONIN I SERPL-MCNC: 0.63 UG/L (ref 0–0.04)
TROPONIN I SERPL-MCNC: 0.84 UG/L (ref 0–0.04)
TROPONIN I SERPL-MCNC: 1.04 UG/L (ref 0–0.04)
TROPONIN I SERPL-MCNC: 1.08 UG/L (ref 0–0.04)
TROPONIN I SERPL-MCNC: 1.12 UG/L (ref 0–0.04)
TROPONIN I SERPL-MCNC: 1.14 UG/L (ref 0–0.04)
TROPONIN I SERPL-MCNC: 1.18 UG/L (ref 0–0.04)
TROPONIN I SERPL-MCNC: 1.24 UG/L (ref 0–0.04)
TROPONIN I SERPL-MCNC: 1.27 UG/L (ref 0–0.04)
TROPONIN I SERPL-MCNC: 1.31 UG/L (ref 0–0.04)
TSH SERPL DL<=0.005 MIU/L-ACNC: 4.39 MU/L (ref 0.4–4)
UROBILINOGEN UR STRIP-ACNC: 0.2 EU/DL (ref 0.2–1)
UROBILINOGEN UR STRIP-MCNC: 2 MG/DL (ref 0–2)
UROBILINOGEN UR STRIP-MCNC: 4 MG/DL (ref 0–2)
UROBILINOGEN UR STRIP-MCNC: 4 MG/DL (ref 0–2)
UROBILINOGEN UR STRIP-MCNC: NORMAL MG/DL (ref 0–2)
VIT B12 SERPL-MCNC: 1155 PG/ML (ref 193–986)
WBC # BLD AUTO: 10 10E9/L (ref 4–11)
WBC # BLD AUTO: 10.1 10E9/L (ref 4–11)
WBC # BLD AUTO: 10.2 10E9/L (ref 4–11)
WBC # BLD AUTO: 11.1 10E9/L (ref 4–11)
WBC # BLD AUTO: 12.5 10E9/L (ref 4–11)
WBC # BLD AUTO: 13.5 10E9/L (ref 4–11)
WBC # BLD AUTO: 13.7 10E9/L (ref 4–11)
WBC # BLD AUTO: 15.4 10E9/L (ref 4–11)
WBC # BLD AUTO: 6.4 10E9/L (ref 4–11)
WBC # BLD AUTO: 6.6 10E9/L (ref 4–11)
WBC # BLD AUTO: 6.7 10E9/L (ref 4–11)
WBC # BLD AUTO: 6.7 10E9/L (ref 4–11)
WBC # BLD AUTO: 7.2 10E9/L (ref 4–11)
WBC # BLD AUTO: 9.1 10E9/L (ref 4–11)
WBC # BLD AUTO: 9.4 10E9/L (ref 4–11)
WBC # BLD AUTO: 9.5 10E9/L (ref 4–11)
WBC # BLD AUTO: 9.6 10E9/L (ref 4–11)
WBC # BLD AUTO: 9.8 10E9/L (ref 4–11)
WBC #/AREA URNS AUTO: 13 /HPF (ref 0–5)
WBC #/AREA URNS AUTO: 162 /HPF (ref 0–5)
WBC #/AREA URNS AUTO: 7 /HPF (ref 0–5)
WBC #/AREA URNS AUTO: >182 /HPF (ref 0–5)
WBC CLUMPS #/AREA URNS HPF: PRESENT /HPF

## 2019-01-01 PROCEDURE — 84484 ASSAY OF TROPONIN QUANT: CPT | Performed by: STUDENT IN AN ORGANIZED HEALTH CARE EDUCATION/TRAINING PROGRAM

## 2019-01-01 PROCEDURE — 99358 PROLONG SERVICE W/O CONTACT: CPT | Performed by: INTERNAL MEDICINE

## 2019-01-01 PROCEDURE — 80048 BASIC METABOLIC PNL TOTAL CA: CPT | Performed by: STUDENT IN AN ORGANIZED HEALTH CARE EDUCATION/TRAINING PROGRAM

## 2019-01-01 PROCEDURE — 25000132 ZZH RX MED GY IP 250 OP 250 PS 637: Mod: GY | Performed by: STUDENT IN AN ORGANIZED HEALTH CARE EDUCATION/TRAINING PROGRAM

## 2019-01-01 PROCEDURE — 99284 EMERGENCY DEPT VISIT MOD MDM: CPT | Mod: 25 | Performed by: FAMILY MEDICINE

## 2019-01-01 PROCEDURE — 97530 THERAPEUTIC ACTIVITIES: CPT | Mod: GO

## 2019-01-01 PROCEDURE — 85027 COMPLETE CBC AUTOMATED: CPT | Performed by: NURSE PRACTITIONER

## 2019-01-01 PROCEDURE — 97112 NEUROMUSCULAR REEDUCATION: CPT | Mod: GP | Performed by: PHYSICAL THERAPIST

## 2019-01-01 PROCEDURE — 21000001 ZZH R&B HEART CARE

## 2019-01-01 PROCEDURE — 99232 SBSQ HOSP IP/OBS MODERATE 35: CPT | Performed by: PSYCHIATRY & NEUROLOGY

## 2019-01-01 PROCEDURE — 99221 1ST HOSP IP/OBS SF/LOW 40: CPT | Performed by: PSYCHIATRY & NEUROLOGY

## 2019-01-01 PROCEDURE — 80053 COMPREHEN METABOLIC PANEL: CPT | Performed by: STUDENT IN AN ORGANIZED HEALTH CARE EDUCATION/TRAINING PROGRAM

## 2019-01-01 PROCEDURE — 25000132 ZZH RX MED GY IP 250 OP 250 PS 637: Mod: GY | Performed by: HOSPITALIST

## 2019-01-01 PROCEDURE — 36415 COLL VENOUS BLD VENIPUNCTURE: CPT | Performed by: STUDENT IN AN ORGANIZED HEALTH CARE EDUCATION/TRAINING PROGRAM

## 2019-01-01 PROCEDURE — 80053 COMPREHEN METABOLIC PANEL: CPT | Performed by: FAMILY MEDICINE

## 2019-01-01 PROCEDURE — 93010 ELECTROCARDIOGRAM REPORT: CPT | Performed by: INTERNAL MEDICINE

## 2019-01-01 PROCEDURE — A9270 NON-COVERED ITEM OR SERVICE: HCPCS | Mod: GY | Performed by: STUDENT IN AN ORGANIZED HEALTH CARE EDUCATION/TRAINING PROGRAM

## 2019-01-01 PROCEDURE — 00000146 ZZHCL STATISTIC GLUCOSE BY METER IP

## 2019-01-01 PROCEDURE — 83605 ASSAY OF LACTIC ACID: CPT | Performed by: INTERNAL MEDICINE

## 2019-01-01 PROCEDURE — 85025 COMPLETE CBC W/AUTO DIFF WBC: CPT | Performed by: EMERGENCY MEDICINE

## 2019-01-01 PROCEDURE — 82248 BILIRUBIN DIRECT: CPT | Performed by: INTERNAL MEDICINE

## 2019-01-01 PROCEDURE — 25000132 ZZH RX MED GY IP 250 OP 250 PS 637: Mod: GY | Performed by: INTERNAL MEDICINE

## 2019-01-01 PROCEDURE — 12000001 ZZH R&B MED SURG/OB UMMC

## 2019-01-01 PROCEDURE — 25000128 H RX IP 250 OP 636: Performed by: INTERNAL MEDICINE

## 2019-01-01 PROCEDURE — 81001 URINALYSIS AUTO W/SCOPE: CPT | Performed by: STUDENT IN AN ORGANIZED HEALTH CARE EDUCATION/TRAINING PROGRAM

## 2019-01-01 PROCEDURE — 25500064 ZZH RX 255 OP 636: Performed by: EMERGENCY MEDICINE

## 2019-01-01 PROCEDURE — 36415 COLL VENOUS BLD VENIPUNCTURE: CPT | Performed by: HOSPITALIST

## 2019-01-01 PROCEDURE — 36415 COLL VENOUS BLD VENIPUNCTURE: CPT | Performed by: INTERNAL MEDICINE

## 2019-01-01 PROCEDURE — 80048 BASIC METABOLIC PNL TOTAL CA: CPT | Performed by: EMERGENCY MEDICINE

## 2019-01-01 PROCEDURE — 97162 PT EVAL MOD COMPLEX 30 MIN: CPT | Mod: GP | Performed by: PHYSICAL THERAPIST

## 2019-01-01 PROCEDURE — 25000128 H RX IP 250 OP 636: Performed by: HOSPITALIST

## 2019-01-01 PROCEDURE — 97110 THERAPEUTIC EXERCISES: CPT | Mod: GP | Performed by: PHYSICAL THERAPIST

## 2019-01-01 PROCEDURE — 25000132 ZZH RX MED GY IP 250 OP 250 PS 637: Mod: GY | Performed by: NURSE PRACTITIONER

## 2019-01-01 PROCEDURE — 86140 C-REACTIVE PROTEIN: CPT | Performed by: STUDENT IN AN ORGANIZED HEALTH CARE EDUCATION/TRAINING PROGRAM

## 2019-01-01 PROCEDURE — 83690 ASSAY OF LIPASE: CPT | Performed by: HOSPITALIST

## 2019-01-01 PROCEDURE — 25000132 ZZH RX MED GY IP 250 OP 250 PS 637: Mod: GY | Performed by: PSYCHIATRY & NEUROLOGY

## 2019-01-01 PROCEDURE — 25000131 ZZH RX MED GY IP 250 OP 636 PS 637: Mod: GY | Performed by: INTERNAL MEDICINE

## 2019-01-01 PROCEDURE — 85520 HEPARIN ASSAY: CPT | Performed by: INTERNAL MEDICINE

## 2019-01-01 PROCEDURE — 80048 BASIC METABOLIC PNL TOTAL CA: CPT | Performed by: INTERNAL MEDICINE

## 2019-01-01 PROCEDURE — 25000132 ZZH RX MED GY IP 250 OP 250 PS 637: Mod: GY | Performed by: FAMILY MEDICINE

## 2019-01-01 PROCEDURE — 85027 COMPLETE CBC AUTOMATED: CPT | Performed by: STUDENT IN AN ORGANIZED HEALTH CARE EDUCATION/TRAINING PROGRAM

## 2019-01-01 PROCEDURE — 76700 US EXAM ABDOM COMPLETE: CPT

## 2019-01-01 PROCEDURE — 99231 SBSQ HOSP IP/OBS SF/LOW 25: CPT | Performed by: PHYSICIAN ASSISTANT

## 2019-01-01 PROCEDURE — 93325 DOPPLER ECHO COLOR FLOW MAPG: CPT | Mod: 26 | Performed by: INTERNAL MEDICINE

## 2019-01-01 PROCEDURE — 72141 MRI NECK SPINE W/O DYE: CPT

## 2019-01-01 PROCEDURE — 83036 HEMOGLOBIN GLYCOSYLATED A1C: CPT | Performed by: STUDENT IN AN ORGANIZED HEALTH CARE EDUCATION/TRAINING PROGRAM

## 2019-01-01 PROCEDURE — 93005 ELECTROCARDIOGRAM TRACING: CPT | Performed by: EMERGENCY MEDICINE

## 2019-01-01 PROCEDURE — 84484 ASSAY OF TROPONIN QUANT: CPT | Performed by: HOSPITALIST

## 2019-01-01 PROCEDURE — 71046 X-RAY EXAM CHEST 2 VIEWS: CPT

## 2019-01-01 PROCEDURE — 92526 ORAL FUNCTION THERAPY: CPT | Mod: GN | Performed by: REHABILITATION PRACTITIONER

## 2019-01-01 PROCEDURE — 83036 HEMOGLOBIN GLYCOSYLATED A1C: CPT | Performed by: FAMILY MEDICINE

## 2019-01-01 PROCEDURE — 99285 EMERGENCY DEPT VISIT HI MDM: CPT | Mod: 25 | Performed by: STUDENT IN AN ORGANIZED HEALTH CARE EDUCATION/TRAINING PROGRAM

## 2019-01-01 PROCEDURE — 80048 BASIC METABOLIC PNL TOTAL CA: CPT | Performed by: HOSPITALIST

## 2019-01-01 PROCEDURE — 99232 SBSQ HOSP IP/OBS MODERATE 35: CPT | Performed by: HOSPITALIST

## 2019-01-01 PROCEDURE — 99284 EMERGENCY DEPT VISIT MOD MDM: CPT | Performed by: FAMILY MEDICINE

## 2019-01-01 PROCEDURE — 36415 COLL VENOUS BLD VENIPUNCTURE: CPT | Performed by: FAMILY MEDICINE

## 2019-01-01 PROCEDURE — 99232 SBSQ HOSP IP/OBS MODERATE 35: CPT | Performed by: INTERNAL MEDICINE

## 2019-01-01 PROCEDURE — 81001 URINALYSIS AUTO W/SCOPE: CPT | Performed by: FAMILY MEDICINE

## 2019-01-01 PROCEDURE — C1894 INTRO/SHEATH, NON-LASER: HCPCS | Performed by: INTERNAL MEDICINE

## 2019-01-01 PROCEDURE — 84443 ASSAY THYROID STIM HORMONE: CPT | Performed by: EMERGENCY MEDICINE

## 2019-01-01 PROCEDURE — 82607 VITAMIN B-12: CPT | Performed by: STUDENT IN AN ORGANIZED HEALTH CARE EDUCATION/TRAINING PROGRAM

## 2019-01-01 PROCEDURE — 93005 ELECTROCARDIOGRAM TRACING: CPT

## 2019-01-01 PROCEDURE — 84484 ASSAY OF TROPONIN QUANT: CPT | Performed by: INTERNAL MEDICINE

## 2019-01-01 PROCEDURE — 25000128 H RX IP 250 OP 636: Performed by: EMERGENCY MEDICINE

## 2019-01-01 PROCEDURE — 85652 RBC SED RATE AUTOMATED: CPT | Performed by: PHYSICIAN ASSISTANT

## 2019-01-01 PROCEDURE — A9502 TC99M TETROFOSMIN: HCPCS | Performed by: INTERNAL MEDICINE

## 2019-01-01 PROCEDURE — 99207 ZZC NON-BILLABLE SERV PER CHARTING: CPT | Performed by: INTERNAL MEDICINE

## 2019-01-01 PROCEDURE — 36600 WITHDRAWAL OF ARTERIAL BLOOD: CPT

## 2019-01-01 PROCEDURE — 99205 OFFICE O/P NEW HI 60 MIN: CPT | Performed by: PSYCHIATRY & NEUROLOGY

## 2019-01-01 PROCEDURE — 25000128 H RX IP 250 OP 636: Performed by: STUDENT IN AN ORGANIZED HEALTH CARE EDUCATION/TRAINING PROGRAM

## 2019-01-01 PROCEDURE — 93018 CV STRESS TEST I&R ONLY: CPT | Performed by: INTERNAL MEDICINE

## 2019-01-01 PROCEDURE — 97530 THERAPEUTIC ACTIVITIES: CPT | Mod: GP | Performed by: PHYSICAL THERAPIST

## 2019-01-01 PROCEDURE — 80320 DRUG SCREEN QUANTALCOHOLS: CPT | Performed by: EMERGENCY MEDICINE

## 2019-01-01 PROCEDURE — 70553 MRI BRAIN STEM W/O & W/DYE: CPT

## 2019-01-01 PROCEDURE — 93306 TTE W/DOPPLER COMPLETE: CPT

## 2019-01-01 PROCEDURE — 25000125 ZZHC RX 250: Performed by: INTERNAL MEDICINE

## 2019-01-01 PROCEDURE — 99239 HOSP IP/OBS DSCHRG MGMT >30: CPT | Performed by: INTERNAL MEDICINE

## 2019-01-01 PROCEDURE — 84145 PROCALCITONIN (PCT): CPT | Performed by: STUDENT IN AN ORGANIZED HEALTH CARE EDUCATION/TRAINING PROGRAM

## 2019-01-01 PROCEDURE — 99215 OFFICE O/P EST HI 40 MIN: CPT | Performed by: PSYCHIATRY & NEUROLOGY

## 2019-01-01 PROCEDURE — 80053 COMPREHEN METABOLIC PANEL: CPT | Performed by: INTERNAL MEDICINE

## 2019-01-01 PROCEDURE — 25000125 ZZHC RX 250: Performed by: STUDENT IN AN ORGANIZED HEALTH CARE EDUCATION/TRAINING PROGRAM

## 2019-01-01 PROCEDURE — 82140 ASSAY OF AMMONIA: CPT | Performed by: STUDENT IN AN ORGANIZED HEALTH CARE EDUCATION/TRAINING PROGRAM

## 2019-01-01 PROCEDURE — 97530 THERAPEUTIC ACTIVITIES: CPT | Mod: GP

## 2019-01-01 PROCEDURE — 34300033 ZZH RX 343: Performed by: INTERNAL MEDICINE

## 2019-01-01 PROCEDURE — 99214 OFFICE O/P EST MOD 30 MIN: CPT | Performed by: FAMILY MEDICINE

## 2019-01-01 PROCEDURE — 72131 CT LUMBAR SPINE W/O DYE: CPT

## 2019-01-01 PROCEDURE — 93308 TTE F-UP OR LMTD: CPT | Mod: 26 | Performed by: INTERNAL MEDICINE

## 2019-01-01 PROCEDURE — 80061 LIPID PANEL: CPT | Performed by: INTERNAL MEDICINE

## 2019-01-01 PROCEDURE — 80076 HEPATIC FUNCTION PANEL: CPT | Performed by: STUDENT IN AN ORGANIZED HEALTH CARE EDUCATION/TRAINING PROGRAM

## 2019-01-01 PROCEDURE — 99285 EMERGENCY DEPT VISIT HI MDM: CPT | Mod: Z6 | Performed by: EMERGENCY MEDICINE

## 2019-01-01 PROCEDURE — 99605 MTMS BY PHARM NP 15 MIN: CPT | Performed by: PHARMACIST

## 2019-01-01 PROCEDURE — 99232 SBSQ HOSP IP/OBS MODERATE 35: CPT | Mod: GW | Performed by: INTERNAL MEDICINE

## 2019-01-01 PROCEDURE — 74170 CT ABD WO CNTRST FLWD CNTRST: CPT

## 2019-01-01 PROCEDURE — 99214 OFFICE O/P EST MOD 30 MIN: CPT | Performed by: PHYSICIAN ASSISTANT

## 2019-01-01 PROCEDURE — 99285 EMERGENCY DEPT VISIT HI MDM: CPT | Mod: 25 | Performed by: EMERGENCY MEDICINE

## 2019-01-01 PROCEDURE — 99221 1ST HOSP IP/OBS SF/LOW 40: CPT | Mod: 25 | Performed by: INTERNAL MEDICINE

## 2019-01-01 PROCEDURE — 71045 X-RAY EXAM CHEST 1 VIEW: CPT

## 2019-01-01 PROCEDURE — 82565 ASSAY OF CREATININE: CPT | Performed by: STUDENT IN AN ORGANIZED HEALTH CARE EDUCATION/TRAINING PROGRAM

## 2019-01-01 PROCEDURE — 85025 COMPLETE CBC W/AUTO DIFF WBC: CPT | Performed by: FAMILY MEDICINE

## 2019-01-01 PROCEDURE — 99233 SBSQ HOSP IP/OBS HIGH 50: CPT | Performed by: INTERNAL MEDICINE

## 2019-01-01 PROCEDURE — 83690 ASSAY OF LIPASE: CPT | Performed by: INTERNAL MEDICINE

## 2019-01-01 PROCEDURE — 93454 CORONARY ARTERY ANGIO S&I: CPT | Performed by: INTERNAL MEDICINE

## 2019-01-01 PROCEDURE — 40000556 ZZH STATISTIC PERIPHERAL IV START W US GUIDANCE

## 2019-01-01 PROCEDURE — 99220 ZZC INITIAL OBSERVATION CARE,LEVL III: CPT | Mod: AI | Performed by: INTERNAL MEDICINE

## 2019-01-01 PROCEDURE — 99233 SBSQ HOSP IP/OBS HIGH 50: CPT | Performed by: FAMILY MEDICINE

## 2019-01-01 PROCEDURE — 99607 MTMS BY PHARM ADDL 15 MIN: CPT | Performed by: PHARMACIST

## 2019-01-01 PROCEDURE — 93010 ELECTROCARDIOGRAM REPORT: CPT | Mod: Z6 | Performed by: STUDENT IN AN ORGANIZED HEALTH CARE EDUCATION/TRAINING PROGRAM

## 2019-01-01 PROCEDURE — 80053 COMPREHEN METABOLIC PANEL: CPT | Performed by: EMERGENCY MEDICINE

## 2019-01-01 PROCEDURE — 87186 SC STD MICRODIL/AGAR DIL: CPT | Performed by: STUDENT IN AN ORGANIZED HEALTH CARE EDUCATION/TRAINING PROGRAM

## 2019-01-01 PROCEDURE — 99152 MOD SED SAME PHYS/QHP 5/>YRS: CPT | Performed by: INTERNAL MEDICINE

## 2019-01-01 PROCEDURE — 93306 TTE W/DOPPLER COMPLETE: CPT | Mod: 26 | Performed by: INTERNAL MEDICINE

## 2019-01-01 PROCEDURE — 99207 ZZC APP CREDIT; MD BILLING SHARED VISIT: CPT | Performed by: NURSE PRACTITIONER

## 2019-01-01 PROCEDURE — 25000125 ZZHC RX 250: Performed by: FAMILY MEDICINE

## 2019-01-01 PROCEDURE — 93010 ELECTROCARDIOGRAM REPORT: CPT | Mod: 76 | Performed by: INTERNAL MEDICINE

## 2019-01-01 PROCEDURE — 80048 BASIC METABOLIC PNL TOTAL CA: CPT | Performed by: NURSE PRACTITIONER

## 2019-01-01 PROCEDURE — 25800030 ZZH RX IP 258 OP 636: Performed by: HOSPITALIST

## 2019-01-01 PROCEDURE — C9113 INJ PANTOPRAZOLE SODIUM, VIA: HCPCS | Performed by: INTERNAL MEDICINE

## 2019-01-01 PROCEDURE — 85027 COMPLETE CBC AUTOMATED: CPT | Performed by: INTERNAL MEDICINE

## 2019-01-01 PROCEDURE — 81003 URINALYSIS AUTO W/O SCOPE: CPT | Performed by: FAMILY MEDICINE

## 2019-01-01 PROCEDURE — 84484 ASSAY OF TROPONIN QUANT: CPT | Performed by: FAMILY MEDICINE

## 2019-01-01 PROCEDURE — 99223 1ST HOSP IP/OBS HIGH 75: CPT | Mod: AI | Performed by: HOSPITALIST

## 2019-01-01 PROCEDURE — 83690 ASSAY OF LIPASE: CPT | Performed by: EMERGENCY MEDICINE

## 2019-01-01 PROCEDURE — 83690 ASSAY OF LIPASE: CPT | Performed by: FAMILY MEDICINE

## 2019-01-01 PROCEDURE — 80053 COMPREHEN METABOLIC PANEL: CPT | Performed by: HOSPITALIST

## 2019-01-01 PROCEDURE — 97140 MANUAL THERAPY 1/> REGIONS: CPT | Mod: GP | Performed by: PHYSICAL THERAPIST

## 2019-01-01 PROCEDURE — 83880 ASSAY OF NATRIURETIC PEPTIDE: CPT | Performed by: STUDENT IN AN ORGANIZED HEALTH CARE EDUCATION/TRAINING PROGRAM

## 2019-01-01 PROCEDURE — 62323 NJX INTERLAMINAR LMBR/SAC: CPT | Performed by: PSYCHIATRY & NEUROLOGY

## 2019-01-01 PROCEDURE — 87040 BLOOD CULTURE FOR BACTERIA: CPT | Performed by: INTERNAL MEDICINE

## 2019-01-01 PROCEDURE — 97163 PT EVAL HIGH COMPLEX 45 MIN: CPT | Mod: GP

## 2019-01-01 PROCEDURE — 85025 COMPLETE CBC W/AUTO DIFF WBC: CPT | Performed by: HOSPITALIST

## 2019-01-01 PROCEDURE — 84484 ASSAY OF TROPONIN QUANT: CPT | Performed by: EMERGENCY MEDICINE

## 2019-01-01 PROCEDURE — 25000128 H RX IP 250 OP 636: Performed by: FAMILY MEDICINE

## 2019-01-01 PROCEDURE — 36415 COLL VENOUS BLD VENIPUNCTURE: CPT | Performed by: NURSE PRACTITIONER

## 2019-01-01 PROCEDURE — 80076 HEPATIC FUNCTION PANEL: CPT | Performed by: HOSPITALIST

## 2019-01-01 PROCEDURE — 76705 ECHO EXAM OF ABDOMEN: CPT

## 2019-01-01 PROCEDURE — 93454 CORONARY ARTERY ANGIO S&I: CPT | Mod: 26 | Performed by: INTERNAL MEDICINE

## 2019-01-01 PROCEDURE — 25800030 ZZH RX IP 258 OP 636: Performed by: INTERNAL MEDICINE

## 2019-01-01 PROCEDURE — 97166 OT EVAL MOD COMPLEX 45 MIN: CPT | Mod: GO

## 2019-01-01 PROCEDURE — 83880 ASSAY OF NATRIURETIC PEPTIDE: CPT | Performed by: EMERGENCY MEDICINE

## 2019-01-01 PROCEDURE — 25000132 ZZH RX MED GY IP 250 OP 250 PS 637: Mod: GY | Performed by: EMERGENCY MEDICINE

## 2019-01-01 PROCEDURE — 93005 ELECTROCARDIOGRAM TRACING: CPT | Performed by: FAMILY MEDICINE

## 2019-01-01 PROCEDURE — A9585 GADOBUTROL INJECTION: HCPCS | Performed by: EMERGENCY MEDICINE

## 2019-01-01 PROCEDURE — 25800025 ZZH RX 258: Performed by: STUDENT IN AN ORGANIZED HEALTH CARE EDUCATION/TRAINING PROGRAM

## 2019-01-01 PROCEDURE — 99223 1ST HOSP IP/OBS HIGH 75: CPT | Performed by: INTERNAL MEDICINE

## 2019-01-01 PROCEDURE — 27210794 ZZH OR GENERAL SUPPLY STERILE: Performed by: INTERNAL MEDICINE

## 2019-01-01 PROCEDURE — 83690 ASSAY OF LIPASE: CPT | Performed by: STUDENT IN AN ORGANIZED HEALTH CARE EDUCATION/TRAINING PROGRAM

## 2019-01-01 PROCEDURE — 96374 THER/PROPH/DIAG INJ IV PUSH: CPT | Performed by: STUDENT IN AN ORGANIZED HEALTH CARE EDUCATION/TRAINING PROGRAM

## 2019-01-01 PROCEDURE — 85520 HEPARIN ASSAY: CPT | Performed by: HOSPITALIST

## 2019-01-01 PROCEDURE — 99231 SBSQ HOSP IP/OBS SF/LOW 25: CPT | Mod: GW | Performed by: CLINICAL NURSE SPECIALIST

## 2019-01-01 PROCEDURE — 87088 URINE BACTERIA CULTURE: CPT | Performed by: STUDENT IN AN ORGANIZED HEALTH CARE EDUCATION/TRAINING PROGRAM

## 2019-01-01 PROCEDURE — 93016 CV STRESS TEST SUPVJ ONLY: CPT | Performed by: INTERNAL MEDICINE

## 2019-01-01 PROCEDURE — 99217 ZZC OBSERVATION CARE DISCHARGE: CPT | Mod: 25 | Performed by: PHYSICIAN ASSISTANT

## 2019-01-01 PROCEDURE — 99233 SBSQ HOSP IP/OBS HIGH 50: CPT | Performed by: HOSPITALIST

## 2019-01-01 PROCEDURE — 78451 HT MUSCLE IMAGE SPECT SING: CPT

## 2019-01-01 PROCEDURE — 25000131 ZZH RX MED GY IP 250 OP 636 PS 637: Mod: GY | Performed by: STUDENT IN AN ORGANIZED HEALTH CARE EDUCATION/TRAINING PROGRAM

## 2019-01-01 PROCEDURE — 73610 X-RAY EXAM OF ANKLE: CPT | Mod: LT

## 2019-01-01 PROCEDURE — 97165 OT EVAL LOW COMPLEX 30 MIN: CPT | Mod: GO

## 2019-01-01 PROCEDURE — 82550 ASSAY OF CK (CPK): CPT | Performed by: STUDENT IN AN ORGANIZED HEALTH CARE EDUCATION/TRAINING PROGRAM

## 2019-01-01 PROCEDURE — 82140 ASSAY OF AMMONIA: CPT | Performed by: EMERGENCY MEDICINE

## 2019-01-01 PROCEDURE — 93308 TTE F-UP OR LMTD: CPT

## 2019-01-01 PROCEDURE — 36415 COLL VENOUS BLD VENIPUNCTURE: CPT | Performed by: PHYSICIAN ASSISTANT

## 2019-01-01 PROCEDURE — 40000893 ZZH STATISTIC PT IP EVAL DEFER

## 2019-01-01 PROCEDURE — 99221 1ST HOSP IP/OBS SF/LOW 40: CPT | Performed by: SURGERY

## 2019-01-01 PROCEDURE — 93321 DOPPLER ECHO F-UP/LMTD STD: CPT | Mod: 26 | Performed by: INTERNAL MEDICINE

## 2019-01-01 PROCEDURE — 40000065 ZZH STATISTIC EKG NON-CHARGEABLE

## 2019-01-01 PROCEDURE — 99232 SBSQ HOSP IP/OBS MODERATE 35: CPT | Mod: 25 | Performed by: INTERNAL MEDICINE

## 2019-01-01 PROCEDURE — 74177 CT ABD & PELVIS W/CONTRAST: CPT

## 2019-01-01 PROCEDURE — 99606 MTMS BY PHARM EST 15 MIN: CPT | Performed by: PHARMACIST

## 2019-01-01 PROCEDURE — 40000855 ZZH STATISTIC ECHO STRESS OR NM NPI

## 2019-01-01 PROCEDURE — 93010 ELECTROCARDIOGRAM REPORT: CPT | Mod: Z6 | Performed by: FAMILY MEDICINE

## 2019-01-01 PROCEDURE — 85049 AUTOMATED PLATELET COUNT: CPT | Performed by: STUDENT IN AN ORGANIZED HEALTH CARE EDUCATION/TRAINING PROGRAM

## 2019-01-01 PROCEDURE — 84439 ASSAY OF FREE THYROXINE: CPT | Performed by: EMERGENCY MEDICINE

## 2019-01-01 PROCEDURE — 83735 ASSAY OF MAGNESIUM: CPT | Performed by: HOSPITALIST

## 2019-01-01 PROCEDURE — G0378 HOSPITAL OBSERVATION PER HR: HCPCS

## 2019-01-01 PROCEDURE — 99223 1ST HOSP IP/OBS HIGH 75: CPT | Mod: GW | Performed by: INTERNAL MEDICINE

## 2019-01-01 PROCEDURE — 86140 C-REACTIVE PROTEIN: CPT | Performed by: EMERGENCY MEDICINE

## 2019-01-01 PROCEDURE — 99213 OFFICE O/P EST LOW 20 MIN: CPT | Performed by: PHYSICIAN ASSISTANT

## 2019-01-01 PROCEDURE — 92610 EVALUATE SWALLOWING FUNCTION: CPT | Mod: GN | Performed by: REHABILITATION PRACTITIONER

## 2019-01-01 PROCEDURE — 96374 THER/PROPH/DIAG INJ IV PUSH: CPT | Performed by: EMERGENCY MEDICINE

## 2019-01-01 PROCEDURE — 25800030 ZZH RX IP 258 OP 636: Performed by: EMERGENCY MEDICINE

## 2019-01-01 PROCEDURE — 82150 ASSAY OF AMYLASE: CPT | Performed by: FAMILY MEDICINE

## 2019-01-01 PROCEDURE — 73590 X-RAY EXAM OF LOWER LEG: CPT | Mod: RT | Performed by: FAMILY MEDICINE

## 2019-01-01 PROCEDURE — G0463 HOSPITAL OUTPT CLINIC VISIT: HCPCS

## 2019-01-01 PROCEDURE — 82728 ASSAY OF FERRITIN: CPT | Performed by: FAMILY MEDICINE

## 2019-01-01 PROCEDURE — 84132 ASSAY OF SERUM POTASSIUM: CPT | Performed by: HOSPITALIST

## 2019-01-01 PROCEDURE — 99207 ZZC OFFICE-HOSPITAL ADMIT: CPT | Performed by: PHYSICIAN ASSISTANT

## 2019-01-01 PROCEDURE — 21400000 ZZH R&B CCU UMMC

## 2019-01-01 PROCEDURE — 93010 ELECTROCARDIOGRAM REPORT: CPT | Mod: Z6 | Performed by: EMERGENCY MEDICINE

## 2019-01-01 PROCEDURE — 82010 KETONE BODYS QUAN: CPT | Performed by: HOSPITALIST

## 2019-01-01 PROCEDURE — 80076 HEPATIC FUNCTION PANEL: CPT | Performed by: EMERGENCY MEDICINE

## 2019-01-01 PROCEDURE — 99207 ZZC CDG-MDM COMPONENT: MEETS LOW - DOWN CODED: CPT | Performed by: CLINICAL NURSE SPECIALIST

## 2019-01-01 PROCEDURE — 82803 BLOOD GASES ANY COMBINATION: CPT | Performed by: STUDENT IN AN ORGANIZED HEALTH CARE EDUCATION/TRAINING PROGRAM

## 2019-01-01 PROCEDURE — 99213 OFFICE O/P EST LOW 20 MIN: CPT | Performed by: NEUROLOGICAL SURGERY

## 2019-01-01 PROCEDURE — 93005 ELECTROCARDIOGRAM TRACING: CPT | Performed by: STUDENT IN AN ORGANIZED HEALTH CARE EDUCATION/TRAINING PROGRAM

## 2019-01-01 PROCEDURE — 87040 BLOOD CULTURE FOR BACTERIA: CPT | Performed by: STUDENT IN AN ORGANIZED HEALTH CARE EDUCATION/TRAINING PROGRAM

## 2019-01-01 PROCEDURE — 96361 HYDRATE IV INFUSION ADD-ON: CPT | Performed by: EMERGENCY MEDICINE

## 2019-01-01 PROCEDURE — 87086 URINE CULTURE/COLONY COUNT: CPT | Performed by: STUDENT IN AN ORGANIZED HEALTH CARE EDUCATION/TRAINING PROGRAM

## 2019-01-01 PROCEDURE — 85018 HEMOGLOBIN: CPT | Performed by: FAMILY MEDICINE

## 2019-01-01 PROCEDURE — 40000141 ZZH STATISTIC PERIPHERAL IV START W/O US GUIDANCE

## 2019-01-01 PROCEDURE — 97535 SELF CARE MNGMENT TRAINING: CPT | Mod: GO

## 2019-01-01 PROCEDURE — 82140 ASSAY OF AMMONIA: CPT | Performed by: HOSPITALIST

## 2019-01-01 PROCEDURE — 80048 BASIC METABOLIC PNL TOTAL CA: CPT | Performed by: FAMILY MEDICINE

## 2019-01-01 PROCEDURE — 85025 COMPLETE CBC W/AUTO DIFF WBC: CPT | Performed by: STUDENT IN AN ORGANIZED HEALTH CARE EDUCATION/TRAINING PROGRAM

## 2019-01-01 PROCEDURE — 70450 CT HEAD/BRAIN W/O DYE: CPT

## 2019-01-01 PROCEDURE — 40000275 ZZH STATISTIC RCP TIME EA 10 MIN

## 2019-01-01 PROCEDURE — 72148 MRI LUMBAR SPINE W/O DYE: CPT

## 2019-01-01 RX ORDER — POLYETHYLENE GLYCOL 3350 17 G/17G
17 POWDER, FOR SOLUTION ORAL DAILY
Status: DISCONTINUED | OUTPATIENT
Start: 2019-01-01 | End: 2019-01-01 | Stop reason: HOSPADM

## 2019-01-01 RX ORDER — AMLODIPINE BESYLATE 5 MG/1
5 TABLET ORAL DAILY
Status: DISCONTINUED | OUTPATIENT
Start: 2019-01-01 | End: 2019-01-01 | Stop reason: HOSPADM

## 2019-01-01 RX ORDER — TRAZODONE HYDROCHLORIDE 100 MG/1
TABLET ORAL
Qty: 270 TABLET | Refills: 1 | Status: SHIPPED | OUTPATIENT
Start: 2019-01-01 | End: 2019-01-01

## 2019-01-01 RX ORDER — UREA 10 %
500 LOTION (ML) TOPICAL DAILY
Status: CANCELLED | OUTPATIENT
Start: 2019-01-01

## 2019-01-01 RX ORDER — BENZTROPINE MESYLATE 1 MG/1
1-2 TABLET ORAL 3 TIMES DAILY PRN
Status: DISCONTINUED | OUTPATIENT
Start: 2019-01-01 | End: 2019-01-01

## 2019-01-01 RX ORDER — ATORVASTATIN CALCIUM 80 MG/1
80 TABLET, FILM COATED ORAL DAILY
Status: DISCONTINUED | OUTPATIENT
Start: 2019-01-01 | End: 2019-01-01 | Stop reason: HOSPADM

## 2019-01-01 RX ORDER — POLYETHYLENE GLYCOL 3350 17 G/17G
17 POWDER, FOR SOLUTION ORAL DAILY PRN
Status: DISCONTINUED | OUTPATIENT
Start: 2019-01-01 | End: 2019-01-01 | Stop reason: HOSPADM

## 2019-01-01 RX ORDER — ACETAMINOPHEN 500 MG
1000 TABLET ORAL 3 TIMES DAILY
Status: CANCELLED | OUTPATIENT
Start: 2019-01-01

## 2019-01-01 RX ORDER — OXYCODONE AND ACETAMINOPHEN 5; 325 MG/1; MG/1
1 TABLET ORAL ONCE
Status: COMPLETED | OUTPATIENT
Start: 2019-01-01 | End: 2019-01-01

## 2019-01-01 RX ORDER — OXYCODONE HYDROCHLORIDE 30 MG/1
30 TABLET, FILM COATED, EXTENDED RELEASE ORAL EVERY MORNING
Qty: 30 TABLET | Refills: 0 | Status: SHIPPED | OUTPATIENT
Start: 2019-01-01 | End: 2019-01-01

## 2019-01-01 RX ORDER — ALBUTEROL SULFATE 0.83 MG/ML
2.5 SOLUTION RESPIRATORY (INHALATION) EVERY 6 HOURS PRN
Status: DISCONTINUED | OUTPATIENT
Start: 2019-01-01 | End: 2019-01-01 | Stop reason: HOSPADM

## 2019-01-01 RX ORDER — OXYCODONE HYDROCHLORIDE 5 MG/1
TABLET ORAL
Qty: 40 TABLET | Refills: 0 | Status: ON HOLD | OUTPATIENT
Start: 2019-01-01 | End: 2019-01-01

## 2019-01-01 RX ORDER — POTASSIUM CHLORIDE 1500 MG/1
20 TABLET, EXTENDED RELEASE ORAL ONCE
Status: COMPLETED | OUTPATIENT
Start: 2019-01-01 | End: 2019-01-01

## 2019-01-01 RX ORDER — OXYCODONE HYDROCHLORIDE 5 MG/1
5 TABLET ORAL EVERY 4 HOURS PRN
Status: DISCONTINUED | OUTPATIENT
Start: 2019-01-01 | End: 2019-01-01 | Stop reason: HOSPADM

## 2019-01-01 RX ORDER — TAMSULOSIN HYDROCHLORIDE 0.4 MG/1
0.4 CAPSULE ORAL DAILY
Qty: 30 CAPSULE | Refills: 0 | Status: SHIPPED | OUTPATIENT
Start: 2019-01-01

## 2019-01-01 RX ORDER — ACETAMINOPHEN 325 MG/1
650 TABLET ORAL EVERY 4 HOURS PRN
Status: DISCONTINUED | OUTPATIENT
Start: 2019-01-01 | End: 2019-01-01

## 2019-01-01 RX ORDER — SENNOSIDES 8.6 MG
1-2 TABLET ORAL 2 TIMES DAILY
Qty: 100 TABLET | Refills: 1 | Status: SHIPPED | OUTPATIENT
Start: 2019-01-01 | End: 2019-01-01

## 2019-01-01 RX ORDER — GABAPENTIN 100 MG/1
CAPSULE ORAL
Refills: 1 | Status: ON HOLD | COMMUNITY
Start: 2019-01-01 | End: 2019-01-01

## 2019-01-01 RX ORDER — ATROPINE SULFATE 10 MG/ML
2-4 SOLUTION/ DROPS OPHTHALMIC
Qty: 5 ML | Refills: 1 | Status: SHIPPED | OUTPATIENT
Start: 2019-01-01 | End: 2019-01-01

## 2019-01-01 RX ORDER — DOBUTAMINE HYDROCHLORIDE 200 MG/100ML
2-20 INJECTION INTRAVENOUS CONTINUOUS PRN
Status: DISCONTINUED | OUTPATIENT
Start: 2019-01-01 | End: 2019-01-01 | Stop reason: HOSPADM

## 2019-01-01 RX ORDER — AMLODIPINE BESYLATE 10 MG/1
10 TABLET ORAL DAILY
Qty: 14 TABLET | Refills: 0 | Status: SHIPPED | OUTPATIENT
Start: 2019-01-01

## 2019-01-01 RX ORDER — DIAZEPAM 5 MG
5 TABLET ORAL EVERY 4 HOURS PRN
Qty: 84 TABLET | Refills: 0 | Status: SHIPPED | OUTPATIENT
Start: 2019-01-01 | End: 2019-01-01

## 2019-01-01 RX ORDER — TRAZODONE HYDROCHLORIDE 100 MG/1
300 TABLET ORAL AT BEDTIME
Status: ON HOLD | COMMUNITY
End: 2019-01-01

## 2019-01-01 RX ORDER — AMOXICILLIN 250 MG
2 CAPSULE ORAL 2 TIMES DAILY
Status: DISCONTINUED | OUTPATIENT
Start: 2019-01-01 | End: 2019-01-01 | Stop reason: HOSPADM

## 2019-01-01 RX ORDER — EPTIFIBATIDE 2 MG/ML
180 INJECTION, SOLUTION INTRAVENOUS EVERY 10 MIN PRN
Status: DISCONTINUED | OUTPATIENT
Start: 2019-01-01 | End: 2019-01-01 | Stop reason: HOSPADM

## 2019-01-01 RX ORDER — OLANZAPINE 5 MG/1
5 TABLET, ORALLY DISINTEGRATING ORAL AT BEDTIME
Qty: 30 TABLET | Refills: 0 | Status: ON HOLD | OUTPATIENT
Start: 2019-01-01 | End: 2019-01-01

## 2019-01-01 RX ORDER — OXYCODONE HYDROCHLORIDE 15 MG/1
30 TABLET, FILM COATED, EXTENDED RELEASE ORAL EVERY MORNING
Status: DISCONTINUED | OUTPATIENT
Start: 2019-01-01 | End: 2019-01-01 | Stop reason: HOSPADM

## 2019-01-01 RX ORDER — CALCIUM CARBONATE 300MG(750)
10 TABLET,CHEWABLE ORAL AT BEDTIME
Status: DISCONTINUED | OUTPATIENT
Start: 2019-01-01 | End: 2019-01-01

## 2019-01-01 RX ORDER — PROCHLORPERAZINE 25 MG
12.5 SUPPOSITORY, RECTAL RECTAL EVERY 12 HOURS PRN
Status: CANCELLED | OUTPATIENT
Start: 2019-01-01

## 2019-01-01 RX ORDER — AMLODIPINE BESYLATE 10 MG/1
10 TABLET ORAL DAILY
Status: DISCONTINUED | OUTPATIENT
Start: 2019-01-01 | End: 2019-01-01 | Stop reason: HOSPADM

## 2019-01-01 RX ORDER — SPIRONOLACTONE 25 MG/1
12.5 TABLET ORAL DAILY
Qty: 30 TABLET | Refills: 0 | Status: SHIPPED | OUTPATIENT
Start: 2019-01-01

## 2019-01-01 RX ORDER — ATROPA BELLADONNA AND OPIUM 16.2; 3 MG/1; MG/1
15 SUPPOSITORY RECTAL EVERY 6 HOURS PRN
Status: DISCONTINUED | OUTPATIENT
Start: 2019-01-01 | End: 2019-01-01

## 2019-01-01 RX ORDER — CLOPIDOGREL BISULFATE 75 MG/1
75 TABLET ORAL DAILY
Qty: 90 TABLET | Refills: 0 | Status: SHIPPED | OUTPATIENT
Start: 2019-01-01

## 2019-01-01 RX ORDER — OXYCODONE HYDROCHLORIDE 5 MG/1
5 TABLET ORAL EVERY 12 HOURS PRN
Status: CANCELLED | OUTPATIENT
Start: 2019-01-01

## 2019-01-01 RX ORDER — QUETIAPINE FUMARATE 25 MG/1
25 TABLET, FILM COATED ORAL 2 TIMES DAILY PRN
Status: DISCONTINUED | OUTPATIENT
Start: 2019-01-01 | End: 2019-01-01

## 2019-01-01 RX ORDER — ONDANSETRON 4 MG/1
4 TABLET, ORALLY DISINTEGRATING ORAL EVERY 6 HOURS PRN
Status: CANCELLED | OUTPATIENT
Start: 2019-01-01

## 2019-01-01 RX ORDER — HYDROMORPHONE HYDROCHLORIDE 1 MG/ML
0.5 INJECTION, SOLUTION INTRAMUSCULAR; INTRAVENOUS; SUBCUTANEOUS
Status: DISCONTINUED | OUTPATIENT
Start: 2019-01-01 | End: 2019-01-01 | Stop reason: HOSPADM

## 2019-01-01 RX ORDER — AMOXICILLIN 250 MG
2 CAPSULE ORAL 2 TIMES DAILY PRN
Status: DISCONTINUED | OUTPATIENT
Start: 2019-01-01 | End: 2019-01-01 | Stop reason: HOSPADM

## 2019-01-01 RX ORDER — TAMSULOSIN HYDROCHLORIDE 0.4 MG/1
0.4 CAPSULE ORAL DAILY
Qty: 30 CAPSULE | Refills: 0 | Status: CANCELLED | OUTPATIENT
Start: 2019-01-01

## 2019-01-01 RX ORDER — FUROSEMIDE 10 MG/ML
20 INJECTION INTRAMUSCULAR; INTRAVENOUS ONCE
Status: COMPLETED | OUTPATIENT
Start: 2019-01-01 | End: 2019-01-01

## 2019-01-01 RX ORDER — NITROGLYCERIN 0.4 MG/1
0.4 TABLET SUBLINGUAL EVERY 5 MIN PRN
Status: DISCONTINUED | OUTPATIENT
Start: 2019-01-01 | End: 2019-01-01 | Stop reason: HOSPADM

## 2019-01-01 RX ORDER — ACETAMINOPHEN 325 MG/1
650 TABLET ORAL EVERY 4 HOURS PRN
Status: DISCONTINUED | OUTPATIENT
Start: 2019-01-01 | End: 2019-01-01 | Stop reason: HOSPADM

## 2019-01-01 RX ORDER — FERROUS SULFATE 325(65) MG
325 TABLET ORAL DAILY
Status: DISCONTINUED | OUTPATIENT
Start: 2019-01-01 | End: 2019-01-01 | Stop reason: HOSPADM

## 2019-01-01 RX ORDER — ATORVASTATIN CALCIUM 80 MG/1
80 TABLET, FILM COATED ORAL DAILY
Status: CANCELLED | OUTPATIENT
Start: 2019-01-01

## 2019-01-01 RX ORDER — SUCRALFATE 1 G/1
1 TABLET ORAL 4 TIMES DAILY
Status: DISCONTINUED | OUTPATIENT
Start: 2019-01-01 | End: 2019-01-01

## 2019-01-01 RX ORDER — SERTRALINE HYDROCHLORIDE 100 MG/1
200 TABLET, FILM COATED ORAL DAILY
Status: DISCONTINUED | OUTPATIENT
Start: 2019-01-01 | End: 2019-01-01

## 2019-01-01 RX ORDER — LISINOPRIL 10 MG/1
40 TABLET ORAL DAILY
Status: CANCELLED | OUTPATIENT
Start: 2019-01-01

## 2019-01-01 RX ORDER — NICOTINE POLACRILEX 4 MG
15-30 LOZENGE BUCCAL
Status: CANCELLED | OUTPATIENT
Start: 2019-01-01

## 2019-01-01 RX ORDER — PANTOPRAZOLE SODIUM 40 MG/1
40 TABLET, DELAYED RELEASE ORAL
Status: DISCONTINUED | OUTPATIENT
Start: 2019-01-01 | End: 2019-01-01

## 2019-01-01 RX ORDER — OLANZAPINE 2.5 MG/1
2.5 TABLET, FILM COATED ORAL 2 TIMES DAILY PRN
Qty: 30 TABLET | Refills: 0 | Status: ON HOLD | OUTPATIENT
Start: 2019-01-01 | End: 2019-01-01

## 2019-01-01 RX ORDER — ATORVASTATIN CALCIUM 40 MG/1
80 TABLET, FILM COATED ORAL DAILY
Status: DISCONTINUED | OUTPATIENT
Start: 2019-01-01 | End: 2019-01-01 | Stop reason: HOSPADM

## 2019-01-01 RX ORDER — SERTRALINE HYDROCHLORIDE 100 MG/1
200 TABLET, FILM COATED ORAL DAILY
Status: CANCELLED | OUTPATIENT
Start: 2019-01-01

## 2019-01-01 RX ORDER — LURASIDONE HYDROCHLORIDE 20 MG/1
40 TABLET, FILM COATED ORAL AT BEDTIME
Status: DISCONTINUED | OUTPATIENT
Start: 2019-01-01 | End: 2019-01-01 | Stop reason: HOSPADM

## 2019-01-01 RX ORDER — POTASSIUM CL/LIDO/0.9 % NACL 10MEQ/0.1L
10 INTRAVENOUS SOLUTION, PIGGYBACK (ML) INTRAVENOUS
Status: DISCONTINUED | OUTPATIENT
Start: 2019-01-01 | End: 2019-01-01 | Stop reason: HOSPADM

## 2019-01-01 RX ORDER — TAMSULOSIN HYDROCHLORIDE 0.4 MG/1
0.4 CAPSULE ORAL DAILY
Status: CANCELLED | OUTPATIENT
Start: 2019-01-01

## 2019-01-01 RX ORDER — TRAZODONE HYDROCHLORIDE 150 MG/1
300 TABLET ORAL AT BEDTIME
Status: CANCELLED | OUTPATIENT
Start: 2019-01-01

## 2019-01-01 RX ORDER — IBUPROFEN 400 MG/1
800 TABLET, FILM COATED ORAL EVERY 8 HOURS PRN
Status: DISCONTINUED | OUTPATIENT
Start: 2019-01-01 | End: 2019-01-01

## 2019-01-01 RX ORDER — TIROFIBAN HYDROCHLORIDE 50 UG/ML
0.07 INJECTION INTRAVENOUS CONTINUOUS PRN
Status: DISCONTINUED | OUTPATIENT
Start: 2019-01-01 | End: 2019-01-01 | Stop reason: HOSPADM

## 2019-01-01 RX ORDER — AMLODIPINE BESYLATE 5 MG/1
5 TABLET ORAL DAILY
Status: DISCONTINUED | OUTPATIENT
Start: 2019-01-01 | End: 2019-01-01

## 2019-01-01 RX ORDER — POLYETHYLENE GLYCOL 3350 17 G/17G
17 POWDER, FOR SOLUTION ORAL DAILY PRN
Status: CANCELLED | OUTPATIENT
Start: 2019-01-01

## 2019-01-01 RX ORDER — OXYCODONE HCL 10 MG/1
10 TABLET, FILM COATED, EXTENDED RELEASE ORAL AT BEDTIME
Qty: 10 TABLET | Refills: 0 | OUTPATIENT
Start: 2019-01-01

## 2019-01-01 RX ORDER — DEXTROSE MONOHYDRATE 25 G/50ML
25-50 INJECTION, SOLUTION INTRAVENOUS
Status: DISCONTINUED | OUTPATIENT
Start: 2019-01-01 | End: 2019-01-01 | Stop reason: HOSPADM

## 2019-01-01 RX ORDER — OXYCODONE HYDROCHLORIDE 5 MG/1
TABLET ORAL
Qty: 40 TABLET | Refills: 0 | Status: SHIPPED | OUTPATIENT
Start: 2019-01-01 | End: 2019-01-01

## 2019-01-01 RX ORDER — TRAZODONE HYDROCHLORIDE 100 MG/1
300 TABLET ORAL AT BEDTIME
Status: DISCONTINUED | OUTPATIENT
Start: 2019-01-01 | End: 2019-01-01

## 2019-01-01 RX ORDER — FERROUS SULFATE 325(65) MG
325 TABLET, DELAYED RELEASE (ENTERIC COATED) ORAL DAILY
Status: CANCELLED | OUTPATIENT
Start: 2019-01-01

## 2019-01-01 RX ORDER — ONDANSETRON 2 MG/ML
4 INJECTION INTRAMUSCULAR; INTRAVENOUS EVERY 6 HOURS PRN
Status: CANCELLED | OUTPATIENT
Start: 2019-01-01

## 2019-01-01 RX ORDER — ATROPINE SULFATE 10 MG/ML
1-2 SOLUTION/ DROPS OPHTHALMIC
Status: DISCONTINUED | OUTPATIENT
Start: 2019-01-01 | End: 2019-01-01 | Stop reason: HOSPADM

## 2019-01-01 RX ORDER — DIAZEPAM 5 MG
5 TABLET ORAL EVERY 4 HOURS PRN
Status: DISCONTINUED | OUTPATIENT
Start: 2019-01-01 | End: 2019-01-01 | Stop reason: HOSPADM

## 2019-01-01 RX ORDER — AMINOPHYLLINE 25 MG/ML
50-100 INJECTION, SOLUTION INTRAVENOUS
Status: DISCONTINUED | OUTPATIENT
Start: 2019-01-01 | End: 2019-01-01 | Stop reason: CLARIF

## 2019-01-01 RX ORDER — SUCRALFATE 1 G/1
1 TABLET ORAL 4 TIMES DAILY
Status: ON HOLD | COMMUNITY
End: 2019-01-01

## 2019-01-01 RX ORDER — FUROSEMIDE 10 MG/ML
40 INJECTION INTRAMUSCULAR; INTRAVENOUS ONCE
Status: COMPLETED | OUTPATIENT
Start: 2019-01-01 | End: 2019-01-01

## 2019-01-01 RX ORDER — METFORMIN HCL 500 MG
500 TABLET, EXTENDED RELEASE 24 HR ORAL 2 TIMES DAILY WITH MEALS
Status: DISCONTINUED | OUTPATIENT
Start: 2019-01-01 | End: 2019-01-01 | Stop reason: HOSPADM

## 2019-01-01 RX ORDER — CEFAZOLIN SODIUM 1 G/3ML
1 INJECTION, POWDER, FOR SOLUTION INTRAMUSCULAR; INTRAVENOUS EVERY 8 HOURS
Status: DISCONTINUED | OUTPATIENT
Start: 2019-01-01 | End: 2019-01-01

## 2019-01-01 RX ORDER — LISINOPRIL 20 MG/1
20 TABLET ORAL DAILY
Status: DISCONTINUED | OUTPATIENT
Start: 2019-01-01 | End: 2019-01-01 | Stop reason: HOSPADM

## 2019-01-01 RX ORDER — SERTRALINE HYDROCHLORIDE 100 MG/1
200 TABLET, FILM COATED ORAL EVERY MORNING
Status: DISCONTINUED | OUTPATIENT
Start: 2019-01-01 | End: 2019-01-01 | Stop reason: HOSPADM

## 2019-01-01 RX ORDER — OLANZAPINE 5 MG/1
5 TABLET, ORALLY DISINTEGRATING ORAL AT BEDTIME
Status: CANCELLED | OUTPATIENT
Start: 2019-01-01

## 2019-01-01 RX ORDER — POTASSIUM CHLORIDE 7.45 MG/ML
10 INJECTION INTRAVENOUS
Status: DISCONTINUED | OUTPATIENT
Start: 2019-01-01 | End: 2019-01-01 | Stop reason: HOSPADM

## 2019-01-01 RX ORDER — IBUPROFEN 400 MG/1
800 TABLET, FILM COATED ORAL EVERY 8 HOURS PRN
Status: CANCELLED | OUTPATIENT
Start: 2019-01-01

## 2019-01-01 RX ORDER — PRAVASTATIN SODIUM 20 MG
80 TABLET ORAL EVERY EVENING
Status: CANCELLED | OUTPATIENT
Start: 2019-01-01

## 2019-01-01 RX ORDER — OXYCODONE HYDROCHLORIDE 5 MG/1
5 TABLET ORAL EVERY 4 HOURS PRN
Qty: 84 TABLET | Refills: 0 | Status: SHIPPED | OUTPATIENT
Start: 2019-01-01 | End: 2019-01-01

## 2019-01-01 RX ORDER — FENTANYL CITRATE 50 UG/ML
25 INJECTION, SOLUTION INTRAMUSCULAR; INTRAVENOUS EVERY 5 MIN PRN
Status: DISCONTINUED | OUTPATIENT
Start: 2019-01-01 | End: 2019-01-01

## 2019-01-01 RX ORDER — LIDOCAINE 40 MG/G
CREAM TOPICAL
Status: DISCONTINUED | OUTPATIENT
Start: 2019-01-01 | End: 2019-01-01 | Stop reason: HOSPADM

## 2019-01-01 RX ORDER — POTASSIUM CHLORIDE 750 MG/1
20-40 TABLET, EXTENDED RELEASE ORAL
Status: DISCONTINUED | OUTPATIENT
Start: 2019-01-01 | End: 2019-01-01 | Stop reason: HOSPADM

## 2019-01-01 RX ORDER — NALOXONE HYDROCHLORIDE 0.4 MG/ML
.1-.4 INJECTION, SOLUTION INTRAMUSCULAR; INTRAVENOUS; SUBCUTANEOUS
Status: DISCONTINUED | OUTPATIENT
Start: 2019-01-01 | End: 2019-01-01 | Stop reason: HOSPADM

## 2019-01-01 RX ORDER — POTASSIUM CHLORIDE 29.8 MG/ML
20 INJECTION INTRAVENOUS
Status: DISCONTINUED | OUTPATIENT
Start: 2019-01-01 | End: 2019-01-01 | Stop reason: HOSPADM

## 2019-01-01 RX ORDER — SPIRONOLACTONE 25 MG
12.5 TABLET ORAL DAILY
Status: CANCELLED | OUTPATIENT
Start: 2019-01-01

## 2019-01-01 RX ORDER — LISINOPRIL 40 MG/1
40 TABLET ORAL DAILY
Status: DISCONTINUED | OUTPATIENT
Start: 2019-01-01 | End: 2019-01-01 | Stop reason: HOSPADM

## 2019-01-01 RX ORDER — AMOXICILLIN 250 MG
1 CAPSULE ORAL 2 TIMES DAILY
Status: DISCONTINUED | OUTPATIENT
Start: 2019-01-01 | End: 2019-01-01 | Stop reason: HOSPADM

## 2019-01-01 RX ORDER — LANOLIN ALCOHOL/MO/W.PET/CERES
100 CREAM (GRAM) TOPICAL DAILY
Status: DISCONTINUED | OUTPATIENT
Start: 2019-01-01 | End: 2019-01-01

## 2019-01-01 RX ORDER — ACETYLCYSTEINE 200 MG/ML
2 SOLUTION ORAL; RESPIRATORY (INHALATION) EVERY 6 HOURS PRN
Status: DISCONTINUED | OUTPATIENT
Start: 2019-01-01 | End: 2019-01-01 | Stop reason: HOSPADM

## 2019-01-01 RX ORDER — ATROPINE SULFATE 0.1 MG/ML
0.5 INJECTION INTRAVENOUS EVERY 5 MIN PRN
Status: DISCONTINUED | OUTPATIENT
Start: 2019-01-01 | End: 2019-01-01 | Stop reason: HOSPADM

## 2019-01-01 RX ORDER — HYDROCHLOROTHIAZIDE 25 MG/1
TABLET ORAL
Qty: 90 TABLET | Refills: 0 | Status: ON HOLD | OUTPATIENT
Start: 2019-01-01 | End: 2019-01-01

## 2019-01-01 RX ORDER — LISINOPRIL 40 MG/1
40 TABLET ORAL DAILY
Status: DISCONTINUED | OUTPATIENT
Start: 2019-01-01 | End: 2019-01-01

## 2019-01-01 RX ORDER — LANOLIN ALCOHOL/MO/W.PET/CERES
100 CREAM (GRAM) TOPICAL DAILY
Status: CANCELLED | OUTPATIENT
Start: 2019-01-01

## 2019-01-01 RX ORDER — SODIUM CHLORIDE 9 MG/ML
INJECTION, SOLUTION INTRAVENOUS CONTINUOUS
Status: DISCONTINUED | OUTPATIENT
Start: 2019-01-01 | End: 2019-01-01

## 2019-01-01 RX ORDER — LANOLIN ALCOHOL/MO/W.PET/CERES
3 CREAM (GRAM) TOPICAL DAILY
Status: DISCONTINUED | OUTPATIENT
Start: 2019-01-01 | End: 2019-01-01 | Stop reason: HOSPADM

## 2019-01-01 RX ORDER — AMOXICILLIN 250 MG
1 CAPSULE ORAL 2 TIMES DAILY PRN
Status: CANCELLED | OUTPATIENT
Start: 2019-01-01

## 2019-01-01 RX ORDER — LURASIDONE HYDROCHLORIDE 20 MG/1
20 TABLET, FILM COATED ORAL EVERY MORNING
Status: DISCONTINUED | OUTPATIENT
Start: 2019-01-01 | End: 2019-01-01

## 2019-01-01 RX ORDER — HYDROCHLOROTHIAZIDE 25 MG/1
TABLET ORAL
Qty: 90 TABLET | Refills: 0 | OUTPATIENT
Start: 2019-01-01

## 2019-01-01 RX ORDER — PANTOPRAZOLE SODIUM 40 MG/1
40 TABLET, DELAYED RELEASE ORAL
Status: DISCONTINUED | OUTPATIENT
Start: 2019-01-01 | End: 2019-01-01 | Stop reason: HOSPADM

## 2019-01-01 RX ORDER — CLOPIDOGREL BISULFATE 75 MG/1
75 TABLET ORAL DAILY
Status: DISCONTINUED | OUTPATIENT
Start: 2019-01-01 | End: 2019-01-01 | Stop reason: HOSPADM

## 2019-01-01 RX ORDER — TRAZODONE HYDROCHLORIDE 100 MG/1
200 TABLET ORAL AT BEDTIME
Status: DISCONTINUED | OUTPATIENT
Start: 2019-01-01 | End: 2019-01-01

## 2019-01-01 RX ORDER — GLYCOPYRROLATE 1 MG/1
1-2 TABLET ORAL EVERY 4 HOURS PRN
Qty: 30 TABLET | Refills: 1 | Status: SHIPPED | OUTPATIENT
Start: 2019-01-01

## 2019-01-01 RX ORDER — BISACODYL 10 MG
SUPPOSITORY, RECTAL RECTAL
Qty: 12 SUPPOSITORY | Refills: 1 | Status: SHIPPED | OUTPATIENT
Start: 2019-01-01 | End: 2019-01-01

## 2019-01-01 RX ORDER — DEXTROSE MONOHYDRATE 25 G/50ML
25-50 INJECTION, SOLUTION INTRAVENOUS
Status: CANCELLED | OUTPATIENT
Start: 2019-01-01

## 2019-01-01 RX ORDER — LORAZEPAM 2 MG/ML
1-2 INJECTION INTRAMUSCULAR EVERY 30 MIN PRN
Status: DISCONTINUED | OUTPATIENT
Start: 2019-01-01 | End: 2019-01-01

## 2019-01-01 RX ORDER — POTASSIUM CHLORIDE 750 MG/1
20 TABLET, EXTENDED RELEASE ORAL DAILY
Status: DISCONTINUED | OUTPATIENT
Start: 2019-01-01 | End: 2019-01-01

## 2019-01-01 RX ORDER — LANOLIN ALCOHOL/MO/W.PET/CERES
100 CREAM (GRAM) TOPICAL DAILY
Qty: 30 TABLET | Refills: 0 | Status: SHIPPED | OUTPATIENT
Start: 2019-01-01

## 2019-01-01 RX ORDER — ATORVASTATIN CALCIUM 80 MG/1
80 TABLET, FILM COATED ORAL EVERY EVENING
Status: DISCONTINUED | OUTPATIENT
Start: 2019-01-01 | End: 2019-01-01

## 2019-01-01 RX ORDER — POTASSIUM CHLORIDE 1.5 G/1.58G
20-40 POWDER, FOR SOLUTION ORAL
Status: DISCONTINUED | OUTPATIENT
Start: 2019-01-01 | End: 2019-01-01 | Stop reason: HOSPADM

## 2019-01-01 RX ORDER — CEPHALEXIN 500 MG/1
500 CAPSULE ORAL 3 TIMES DAILY
Qty: 21 CAPSULE | Refills: 0 | Status: SHIPPED | OUTPATIENT
Start: 2019-01-01 | End: 2019-01-01

## 2019-01-01 RX ORDER — SERTRALINE HYDROCHLORIDE 100 MG/1
200 TABLET, FILM COATED ORAL DAILY
Status: DISCONTINUED | OUTPATIENT
Start: 2019-01-01 | End: 2019-01-01 | Stop reason: HOSPADM

## 2019-01-01 RX ORDER — GLYCOPYRROLATE 1 MG/1
1-2 TABLET ORAL EVERY 4 HOURS PRN
Qty: 30 TABLET | Refills: 1 | Status: SHIPPED | OUTPATIENT
Start: 2019-01-01 | End: 2019-01-01

## 2019-01-01 RX ORDER — FUROSEMIDE 10 MG/ML
20 INJECTION INTRAMUSCULAR; INTRAVENOUS ONCE
Status: DISCONTINUED | OUTPATIENT
Start: 2019-01-01 | End: 2019-01-01

## 2019-01-01 RX ORDER — SPIRONOLACTONE 25 MG
12.5 TABLET ORAL DAILY
Status: DISCONTINUED | OUTPATIENT
Start: 2019-01-01 | End: 2019-01-01 | Stop reason: HOSPADM

## 2019-01-01 RX ORDER — LURASIDONE HYDROCHLORIDE 20 MG/1
20 TABLET, FILM COATED ORAL EVERY MORNING
Status: DISCONTINUED | OUTPATIENT
Start: 2019-01-01 | End: 2019-01-01 | Stop reason: HOSPADM

## 2019-01-01 RX ORDER — FLUMAZENIL 0.1 MG/ML
0.2 INJECTION, SOLUTION INTRAVENOUS
Status: DISCONTINUED | OUTPATIENT
Start: 2019-01-01 | End: 2019-01-01 | Stop reason: HOSPADM

## 2019-01-01 RX ORDER — MAGNESIUM SULFATE HEPTAHYDRATE 40 MG/ML
4 INJECTION, SOLUTION INTRAVENOUS EVERY 4 HOURS PRN
Status: DISCONTINUED | OUTPATIENT
Start: 2019-01-01 | End: 2019-01-01 | Stop reason: HOSPADM

## 2019-01-01 RX ORDER — ASPIRIN 81 MG/1
324 TABLET, CHEWABLE ORAL ONCE
Status: CANCELLED | OUTPATIENT
Start: 2019-01-01 | End: 2019-01-01

## 2019-01-01 RX ORDER — AMOXICILLIN 250 MG
1 CAPSULE ORAL 2 TIMES DAILY PRN
Status: DISCONTINUED | OUTPATIENT
Start: 2019-01-01 | End: 2019-01-01 | Stop reason: HOSPADM

## 2019-01-01 RX ORDER — LURASIDONE HYDROCHLORIDE 20 MG/1
20 TABLET, FILM COATED ORAL EVERY MORNING
Qty: 14 TABLET | Refills: 0 | Status: SHIPPED | OUTPATIENT
Start: 2019-01-01 | End: 2019-01-01

## 2019-01-01 RX ORDER — AMLODIPINE BESYLATE 10 MG/1
10 TABLET ORAL DAILY
Status: DISCONTINUED | OUTPATIENT
Start: 2019-01-01 | End: 2019-01-01

## 2019-01-01 RX ORDER — LANOLIN ALCOHOL/MO/W.PET/CERES
100 CREAM (GRAM) TOPICAL DAILY
Status: DISCONTINUED | OUTPATIENT
Start: 2019-01-01 | End: 2019-01-01 | Stop reason: HOSPADM

## 2019-01-01 RX ORDER — CLONAZEPAM 0.5 MG/1
1 TABLET ORAL AT BEDTIME
Status: DISCONTINUED | OUTPATIENT
Start: 2019-01-01 | End: 2019-01-01

## 2019-01-01 RX ORDER — AMLODIPINE BESYLATE 5 MG/1
5 TABLET ORAL ONCE
Status: COMPLETED | OUTPATIENT
Start: 2019-01-01 | End: 2019-01-01

## 2019-01-01 RX ORDER — LIDOCAINE HYDROCHLORIDE 20 MG/ML
10 JELLY TOPICAL ONCE
Status: COMPLETED | OUTPATIENT
Start: 2019-01-01 | End: 2019-01-01

## 2019-01-01 RX ORDER — FENTANYL CITRATE 50 UG/ML
25-50 INJECTION, SOLUTION INTRAMUSCULAR; INTRAVENOUS
Status: DISCONTINUED | OUTPATIENT
Start: 2019-01-01 | End: 2019-01-01 | Stop reason: HOSPADM

## 2019-01-01 RX ORDER — POTASSIUM CHLORIDE 1500 MG/1
20-40 TABLET, EXTENDED RELEASE ORAL
Status: DISCONTINUED | OUTPATIENT
Start: 2019-01-01 | End: 2019-01-01 | Stop reason: HOSPADM

## 2019-01-01 RX ORDER — TRAZODONE HYDROCHLORIDE 100 MG/1
300 TABLET ORAL
Status: DISCONTINUED | OUTPATIENT
Start: 2019-01-01 | End: 2019-01-01 | Stop reason: HOSPADM

## 2019-01-01 RX ORDER — CLOPIDOGREL BISULFATE 75 MG/1
75 TABLET ORAL DAILY
Status: DISCONTINUED | OUTPATIENT
Start: 2019-01-01 | End: 2019-01-01

## 2019-01-01 RX ORDER — OXYCODONE HCL 10 MG/1
10 TABLET, FILM COATED, EXTENDED RELEASE ORAL AT BEDTIME
Status: DISCONTINUED | OUTPATIENT
Start: 2019-01-01 | End: 2019-01-01

## 2019-01-01 RX ORDER — ACETYLCYSTEINE 200 MG/ML
2 SOLUTION ORAL; RESPIRATORY (INHALATION) EVERY 4 HOURS
Status: DISCONTINUED | OUTPATIENT
Start: 2019-01-01 | End: 2019-01-01

## 2019-01-01 RX ORDER — LISINOPRIL 20 MG/1
40 TABLET ORAL DAILY
Status: DISCONTINUED | OUTPATIENT
Start: 2019-01-01 | End: 2019-01-01 | Stop reason: HOSPADM

## 2019-01-01 RX ORDER — IBUPROFEN 800 MG/1
TABLET, FILM COATED ORAL
Qty: 90 TABLET | Refills: 0 | Status: SHIPPED | OUTPATIENT
Start: 2019-01-01 | End: 2019-01-01

## 2019-01-01 RX ORDER — FENTANYL CITRATE 50 UG/ML
INJECTION, SOLUTION INTRAMUSCULAR; INTRAVENOUS
Status: DISCONTINUED | OUTPATIENT
Start: 2019-01-01 | End: 2019-01-01 | Stop reason: HOSPADM

## 2019-01-01 RX ORDER — TRAZODONE HYDROCHLORIDE 100 MG/1
200 TABLET ORAL
Status: DISCONTINUED | OUTPATIENT
Start: 2019-01-01 | End: 2019-01-01

## 2019-01-01 RX ORDER — NALOXONE HYDROCHLORIDE 0.4 MG/ML
.2-.4 INJECTION, SOLUTION INTRAMUSCULAR; INTRAVENOUS; SUBCUTANEOUS
Status: DISCONTINUED | OUTPATIENT
Start: 2019-01-01 | End: 2019-01-01

## 2019-01-01 RX ORDER — TAMSULOSIN HYDROCHLORIDE 0.4 MG/1
0.4 CAPSULE ORAL DAILY
Status: DISCONTINUED | OUTPATIENT
Start: 2019-01-01 | End: 2019-01-01 | Stop reason: HOSPADM

## 2019-01-01 RX ORDER — ACETAMINOPHEN 650 MG/1
650 SUPPOSITORY RECTAL EVERY 4 HOURS PRN
Status: DISCONTINUED | OUTPATIENT
Start: 2019-01-01 | End: 2019-01-01 | Stop reason: HOSPADM

## 2019-01-01 RX ORDER — IOPAMIDOL 755 MG/ML
100 INJECTION, SOLUTION INTRAVASCULAR ONCE
Status: COMPLETED | OUTPATIENT
Start: 2019-01-01 | End: 2019-01-01

## 2019-01-01 RX ORDER — ASPIRIN 81 MG/1
81 TABLET ORAL EVERY EVENING
Status: DISCONTINUED | OUTPATIENT
Start: 2019-01-01 | End: 2019-01-01 | Stop reason: HOSPADM

## 2019-01-01 RX ORDER — ACETAMINOPHEN 650 MG/1
650 SUPPOSITORY RECTAL EVERY 4 HOURS PRN
Qty: 12 SUPPOSITORY | Refills: 1 | Status: SHIPPED | OUTPATIENT
Start: 2019-01-01 | End: 2019-01-01

## 2019-01-01 RX ORDER — LURASIDONE HYDROCHLORIDE 40 MG/1
40 TABLET, FILM COATED ORAL DAILY
Status: DISCONTINUED | OUTPATIENT
Start: 2019-01-01 | End: 2019-01-01

## 2019-01-01 RX ORDER — LORAZEPAM 1 MG/1
1 TABLET ORAL
Status: DISCONTINUED | OUTPATIENT
Start: 2019-01-01 | End: 2019-01-01 | Stop reason: HOSPADM

## 2019-01-01 RX ORDER — NALOXONE HYDROCHLORIDE 0.4 MG/ML
.1-.4 INJECTION, SOLUTION INTRAMUSCULAR; INTRAVENOUS; SUBCUTANEOUS
Status: CANCELLED | OUTPATIENT
Start: 2019-01-01

## 2019-01-01 RX ORDER — ISOSORBIDE MONONITRATE 30 MG/1
30 TABLET, EXTENDED RELEASE ORAL DAILY
Status: DISCONTINUED | OUTPATIENT
Start: 2019-01-01 | End: 2019-01-01 | Stop reason: HOSPADM

## 2019-01-01 RX ORDER — SODIUM CHLORIDE AND POTASSIUM CHLORIDE 150; 900 MG/100ML; MG/100ML
INJECTION, SOLUTION INTRAVENOUS CONTINUOUS
Status: DISCONTINUED | OUTPATIENT
Start: 2019-01-01 | End: 2019-01-01

## 2019-01-01 RX ORDER — DIAZEPAM 5 MG
5 TABLET ORAL
Status: COMPLETED | OUTPATIENT
Start: 2019-01-01 | End: 2019-01-01

## 2019-01-01 RX ORDER — OXYCODONE HYDROCHLORIDE 5 MG/1
5-10 TABLET ORAL EVERY 4 HOURS PRN
Status: CANCELLED | OUTPATIENT
Start: 2019-01-01

## 2019-01-01 RX ORDER — ARGATROBAN 1 MG/ML
150 INJECTION, SOLUTION INTRAVENOUS
Status: DISCONTINUED | OUTPATIENT
Start: 2019-01-01 | End: 2019-01-01 | Stop reason: HOSPADM

## 2019-01-01 RX ORDER — IOPAMIDOL 755 MG/ML
112 INJECTION, SOLUTION INTRAVASCULAR ONCE
Status: COMPLETED | OUTPATIENT
Start: 2019-01-01 | End: 2019-01-01

## 2019-01-01 RX ORDER — FENTANYL CITRATE 50 UG/ML
50 INJECTION, SOLUTION INTRAMUSCULAR; INTRAVENOUS
Status: DISCONTINUED | OUTPATIENT
Start: 2019-01-01 | End: 2019-01-01 | Stop reason: HOSPADM

## 2019-01-01 RX ORDER — UREA 10 %
500 LOTION (ML) TOPICAL DAILY
Status: DISCONTINUED | OUTPATIENT
Start: 2019-01-01 | End: 2019-01-01 | Stop reason: HOSPADM

## 2019-01-01 RX ORDER — OLANZAPINE 2.5 MG/1
2.5 TABLET, FILM COATED ORAL AT BEDTIME
Qty: 30 TABLET | Refills: 0 | Status: CANCELLED | OUTPATIENT
Start: 2019-01-01

## 2019-01-01 RX ORDER — AMLODIPINE BESYLATE 10 MG/1
10 TABLET ORAL DAILY
Qty: 14 TABLET | Refills: 0 | Status: SHIPPED | OUTPATIENT
Start: 2019-01-01 | End: 2019-01-01

## 2019-01-01 RX ORDER — AMOXICILLIN 250 MG
2 CAPSULE ORAL 2 TIMES DAILY PRN
Status: CANCELLED | OUTPATIENT
Start: 2019-01-01

## 2019-01-01 RX ORDER — OXYCODONE HCL 10 MG/1
10 TABLET, FILM COATED, EXTENDED RELEASE ORAL EVERY EVENING
Qty: 14 TABLET | Refills: 0 | Status: SHIPPED | OUTPATIENT
Start: 2019-01-01

## 2019-01-01 RX ORDER — ATROPINE SULFATE 10 MG/ML
2-4 SOLUTION/ DROPS OPHTHALMIC
Qty: 5 ML | Refills: 1 | Status: SHIPPED | OUTPATIENT
Start: 2019-01-01

## 2019-01-01 RX ORDER — LISINOPRIL 40 MG/1
TABLET ORAL
Qty: 90 TABLET | Refills: 0 | OUTPATIENT
Start: 2019-01-01

## 2019-01-01 RX ORDER — DOPAMINE HYDROCHLORIDE 160 MG/100ML
2-20 INJECTION, SOLUTION INTRAVENOUS CONTINUOUS PRN
Status: DISCONTINUED | OUTPATIENT
Start: 2019-01-01 | End: 2019-01-01 | Stop reason: HOSPADM

## 2019-01-01 RX ORDER — LURASIDONE HYDROCHLORIDE 40 MG/1
40 TABLET, FILM COATED ORAL EVERY MORNING
Status: DISCONTINUED | OUTPATIENT
Start: 2019-01-01 | End: 2019-01-01

## 2019-01-01 RX ORDER — AMLODIPINE BESYLATE 5 MG/1
TABLET ORAL
Qty: 90 TABLET | Refills: 0 | OUTPATIENT
Start: 2019-01-01

## 2019-01-01 RX ORDER — OXYCODONE HYDROCHLORIDE 15 MG/1
15 TABLET, FILM COATED, EXTENDED RELEASE ORAL EVERY 12 HOURS
Status: CANCELLED | OUTPATIENT
Start: 2019-01-01

## 2019-01-01 RX ORDER — ARGATROBAN 1 MG/ML
350 INJECTION, SOLUTION INTRAVENOUS
Status: DISCONTINUED | OUTPATIENT
Start: 2019-01-01 | End: 2019-01-01 | Stop reason: HOSPADM

## 2019-01-01 RX ORDER — NALOXONE HYDROCHLORIDE 0.4 MG/ML
.1-.4 INJECTION, SOLUTION INTRAMUSCULAR; INTRAVENOUS; SUBCUTANEOUS ONCE
Qty: 1 ML | Refills: 0 | Status: SHIPPED | OUTPATIENT
Start: 2019-01-01 | End: 2019-01-01

## 2019-01-01 RX ORDER — TRAZODONE HYDROCHLORIDE 100 MG/1
100 TABLET ORAL AT BEDTIME
Status: DISCONTINUED | OUTPATIENT
Start: 2019-01-01 | End: 2019-01-01

## 2019-01-01 RX ORDER — ATORVASTATIN CALCIUM 80 MG/1
TABLET, FILM COATED ORAL
Qty: 90 TABLET | Refills: 1 | Status: SHIPPED | OUTPATIENT
Start: 2019-01-01

## 2019-01-01 RX ORDER — LISINOPRIL 40 MG/1
TABLET ORAL
Qty: 90 TABLET | Refills: 0 | Status: SHIPPED | OUTPATIENT
Start: 2019-01-01 | End: 2019-01-01

## 2019-01-01 RX ORDER — MULTIPLE VITAMINS W/ MINERALS TAB 9MG-400MCG
1 TAB ORAL DAILY
Status: DISCONTINUED | OUTPATIENT
Start: 2019-01-01 | End: 2019-01-01

## 2019-01-01 RX ORDER — OXYCODONE HYDROCHLORIDE 5 MG/1
5 TABLET ORAL EVERY 4 HOURS PRN
Qty: 84 TABLET | Refills: 0 | Status: SHIPPED | OUTPATIENT
Start: 2019-01-01

## 2019-01-01 RX ORDER — CLOPIDOGREL BISULFATE 75 MG/1
75 TABLET ORAL DAILY
Status: CANCELLED | OUTPATIENT
Start: 2019-01-01

## 2019-01-01 RX ORDER — IOPAMIDOL 755 MG/ML
91 INJECTION, SOLUTION INTRAVASCULAR ONCE
Status: COMPLETED | OUTPATIENT
Start: 2019-01-01 | End: 2019-01-01

## 2019-01-01 RX ORDER — FAMOTIDINE 20 MG/1
20 TABLET, FILM COATED ORAL 2 TIMES DAILY
Status: ON HOLD | COMMUNITY
End: 2019-01-01

## 2019-01-01 RX ORDER — AMLODIPINE BESYLATE 5 MG/1
5 TABLET ORAL DAILY
Status: CANCELLED | OUTPATIENT
Start: 2019-01-01

## 2019-01-01 RX ORDER — LIDOCAINE HYDROCHLORIDE 20 MG/ML
10 JELLY TOPICAL ONCE
Status: DISCONTINUED | OUTPATIENT
Start: 2019-01-01 | End: 2019-01-01

## 2019-01-01 RX ORDER — ASPIRIN 325 MG
325 TABLET ORAL DAILY
Status: CANCELLED | OUTPATIENT
Start: 2019-01-01

## 2019-01-01 RX ORDER — OXYCODONE HYDROCHLORIDE 30 MG/1
30 TABLET, FILM COATED, EXTENDED RELEASE ORAL EVERY MORNING
Qty: 30 TABLET | Refills: 0 | Status: ON HOLD | OUTPATIENT
Start: 2019-01-01 | End: 2019-01-01

## 2019-01-01 RX ORDER — OXYCODONE HCL 10 MG/1
10 TABLET, FILM COATED, EXTENDED RELEASE ORAL AT BEDTIME
Qty: 10 TABLET | Refills: 0 | Status: SHIPPED | OUTPATIENT
Start: 2019-01-01 | End: 2019-01-01

## 2019-01-01 RX ORDER — NITROGLYCERIN 20 MG/100ML
.07-2 INJECTION INTRAVENOUS CONTINUOUS PRN
Status: DISCONTINUED | OUTPATIENT
Start: 2019-01-01 | End: 2019-01-01 | Stop reason: HOSPADM

## 2019-01-01 RX ORDER — LIDOCAINE 40 MG/G
CREAM TOPICAL
Status: CANCELLED | OUTPATIENT
Start: 2019-01-01

## 2019-01-01 RX ORDER — QUETIAPINE FUMARATE 25 MG/1
50 TABLET, FILM COATED ORAL 2 TIMES DAILY PRN
Status: DISCONTINUED | OUTPATIENT
Start: 2019-01-01 | End: 2019-01-01

## 2019-01-01 RX ORDER — ASPIRIN 81 MG/1
81 TABLET ORAL DAILY
Status: DISCONTINUED | OUTPATIENT
Start: 2019-01-01 | End: 2019-01-01 | Stop reason: HOSPADM

## 2019-01-01 RX ORDER — OXYCODONE HYDROCHLORIDE 5 MG/1
5 TABLET ORAL EVERY 6 HOURS PRN
Status: DISCONTINUED | OUTPATIENT
Start: 2019-01-01 | End: 2019-01-01 | Stop reason: HOSPADM

## 2019-01-01 RX ORDER — BISACODYL 10 MG
10 SUPPOSITORY, RECTAL RECTAL DAILY PRN
Status: DISCONTINUED | OUTPATIENT
Start: 2019-01-01 | End: 2019-01-01 | Stop reason: HOSPADM

## 2019-01-01 RX ORDER — FOLIC ACID 1 MG/1
1 TABLET ORAL DAILY
Status: DISCONTINUED | OUTPATIENT
Start: 2019-01-01 | End: 2019-01-01

## 2019-01-01 RX ORDER — IOPAMIDOL 755 MG/ML
119 INJECTION, SOLUTION INTRAVASCULAR ONCE
Status: COMPLETED | OUTPATIENT
Start: 2019-01-01 | End: 2019-01-01

## 2019-01-01 RX ORDER — OXYCODONE HCL 10 MG/1
10 TABLET, FILM COATED, EXTENDED RELEASE ORAL EVERY EVENING
Qty: 14 TABLET | Refills: 0 | Status: SHIPPED | OUTPATIENT
Start: 2019-01-01 | End: 2019-01-01

## 2019-01-01 RX ORDER — MIRTAZAPINE 15 MG/1
15 TABLET, FILM COATED ORAL AT BEDTIME
Qty: 10 TABLET | Refills: 0 | Status: ON HOLD | OUTPATIENT
Start: 2019-01-01 | End: 2019-01-01

## 2019-01-01 RX ORDER — ACETAMINOPHEN 325 MG/1
650 TABLET ORAL EVERY 4 HOURS PRN
Qty: 30 TABLET | Refills: 1 | COMMUNITY
Start: 2019-01-01

## 2019-01-01 RX ORDER — HYDROCHLOROTHIAZIDE 25 MG/1
25 TABLET ORAL DAILY
Status: DISCONTINUED | OUTPATIENT
Start: 2019-01-01 | End: 2019-01-01

## 2019-01-01 RX ORDER — IBUPROFEN 800 MG/1
TABLET, FILM COATED ORAL
Qty: 90 TABLET | Refills: 0 | OUTPATIENT
Start: 2019-01-01

## 2019-01-01 RX ORDER — OXYCODONE HCL 10 MG/1
30 TABLET, FILM COATED, EXTENDED RELEASE ORAL EVERY MORNING
Status: DISCONTINUED | OUTPATIENT
Start: 2019-01-01 | End: 2019-01-01

## 2019-01-01 RX ORDER — DEXTROSE MONOHYDRATE 25 G/50ML
25-50 INJECTION, SOLUTION INTRAVENOUS
Status: DISCONTINUED | OUTPATIENT
Start: 2019-01-01 | End: 2019-01-01

## 2019-01-01 RX ORDER — ISOSORBIDE MONONITRATE 30 MG/1
30 TABLET, EXTENDED RELEASE ORAL DAILY
Qty: 14 TABLET | Refills: 0 | Status: SHIPPED | OUTPATIENT
Start: 2019-01-01 | End: 2019-01-01

## 2019-01-01 RX ORDER — LORAZEPAM 2 MG/ML
0.5 INJECTION INTRAMUSCULAR ONCE
Status: COMPLETED | OUTPATIENT
Start: 2019-01-01 | End: 2019-01-01

## 2019-01-01 RX ORDER — MIRTAZAPINE 15 MG/1
15 TABLET, FILM COATED ORAL AT BEDTIME
Status: DISCONTINUED | OUTPATIENT
Start: 2019-01-01 | End: 2019-01-01

## 2019-01-01 RX ORDER — LIDOCAINE 40 MG/G
CREAM TOPICAL
Status: DISCONTINUED | OUTPATIENT
Start: 2019-01-01 | End: 2019-01-01

## 2019-01-01 RX ORDER — ALBUTEROL SULFATE 0.83 MG/ML
SOLUTION RESPIRATORY (INHALATION)
Status: DISCONTINUED
Start: 2019-01-01 | End: 2019-01-01 | Stop reason: WASHOUT

## 2019-01-01 RX ORDER — LISINOPRIL 40 MG/1
TABLET ORAL
Qty: 90 TABLET | Refills: 0 | Status: SHIPPED | OUTPATIENT
Start: 2019-01-01

## 2019-01-01 RX ORDER — ASCORBIC ACID 500 MG
500 TABLET ORAL DAILY
Status: DISCONTINUED | OUTPATIENT
Start: 2019-01-01 | End: 2019-01-01 | Stop reason: HOSPADM

## 2019-01-01 RX ORDER — POLYETHYLENE GLYCOL 3350 17 G/17G
17 POWDER, FOR SOLUTION ORAL DAILY PRN
Status: ON HOLD | COMMUNITY
End: 2019-01-01

## 2019-01-01 RX ORDER — OXYCODONE HCL 10 MG/1
20 TABLET, FILM COATED, EXTENDED RELEASE ORAL EVERY MORNING
Status: DISCONTINUED | OUTPATIENT
Start: 2019-01-01 | End: 2019-01-01 | Stop reason: HOSPADM

## 2019-01-01 RX ORDER — LORAZEPAM 1 MG/1
1-2 TABLET ORAL EVERY 30 MIN PRN
Status: DISCONTINUED | OUTPATIENT
Start: 2019-01-01 | End: 2019-01-01

## 2019-01-01 RX ORDER — OLANZAPINE 5 MG/1
5 TABLET, ORALLY DISINTEGRATING ORAL AT BEDTIME
Status: DISCONTINUED | OUTPATIENT
Start: 2019-01-01 | End: 2019-01-01 | Stop reason: HOSPADM

## 2019-01-01 RX ORDER — DEXTROSE MONOHYDRATE, SODIUM CHLORIDE, AND POTASSIUM CHLORIDE 50; 1.49; 4.5 G/1000ML; G/1000ML; G/1000ML
INJECTION, SOLUTION INTRAVENOUS CONTINUOUS
Status: CANCELLED | OUTPATIENT
Start: 2019-01-01

## 2019-01-01 RX ORDER — IBUPROFEN 800 MG/1
800 TABLET, FILM COATED ORAL EVERY 8 HOURS PRN
COMMUNITY

## 2019-01-01 RX ORDER — OLANZAPINE 5 MG/1
5 TABLET, ORALLY DISINTEGRATING ORAL DAILY PRN
Status: DISCONTINUED | OUTPATIENT
Start: 2019-01-01 | End: 2019-01-01

## 2019-01-01 RX ORDER — ACYCLOVIR 200 MG/1
0-1 CAPSULE ORAL
Status: DISCONTINUED | OUTPATIENT
Start: 2019-01-01 | End: 2019-01-01 | Stop reason: CLARIF

## 2019-01-01 RX ORDER — OXYCODONE HCL 20 MG/1
20 TABLET, FILM COATED, EXTENDED RELEASE ORAL EVERY 24 HOURS
Qty: 14 TABLET | Refills: 0 | Status: SHIPPED | OUTPATIENT
Start: 2019-01-01 | End: 2019-01-01

## 2019-01-01 RX ORDER — NICOTINE POLACRILEX 4 MG
15-30 LOZENGE BUCCAL
Status: DISCONTINUED | OUTPATIENT
Start: 2019-01-01 | End: 2019-01-01 | Stop reason: HOSPADM

## 2019-01-01 RX ORDER — METFORMIN HCL 500 MG
TABLET, EXTENDED RELEASE 24 HR ORAL
Qty: 360 TABLET | Refills: 0 | OUTPATIENT
Start: 2019-01-01

## 2019-01-01 RX ORDER — OXYCODONE HYDROCHLORIDE 5 MG/1
5 TABLET ORAL
Status: COMPLETED | OUTPATIENT
Start: 2019-01-01 | End: 2019-01-01

## 2019-01-01 RX ORDER — LIDOCAINE 4 G/G
2 PATCH TOPICAL EVERY 24 HOURS
Status: DISCONTINUED | OUTPATIENT
Start: 2019-01-01 | End: 2019-01-01

## 2019-01-01 RX ORDER — NALOXONE HYDROCHLORIDE 0.4 MG/ML
.1-.4 INJECTION, SOLUTION INTRAMUSCULAR; INTRAVENOUS; SUBCUTANEOUS
Status: DISCONTINUED | OUTPATIENT
Start: 2019-01-01 | End: 2019-01-01

## 2019-01-01 RX ORDER — NICOTINE POLACRILEX 4 MG
15-30 LOZENGE BUCCAL
Status: DISCONTINUED | OUTPATIENT
Start: 2019-01-01 | End: 2019-01-01

## 2019-01-01 RX ORDER — IOPAMIDOL 755 MG/ML
INJECTION, SOLUTION INTRAVASCULAR
Status: DISCONTINUED | OUTPATIENT
Start: 2019-01-01 | End: 2019-01-01 | Stop reason: HOSPADM

## 2019-01-01 RX ORDER — METFORMIN HCL 500 MG
1000 TABLET, EXTENDED RELEASE 24 HR ORAL 2 TIMES DAILY WITH MEALS
Status: CANCELLED | OUTPATIENT
Start: 2019-01-01

## 2019-01-01 RX ORDER — OLANZAPINE 2.5 MG/1
2.5 TABLET, FILM COATED ORAL 2 TIMES DAILY PRN
Qty: 60 TABLET | Refills: 0 | Status: CANCELLED | OUTPATIENT
Start: 2019-01-01

## 2019-01-01 RX ORDER — LIDOCAINE HYDROCHLORIDE 20 MG/ML
10 JELLY TOPICAL ONCE
Status: DISCONTINUED | OUTPATIENT
Start: 2019-01-01 | End: 2019-01-01 | Stop reason: CLARIF

## 2019-01-01 RX ORDER — NOREPINEPHRINE BITARTRATE/D5W 16MG/250ML
.03-.4 PLASTIC BAG, INJECTION (ML) INTRAVENOUS CONTINUOUS PRN
Status: DISCONTINUED | OUTPATIENT
Start: 2019-01-01 | End: 2019-01-01 | Stop reason: HOSPADM

## 2019-01-01 RX ORDER — THIAMINE HYDROCHLORIDE 100 MG/ML
100 INJECTION, SOLUTION INTRAMUSCULAR; INTRAVENOUS DAILY
Status: DISCONTINUED | OUTPATIENT
Start: 2019-01-01 | End: 2019-01-01 | Stop reason: HOSPADM

## 2019-01-01 RX ORDER — HYDROCHLOROTHIAZIDE 25 MG/1
25 TABLET ORAL DAILY
Status: DISCONTINUED | OUTPATIENT
Start: 2019-01-01 | End: 2019-01-01 | Stop reason: HOSPADM

## 2019-01-01 RX ORDER — EPTIFIBATIDE 2 MG/ML
2 INJECTION, SOLUTION INTRAVENOUS CONTINUOUS PRN
Status: DISCONTINUED | OUTPATIENT
Start: 2019-01-01 | End: 2019-01-01 | Stop reason: HOSPADM

## 2019-01-01 RX ORDER — OLANZAPINE 2.5 MG/1
2.5 TABLET, FILM COATED ORAL 2 TIMES DAILY PRN
Status: CANCELLED | OUTPATIENT
Start: 2019-01-01

## 2019-01-01 RX ORDER — HYDROCHLOROTHIAZIDE 25 MG/1
TABLET ORAL
Qty: 90 TABLET | Refills: 0 | Status: SHIPPED | OUTPATIENT
Start: 2019-01-01 | End: 2019-01-01

## 2019-01-01 RX ORDER — LIDOCAINE HYDROCHLORIDE 20 MG/ML
JELLY TOPICAL ONCE
Status: COMPLETED | OUTPATIENT
Start: 2019-01-01 | End: 2019-01-01

## 2019-01-01 RX ORDER — ACETAMINOPHEN 650 MG/1
650 SUPPOSITORY RECTAL EVERY 4 HOURS PRN
Qty: 12 SUPPOSITORY | Refills: 1 | Status: SHIPPED | OUTPATIENT
Start: 2019-01-01

## 2019-01-01 RX ORDER — TRAZODONE HYDROCHLORIDE 100 MG/1
300 TABLET ORAL AT BEDTIME
Status: DISCONTINUED | OUTPATIENT
Start: 2019-01-01 | End: 2019-01-01 | Stop reason: HOSPADM

## 2019-01-01 RX ORDER — OXYCODONE HYDROCHLORIDE 30 MG/1
30 TABLET, FILM COATED, EXTENDED RELEASE ORAL EVERY MORNING
Qty: 30 TABLET | Refills: 0 | Status: CANCELLED | OUTPATIENT
Start: 2019-01-01

## 2019-01-01 RX ORDER — TRAZODONE HYDROCHLORIDE 100 MG/1
TABLET ORAL
Qty: 270 TABLET | Refills: 0 | Status: ON HOLD | OUTPATIENT
Start: 2019-01-01 | End: 2019-01-01

## 2019-01-01 RX ORDER — ASPIRIN 81 MG/1
162 TABLET, CHEWABLE ORAL ONCE
Status: COMPLETED | OUTPATIENT
Start: 2019-01-01 | End: 2019-01-01

## 2019-01-01 RX ORDER — LORAZEPAM 2 MG/ML
.5-1 INJECTION INTRAMUSCULAR
Status: COMPLETED | OUTPATIENT
Start: 2019-01-01 | End: 2019-01-01

## 2019-01-01 RX ORDER — TRAZODONE HYDROCHLORIDE 100 MG/1
TABLET ORAL
Qty: 270 TABLET | Refills: 0 | OUTPATIENT
Start: 2019-01-01

## 2019-01-01 RX ORDER — SERTRALINE HYDROCHLORIDE 100 MG/1
TABLET, FILM COATED ORAL
Qty: 180 TABLET | Refills: 1 | Status: SHIPPED | OUTPATIENT
Start: 2019-01-01

## 2019-01-01 RX ORDER — ASPIRIN 81 MG/1
81 TABLET, CHEWABLE ORAL DAILY
Status: DISCONTINUED | OUTPATIENT
Start: 2019-01-01 | End: 2019-01-01

## 2019-01-01 RX ORDER — LIDOCAINE HYDROCHLORIDE 20 MG/ML
JELLY TOPICAL
Status: DISCONTINUED
Start: 2019-01-01 | End: 2019-01-01 | Stop reason: WASHOUT

## 2019-01-01 RX ORDER — DIAZEPAM 5 MG
5 TABLET ORAL EVERY 4 HOURS PRN
Qty: 84 TABLET | Refills: 0 | Status: SHIPPED | OUTPATIENT
Start: 2019-01-01

## 2019-01-01 RX ORDER — OLANZAPINE 5 MG/1
5 TABLET, ORALLY DISINTEGRATING ORAL AT BEDTIME
Status: DISCONTINUED | OUTPATIENT
Start: 2019-01-01 | End: 2019-01-01

## 2019-01-01 RX ORDER — NITROGLYCERIN 0.4 MG/1
0.4 TABLET SUBLINGUAL EVERY 5 MIN PRN
Status: CANCELLED | OUTPATIENT
Start: 2019-01-01

## 2019-01-01 RX ORDER — PROCHLORPERAZINE MALEATE 5 MG
5 TABLET ORAL EVERY 6 HOURS PRN
Status: CANCELLED | OUTPATIENT
Start: 2019-01-01

## 2019-01-01 RX ORDER — IBUPROFEN 800 MG/1
TABLET, FILM COATED ORAL
Refills: 0 | Status: ON HOLD | COMMUNITY
Start: 2019-01-01 | End: 2019-01-01

## 2019-01-01 RX ORDER — SPIRONOLACTONE 25 MG/1
12.5 TABLET ORAL DAILY
Qty: 30 TABLET | Refills: 0 | Status: CANCELLED | OUTPATIENT
Start: 2019-01-01

## 2019-01-01 RX ORDER — OXYCODONE HCL 10 MG/1
10 TABLET, FILM COATED, EXTENDED RELEASE ORAL EVERY EVENING
Status: DISCONTINUED | OUTPATIENT
Start: 2019-01-01 | End: 2019-01-01 | Stop reason: HOSPADM

## 2019-01-01 RX ORDER — ALBUTEROL SULFATE 90 UG/1
2 AEROSOL, METERED RESPIRATORY (INHALATION) EVERY 5 MIN PRN
Status: DISCONTINUED | OUTPATIENT
Start: 2019-01-01 | End: 2019-01-01 | Stop reason: CLARIF

## 2019-01-01 RX ORDER — METFORMIN HCL 500 MG
TABLET, EXTENDED RELEASE 24 HR ORAL
Qty: 360 TABLET | Refills: 0 | Status: SHIPPED | OUTPATIENT
Start: 2019-01-01 | End: 2019-01-01

## 2019-01-01 RX ORDER — LURASIDONE HYDROCHLORIDE 40 MG/1
40 TABLET, FILM COATED ORAL AT BEDTIME
Qty: 14 TABLET | Refills: 0 | Status: SHIPPED | OUTPATIENT
Start: 2019-01-01 | End: 2019-01-01

## 2019-01-01 RX ORDER — SENNOSIDES 8.6 MG
1-2 TABLET ORAL 2 TIMES DAILY
Qty: 100 TABLET | Refills: 1 | Status: SHIPPED | OUTPATIENT
Start: 2019-01-01

## 2019-01-01 RX ORDER — LURASIDONE HYDROCHLORIDE 40 MG/1
40 TABLET, FILM COATED ORAL AT BEDTIME
Status: DISCONTINUED | OUTPATIENT
Start: 2019-01-01 | End: 2019-01-01

## 2019-01-01 RX ORDER — OLANZAPINE 2.5 MG/1
2.5 TABLET, FILM COATED ORAL 2 TIMES DAILY PRN
Status: DISCONTINUED | OUTPATIENT
Start: 2019-01-01 | End: 2019-01-01

## 2019-01-01 RX ORDER — FAMOTIDINE 20 MG/1
20 TABLET, FILM COATED ORAL 2 TIMES DAILY
Status: DISCONTINUED | OUTPATIENT
Start: 2019-01-01 | End: 2019-01-01

## 2019-01-01 RX ORDER — BISACODYL 10 MG
SUPPOSITORY, RECTAL RECTAL
Qty: 12 SUPPOSITORY | Refills: 1 | Status: SHIPPED | OUTPATIENT
Start: 2019-01-01

## 2019-01-01 RX ORDER — OXYCODONE HCL 10 MG/1
10 TABLET, FILM COATED, EXTENDED RELEASE ORAL AT BEDTIME
Qty: 10 TABLET | Refills: 0 | Status: ON HOLD | OUTPATIENT
Start: 2019-01-01 | End: 2019-01-01

## 2019-01-01 RX ORDER — IOPAMIDOL 755 MG/ML
100 INJECTION, SOLUTION INTRAVASCULAR ONCE
Status: DISCONTINUED | OUTPATIENT
Start: 2019-01-01 | End: 2019-01-01 | Stop reason: CLARIF

## 2019-01-01 RX ORDER — EPTIFIBATIDE 2 MG/ML
1 INJECTION, SOLUTION INTRAVENOUS CONTINUOUS PRN
Status: DISCONTINUED | OUTPATIENT
Start: 2019-01-01 | End: 2019-01-01 | Stop reason: HOSPADM

## 2019-01-01 RX ORDER — ONDANSETRON 4 MG/1
4 TABLET, ORALLY DISINTEGRATING ORAL EVERY 6 HOURS PRN
Status: DISCONTINUED | OUTPATIENT
Start: 2019-01-01 | End: 2019-01-01 | Stop reason: HOSPADM

## 2019-01-01 RX ORDER — ONDANSETRON 2 MG/ML
4 INJECTION INTRAMUSCULAR; INTRAVENOUS EVERY 6 HOURS PRN
Status: DISCONTINUED | OUTPATIENT
Start: 2019-01-01 | End: 2019-01-01 | Stop reason: HOSPADM

## 2019-01-01 RX ORDER — OXYCODONE HCL 20 MG/1
20 TABLET, FILM COATED, EXTENDED RELEASE ORAL EVERY 24 HOURS
Qty: 14 TABLET | Refills: 0 | Status: SHIPPED | OUTPATIENT
Start: 2019-01-01

## 2019-01-01 RX ORDER — OLANZAPINE 5 MG/1
10 TABLET, ORALLY DISINTEGRATING ORAL ONCE
Status: COMPLETED | OUTPATIENT
Start: 2019-01-01 | End: 2019-01-01

## 2019-01-01 RX ORDER — REGADENOSON 0.08 MG/ML
0.4 INJECTION, SOLUTION INTRAVENOUS ONCE
Status: DISCONTINUED | OUTPATIENT
Start: 2019-01-01 | End: 2019-01-01 | Stop reason: CLARIF

## 2019-01-01 RX ORDER — GADOBUTROL 604.72 MG/ML
10 INJECTION INTRAVENOUS ONCE
Status: COMPLETED | OUTPATIENT
Start: 2019-01-01 | End: 2019-01-01

## 2019-01-01 RX ORDER — RAMELTEON 8 MG/1
8 TABLET ORAL AT BEDTIME
Status: CANCELLED | OUTPATIENT
Start: 2019-01-01

## 2019-01-01 RX ORDER — OXYCODONE HYDROCHLORIDE 15 MG/1
30 TABLET, FILM COATED, EXTENDED RELEASE ORAL EVERY MORNING
Status: CANCELLED | OUTPATIENT
Start: 2019-01-01

## 2019-01-01 RX ORDER — GABAPENTIN 100 MG/1
400 CAPSULE ORAL 2 TIMES DAILY
Qty: 360 CAPSULE | Refills: 1 | Status: SHIPPED | OUTPATIENT
Start: 2019-01-01 | End: 2019-01-01

## 2019-01-01 RX ADMIN — HYDROMORPHONE HYDROCHLORIDE 0.5 MG: 1 INJECTION, SOLUTION INTRAMUSCULAR; INTRAVENOUS; SUBCUTANEOUS at 22:21

## 2019-01-01 RX ADMIN — AMLODIPINE BESYLATE 5 MG: 5 TABLET ORAL at 08:56

## 2019-01-01 RX ADMIN — TRAZODONE HYDROCHLORIDE 300 MG: 100 TABLET ORAL at 20:03

## 2019-01-01 RX ADMIN — INSULIN ASPART 1 UNITS: 100 INJECTION, SOLUTION INTRAVENOUS; SUBCUTANEOUS at 08:30

## 2019-01-01 RX ADMIN — POTASSIUM CHLORIDE 40 MEQ: 1500 TABLET, EXTENDED RELEASE ORAL at 06:41

## 2019-01-01 RX ADMIN — OMEPRAZOLE 20 MG: 20 CAPSULE, DELAYED RELEASE ORAL at 09:30

## 2019-01-01 RX ADMIN — OXYCODONE HYDROCHLORIDE 30 MG: 15 TABLET, FILM COATED, EXTENDED RELEASE ORAL at 10:35

## 2019-01-01 RX ADMIN — TAMSULOSIN HYDROCHLORIDE 0.4 MG: 0.4 CAPSULE ORAL at 08:32

## 2019-01-01 RX ADMIN — POTASSIUM CHLORIDE 20 MEQ: 1500 TABLET, EXTENDED RELEASE ORAL at 09:25

## 2019-01-01 RX ADMIN — OXYCODONE HYDROCHLORIDE 5 MG: 5 TABLET ORAL at 06:40

## 2019-01-01 RX ADMIN — HYDROMORPHONE HYDROCHLORIDE 0.5 MG: 1 INJECTION, SOLUTION INTRAMUSCULAR; INTRAVENOUS; SUBCUTANEOUS at 03:38

## 2019-01-01 RX ADMIN — OXYCODONE HYDROCHLORIDE 30 MG: 15 TABLET, FILM COATED, EXTENDED RELEASE ORAL at 08:31

## 2019-01-01 RX ADMIN — SERTRALINE HYDROCHLORIDE 200 MG: 100 TABLET ORAL at 08:44

## 2019-01-01 RX ADMIN — Medication 1 MG: at 21:53

## 2019-01-01 RX ADMIN — INSULIN ASPART 1 UNITS: 100 INJECTION, SOLUTION INTRAVENOUS; SUBCUTANEOUS at 04:49

## 2019-01-01 RX ADMIN — OLANZAPINE 2.5 MG: 5 TABLET, ORALLY DISINTEGRATING ORAL at 06:22

## 2019-01-01 RX ADMIN — Medication 100 MG: at 08:43

## 2019-01-01 RX ADMIN — NITROGLYCERIN 0.4 MG: 0.4 TABLET SUBLINGUAL at 21:15

## 2019-01-01 RX ADMIN — POTASSIUM CHLORIDE 40 MEQ: 1500 TABLET, EXTENDED RELEASE ORAL at 16:44

## 2019-01-01 RX ADMIN — TAMSULOSIN HYDROCHLORIDE 0.4 MG: 0.4 CAPSULE ORAL at 10:38

## 2019-01-01 RX ADMIN — INSULIN ASPART 1 UNITS: 100 INJECTION, SOLUTION INTRAVENOUS; SUBCUTANEOUS at 17:11

## 2019-01-01 RX ADMIN — SENNOSIDES AND DOCUSATE SODIUM 2 TABLET: 8.6; 5 TABLET ORAL at 21:42

## 2019-01-01 RX ADMIN — LURASIDONE HYDROCHLORIDE 60 MG: 40 TABLET, FILM COATED ORAL at 21:32

## 2019-01-01 RX ADMIN — AMLODIPINE BESYLATE 5 MG: 5 TABLET ORAL at 08:46

## 2019-01-01 RX ADMIN — ASPIRIN 81 MG: 81 TABLET, COATED ORAL at 08:40

## 2019-01-01 RX ADMIN — LISINOPRIL 20 MG: 20 TABLET ORAL at 08:11

## 2019-01-01 RX ADMIN — CLOPIDOGREL BISULFATE 75 MG: 75 TABLET, FILM COATED ORAL at 08:49

## 2019-01-01 RX ADMIN — ENOXAPARIN SODIUM 40 MG: 40 INJECTION SUBCUTANEOUS at 19:33

## 2019-01-01 RX ADMIN — PANTOPRAZOLE SODIUM 40 MG: 40 TABLET, DELAYED RELEASE ORAL at 06:24

## 2019-01-01 RX ADMIN — DEXTROSE AND SODIUM CHLORIDE: 5; 900 INJECTION, SOLUTION INTRAVENOUS at 10:29

## 2019-01-01 RX ADMIN — AMLODIPINE BESYLATE 10 MG: 10 TABLET ORAL at 08:11

## 2019-01-01 RX ADMIN — OXYCODONE HYDROCHLORIDE AND ACETAMINOPHEN 500 MG: 500 TABLET ORAL at 10:39

## 2019-01-01 RX ADMIN — SENNOSIDES AND DOCUSATE SODIUM 1 TABLET: 8.6; 5 TABLET ORAL at 09:28

## 2019-01-01 RX ADMIN — CLOPIDOGREL BISULFATE 75 MG: 75 TABLET, FILM COATED ORAL at 07:53

## 2019-01-01 RX ADMIN — OXYCODONE HYDROCHLORIDE 30 MG: 15 TABLET, FILM COATED, EXTENDED RELEASE ORAL at 10:49

## 2019-01-01 RX ADMIN — POTASSIUM CHLORIDE 20 MEQ: 1500 TABLET, EXTENDED RELEASE ORAL at 18:36

## 2019-01-01 RX ADMIN — OXYCODONE HYDROCHLORIDE AND ACETAMINOPHEN 500 MG: 500 TABLET ORAL at 08:48

## 2019-01-01 RX ADMIN — OLANZAPINE 2.5 MG: 5 TABLET, ORALLY DISINTEGRATING ORAL at 15:23

## 2019-01-01 RX ADMIN — LIDOCAINE HYDROCHLORIDE 10 ML: 20 JELLY TOPICAL at 13:31

## 2019-01-01 RX ADMIN — Medication 12.5 MG: at 08:52

## 2019-01-01 RX ADMIN — LIDOCAINE HYDROCHLORIDE 30 ML: 20 SOLUTION ORAL; TOPICAL at 16:12

## 2019-01-01 RX ADMIN — SPIRONOLACTONE 12.5 MG: 25 TABLET ORAL at 15:22

## 2019-01-01 RX ADMIN — OXYCODONE HYDROCHLORIDE 5 MG: 5 TABLET ORAL at 22:49

## 2019-01-01 RX ADMIN — OXYCODONE HYDROCHLORIDE 5 MG: 5 TABLET ORAL at 01:01

## 2019-01-01 RX ADMIN — OXYCODONE HYDROCHLORIDE 5 MG: 5 TABLET ORAL at 19:04

## 2019-01-01 RX ADMIN — POTASSIUM CHLORIDE 20 MEQ: 1500 TABLET, EXTENDED RELEASE ORAL at 18:27

## 2019-01-01 RX ADMIN — MELATONIN 1000 UNITS: at 09:07

## 2019-01-01 RX ADMIN — OLANZAPINE 5 MG: 5 TABLET, ORALLY DISINTEGRATING ORAL at 08:41

## 2019-01-01 RX ADMIN — OXYCODONE HYDROCHLORIDE 30 MG: 15 TABLET, FILM COATED, EXTENDED RELEASE ORAL at 09:19

## 2019-01-01 RX ADMIN — MELATONIN TAB 3 MG 3 MG: 3 TAB at 19:04

## 2019-01-01 RX ADMIN — ATORVASTATIN CALCIUM 80 MG: 40 TABLET, FILM COATED ORAL at 09:30

## 2019-01-01 RX ADMIN — CEFAZOLIN 1 G: 1 INJECTION, POWDER, FOR SOLUTION INTRAMUSCULAR; INTRAVENOUS at 18:55

## 2019-01-01 RX ADMIN — CEFAZOLIN 1 G: 1 INJECTION, POWDER, FOR SOLUTION INTRAMUSCULAR; INTRAVENOUS at 03:36

## 2019-01-01 RX ADMIN — CYANOCOBALAMIN TAB 500 MCG 500 MCG: 500 TAB at 11:56

## 2019-01-01 RX ADMIN — Medication 1 MG: at 20:46

## 2019-01-01 RX ADMIN — ACETAMINOPHEN 650 MG: 325 TABLET ORAL at 16:12

## 2019-01-01 RX ADMIN — FERROUS SULFATE TAB 325 MG (65 MG ELEMENTAL FE) 325 MG: 325 (65 FE) TAB at 11:55

## 2019-01-01 RX ADMIN — LIDOCAINE HYDROCHLORIDE 30 ML: 20 SOLUTION ORAL; TOPICAL at 18:10

## 2019-01-01 RX ADMIN — Medication 100 MG: at 08:49

## 2019-01-01 RX ADMIN — PANTOPRAZOLE SODIUM 40 MG: 40 INJECTION, POWDER, FOR SOLUTION INTRAVENOUS at 09:15

## 2019-01-01 RX ADMIN — TRAZODONE HYDROCHLORIDE 300 MG: 100 TABLET ORAL at 21:07

## 2019-01-01 RX ADMIN — ASPIRIN 81 MG: 81 TABLET, COATED ORAL at 19:47

## 2019-01-01 RX ADMIN — INSULIN ASPART 1 UNITS: 100 INJECTION, SOLUTION INTRAVENOUS; SUBCUTANEOUS at 17:14

## 2019-01-01 RX ADMIN — ASPIRIN 81 MG: 81 TABLET, COATED ORAL at 08:52

## 2019-01-01 RX ADMIN — CEFAZOLIN 1 G: 1 INJECTION, POWDER, FOR SOLUTION INTRAMUSCULAR; INTRAVENOUS at 20:25

## 2019-01-01 RX ADMIN — INSULIN ASPART 1 UNITS: 100 INJECTION, SOLUTION INTRAVENOUS; SUBCUTANEOUS at 09:40

## 2019-01-01 RX ADMIN — POLYETHYLENE GLYCOL 3350 17 G: 17 POWDER, FOR SOLUTION ORAL at 21:42

## 2019-01-01 RX ADMIN — SENNOSIDES AND DOCUSATE SODIUM 2 TABLET: 8.6; 5 TABLET ORAL at 09:01

## 2019-01-01 RX ADMIN — SERTRALINE HYDROCHLORIDE 200 MG: 100 TABLET ORAL at 08:32

## 2019-01-01 RX ADMIN — ACETAMINOPHEN 650 MG: 325 TABLET ORAL at 15:29

## 2019-01-01 RX ADMIN — OMEPRAZOLE 20 MG: 20 CAPSULE, DELAYED RELEASE ORAL at 17:02

## 2019-01-01 RX ADMIN — OXYCODONE HYDROCHLORIDE 5 MG: 5 TABLET ORAL at 09:08

## 2019-01-01 RX ADMIN — SENNOSIDES AND DOCUSATE SODIUM 1 TABLET: 8.6; 5 TABLET ORAL at 20:46

## 2019-01-01 RX ADMIN — ACETAMINOPHEN 650 MG: 325 TABLET ORAL at 17:27

## 2019-01-01 RX ADMIN — Medication 100 MG: at 09:06

## 2019-01-01 RX ADMIN — TAMSULOSIN HYDROCHLORIDE 0.4 MG: 0.4 CAPSULE ORAL at 09:31

## 2019-01-01 RX ADMIN — ASPIRIN 81 MG: 81 TABLET, COATED ORAL at 10:51

## 2019-01-01 RX ADMIN — PANTOPRAZOLE SODIUM 40 MG: 40 TABLET, DELAYED RELEASE ORAL at 06:32

## 2019-01-01 RX ADMIN — QUETIAPINE 25 MG: 25 TABLET ORAL at 17:29

## 2019-01-01 RX ADMIN — OLANZAPINE 5 MG: 5 TABLET, ORALLY DISINTEGRATING ORAL at 21:43

## 2019-01-01 RX ADMIN — OXYCODONE HYDROCHLORIDE 5 MG: 5 TABLET ORAL at 18:36

## 2019-01-01 RX ADMIN — OLANZAPINE 5 MG: 5 TABLET, ORALLY DISINTEGRATING ORAL at 21:31

## 2019-01-01 RX ADMIN — AMLODIPINE BESYLATE 5 MG: 5 TABLET ORAL at 09:28

## 2019-01-01 RX ADMIN — TRAZODONE HYDROCHLORIDE 200 MG: 100 TABLET ORAL at 02:17

## 2019-01-01 RX ADMIN — LIDOCAINE HYDROCHLORIDE 30 ML: 20 SOLUTION ORAL; TOPICAL at 00:10

## 2019-01-01 RX ADMIN — CLOPIDOGREL BISULFATE 75 MG: 75 TABLET, FILM COATED ORAL at 08:44

## 2019-01-01 RX ADMIN — POTASSIUM CHLORIDE AND SODIUM CHLORIDE: 900; 150 INJECTION, SOLUTION INTRAVENOUS at 12:07

## 2019-01-01 RX ADMIN — Medication 1 MG: at 23:12

## 2019-01-01 RX ADMIN — OXYCODONE HYDROCHLORIDE 5 MG: 5 TABLET ORAL at 15:23

## 2019-01-01 RX ADMIN — LISINOPRIL 40 MG: 40 TABLET ORAL at 09:30

## 2019-01-01 RX ADMIN — PANTOPRAZOLE SODIUM 40 MG: 40 INJECTION, POWDER, FOR SOLUTION INTRAVENOUS at 20:48

## 2019-01-01 RX ADMIN — OMEPRAZOLE 20 MG: 20 CAPSULE, DELAYED RELEASE ORAL at 10:41

## 2019-01-01 RX ADMIN — OXYCODONE HYDROCHLORIDE 30 MG: 10 TABLET, FILM COATED, EXTENDED RELEASE ORAL at 09:29

## 2019-01-01 RX ADMIN — PANTOPRAZOLE SODIUM 40 MG: 40 TABLET, DELAYED RELEASE ORAL at 09:02

## 2019-01-01 RX ADMIN — PANTOPRAZOLE SODIUM 40 MG: 40 TABLET, DELAYED RELEASE ORAL at 17:44

## 2019-01-01 RX ADMIN — FERROUS SULFATE TAB 325 MG (65 MG ELEMENTAL FE) 325 MG: 325 (65 FE) TAB at 12:51

## 2019-01-01 RX ADMIN — OXYCODONE HYDROCHLORIDE 5 MG: 5 TABLET ORAL at 22:32

## 2019-01-01 RX ADMIN — OXYCODONE HYDROCHLORIDE 20 MG: 10 TABLET, FILM COATED, EXTENDED RELEASE ORAL at 09:06

## 2019-01-01 RX ADMIN — POTASSIUM CHLORIDE 20 MEQ: 750 TABLET, EXTENDED RELEASE ORAL at 09:01

## 2019-01-01 RX ADMIN — ONDANSETRON 4 MG: 2 INJECTION INTRAMUSCULAR; INTRAVENOUS at 09:58

## 2019-01-01 RX ADMIN — OXYCODONE HYDROCHLORIDE 5 MG: 5 TABLET ORAL at 14:16

## 2019-01-01 RX ADMIN — LIDOCAINE HYDROCHLORIDE 30 ML: 20 SOLUTION ORAL; TOPICAL at 15:56

## 2019-01-01 RX ADMIN — POLYETHYLENE GLYCOL 3350 17 G: 17 POWDER, FOR SOLUTION ORAL at 13:38

## 2019-01-01 RX ADMIN — TRAZODONE HYDROCHLORIDE 300 MG: 100 TABLET ORAL at 22:56

## 2019-01-01 RX ADMIN — AMLODIPINE BESYLATE 5 MG: 5 TABLET ORAL at 10:39

## 2019-01-01 RX ADMIN — ENOXAPARIN SODIUM 40 MG: 40 INJECTION SUBCUTANEOUS at 20:26

## 2019-01-01 RX ADMIN — HEPARIN SODIUM 900 UNITS/HR: 10000 INJECTION, SOLUTION INTRAVENOUS at 10:34

## 2019-01-01 RX ADMIN — IOPAMIDOL 119 ML: 755 INJECTION, SOLUTION INTRAVASCULAR at 08:42

## 2019-01-01 RX ADMIN — LIDOCAINE HYDROCHLORIDE: 20 JELLY TOPICAL at 14:22

## 2019-01-01 RX ADMIN — HYDROMORPHONE HYDROCHLORIDE 0.5 MG: 1 INJECTION, SOLUTION INTRAMUSCULAR; INTRAVENOUS; SUBCUTANEOUS at 06:49

## 2019-01-01 RX ADMIN — GABAPENTIN 400 MG: 300 CAPSULE ORAL at 22:26

## 2019-01-01 RX ADMIN — TRAZODONE HYDROCHLORIDE 300 MG: 100 TABLET ORAL at 20:59

## 2019-01-01 RX ADMIN — ATROPA BELLADONNA AND OPIUM 0.5 SUPPOSITORY: 16.2; 3 SUPPOSITORY RECTAL at 21:24

## 2019-01-01 RX ADMIN — OXYCODONE HYDROCHLORIDE 5 MG: 5 TABLET ORAL at 02:39

## 2019-01-01 RX ADMIN — METFORMIN HYDROCHLORIDE 500 MG: 500 TABLET, EXTENDED RELEASE ORAL at 18:28

## 2019-01-01 RX ADMIN — QUETIAPINE 50 MG: 25 TABLET ORAL at 00:14

## 2019-01-01 RX ADMIN — TRAZODONE HYDROCHLORIDE 200 MG: 100 TABLET ORAL at 22:32

## 2019-01-01 RX ADMIN — OXYCODONE HYDROCHLORIDE 5 MG: 5 TABLET ORAL at 15:06

## 2019-01-01 RX ADMIN — CYANOCOBALAMIN TAB 500 MCG 500 MCG: 500 TAB at 12:15

## 2019-01-01 RX ADMIN — CEFAZOLIN 1 G: 1 INJECTION, POWDER, FOR SOLUTION INTRAMUSCULAR; INTRAVENOUS at 19:14

## 2019-01-01 RX ADMIN — QUETIAPINE 50 MG: 25 TABLET ORAL at 05:39

## 2019-01-01 RX ADMIN — LISINOPRIL 40 MG: 40 TABLET ORAL at 09:28

## 2019-01-01 RX ADMIN — OXYCODONE HYDROCHLORIDE 5 MG: 5 TABLET ORAL at 05:06

## 2019-01-01 RX ADMIN — OXYCODONE HYDROCHLORIDE 5 MG: 5 TABLET ORAL at 02:05

## 2019-01-01 RX ADMIN — SUCRALFATE 1 G: 1 TABLET ORAL at 17:29

## 2019-01-01 RX ADMIN — OXYCODONE HYDROCHLORIDE 5 MG: 5 TABLET ORAL at 18:24

## 2019-01-01 RX ADMIN — Medication 1 MG: at 21:07

## 2019-01-01 RX ADMIN — INSULIN ASPART 3 UNITS: 100 INJECTION, SOLUTION INTRAVENOUS; SUBCUTANEOUS at 12:14

## 2019-01-01 RX ADMIN — LURASIDONE HYDROCHLORIDE 40 MG: 40 TABLET, FILM COATED ORAL at 08:44

## 2019-01-01 RX ADMIN — OXYCODONE HYDROCHLORIDE 5 MG: 5 TABLET ORAL at 21:42

## 2019-01-01 RX ADMIN — LISINOPRIL 40 MG: 40 TABLET ORAL at 08:43

## 2019-01-01 RX ADMIN — OMEPRAZOLE 20 MG: 20 CAPSULE, DELAYED RELEASE ORAL at 08:25

## 2019-01-01 RX ADMIN — OXYCODONE HYDROCHLORIDE AND ACETAMINOPHEN 500 MG: 500 TABLET ORAL at 11:51

## 2019-01-01 RX ADMIN — DEXTROSE AND SODIUM CHLORIDE: 5; 450 INJECTION, SOLUTION INTRAVENOUS at 11:03

## 2019-01-01 RX ADMIN — THIAMINE HYDROCHLORIDE 100 MG: 100 INJECTION, SOLUTION INTRAMUSCULAR; INTRAVENOUS at 08:53

## 2019-01-01 RX ADMIN — AMLODIPINE BESYLATE 5 MG: 5 TABLET ORAL at 22:21

## 2019-01-01 RX ADMIN — LURASIDONE HYDROCHLORIDE 40 MG: 40 TABLET, FILM COATED ORAL at 22:09

## 2019-01-01 RX ADMIN — QUETIAPINE 50 MG: 25 TABLET ORAL at 17:44

## 2019-01-01 RX ADMIN — ENOXAPARIN SODIUM 40 MG: 40 INJECTION SUBCUTANEOUS at 22:23

## 2019-01-01 RX ADMIN — SERTRALINE HYDROCHLORIDE 200 MG: 100 TABLET ORAL at 08:23

## 2019-01-01 RX ADMIN — OXYCODONE HYDROCHLORIDE 30 MG: 15 TABLET, FILM COATED, EXTENDED RELEASE ORAL at 08:34

## 2019-01-01 RX ADMIN — METFORMIN HYDROCHLORIDE 500 MG: 500 TABLET, EXTENDED RELEASE ORAL at 09:09

## 2019-01-01 RX ADMIN — ATORVASTATIN CALCIUM 80 MG: 40 TABLET, FILM COATED ORAL at 07:53

## 2019-01-01 RX ADMIN — CEFAZOLIN 1 G: 1 INJECTION, POWDER, FOR SOLUTION INTRAMUSCULAR; INTRAVENOUS at 03:04

## 2019-01-01 RX ADMIN — INSULIN ASPART 1 UNITS: 100 INJECTION, SOLUTION INTRAVENOUS; SUBCUTANEOUS at 05:26

## 2019-01-01 RX ADMIN — INSULIN ASPART 3 UNITS: 100 INJECTION, SOLUTION INTRAVENOUS; SUBCUTANEOUS at 16:12

## 2019-01-01 RX ADMIN — DIAZEPAM 5 MG: 5 TABLET ORAL at 01:09

## 2019-01-01 RX ADMIN — FUROSEMIDE 20 MG: 10 INJECTION, SOLUTION INTRAVENOUS at 18:27

## 2019-01-01 RX ADMIN — INSULIN ASPART 1 UNITS: 100 INJECTION, SOLUTION INTRAVENOUS; SUBCUTANEOUS at 01:07

## 2019-01-01 RX ADMIN — HEPARIN SODIUM 900 UNITS/HR: 10000 INJECTION, SOLUTION INTRAVENOUS at 05:00

## 2019-01-01 RX ADMIN — CEFAZOLIN 1 G: 1 INJECTION, POWDER, FOR SOLUTION INTRAMUSCULAR; INTRAVENOUS at 20:05

## 2019-01-01 RX ADMIN — OXYCODONE HYDROCHLORIDE AND ACETAMINOPHEN 500 MG: 500 TABLET ORAL at 07:53

## 2019-01-01 RX ADMIN — AMLODIPINE BESYLATE 5 MG: 5 TABLET ORAL at 08:43

## 2019-01-01 RX ADMIN — ACETAMINOPHEN 650 MG: 325 TABLET ORAL at 22:49

## 2019-01-01 RX ADMIN — AMLODIPINE BESYLATE 10 MG: 10 TABLET ORAL at 08:51

## 2019-01-01 RX ADMIN — LIDOCAINE HYDROCHLORIDE 30 ML: 20 SOLUTION ORAL; TOPICAL at 05:42

## 2019-01-01 RX ADMIN — OXYCODONE HYDROCHLORIDE 30 MG: 15 TABLET, FILM COATED, EXTENDED RELEASE ORAL at 08:11

## 2019-01-01 RX ADMIN — QUETIAPINE 50 MG: 25 TABLET ORAL at 12:47

## 2019-01-01 RX ADMIN — CEFAZOLIN 1 G: 1 INJECTION, POWDER, FOR SOLUTION INTRAMUSCULAR; INTRAVENOUS at 03:35

## 2019-01-01 RX ADMIN — AMLODIPINE BESYLATE 10 MG: 10 TABLET ORAL at 10:50

## 2019-01-01 RX ADMIN — CYANOCOBALAMIN TAB 500 MCG 500 MCG: 500 TAB at 11:50

## 2019-01-01 RX ADMIN — LIDOCAINE HYDROCHLORIDE 30 ML: 20 SOLUTION ORAL; TOPICAL at 08:46

## 2019-01-01 RX ADMIN — ATORVASTATIN CALCIUM 80 MG: 80 TABLET, FILM COATED ORAL at 22:27

## 2019-01-01 RX ADMIN — ISOSORBIDE MONONITRATE 30 MG: 30 TABLET, EXTENDED RELEASE ORAL at 10:37

## 2019-01-01 RX ADMIN — METFORMIN HYDROCHLORIDE 500 MG: 500 TABLET, EXTENDED RELEASE ORAL at 08:52

## 2019-01-01 RX ADMIN — PANTOPRAZOLE SODIUM 40 MG: 40 TABLET, DELAYED RELEASE ORAL at 16:13

## 2019-01-01 RX ADMIN — OXYCODONE HYDROCHLORIDE 5 MG: 5 TABLET ORAL at 16:12

## 2019-01-01 RX ADMIN — ASPIRIN 81 MG: 81 TABLET, COATED ORAL at 19:55

## 2019-01-01 RX ADMIN — Medication 1 MG: at 21:30

## 2019-01-01 RX ADMIN — OXYCODONE HYDROCHLORIDE AND ACETAMINOPHEN 1 TABLET: 5; 325 TABLET ORAL at 13:54

## 2019-01-01 RX ADMIN — OXYCODONE HYDROCHLORIDE 5 MG: 5 TABLET ORAL at 10:20

## 2019-01-01 RX ADMIN — HYDROMORPHONE HYDROCHLORIDE 0.5 MG: 1 INJECTION, SOLUTION INTRAMUSCULAR; INTRAVENOUS; SUBCUTANEOUS at 11:44

## 2019-01-01 RX ADMIN — CEFAZOLIN 1 G: 1 INJECTION, POWDER, FOR SOLUTION INTRAMUSCULAR; INTRAVENOUS at 11:55

## 2019-01-01 RX ADMIN — AMLODIPINE BESYLATE 5 MG: 5 TABLET ORAL at 08:58

## 2019-01-01 RX ADMIN — QUETIAPINE 50 MG: 25 TABLET ORAL at 04:18

## 2019-01-01 RX ADMIN — OMEPRAZOLE 20 MG: 20 CAPSULE, DELAYED RELEASE ORAL at 08:43

## 2019-01-01 RX ADMIN — ACETAMINOPHEN 650 MG: 325 TABLET ORAL at 08:51

## 2019-01-01 RX ADMIN — INSULIN ASPART 1 UNITS: 100 INJECTION, SOLUTION INTRAVENOUS; SUBCUTANEOUS at 18:03

## 2019-01-01 RX ADMIN — ASPIRIN 81 MG: 81 TABLET, COATED ORAL at 09:08

## 2019-01-01 RX ADMIN — ENOXAPARIN SODIUM 40 MG: 40 INJECTION SUBCUTANEOUS at 19:55

## 2019-01-01 RX ADMIN — LISINOPRIL 40 MG: 40 TABLET ORAL at 09:07

## 2019-01-01 RX ADMIN — ACETAMINOPHEN 650 MG: 325 TABLET, FILM COATED ORAL at 08:49

## 2019-01-01 RX ADMIN — SERTRALINE HYDROCHLORIDE 200 MG: 100 TABLET ORAL at 08:57

## 2019-01-01 RX ADMIN — LURASIDONE HYDROCHLORIDE 40 MG: 40 TABLET, FILM COATED ORAL at 21:53

## 2019-01-01 RX ADMIN — OMEPRAZOLE 20 MG: 20 CAPSULE, DELAYED RELEASE ORAL at 16:36

## 2019-01-01 RX ADMIN — POLYETHYLENE GLYCOL 3350 17 G: 17 POWDER, FOR SOLUTION ORAL at 08:37

## 2019-01-01 RX ADMIN — PANTOPRAZOLE SODIUM 40 MG: 40 TABLET, DELAYED RELEASE ORAL at 06:12

## 2019-01-01 RX ADMIN — TRAZODONE HYDROCHLORIDE 300 MG: 100 TABLET ORAL at 00:27

## 2019-01-01 RX ADMIN — SENNOSIDES AND DOCUSATE SODIUM 2 TABLET: 8.6; 5 TABLET ORAL at 08:51

## 2019-01-01 RX ADMIN — Medication 100 MG: at 07:53

## 2019-01-01 RX ADMIN — SERTRALINE HYDROCHLORIDE 200 MG: 100 TABLET ORAL at 08:11

## 2019-01-01 RX ADMIN — SENNOSIDES AND DOCUSATE SODIUM 2 TABLET: 8.6; 5 TABLET ORAL at 21:00

## 2019-01-01 RX ADMIN — INSULIN ASPART 2 UNITS: 100 INJECTION, SOLUTION INTRAVENOUS; SUBCUTANEOUS at 08:12

## 2019-01-01 RX ADMIN — SENNOSIDES AND DOCUSATE SODIUM 1 TABLET: 8.6; 5 TABLET ORAL at 22:56

## 2019-01-01 RX ADMIN — OLANZAPINE 5 MG: 5 TABLET, ORALLY DISINTEGRATING ORAL at 22:28

## 2019-01-01 RX ADMIN — INSULIN ASPART 2 UNITS: 100 INJECTION, SOLUTION INTRAVENOUS; SUBCUTANEOUS at 09:20

## 2019-01-01 RX ADMIN — IOPAMIDOL 100 ML: 755 INJECTION, SOLUTION INTRAVENOUS at 10:30

## 2019-01-01 RX ADMIN — Medication 12.5 MG: at 09:10

## 2019-01-01 RX ADMIN — SUCRALFATE 1 G: 1 TABLET ORAL at 14:09

## 2019-01-01 RX ADMIN — LORAZEPAM 1 MG: 1 TABLET ORAL at 12:29

## 2019-01-01 RX ADMIN — ISOSORBIDE MONONITRATE 30 MG: 30 TABLET, EXTENDED RELEASE ORAL at 09:06

## 2019-01-01 RX ADMIN — SENNOSIDES AND DOCUSATE SODIUM 1 TABLET: 8.6; 5 TABLET ORAL at 10:36

## 2019-01-01 RX ADMIN — ATORVASTATIN CALCIUM 80 MG: 40 TABLET, FILM COATED ORAL at 08:44

## 2019-01-01 RX ADMIN — CLOPIDOGREL BISULFATE 75 MG: 75 TABLET ORAL at 08:32

## 2019-01-01 RX ADMIN — SODIUM CHLORIDE: 9 INJECTION, SOLUTION INTRAVENOUS at 09:50

## 2019-01-01 RX ADMIN — ASPIRIN 81 MG: 81 TABLET, COATED ORAL at 08:23

## 2019-01-01 RX ADMIN — TAMSULOSIN HYDROCHLORIDE 0.4 MG: 0.4 CAPSULE ORAL at 09:01

## 2019-01-01 RX ADMIN — DIAZEPAM 5 MG: 5 TABLET ORAL at 04:16

## 2019-01-01 RX ADMIN — Medication 1 MG: at 21:55

## 2019-01-01 RX ADMIN — ATORVASTATIN CALCIUM 80 MG: 80 TABLET, FILM COATED ORAL at 21:43

## 2019-01-01 RX ADMIN — INSULIN ASPART 1 UNITS: 100 INJECTION, SOLUTION INTRAVENOUS; SUBCUTANEOUS at 08:35

## 2019-01-01 RX ADMIN — ATORVASTATIN CALCIUM 80 MG: 40 TABLET, FILM COATED ORAL at 09:06

## 2019-01-01 RX ADMIN — OXYCODONE HYDROCHLORIDE 5 MG: 5 TABLET ORAL at 13:29

## 2019-01-01 RX ADMIN — ASPIRIN 81 MG: 81 TABLET, COATED ORAL at 20:13

## 2019-01-01 RX ADMIN — ACETAMINOPHEN 650 MG: 325 TABLET, FILM COATED ORAL at 19:04

## 2019-01-01 RX ADMIN — OXYCODONE HYDROCHLORIDE 30 MG: 15 TABLET, FILM COATED, EXTENDED RELEASE ORAL at 08:52

## 2019-01-01 RX ADMIN — DIAZEPAM 5 MG: 5 TABLET ORAL at 17:12

## 2019-01-01 RX ADMIN — FUROSEMIDE 40 MG: 10 INJECTION, SOLUTION INTRAVENOUS at 20:26

## 2019-01-01 RX ADMIN — CEFAZOLIN 1 G: 1 INJECTION, POWDER, FOR SOLUTION INTRAMUSCULAR; INTRAVENOUS at 04:03

## 2019-01-01 RX ADMIN — LISINOPRIL 20 MG: 20 TABLET ORAL at 08:46

## 2019-01-01 RX ADMIN — Medication 100 MG: at 10:39

## 2019-01-01 RX ADMIN — FERROUS SULFATE TAB 325 MG (65 MG ELEMENTAL FE) 325 MG: 325 (65 FE) TAB at 12:15

## 2019-01-01 RX ADMIN — SERTRALINE HYDROCHLORIDE 200 MG: 100 TABLET ORAL at 07:53

## 2019-01-01 RX ADMIN — Medication 1 MG: at 21:19

## 2019-01-01 RX ADMIN — OXYCODONE HYDROCHLORIDE 30 MG: 15 TABLET, FILM COATED, EXTENDED RELEASE ORAL at 09:09

## 2019-01-01 RX ADMIN — LISINOPRIL 20 MG: 20 TABLET ORAL at 09:02

## 2019-01-01 RX ADMIN — ACETAMINOPHEN 650 MG: 325 TABLET, FILM COATED ORAL at 03:35

## 2019-01-01 RX ADMIN — Medication 10 MG: at 19:47

## 2019-01-01 RX ADMIN — LORAZEPAM 0.5 MG: 2 INJECTION INTRAMUSCULAR; INTRAVENOUS at 10:40

## 2019-01-01 RX ADMIN — TRAZODONE HYDROCHLORIDE 300 MG: 100 TABLET ORAL at 21:42

## 2019-01-01 RX ADMIN — QUETIAPINE 50 MG: 25 TABLET ORAL at 21:55

## 2019-01-01 RX ADMIN — INSULIN ASPART 2 UNITS: 100 INJECTION, SOLUTION INTRAVENOUS; SUBCUTANEOUS at 20:57

## 2019-01-01 RX ADMIN — ASPIRIN 81 MG: 81 TABLET, COATED ORAL at 08:32

## 2019-01-01 RX ADMIN — OXYCODONE HYDROCHLORIDE 5 MG: 5 TABLET ORAL at 20:46

## 2019-01-01 RX ADMIN — CEFAZOLIN 1 G: 1 INJECTION, POWDER, FOR SOLUTION INTRAMUSCULAR; INTRAVENOUS at 18:51

## 2019-01-01 RX ADMIN — LURASIDONE HYDROCHLORIDE 20 MG: 20 TABLET, FILM COATED ORAL at 08:48

## 2019-01-01 RX ADMIN — ASPIRIN 81 MG: 81 TABLET, COATED ORAL at 08:11

## 2019-01-01 RX ADMIN — CYANOCOBALAMIN TAB 500 MCG 500 MCG: 500 TAB at 22:31

## 2019-01-01 RX ADMIN — DIAZEPAM 5 MG: 5 TABLET ORAL at 00:11

## 2019-01-01 RX ADMIN — INSULIN ASPART 1 UNITS: 100 INJECTION, SOLUTION INTRAVENOUS; SUBCUTANEOUS at 05:32

## 2019-01-01 RX ADMIN — INSULIN ASPART 3 UNITS: 100 INJECTION, SOLUTION INTRAVENOUS; SUBCUTANEOUS at 20:54

## 2019-01-01 RX ADMIN — MELATONIN 1000 UNITS: at 11:51

## 2019-01-01 RX ADMIN — TRAZODONE HYDROCHLORIDE 200 MG: 100 TABLET ORAL at 00:34

## 2019-01-01 RX ADMIN — HYDROMORPHONE HYDROCHLORIDE 0.5 MG: 1 INJECTION, SOLUTION INTRAMUSCULAR; INTRAVENOUS; SUBCUTANEOUS at 20:36

## 2019-01-01 RX ADMIN — LURASIDONE HYDROCHLORIDE 40 MG: 40 TABLET, FILM COATED ORAL at 21:48

## 2019-01-01 RX ADMIN — TRAZODONE HYDROCHLORIDE 300 MG: 100 TABLET ORAL at 21:19

## 2019-01-01 RX ADMIN — Medication 100 MG: at 11:51

## 2019-01-01 RX ADMIN — LISINOPRIL 40 MG: 40 TABLET ORAL at 22:23

## 2019-01-01 RX ADMIN — OXYCODONE HYDROCHLORIDE AND ACETAMINOPHEN 500 MG: 500 TABLET ORAL at 08:44

## 2019-01-01 RX ADMIN — ASPIRIN 81 MG 162 MG: 81 TABLET ORAL at 21:14

## 2019-01-01 RX ADMIN — NITROGLYCERIN 0.4 MG: 0.4 TABLET SUBLINGUAL at 03:03

## 2019-01-01 RX ADMIN — PANTOPRAZOLE SODIUM 40 MG: 40 INJECTION, POWDER, FOR SOLUTION INTRAVENOUS at 08:40

## 2019-01-01 RX ADMIN — OXYCODONE HYDROCHLORIDE 5 MG: 5 TABLET ORAL at 00:38

## 2019-01-01 RX ADMIN — INSULIN ASPART 1 UNITS: 100 INJECTION, SOLUTION INTRAVENOUS; SUBCUTANEOUS at 08:46

## 2019-01-01 RX ADMIN — SODIUM CHLORIDE: 9 INJECTION, SOLUTION INTRAVENOUS at 17:12

## 2019-01-01 RX ADMIN — ASPIRIN 81 MG: 81 TABLET, COATED ORAL at 22:21

## 2019-01-01 RX ADMIN — LIDOCAINE HYDROCHLORIDE 30 ML: 20 SOLUTION ORAL; TOPICAL at 14:48

## 2019-01-01 RX ADMIN — TAMSULOSIN HYDROCHLORIDE 0.4 MG: 0.4 CAPSULE ORAL at 08:48

## 2019-01-01 RX ADMIN — CEFAZOLIN 1 G: 1 INJECTION, POWDER, FOR SOLUTION INTRAMUSCULAR; INTRAVENOUS at 11:58

## 2019-01-01 RX ADMIN — SERTRALINE HYDROCHLORIDE 200 MG: 100 TABLET ORAL at 08:49

## 2019-01-01 RX ADMIN — TRAZODONE HYDROCHLORIDE 300 MG: 100 TABLET ORAL at 22:22

## 2019-01-01 RX ADMIN — INSULIN ASPART 2 UNITS: 100 INJECTION, SOLUTION INTRAVENOUS; SUBCUTANEOUS at 16:51

## 2019-01-01 RX ADMIN — INSULIN ASPART 3 UNITS: 100 INJECTION, SOLUTION INTRAVENOUS; SUBCUTANEOUS at 13:03

## 2019-01-01 RX ADMIN — OXYCODONE HYDROCHLORIDE 20 MG: 10 TABLET, FILM COATED, EXTENDED RELEASE ORAL at 08:48

## 2019-01-01 RX ADMIN — POLYETHYLENE GLYCOL 3350 17 G: 17 POWDER, FOR SOLUTION ORAL at 06:12

## 2019-01-01 RX ADMIN — OXYCODONE HYDROCHLORIDE 5 MG: 5 TABLET ORAL at 02:58

## 2019-01-01 RX ADMIN — INSULIN ASPART 1 UNITS: 100 INJECTION, SOLUTION INTRAVENOUS; SUBCUTANEOUS at 08:52

## 2019-01-01 RX ADMIN — OLANZAPINE 2.5 MG: 5 TABLET, ORALLY DISINTEGRATING ORAL at 19:33

## 2019-01-01 RX ADMIN — TAMSULOSIN HYDROCHLORIDE 0.4 MG: 0.4 CAPSULE ORAL at 08:34

## 2019-01-01 RX ADMIN — OXYCODONE HYDROCHLORIDE 5 MG: 5 TABLET ORAL at 21:19

## 2019-01-01 RX ADMIN — Medication 10.6 MILLICURIE: at 12:06

## 2019-01-01 RX ADMIN — OXYCODONE HYDROCHLORIDE 5 MG: 5 TABLET ORAL at 09:32

## 2019-01-01 RX ADMIN — AMLODIPINE BESYLATE 5 MG: 5 TABLET ORAL at 10:35

## 2019-01-01 RX ADMIN — PANTOPRAZOLE SODIUM 40 MG: 40 TABLET, DELAYED RELEASE ORAL at 07:03

## 2019-01-01 RX ADMIN — ACETAMINOPHEN 650 MG: 325 TABLET ORAL at 12:03

## 2019-01-01 RX ADMIN — SENNOSIDES AND DOCUSATE SODIUM 1 TABLET: 8.6; 5 TABLET ORAL at 09:06

## 2019-01-01 RX ADMIN — AMLODIPINE BESYLATE 5 MG: 5 TABLET ORAL at 08:34

## 2019-01-01 RX ADMIN — ENOXAPARIN SODIUM 40 MG: 40 INJECTION SUBCUTANEOUS at 19:47

## 2019-01-01 RX ADMIN — ATORVASTATIN CALCIUM 80 MG: 80 TABLET, FILM COATED ORAL at 08:26

## 2019-01-01 RX ADMIN — Medication 12.5 MG: at 22:31

## 2019-01-01 RX ADMIN — INSULIN ASPART 1 UNITS: 100 INJECTION, SOLUTION INTRAVENOUS; SUBCUTANEOUS at 18:27

## 2019-01-01 RX ADMIN — INSULIN ASPART 1 UNITS: 100 INJECTION, SOLUTION INTRAVENOUS; SUBCUTANEOUS at 16:46

## 2019-01-01 RX ADMIN — Medication 1 MG: at 03:34

## 2019-01-01 RX ADMIN — SERTRALINE HYDROCHLORIDE 200 MG: 100 TABLET ORAL at 10:50

## 2019-01-01 RX ADMIN — INSULIN ASPART 2 UNITS: 100 INJECTION, SOLUTION INTRAVENOUS; SUBCUTANEOUS at 11:29

## 2019-01-01 RX ADMIN — ACETAMINOPHEN 650 MG: 325 TABLET ORAL at 21:31

## 2019-01-01 RX ADMIN — TRAZODONE HYDROCHLORIDE 300 MG: 100 TABLET ORAL at 21:30

## 2019-01-01 RX ADMIN — THIAMINE HYDROCHLORIDE 100 MG: 100 INJECTION, SOLUTION INTRAMUSCULAR; INTRAVENOUS at 21:30

## 2019-01-01 RX ADMIN — LIDOCAINE HYDROCHLORIDE 30 ML: 20 SOLUTION ORAL; TOPICAL at 16:36

## 2019-01-01 RX ADMIN — OLANZAPINE 2.5 MG: 5 TABLET, ORALLY DISINTEGRATING ORAL at 14:51

## 2019-01-01 RX ADMIN — LURASIDONE HYDROCHLORIDE 40 MG: 20 TABLET, FILM COATED ORAL at 22:34

## 2019-01-01 RX ADMIN — CLOPIDOGREL BISULFATE 75 MG: 75 TABLET ORAL at 08:51

## 2019-01-01 RX ADMIN — ASPIRIN 81 MG: 81 TABLET, COATED ORAL at 19:33

## 2019-01-01 RX ADMIN — SERTRALINE HYDROCHLORIDE 200 MG: 100 TABLET ORAL at 08:34

## 2019-01-01 RX ADMIN — VANCOMYCIN HYDROCHLORIDE 1250 MG: 10 INJECTION, POWDER, LYOPHILIZED, FOR SOLUTION INTRAVENOUS at 14:58

## 2019-01-01 RX ADMIN — ATORVASTATIN CALCIUM 80 MG: 80 TABLET, FILM COATED ORAL at 20:45

## 2019-01-01 RX ADMIN — FAMOTIDINE 20 MG: 20 TABLET, FILM COATED ORAL at 20:45

## 2019-01-01 RX ADMIN — MELATONIN 1000 UNITS: at 22:22

## 2019-01-01 RX ADMIN — POTASSIUM CHLORIDE 40 MEQ: 1.5 POWDER, FOR SOLUTION ORAL at 10:37

## 2019-01-01 RX ADMIN — OLANZAPINE 5 MG: 5 TABLET, ORALLY DISINTEGRATING ORAL at 09:01

## 2019-01-01 RX ADMIN — OXYCODONE HYDROCHLORIDE 5 MG: 5 TABLET ORAL at 10:59

## 2019-01-01 RX ADMIN — PANTOPRAZOLE SODIUM 40 MG: 40 TABLET, DELAYED RELEASE ORAL at 18:13

## 2019-01-01 RX ADMIN — SERTRALINE HYDROCHLORIDE 200 MG: 100 TABLET ORAL at 16:47

## 2019-01-01 RX ADMIN — SERTRALINE HYDROCHLORIDE 200 MG: 100 TABLET ORAL at 08:41

## 2019-01-01 RX ADMIN — QUETIAPINE 50 MG: 25 TABLET ORAL at 20:26

## 2019-01-01 RX ADMIN — GABAPENTIN 400 MG: 300 CAPSULE ORAL at 08:25

## 2019-01-01 RX ADMIN — OXYCODONE HYDROCHLORIDE 30 MG: 15 TABLET, FILM COATED, EXTENDED RELEASE ORAL at 09:02

## 2019-01-01 RX ADMIN — Medication 12.5 MG: at 07:57

## 2019-01-01 RX ADMIN — TAMSULOSIN HYDROCHLORIDE 0.4 MG: 0.4 CAPSULE ORAL at 08:44

## 2019-01-01 RX ADMIN — SENNOSIDES AND DOCUSATE SODIUM 2 TABLET: 8.6; 5 TABLET ORAL at 08:34

## 2019-01-01 RX ADMIN — AMLODIPINE BESYLATE 10 MG: 10 TABLET ORAL at 09:08

## 2019-01-01 RX ADMIN — MELATONIN 1000 UNITS: at 07:53

## 2019-01-01 RX ADMIN — FERROUS SULFATE TAB 325 MG (65 MG ELEMENTAL FE) 325 MG: 325 (65 FE) TAB at 11:58

## 2019-01-01 RX ADMIN — Medication 12.5 MG: at 10:40

## 2019-01-01 RX ADMIN — DEXTROSE AND SODIUM CHLORIDE 1000 ML: 5; 450 INJECTION, SOLUTION INTRAVENOUS at 21:22

## 2019-01-01 RX ADMIN — OXYCODONE HYDROCHLORIDE 5 MG: 5 TABLET ORAL at 00:17

## 2019-01-01 RX ADMIN — Medication 1 MG: at 01:01

## 2019-01-01 RX ADMIN — INSULIN ASPART 2 UNITS: 100 INJECTION, SOLUTION INTRAVENOUS; SUBCUTANEOUS at 18:35

## 2019-01-01 RX ADMIN — SERTRALINE HYDROCHLORIDE 200 MG: 100 TABLET ORAL at 09:06

## 2019-01-01 RX ADMIN — PANTOPRAZOLE SODIUM 40 MG: 40 INJECTION, POWDER, FOR SOLUTION INTRAVENOUS at 11:04

## 2019-01-01 RX ADMIN — OXYCODONE HYDROCHLORIDE 5 MG: 5 TABLET ORAL at 10:46

## 2019-01-01 RX ADMIN — ASPIRIN 81 MG: 81 TABLET, COATED ORAL at 09:01

## 2019-01-01 RX ADMIN — POTASSIUM CHLORIDE 20 MEQ: 1500 TABLET, EXTENDED RELEASE ORAL at 12:52

## 2019-01-01 RX ADMIN — AMLODIPINE BESYLATE 5 MG: 5 TABLET ORAL at 12:52

## 2019-01-01 RX ADMIN — ACETAMINOPHEN 650 MG: 325 TABLET ORAL at 20:45

## 2019-01-01 RX ADMIN — Medication 12.5 MG: at 08:49

## 2019-01-01 RX ADMIN — SPIRONOLACTONE 12.5 MG: 25 TABLET ORAL at 08:31

## 2019-01-01 RX ADMIN — INSULIN ASPART 1 UNITS: 100 INJECTION, SOLUTION INTRAVENOUS; SUBCUTANEOUS at 00:29

## 2019-01-01 RX ADMIN — IOPAMIDOL 91 ML: 755 INJECTION, SOLUTION INTRAVENOUS at 09:52

## 2019-01-01 RX ADMIN — ATORVASTATIN CALCIUM 80 MG: 80 TABLET, FILM COATED ORAL at 22:26

## 2019-01-01 RX ADMIN — CYANOCOBALAMIN TAB 500 MCG 500 MCG: 500 TAB at 11:55

## 2019-01-01 RX ADMIN — TRAZODONE HYDROCHLORIDE 300 MG: 100 TABLET ORAL at 22:28

## 2019-01-01 RX ADMIN — HYDROMORPHONE HYDROCHLORIDE 0.5 MG: 1 INJECTION, SOLUTION INTRAMUSCULAR; INTRAVENOUS; SUBCUTANEOUS at 07:49

## 2019-01-01 RX ADMIN — NITROGLYCERIN 0.4 MG: 0.4 TABLET SUBLINGUAL at 21:44

## 2019-01-01 RX ADMIN — ACETAMINOPHEN 650 MG: 325 TABLET ORAL at 11:26

## 2019-01-01 RX ADMIN — SENNOSIDES AND DOCUSATE SODIUM 2 TABLET: 8.6; 5 TABLET ORAL at 21:31

## 2019-01-01 RX ADMIN — TRAZODONE HYDROCHLORIDE 200 MG: 100 TABLET ORAL at 21:53

## 2019-01-01 RX ADMIN — FUROSEMIDE 40 MG: 10 INJECTION, SOLUTION INTRAVENOUS at 12:51

## 2019-01-01 RX ADMIN — LISINOPRIL 20 MG: 20 TABLET ORAL at 08:52

## 2019-01-01 RX ADMIN — DEXTROSE AND SODIUM CHLORIDE: 5; 900 INJECTION, SOLUTION INTRAVENOUS at 22:10

## 2019-01-01 RX ADMIN — INSULIN ASPART 2 UNITS: 100 INJECTION, SOLUTION INTRAVENOUS; SUBCUTANEOUS at 13:03

## 2019-01-01 RX ADMIN — LISINOPRIL 20 MG: 20 TABLET ORAL at 10:50

## 2019-01-01 RX ADMIN — OXYCODONE HYDROCHLORIDE 5 MG: 5 TABLET ORAL at 22:28

## 2019-01-01 RX ADMIN — HYDROMORPHONE HYDROCHLORIDE 0.5 MG: 1 INJECTION, SOLUTION INTRAMUSCULAR; INTRAVENOUS; SUBCUTANEOUS at 21:19

## 2019-01-01 RX ADMIN — ACETAMINOPHEN 650 MG: 325 TABLET, FILM COATED ORAL at 09:30

## 2019-01-01 RX ADMIN — LISINOPRIL 20 MG: 20 TABLET ORAL at 09:08

## 2019-01-01 RX ADMIN — OXYCODONE HYDROCHLORIDE 20 MG: 10 TABLET, FILM COATED, EXTENDED RELEASE ORAL at 08:44

## 2019-01-01 RX ADMIN — ACETAMINOPHEN 650 MG: 325 TABLET, FILM COATED ORAL at 21:48

## 2019-01-01 RX ADMIN — OLANZAPINE 2.5 MG: 5 TABLET, ORALLY DISINTEGRATING ORAL at 15:06

## 2019-01-01 RX ADMIN — OLANZAPINE 10 MG: 5 TABLET, ORALLY DISINTEGRATING ORAL at 14:36

## 2019-01-01 RX ADMIN — ATORVASTATIN CALCIUM 80 MG: 40 TABLET, FILM COATED ORAL at 10:38

## 2019-01-01 RX ADMIN — TRAZODONE HYDROCHLORIDE 300 MG: 100 TABLET ORAL at 22:26

## 2019-01-01 RX ADMIN — FERROUS SULFATE TAB 325 MG (65 MG ELEMENTAL FE) 325 MG: 325 (65 FE) TAB at 11:51

## 2019-01-01 RX ADMIN — INSULIN ASPART 1 UNITS: 100 INJECTION, SOLUTION INTRAVENOUS; SUBCUTANEOUS at 12:33

## 2019-01-01 RX ADMIN — TAMSULOSIN HYDROCHLORIDE 0.4 MG: 0.4 CAPSULE ORAL at 07:53

## 2019-01-01 RX ADMIN — FERROUS SULFATE TAB 325 MG (65 MG ELEMENTAL FE) 325 MG: 325 (65 FE) TAB at 22:23

## 2019-01-01 RX ADMIN — MELATONIN 1000 UNITS: at 08:48

## 2019-01-01 RX ADMIN — OXYCODONE HYDROCHLORIDE 30 MG: 15 TABLET, FILM COATED, EXTENDED RELEASE ORAL at 08:59

## 2019-01-01 RX ADMIN — SENNOSIDES AND DOCUSATE SODIUM 1 TABLET: 8.6; 5 TABLET ORAL at 08:41

## 2019-01-01 RX ADMIN — PANTOPRAZOLE SODIUM 40 MG: 40 INJECTION, POWDER, FOR SOLUTION INTRAVENOUS at 20:36

## 2019-01-01 RX ADMIN — POTASSIUM CHLORIDE AND SODIUM CHLORIDE: 900; 150 INJECTION, SOLUTION INTRAVENOUS at 00:31

## 2019-01-01 RX ADMIN — INSULIN ASPART 1 UNITS: 100 INJECTION, SOLUTION INTRAVENOUS; SUBCUTANEOUS at 17:05

## 2019-01-01 RX ADMIN — LORAZEPAM 1 MG: 1 TABLET ORAL at 06:28

## 2019-01-01 RX ADMIN — SODIUM CHLORIDE 68 ML: 9 INJECTION, SOLUTION INTRAVENOUS at 09:53

## 2019-01-01 RX ADMIN — FERROUS SULFATE TAB 325 MG (65 MG ELEMENTAL FE) 325 MG: 325 (65 FE) TAB at 11:56

## 2019-01-01 RX ADMIN — ACETAMINOPHEN 650 MG: 325 TABLET, FILM COATED ORAL at 13:58

## 2019-01-01 RX ADMIN — INSULIN ASPART 1 UNITS: 100 INJECTION, SOLUTION INTRAVENOUS; SUBCUTANEOUS at 18:15

## 2019-01-01 RX ADMIN — ENOXAPARIN SODIUM 40 MG: 40 INJECTION SUBCUTANEOUS at 20:13

## 2019-01-01 RX ADMIN — OXYCODONE HYDROCHLORIDE 5 MG: 5 TABLET ORAL at 03:55

## 2019-01-01 RX ADMIN — LURASIDONE HYDROCHLORIDE 20 MG: 20 TABLET, FILM COATED ORAL at 07:57

## 2019-01-01 RX ADMIN — FUROSEMIDE 20 MG: 10 INJECTION, SOLUTION INTRAVENOUS at 09:20

## 2019-01-01 RX ADMIN — OLANZAPINE 2.5 MG: 5 TABLET, ORALLY DISINTEGRATING ORAL at 04:39

## 2019-01-01 RX ADMIN — SERTRALINE HYDROCHLORIDE 200 MG: 100 TABLET ORAL at 22:23

## 2019-01-01 RX ADMIN — SUCRALFATE 1 G: 1 TABLET ORAL at 10:36

## 2019-01-01 RX ADMIN — SENNOSIDES AND DOCUSATE SODIUM 1 TABLET: 8.6; 5 TABLET ORAL at 09:00

## 2019-01-01 RX ADMIN — HYDROCHLOROTHIAZIDE 25 MG: 25 TABLET ORAL at 16:48

## 2019-01-01 RX ADMIN — ATORVASTATIN CALCIUM 80 MG: 40 TABLET, FILM COATED ORAL at 22:23

## 2019-01-01 RX ADMIN — ASPIRIN 81 MG: 81 TABLET, COATED ORAL at 10:36

## 2019-01-01 RX ADMIN — FERROUS SULFATE TAB 325 MG (65 MG ELEMENTAL FE) 325 MG: 325 (65 FE) TAB at 08:23

## 2019-01-01 RX ADMIN — OXYCODONE HYDROCHLORIDE 30 MG: 15 TABLET, FILM COATED, EXTENDED RELEASE ORAL at 08:41

## 2019-01-01 RX ADMIN — CYANOCOBALAMIN TAB 500 MCG 500 MCG: 500 TAB at 12:51

## 2019-01-01 RX ADMIN — QUETIAPINE 50 MG: 25 TABLET ORAL at 21:07

## 2019-01-01 RX ADMIN — OXYCODONE HYDROCHLORIDE 5 MG: 5 TABLET ORAL at 20:26

## 2019-01-01 RX ADMIN — LISINOPRIL 40 MG: 40 TABLET ORAL at 08:58

## 2019-01-01 RX ADMIN — DIAZEPAM 5 MG: 5 TABLET ORAL at 15:35

## 2019-01-01 RX ADMIN — SODIUM CHLORIDE: 9 INJECTION, SOLUTION INTRAVENOUS at 14:36

## 2019-01-01 RX ADMIN — CEFAZOLIN 1 G: 1 INJECTION, POWDER, FOR SOLUTION INTRAMUSCULAR; INTRAVENOUS at 11:51

## 2019-01-01 RX ADMIN — AMLODIPINE BESYLATE 5 MG: 5 TABLET ORAL at 08:35

## 2019-01-01 RX ADMIN — SENNOSIDES AND DOCUSATE SODIUM 1 TABLET: 8.6; 5 TABLET ORAL at 22:26

## 2019-01-01 RX ADMIN — DEXTROSE AND SODIUM CHLORIDE: 5; 900 INJECTION, SOLUTION INTRAVENOUS at 00:58

## 2019-01-01 RX ADMIN — AMLODIPINE BESYLATE 5 MG: 5 TABLET ORAL at 07:56

## 2019-01-01 RX ADMIN — ASPIRIN 81 MG: 81 TABLET, COATED ORAL at 09:00

## 2019-01-01 RX ADMIN — MELATONIN 1000 UNITS: at 08:45

## 2019-01-01 RX ADMIN — SENNOSIDES AND DOCUSATE SODIUM 1 TABLET: 8.6; 5 TABLET ORAL at 20:03

## 2019-01-01 RX ADMIN — OMEPRAZOLE 20 MG: 20 CAPSULE, DELAYED RELEASE ORAL at 09:06

## 2019-01-01 RX ADMIN — PANTOPRAZOLE SODIUM 40 MG: 40 TABLET, DELAYED RELEASE ORAL at 16:26

## 2019-01-01 RX ADMIN — HEPARIN SODIUM 900 UNITS/HR: 10000 INJECTION, SOLUTION INTRAVENOUS at 14:05

## 2019-01-01 RX ADMIN — SERTRALINE HYDROCHLORIDE 200 MG: 100 TABLET ORAL at 10:39

## 2019-01-01 RX ADMIN — TAMSULOSIN HYDROCHLORIDE 0.4 MG: 0.4 CAPSULE ORAL at 09:09

## 2019-01-01 RX ADMIN — LURASIDONE HYDROCHLORIDE 20 MG: 20 TABLET, FILM COATED ORAL at 09:11

## 2019-01-01 RX ADMIN — SENNOSIDES AND DOCUSATE SODIUM 2 TABLET: 8.6; 5 TABLET ORAL at 20:37

## 2019-01-01 RX ADMIN — OXYCODONE HYDROCHLORIDE AND ACETAMINOPHEN 500 MG: 500 TABLET ORAL at 22:22

## 2019-01-01 RX ADMIN — LIDOCAINE HYDROCHLORIDE 30 ML: 20 SOLUTION ORAL; TOPICAL at 20:26

## 2019-01-01 RX ADMIN — TAMSULOSIN HYDROCHLORIDE 0.4 MG: 0.4 CAPSULE ORAL at 08:40

## 2019-01-01 RX ADMIN — PANTOPRAZOLE SODIUM 40 MG: 40 TABLET, DELAYED RELEASE ORAL at 16:21

## 2019-01-01 RX ADMIN — OLANZAPINE 2.5 MG: 5 TABLET, ORALLY DISINTEGRATING ORAL at 03:04

## 2019-01-01 RX ADMIN — OXYCODONE HYDROCHLORIDE 5 MG: 5 TABLET ORAL at 14:09

## 2019-01-01 RX ADMIN — TAMSULOSIN HYDROCHLORIDE 0.4 MG: 0.4 CAPSULE ORAL at 10:50

## 2019-01-01 RX ADMIN — ACETAMINOPHEN 650 MG: 325 TABLET, FILM COATED ORAL at 12:46

## 2019-01-01 RX ADMIN — CYANOCOBALAMIN TAB 500 MCG 500 MCG: 500 TAB at 11:58

## 2019-01-01 RX ADMIN — ACETAMINOPHEN 650 MG: 325 TABLET, FILM COATED ORAL at 01:36

## 2019-01-01 RX ADMIN — SPIRONOLACTONE 12.5 MG: 25 TABLET ORAL at 09:08

## 2019-01-01 RX ADMIN — OLANZAPINE 5 MG: 5 TABLET, ORALLY DISINTEGRATING ORAL at 13:58

## 2019-01-01 RX ADMIN — PANTOPRAZOLE SODIUM 40 MG: 40 TABLET, DELAYED RELEASE ORAL at 09:01

## 2019-01-01 RX ADMIN — LISINOPRIL 20 MG: 20 TABLET ORAL at 08:32

## 2019-01-01 RX ADMIN — TRAZODONE HYDROCHLORIDE 300 MG: 100 TABLET ORAL at 20:57

## 2019-01-01 RX ADMIN — PANTOPRAZOLE SODIUM 40 MG: 40 INJECTION, POWDER, FOR SOLUTION INTRAVENOUS at 08:35

## 2019-01-01 RX ADMIN — SENNOSIDES AND DOCUSATE SODIUM 1 TABLET: 8.6; 5 TABLET ORAL at 21:19

## 2019-01-01 RX ADMIN — SODIUM CHLORIDE 70 ML: 9 INJECTION, SOLUTION INTRAVENOUS at 10:30

## 2019-01-01 RX ADMIN — OLANZAPINE 5 MG: 5 TABLET, ORALLY DISINTEGRATING ORAL at 21:20

## 2019-01-01 RX ADMIN — SUCRALFATE 1 G: 1 TABLET ORAL at 21:22

## 2019-01-01 RX ADMIN — DEXTROSE AND SODIUM CHLORIDE: 5; 900 INJECTION, SOLUTION INTRAVENOUS at 21:21

## 2019-01-01 RX ADMIN — LISINOPRIL 40 MG: 40 TABLET ORAL at 10:36

## 2019-01-01 RX ADMIN — DEXTROSE AND SODIUM CHLORIDE: 5; 900 INJECTION, SOLUTION INTRAVENOUS at 06:44

## 2019-01-01 RX ADMIN — CEFAZOLIN 1 G: 1 INJECTION, POWDER, FOR SOLUTION INTRAMUSCULAR; INTRAVENOUS at 11:50

## 2019-01-01 RX ADMIN — CLOPIDOGREL BISULFATE 75 MG: 75 TABLET, FILM COATED ORAL at 22:22

## 2019-01-01 RX ADMIN — MELATONIN TAB 3 MG 3 MG: 3 TAB at 20:26

## 2019-01-01 RX ADMIN — FUROSEMIDE 40 MG: 10 INJECTION, SOLUTION INTRAVENOUS at 14:32

## 2019-01-01 RX ADMIN — ACETAMINOPHEN 650 MG: 325 TABLET ORAL at 04:31

## 2019-01-01 RX ADMIN — HYDROCHLOROTHIAZIDE 25 MG: 25 TABLET ORAL at 08:26

## 2019-01-01 RX ADMIN — OMEPRAZOLE 20 MG: 20 CAPSULE, DELAYED RELEASE ORAL at 16:47

## 2019-01-01 RX ADMIN — SENNOSIDES AND DOCUSATE SODIUM 1 TABLET: 8.6; 5 TABLET ORAL at 10:51

## 2019-01-01 RX ADMIN — OXYCODONE HYDROCHLORIDE 30 MG: 15 TABLET, FILM COATED, EXTENDED RELEASE ORAL at 08:39

## 2019-01-01 RX ADMIN — OXYCODONE HYDROCHLORIDE 30 MG: 10 TABLET, FILM COATED, EXTENDED RELEASE ORAL at 07:52

## 2019-01-01 RX ADMIN — OMEPRAZOLE 20 MG: 20 CAPSULE, DELAYED RELEASE ORAL at 22:22

## 2019-01-01 RX ADMIN — LISINOPRIL 40 MG: 20 TABLET ORAL at 08:25

## 2019-01-01 RX ADMIN — OMEPRAZOLE 20 MG: 20 CAPSULE, DELAYED RELEASE ORAL at 16:12

## 2019-01-01 RX ADMIN — OXYCODONE HYDROCHLORIDE 5 MG: 5 TABLET ORAL at 09:24

## 2019-01-01 RX ADMIN — INSULIN ASPART 3 UNITS: 100 INJECTION, SOLUTION INTRAVENOUS; SUBCUTANEOUS at 12:45

## 2019-01-01 RX ADMIN — SPIRONOLACTONE 12.5 MG: 25 TABLET ORAL at 08:51

## 2019-01-01 RX ADMIN — INSULIN ASPART 1 UNITS: 100 INJECTION, SOLUTION INTRAVENOUS; SUBCUTANEOUS at 14:52

## 2019-01-01 RX ADMIN — ACETAMINOPHEN 650 MG: 325 TABLET ORAL at 23:52

## 2019-01-01 RX ADMIN — CLOPIDOGREL BISULFATE 75 MG: 75 TABLET ORAL at 09:01

## 2019-01-01 RX ADMIN — ACETAMINOPHEN 650 MG: 325 TABLET, FILM COATED ORAL at 18:51

## 2019-01-01 RX ADMIN — LIDOCAINE HYDROCHLORIDE 30 ML: 20 SOLUTION ORAL; TOPICAL at 07:07

## 2019-01-01 RX ADMIN — INSULIN ASPART 1 UNITS: 100 INJECTION, SOLUTION INTRAVENOUS; SUBCUTANEOUS at 00:41

## 2019-01-01 RX ADMIN — AMLODIPINE BESYLATE 5 MG: 5 TABLET ORAL at 09:29

## 2019-01-01 RX ADMIN — CLOPIDOGREL BISULFATE 75 MG: 75 TABLET ORAL at 09:08

## 2019-01-01 RX ADMIN — AMLODIPINE BESYLATE 10 MG: 10 TABLET ORAL at 08:32

## 2019-01-01 RX ADMIN — SENNOSIDES AND DOCUSATE SODIUM 2 TABLET: 8.6; 5 TABLET ORAL at 08:11

## 2019-01-01 RX ADMIN — LORAZEPAM 1 MG: 1 TABLET ORAL at 09:19

## 2019-01-01 RX ADMIN — SERTRALINE HYDROCHLORIDE 200 MG: 100 TABLET ORAL at 10:36

## 2019-01-01 RX ADMIN — LISINOPRIL 40 MG: 40 TABLET ORAL at 09:00

## 2019-01-01 RX ADMIN — DEXTROSE AND SODIUM CHLORIDE: 5; 450 INJECTION, SOLUTION INTRAVENOUS at 16:42

## 2019-01-01 RX ADMIN — OLANZAPINE 5 MG: 5 TABLET, ORALLY DISINTEGRATING ORAL at 20:03

## 2019-01-01 RX ADMIN — OXYCODONE HYDROCHLORIDE 5 MG: 5 TABLET ORAL at 03:48

## 2019-01-01 RX ADMIN — SENNOSIDES AND DOCUSATE SODIUM 2 TABLET: 8.6; 5 TABLET ORAL at 20:48

## 2019-01-01 RX ADMIN — DEXTROSE AND SODIUM CHLORIDE 75 ML/HR: 5; 900 INJECTION, SOLUTION INTRAVENOUS at 11:30

## 2019-01-01 RX ADMIN — OXYCODONE HYDROCHLORIDE 5 MG: 5 TABLET ORAL at 02:17

## 2019-01-01 RX ADMIN — LURASIDONE HYDROCHLORIDE 20 MG: 20 TABLET, FILM COATED ORAL at 10:39

## 2019-01-01 RX ADMIN — OXYCODONE HYDROCHLORIDE 5 MG: 5 TABLET ORAL at 15:29

## 2019-01-01 RX ADMIN — LISINOPRIL 20 MG: 20 TABLET ORAL at 08:34

## 2019-01-01 RX ADMIN — ACETAMINOPHEN 650 MG: 325 TABLET, FILM COATED ORAL at 14:45

## 2019-01-01 RX ADMIN — SENNOSIDES AND DOCUSATE SODIUM 1 TABLET: 8.6; 5 TABLET ORAL at 08:31

## 2019-01-01 RX ADMIN — OMEPRAZOLE 20 MG: 20 CAPSULE, DELAYED RELEASE ORAL at 08:48

## 2019-01-01 RX ADMIN — INSULIN ASPART 1 UNITS: 100 INJECTION, SOLUTION INTRAVENOUS; SUBCUTANEOUS at 09:25

## 2019-01-01 RX ADMIN — TAMSULOSIN HYDROCHLORIDE 0.4 MG: 0.4 CAPSULE ORAL at 22:21

## 2019-01-01 RX ADMIN — INSULIN ASPART 1 UNITS: 100 INJECTION, SOLUTION INTRAVENOUS; SUBCUTANEOUS at 09:11

## 2019-01-01 RX ADMIN — INSULIN ASPART 2 UNITS: 100 INJECTION, SOLUTION INTRAVENOUS; SUBCUTANEOUS at 12:28

## 2019-01-01 RX ADMIN — Medication 12.5 MG: at 09:30

## 2019-01-01 RX ADMIN — PANTOPRAZOLE SODIUM 40 MG: 40 TABLET, DELAYED RELEASE ORAL at 10:35

## 2019-01-01 RX ADMIN — AMLODIPINE BESYLATE 5 MG: 5 TABLET ORAL at 08:40

## 2019-01-01 RX ADMIN — OXYCODONE HYDROCHLORIDE 10 MG: 10 TABLET, FILM COATED, EXTENDED RELEASE ORAL at 19:33

## 2019-01-01 RX ADMIN — OLANZAPINE 2.5 MG: 5 TABLET, ORALLY DISINTEGRATING ORAL at 00:07

## 2019-01-01 RX ADMIN — OXYCODONE HYDROCHLORIDE AND ACETAMINOPHEN 500 MG: 500 TABLET ORAL at 09:06

## 2019-01-01 RX ADMIN — ACETAMINOPHEN 325 MG: 325 TABLET, FILM COATED ORAL at 20:26

## 2019-01-01 RX ADMIN — PANTOPRAZOLE SODIUM 40 MG: 40 INJECTION, POWDER, FOR SOLUTION INTRAVENOUS at 20:26

## 2019-01-01 RX ADMIN — INSULIN ASPART 2 UNITS: 100 INJECTION, SOLUTION INTRAVENOUS; SUBCUTANEOUS at 12:56

## 2019-01-01 RX ADMIN — IOPAMIDOL 112 ML: 755 INJECTION, SOLUTION INTRAVASCULAR at 10:54

## 2019-01-01 RX ADMIN — ACETAMINOPHEN 650 MG: 325 TABLET ORAL at 16:51

## 2019-01-01 RX ADMIN — LORAZEPAM 1 MG: 2 INJECTION INTRAMUSCULAR; INTRAVENOUS at 04:22

## 2019-01-01 RX ADMIN — DEXTROSE AND SODIUM CHLORIDE: 5; 900 INJECTION, SOLUTION INTRAVENOUS at 04:37

## 2019-01-01 RX ADMIN — OMEPRAZOLE 20 MG: 20 CAPSULE, DELAYED RELEASE ORAL at 07:53

## 2019-01-01 RX ADMIN — CLOPIDOGREL 75 MG: 75 TABLET, FILM COATED ORAL at 08:36

## 2019-01-01 RX ADMIN — TAMSULOSIN HYDROCHLORIDE 0.4 MG: 0.4 CAPSULE ORAL at 08:52

## 2019-01-01 RX ADMIN — ACETAMINOPHEN 650 MG: 325 TABLET, FILM COATED ORAL at 18:59

## 2019-01-01 RX ADMIN — GADOBUTROL 10 ML: 604.72 INJECTION INTRAVENOUS at 14:49

## 2019-01-01 RX ADMIN — SENNOSIDES AND DOCUSATE SODIUM 1 TABLET: 8.6; 5 TABLET ORAL at 08:48

## 2019-01-01 RX ADMIN — OXYCODONE HYDROCHLORIDE 5 MG: 5 TABLET ORAL at 04:17

## 2019-01-01 RX ADMIN — PANTOPRAZOLE SODIUM 40 MG: 40 INJECTION, POWDER, FOR SOLUTION INTRAVENOUS at 22:31

## 2019-01-01 RX ADMIN — SODIUM CHLORIDE 500 ML: 9 INJECTION, SOLUTION INTRAVENOUS at 11:45

## 2019-01-01 RX ADMIN — INSULIN ASPART 1 UNITS: 100 INJECTION, SOLUTION INTRAVENOUS; SUBCUTANEOUS at 13:11

## 2019-01-01 RX ADMIN — INSULIN ASPART 1 UNITS: 100 INJECTION, SOLUTION INTRAVENOUS; SUBCUTANEOUS at 13:35

## 2019-01-01 RX ADMIN — TAMSULOSIN HYDROCHLORIDE 0.4 MG: 0.4 CAPSULE ORAL at 10:34

## 2019-01-01 RX ADMIN — OXYCODONE HYDROCHLORIDE 5 MG: 5 TABLET ORAL at 00:32

## 2019-01-01 RX ADMIN — POTASSIUM CHLORIDE 40 MEQ: 1500 TABLET, EXTENDED RELEASE ORAL at 11:03

## 2019-01-01 RX ADMIN — TRAZODONE HYDROCHLORIDE 100 MG: 100 TABLET ORAL at 22:09

## 2019-01-01 RX ADMIN — SERTRALINE HYDROCHLORIDE 200 MG: 100 TABLET ORAL at 09:01

## 2019-01-01 RX ADMIN — LISINOPRIL 20 MG: 20 TABLET ORAL at 08:41

## 2019-01-01 RX ADMIN — OLANZAPINE 5 MG: 5 TABLET, ORALLY DISINTEGRATING ORAL at 22:40

## 2019-01-01 RX ADMIN — OXYCODONE HYDROCHLORIDE 10 MG: 10 TABLET, FILM COATED, EXTENDED RELEASE ORAL at 20:26

## 2019-01-01 RX ADMIN — OXYCODONE HYDROCHLORIDE 5 MG: 5 TABLET ORAL at 18:33

## 2019-01-01 RX ADMIN — ACETAMINOPHEN 650 MG: 325 TABLET, FILM COATED ORAL at 03:36

## 2019-01-01 RX ADMIN — ACETAMINOPHEN 650 MG: 325 TABLET ORAL at 08:40

## 2019-01-01 RX ADMIN — OXYCODONE HYDROCHLORIDE 5 MG: 5 TABLET ORAL at 03:04

## 2019-01-01 RX ADMIN — ASPIRIN 81 MG: 81 TABLET, COATED ORAL at 08:34

## 2019-01-01 RX ADMIN — TRAZODONE HYDROCHLORIDE 300 MG: 100 TABLET ORAL at 22:27

## 2019-01-01 RX ADMIN — CLOPIDOGREL BISULFATE 75 MG: 75 TABLET, FILM COATED ORAL at 10:40

## 2019-01-01 RX ADMIN — AMLODIPINE BESYLATE 10 MG: 10 TABLET ORAL at 09:02

## 2019-01-01 RX ADMIN — CLOPIDOGREL BISULFATE 75 MG: 75 TABLET, FILM COATED ORAL at 09:07

## 2019-01-01 RX ADMIN — TAMSULOSIN HYDROCHLORIDE 0.4 MG: 0.4 CAPSULE ORAL at 08:11

## 2019-01-01 RX ADMIN — SODIUM CHLORIDE 1000 ML: 9 INJECTION, SOLUTION INTRAVENOUS at 07:48

## 2019-01-01 RX ADMIN — SERTRALINE HYDROCHLORIDE 200 MG: 100 TABLET ORAL at 09:07

## 2019-01-01 RX ADMIN — OXYCODONE HYDROCHLORIDE 20 MG: 10 TABLET, FILM COATED, EXTENDED RELEASE ORAL at 10:40

## 2019-01-01 RX ADMIN — SPIRONOLACTONE 12.5 MG: 25 TABLET ORAL at 08:11

## 2019-01-01 RX ADMIN — PANTOPRAZOLE SODIUM 40 MG: 40 INJECTION, POWDER, FOR SOLUTION INTRAVENOUS at 21:16

## 2019-01-01 RX ADMIN — OXYCODONE HYDROCHLORIDE 10 MG: 10 TABLET, FILM COATED, EXTENDED RELEASE ORAL at 20:04

## 2019-01-01 RX ADMIN — SERTRALINE HYDROCHLORIDE 200 MG: 100 TABLET ORAL at 09:29

## 2019-01-01 RX ADMIN — ACETAMINOPHEN 650 MG: 325 TABLET, FILM COATED ORAL at 16:12

## 2019-01-01 RX ADMIN — LISINOPRIL 40 MG: 40 TABLET ORAL at 07:56

## 2019-01-01 RX ADMIN — OXYCODONE HYDROCHLORIDE 10 MG: 10 TABLET, FILM COATED, EXTENDED RELEASE ORAL at 20:13

## 2019-01-01 RX ADMIN — HEPARIN SODIUM 900 UNITS/HR: 10000 INJECTION, SOLUTION INTRAVENOUS at 19:44

## 2019-01-01 RX ADMIN — OMEPRAZOLE 20 MG: 20 CAPSULE, DELAYED RELEASE ORAL at 15:59

## 2019-01-01 RX ADMIN — SERTRALINE HYDROCHLORIDE 200 MG: 100 TABLET ORAL at 08:52

## 2019-01-01 RX ADMIN — TAMSULOSIN HYDROCHLORIDE 0.4 MG: 0.4 CAPSULE ORAL at 09:06

## 2019-01-01 RX ADMIN — DIAZEPAM 5 MG: 5 TABLET ORAL at 12:15

## 2019-01-01 RX ADMIN — OMEPRAZOLE 20 MG: 20 CAPSULE, DELAYED RELEASE ORAL at 22:26

## 2019-01-01 RX ADMIN — MELATONIN 1000 UNITS: at 10:39

## 2019-01-01 RX ADMIN — CEFAZOLIN 1 G: 1 INJECTION, POWDER, FOR SOLUTION INTRAMUSCULAR; INTRAVENOUS at 04:26

## 2019-01-01 RX ADMIN — OXYCODONE HYDROCHLORIDE 5 MG: 5 TABLET ORAL at 03:41

## 2019-01-01 RX ADMIN — Medication 1 MG: at 00:10

## 2019-01-01 RX ADMIN — INSULIN ASPART 2 UNITS: 100 INJECTION, SOLUTION INTRAVENOUS; SUBCUTANEOUS at 21:14

## 2019-01-01 RX ADMIN — ASPIRIN 81 MG: 81 TABLET, COATED ORAL at 09:28

## 2019-01-01 RX ADMIN — HEPARIN SODIUM 900 UNITS/HR: 10000 INJECTION, SOLUTION INTRAVENOUS at 04:01

## 2019-01-01 RX ADMIN — Medication 100 MG: at 22:23

## 2019-01-01 RX ADMIN — DEXTROSE AND SODIUM CHLORIDE: 5; 900 INJECTION, SOLUTION INTRAVENOUS at 14:16

## 2019-01-01 RX ADMIN — CLOPIDOGREL BISULFATE 75 MG: 75 TABLET, FILM COATED ORAL at 09:30

## 2019-01-01 RX ADMIN — CLOPIDOGREL BISULFATE 75 MG: 75 TABLET ORAL at 08:11

## 2019-01-01 RX ADMIN — LISINOPRIL 40 MG: 40 TABLET ORAL at 10:38

## 2019-01-01 RX ADMIN — ATORVASTATIN CALCIUM 80 MG: 40 TABLET, FILM COATED ORAL at 08:49

## 2019-01-01 ASSESSMENT — ACTIVITIES OF DAILY LIVING (ADL)
ADLS_ACUITY_SCORE: 21
ADLS_ACUITY_SCORE: 26
SWALLOWING: 0-->SWALLOWS FOODS/LIQUIDS WITHOUT DIFFICULTY
ADLS_ACUITY_SCORE: 19.5
ADLS_ACUITY_SCORE: 18.5
ADLS_ACUITY_SCORE: 15.5
ADLS_ACUITY_SCORE: 29
ADLS_ACUITY_SCORE: 23
ADLS_ACUITY_SCORE: 18.5
ADLS_ACUITY_SCORE: 15.5
RETIRED_EATING: 0-->INDEPENDENT
ADLS_ACUITY_SCORE: 19.5
ADLS_ACUITY_SCORE: 19.5
ADLS_ACUITY_SCORE: 14
RETIRED_EATING: 0-->INDEPENDENT
ADLS_ACUITY_SCORE: 29
ADLS_ACUITY_SCORE: 15.5
ADLS_ACUITY_SCORE: 29
ADLS_ACUITY_SCORE: 29
TRANSFERRING: 1-->ASSISTIVE EQUIPMENT
DRESS: 2-->ASSISTIVE PERSON
ADLS_ACUITY_SCORE: 19
ADLS_ACUITY_SCORE: 15.5
ADLS_ACUITY_SCORE: 18.5
ADLS_ACUITY_SCORE: 16.5
AMBULATION: 1-->ASSISTIVE EQUIPMENT
ADLS_ACUITY_SCORE: 17
ADLS_ACUITY_SCORE: 22
ADLS_ACUITY_SCORE: 16
ADLS_ACUITY_SCORE: 16
ADLS_ACUITY_SCORE: 24
ADLS_ACUITY_SCORE: 24
ADLS_ACUITY_SCORE: 15.5
ADLS_ACUITY_SCORE: 15
ADLS_ACUITY_SCORE: 17
ADLS_ACUITY_SCORE: 15
ADLS_ACUITY_SCORE: 15
ADLS_ACUITY_SCORE: 17
ADLS_ACUITY_SCORE: 19.5
ADLS_ACUITY_SCORE: 14
DEPENDENT_IADLS:: TRANSPORTATION;MONEY MANAGEMENT;MEDICATION MANAGEMENT;MEAL PREPARATION;SHOPPING;LAUNDRY;COOKING;CLEANING
ADLS_ACUITY_SCORE: 18.5
ADLS_ACUITY_SCORE: 29
ADLS_ACUITY_SCORE: 29
ADLS_ACUITY_SCORE: 21
SWALLOWING: 0-->SWALLOWS FOODS/LIQUIDS WITHOUT DIFFICULTY
ADLS_ACUITY_SCORE: 18.5
BATHING: 1-->ASSISTIVE EQUIPMENT
COGNITION: 1 - ATTENTION OR MEMORY DEFICITS
ADLS_ACUITY_SCORE: 18.5
FALL_HISTORY_WITHIN_LAST_SIX_MONTHS: YES
NUMBER_OF_TIMES_PATIENT_HAS_FALLEN_WITHIN_LAST_SIX_MONTHS: 4
ADLS_ACUITY_SCORE: 24
ADLS_ACUITY_SCORE: 19
ADLS_ACUITY_SCORE: 15.5
ADLS_ACUITY_SCORE: 22
ADLS_ACUITY_SCORE: 19
ADLS_ACUITY_SCORE: 18.5
ADLS_ACUITY_SCORE: 16
ADLS_ACUITY_SCORE: 25
ADLS_ACUITY_SCORE: 22
ADLS_ACUITY_SCORE: 35
ADLS_ACUITY_SCORE: 14
ADLS_ACUITY_SCORE: 16
ADLS_ACUITY_SCORE: 15
ADLS_ACUITY_SCORE: 21
ADLS_ACUITY_SCORE: 15
ADLS_ACUITY_SCORE: 22
ADLS_ACUITY_SCORE: 16
ADLS_ACUITY_SCORE: 19.5
ADLS_ACUITY_SCORE: 26
ADLS_ACUITY_SCORE: 16
ADLS_ACUITY_SCORE: 19
WHICH_OF_THE_ABOVE_FUNCTIONAL_RISKS_HAD_A_RECENT_ONSET_OR_CHANGE?: AMBULATION
ADLS_ACUITY_SCORE: 25
ADLS_ACUITY_SCORE: 16
ADLS_ACUITY_SCORE: 15
ADLS_ACUITY_SCORE: 15.5
DRESS: 2-->ASSISTIVE PERSON
ADLS_ACUITY_SCORE: 35
ADLS_ACUITY_SCORE: 22
ADLS_ACUITY_SCORE: 23
ADLS_ACUITY_SCORE: 22
ADLS_ACUITY_SCORE: 24
ADLS_ACUITY_SCORE: 16.5
ADLS_ACUITY_SCORE: 23
ADLS_ACUITY_SCORE: 19.5
RETIRED_COMMUNICATION: 0-->UNDERSTANDS/COMMUNICATES WITHOUT DIFFICULTY
FALL_HISTORY_WITHIN_LAST_SIX_MONTHS: YES
ADLS_ACUITY_SCORE: 22
ADLS_ACUITY_SCORE: 29
TRANSFERRING: 3-->ASSISTIVE EQUIPMENT AND PERSON
ADLS_ACUITY_SCORE: 19.5
ADLS_ACUITY_SCORE: 23
ADLS_ACUITY_SCORE: 15
AMBULATION: 3-->ASSISTIVE EQUIPMENT AND PERSON
ADLS_ACUITY_SCORE: 22
ADLS_ACUITY_SCORE: 16
ADLS_ACUITY_SCORE: 29
ADLS_ACUITY_SCORE: 16.5
ADLS_ACUITY_SCORE: 16
ADLS_ACUITY_SCORE: 18
ADLS_ACUITY_SCORE: 29
ADLS_ACUITY_SCORE: 26
RETIRED_COMMUNICATION: 2-->DIFFICULTY UNDERSTANDING (NOT RELATED TO LANGUAGE BARRIER)
ADLS_ACUITY_SCORE: 15.5
ADLS_ACUITY_SCORE: 16
ADLS_ACUITY_SCORE: 15
ADLS_ACUITY_SCORE: 22
ADLS_ACUITY_SCORE: 29
ADLS_ACUITY_SCORE: 24
ADLS_ACUITY_SCORE: 15
ADLS_ACUITY_SCORE: 17.5
ADLS_ACUITY_SCORE: 24
ADLS_ACUITY_SCORE: 24
DEPENDENT_IADLS:: TRANSPORTATION;MONEY MANAGEMENT;MEDICATION MANAGEMENT;MEAL PREPARATION;SHOPPING;LAUNDRY;COOKING;CLEANING
ADLS_ACUITY_SCORE: 26
ADLS_ACUITY_SCORE: 15.5
ADLS_ACUITY_SCORE: 31
WHICH_OF_THE_ABOVE_FUNCTIONAL_RISKS_HAD_A_RECENT_ONSET_OR_CHANGE?: AMBULATION;TRANSFERRING
BATHING: 3-->ASSISTIVE EQUIPMENT AND PERSON
TOILETING: 3-->ASSISTIVE EQUIPMENT AND PERSON
ADLS_ACUITY_SCORE: 19
TOILETING: 1-->ASSISTIVE EQUIPMENT
ADLS_ACUITY_SCORE: 14
ADLS_ACUITY_SCORE: 29
ADLS_ACUITY_SCORE: 19.5
ADLS_ACUITY_SCORE: 22
COGNITION: 1 - ATTENTION OR MEMORY DEFICITS
ADLS_ACUITY_SCORE: 19
ADLS_ACUITY_SCORE: 26
ADLS_ACUITY_SCORE: 19.5
ADLS_ACUITY_SCORE: 24

## 2019-01-01 ASSESSMENT — ENCOUNTER SYMPTOMS
NUMBNESS: 0
DIFFICULTY URINATING: 0
ABDOMINAL PAIN: 1
SHORTNESS OF BREATH: 0
ACTIVITY CHANGE: 1
BACK PAIN: 1
BLOOD IN STOOL: 0
CONFUSION: 1
BACK PAIN: 1
COUGH: 0
SORE THROAT: 0
PALPITATIONS: 0
WOUND: 1
DIFFICULTY URINATING: 1
LIGHT-HEADEDNESS: 0
VOMITING: 0
FACIAL ASYMMETRY: 0
ABDOMINAL PAIN: 0
RHINORRHEA: 0
CHILLS: 0
SHORTNESS OF BREATH: 0
DIARRHEA: 0
NAUSEA: 0
DIZZINESS: 0
COUGH: 0
FEVER: 0
HEADACHES: 0
SLEEP DISTURBANCE: 1

## 2019-01-01 ASSESSMENT — PAIN SCALES - GENERAL
PAINLEVEL: WORST PAIN (10)

## 2019-01-01 ASSESSMENT — ANXIETY QUESTIONNAIRES
2. NOT BEING ABLE TO STOP OR CONTROL WORRYING: NEARLY EVERY DAY
GAD7 TOTAL SCORE: 10
6. BECOMING EASILY ANNOYED OR IRRITABLE: NOT AT ALL
3. WORRYING TOO MUCH ABOUT DIFFERENT THINGS: SEVERAL DAYS
GAD7 TOTAL SCORE: 10
5. BEING SO RESTLESS THAT IT IS HARD TO SIT STILL: NOT AT ALL
1. FEELING NERVOUS, ANXIOUS, OR ON EDGE: NEARLY EVERY DAY
7. FEELING AFRAID AS IF SOMETHING AWFUL MIGHT HAPPEN: NEARLY EVERY DAY

## 2019-01-01 ASSESSMENT — MIFFLIN-ST. JEOR
SCORE: 1582.64
SCORE: 1524.13
SCORE: 1590.36
SCORE: 1556.33
SCORE: 1565.86
SCORE: 1535.93
SCORE: 1581.74
SCORE: 1579.48
SCORE: 1582.19
SCORE: 1580.39
SCORE: 1540.46
SCORE: 1602.15
SCORE: 1604.43
SCORE: 1565.86
SCORE: 1579.92
SCORE: 1574.48
SCORE: 1524.58
SCORE: 1540.46
SCORE: 1513.7
SCORE: 1599.88

## 2019-01-01 ASSESSMENT — PATIENT HEALTH QUESTIONNAIRE - PHQ9
SUM OF ALL RESPONSES TO PHQ QUESTIONS 1-9: 11
5. POOR APPETITE OR OVEREATING: NOT AT ALL

## 2019-01-01 ASSESSMENT — VISUAL ACUITY: OU: DOUBLE VISION/DIPLOPIA

## 2019-01-02 NOTE — TELEPHONE ENCOUNTER
Reason for Call:  Request for results:    Name of test or procedure: Pat is calling hoping you could help them.  Vincent had surgery and biopsy done on 12/4/18 under Dr. Lucius Capone and she has not heard what the results of that was yet.  They haven't returned any of her calls.  Can you help them out?  Please review and advise. Thank you..Ebony Dickson     Date of test of procedure: 12/4/18    Location of the test or procedure[facility]    OK to leave the result message on voice mail or with a family member? YES    Phone number Patient can be reached at:  Home number on file [phone] (home)    Call taken on 1/2/2019 at 3:35 PM by Ebony Dickson

## 2019-01-02 NOTE — TELEPHONE ENCOUNTER
I sent a message to Dr. Capone for someone to address this situation. If he does not respond I will call on Friday. Keyla Fonseca M.D.

## 2019-01-09 NOTE — TELEPHONE ENCOUNTER
"ONE TOUCH ULTRA BLUE TESTST(NEW)100      Last Written Prescription Date:  1/8/18  Last Fill Quantity: 1 kit,   # refills: 0  Last Office Visit: 12/18/18  Future Office visit:       Requested Prescriptions   Pending Prescriptions Disp Refills     blood glucose (ONETOUCH ULTRA) test strip [Pharmacy Med Name: ONE TOUCH ULTRA BLUE TESTST(NEW)100] 200 strip 0     Sig: TEST BLOOD SUGAR TWICE DAILY OR AS DIRECTED    Diabetic Supplies Protocol Passed - 1/9/2019  2:06 PM       Passed - Medication is active on med list       Passed - Patient is 18 years of age or older       Passed - Recent (6 mo) or future (30 days) visit within the authorizing provider's specialty    Patient had office visit in the last 6 months or has a visit in the next 30 days with authorizing provider.  See \"Patient Info\" tab in inbasket, or \"Choose Columns\" in Meds & Orders section of the refill encounter.              "

## 2019-01-11 NOTE — PROGRESS NOTES
Post op.  S/P CT guided Cryoablation of Left Renal Mass on 12-4-18- imaging prior.  Records available in [facility].

## 2019-01-18 NOTE — LETTER
1/18/2019       RE: Vincent Mishra  [address] [place] [address]     Dear Colleague,    Thank you for referring your patient, Vincent Mishra, to the [facility] UROLOGY AND INST FOR PROSTATE AND UROLOGIC CANCERS at [facility]. Please see a copy of my visit note below.    ASSESSMENT and PLAN  Perineal Pain  Still having chronic perineal pain.  His exam and previous CTs have been negative.  MRI and Xrays demonstrate multilevel degenerative disc disease and bilateral neural foraminal stenosis. He did have a Neurosurgery consult but was determined to be a poor surgical candidate given his age and hx of 4 MIs.     Stable bilateral adrenal nodules.    Renal cell carcinoma.  Successful cryoablation.  Possible new renal mass.  CT from yesterday continues to demonstrate the presence of a complex enhancing lesion, however this could not be well evaluated without IV contrast. Will plan to obtain CT with contrast to further characterize this lesion. His most recent CR is 1.39 (9/14/18), which is adequate for receiving contrast. Will contact patient with results via mail.  _________________________________________________________________    CHIEF COMPLAINT  It was my pleasure to see Vincent Mishra who is a 78 year old male for follow-up of perineal pain.      HPI  Mr. Mishra is status post percutaneous cryoablation of the left kidney for Gr 1 renal cell carcinoma in 2012.  Today he is here for groin pain.     His pain continues to be constant, occurring every day/night.  It is located bilaterally in the low back and radiates into the groin and posterior leg bilaterally. He reports 10/10 intensity, at times he feels it is 20/10. Uses a walker to get around. Continues to take oxycodone 30mg and this helps the pain. He denies any recent hematuria.     Hx of 4 MIs, on chronic anticoagulation.    He had a Neurosurgery consult in 10/2017. MRI and Xrays obtained at this time  demonstrated multilevel degenerative disc disease and bilateral neural foraminal stenosis. Given his age and extensive cardiac history, surgical intervention was not recommended.     CT from yesterday continues to demonstrate the presence of a complex enhancing lesion, however this could not be well evaluated without IV contrast.       Left ABDOMEN PAIN FROM FALL AFTER SURGERY.  HE HAS WALKER ETC.    With regard to his kidney function, his most recent Cr is:   Creatinine   Date Value Ref Range Status   12/12/2018 1.40 (H) 0.66 - 1.25 mg/dL Final         RADIOLOGIC IMAGING    EXAMINATION: CT ABDOMEN PELVIS W/O CONTRAST, 5/12/2017 8:33 AM     TECHNIQUE: Helical CT images from the lung bases through the pubic  symphysis were obtained without IV contrast.      COMPARISON: CT 6/3/2016     HISTORY: Malignant neoplasm of kidney excluding renal pelvis,  unspecified laterality, Malignant neoplasm of unspecified kidney,  except renal pelvis     FINDINGS:     Abdomen and pelvis: Postablation changes in the lateral interpolar  left kidney, the evaluation of which is limited without intravenous  contrast. Nodular soft tissue density within treatment bed measures 17  x 22 mm, previously demonstrating nonenhancing measuring 17 x 25 mm.  Calcific foci along the peripheral aspect of the treatment bed are  unchanged. Previously seen heterogeneously enhancing lesion in the  superior pole of the left kidney is not visualized without intravenous  contrast.  No hyperdense renal calculi within either kidney, ureter or urinary  bladder. No hydronephrosis or perinephric fat stranding. Bladder is  partially distended with diffuse bladder wall thickening, not  significantly changed since prior study. Prostate is surgically  absent.     Remaining abdomen and pelvis: The liver, gallbladder, spleen, pancreas  and bowel are unremarkable on this limited unenhanced exam. Unchanged  multiple bilateral adrenal gland nodules. No ascites. No abdominal  or  pelvic lymphadenopathy. Abdominal aorta is normal caliber. Scattered  atherosclerotic calcifications in the abdomen and pelvis.     Lung bases: Right middle lobe consolidation with associated  bronchiectasis, unchanged since prior study. Coronary artery  calcifications.     Bones and soft tissues: No pathologic appearing osseous lesions  identified. Extensive degenerative changes in the spine, not clearly  changed since prior study. Postsurgical changes of laminectomy and  interbody at L3-L4 level. Grade 1 retrolisthesis of L2 on L3,  unchanged since prior study.         IMPRESSION:   1. No renal calculi over the course of the kidneys, ureters or  bladder.  2. Stable post ablation changes along the lateral aspect of the left  kidney.  3. Previously seen complex solid and cystic lesion in the superior  pole of the left kidney cannot be evaluated on today's study without  intravenous contrast.  4. Unchanged multinodular appearing adrenal glands.     I have personally reviewed the examination and initial interpretation  and I agree with the findings.     CARLOS DAVIS    I reviewed the recent radiologic imaging and reports described above. Original images were visualized independently.     Lab Results   Component Value Date    CR 1.39 09/14/2018    CR 1.48 04/04/2018    CR 1.38 01/08/2018    CR 1.36 10/30/2017    CR 1.31 05/31/2017    CR 1.32 05/30/2017    CR 1.32 03/06/2017    CR 1.42 01/24/2017    CR 1.23 01/07/2016    CR 0.94 09/25/2015       Patient Active Problem List    Diagnosis Date Noted     Chest pain 01/12/2012     Priority: High     Hyperlipidemia LDL goal <100 09/23/2010     Priority: High     Renal hematoma 12/11/2018     Priority: Medium     Severe episode of recurrent major depressive disorder, without psychotic features (H) 02/20/2018     Priority: Medium     Chronic bilateral low back pain with sciatica, sciatica laterality unspecified 09/20/2017     Priority: Medium     ACS (acute coronary  syndrome) (H) 05/30/2017     Priority: Medium     Bilateral low back pain with right-sided sciatica 04/03/2017     Priority: Medium     Bilateral low back pain with left-sided sciatica 04/03/2017     Priority: Medium     Claudication (H) 05/09/2016     Priority: Medium     Hip pain, bilateral 04/05/2016     Priority: Medium     Thyroid nodule 10/30/2015     Priority: Medium     Type 2 diabetes mellitus with other circulatory complication, without long-term current use of insulin (H) 10/22/2015     Priority: Medium     Myocardial infarction, nontransmural (H) 07/16/2015     Priority: Medium     Sinoatrial node dysfunction (H) 07/16/2015     Priority: Medium     Right bundle branch block (RBBB) 07/16/2015     Priority: Medium     Essential hypertension 07/16/2015     Priority: Medium     Hyperlipidemia 07/16/2015     Priority: Medium     NSTEMI (non-ST elevated myocardial infarction) (H)      Priority: Medium     07-25-14 CATH- RCA, L.Main and CFX  had minor luminal irregularites. Mid LAD high grade stenosis 80-90%.Stent placed to mid LAD       Hard of hearing 06/05/2014     Priority: Medium     Constipation 06/05/2014     Priority: Medium     Abdominal pain, right upper quadrant 03/26/2014     Priority: Medium     Esophageal reflux 03/26/2014     Priority: Medium     Insomnia 10/21/2013     Priority: Medium     Health Care Home 10/08/2013     Priority: Medium     *See Letters for [facility] Care Plan: My Access Plan           Vitamin D deficiency disease 09/25/2013     Priority: Medium     Renal mass, left 12/05/2012     Priority: Medium     Malignant neoplasm of kidney excluding renal pelvis (H) 11/16/2012     Priority: Medium     SURGERY, PATHOLOGY, AND TREATMENT HISTORY FOR KIDNEY CANCER  11/12/12:  Left lower pole kidney mass biopsy: Renal cell carcinoma, clear cell type; Viktor grade 1  12/5/12 Left percutaneous cryoablation of two renal tumors.  These were located adjacent to one another in the lateral kidney.    Asif Capone, [facility].  Problem list name updated by automated process. Provider to review       Moderate major depression (H) 09/17/2012     Priority: Medium     Orchitis, epididymitis, and epididymo-orchitis 09/17/2012     Priority: Medium     Osteoarthritis, knee 09/05/2012     Priority: Medium     Abnormal antinuclear antibody titer 03/12/2012     Priority: Medium     Anatomic airway obstruction 02/09/2012     Priority: Medium     fixed obstruction on PFTs with dyspnea       Right bundle branch block 07/26/2011     Priority: Medium     Seen on EKG 2/2011.  Different configuration on 7/26/2011 s/p MI - but same at [facility] post-MI as here        Acute myocardial infarction of inferolateral wall (H) 07/14/2011     Priority: Medium     Obesity 07/14/2011     Priority: Medium     Hypertension goal BP (blood pressure) < 140/90 02/28/2011     Priority: Medium     C6-7 disc with radiculopathy 02/28/2011     Priority: Medium     consider second Epidural steroid injection at [facility].  Continue PT.  Await accupuncture       Tear of meniscus of left knee 02/11/2011     Priority: Medium     Neck pain 11/12/2010     Priority: Medium     B12 deficiency 11/12/2010     Priority: Medium     Diplopia 11/02/2010     Priority: Medium     Neuropathy 11/02/2010     Priority: Medium     Vision loss night 11/02/2010     Priority: Medium     Parotid mass 10/04/2010     Priority: Medium     Tension headache 09/23/2010     Priority: Medium     Trapezius strain 09/23/2010     Priority: Medium     Past Medical History:   Diagnosis Date     CAD (coronary artery disease) 7/26/2011 7/2011 ([facility]): s/p MI, PTCA - RCA      Cancer of kidney (H)      Chest pain 1/12/2012     Coronary artery disease      History of angina      History of blood transfusion      Hyperlipidaemia      Hyperlipidemia LDL goal <100 9/23/2010     Malignant neoplasm (H)     Prostate CA (removed)     Myocardial infarction (H)     s/p MI  7/29/14, stent placement     NSTEMI (non-ST elevated myocardial infarction) (H)     07-25-14 CATH- RCA, L.Main and CFX  had minor luminal irregularites. Mid LAD high grade stenosis 80-90%.Stent placed to mid LAD     Other and unspecified hyperlipidemia     MI in July 2011     Right bundle branch block      Type II or unspecified type diabetes mellitus without mention of complication, not stated as uncontrolled      Unspecified essential hypertension      Past Surgical History:   Procedure Laterality Date     ARTHROSCOPY KNEE  2/11/2011    ARTHROSCOPY KNEE performed by LEY, JEFFREY DUANE at [facility]     ARTHROSCOPY KNEE WITH MEDIAL MENISCECTOMY  6/26/2012    Procedure: ARTHROSCOPY KNEE WITH MEDIAL MENISCECTOMY;  Right Knee Arthroscopy With Medial Menisectomy;  Surgeon: Ley, Jeffrey Duane, MD;  Location: [facility]     BACK SURGERY      back surgery x4     BIOPSY       CARDIAC SURGERY  7/2011    stentt placed     COLONOSCOPY       CORONARY ANGIOGRAPHY ADULT ORDER  07-25-14    RCA, L.Main and CFX  had minor luminal irregularites. Mid LAD high grade stenosis 80-90%.Stent placed to mid LAD     ESOPHAGOSCOPY, GASTROSCOPY, DUODENOSCOPY (EGD), COMBINED  10/25/2012    Procedure: COMBINED ESOPHAGOSCOPY, GASTROSCOPY, DUODENOSCOPY (EGD), BIOPSY SINGLE OR MULTIPLE;  Gastroscopy  ;  Surgeon: Lucius Dasilva MD;  Location: [facility]     HEART CATH, ANGIOPLASTY  07-25-14    mid LAD      ORTHOPEDIC SURGERY       back surgery     Current Outpatient Medications   Medication Sig Dispense Refill     amLODIPine (NORVASC) 5 MG tablet Take 1 tablet (5 mg) by mouth daily 90 tablet 3     ascorbic acid (VITAMIN C) 500 MG tablet Take 500 mg by mouth daily        aspirin (ASA) 81 MG tablet Take 81 mg by mouth daily       atorvastatin (LIPITOR) 80 MG tablet TAKE 1 TABLET BY MOUTH DAILY (Patient taking differently: TAKE 1 TABLET BY MOUTH EVERY EVENING) 90 tablet 3     blood glucose (ONETOUCH ULTRA) test strip TEST BLOOD SUGAR TWICE DAILY OR AS DIRECTED 200  strip 0     blood glucose monitoring (NO BRAND SPECIFIED) meter device kit Use to test blood sugar 1 times daily or as directed. 1 kit 0     Calcium Carbonate-Vitamin D (CALCIUM + D) 600-200 MG-UNIT per tablet Take 2 tablets by mouth daily. 100 tablet 12     cholecalciferol (VITAMIN  -D) 1000 UNITS capsule Take 1 capsule by mouth daily       clopidogrel (PLAVIX) 75 MG tablet TAKE 1 TABLET BY MOUTH DAILY 90 tablet 0     Cyanocobalamin (VITAMIN B-12 PO) Take 500 mcg by mouth daily        ferrous sulfate (FE TABS) 325 (65 Fe) MG EC tablet Take 1 tablet (325 mg) by mouth daily 90 tablet 3     gabapentin (NEURONTIN) 100 MG capsule TAKE ONE CAPSULE BY MOUTH DAILY, INCREASE BY 1 CAPSULE EVERY 2 DAYS UP TO 6 CAPSULES THREE TIMES DAILY (Patient taking differently: Take 400 mg by mouth 2 times daily ) 270 capsule 11     hydrochlorothiazide (HYDRODIURIL) 25 MG tablet TAKE 1 TABLET BY MOUTH DAILY 90 tablet 1     HYDROmorphone (DILAUDID) 2 MG tablet Take 1 tablet (2 mg) by mouth every 6 hours as needed for breakthrough pain or pain 22 tablet 0     Lancets Misc. (SELECT-LITE DEVICE/LANCETS) KIT 1 kit 2 times daily 1 kit 1     WalkHubCAN FINEPOINT LANCETS MISC Use to test blood sugars 1 times daily or as directed. 100 each 12     lisinopril (PRINIVIL/ZESTRIL) 40 MG tablet TAKE 1 TABLET(40 MG) BY MOUTH DAILY 90 tablet 1     Melatonin 10 MG TBDP Take 10 mg by mouth At Bedtime 90 tablet      metFORMIN (GLUCOPHAGE-XR) 500 MG 24 hr tablet TAKE 2 TABLETS BY MOUTH TWICE DAILY WITH MEALS. 360 tablet 1     nitroGLYcerin (NITROSTAT) 0.4 MG sublingual tablet Place 1 tablet (0.4 mg) under the tongue every 5 minutes as needed for chest pain 25 tablet 1     omeprazole (PRILOSEC) 20 MG CR capsule TAKE ONE CAPSULE BY MOUTH TWICE DAILY 180 capsule 3     order for DME Equipment being ordered: walker wheeled also include  With skis for indoor carpet use 1 Device 0     order for DME Equipment being ordered: compression stockings 1 Units 0     order for  "DME Equipment being ordered: TENS 1 Units 0     order for DME Equipment being ordered: Wheelchair motorized for patient with spinal stenosis and neurogenic claudication 1 Device 0     [START ON 3/5/2019] oxyCODONE (OXYCONTIN) 30 MG 12 hr tablet Take 1 tablet (30 mg) by mouth every morning 30 tablet 0     [START ON 3/5/2019] oxyCODONE (ROXICODONE) 5 MG tablet Take 1 daily as needed  for severe pain /at bedtime may have up to 2 per day max 40 per month 40 tablet 0     oxyCODONE (ROXICODONE) 5 MG tablet Take 1 daily as needed  for severe pain /at bedtime may have up to 2 per day max 40 per month 10 tablet 0     polyethylene glycol (MIRALAX/GLYCOLAX) powder Take 17 g (1 capful) by mouth daily 119 g 3     sertraline (ZOLOFT) 100 MG tablet TAKE 2 TABLETS(200 MG) BY MOUTH DAILY 180 tablet 0     traZODone (DESYREL) 100 MG tablet TAKE 3 TABLETS(300 MG) BY MOUTH EVERY NIGHT AT BEDTIME AS NEEDED FOR SLEEP 270 tablet 1      SOCIAL HISTORY: He  reports that  has never smoked. he has never used smokeless tobacco. He reports that he does not drink alcohol or use drugs.    PHYSICAL EXAM  Vitals:    01/18/19 1036   BP: 146/73   Pulse: 57   Weight: 88 kg (194 lb)   Height: 1.727 m (5' 8\")     Constitutional: Alert, no acute distress  Psychiatric: Mood is appropriate.  Abdomen:  Soft non tender non distended.        Again, thank you for allowing me to participate in the care of your patient.      Sincerely,    Lucius Capone MD        Patient Care Team:  Keyla Fonseca MD as PCP - General (Family Practice)  Lucius Capone MD as MD (Urology)  Julia Cross, RN as Registered Nurse (Urology)  ARTHUR ASIF    Copy to patient  ISELA AGUIRRE  [address] [place] [phone]        "

## 2019-01-18 NOTE — DISCHARGE INSTRUCTIONS

## 2019-01-18 NOTE — NURSING NOTE
Chief Complaint   Patient presents with     Surgical Followup     Post op.  S/P CT guided Cryoablation of Left Renal Mass on 12-4-18     MYRNA DohertyA

## 2019-01-18 NOTE — PROGRESS NOTES
ASSESSMENT and PLAN  Perineal Pain  Still having chronic perineal pain.  His exam and previous CTs have been negative.  MRI and Xrays demonstrate multilevel degenerative disc disease and bilateral neural foraminal stenosis. He did have a Neurosurgery consult but was determined to be a poor surgical candidate given his age and hx of 4 MIs.     Stable bilateral adrenal nodules.    Renal cell carcinoma.  Successful cryoablation.  Possible new renal mass.  CT from yesterday continues to demonstrate the presence of a complex enhancing lesion, however this could not be well evaluated without IV contrast. Will plan to obtain CT with contrast to further characterize this lesion. His most recent CR is 1.39 (9/14/18), which is adequate for receiving contrast. Will contact patient with results via mail.  _________________________________________________________________    CHIEF COMPLAINT  It was my pleasure to see Vincent Mishra who is a 78 year old male for follow-up of perineal pain.      HPI  Mr. Mishra is status post percutaneous cryoablation of the left kidney for Gr 1 renal cell carcinoma in 2012.  Today he is here for groin pain.     His pain continues to be constant, occurring every day/night.  It is located bilaterally in the low back and radiates into the groin and posterior leg bilaterally. He reports 10/10 intensity, at times he feels it is 20/10. Uses a walker to get around. Continues to take oxycodone 30mg and this helps the pain. He denies any recent hematuria.     Hx of 4 MIs, on chronic anticoagulation.    He had a Neurosurgery consult in 10/2017. MRI and Xrays obtained at this time demonstrated multilevel degenerative disc disease and bilateral neural foraminal stenosis. Given his age and extensive cardiac history, surgical intervention was not recommended.     CT from yesterday continues to demonstrate the presence of a complex enhancing lesion, however this could not be well evaluated without IV  contrast.       Left ABDOMEN PAIN FROM FALL AFTER SURGERY.  HE HAS WALKER ETC.    With regard to his kidney function, his most recent Cr is:   Creatinine   Date Value Ref Range Status   12/12/2018 1.40 (H) 0.66 - 1.25 mg/dL Final         RADIOLOGIC IMAGING    EXAMINATION: CT ABDOMEN PELVIS W/O CONTRAST, 5/12/2017 8:33 AM     TECHNIQUE: Helical CT images from the lung bases through the pubic  symphysis were obtained without IV contrast.      COMPARISON: CT 6/3/2016     HISTORY: Malignant neoplasm of kidney excluding renal pelvis,  unspecified laterality, Malignant neoplasm of unspecified kidney,  except renal pelvis     FINDINGS:     Abdomen and pelvis: Postablation changes in the lateral interpolar  left kidney, the evaluation of which is limited without intravenous  contrast. Nodular soft tissue density within treatment bed measures 17  x 22 mm, previously demonstrating nonenhancing measuring 17 x 25 mm.  Calcific foci along the peripheral aspect of the treatment bed are  unchanged. Previously seen heterogeneously enhancing lesion in the  superior pole of the left kidney is not visualized without intravenous  contrast.  No hyperdense renal calculi within either kidney, ureter or urinary  bladder. No hydronephrosis or perinephric fat stranding. Bladder is  partially distended with diffuse bladder wall thickening, not  significantly changed since prior study. Prostate is surgically  absent.     Remaining abdomen and pelvis: The liver, gallbladder, spleen, pancreas  and bowel are unremarkable on this limited unenhanced exam. Unchanged  multiple bilateral adrenal gland nodules. No ascites. No abdominal or  pelvic lymphadenopathy. Abdominal aorta is normal caliber. Scattered  atherosclerotic calcifications in the abdomen and pelvis.     Lung bases: Right middle lobe consolidation with associated  bronchiectasis, unchanged since prior study. Coronary artery  calcifications.     Bones and soft tissues: No pathologic  appearing osseous lesions  identified. Extensive degenerative changes in the spine, not clearly  changed since prior study. Postsurgical changes of laminectomy and  interbody at L3-L4 level. Grade 1 retrolisthesis of L2 on L3,  unchanged since prior study.         IMPRESSION:   1. No renal calculi over the course of the kidneys, ureters or  bladder.  2. Stable post ablation changes along the lateral aspect of the left  kidney.  3. Previously seen complex solid and cystic lesion in the superior  pole of the left kidney cannot be evaluated on today's study without  intravenous contrast.  4. Unchanged multinodular appearing adrenal glands.     I have personally reviewed the examination and initial interpretation  and I agree with the findings.     CARLOS DAVIS    I reviewed the recent radiologic imaging and reports described above. Original images were visualized independently.     Lab Results   Component Value Date    CR 1.39 09/14/2018    CR 1.48 04/04/2018    CR 1.38 01/08/2018    CR 1.36 10/30/2017    CR 1.31 05/31/2017    CR 1.32 05/30/2017    CR 1.32 03/06/2017    CR 1.42 01/24/2017    CR 1.23 01/07/2016    CR 0.94 09/25/2015       Patient Active Problem List    Diagnosis Date Noted     Chest pain 01/12/2012     Priority: High     Hyperlipidemia LDL goal <100 09/23/2010     Priority: High     Renal hematoma 12/11/2018     Priority: Medium     Severe episode of recurrent major depressive disorder, without psychotic features (H) 02/20/2018     Priority: Medium     Chronic bilateral low back pain with sciatica, sciatica laterality unspecified 09/20/2017     Priority: Medium     ACS (acute coronary syndrome) (H) 05/30/2017     Priority: Medium     Bilateral low back pain with right-sided sciatica 04/03/2017     Priority: Medium     Bilateral low back pain with left-sided sciatica 04/03/2017     Priority: Medium     Claudication (H) 05/09/2016     Priority: Medium     Hip pain, bilateral 04/05/2016     Priority:  Medium     Thyroid nodule 10/30/2015     Priority: Medium     Type 2 diabetes mellitus with other circulatory complication, without long-term current use of insulin (H) 10/22/2015     Priority: Medium     Myocardial infarction, nontransmural (H) 07/16/2015     Priority: Medium     Sinoatrial node dysfunction (H) 07/16/2015     Priority: Medium     Right bundle branch block (RBBB) 07/16/2015     Priority: Medium     Essential hypertension 07/16/2015     Priority: Medium     Hyperlipidemia 07/16/2015     Priority: Medium     NSTEMI (non-ST elevated myocardial infarction) (H)      Priority: Medium     07-25-14 CATH- RCA, L.Main and CFX  had minor luminal irregularites. Mid LAD high grade stenosis 80-90%.Stent placed to mid LAD       Hard of hearing 06/05/2014     Priority: Medium     Constipation 06/05/2014     Priority: Medium     Abdominal pain, right upper quadrant 03/26/2014     Priority: Medium     Esophageal reflux 03/26/2014     Priority: Medium     Insomnia 10/21/2013     Priority: Medium     Health Care Home 10/08/2013     Priority: Medium     *See Letters for H Care Plan: My Access Plan           Vitamin D deficiency disease 09/25/2013     Priority: Medium     Renal mass, left 12/05/2012     Priority: Medium     Malignant neoplasm of kidney excluding renal pelvis (H) 11/16/2012     Priority: Medium     SURGERY, PATHOLOGY, AND TREATMENT HISTORY FOR KIDNEY CANCER  11/12/12:  Left lower pole kidney mass biopsy: Renal cell carcinoma, clear cell type; Viktor grade 1  12/5/12 Left percutaneous cryoablation of two renal tumors.  These were located adjacent to one another in the lateral kidney.  Dr Asif Cpaone, [facility].  Problem list name updated by automated process. Provider to review       Moderate major depression (H) 09/17/2012     Priority: Medium     Orchitis, epididymitis, and epididymo-orchitis 09/17/2012     Priority: Medium     Osteoarthritis, knee 09/05/2012      Priority: Medium     Abnormal antinuclear antibody titer 03/12/2012     Priority: Medium     Anatomic airway obstruction 02/09/2012     Priority: Medium     fixed obstruction on PFTs with dyspnea       Right bundle branch block 07/26/2011     Priority: Medium     Seen on EKG 2/2011.  Different configuration on 7/26/2011 s/p MI - but same at [facility] post-MI as here        Acute myocardial infarction of inferolateral wall (H) 07/14/2011     Priority: Medium     Obesity 07/14/2011     Priority: Medium     Hypertension goal BP (blood pressure) < 140/90 02/28/2011     Priority: Medium     C6-7 disc with radiculopathy 02/28/2011     Priority: Medium     consider second Epidural steroid injection at [facility].  Continue PT.  Await accupuncture       Tear of meniscus of left knee 02/11/2011     Priority: Medium     Neck pain 11/12/2010     Priority: Medium     B12 deficiency 11/12/2010     Priority: Medium     Diplopia 11/02/2010     Priority: Medium     Neuropathy 11/02/2010     Priority: Medium     Vision loss night 11/02/2010     Priority: Medium     Parotid mass 10/04/2010     Priority: Medium     Tension headache 09/23/2010     Priority: Medium     Trapezius strain 09/23/2010     Priority: Medium     Past Medical History:   Diagnosis Date     CAD (coronary artery disease) 7/26/2011 7/2011 ([facility]): s/p MI, PTCA - RCA      Cancer of kidney (H)      Chest pain 1/12/2012     Coronary artery disease      History of angina      History of blood transfusion      Hyperlipidaemia      Hyperlipidemia LDL goal <100 9/23/2010     Malignant neoplasm (H)     Prostate CA (removed)     Myocardial infarction (H)     s/p MI 7/29/14, stent placement     NSTEMI (non-ST elevated myocardial infarction) (H)     07-25-14 CATH- RCA, L.Main and CFX  had minor luminal irregularites. Mid LAD high grade stenosis 80-90%.Stent placed to mid LAD     Other and unspecified hyperlipidemia     MI in July 2011     Right bundle branch block      Type II or  unspecified type diabetes mellitus without mention of complication, not stated as uncontrolled      Unspecified essential hypertension      Past Surgical History:   Procedure Laterality Date     ARTHROSCOPY KNEE  2/11/2011    ARTHROSCOPY KNEE performed by LEY, JEFFREY DUANE at [facility]     ARTHROSCOPY KNEE WITH MEDIAL MENISCECTOMY  6/26/2012    Procedure: ARTHROSCOPY KNEE WITH MEDIAL MENISCECTOMY;  Right Knee Arthroscopy With Medial Menisectomy;  Surgeon: Ley, Jeffrey Duane, MD;  Location: [facility]     BACK SURGERY      back surgery x4     BIOPSY       CARDIAC SURGERY  7/2011    stentt placed     COLONOSCOPY       CORONARY ANGIOGRAPHY ADULT ORDER  07-25-14    RCA, L.Main and CFX  had minor luminal irregularites. Mid LAD high grade stenosis 80-90%.Stent placed to mid LAD     ESOPHAGOSCOPY, GASTROSCOPY, DUODENOSCOPY (EGD), COMBINED  10/25/2012    Procedure: COMBINED ESOPHAGOSCOPY, GASTROSCOPY, DUODENOSCOPY (EGD), BIOPSY SINGLE OR MULTIPLE;  Gastroscopy  ;  Surgeon: Lucius Dasilva MD;  Location: [facility]     HEART CATH, ANGIOPLASTY  07-25-14    mid LAD      ORTHOPEDIC SURGERY       back surgery     Current Outpatient Medications   Medication Sig Dispense Refill     amLODIPine (NORVASC) 5 MG tablet Take 1 tablet (5 mg) by mouth daily 90 tablet 3     ascorbic acid (VITAMIN C) 500 MG tablet Take 500 mg by mouth daily        aspirin (ASA) 81 MG tablet Take 81 mg by mouth daily       atorvastatin (LIPITOR) 80 MG tablet TAKE 1 TABLET BY MOUTH DAILY (Patient taking differently: TAKE 1 TABLET BY MOUTH EVERY EVENING) 90 tablet 3     blood glucose (ONETOUCH ULTRA) test strip TEST BLOOD SUGAR TWICE DAILY OR AS DIRECTED 200 strip 0     blood glucose monitoring (NO BRAND SPECIFIED) meter device kit Use to test blood sugar 1 times daily or as directed. 1 kit 0     Calcium Carbonate-Vitamin D (CALCIUM + D) 600-200 MG-UNIT per tablet Take 2 tablets by mouth daily. 100 tablet 12     cholecalciferol (VITAMIN  -D) 1000 UNITS capsule Take 1  capsule by mouth daily       clopidogrel (PLAVIX) 75 MG tablet TAKE 1 TABLET BY MOUTH DAILY 90 tablet 0     Cyanocobalamin (VITAMIN B-12 PO) Take 500 mcg by mouth daily        ferrous sulfate (FE TABS) 325 (65 Fe) MG EC tablet Take 1 tablet (325 mg) by mouth daily 90 tablet 3     gabapentin (NEURONTIN) 100 MG capsule TAKE ONE CAPSULE BY MOUTH DAILY, INCREASE BY 1 CAPSULE EVERY 2 DAYS UP TO 6 CAPSULES THREE TIMES DAILY (Patient taking differently: Take 400 mg by mouth 2 times daily ) 270 capsule 11     hydrochlorothiazide (HYDRODIURIL) 25 MG tablet TAKE 1 TABLET BY MOUTH DAILY 90 tablet 1     HYDROmorphone (DILAUDID) 2 MG tablet Take 1 tablet (2 mg) by mouth every 6 hours as needed for breakthrough pain or pain 22 tablet 0     Lancets Misc. (SELECT-LITE DEVICE/LANCETS) KIT 1 kit 2 times daily 1 kit 1     Kout FINEPOINT LANCETS MISC Use to test blood sugars 1 times daily or as directed. 100 each 12     lisinopril (PRINIVIL/ZESTRIL) 40 MG tablet TAKE 1 TABLET(40 MG) BY MOUTH DAILY 90 tablet 1     Melatonin 10 MG TBDP Take 10 mg by mouth At Bedtime 90 tablet      metFORMIN (GLUCOPHAGE-XR) 500 MG 24 hr tablet TAKE 2 TABLETS BY MOUTH TWICE DAILY WITH MEALS. 360 tablet 1     nitroGLYcerin (NITROSTAT) 0.4 MG sublingual tablet Place 1 tablet (0.4 mg) under the tongue every 5 minutes as needed for chest pain 25 tablet 1     omeprazole (PRILOSEC) 20 MG CR capsule TAKE ONE CAPSULE BY MOUTH TWICE DAILY 180 capsule 3     order for DME Equipment being ordered: walker wheeled also include  With skis for indoor carpet use 1 Device 0     order for DME Equipment being ordered: compression stockings 1 Units 0     order for DME Equipment being ordered: TENS 1 Units 0     order for DME Equipment being ordered: Wheelchair motorized for patient with spinal stenosis and neurogenic claudication 1 Device 0     [START ON 3/5/2019] oxyCODONE (OXYCONTIN) 30 MG 12 hr tablet Take 1 tablet (30 mg) by mouth every morning 30 tablet 0     [START  "ON 3/5/2019] oxyCODONE (ROXICODONE) 5 MG tablet Take 1 daily as needed  for severe pain /at bedtime may have up to 2 per day max 40 per month 40 tablet 0     oxyCODONE (ROXICODONE) 5 MG tablet Take 1 daily as needed  for severe pain /at bedtime may have up to 2 per day max 40 per month 10 tablet 0     polyethylene glycol (MIRALAX/GLYCOLAX) powder Take 17 g (1 capful) by mouth daily 119 g 3     sertraline (ZOLOFT) 100 MG tablet TAKE 2 TABLETS(200 MG) BY MOUTH DAILY 180 tablet 0     traZODone (DESYREL) 100 MG tablet TAKE 3 TABLETS(300 MG) BY MOUTH EVERY NIGHT AT BEDTIME AS NEEDED FOR SLEEP 270 tablet 1      SOCIAL HISTORY: He  reports that  has never smoked. he has never used smokeless tobacco. He reports that he does not drink alcohol or use drugs.    PHYSICAL EXAM  Vitals:    01/18/19 1036   BP: 146/73   Pulse: 57   Weight: 88 kg (194 lb)   Height: 1.727 m (5' 8\")     Constitutional: Alert, no acute distress  Psychiatric: Mood is appropriate.  Abdomen:  Soft non tender non distended.    CC  Patient Care Team:  Keyla Fonseca MD as PCP - General (Family Practice)  Keyla Fonseca MD as PCP - Assigned PCP  Keyla Fonseca MD as Referring Physician (Family Practice)  Lucius Capone MD as MD (Urology)  Julia Cross, RN as Registered Nurse (Urology)  ARTHUR ASIF    Copy to patient  ISELA AGUIRRE  [address] [place] [phone]      "

## 2019-01-29 NOTE — OP NOTE
PROCEDURE:     Percutaneous Biopsy of Renal Mass.      Percutaneous cryoablation of left  renal mass (by Interventional Radiology)  PREOPERATIVE DIAGNOSIS: left  renal mass  POSTOPERATIVE DIAGNOSIS: Same   DRAINS AND TUBES: None  COMPLICATIONS: None.   SURGEON: Lucius Capone MD   COSURGEON: Wiley Jules MD  Interventional Radiology for ablation.  ASSISTANT SURGEON: None  INDICATIONS FOR PROCEDURE:  Vincent Mishra is a 78 year old male here for percutaneous cryoablation of a renal mass.  I have previously counseled him on options for treatment of renal masses (observation, ablation, partial nephrectomy, or nephrectomy) and the different approaches (percutaneous, laparoscopic, robot, open) as well as risks and benefits of today's procedure.  He gave informed consent for the procedure today.  I am doing the procedure with interventional radiology today.  Separate to the cryoablation procedure he is to have a biopsy of the mass.  I served as the attending for the biopsy and Dr Jules is the attending for the ablation.    DETAILS OF PROCEDURE: The patient was properly identified in preoperative area. He was given preoperative antibiotics and brought to the operating room.  General anesthesia was induced and he was intubated in the standard fashion. He was placed in prone position on the CT scanner table. Sterile prep and drape was done in standard fashion. A timeout was taken.  CT scan was taken to identify the renal mass and plan needle placement.    Please see the interventional radiology note for the details of the percutaneous cryoablation.  After ablation needle placement was completed, an 18 gauge colette-cut biopsy device was placed under CT guidance with the renal mass.  Two cores were then taken and placed in formalin.  These were sent for pathology.  The cryoablation was started immediately following this.    FOLLOW-UP PLAN: We will plan discharge today.  Clinic follow-up will be in 6 weeks with a   scan to confirm successful ablation.

## 2019-02-12 NOTE — PATIENT INSTRUCTIONS
Emollients - thick lotions    cetaphil - tub - approximately $11  vanicream  eucerin  lubriderm  vaseline

## 2019-02-12 NOTE — PROGRESS NOTES
"  SUBJECTIVE:   Vincent Mishra is a 78 year old male who presents to clinic today for the following health issues:    Chief Complaint   Patient presents with     Neck Pain     Here with his wife. She is worried about his pain, skin picking, deconditioning, and whether he has another broken rib on the right side     He is here today for ongoing neck pain. He says it is \"always a 10\" for pain.   Pain improves with movement   Worst in the morning   Occasionally uses heating pad   Once he takes his morning pain pill, the neck pain is much better     Takes:   oxycontin 30mg in a.m.  Oxy short acting 5mg in afternoon  No oxy at night   Taking gabapentin at night - wife wondering if they should continue that     He has depression and anxiety  It is worse right now  Their son   ( years ago) from etoh abuse     Two broken ribs on the left after a fall in December    Had a fall a few nights ago, now has pain in the right ribcage    Has been picking his arms and legs, bleeding a lot   Wife stopped the baby aspirin - still on plavix    Wife is concerned that he won't get out of bed   Doesn't go to [employer] stores   Won't get out of bed for meals  Has a walker, doesn't like to use it.      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  Additional history: as " documented    Patient Active Problem List   Diagnosis     Tension headache     Trapezius strain     Hyperlipidemia LDL goal <100     Parotid mass     Diplopia     Neuropathy     Vision loss night     Neck pain     B12 deficiency     Tear of meniscus of left knee     Hypertension goal BP (blood pressure) < 140/90     C6-7 disc with radiculopathy     Right bundle branch block     Chest pain     Anatomic airway obstruction     Abnormal antinuclear antibody titer     Osteoarthritis, knee     Moderate major depression (H)     Orchitis, epididymitis, and epididymo-orchitis     Malignant neoplasm of kidney excluding renal pelvis (H)     Renal mass, left     Vitamin D deficiency disease     Health Care Home     Insomnia     Abdominal pain, right upper quadrant     Esophageal reflux     Hard of hearing     Constipation     NSTEMI (non-ST elevated myocardial infarction) (H)     Myocardial infarction, nontransmural (H)     Acute myocardial infarction of inferolateral wall (H)     Obesity     Sinoatrial node dysfunction (H)     Right bundle branch block (RBBB)     Essential hypertension     Hyperlipidemia     Type 2 diabetes mellitus with other circulatory complication, without long-term current use of insulin (H)     Thyroid nodule     Hip pain, bilateral     Claudication (H)     Bilateral low back pain with right-sided sciatica     Bilateral low back pain with left-sided sciatica     ACS (acute coronary syndrome) (H)     Chronic bilateral low back pain with sciatica, sciatica laterality unspecified     Severe episode of recurrent major depressive disorder, without psychotic features (H)     Renal hematoma     Current Outpatient Medications   Medication     amLODIPine (NORVASC) 5 MG tablet     ascorbic acid (VITAMIN C) 500 MG tablet     aspirin (ASA) 81 MG tablet     atorvastatin (LIPITOR) 80 MG tablet     blood glucose (ONETOUCH ULTRA) test strip     blood glucose monitoring (NO BRAND SPECIFIED) meter device kit     Calcium  "Carbonate-Vitamin D (CALCIUM + D) 600-200 MG-UNIT per tablet     cholecalciferol (VITAMIN  -D) 1000 UNITS capsule     clopidogrel (PLAVIX) 75 MG tablet     Cyanocobalamin (VITAMIN B-12 PO)     ferrous sulfate (FE TABS) 325 (65 Fe) MG EC tablet     gabapentin (NEURONTIN) 100 MG capsule     hydrochlorothiazide (HYDRODIURIL) 25 MG tablet     Lancets Misc. (SELECT-LITE DEVICE/LANCETS) KIT     [employer] FINEPOINT LANCETS MISC     lisinopril (PRINIVIL/ZESTRIL) 40 MG tablet     Melatonin 10 MG TBDP     metFORMIN (GLUCOPHAGE-XR) 500 MG 24 hr tablet     omeprazole (PRILOSEC) 20 MG CR capsule     order for DME     [START ON 3/5/2019] oxyCODONE (OXYCONTIN) 30 MG 12 hr tablet     [START ON 3/5/2019] oxyCODONE (ROXICODONE) 5 MG tablet     oxyCODONE (ROXICODONE) 5 MG tablet     polyethylene glycol (MIRALAX/GLYCOLAX) powder     sertraline (ZOLOFT) 100 MG tablet     traZODone (DESYREL) 100 MG tablet     HYDROmorphone (DILAUDID) 2 MG tablet     nitroGLYcerin (NITROSTAT) 0.4 MG sublingual tablet     order for DME     order for DME     order for DME     No current facility-administered medications for this visit.         Allergies   Allergen Reactions     Prozac [Fluoxetine] Other (See Comments)     \"I went crazy.\"       Benadryl [Diphenhydramine Hcl] Other (See Comments)     Starts to shake, and paranoid     Codeine Itching     Diphenhydramine Other (See Comments)     Hallucinations, See [facility] records scanned on 7/16/15     Labetalol Other (See Comments)     See [facility] records scanned on 7/16/15  Bradycardia down to 30 on holter, dizziness. Stopped med and symptoms resolved     Metoprolol Other (See Comments)     Bradycardia even at 12.5 mg     Morphine Visual Disturbance       /79 (BP Location: Right arm, Patient Position: Sitting, Cuff Size: Adult Large)   Pulse 65   Temp 98.2  F (36.8  C) (Tympanic)   Resp 14   Ht 1.727 m (5' 8\")   Wt 88.1 kg (194 lb 3.2 oz)   BMI 29.53 " kg/m    GENERAL - Pt is alert and oriented in no acute distress.  Affect is appropriate. Good eye contact. discheveled appearance but this is normal for him. In wheelchair   HEET - Head is normocephalic, atraumatic.    PERRLA,EEMI. Conjunctiva are free of icterus or erythema.    Oropharynx free of masses and lesions, no tonsillar exudate or petechiae.    NECK - limited range of motion in all directions. Mildly tender to palpation along left SCM   RESPIRATORY - Clear to auscultation bilaterally.  No wheezing noted  CV - RRR, no murmurs, rubs, gallops.   Right rib cage - non tender to palpation. No bruising or swelling   Skin - arms are covered in band aids and skin wounds. He is actively picking and scratching during our visiti.     Pre-cert/Authorization approved.  (M50.10) C6-7 disc with radiculopathy  (primary encounter diagnosis)  Comment: neck pain improves with daily oxy. I do not want to increase pain meds further. I did refill neurontin and recommend that he stays on it. Try warm pack and range of motion exercises to neck when it is hurting     No evidence of new rib fracture on exam        (F32.1) Moderate major depression (H)  Comment: wife feels this is due to upcoming anniversary of son's death. She feels his antidepressants are working well for him in general. No change today   Plan:     (L85.3) Xerosis cutis  Comment: apply daily lotion. The skin is very dry appearing so that might help somewhat with the scratching  Plan:     (L98.1) Picking own skin  Comment: try wearing thin gloves plus long sleeves and long pants. Try distraction, give him something to do with his hands.   Plan:     (R53.81) Physical deconditioning  Comment: he will get weaker if he never gets out of bed. Try to get up daily and eat at the table. Use a walker to get around.  Appetite is likely low because he is nearly inactive. Wife is reassured that he isn't losing weight.   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 The dog ate his hearing aids. He doesn't want to get another pair. The patient indicates understanding of these issues and agrees with the plan.   Plan:       ARYA Velez MD

## 2019-02-26 PROBLEM — F42.4 SKIN PICKING HABIT: Status: ACTIVE | Noted: 2019-01-01

## 2019-02-26 PROBLEM — D50.9 IRON DEFICIENCY ANEMIA, UNSPECIFIED IRON DEFICIENCY ANEMIA TYPE: Status: ACTIVE | Noted: 2019-01-01

## 2019-02-26 NOTE — PROGRESS NOTES
SUBJECTIVE:   Vincent Mishra is a 78 year old male who presents to clinic today for the following health issues:      Ongoing neck pain, having difficulty turning his head to the left    Not new but worsening  Sore when looking to the left     Fell on Saturday night getting out of the car - fell into the snowbank.   No acute pain.     Worried about ankles - they are dry and tingly  Has sensation in his feet     three hallucinations last night   Used to happen all the time but hasn't happened in a long time.   Last night he had three of them - imagined that their son was in the bedroom  His  was 3 years ago today     Some burning in the bilateral ankles  Worried about infection due to diabetes    Skin picking- constantly   Won't let his wife put lotion on his skin     He asks for more pain medicine from me.     Diagnosed with anemia in December. Has been on iron supplement since then  Wondering if numbers are better     Review of systems:  Ongoing depression. Not worsened but not better  No f/c   No n/v     Problem list and histories reviewed & adjusted, as indicated.  Additional history: as documented      Patient Active Problem List   Diagnosis     Tension headache     Trapezius strain     Hyperlipidemia LDL goal <100     Parotid mass     Diplopia     Neuropathy     Vision loss night     Neck pain     B12 deficiency     Tear of meniscus of left knee     Hypertension goal BP (blood pressure) < 140/90     C6-7 disc with radiculopathy     Right bundle branch block     Chest pain     Anatomic airway obstruction     Abnormal antinuclear antibody titer     Osteoarthritis, knee     Moderate major depression (H)     Orchitis, epididymitis, and epididymo-orchitis     Malignant neoplasm of kidney excluding renal pelvis (H)     Renal mass, left     Vitamin D deficiency disease     Health Care Home     Insomnia     Abdominal pain, right upper quadrant     Esophageal reflux     Hard of hearing     Constipation      NSTEMI (non-ST elevated myocardial infarction) (H)     Myocardial infarction, nontransmural (H)     Acute myocardial infarction of inferolateral wall (H)     Obesity     Sinoatrial node dysfunction (H)     Right bundle branch block (RBBB)     Essential hypertension     Hyperlipidemia     Type 2 diabetes mellitus with other circulatory complication, without long-term current use of insulin (H)     Thyroid nodule     Hip pain, bilateral     Claudication (H)     Bilateral low back pain with right-sided sciatica     Bilateral low back pain with left-sided sciatica     ACS (acute coronary syndrome) (H)     Chronic bilateral low back pain with sciatica, sciatica laterality unspecified     Severe episode of recurrent major depressive disorder, without psychotic features (H)     Renal hematoma     Current Outpatient Medications   Medication     amLODIPine (NORVASC) 5 MG tablet     ascorbic acid (VITAMIN C) 500 MG tablet     aspirin (ASA) 81 MG tablet     atorvastatin (LIPITOR) 80 MG tablet     Calcium Carbonate-Vitamin D (CALCIUM + D) 600-200 MG-UNIT per tablet     cholecalciferol (VITAMIN  -D) 1000 UNITS capsule     Cyanocobalamin (VITAMIN B-12 PO)     ferrous sulfate (FE TABS) 325 (65 Fe) MG EC tablet     gabapentin (NEURONTIN) 100 MG capsule     hydrochlorothiazide (HYDRODIURIL) 25 MG tablet     lisinopril (PRINIVIL/ZESTRIL) 40 MG tablet     Melatonin 10 MG TBDP     metFORMIN (GLUCOPHAGE-XR) 500 MG 24 hr tablet     omeprazole (PRILOSEC) 20 MG CR capsule     [START ON 3/5/2019] oxyCODONE (OXYCONTIN) 30 MG 12 hr tablet     oxyCODONE (ROXICODONE) 5 MG tablet     polyethylene glycol (MIRALAX/GLYCOLAX) powder     sertraline (ZOLOFT) 100 MG tablet     traZODone (DESYREL) 100 MG tablet     blood glucose (ONETOUCH ULTRA) test strip     blood glucose monitoring (NO BRAND SPECIFIED) meter device kit     clopidogrel (PLAVIX) 75 MG tablet     HYDROmorphone (DILAUDID) 2 MG tablet     Lancets Misc. (SELECT-LITE DEVICE/LANCETS) KIT      "[employer] FINEPOINT LANCETS MISC     nitroGLYcerin (NITROSTAT) 0.4 MG sublingual tablet     order for DME     order for DME     order for DME     order for DME     [START ON 3/5/2019] oxyCODONE (ROXICODONE) 5 MG tablet     No current facility-administered medications for this visit.         Allergies   Allergen Reactions     Prozac [Fluoxetine] Other (See Comments)     \"I went crazy.\"       Benadryl [Diphenhydramine Hcl] Other (See Comments)     Starts to shake, and paranoid     Codeine Itching     Diphenhydramine Other (See Comments)     Hallucinations, See [facility] records scanned on 7/16/15     Labetalol Other (See Comments)     See [facility] records scanned on 7/16/15  Bradycardia down to 30 on holter, dizziness. Stopped med and symptoms resolved     Metoprolol Other (See Comments)     Bradycardia even at 12.5 mg     Morphine Visual Disturbance     /82 (BP Location: Right arm, Patient Position: Sitting, Cuff Size: Adult Regular)   Pulse 62   Temp 96.9  F (36.1  C) (Tympanic)   Resp 14   Ht 1.727 m (5' 8\")   Wt 90.5 kg (199 lb 8 oz)   SpO2 97%   BMI 30.33 kg/m    GENERAL - Pt is alert and oriented in no acute distress.  Affect is appropriate. Good eye contact.  SKIN - no obvious rashes or lesions  Moves fairly easily around the room and onto the exam table. No tenderness, swelling, bruising along on the back or abdomen.  Skin - open bleeding sores on his bilateral forearms  Skin of the bilateral ankles is tight, dry, and erythematous. There are open sores here as well. Trace edema  Neck - pain with looking to the left. Otherwise full range of motion. Tight SCM on the left     Assessment/Plan -    (F33.2) Severe episode of recurrent major depressive disorder, without psychotic features (H)  (primary encounter diagnosis)  Comment: continue on zoloft. Wife will monitor for hallucinations.   Plan: sertraline (ZOLOFT) 100 MG tablet            (M54.40,  G89.29) " Chronic bilateral low back pain with sciatica, sciatica laterality unspecified  Comment: stable.  No additional pain meds will be given. Wife already has concerns about his balance and strength.   Plan:     (M50.10) C6-7 disc with radiculopathy  Comment: recommended PT to work on range of motion of neck muscles   Plan: [facility]FERR[facility]            (D50.9) Iron deficiency anemia, unspecified iron deficiency anemia type  Comment: recheck labs today.   Plan: Hemoglobin, Ferritin            (E78.5) Hyperlipidemia LDL goal <100  Comment:   Plan:     (F42.4) Skin picking habit  Comment: lotion to legs and arms. Long pants/shirts. Distraction. No picking.   Plan:     ARYA Velez MD

## 2019-02-28 NOTE — LETTER
[facility]  [address]  [facility] [address]  Phone: [phone]    February 28, 2019      Vincent Mishra  [address] [place] [address]      To whom it may concern,    Vincent Mishra is my patient.  He has significant anxiety and depression which are much easier to manage when he has his dog with him. The dog calms down his anxious thoughts and helps minimize his skin picking habit.     Please contact me if you need more information.      Sincerely,         ARYA Velez MD

## 2019-02-28 NOTE — TELEPHONE ENCOUNTER
Shoshana Viramontes LPN Berg, Christine Gove, MD             Called and notified of message from provider below. They are wondering if provider could write a letter for patient to have a therapy dog. They will be leaving on the road. This would allow patient to have his dog in the hotel. They said they could pick the letter up on Monday. Provider please advise.   Shoshana Viramontes LPN          He has severe anxiety and depression. I will write this letter       C. Kelsey Velez MD

## 2019-03-01 NOTE — TELEPHONE ENCOUNTER
Pat is very reliable. If she feels that the foot looks the same as it did, they can elevate, ice and monitor it. If symptoms worsen or change, it should be evaluated.     ARYA Velez MD

## 2019-03-01 NOTE — TELEPHONE ENCOUNTER
Reason for call:  Patient reporting a symptom    Symptom or request: Pat is calling reporting that Vincent woke this morning and his left foot is all swollen on top and painful.  He was seen on Tuesday 2/26/19 by Dr. Velez and was told that his foot was not infected.  They are concerned with the swelling and pain.  Please call and assess. Thank you..Ebony Dickson    Duration (how long have symptoms been present:  Seems to be ongoing    Have you been treated for this before? Yes he has been seen    Phone Number patient can be reached at:  Home number on file [phone] (home)    Best Time:  Any time    Can we leave a detailed message on this number:  YES    Call taken on 3/1/2019 at 8:22 AM by Ebony Dickson

## 2019-03-01 NOTE — TELEPHONE ENCOUNTER
Call placed to Pat.  Reports patient came to her at 0800 asking her to make a Dr Appointment for him as his left  foot is painful when walking today.  Patient was seen on Tuesday in clinic with Dr Velez and was told there was no infection.  Left foot is edematous, with no change per Pat.  Pain is new complaint as it was not painful Tuesday.  Left foot and leg deep red color without open areas. Denies extension of redness.  Please advise.  Jessica Ball RN

## 2019-03-01 NOTE — TELEPHONE ENCOUNTER
Call placed to Pat to relay Dr Velez's recommendations.  Unable to leave message as mailbox full.  Jessica Ball RN

## 2019-03-04 NOTE — TELEPHONE ENCOUNTER
Spoke with Pat. She said that his foot looks the same. Advised ice , elevate, monitor. Pat requested an appointment for tomorrow for assessment as they are going out of town for 3 weeks and would like the doctor to look at it before they go. Appointment made.  Yvon Dickens RN

## 2019-03-25 NOTE — TELEPHONE ENCOUNTER
Routing refill request to provider for review/approval because:  Labs out of range:  12/12/18 cmp  Yvon Dickens RN

## 2019-03-25 NOTE — TELEPHONE ENCOUNTER
"HYDROCHLOROTHIAZIDE 25MG TABLETS      Last Written Prescription Date:  10/1/18  Last Fill Quantity: 90,   # refills: 1  Last Office Visit: 2/26/19  Future Office visit:       lisinopril (PRINIVIL/ZESTRIL) 40 MG tablet      Last Written Prescription Date:  10/1/18  Last Fill Quantity: 90,   # refills: 1  Last Office Visit: 2/26/19  Future Office visit:       metFORMIN (GLUCOPHAGE-XR) 500 MG 24 hr tablet      Last Written Prescription Date:  10/1/18  Last Fill Quantity: 360,   # refills: 1  Last Office Visit: 2/26/19  Future Office visit:       Requested Prescriptions   Pending Prescriptions Disp Refills     hydrochlorothiazide (HYDRODIURIL) 25 MG tablet [Pharmacy Med Name: HYDROCHLOROTHIAZIDE 25MG TABLETS] 90 tablet 0     Sig: TAKE 1 TABLET BY MOUTH DAILY    Diuretics (Including Combos) Protocol Failed - 3/24/2019  3:09 PM       Failed - Blood pressure under 140/90 in past 12 months    BP Readings from Last 3 Encounters:   02/26/19 146/82   02/12/19 153/79   01/18/19 146/73                Failed - Normal serum creatinine on file in past 12 months    Recent Labs   Lab Test 12/12/18  0601   CR 1.40*             Passed - Recent (12 mo) or future (30 days) visit within the authorizing provider's specialty    Patient had office visit in the last 12 months or has a visit in the next 30 days with authorizing provider or within the authorizing provider's specialty.  See \"Patient Info\" tab in inbasket, or \"Choose Columns\" in Meds & Orders section of the refill encounter.             Passed - Medication is active on med list       Passed - Patient is age 18 or older       Passed - Normal serum potassium on file in past 12 months    Recent Labs   Lab Test 12/12/18  0601   POTASSIUM 4.4                   Passed - Normal serum sodium on file in past 12 months    Recent Labs   Lab Test 12/12/18  0601                 lisinopril (PRINIVIL/ZESTRIL) 40 MG tablet [Pharmacy Med Name: LISINOPRIL 40MG TABLETS] 90 tablet 0     Sig: " "TAKE 1 TABLET(40 MG) BY MOUTH DAILY    ACE Inhibitors (Including Combos) Protocol Failed - 3/24/2019  3:09 PM       Failed - Blood pressure under 140/90 in past 12 months    BP Readings from Last 3 Encounters:   02/26/19 146/82   02/12/19 153/79   01/18/19 146/73                Failed - Normal serum creatinine on file in past 12 months    Recent Labs   Lab Test 01/18/19  0837 12/12/18  0601   CR  --  1.40*   CREAT 1.3*  --             Passed - Recent (12 mo) or future (30 days) visit within the authorizing provider's specialty    Patient had office visit in the last 12 months or has a visit in the next 30 days with authorizing provider or within the authorizing provider's specialty.  See \"Patient Info\" tab in inbasket, or \"Choose Columns\" in Meds & Orders section of the refill encounter.             Passed - Medication is active on med list       Passed - Patient is age 18 or older       Passed - Normal serum potassium on file in past 12 months    Recent Labs   Lab Test 12/12/18  0601   POTASSIUM 4.4             metFORMIN (GLUCOPHAGE-XR) 500 MG 24 hr tablet [Pharmacy Med Name: METFORMIN ER 500MG 24HR TABS] 360 tablet 0     Sig: TAKE 2 TABLETS BY MOUTH TWICE DAILY WITH MEALS.    Biguanide Agents Failed - 3/24/2019  3:09 PM       Failed - Blood pressure less than 140/90 in past 6 months    BP Readings from Last 3 Encounters:   02/26/19 146/82   02/12/19 153/79   01/18/19 146/73                Failed - Patient has had a Microalbumin in the past 15 mos.    Recent Labs   Lab Test 09/20/17  1309   MICROL 5   UMALCR 5.36            Passed - Patient has documented LDL within the past 12 mos.    Recent Labs   Lab Test 11/20/18  1418   LDL 43            Passed - Patient is age 10 or older       Passed - Patient has documented A1c within the specified period of time.    If HgbA1C is 8 or greater, it needs to be on file within the past 3 months.  If less than 8, must be on file within the past 6 months.     Recent Labs   Lab " "Test 11/20/18  1418   A1C 6.4*            Passed - Patient's CR is NOT>1.4 OR Patient's EGFR is NOT<45 within past 12 mos.    Recent Labs   Lab Test 01/18/19  0837   GFRESTIMATED 54*   GFRESTBLACK 65       Recent Labs   Lab Test 12/12/18  0601   CR 1.40*            Passed - Patient does NOT have a diagnosis of CHF.       Passed - Medication is active on med list       Passed - Recent (6 mo) or future (30 days) visit within the authorizing provider's specialty    Patient had office visit in the last 6 months or has a visit in the next 30 days with authorizing provider or within the authorizing provider's specialty.  See \"Patient Info\" tab in inbasket, or \"Choose Columns\" in Meds & Orders section of the refill encounter.              "

## 2019-03-27 PROBLEM — I24.9 ACS (ACUTE CORONARY SYNDROME) (H): Status: ACTIVE | Noted: 2017-05-30

## 2019-03-27 NOTE — ED NOTES
Pt JACQUELYN NEVAREZ updated patient and family on transfer.  EMTALA obtained.  Denies any new or worsening symptoms at this time.  All questions and concerns answered.

## 2019-03-27 NOTE — ED NOTES
No new complaints or concerns at this time.  Pt placed on monitors after imaging, lab test drawn as ordered.  Pt wife remains at bedside.  Will continue to monitor.

## 2019-03-27 NOTE — ED PROVIDER NOTES
"  History     Chief Complaint   Patient presents with     Chest Pain     cp and uncontrolled movements,      HPI  Vincent Mishra is a 79 year old male who presents the emergency department complaining of intermittent chest pain nonpurposeful movements and left ankle pain.  Patient states he has had intermittent chest pain for several months.  Worse when he eats in the morning.  Pain is not present currently.  He rates his pain a 4 out of 10 at its worse.  It is an aching pain.  He moves around and coughs hence things seem to be better.  He has a significant cardiac history including stent placement x2.  Patient also has had nondescript spastic movements of his head and neck and shoulders over the past 2 weeks per his wife.  These were mildly present previously but have worsened over the past 2 weeks.  Patient is also complaining of left ankle pain when he got his ankle injured while getting out of the vehicle 2 weeks ago.  He has had difficulty walking on it since.  Wife says he uses crutches or a walker but can get around still patient is not had any fevers.  He denies visual changes he denies any shortness of breath.  He is not had any abdominal pain.  He denies nausea vomiting diarrhea.  He denies any focal numbness in any extremity.  He does have chronic back pain with some weakness issues in his legs.  These are unchanged except for the ankle pain.  He is not had a rash.    Allergies:  Allergies   Allergen Reactions     Prozac [Fluoxetine] Other (See Comments)     \"I went crazy.\"       Benadryl [Diphenhydramine Hcl] Other (See Comments)     Starts to shake, and paranoid     Codeine Itching     Diphenhydramine Other (See Comments)     Hallucinations, See [facility] records scanned on 7/16/15     Labetalol Other (See Comments)     See [facility] records scanned on 7/16/15  Bradycardia down to 30 on holter, dizziness. Stopped med and symptoms resolved     Metoprolol Other " (See Comments)     Bradycardia even at 12.5 mg     Morphine Visual Disturbance       Problem List:    Patient Active Problem List    Diagnosis Date Noted     Chest pain 01/12/2012     Priority: High     Skin picking habit 02/26/2019     Priority: Medium     Iron deficiency anemia, unspecified iron deficiency anemia type 02/26/2019     Priority: Medium     Renal hematoma 12/11/2018     Priority: Medium     Severe episode of recurrent major depressive disorder, without psychotic features (H) 02/20/2018     Priority: Medium     Chronic bilateral low back pain with sciatica, sciatica laterality unspecified 09/20/2017     Priority: Medium     ACS (acute coronary syndrome) (H) 05/30/2017     Priority: Medium     Bilateral low back pain with right-sided sciatica 04/03/2017     Priority: Medium     Bilateral low back pain with left-sided sciatica 04/03/2017     Priority: Medium     Claudication (H) 05/09/2016     Priority: Medium     Hip pain, bilateral 04/05/2016     Priority: Medium     Thyroid nodule 10/30/2015     Priority: Medium     Type 2 diabetes mellitus with other circulatory complication, without long-term current use of insulin (H) 10/22/2015     Priority: Medium     Myocardial infarction, nontransmural (H) 07/16/2015     Priority: Medium     Sinoatrial node dysfunction (H) 07/16/2015     Priority: Medium     Right bundle branch block (RBBB) 07/16/2015     Priority: Medium     Essential hypertension 07/16/2015     Priority: Medium     Hyperlipidemia 07/16/2015     Priority: Medium     NSTEMI (non-ST elevated myocardial infarction) (H)      Priority: Medium     07-25-14 CATH- RCA, L.Main and CFX  had minor luminal irregularites. Mid LAD high grade stenosis 80-90%.Stent placed to mid LAD       Hard of hearing 06/05/2014     Priority: Medium     Constipation 06/05/2014     Priority: Medium     Abdominal pain, right upper quadrant 03/26/2014     Priority: Medium     Esophageal reflux 03/26/2014     Priority: Medium      Insomnia 10/21/2013     Priority: Medium     Health Care Home 10/08/2013     Priority: Medium     *See Letters for [facility] Care Plan: My Access Plan           Vitamin D deficiency disease 09/25/2013     Priority: Medium     Renal mass, left 12/05/2012     Priority: Medium     Malignant neoplasm of kidney excluding renal pelvis (H) 11/16/2012     Priority: Medium     SURGERY, PATHOLOGY, AND TREATMENT HISTORY FOR KIDNEY CANCER  11/12/12:  Left lower pole kidney mass biopsy: Renal cell carcinoma, clear cell type; Viktor grade 1  12/5/12 Left percutaneous cryoablation of two renal tumors.  These were located adjacent to one another in the lateral kidney.  Dr Asif Capone, [facility].  Problem list name updated by automated process. Provider to review       Moderate major depression (H) 09/17/2012     Priority: Medium     Orchitis, epididymitis, and epididymo-orchitis 09/17/2012     Priority: Medium     Osteoarthritis, knee 09/05/2012     Priority: Medium     Abnormal antinuclear antibody titer 03/12/2012     Priority: Medium     Anatomic airway obstruction 02/09/2012     Priority: Medium     fixed obstruction on PFTs with dyspnea       Right bundle branch block 07/26/2011     Priority: Medium     Seen on EKG 2/2011.  Different configuration on 7/26/2011 s/p MI - but same at [place] post-MI as here        Acute myocardial infarction of inferolateral wall (H) 07/14/2011     Priority: Medium     Obesity 07/14/2011     Priority: Medium     Hypertension goal BP (blood pressure) < 140/90 02/28/2011     Priority: Medium     C6-7 disc with radiculopathy 02/28/2011     Priority: Medium     consider second Epidural steroid injection at [facility].  Continue PT.  Await accupuncture       Tear of meniscus of left knee 02/11/2011     Priority: Medium     Neck pain 11/12/2010     Priority: Medium     B12 deficiency 11/12/2010     Priority: Medium     Diplopia 11/02/2010     Priority: Medium     Neuropathy  11/02/2010     Priority: Medium     Vision loss night 11/02/2010     Priority: Medium     Parotid mass 10/04/2010     Priority: Medium     Tension headache 09/23/2010     Priority: Medium     Trapezius strain 09/23/2010     Priority: Medium        Past Medical History:    Past Medical History:   Diagnosis Date     CAD (coronary artery disease) 7/26/2011     Cancer of kidney (H)      Chest pain 1/12/2012     Coronary artery disease      History of angina      History of blood transfusion      Hyperlipidaemia      Hyperlipidemia LDL goal <100 9/23/2010     Malignant neoplasm (H)      Myocardial infarction (H)      NSTEMI (non-ST elevated myocardial infarction) (H)      Other and unspecified hyperlipidemia      Right bundle branch block      Type II or unspecified type diabetes mellitus without mention of complication, not stated as uncontrolled      Unspecified essential hypertension        Past Surgical History:    Past Surgical History:   Procedure Laterality Date     ARTHROSCOPY KNEE  2/11/2011    ARTHROSCOPY KNEE performed by LEY, JEFFREY DUANE at [facility]     ARTHROSCOPY KNEE WITH MEDIAL MENISCECTOMY  6/26/2012    Procedure: ARTHROSCOPY KNEE WITH MEDIAL MENISCECTOMY;  Right Knee Arthroscopy With Medial Menisectomy;  Surgeon: Ley, Jeffrey Duane, MD;  Location: [facility]     BACK SURGERY      back surgery x4     BIOPSY       CARDIAC SURGERY  7/2011    stentt placed     COLONOSCOPY       CORONARY ANGIOGRAPHY ADULT ORDER  07-25-14    RCA, L.Main and CFX  had minor luminal irregularites. Mid LAD high grade stenosis 80-90%.Stent placed to mid LAD     ESOPHAGOSCOPY, GASTROSCOPY, DUODENOSCOPY (EGD), COMBINED  10/25/2012    Procedure: COMBINED ESOPHAGOSCOPY, GASTROSCOPY, DUODENOSCOPY (EGD), BIOPSY SINGLE OR MULTIPLE;  Gastroscopy  ;  Surgeon: Lucius Dasilva MD;  Location: [facility]     HEART CATH, ANGIOPLASTY  07-25-14    mid LAD      ORTHOPEDIC SURGERY       back surgery       Family History:    Family History   Problem Relation  Age of Onset     Cancer Mother      Depression Mother      Diabetes Father      Hypertension Father      Heart Disease Father      Mental Illness Father      Heart Disease Maternal Grandfather      Substance Abuse Maternal Grandfather      Alcohol/Drug Paternal Grandmother      Substance Abuse Paternal Grandmother      Heart Disease Paternal Grandfather      Alcohol/Drug Paternal Grandfather      Substance Abuse Paternal Grandfather      Heart Disease Brother      Diabetes Brother      Substance Abuse Brother      Obesity Brother      Diabetes Brother      Obesity Sister      Alcohol/Drug Daughter      Depression Daughter      Obesity Sister      Diabetes Sister      Obesity Sister      Diabetes Sister      Alcohol/Drug Sister      Cancer Sister 55        possible kidney cancer     Substance Abuse Sister      Alcohol/Drug Son      Substance Abuse Son      Diabetes Son      Alcohol/Drug Son      Substance Abuse Son      Obesity Daughter      Diabetes Daughter      Hypertension Daughter      Bipolar Disorder Other        Social History:  Marital Status:   [2]  Social History     Tobacco Use     Smoking status: Never Smoker     Smokeless tobacco: Never Used   Substance Use Topics     Alcohol use: No     Alcohol/week: 0.0 oz     Drug use: No        Medications:      amLODIPine (NORVASC) 5 MG tablet   ascorbic acid (VITAMIN C) 500 MG tablet   aspirin (ASA) 81 MG tablet   atorvastatin (LIPITOR) 80 MG tablet   blood glucose (ONETOUCH ULTRA) test strip   blood glucose monitoring (NO BRAND SPECIFIED) meter device kit   Calcium Carbonate-Vitamin D (CALCIUM + D) 600-200 MG-UNIT per tablet   cholecalciferol (VITAMIN  -D) 1000 UNITS capsule   clopidogrel (PLAVIX) 75 MG tablet   Cyanocobalamin (VITAMIN B-12 PO)   ferrous sulfate (FE TABS) 325 (65 Fe) MG EC tablet   gabapentin (NEURONTIN) 100 MG capsule   hydrochlorothiazide (HYDRODIURIL) 25 MG tablet   HYDROmorphone (DILAUDID) 2 MG tablet   Lancets Misc. (SELECT-LITE  "DEVICE/LANCETS) KIT   Metabolon FINEPOINT LANCETS MISC   lisinopril (PRINIVIL/ZESTRIL) 40 MG tablet   Melatonin 10 MG TBDP   metFORMIN (GLUCOPHAGE-XR) 500 MG 24 hr tablet   nitroGLYcerin (NITROSTAT) 0.4 MG sublingual tablet   omeprazole (PRILOSEC) 20 MG CR capsule   order for DME   order for DME   order for DME   order for DME   oxyCODONE (OXYCONTIN) 30 MG 12 hr tablet   oxyCODONE (ROXICODONE) 5 MG tablet   oxyCODONE (ROXICODONE) 5 MG tablet   polyethylene glycol (MIRALAX/GLYCOLAX) powder   sertraline (ZOLOFT) 100 MG tablet   traZODone (DESYREL) 100 MG tablet         Review of Systems  All systems are reviewed and other than pertinent positives and negatives in HPI all other systems are negative.  Physical Exam   BP: (!) 149/100  Pulse: 61  Temp: 98.1  F (36.7  C)  Height: 172.7 cm (5' 8\")  Weight: 86.2 kg (190 lb)  SpO2: 96 %      Physical Exam   Constitutional: He is oriented to person, place, and time. He appears well-developed and well-nourished. No distress.   HENT:   Head: Normocephalic.   Mouth/Throat: Oropharynx is clear and moist.   Eyes: Conjunctivae and EOM are normal. Pupils are equal, round, and reactive to light.   Neck: Neck supple.   Cardiovascular: Normal rate, regular rhythm, normal heart sounds and intact distal pulses.   No murmur heard.  Pulmonary/Chest: Effort normal. No respiratory distress. He has no wheezes. He has no rales.   Abdominal: Soft. He exhibits no distension.   Musculoskeletal:   No midline back tenderness.  Moving all extremities symmetrically.  Mild bilateral weakness in lower extremities which patient states is chronic.  There is tenderness to palpation of the left ankle no significant swelling noticed.  Mild decreased plantar flexion dorsiflexion is of the left ankle compared to the right ankle.  Pulses sensation symmetrical.   Neurological: He is alert and oriented to person, place, and time. He exhibits normal muscle tone.   Occasional twitching of head and shoulders.  Very " short in duration.   Skin: Skin is warm and dry. No rash noted.   Psychiatric: He has a normal mood and affect.   Nursing note and vitals reviewed.      ED Course        Procedures               EKG Interpretation:      Interpreted by Aleksey Terry  Rhythm: sinus bradycardia  Rate: 50-60  Axis: Normal  Ectopy: none  Conduction: right bundle branch block (complete) and 1st degree AV block  ST Segments/ T Waves: Non-specific ST-T wave changes  Q Waves: none  Comparison to prior: twave inversion have resolved from 12/11/18    Clinical Impression: NSR with 1st degree av block and RBBB    Critical Care time:  none               Results for orders placed or performed during the hospital encounter of 03/27/19 (from the past 24 hour(s))   CBC with platelets differential   Result Value Ref Range    WBC 6.6 4.0 - 11.0 10e9/L    RBC Count 4.38 (L) 4.4 - 5.9 10e12/L    Hemoglobin 10.4 (L) 13.3 - 17.7 g/dL    Hematocrit 35.7 (L) 40.0 - 53.0 %    MCV 82 78 - 100 fl    MCH 23.7 (L) 26.5 - 33.0 pg    MCHC 29.1 (L) 31.5 - 36.5 g/dL    RDW 16.8 (H) 10.0 - 15.0 %    Platelet Count 341 150 - 450 10e9/L    Diff Method Automated Method     % Neutrophils 63.9 %    % Lymphocytes 19.1 %    % Monocytes 6.1 %    % Eosinophils 10.4 %    % Basophils 0.3 %    % Immature Granulocytes 0.2 %    Nucleated RBCs 0 0 /100    Absolute Neutrophil 4.2 1.6 - 8.3 10e9/L    Absolute Lymphocytes 1.3 0.8 - 5.3 10e9/L    Absolute Monocytes 0.4 0.0 - 1.3 10e9/L    Absolute Eosinophils 0.7 0.0 - 0.7 10e9/L    Absolute Basophils 0.0 0.0 - 0.2 10e9/L    Abs Immature Granulocytes 0.0 0 - 0.4 10e9/L    Absolute Nucleated RBC 0.0    Basic metabolic panel   Result Value Ref Range    Sodium 140 133 - 144 mmol/L    Potassium 4.5 3.4 - 5.3 mmol/L    Chloride 107 94 - 109 mmol/L    Carbon Dioxide 25 20 - 32 mmol/L    Anion Gap 8 3 - 14 mmol/L    Glucose 92 70 - 99 mg/dL    Urea Nitrogen 27 7 - 30 mg/dL    Creatinine 1.31 (H) 0.66 - 1.25 mg/dL    GFR Estimate 52 (L)  >60 mL/min/[1.73_m2]    GFR Estimate If Black 60 (L) >60 mL/min/[1.73_m2]    Calcium 8.6 8.5 - 10.1 mg/dL   Troponin I   Result Value Ref Range    Troponin I ES 0.246 (HH) 0.000 - 0.045 ug/L   Hepatic panel   Result Value Ref Range    Bilirubin Direct 0.2 0.0 - 0.2 mg/dL    Bilirubin Total 0.3 0.2 - 1.3 mg/dL    Albumin 3.7 3.4 - 5.0 g/dL    Protein Total 7.1 6.8 - 8.8 g/dL    Alkaline Phosphatase 108 40 - 150 U/L    ALT 28 0 - 70 U/L    AST 27 0 - 45 U/L   Lipase   Result Value Ref Range    Lipase 290 73 - 393 U/L   Nt probnp inpatient   Result Value Ref Range    N-Terminal Pro BNP Inpatient 251 0 - 1,800 pg/mL   XR Ankle Left G/E 3 Views    Narrative    LEFT ANKLE THREE OR MORE VIEWS 3/27/2019 2:33 PM     HISTORY: Pain.      Impression    IMPRESSION: Corticated ossicle at the tip of the lateral malleolus may  be related to old injury. No acute fracture is visible. The ankle  mortise appears congruent. Extensive arterial calcification is noted  at the ankle.    HUMBERTO HANSEN MD   Chest XR,  PA & LAT    Narrative    XR CHEST 2 VW 3/27/2019 2:34 PM    HISTORY: Chest pain, short of breath.    COMPARISON: 5/30/2017    FINDINGS: No airspace consolidation, pleural effusion or pneumothorax.  Normal heart size.      Impression    IMPRESSION: No acute cardiopulmonary abnormality.    YARA GUADALUPE MD   MR Brain w/o & w Contrast    Narrative    MRI BRAIN WITHOUT AND WITH CONTRAST  3/27/2019 3:19 PM    HISTORY:  spastic movement of head and arms     TECHNIQUE:  Multiplanar, multisequence MRI of the brain without and  with 10 mL Gadavist    COMPARISON: CT dated 9/17/2018, MR scan dated 11/19/2015.    FINDINGS:     Intracranial contents: There is diffuse parenchymal volume loss.   White matter changes are present in the cerebral hemispheres that are  consistent with small vessel ischemic disease in this age patient.  There is no evidence of hemorrhage, mass, acute infarct, or anomaly.   There are no gadolinium enhancing  lesions. The arteries at the base of  the brain and the dural venous sinuses appear patent.     Sella:  No significant abnormality accounting for technique.     Orbit: No significant abnormality accounting for technique.      Sinus/mastoids: No significant paranasal sinus mucosal disease. No  significant middle ear or mastoid effusion.    Bones/soft tissues: No aggressive osseous lesion involving the  calvarium, skull base, or visualized upper cervical spine.       Impression    IMPRESSION:    1. No acute pathology. No bleed, mass, or acute infarcts are  identified.  No significant change when compared to the scan from  2015.  2. There is diffuse parenchymal volume loss.  White matter changes are  present in the cerebral hemispheres that are consistent with small  vessel ischemic disease in this age patient.      SAURABH COVARRUBIAS MD   Troponin I   Result Value Ref Range    Troponin I ES 0.229 (HH) 0.000 - 0.045 ug/L       Medications - No data to display    Assessments & Plan (with Medical Decision Making) records were reviewed.  Labs EKG chest x-ray were obtained.  An MRI scan of the brain was obtained due to his symptoms and a left ankle x-ray was obtained.  EKG revealed a sinus bradycardia with first-degree AV block right bundle branch block and nonspecific ST T wave changes no significant change from 12/11/2018.  His white count was 6.6 hemoglobin 10.4.  Platelet count was 341.  Base metabolic panel sodium 140 creatinine was 1.31 which is near baseline for patient.  Troponin was elevated at 0.242.  Chest x-ray revealed no acute cardiopulmonary abnormality.  MRI scan of the brain revealed no acute pathology diffuse parenchymal volume loss with white matter changes are present in the cerebral hemispheres consistent with small vessel ischemic disease.  No obvious source of patient's twitching and body movements were present.  Left ankle film revealed no obvious fracture dislocation.  Patient is not having chest pain at  this time but has a significant cardiac history.  I discussed the case with Dr. jeffers at the [facility] cardiologist.  They are willing to accept the patient in transfer for further evaluation and care.  Patient remains pain-free findings plan were discussed numerous times with patient and they are in agreement the plan.  I did repeat a second troponin it was slightly decreased at 0.229.     I have reviewed the nursing notes.    I have reviewed the findings, diagnosis, plan and need for follow up with the patient.          Medication List      ASK your doctor about these medications    acetaminophen 325 MG tablet  Commonly known as:  TYLENOL  650 mg, Oral, EVERY 4 HOURS PRN  Ask about: Should I take this medication?     Lidocaine 4 % Patch  Commonly known as:  LIDOCARE  2 patches, Transdermal, EVERY 24 HOURS  Ask about: Should I take this medication?            Final diagnoses:   Chest pain, unspecified type   Abnormal involuntary movement   Pain in joint, ankle and foot, left       3/27/2019   [facility] EMERGENCY DEPARTMENT     Aleksey Terry MD  03/29/19 0931

## 2019-03-27 NOTE — ED NOTES
DATE:  3/27/2019   TIME OF RECEIPT FROM LAB:  16:25  LAB TEST:  Troponin  LAB VALUE:  0.229  RESULTS GIVEN WITH READ-BACK TO (PROVIDER):  Aleksey Terry MD  TIME LAB VALUE REPORTED TO PROVIDER:   16:25

## 2019-03-27 NOTE — TELEPHONE ENCOUNTER
"Pat reports that they just got home yesterday from a road trip.   \"His arms go in circles and he cannot sit still. His eyes are open wide. He says that 'I think I have dementia.'   Blood sugar was 184; tested 2 weeks ago and has not checked it since. Has not checked it since.   Complains of chest pain in the middle and it makes him feel sick. \"He gets the dry heaves.\"  \"Says that he has a hard time swallowing.\"  Advised Pat to take Vincent to the ED now and if no vehicle, to call 911. Pat agreed with plans.  Yvon Dickens RN     "

## 2019-03-27 NOTE — TELEPHONE ENCOUNTER
Reason for call:  Patient reporting a symptom    Symptom or request: Sheyla is reporting that Vincent has been twitching / shaking.  Seems like he can't sit still. Also she states that his eye are open really wide, which is something new also.  Has appointment with Cydney on Friday, but wanted to speak with RN.  Maybe he just needs referral for neurology?  Please call and assess. Thank you..Ebony Dickson    Duration (how long have symptoms been present): on going    Have you been treated for this before? No    Phone Number patient can be reached at:  Home number on file [phone] (home)    Best Time:  Any time    Can we leave a detailed message on this number:  YES    Call taken on 3/27/2019 at 10:40 AM by Ebony Dickson     No

## 2019-03-27 NOTE — ED NOTES
Multiple complaints upon arrival: chest pain off and on for a couple of weeks, wife thinks it's worse when he eats. Wife is concerned about these uncontrolled movements, like twitching or a tick, this is new about 2 weeks ago. Pt also concerned that he may have broke his left ankle several weeks ago, unable to bear weight, left ankle slightly bigger than right no bruising noted.

## 2019-03-28 PROBLEM — R07.89 ATYPICAL CHEST PAIN: Status: ACTIVE | Noted: 2019-01-01

## 2019-03-28 NOTE — PLAN OF CARE
"/71 (BP Location: Left arm)   Pulse 57   Temp 99  F (37.2  C) (Oral)   Resp 16   Wt 85.7 kg (188 lb 14.4 oz)   SpO2 93%   BMI 28.72 kg/m      Shift: 4663-5514  Neuro: A/Ox4, has chronic pain in LLE and groin from chronic back issues.   Cardiac: SB with 1st degree AV block and BBB. Multiple 1.8 second pauses. Team aware. VSS.   Respiratory: Maintains sats on RA.   GI/: Voiding spontaneously, up to bathroom but missed hat.   Diet/Appetite: NPO  Activity: Pt has history of falls at home, up with Ax1 and GB. Bed alarm on for safety.   Pain: chronic pain in LLE and BL groin \"up by prostate\". Declines interventions at this time.   Skin: No new deficits noted.   LDA's: PIV, SL.     Plan: Will continue to monitor per POC.     "

## 2019-03-28 NOTE — DISCHARGE SUMMARY
[facility]   Cardiology Discharge Summary      Date of Admission:  3/27/2019  Date of Discharge:  3/28/2019   Discharging Provider: Marianlea Caal  Date of Service: 3/28/2019     Discharge Instructions:  1. Please see your PCP in approximately 1 week to discuss your recent hospital stay.  2. Please see your cardiologist in about 1 month given your troponin elevation of unclear etiology  3. Please follow up with neurology at your earliest convenience given your weakness, worsening numbness in your feet.    Primary Care     Keyla Fonseca  [id number] [facility] [address]      Identification and Chief Complaint: Vincent Mishra is a 79 year old male who presented on 3/27/2019 with complaint of atypical chest pain and elevated troponin.    Discharge Diagnoses   Atypical Chest Pain - non cardiac      Discharge Disposition   Discharged to home    Discharge Orders      NEUROLOGY ADULT REFERRAL      Medication Therapy Management Referral      Reason for your hospital stay    Chest pain     Follow Up and recommended labs and tests    PCP - 1 week  Neurology - as soon as able     Activity    Your activity upon discharge: activity as tolerated     Full Code     Diet    Follow this diet upon discharge: Orders Placed This Encounter  Regular diet, cardiac diet     Discharge Medications   Current Discharge Medication List      CONTINUE these medications which have CHANGED    Details   acetaminophen (TYLENOL) 325 MG tablet Take 2 tablets (650 mg) by mouth every 4 hours as needed for mild pain  Qty: 30 tablet, Refills: 1    Associated Diagnoses: Closed fracture of multiple ribs of left side, initial encounter         CONTINUE these medications which have NOT CHANGED    Details   aspirin (ASA) 81 MG tablet Take 81 mg by mouth daily      atorvastatin (LIPITOR) 80 MG tablet TAKE 1 TABLET BY MOUTH DAILY  Qty: 90 tablet, Refills: 3    Associated Diagnoses: Hyperlipidemia LDL goal <100       clopidogrel (PLAVIX) 75 MG tablet TAKE 1 TABLET BY MOUTH DAILY  Qty: 90 tablet, Refills: 0    Associated Diagnoses: Right bundle branch block; NSTEMI (non-ST elevated myocardial infarction) (H)      metFORMIN (GLUCOPHAGE-XR) 500 MG 24 hr tablet TAKE 2 TABLETS BY MOUTH TWICE DAILY WITH MEALS.  Qty: 360 tablet, Refills: 0    Associated Diagnoses: Type 2 diabetes mellitus without complication (H)      oxyCODONE (OXYCONTIN) 30 MG 12 hr tablet Take 1 tablet (30 mg) by mouth every morning  Qty: 30 tablet, Refills: 0    Associated Diagnoses: Failed back surgical syndrome      !! oxyCODONE (ROXICODONE) 5 MG tablet Take 1 daily as needed  for severe pain /at bedtime may have up to 2 per day max 40 per month  Qty: 10 tablet, Refills: 0    Associated Diagnoses: Lumbar back pain with radiculopathy affecting left lower extremity      sertraline (ZOLOFT) 100 MG tablet TAKE 2 TABLETS(200 MG) BY MOUTH DAILY  Qty: 180 tablet, Refills: 1    Associated Diagnoses: Severe episode of recurrent major depressive disorder, without psychotic features (H)      traZODone (DESYREL) 100 MG tablet TAKE 3 TABLETS(300 MG) BY MOUTH EVERY NIGHT AT BEDTIME AS NEEDED FOR SLEEP  Qty: 270 tablet, Refills: 1    Associated Diagnoses: Primary insomnia      amLODIPine (NORVASC) 5 MG tablet Take 1 tablet (5 mg) by mouth daily  Qty: 90 tablet, Refills: 3    Associated Diagnoses: Hypertension goal BP (blood pressure) < 140/90      ascorbic acid (VITAMIN C) 500 MG tablet Take 500 mg by mouth daily       blood glucose (ONETOUCH ULTRA) test strip TEST BLOOD SUGAR TWICE DAILY OR AS DIRECTED  Qty: 200 strip, Refills: 0    Associated Diagnoses: Type 2 diabetes mellitus with other circulatory complications (H)      blood glucose monitoring (NO BRAND SPECIFIED) meter device kit Use to test blood sugar 1 times daily or as directed.  Qty: 1 kit, Refills: 0    Associated Diagnoses: Type 2 diabetes mellitus with complication, without long-term current use of insulin (H)       Calcium Carbonate-Vitamin D (CALCIUM + D) 600-200 MG-UNIT per tablet Take 2 tablets by mouth daily.  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  Qty: 100 each, Refills: 12    Associated Diagnoses: Type 2 diabetes, HbA1c goal < 7% (H)      lisinopril (PRINIVIL/ZESTRIL) 40 MG tablet TAKE 1 TABLET(40 MG) BY MOUTH DAILY  Qty: 90 tablet, Refills: 0    Associated Diagnoses: Essential hypertension with goal blood pressure less than 140/90      Melatonin 10 MG TBDP Take 10 mg by mouth At Bedtime  Qty: 90 tablet      nitroGLYcerin (NITROSTAT) 0.4 MG sublingual tablet Place 1 tablet (0.4 mg) under the tongue every 5 minutes as needed for chest pain  Qty:  "25 tablet, Refills: 1    Associated Diagnoses: Chest pain, unspecified type      omeprazole (PRILOSEC) 20 MG CR capsule TAKE ONE CAPSULE BY MOUTH TWICE DAILY  Qty: 180 capsule, Refills: 3    Associated Diagnoses: Gastroesophageal reflux disease without esophagitis      !! order for DME Equipment being ordered: walker wheeled also include  With skis for indoor carpet use  Qty: 1 Device, Refills: 0    Associated Diagnoses: Falls frequently; Deep inguinal pain, left      !! order for DME Equipment being ordered: compression stockings  Qty: 1 Units, Refills: 0    Associated Diagnoses: Cellulitis and abscess of leg, except foot      !! order for DME Equipment being ordered: TENS  Qty: 1 Units, Refills: 0    Associated Diagnoses: Chronic bilateral low back pain with sciatica, sciatica laterality unspecified; Hip pain, bilateral      !! order for DME Equipment being ordered: Wheelchair motorized for patient with spinal stenosis and neurogenic claudication  Qty: 1 Device, Refills: 0    Associated Diagnoses: Spinal stenosis, lumbar region, with neurogenic claudication      !! oxyCODONE (ROXICODONE) 5 MG tablet Take 1 daily as needed  for severe pain /at bedtime may have up to 2 per day max 40 per month  Qty: 40 tablet, Refills: 0    Associated Diagnoses: Lumbar back pain with radiculopathy affecting left lower extremity      polyethylene glycol (MIRALAX/GLYCOLAX) powder Take 17 g (1 capful) by mouth daily  Qty: 119 g, Refills: 3    Associated Diagnoses: Chronic idiopathic constipation       !! - Potential duplicate medications found. Please discuss with provider.      STOP taking these medications       Lidocaine (LIDOCARE) 4 % Patch Comments:   Reason for Stopping:             Allergies   Allergies   Allergen Reactions     Prozac [Fluoxetine] Other (See Comments)     \"I went crazy.\"       Benadryl [Diphenhydramine Hcl] Other (See Comments)     Starts to shake, and paranoid     Codeine Itching     Diphenhydramine Other (See " Comments)     Hallucinations, See [facility] records scanned on 7/16/15     Labetalol Other (See Comments)     See [facility] records scanned on 7/16/15  Bradycardia down to 30 on holter, dizziness. Stopped med and symptoms resolved     Metoprolol Other (See Comments)     Bradycardia even at 12.5 mg     Morphine Visual Disturbance       Consultations This Hospital Stay  CARDIAC REHAB IP CONSULT  PHYSICAL THERAPY ADULT IP CONSULT  OCCUPATIONAL THERAPY ADULT IP CONSULT  CARE COORDINATOR IP CONSULT  SMOKING CESSATION PROGRAM IP CONSULT  PHARMACY IP CONSULT  PHARMACY IP CONSULT    Significant Results and Procedures   Coronary Angiogram 03/28/2019:      Data   Results for orders placed or performed during the hospital encounter of 03/27/19   MR Brain w/o & w Contrast    Narrative    MRI BRAIN WITHOUT AND WITH CONTRAST  3/27/2019 3:19 PM    HISTORY:  spastic movement of head and arms     TECHNIQUE:  Multiplanar, multisequence MRI of the brain without and  with 10 mL Gadavist    COMPARISON: CT dated 9/17/2018, MR scan dated 11/19/2015.    FINDINGS:     Intracranial contents: There is diffuse parenchymal volume loss.   White matter changes are present in the cerebral hemispheres that are  consistent with small vessel ischemic disease in this age patient.  There is no evidence of hemorrhage, mass, acute infarct, or anomaly.   There are no gadolinium enhancing lesions. The arteries at the base of  the brain and the dural venous sinuses appear patent.     Sella:  No significant abnormality accounting for technique.     Orbit: No significant abnormality accounting for technique.      Sinus/mastoids: No significant paranasal sinus mucosal disease. No  significant middle ear or mastoid effusion.    Bones/soft tissues: No aggressive osseous lesion involving the  calvarium, skull base, or visualized upper cervical spine.       Impression    IMPRESSION:    1. No acute pathology. No bleed,  mass, or acute infarcts are  identified.  No significant change when compared to the scan from  2015.  2. There is diffuse parenchymal volume loss.  White matter changes are  present in the cerebral hemispheres that are consistent with small  vessel ischemic disease in this age patient.      SAURABH COVARRUBIAS MD   Chest XR,  PA & LAT    Narrative    XR CHEST 2 VW 3/27/2019 2:34 PM    HISTORY: Chest pain, short of breath.    COMPARISON: 5/30/2017    FINDINGS: No airspace consolidation, pleural effusion or pneumothorax.  Normal heart size.      Impression    IMPRESSION: No acute cardiopulmonary abnormality.    YARA GUADALUPE MD   XR Ankle Left G/E 3 Views    Narrative    LEFT ANKLE THREE OR MORE VIEWS 3/27/2019 2:33 PM     HISTORY: Pain.      Impression    IMPRESSION: Corticated ossicle at the tip of the lateral malleolus may  be related to old injury. No acute fracture is visible. The ankle  mortise appears congruent. Extensive arterial calcification is noted  at the ankle.    HUMBERTO HANSEN MD       History of Present Illness   (From H&P) Vincent Mishra is a 79 year old male with PMH notable for NSTEMI s/p PCI in 2011 (RCA) and 2014 (mLAD), HTN, prostate cancer, T2DM, renal cell carcinoma and frequent falls who presents to [facility] for chest pain. Patient states that chest pain has been intermittent for several years but has had pain that has persisted for the past week. Denies any exertional component. Does appreciate positional component. Not reproducible with palpation.  Of note patient reports multiple rib fractures on his left side several months ago. Patient does report chest pain when lying flat since admission, but denies any fever, chills, headache, shortness of breath, or palpitations.  He does note worsening balance although denies any syncope or lightheadedness.  Patient ambulates by a means of a wheelchair as he is unable to walk due to his neuropathy.     Regarding his cardiac history, patient underwent PCI to  the RCA 2011 at [facility] in [place] and to the mid LAD in 2014 in [facility].  He underwent cardiac catheterization 2014 without evidence of worsening obstructive disease.  Patient was later admitted in July 2015 for an STEMI and placed on DVT at that time.  Repeat cardiac catheterization did not demonstrate any worsening of his coronary disease.      ED course:  - MRI brain - no acute pathology  - CXR - no acute pathology   - Left ankle XR - negative for fx     Review of Systems:    Complete 10 point review of       Hospital Course   Vincent Mishra was admitted on 3/27/2019.  The following problems were addressed during his hospitalization:    # Atypical chest pain  # CAD s/p multiple NSTEMI, PCI to RCA and mLAD  # Elevated troponin   # HTN  Patient presented with epigastric/substernal chest pain x1 month.  Reported that his pain began each morning upon awakening and was relieved with any type of food/fluid consumption. He endorsed associated weakness and nausea, but denied worsening paresthesias, SOB, palpitations, worsening lightheadedness. As such, current presentation did not appear consistent with ACS given chronicity, reproducibility and positional components. However, patient did report that with his previous NSTEMIs requiring PCI, his presentation was NOT with overt chest pain in the past.  As such, angiogram was pursued.  This failed to show obstructive coronary disease.  As such, cardiac etiology ruled out.  Remaining differential includes GERD (although already on 20mg PO omeprazole BID), and MSK.  Unlikely pulmonary etiology given no overt SOTO nor inspiration component of the patient's chest pain.  He will discharge to home on PTA aspirin and Plavix.  In addition, he will continue PTA atorvastatin, lisinopril and amlodipine.  The patient does not tolerate BB given baseline bradycardia.  The patient will follow up with his PCP in approximately 7-10 days and with his outpatient  "cardiologist in approximately 1 month.     # Recurrent falls  # Worsening neuropathy.  Per chart, patient has been documented to have multiple falls over the past several years which he attributes to mechanical falls related to his neuropathy. The patient notes that he does not recall ever having been seen by a neurologist as his spinal surgeon had typically managed his neuropathy.  He states that due to his baseline neuropathy/weakness, he has been somewhat wheelchair bound x2 weeks (although was able to ambulate to the restroom while admitted with assist of 1). PT and OT consulted whom recommended TCU at discharge; the patient and his family, however, declined this and wish to pursue outpatient physical therapy. In addition, the patient was given an outpatient referral to neurology for ongoing management/establishment of care given his profound neuropathy. In addition, this writer had a lengthy discussion with both the patient and his wife in regards to goals of care; conveyed to both parties that should he desire improved mobility/functionality, he will need to assert himself into his outpatient care/follow up.  The patient states that this sounds \"hard\" and replied with several rationales as to why this \"wasn't possible.\"  Again conveyed that should he desire the ability to get out of bed/be free of his wheelchair, he will need to work with outpatient physicians and PT providers.  Patient expressed understanding.     # T2DM  Patient's PTA metformin was held on admission given possible angiogram.  Blood glucoses were controlled using sliding scale insulin.  He will discharge to home with instructions to resume his Metformin Saturday, March 30th.  In addition, the patient will continue PTA 400mg PO gabapentin BID.     # Constipation  Continue outpatient bowel regimen.     # Chronic normocytic anemia  Continue PTA ferrous sulfate on discharge.     # Depression  Continue PTA Zoloft on discharge.     # GERD  Continue " PTA omeprazole, 20mg PO BID.        Pending Results   Unresulted Labs Ordered in the Past 30 Days of this Admission     No orders found for last 61 day(s).          Physical Exam   Temp:  [98.1  F (36.7  C)-99  F (37.2  C)] 98.1  F (36.7  C)  Pulse:  [49-58] 58  Heart Rate:  [49-73] 56  Resp:  [11-30] 16  BP: (112-157)/() 137/76  SpO2:  [92 %-98 %] 96 %  Vitals:    03/27/19 1912   Weight: 85.7 kg (188 lb 14.4 oz)         Constitutional: alert and oriented x4, in no acute distress  CV: regular rate/rhythm, no murmur/rub/gallop  Respiratory: CTA bilaterally  GI: Soft, nontender  Skin: Warm and dry  R Groin Site: clean, dry, intact. No appreciable hematoma    The discharge plan was discussed with the patient and patient agrees to plan    Total time on this discharge was greater than 30 minutes.    Marianela Caal PA-C  Cardiology - [employer]     Patient seen and examined with Cardiovascular fellow and agree with the assessment and plan described above.     Dominguez Mishra M.D.  Interventional Cardiology  [facility]

## 2019-03-28 NOTE — PROGRESS NOTES
"  Care Coordinator - Discharge Planning    Admission Date/Time:  3/27/2019  Attending MD:  Dominguez Mishra MD     Data  Chart reviewed, discussed with interdisciplinary team.   Patient with history of NSTEMI, HTN, prostate cancer and renal cell carcinoma was admitted for chest pain.    Assessment   Concerns with insurance coverage for discharge needs: None.  Current Living Situation: Patient lives with family.   Support System: Supportive and Involved family  Services Involved: None currently  Transportation at Discharge: Family or friend will provide  Transportation to Medical Appointments: Family  Barriers to Discharge: Medical plan of care, OT evaluation    Coordination of Care and Referrals: This writer met with pt, pt's wife, Pat and pt's granddaughter to introduce self and role of RNCC. Per chart review pt has a history of multiple falls at home. OT evaluation pending once patient off bedrest. Per pt's wife, pt has a large family support system at home who are able to assist as needed, but pt is \"quite stubborn\" and often declines their support and activity suggestions. Per pt's wife pt uses a walker at home and a wheelchair as needed. Pt also has a shower bench. Pt's wife stated that pt was recently seen by his PCP and referred to outpt PT at [facility] which he was scheduled to begin tomorrow, but is now rescheduled for 4/4. This writer discussed options for home care, but pt's wife stated she feels pt would respond better in a clinic setting at this time.       Plan  Anticipated Discharge Date: 3/28/2019  Anticipated Discharge Plan: TBD, pending therapy eval, likely home with OP PT.   CC will continue to monitor patient's medical condition and progress towards discharge.  Tammy Baer RN BSN  6C Unit Care Coordinator  Phone number: [phone]  Pager: [phone]    "

## 2019-03-28 NOTE — PLAN OF CARE
"Admission    Pt admitted from OSH with reported chest pain for past few weeks and spastic type movements/tics. Pt arrived via litter, alert and oriented but forgetful reporting multiple complaints. Pt is disheveled in appearance and exhibiting very spastic type movements +/ or tics. Pt asked if he had pain anywhere and reported he did have pain in his left ankle, sharp pain in his groin bilaterally and neck pain from arthritis(no chest pain). Pt placed on telemetry and rhythm appears SR with 1 st degree and BBB rates in the 50's, other VSS. Pt exaimed head to toe and no wounds or ulcers found, Pt did note to have some small scabs on LE and UE which pt reported \"I'm a \". Pt fed and ate well, FSBG prior to meal 89. Reported pt ambulated with SBA to BR and instructed to call for assistance back and pt did not. Pt had a fall at home d/t his dogs per pt and initially thought he had broke his ankle but xray tonight showed no break. Pt did have elevated troponin at .246 next .229 and third pending. POC continue to monitor next 24 hours with further plan to be determined.    "

## 2019-03-28 NOTE — PROVIDER NOTIFICATION
/71 (BP Location: Left arm)   Pulse 57   Temp 99  F (37.2  C) (Oral)   Resp 16   Wt 85.7 kg (188 lb 14.4 oz)   SpO2 93%   BMI 28.72 kg/m    4:50 AM  Provider notified: DONALD Mays   Reason for notification: Trending tele and patient having multiple 1.8-1.9 second pauses. VSS and asymptomatic at this time.   Orders: Will continue POC.

## 2019-03-28 NOTE — PLAN OF CARE
PT / 6C - PT orders received and acknowledged. Attempted PT eval x2 this date. On first attempt, provider speaking with patient about POC. On second attempt, patient leaving with transport for procedure.

## 2019-03-28 NOTE — PROGRESS NOTES
03/28/19 1526   Quick Adds   Type of Visit Initial Occupational Therapy Evaluation   Living Environment   Lives With spouse   Living Arrangements house   Home Accessibility stairs to enter home   Number of Stairs, Main Entrance 3   Stair Railings, Main Entrance railing on left side (ascending)   Transportation Anticipated family or friend will provide   Living Environment Comment Pt and wife report all needs on main level. Pt ambulates ~10-15 feet from bed to bathroom.    Self-Care   Usual Activity Tolerance fair   Current Activity Tolerance fair   Regular Exercise No   Equipment Currently Used at Home crutches;grab bar, tub/shower;walker, standard;walker, rolling;wheelchair, manual;shower chair  ([place] (elbow) crutches)   Activity/Exercise/Self-Care Comment Pt reports using 4WW and elbow crutches for majority of mobility. Will also utilize w/c on occasion for community mobility.    Functional Level   Ambulation 1-->assistive equipment   Transferring 1-->assistive equipment   Toileting 0-->independent   Bathing 3-->assistive equipment and person   Dressing 0-->independent   Eating 0-->independent   Communication 0-->understands/communicates without difficulty   Fall history within last six months yes   Number of times patient has fallen within last six months (many-unable to count)   Which of the above functional risks had a recent onset or change? none   Prior Functional Level Comment Pt typically IND with ADLs but per family is mostly sedentary at home.    General Information   Onset of Illness/Injury or Date of Surgery - Date 03/27/19   Patient/Family Goals Statement go home   Precautions/Limitations fall precautions   General Info Comments Activity: ambulate   Cognitive Status Examination   Orientation orientation to person, place and time   Cognitive Comment Pt aware of current situation. Very [place] so difficult to fully assess cognition with poor hearing ability   Sensory Examination   Sensory Comments Pt  reports tingling and numbness in LLE, which he attributes to a fall earlier this year where he hurt his ankle. per chart review, pt also with neuropathy.   Integumentary/Edema   Integumentary/Edema no deficits were identifed   Posture   Posture forward head position;kyphosis;protracted shoulders   Posture Comments Pt with significantly impaired back   Range of Motion (ROM)   ROM Comment BUE ROM WFL   Mobility   Bed Mobility Bed mobility skill: Supine to sit;Bed mobility skill: Sit to supine   Bed Mobility Skill: Sit to Supine   Level of [place]: Sit/Supine stand-by assist   Bed Mobility Skill: Supine to Sit   Level of [place]: Supine/Sit stand-by assist   Transfer Skill: Sit to Stand   Level of [place]: Sit/Stand contact guard   Assistive Device for Transfer: Sit/Stand standard walker   Balance   Balance Comments Highly impaired balance with ambulation requiring close CGA-min A   Lower Body Dressing   Level of [place]: Dress Lower Body minimum assist (75% patients effort)   Instrumental Activities of Daily Living (IADL)   IADL Comments pt lives with ~10 other family members who assist with IADLs   Activities of Daily Living Analysis   Impairments Contributing to Impaired Activities of Daily Living balance impaired;cognition impaired;motor control impaired;pain;strength decreased   General Therapy Interventions   Planned Therapy Interventions ADL retraining;strengthening;transfer training;progressive activity/exercise;risk factor education   Clinical Impression   Criteria for Skilled Therapeutic Interventions Met yes, treatment indicated   OT Diagnosis decreased IND and safety with ADLs   Influenced by the following impairments pain, weakness, impaired balance   Assessment of Occupational Performance 3-5 Performance Deficits   Identified Performance Deficits dressing, bathing, toileting, functional mobility   Clinical Decision Making (Complexity) Moderate complexity   Therapy Frequency 5 times/wk  "  Predicted Duration of Therapy Intervention (days/wks) 1 week   Anticipated Equipment Needs at Discharge gait belt   Anticipated Discharge Disposition Transitional Care Facility;Home with Outpatient Therapy;Home with Home Therapy   Risks and Benefits of Treatment have been explained. Yes   Patient, Family & other staff in agreement with plan of care Yes   [facility] TM \"6 Clicks\"   2016, Trustees of [facility], under license to [employer].  All rights reserved.   6 Clicks Short Forms Daily Activity Inpatient Short Form   [facility]  \"6 Clicks\" Daily Activity Inpatient Short Form   1. Putting on and taking off regular lower body clothing? 3 - A Little   2. Bathing (including washing, rinsing, drying)? 2 - A Lot   3. Toileting, which includes using toilet, bedpan or urinal? 3 - A Little   4. Putting on and taking off regular upper body clothing? 4 - None   5. Taking care of personal grooming such as brushing teeth? 4 - None   6. Eating meals? 4 - None   Daily Activity Raw Score (Score out of 24.Lower scores equate to lower levels of function) 20   Total Evaluation Time   Total Evaluation Time (Minutes) 10     "

## 2019-03-28 NOTE — PLAN OF CARE
Pt A/Ox4. Speech often illogical, pt gets carried away with stories/forgetful. VSS. 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 Pt reports chronic pain in LLE & groin d/t chronic back issues, denied med intervention. Angiogram complete: cores clean. R. Groin site WDL/CMS intact. Off bedrest at 1430. Pt very unstable on feet, high fall risk. 2x assist. Difficulty hearing.    Plan is to discharge home today, after PT consult.

## 2019-03-28 NOTE — PLAN OF CARE
OT 6C: Pt on bedrest until 2:30 PM following procedure. Will plan to evaluate pt once bedrest restrictions lifted to assist with discharge planning.

## 2019-03-28 NOTE — PLAN OF CARE
OT 6C  Discharge Planner OT   Patient plan for discharge: home   Current status: Pt SBA for bed mobility and CGA for STS transfers. Close CGA-periods of min A with functional mobility using FWW ~15 feet x 2. Completed 3 stairs with unilateral hand railing with CGA-min A for ascending and min-mod A for descending. Educated on recs for TCU but family declining. Recommended on use of gait belt for all mobility, FWW within the home, w/c within the community, and use of urinal overnight for toileting-all intended to reduce falls risk.   Barriers to return to prior living situation: weakness, significantly impaired balance, pain   Recommendations for discharge: TCU  Rationale for recommendations: TCU recommended given high falls risk and concern for safety at home; pt and family declining and comfortable with discharging home. Agreeable to above mobility recommendations and report OP PT appointments made. OP appointments made, but pt would likely benefit from home safety eval to maximize safe setup within the home for mobility.        Entered by: Rae Cross 03/28/2019 3:18 PM

## 2019-03-28 NOTE — H&P
Cardiology History and Physical   Vincent Mishra MRN: [id number]  Age: 79 year old, : 1940  03/27/19    Chief Complaint: chest and ankle pain    HPI:   Vincent Mishra is a 79 year old male with PMH notable for NSTEMI s/p PCI in  (RCA) and  (mLAD), HTN, prostate cancer, T2DM, renal cell carcinoma and frequent falls who presents to [facility] for chest pain. Patient states that chest pain has been intermittent for several years but has had pain that has persisted for the past week. Denies any exertional component. Does appreciate positional component. Not reproducible with palpation.  Of note patient reports multiple rib fractures on his left side several months ago. Patient does report chest pain when lying flat since admission, but denies any fever, chills, headache, shortness of breath, or palpitations.  He does note worsening balance although denies any syncope or lightheadedness.  Patient ambulates by a means of a wheelchair as he is unable to walk due to his neuropathy.    Regarding his cardiac history, patient underwent PCI to the RCA  at [facility] in [place] and to the mid LAD in  in [facility].  He underwent cardiac catheterization  without evidence of worsening obstructive disease.  Patient was later admitted in 2015 for an STEMI and placed on DVT at that time.  Repeat cardiac catheterization did not demonstrate any worsening of his coronary disease.     ED course:  - MRI brain - no acute pathology  - CXR - no acute pathology   - Left ankle XR - negative for fx    Review of Systems:    Complete 10 point review of systems was performed and negative except per HPI.      Past Medical History    Reviewed and edited as appropriate  Past Medical History:   Diagnosis Date     CAD (coronary artery disease) 2011 ([place]): s/p MI, PTCA - RCA      Cancer of kidney (H)      Chest pain 2012     Coronary artery disease      History of angina      History  of blood transfusion      Hyperlipidaemia      Hyperlipidemia LDL goal <100 9/23/2010     Malignant neoplasm (H)     Prostate CA (removed)     Myocardial infarction (H)     s/p MI 7/29/14, stent placement     NSTEMI (non-ST elevated myocardial infarction) (H)     07-25-14 CATH- RCA, L.Main and CFX  had minor luminal irregularites. Mid LAD high grade stenosis 80-90%.Stent placed to mid LAD     Other and unspecified hyperlipidemia     MI in July 2011     Right bundle branch block      Type II or unspecified type diabetes mellitus without mention of complication, not stated as uncontrolled      Unspecified essential hypertension        Past Surgical History   Reviewed and edited as appropriate   Past Surgical History:   Procedure Laterality Date     ARTHROSCOPY KNEE  2/11/2011    ARTHROSCOPY KNEE performed by LEY, JEFFREY DUANE at [facility]     ARTHROSCOPY KNEE WITH MEDIAL MENISCECTOMY  6/26/2012    Procedure: ARTHROSCOPY KNEE WITH MEDIAL MENISCECTOMY;  Right Knee Arthroscopy With Medial Menisectomy;  Surgeon: Ley, Jeffrey Duane, MD;  Location: [facility]     BACK SURGERY      back surgery x4     BIOPSY       CARDIAC SURGERY  7/2011    stentt placed     COLONOSCOPY       CORONARY ANGIOGRAPHY ADULT ORDER  07-25-14    RCA, L.Main and CFX  had minor luminal irregularites. Mid LAD high grade stenosis 80-90%.Stent placed to mid LAD     ESOPHAGOSCOPY, GASTROSCOPY, DUODENOSCOPY (EGD), COMBINED  10/25/2012    Procedure: COMBINED ESOPHAGOSCOPY, GASTROSCOPY, DUODENOSCOPY (EGD), BIOPSY SINGLE OR MULTIPLE;  Gastroscopy  ;  Surgeon: Lucius Dasilva MD;  Location: [facility]     HEART CATH, ANGIOPLASTY  07-25-14    mid LAD      ORTHOPEDIC SURGERY       back surgery       Social History   Reviewed and edited as appropriate  Social History     Socioeconomic History     Marital status:      Spouse name: Not on file     Number of children: Not on file     Years of education: Not on file     Highest education level: Not on file   Occupational  History     Not on file   Social Needs     Financial resource strain: Not on file     Food insecurity:     Worry: Not on file     Inability: Not on file     Transportation needs:     Medical: Not on file     Non-medical: Not on file   Tobacco Use     Smoking status: Never Smoker     Smokeless tobacco: Never Used   Substance and Sexual Activity     Alcohol use: No     Alcohol/week: 0.0 oz     Drug use: No     Sexual activity: Yes     Partners: Female   Lifestyle     Physical activity:     Days per week: Not on file     Minutes per session: Not on file     Stress: Not on file   Relationships     Social connections:     Talks on phone: Not on file     Gets together: Not on file     Attends Nondenominational service: Not on file     Active member of club or organization: Not on file     Attends meetings of clubs or organizations: Not on file     Relationship status: Not on file     Intimate partner violence:     Fear of current or ex partner: Not on file     Emotionally abused: Not on file     Physically abused: Not on file     Forced sexual activity: Not on file   Other Topics Concern     Parent/sibling w/ CABG, MI or angioplasty before 65F 55M? No      Service Not Asked     Blood Transfusions Not Asked     Caffeine Concern No     Comment: 4-5 cups per day     Occupational Exposure Not Asked     Hobby Hazards Not Asked     Sleep Concern Not Asked     Stress Concern Not Asked     Weight Concern Not Asked     Special Diet Yes     Comment: smaller portions     Back Care Not Asked     Exercise No     Bike Helmet Not Asked     Seat Belt Not Asked     Self-Exams Not Asked   Social History Narrative     Not on file       Family History     Reviewed and edited as appropriate  Family History   Problem Relation Age of Onset     Cancer Mother      Depression Mother      Diabetes Father      Hypertension Father      Heart Disease Father      Mental Illness Father      Heart Disease Maternal Grandfather      Substance Abuse  "Maternal Grandfather      Alcohol/Drug Paternal Grandmother      Substance Abuse Paternal Grandmother      Heart Disease Paternal Grandfather      Alcohol/Drug Paternal Grandfather      Substance Abuse Paternal Grandfather      Heart Disease Brother      Diabetes Brother      Substance Abuse Brother      Obesity Brother      Diabetes Brother      Obesity Sister      Alcohol/Drug Daughter      Depression Daughter      Obesity Sister      Diabetes Sister      Obesity Sister      Diabetes Sister      Alcohol/Drug Sister      Cancer Sister 55        possible kidney cancer     Substance Abuse Sister      Alcohol/Drug Son      Substance Abuse Son      Diabetes Son      Alcohol/Drug Son      Substance Abuse Son      Obesity Daughter      Diabetes Daughter      Hypertension Daughter      Bipolar Disorder Other        Allergies   Reviewed and edited as appropriate     Allergies   Allergen Reactions     Prozac [Fluoxetine] Other (See Comments)     \"I went crazy.\"       Benadryl [Diphenhydramine Hcl] Other (See Comments)     Starts to shake, and paranoid     Codeine Itching     Diphenhydramine Other (See Comments)     Hallucinations, See [facility] scanned on 7/16/15     Labetalol Other (See Comments)     See [facility] records scanned on 7/16/15  Bradycardia down to 30 on holter, dizziness. Stopped med and symptoms resolved     Metoprolol Other (See Comments)     Bradycardia even at 12.5 mg     Morphine Visual Disturbance        Prior to Admission Medications    Medications Prior to Admission   Medication Sig Dispense Refill Last Dose     [] acetaminophen (TYLENOL) 325 MG tablet Take 2 tablets (650 mg) by mouth every 4 hours as needed for mild pain 30 tablet 1 Taking     amLODIPine (NORVASC) 5 MG tablet Take 1 tablet (5 mg) by mouth daily 90 tablet 3 Taking     ascorbic acid (VITAMIN C) 500 MG tablet Take 500 mg by mouth daily    Taking     aspirin (ASA) 81 MG tablet Take " 81 mg by mouth daily   Taking     atorvastatin (LIPITOR) 80 MG tablet TAKE 1 TABLET BY MOUTH DAILY (Patient taking differently: TAKE 1 TABLET BY MOUTH EVERY EVENING) 90 tablet 3 Taking     blood glucose (ONETOUCH ULTRA) test strip TEST BLOOD SUGAR TWICE DAILY OR AS DIRECTED 200 strip 0 Taking     blood glucose monitoring (NO BRAND SPECIFIED) meter device kit Use to test blood sugar 1 times daily or as directed. 1 kit 0 Taking     Calcium Carbonate-Vitamin D (CALCIUM + D) 600-200 MG-UNIT per tablet Take 2 tablets by mouth daily. 100 tablet 12 Taking     cholecalciferol (VITAMIN  -D) 1000 UNITS capsule Take 1 capsule by mouth daily   Taking     clopidogrel (PLAVIX) 75 MG tablet TAKE 1 TABLET BY MOUTH DAILY 90 tablet 0 Taking     Cyanocobalamin (VITAMIN B-12 PO) Take 500 mcg by mouth daily    Taking     ferrous sulfate (FE TABS) 325 (65 Fe) MG EC tablet Take 1 tablet (325 mg) by mouth daily 90 tablet 3 Taking     gabapentin (NEURONTIN) 100 MG capsule Take 4 capsules (400 mg) by mouth 2 times daily 360 capsule 1 Taking     hydrochlorothiazide (HYDRODIURIL) 25 MG tablet TAKE 1 TABLET BY MOUTH DAILY 90 tablet 0      HYDROmorphone (DILAUDID) 2 MG tablet Take 1 tablet (2 mg) by mouth every 6 hours as needed for breakthrough pain or pain (Patient not taking: Reported on 2019) 22 tablet 0 Not Taking     Lancets Misc. (SELECT-LITE DEVICE/LANCETS) KIT 1 kit 2 times daily 1 kit 1 Taking     [] Lidocaine (LIDOCARE) 4 % Patch Place 2 patches onto the skin every 24 hours 15 patch 0 Taking     LIFESCAN FINEPOINT LANCETS MISC Use to test blood sugars 1 times daily or as directed. 100 each 12 Taking     lisinopril (PRINIVIL/ZESTRIL) 40 MG tablet TAKE 1 TABLET(40 MG) BY MOUTH DAILY 90 tablet 0      Melatonin 10 MG TBDP Take 10 mg by mouth At Bedtime 90 tablet  Taking     metFORMIN (GLUCOPHAGE-XR) 500 MG 24 hr tablet TAKE 2 TABLETS BY MOUTH TWICE DAILY WITH MEALS. 360 tablet 0      nitroGLYcerin (NITROSTAT) 0.4 MG sublingual  tablet Place 1 tablet (0.4 mg) under the tongue every 5 minutes as needed for chest pain (Patient not taking: Reported on 2/12/2019) 25 tablet 1 Not Taking     omeprazole (PRILOSEC) 20 MG CR capsule TAKE ONE CAPSULE BY MOUTH TWICE DAILY 180 capsule 3 Taking     order for DME Equipment being ordered: walker wheeled also include  With skis for indoor carpet use 1 Device 0 Taking     order for DME Equipment being ordered: compression stockings (Patient not taking: Reported on 2/12/2019) 1 Units 0 Not Taking     order for DME Equipment being ordered: TENS (Patient not taking: Reported on 2/12/2019) 1 Units 0 Not Taking     order for DME Equipment being ordered: Wheelchair motorized for patient with spinal stenosis and neurogenic claudication (Patient not taking: Reported on 2/12/2019) 1 Device 0 Not Taking     oxyCODONE (OXYCONTIN) 30 MG 12 hr tablet Take 1 tablet (30 mg) by mouth every morning 30 tablet 0 Taking     oxyCODONE (ROXICODONE) 5 MG tablet Take 1 daily as needed  for severe pain /at bedtime may have up to 2 per day max 40 per month 40 tablet 0 Taking     oxyCODONE (ROXICODONE) 5 MG tablet Take 1 daily as needed  for severe pain /at bedtime may have up to 2 per day max 40 per month 10 tablet 0 Taking     polyethylene glycol (MIRALAX/GLYCOLAX) powder Take 17 g (1 capful) by mouth daily 119 g 3 Taking     sertraline (ZOLOFT) 100 MG tablet TAKE 2 TABLETS(200 MG) BY MOUTH DAILY 180 tablet 1      traZODone (DESYREL) 100 MG tablet TAKE 3 TABLETS(300 MG) BY MOUTH EVERY NIGHT AT BEDTIME AS NEEDED FOR SLEEP 270 tablet 1 Taking          Physical Exam   BP (!) 117/94 (BP Location: Left arm)   Temp 98.3  F (36.8  C) (Oral)   Resp 16   Wt 85.7 kg (188 lb 14.4 oz)   SpO2 94%   BMI 28.72 kg/m    No intake or output data in the 24 hours ending 03/27/19 2040  Wt:   Wt Readings from Last 2 Encounters:   03/27/19 85.7 kg (188 lb 14.4 oz)   03/27/19 86.2 kg (190 lb)      GEN: pleasant, no acute distress  HEENT: no icterus,  MMM  CV: RRR, normal s1,s2, no murmurs/rubs no heave. JVP not appreciated  CHEST: clear to ausculation bilaterally, no rales or wheezing.   ABD: soft, ttp over RUQ and LLQ, normal active bowel sounds  EXTR: DP 2+ bilaterally. No clubbing, cyanosis or edema.   NEURO: alert oriented, tangential, speech fluen motor grossly non-focal  SKIN: Multiple excoriations over upper and lower extremities     Assessment and Recommendation:   Vinecnt Mishra is a 79 year old male with PMH notable for NSTEMI s/p PCI in 2011 (RCA) and 2014 (mLAD), HTN, T2DM, prostate cancer, renal cell carcinoma and frequent falls who presents to [facility] for chest pain.    # Atypical chest pain  # CAD s/p multiple NSTEMI, PCI to RCA and mLAD  # Elevated troponin   # HTN  Current presentation does not appear consistent with ACS given chronicity, reproducibility and positional components. However, patient does have significant coronary hx with multiple NSTEMIs and chronic appearing troponin elevation per records.   -- Troponins peaked at 0.246  -- BB deferred 2/2 bradycardia  -- PTA DAPT  -- ASA 81 mg  -- Atorvastatin 80 mg  -- Lisinopril 40 mg every day  -- PTA amlodipine  -- Telemetry  -- TTE in AM    # T2DM  -- Holding PTA metformin  -- sliding scale insulin   -- repeat HgbA1C  -- hypoglycemia protocol  -- Gabapenting 400 mg BID    # Constipation  -- Senna BID  -- Miralax every day    # Chronic normocytic anemia  -- Cont PTA ferrous sulfate     # Recurrent falls  Per chart, patient has been documented to have multiple falls over the past several years which he attributes to mechanical falls related to his neuropathy.   -- PT/OT consults, appreciate recs  -- Fall precautions    # Depression  -- PTA zoloft    # GERD  -- PTA omeprazole    ACCESS: PIV  FEN: NPO at MN  CODE: Full Code, discussed with pt    Davon Kumar DO  Internal Medicine PGY2  Pager [phone] (Text Page)    Data   Labs and imaging below were independently reviewed and  interpreted    BMP  Recent Labs   Lab 03/27/19  1330      POTASSIUM 4.5   CHLORIDE 107   LIOR 8.6   CO2 25   BUN 27   CR 1.31*   GLC 92     CBC  Recent Labs   Lab 03/27/19  1330   WBC 6.6   RBC 4.38*   HGB 10.4*   HCT 35.7*   MCV 82   MCH 23.7*   MCHC 29.1*   RDW 16.8*        INRNo lab results found in last 7 days.  LFTs  Recent Labs   Lab 03/27/19  1330   ALKPHOS 108   AST 27   ALT 28   BILITOTAL 0.3   PROTTOTAL 7.1   ALBUMIN 3.7      Troponin  Lab Results   Component Value Date    TROPI 0.229 () 03/27/2019    TROPI 0.246 () 03/27/2019    TROPI 0.372 () 12/12/2018    TROPI 0.352 () 12/12/2018    TROPI 0.349 () 12/11/2018    TROPONIN <0.012 07/25/2014    TROPONIN 0.012 07/24/2014    TROPONIN <0.30 02/28/2004         Imaging:  MR brain 3/27/19  IMPRESSION:    1. No acute pathology. No bleed, mass, or acute infarcts are  identified.  No significant change when compared to the scan from  2015.  2. There is diffuse parenchymal volume loss.  White matter changes are  present in the cerebral hemispheres that are consistent with small  vessel ischemic disease in this age patient.    CXR 3/27/19  No acute process    Left ankle XR 3/27/19  IMPRESSION: Corticated ossicle at the tip of the lateral malleolus may  be related to old injury. No acute fracture is visible. The ankle  mortise appears congruent. Extensive arterial calcification is noted  at the ankle.      EKG:      NM Lexiscan stress test 5/30/17  Impression:  1. Normal myocardial SPECT study with a summed stress score of 3. The  apical thinning was present in 2014 and is unchanged. The apical  thinning may be physiological.  2. No changes of ischemia.  3. Normal LV ejection fraction and wall motion.  4. Abnormal appearance of the chest. Left upper mass is likely a  lipoma but they are right lower lobe nodular and parenchymal opacities  which should be followed. Limited CT of the chest is recommended.    Cardiac cath 10/7/2014      Transthoracic  echocardiogram 8/16/11  Interpretation Summary  The study was technically adequate.  Compared to the prior study dated 2/21/2011, there have been no changes.  The pulmonary pressure has improved somewhat (from 42 mmHg to 28 mmHg).  The visual ejection fraction is estimated at 55-60%. Left ventricular   systolic function is normal. There is mild to moderate concentric left   ventricular hypertrophy. Grade I left ventricular diastolic dysfunction is   noted. Right ventricular systolic pressure is normal. Mild aortic root   dilatation. The ascending aorta is Borderline dilated. The rhythm was sinus   bradycardia.  PatientHeight: 68 in  PatientWeight: 208 lbs  SystolicPressure: 120 mmHg  DiastolicPressure: 64 mmHg  HeartRate: 53 bpm  BSA 2.1 m^2

## 2019-03-28 NOTE — UTILIZATION REVIEW
"  Admission Status; Secondary Review Determination         Under the authority of the Utilization Management Committee, the utilization review process indicated a secondary review on the above patient.  The review outcome is based on review of the medical records, discussions with staff, and applying clinical experience noted on the date of the review.          (x) Observation Status Appropriate - This patient does not meet hospital inpatient criteria and is placed in observation status. If this patient's primary payer is Medicare and was admitted as an inpatient, Condition Code 44 should be used and patient status changed to \"observation\".     RATIONALE FOR DETERMINATION   79-year-old man admitted to the hospital with atypical chest pain, minimally elevated troponin, patient has risk factor and prior history of stenting.  He underwent a coronary angiogram which showed nonobstructing disease, there was no evidence of acute coronary syndrome.  Likely short stay admission. The severity of illness, intensity of service provided, expected LOS and risk for adverse outcome make the care appropriate for further observation; however, doesn't meet criteria for hospital inpatient admission. Cards resident Damián notified of this determination.    This document was produced using voice recognition software.      The information on this document is developed by the utilization review team in order for the business office to ensure compliance.  This only denotes the appropriateness of proper admission status and does not reflect the quality of care rendered.         The definitions of Inpatient Status and Observation Status used in making the determination above are those provided in the CMS Coverage Manual, Chapter 1 and Chapter 6, section 70.4.      Sincerely,     BENNIE FLETCHER MD    System Medical Director  Utilization Management  [facility].      "

## 2019-03-28 NOTE — DISCHARGE INSTRUCTIONS
1. Please see your PCP in approximately 1 week to discuss your recent hospital stay.  2. Please see your cardiologist in about 1 month given your troponin elevation of unclear etiology  3. Please follow up with neurology at your earliest convenience given your weakness, worsening numbness in your feet.    Going Home after Coronary Angiogram  FOR 24 HOURS:         Have an adult stay with you for 24 hours.         Relax and take it easy.         Drink plenty of fluids.         Do NOT make any important or legal decisions.         Do NOT drive or operate machines at home or at work.         Do NOT drink alcohol.     PROCEDURE SITE:  Care of groin site:         Remove the Band-Aid after 24 hours. If there is minor oozing, apply another Band-aid and remove it after 12 hours.          Do NOT take a bath, or use a hot tub or pool for at least 3 days. You may shower.          It is normal to have a small bruise or lump at the site.         Do not scrub the site.         Do not use lotion or powder near the puncture site for 3 days.         For the first 2 days: Do not stoop or squat. When you cough, sneeze or move your bowels, hold your hand over the puncture site and press gently.         Do not lift more than 10 pounds for at least 3 to 5 days.         For 2 days, do NOT have sex or do any heavy exercise.     If you start bleeding from the site in your groin:  Lie down flat and press firmly on the site.  Call your physician immediately, or, come to the emergency room.    Call 911 right away if you have bleeding that is heavy or does not stop.      DIET:  We recommend a diet low in saturated fat, trans fat and cholesterol. In addition it will be helpful to be cautious of sodium intake, sugar and carbohydrates. Try to increase the amount of lean meats you eat like fish and chicken, but avoid frying; and reduce the amount of red meat you eat. Eat more fresh fruits and vegetables and try to avoid canned and processed food.  Please reference the handouts you received for more specific information.    CALL YOUR DOCTOR IF:  -You have a large or growing lump/bump around the procedure site  -The site is red, swollen, hot, tender or has drainage  -You have hives, a rash or unusual itching  -You have increasing or worsening shortness of breath or chest pain    FOLLOW UP:  We prefer you to follow up with your primary care provider within one week.    Should you need to contact us:  Cardiology clinic for scheduling or triage nurse questions/concerns:  [phone]

## 2019-03-28 NOTE — PLAN OF CARE
Discharge  Discharged to: Home  Via: Wheelchair  Accompanied by: Wife and daughter-in-law  Belongings: All belongings with patient including clothing and glasses. No valuables checked in security. No new meds to  at pharmacy.   Teaching: Reviewed all discharge instructions with patient and wife. Discussed groin site care, signs to watch for, activity restrictions, diet changes, and when to call the MD if needed. Patient and wife verbalized understanding.   Clinic appointment: Follow-up appts reviewed with patient who verbalized how to make or change if needed.   Report called/faxed: discharge paperwork to be faxed to PMD.   Tele and IV: Removed

## 2019-03-29 NOTE — PLAN OF CARE
Occupational Therapy Discharge Summary    Reason for therapy discharge:    Discharged to home.    Progress towards therapy goal(s). See goals on Care Plan in [facility] electronic health record for goal details.  Goals not met.  Barriers to achieving goals:   discharge from facility.    Therapy recommendation(s):    Continued therapy is recommended.  Rationale/Recommendations:  TCU recommended given high falls risk but family declining. Recommend home vs outpatient therapy, use of gait belt and FWW for all mobility.

## 2019-04-01 NOTE — PROGRESS NOTES
SUBJECTIVE/OBJECTIVE:                Vincent Mishra is a 79 year old male called for a transitions of care visit.  He was discharged from [facility] on 03/28/19 for atypical chest pain-noncardiac.     Chief Complaint: Reports about 2 weeks ago he started twitching.  Wife describes when she looks at him his eyes are wide open.  Daughter has noticed him moving his arms and shoulders  when they should not be moving around.  Reports they will talk to the doctor about this unusual movement.  Wife is wondering if any of the medications he is on can cause these types of symptoms.  Personal Healthcare Goals: Feel well.    Allergies/ADRs: Reviewed in Epic  Tobacco: No tobacco use   Alcohol: none  Caffeine: all day will drink coffee and coke caffeine. Wife reports both MD and her has talked to him about lowering caffeine intake.   Activity: no activity for a long time. Back pain.   PMH: Reviewed in Epic    Medication Adherence/Access:  no issues reported  Patient uses pill box(es).  Patient takes medications 2 time(s) per day.   Per patient, misses medication 0 times per week.   Medication barriers: none.   The patient fills medications at [facility]: NO, fills medications at [facility].    Hypertension/NSTEMI/Chest pain: Current medications include aspirin 81 mg, 1 tablet daily, Plavix 75 mg, 1 tablet daily, amlodipine 5 mg, 1 tablet daily, hydrochlorothiazide 25 mg, 1 tablet daily, lisinopril 40 mg, 1 tablet daily, nitroglycerin 0.4 mg, 1 tablet under the tongue as needed for chest pain, patient reports has never had to use nitroglycerin.  Wife reports her   tolerates medications pretty well.  Reports they do not home monitor blood pressure.    Hyperlipidemia: Current therapy includes atorvastatin 80 mg once daily.  Pt reports no significant myalgias or other side effects.  The ASCVD Risk score (Ruskin DELMIS Jr., et al., 2013) failed to calculate for the following reasons:    The patient has a prior MI or stroke  diagnosis    Diabetes:  Pt currently taking metformin 1000 mg twice daily with meals. Pt is not experiencing side effects.  SMBG: two times daily.   Ranges (patient reported): Always under 178.  Patient is not experiencing hypoglycemia  Recent symptoms of high blood sugar? none  Eye exam: up to date  Foot exam: up to date  ACEi/ARB: Yes: Lisinopril.   Urine Albumin:   Lab Results   Component Value Date    UMALCR 5.36 09/20/2017   Aspirin: Taking 81mg daily and denies side effects  Diet/Exercise: watches diet, no activity.     Pain/neuropathy pain: Reports takes oxycodone 30 mg, 12-hour tablet, once every morning, reports will take 1 oxycodone 5 mg if needed during the day.  Reports takes gabapentin, 400 mg by mouth twice daily.  Reports has a few tablets left of hydromorphone 2 mg, but is not using this.  Reports has acetaminophen 325 mg tablets, and uses this very seldomly.  Reports pain is reasonably controlled.    Depression/insomnia: Reports takes sertraline 200 mg by mouth daily.  Wife reports his mood needs improvement.  Reports because of health conditions his mood needs improvement.  Reports takes trazodone 300 mg at bedtime, and is using melatonin 10 mg at bedtime.  Reports sleeping is okay.    GERD: Reports is using omeprazole 20 mg twice daily.  Reports this stable.    Constipation: Reports is using polyethylene glycol, 17 g mixed in liquid and will start using it once daily as he has constipation symptoms and has not been using it every day.    Supplements: Reports takes vitamin C 500 mg daily, calcium carbonate-vitamin D, 600- 200 mg, 2 tablets daily, cholecalciferol 1000 units, once per day cyanocobalamin 500 mcg once daily, ferrous sulfate 325 mg, once daily.  Takes all of these without difficulty.    Today's Vitals: There were no vitals taken for this visit.    ASSESSMENT:                 Current medications were reviewed today.      Medication Adherence: excellent, no issues  identified    Hypertension/NSTEMI/Chest pain: Appears stable.  Patient is meeting blood pressure goal of less than 140/90 according to average of last few blood pressures.  Continue current medications.    Hyperlipidemia: Cannot accurately assess, as do not have recent lipid panel.  On 11/20/2018, LDL was at goal.  PCP may consider rechecking LDL panel.    Diabetes: Stable.  Patient is meeting hemoglobin A1c goal of less than 8.  Continue current medication.    Pain/neuropathy pain: Stable.  It appears pain is been reasonably controlled.  We did discuss possible adverse effect from gabapentin causing ataxia, tremor.  She is asked to talk to primary care doctor about a possible dose and decrease of gabapentin to see if his movement symptoms improve/resolve.    Depression/insomnia: Depression needs improvement.  Sleep stable.  Patient seen primary care doctor and will discuss depression symptoms, due to health conditions.    GERD: Stable.    Constipation: Needs improvement.  Patient is asked to use polyethylene glycol once daily, instead is every few days.    Supplements: Hemoglobin low but stable.  Other supplements stable.  Continue current supplements.     PLAN:                  Post Discharge Medication Reconciliation Status: discharge medications reconciled and changed, per note/orders (see AVS).    1) patient asked to discuss with primary care provider about a possible lower dose of gabapentin.    2) suggested patient start taking polyethylene glycol, 17 g mixed in liquid, once per day.    I spent 60 minutes with this patient today. I offer these suggestions with the understanding that I don't fully understand Vincent's past medical history and the complexity of his health conditions. Vincent should make no changes without the approval of his physician. A copy of the visit note was provided to the patient's primary care and cardiology provider.    Will follow up in 2 weeks.    The patient was mailed a summary of  these recommendations as an after visit summary.    Chart documentation was completed in part with Dragon voice-recognition software. Even though reviewed, some grammatical, spelling, and word errors may remain.    Claire Peng, [facility] Pharmacist.   [phone]

## 2019-04-01 NOTE — LETTER
"Dear Vincent,    It was so nice to speak with your wife, Sheyla on April 1st about your medications.  I hope I was able to give her some useful information during our Medication Therapy Management (MTM) visit. The purpose of this visit with a clinical pharmacist was to review the medicines you received when discharged from the hospital. We want to make sure that you know which medicines to take and what they are for. We also want to make sure all your medicines are working, safe, and as easy to take as possible.    Enclosed is a summary of the suggestions we talked about and any other thoughts I had. There is also a list of your medicines included. This information has also been shared with your primary care provider.    Feel free to call if you have any questions or concerns. By working together with you and your doctor, I hope to help you feel confident managing your medicines and improving your quality of life.    Next MTM visit: I will call you again to follow up on Monday, April 15th at 1:30. This will appear as a \"telemed\" appointment. Please DO NOT come to this appointment.          Best wishes,         Claire Peng, [facility] Pharmacist.   [phone]                    "

## 2019-04-04 PROBLEM — M54.2 CERVICAL PAIN: Status: ACTIVE | Noted: 2019-01-01

## 2019-04-04 NOTE — LETTER
DEPARTMENT OF HEALTH AND HUMAN SERVICES  CENTERS FOR MEDICARE & MEDICAID SERVICES    PLAN/UPDATED PLAN OF PROGRESS FOR OUTPATIENT REHABILITATION    PATIENTS NAME:  Vincent Mishra   : 1940    PROVIDER NUMBER:    [id number]    HICN:  [id number]    PROVIDER NAME: [employer] [facility] [employer]    MEDICAL RECORD NUMBER: [id number]     START OF CARE DATE:  SOC Date: 19   TYPE:  PT    PRIMARY/TREATMENT DIAGNOSIS: (Pertinent Medical Diagnosis)     Cervical disc disorder with radiculopathy  Cervical pain    VISITS FROM START OF CARE:  Rxs Used: 1     [employer] [facility] Initial Evaluation  Subjective:    Vincent Mishra is a 79 year old male with a cervical spine condition.  Condition occurred with:  Degenerative joint disease.  Condition occurred: at home.  This is a chronic condition  Pt reports cervical neck pain started 3/1/19. Hurts with looking to the right and sleeping on the right side..    Patient reports pain:  Cervical left side.  Radiates to:  None.  Pain is described as aching and is intermittent and reported as 7/10.  Associated symptoms:  Loss of motion/stiffness. Pain is worse during the day.  Exacerbated by: looking right, laying on right side. and relieved by nothing.  Since onset symptoms are unchanged.                                        Please check all that apply to your current or past medical history:  Cancer, depression, diabetes and heart problems  Other surgeries:  Cancer surgery and heart surgery  Medications you are currently taking:  Anti-depressants, high blood pressure medication, sleep medication and pain medication  Occupation comment:  Retired     Barriers at home/work:  Stairs and requires assistance with ADL's  Red flags:  None as reported by the patient         Cervical/Thoracic Evaluation  AROM:  AROM Cervical:  Flexion:          100%  Extension:       50%  Rotation:         Left: 50%     Right: 50%  Side Bend:      Left:     Right:   Headaches:  cervical     PATIENTS NAME:  Vincent Mishra   : 1940              Assessment/Plan:    Patient is a 78 year old male with lower extremity complaints.    Patient has the following significant findings with corresponding treatment plan.                Diagnosis 1:  Physical Deconditioning  Pain -  manual therapy, self management, education and home program  Decreased ROM/flexibility - manual therapy and therapeutic exercise  Decreased joint mobility - manual therapy and therapeutic exercise  Decreased strength - therapeutic exercise and therapeutic activities  Impaired balance - neuro re-education and therapeutic activities  Decreased proprioception - neuro re-education and therapeutic activities  Impaired gait - gait training, assistive devices and home program         Therapy Evaluation Codes:   1. History comprised of:                 Personal factors that impact the plan of care:                                  Overall behavior pattern, Past/current experiences and Time since onset of symptoms.                  Comorbidity factors that impact the plan of care are:                                  Cancer, Chemical Dependency, Diabetes, Depression, Heart problems and High blood pressure.                   Medications impacting care: Anti-depressant, High blood pressure, Pain and Sleep.  2. Examination of Body Systems comprised of:                 Body structures and functions that impact the plan of care:                                  Cervical spine.                 Activity limitations that impact the plan of care are:                                  Dressing, Lifting, Squatting/kneeling, Stairs, Standing, Walking, Sleeping and Laying down.  3. Clinical presentation characteristics are:                  Evolving  4. Decision-Making                                 Medium complexity using standardized patient assessment instrument and/or measureable assessment of functional outcome.  Cumulative Therapy  "Evaluation is:  Medium complexity.    Frequency:  1 X week, once daily  Duration:  for 10 weeks  Discharge Plan:  Achieve all LTG.  Independent in home treatment program.  Reach maximal therapeutic benefit.                        PATIENTS NAME:  Vincent Mishra   : 1940      Caregiver Signature/Credentials _____________________________ Date ________       Treating Provider: Luis Felipe HALLT     I have reviewed and certified the need for these services and plan of treatment while under my care.        PHYSICIAN'S SIGNATURE:   _________________________________________  Date___________   Ramya Velez MD    Certification period:  Beginning of Cert date period: 19 to  End of Cert period date: 19     Functional Level Progress Report: Please see attached \"Goal Flow sheet for Functional level.\"    ____X____ Continue Services or       ________ DC Services                Service dates: From  SOC Date: 19 date to present                         "

## 2019-04-04 NOTE — PROGRESS NOTES
[facility] Initial Evaluation  Subjective:    Vincent Mishra is a 79 year old male with a cervical spine condition.  Condition occurred with:  Degenerative joint disease.  Condition occurred: at home.  This is a chronic condition  Pt reports cervical neck pain started 3/1/19. Hurts with looking to the right and sleeping on the right side..    Patient reports pain:  Cervical left side.  Radiates to:  None.  Pain is described as aching and is intermittent and reported as 7/10.  Associated symptoms:  Loss of motion/stiffness. Pain is worse during the day.  Exacerbated by: looking right, laying on right side. and relieved by nothing.  Since onset symptoms are unchanged.                                                    Please check all that apply to your current or past medical history:  Cancer, depression, diabetes and heart problems  Other surgeries:  Cancer surgery and heart surgery  Medications you are currently taking:  Anti-depressants, high blood pressure medication, sleep medication and pain medication  Occupation comment:  Retired     Barriers at home/work:  Stairs and requires assistance with ADL's  Red flags:  None as reported by the patient                        Objective:                                   ROS       Objective:  System              Cervical/Thoracic Evaluation    AROM:  AROM Cervical:    Flexion:          100%  Extension:       50%  Rotation:         Left: 50%     Right: 50%  Side Bend:      Left:     Right:       Headaches: cervical                                                           Assessment/Plan:    Patient is a 78 year old male with lower extremity complaints.    Patient has the following significant findings with corresponding treatment plan.                Diagnosis 1:  Physical Deconditioning  Pain -  manual therapy, self management, education and home program  Decreased ROM/flexibility - manual therapy and therapeutic exercise  Decreased joint mobility  - manual therapy and therapeutic exercise  Decreased strength - therapeutic exercise and therapeutic activities  Impaired balance - neuro re-education and therapeutic activities  Decreased proprioception - neuro re-education and therapeutic activities  Impaired gait - gait training, assistive devices and home program     Therapy Evaluation Codes:   1. History comprised of:                 Personal factors that impact the plan of care:                                  Overall behavior pattern, Past/current experiences and Time since onset of symptoms.                  Comorbidity factors that impact the plan of care are:                                  Cancer, Chemical Dependency, Diabetes, Depression, Heart problems and High blood pressure.                   Medications impacting care: Anti-depressant, High blood pressure, Pain and Sleep.  2. Examination of Body Systems comprised of:                 Body structures and functions that impact the plan of care:                                 Cervical spine.                 Activity limitations that impact the plan of care are:                                  Dressing, Lifting, Squatting/kneeling, Stairs, Standing, Walking, Sleeping and Laying down.  3. Clinical presentation characteristics are:                 Evolving  4. Decision-Making                                Medium complexity using standardized patient assessment instrument and/or measureable assessment of functional outcome.  Cumulative Therapy Evaluation is: Medium complexity.      Frequency:  1 X week, once daily  Duration:  for 10 weeks  Discharge Plan:  Achieve all LTG.  Independent in home treatment program.  Reach maximal therapeutic benefit.    Please refer to the daily flowsheet for treatment today, total treatment time and time spent performing 1:1 timed codes.     .
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
## 2019-04-05 NOTE — PATIENT INSTRUCTIONS
"Recommendations from today's MTM visit:                                                    MTM (medication therapy management) is a service provided by a clinical pharmacist designed to help you get the most of out of your medicines.   Today we reviewed what your medicines are for, how to know if they are working, that your medicines are safe and how to make your medicine regimen as easy as possible.     1) Discuss with primary care provider about a possible lower dose of gabapentin. Gabapentin may cause ataxia (lack of voluntary coordination of muscle movements) and tremor (shaking).    2) Try taking polyethylene glycol, 17 g mixed in liquid, once per day for constipation symptoms. You can go back to using it only as needed after symptoms resolve.    Next MTM visit: I will call you again to follow up on Monday, April 15th at 1:30. This will appear as a \"telemed\" appointment. Please DO NOT come to this appointment.         To schedule another MTM appointment, please call the clinic directly or you may call the MTM scheduling line at [phone] or toll-free at [phone].     My Clinical Pharmacist's contact information:                                                      It was a pleasure talking with you today!  Please feel free to contact me with any questions or concerns you have.      Claire Peng, [facility] Pharmacist.   [phone]      You may receive a survey about the MTM services you received by email and/or US Mail.  I would appreciate your feedback to help me serve you better in the future. Your comments will be anonymous.        "

## 2019-04-08 NOTE — TELEPHONE ENCOUNTER
Pres[place] [facility] [place].    Polly COSTELLO RN

## 2019-04-08 NOTE — TELEPHONE ENCOUNTER
"ONE TOUCH ULTRA BLUE TESTST(NEW)100      Last Written Prescription Date:  1/10/19  Last Fill Quantity: 200,   # refills: 0  Last Office Visit: 4/5/19  Future Office visit:    Next 5 appointments (look out 90 days)    Apr 15, 2019  1:30 PM CDT  (Arrive by 1:15 PM)  [facility] [address] [place] [address]  [address]  [facility] [address] [phone]           Requested Prescriptions   Pending Prescriptions Disp Refills     blood glucose (ONETOUCH ULTRA) test strip [Pharmacy Med Name: ONE TOUCH ULTRA BLUE TESTST(NEW)100] 200 strip 0     Sig: TEST BLOOD SUGAR TWICE DAILY OR AS DIRECTED       Diabetic Supplies Protocol Passed - 4/6/2019 10:12 AM        Passed - Medication is active on med list        Passed - Patient is 18 years of age or older        Passed - Recent (6 mo) or future (30 days) visit within the authorizing provider's specialty     Patient had office visit in the last 6 months or has a visit in the next 30 days with authorizing provider.  See \"Patient Info\" tab in inbasket, or \"Choose Columns\" in Meds & Orders section of the refill encounter.              "

## 2019-04-12 NOTE — PROGRESS NOTES
INITIAL NEUROLOGY CONSULTATION    DATE OF VISIT: 4/16/2019  CLINIC LOCATION: [facility]  MRN: [id number]  PATIENT NAME: Vincent Mishra  YOB: 1940    PRIMARY CARE PROVIDER: Keyla Fonseca MD     REASON FOR VISIT:   Chief Complaint   Patient presents with     Tremors     whole body      Neck Pain     left side     HISTORY OF PRESENT ILLNESS:                                                    Mr. Vincent Mishra is 79 year old right handed male patient with past medical history of B12 deficiency, polyneuropathy, headaches, cervical radiculopathy, hypertension, depression, left renal cell carcinoma, prostate cancer, low back pain, status post multiple lumbar surgeries, and diabetes mellitus type 2, who was seen in consultation today requested by Marianela Caal PA-C, for generalized weakness and numbness in both feet.  The patient is more concerned about ongoing left-sided neck pain and intermittent abnormal movements.  The patient is accompanied by his wife, who participates in interview.    Per patient's report, intermittent involuntary movements of the upper body, arms, and head started approximately 6-8 weeks ago in context of increasing left-sided neck pain that worsened in the last 3-4 months.  Initially, the movements were triggered by neck pain, but now seem to occur even without it.  He also feels that he cannot turn his head to the left because of increased left cervical pain.  Symptoms are more pronounced when he is trying to walk and better when he is sitting down.  Several weeks ago his dogs pulled him while he was sitting and holding the leash.  He fell down and landed on the right side of his face.  In addition, for the last 2 years he has trouble walking that continues to worsen.  He has chronic diplopia for many years and difficulty with balance.  He also has left leg weakness related to his lumbar spinal stenosis and lumbar radiculopathies that did not improved  following several lumbar surgeries.  Got physical therapy in the past, but recently due to his wife illness, it was on hold.  Plans to resume it next Monday.  He denies any additional focal neurological symptoms.  No recent medication changes.  He is on 400 mg of gabapentin twice daily and OxyContin/oxycodone for his chronic pain.  Also takes sertraline and trazodone for insomnia and mental health problems.    The patient was previously seen by [place] neurologist, Dr. Pretty (2012).  According to his notes, the patient was evaluated for diplopia, neck pain, and difficulty with balance.  At that time he was found to have low vitamin B12 level (230), though his MMA was normal.  Acetylcholine receptor antibodies (binding, blocking, and modulating) and striated muscle antibodies were negative.  Sed rate was normal.  LISSY was elevated at 10.5, and rheumatologic evaluation was recommended.  On the exam it felt that the patient has findings of peripheral polyneuropathy, secondary to his diabetes and possibly B12 deficiency.  In addition, it felt that neurogenic claudication is also possible due to his reports of feeling better while leaning forward.    Most recent labs from March 2019 include normal hemoglobin A1C of 5.3, elevated creatinine of 1.32, and low hemoglobin of 10.2 with normal MCV (81).  TSH was normal in November 2018 (3.06).  Vitamin B12 was normal in 2017 (779).    Cervical spine CT without contrast from 09/17/2018 demonstrated no evidence of fracture or subluxation in cervical spine.  Multilevel degenerative changes were noted.  Head CT from the same day demonstrated no evidence of acute intracranial pathology.    Brain MRI with and without contrast from 03/27/2019, performed for spastic movements of head and arms, did not demonstrate any acute intracranial pathology and no significant changes compared to the scan from 2015.  Diffuse parenchymal volume loss and white matter changes consistent with small  vessel ischemic disease were noted.    MRI of the lumbar spine from 10/05/2017 mistreated L2-3 degenerative disc and facet disease with moderate central canal and severe bilateral neuroforaminal stenosis (progressed since 2016), mild central and severe bilateral neuroforaminal stenosis at L3-4 secondary to degenerative disc disease and facet disease with intradiscal metallic device which could be fusion or disc replacement (stable compared to 2016), and interval decrease in small right paramedial T12-L1 disc protrusion.  Solid posterior fusion from L4-S1 is also noted.    No additional useful information is available in Care Everywhere, which was reviewed.    Review of Systems - the patient endorses fatigue, weight loss, chest pain, bilateral hearing loss, chronic diplopia (previously evaluated), constipation, depression, arthritis, and memory problems.  All of them have been previously discussed with other medical providers. Otherwise, he denies any other complaints on 14-point comprehensive review of systems.  PAST MEDICAL/SURGICAL HISTORY:                                                    I personally reviewed patient's past medical and surgical history with the patient at today's visit.  Patient Active Problem List   Diagnosis     Tension headache     Parotid mass     Diplopia     Neuropathy     Neck pain     B12 deficiency     Tear of meniscus of left knee     Hypertension goal BP (blood pressure) < 140/90     C6-7 disc with radiculopathy     Right bundle branch block     Chest pain     Anatomic airway obstruction     Abnormal antinuclear antibody titer     Osteoarthritis, knee     Moderate major depression (H)     Orchitis, epididymitis, and epididymo-orchitis     Malignant neoplasm of kidney excluding renal pelvis (H)     Renal mass, left     Health Care Home     Insomnia     Abdominal pain, right upper quadrant     Esophageal reflux     Hard of hearing     Constipation     NSTEMI (non-ST elevated myocardial  infarction) (H)     Myocardial infarction, nontransmural (H)     Acute myocardial infarction of inferolateral wall (H)     Obesity     Sinoatrial node dysfunction (H)     Right bundle branch block (RBBB)     Essential hypertension     Hyperlipidemia     Type 2 diabetes mellitus with other circulatory complication, without long-term current use of insulin (H)     Thyroid nodule     Hip pain, bilateral     Claudication (H)     Bilateral low back pain with right-sided sciatica     Bilateral low back pain with left-sided sciatica     ACS (acute coronary syndrome) (H)     Chronic bilateral low back pain with sciatica, sciatica laterality unspecified     Severe episode of recurrent major depressive disorder, without psychotic features (H)     Renal hematoma     Skin picking habit     Iron deficiency anemia, unspecified iron deficiency anemia type     Atypical chest pain     Cervical pain     Past Medical History:   Diagnosis Date     CAD (coronary artery disease) 7/26/2011 7/2011 ([place]): s/p MI, PTCA - RCA      Cancer of kidney (H)      Chest pain 1/12/2012     Coronary artery disease      History of angina      History of blood transfusion      Hyperlipidaemia      Hyperlipidemia LDL goal <100 9/23/2010     Malignant neoplasm (H)     Prostate CA (removed)     Myocardial infarction (H)     s/p MI 7/29/14, stent placement     NSTEMI (non-ST elevated myocardial infarction) (H)     07-25-14 CATH- RCA, L.Main and CFX  had minor luminal irregularites. Mid LAD high grade stenosis 80-90%.Stent placed to mid LAD     Other and unspecified hyperlipidemia     MI in July 2011     Right bundle branch block      Type II or unspecified type diabetes mellitus without mention of complication, not stated as uncontrolled      Unspecified essential hypertension      Past Surgical History:   Procedure Laterality Date     ARTHROSCOPY KNEE  2/11/2011    ARTHROSCOPY KNEE performed by LEY, JEFFREY DUANE at [facility]     ARTHROSCOPY KNEE WITH  MEDIAL MENISCECTOMY  6/26/2012    Procedure: ARTHROSCOPY KNEE WITH MEDIAL MENISCECTOMY;  Right Knee Arthroscopy With Medial Menisectomy;  Surgeon: Ley, Jeffrey Duane, MD;  Location: [facility]     BACK SURGERY      back surgery x4     BIOPSY       CARDIAC SURGERY  7/2011    stentt placed     COLONOSCOPY       CORONARY ANGIOGRAPHY ADULT ORDER  07-25-14    RCA, L.Main and CFX  had minor luminal irregularites. Mid LAD high grade stenosis 80-90%.Stent placed to mid LAD     CV CORONARY ANGIOGRAM  3/28/2019    Procedure: CV CORONARY ANGIOGRAM;  Surgeon: Dominguez Mishra MD;  Location: [facility] CARDIAC CATH LAB     ESOPHAGOSCOPY, GASTROSCOPY, DUODENOSCOPY (EGD), COMBINED  10/25/2012    Procedure: COMBINED ESOPHAGOSCOPY, GASTROSCOPY, DUODENOSCOPY (EGD), BIOPSY SINGLE OR MULTIPLE;  Gastroscopy  ;  Surgeon: Lucius Dasilva MD;  Location: [facility]     HEART CATH, ANGIOPLASTY  07-25-14    mid LAD      ORTHOPEDIC SURGERY       back surgery     MEDICATIONS:                                                    I personally reviewed patient's medications and allergies with the patient at today's visit.  Current Outpatient Medications on File Prior to Visit:  acetaminophen (TYLENOL) 325 MG tablet Take 2 tablets (650 mg) by mouth every 4 hours as needed for mild pain   amLODIPine (NORVASC) 5 MG tablet Take 1 tablet (5 mg) by mouth daily   ascorbic acid (VITAMIN C) 500 MG tablet Take 500 mg by mouth daily    aspirin (ASA) 81 MG tablet Take 81 mg by mouth daily   atorvastatin (LIPITOR) 80 MG tablet TAKE 1 TABLET BY MOUTH DAILY (Patient taking differently: TAKE 1 TABLET BY MOUTH EVERY EVENING)   blood glucose (ONETOUCH ULTRA) test strip TEST BLOOD SUGAR TWICE DAILY OR AS DIRECTED   blood glucose monitoring (NO BRAND SPECIFIED) meter device kit Use to test blood sugar 1 times daily or as directed.   Calcium Carbonate-Vitamin D (CALCIUM + D) 600-200 MG-UNIT per tablet Take 2 tablets by mouth daily.   cholecalciferol (VITAMIN  -D) 1000 UNITS  capsule Take 1 capsule by mouth daily   clopidogrel (PLAVIX) 75 MG tablet TAKE 1 TABLET BY MOUTH DAILY   Cyanocobalamin (VITAMIN B-12 PO) Take 500 mcg by mouth daily    ferrous sulfate (FE TABS) 325 (65 Fe) MG EC tablet Take 1 tablet (325 mg) by mouth daily   gabapentin (NEURONTIN) 100 MG capsule Take 4 capsules (400 mg) by mouth 2 times daily   hydrochlorothiazide (HYDRODIURIL) 25 MG tablet TAKE 1 TABLET BY MOUTH DAILY   Lancets Misc. (SELECT-LITE DEVICE/LANCETS) KIT 1 kit 2 times daily   [employer] FINEPOINT LANCETS MISC Use to test blood sugars 1 times daily or as directed.   lisinopril (PRINIVIL/ZESTRIL) 40 MG tablet TAKE 1 TABLET(40 MG) BY MOUTH DAILY   Melatonin 10 MG TBDP Take 10 mg by mouth At Bedtime   metFORMIN (GLUCOPHAGE-XR) 500 MG 24 hr tablet TAKE 2 TABLETS BY MOUTH TWICE DAILY WITH MEALS.   nitroGLYcerin (NITROSTAT) 0.4 MG sublingual tablet Place 1 tablet (0.4 mg) under the tongue every 5 minutes as needed for chest pain   omeprazole (PRILOSEC) 20 MG CR capsule TAKE ONE CAPSULE BY MOUTH TWICE DAILY   order for DME Equipment being ordered: walker wheeled also include  With skis for indoor carpet use   order for DME Equipment being ordered: compression stockings   order for DME Equipment being ordered: TENS   order for DME Equipment being ordered: Wheelchair motorized for patient with spinal stenosis and neurogenic claudication   oxyCODONE (OXYCONTIN) 30 MG 12 hr tablet Take 1 tablet (30 mg) by mouth every morning   oxyCODONE (ROXICODONE) 5 MG tablet Take 1 daily as needed  for severe pain /at bedtime may have up to 2 per day max 40 per month   polyethylene glycol (MIRALAX/GLYCOLAX) powder Take 17 g (1 capful) by mouth daily   sertraline (ZOLOFT) 100 MG tablet TAKE 2 TABLETS(200 MG) BY MOUTH DAILY   traZODone (DESYREL) 100 MG tablet TAKE 3 TABLETS(300 MG) BY MOUTH EVERY NIGHT AT BEDTIME AS NEEDED FOR SLEEP     ALLERGIES:                                                      Allergies   Allergen Reactions  "    Prozac [Fluoxetine] Other (See Comments)     \"I went crazy.\"       Benadryl [Diphenhydramine Hcl] Other (See Comments)     Starts to shake, and paranoid     Codeine Itching     Diphenhydramine Other (See Comments)     Hallucinations, See [facility] records scanned on 7/16/15     Labetalol Other (See Comments)     See [facility] records scanned on 7/16/15  Bradycardia down to 30 on holter, dizziness. Stopped med and symptoms resolved     Metoprolol Other (See Comments)     Bradycardia even at 12.5 mg     Morphine Visual Disturbance     FAMILY/SOCIAL HISTORY:                                                    Family and social history was reviewed with the patient at today's visit.  No family history of neurological disorders.  Never smoker.  Denies current alcohol and recreational drug use.  , lives with family.  Retired.  REVIEW OF SYSTEMS:                                                    Patient has completed a [facility] Patient Health History, including a 14-system review, which was personally reviewed, and pertinent positives are listed in HPI. He denies any additional problems on the further questioning.  EXAM:                                                    VITAL SIGNS:   /82 (BP Location: Left arm, Patient Position: Sitting, Cuff Size: Adult Regular)   Pulse 64   Temp 98.3  F (36.8  C) (Oral)   Resp 16   Wt 88 kg (194 lb)   SpO2 97%   BMI 29.50 kg/m     Repeat blood pressure: /80   Pulse 66   Temp 98.3  F (36.8  C) (Oral)   Resp 16   Wt 88 kg (194 lb)   SpO2 97%   BMI 29.50 kg/m  .  Mini-Cog Assessment:  Mini Cog Assessment  Clock Draw Score: 2 Normal  3 Item Recall: 3 objects recalled  Mini Cog Total Score: 5  Administered by: : Karthik Carney MA    General: pt is in NAD, cooperative.  Skin: normal turgor, moist mucous membranes, no lesions/rashes noticed.  HEENT: ATNC, EOMI, PERRL, white sclera, normal conjunctiva, no " nystagmus or ptosis. No carotid bruits bilaterally.  Respiratory: lung sounds clear to auscultation bilaterally, no crackles, wheezes, rhonchi. Symmetric lung excursion, no accessory respiratory muscle use.  Cardiovascular: normal S1/S2, no murmurs/rubs/gallops.   Abdomen: Not distended.  : deferred.    Neurological:  Mental: alert, follows commands, Mini Cog Total Score: 5/5 with 3/3 on memory recall, no aphasia or dysarthria. Fund of knowledge is appropriate for age.  Cranial Nerves:  CN II: visual acuity - able to accurately count fingers with each eye. Visual fields intact, fundi: discs sharp, no papilledema and normal vessels bilaterally.  CN III, IV, VI: EOM intact, pupils equal and reactive  CN V: facial sensation nl  CN VII: face symmetric, no facial droop  CN VIII: hearing bilaterally reduced  CN IX: palate elevation symmetric, uvula at midline  CN XI SCM normal, shoulder shrug nl  CN XII: tongue midline  Motor: Strength: 5/5 in all major groups of both upper extremities and the right leg, left knee extension and dorsiflexion are 4/5, the rest of the strength in the left lower extremity is preserved. Normal tone.  No significant tremor.  The patient noted to have mild asterixis.  The patient completed tremor spiral worksheet that can be scanned into his medical record.  No other abnormal movements. No pronator drift b/l.  Reflexes: Triceps, biceps, brachioradialis are symmetric, patellar (absent on the right, reduced on the left), and achilles reflexes absent bilaterally. No clonus noted. Toes are down-going b/l.   Sensory: temperature, light touch, pinprick, and vibration reduced in both lower extremities, left more than right.   Coordination: FNF intact b/l.   Gait: Unsteady, walks with a walker and left foot steppage.  DATA:   LABS/IMAGING/OTHER STUDIES: I reviewed pertinent medical records, including previous neurology notes, personal review of spine and brain MRI/head CT images, and Care Everywhere,  as detailed in the history of present illness.  ASSESSMENT AND PLAN:      ASSESSMENT: Vincent Mishra is a 79 year old male patient with past medical history of B12 deficiency, polyneuropathy, headaches, cervical radiculopathy, hypertension, depression, left renal cell carcinoma, prostate cancer, low back pain, status post multiple lumbar surgeries, and diabetes mellitus type 2, who presents with intermittent abnormal movements of his head, both arms, and upper body started approximately 6-8 weeks ago in context of increasing left neck pain and difficulty walking following a fall.    We had a prolonged discussion with the patient and his wife regarding his symptoms.  His neurological exam likely reflects chronic changes related to peripheral polyneuropathy and multilevel lumbar radiculopathy.  At the same time, the patient reports worsening of left neck pain following the fall, which could exacerbate his cervical radiculopathy or cause compressive myelopathy, especially in light of the perceived worsening of chronic gait difficulty.  I ordered cervical spine MRI for further evaluation.    The clinical presentation of abnormal movements could be related to side effects of the medications, especially gabapentin and OxyContin/oxycodone.  They should be gradually reduced to see if movements resolve given his mild asterixis on the exam and impaired renal function (gabapentin is renally-cleared).  If his movements continue, we might consider referral to movement clinic at the [facility].    Vincent to follow up with Primary Care provider regarding elevated blood pressure.    DIAGNOSES:    ICD-10-CM    1. Abnormal involuntary movement R25.9    2. Cervicalgia M54.2 MR Cervical Spine w/o Contrast   3. Difficulty walking R26.2 MR Cervical Spine w/o Contrast     PLAN: At today's visit we thoroughly discussed various diagnostic possibilities for patient's symptoms, necessary evaluation, and the plan, which  includes:  Orders Placed This Encounter   Procedures     MR Cervical Spine w/o Contrast     We discussed that certain medications could cause abnormal movements and I advised the patient to reduce gabapentin and oxycodone to see if they improve.    Additional recommendations after the work-up.    Next follow-up appointment is in the next 2 weeks or earlier if needed.    Total Time:  82 minutes with > 50% spent counseling the patient and his wife on stated above assessment and recommendations, including nature of the diagnosis, needed w/u, and proposed plan.  Additional time was used to answer questions regarding patient's symptoms, my recommendations, and the plan.    Solomon Patrick MD  / Neurology  [place]  (Chart documentation was completed in part with Dragon voice-recognition software. Even though reviewed, some grammatical, spelling, and word errors may remain.)

## 2019-04-16 NOTE — LETTER
4/16/2019         RE: Vincent Mishra  [address] [place] [address]        Dear Colleague,    Thank you for referring your patient, Vincent Mishra, to the [facility]. Please see a copy of my visit note below.    INITIAL NEUROLOGY CONSULTATION    DATE OF VISIT: 4/16/2019  CLINIC LOCATION: [facility]  MRN: [id number]  PATIENT NAME: Vincent Mishra  YOB: 1940    PRIMARY CARE PROVIDER: Keyla Fonseca MD     REASON FOR VISIT:   Chief Complaint   Patient presents with     Tremors     whole body      Neck Pain     left side     HISTORY OF PRESENT ILLNESS:                                                    Mr. Vincent Mishra is 79 year old right handed male patient with past medical history of B12 deficiency, polyneuropathy, headaches, cervical radiculopathy, hypertension, depression, left renal cell carcinoma, prostate cancer, low back pain, status post multiple lumbar surgeries, and diabetes mellitus type 2, who was seen in consultation today requested by Marianela Caal PA-C, for generalized weakness and numbness in both feet.  The patient is more concerned about ongoing left-sided neck pain and intermittent abnormal movements.  The patient is accompanied by his wife, who participates in interview.    Per patient's report, intermittent involuntary movements of the upper body, arms, and head started approximately 6-8 weeks ago in context of increasing left-sided neck pain that worsened in the last 3-4 months.  Initially, the movements were triggered by neck pain, but now seem to occur even without it.  He also feels that he cannot turn his head to the left because of increased left cervical pain.  Symptoms are more pronounced when he is trying to walk and better when he is sitting down.  Several weeks ago his dogs pulled him while he was sitting and holding the leash.  He fell down and landed on the right side of his face.  In addition, for the last 2 years  he has trouble walking that continues to worsen.  He has chronic diplopia for many years and difficulty with balance.  He also has left leg weakness related to his lumbar spinal stenosis and lumbar radiculopathies that did not improved following several lumbar surgeries.  Got physical therapy in the past, but recently due to his wife illness, it was on hold.  Plans to resume it next Monday.  He denies any additional focal neurological symptoms.  No recent medication changes.  He is on 400 mg of gabapentin twice daily and OxyContin/oxycodone for his chronic pain.  Also takes sertraline and trazodone for insomnia and mental health problems.    The patient was previously seen by [place] neurologist, Dr. Pretty (2012).  According to his notes, the patient was evaluated for diplopia, neck pain, and difficulty with balance.  At that time he was found to have low vitamin B12 level (230), though his MMA was normal.  Acetylcholine receptor antibodies (binding, blocking, and modulating) and striated muscle antibodies were negative.  Sed rate was normal.  LISSY was elevated at 10.5, and rheumatologic evaluation was recommended.  On the exam it felt that the patient has findings of peripheral polyneuropathy, secondary to his diabetes and possibly B12 deficiency.  In addition, it felt that neurogenic claudication is also possible due to his reports of feeling better while leaning forward.    Most recent labs from March 2019 include normal hemoglobin A1C of 5.3, elevated creatinine of 1.32, and low hemoglobin of 10.2 with normal MCV (81).  TSH was normal in November 2018 (3.06).  Vitamin B12 was normal in 2017 (779).    Cervical spine CT without contrast from 09/17/2018 demonstrated no evidence of fracture or subluxation in cervical spine.  Multilevel degenerative changes were noted.  Head CT from the same day demonstrated no evidence of acute intracranial pathology.    Brain MRI with and without contrast from 03/27/2019, performed  for spastic movements of head and arms, did not demonstrate any acute intracranial pathology and no significant changes compared to the scan from 2015.  Diffuse parenchymal volume loss and white matter changes consistent with small vessel ischemic disease were noted.    MRI of the lumbar spine from 10/05/2017 mistreated L2-3 degenerative disc and facet disease with moderate central canal and severe bilateral neuroforaminal stenosis (progressed since 2016), mild central and severe bilateral neuroforaminal stenosis at L3-4 secondary to degenerative disc disease and facet disease with intradiscal metallic device which could be fusion or disc replacement (stable compared to 2016), and interval decrease in small right paramedial T12-L1 disc protrusion.  Solid posterior fusion from L4-S1 is also noted.    No additional useful information is available in Care Everywhere, which was reviewed.    Review of Systems - the patient endorses fatigue, weight loss, chest pain, bilateral hearing loss, chronic diplopia (previously evaluated), constipation, depression, arthritis, and memory problems.  All of them have been previously discussed with other medical providers. Otherwise, he denies any other complaints on 14-point comprehensive review of systems.  PAST MEDICAL/SURGICAL HISTORY:                                                    I personally reviewed patient's past medical and surgical history with the patient at today's visit.  Patient Active Problem List   Diagnosis     Tension headache     Parotid mass     Diplopia     Neuropathy     Neck pain     B12 deficiency     Tear of meniscus of left knee     Hypertension goal BP (blood pressure) < 140/90     C6-7 disc with radiculopathy     Right bundle branch block     Chest pain     Anatomic airway obstruction     Abnormal antinuclear antibody titer     Osteoarthritis, knee     Moderate major depression (H)     Orchitis, epididymitis, and epididymo-orchitis     Malignant  neoplasm of kidney excluding renal pelvis (H)     Renal mass, left     Health Care Home     Insomnia     Abdominal pain, right upper quadrant     Esophageal reflux     Hard of hearing     Constipation     NSTEMI (non-ST elevated myocardial infarction) (H)     Myocardial infarction, nontransmural (H)     Acute myocardial infarction of inferolateral wall (H)     Obesity     Sinoatrial node dysfunction (H)     Right bundle branch block (RBBB)     Essential hypertension     Hyperlipidemia     Type 2 diabetes mellitus with other circulatory complication, without long-term current use of insulin (H)     Thyroid nodule     Hip pain, bilateral     Claudication (H)     Bilateral low back pain with right-sided sciatica     Bilateral low back pain with left-sided sciatica     ACS (acute coronary syndrome) (H)     Chronic bilateral low back pain with sciatica, sciatica laterality unspecified     Severe episode of recurrent major depressive disorder, without psychotic features (H)     Renal hematoma     Skin picking habit     Iron deficiency anemia, unspecified iron deficiency anemia type     Atypical chest pain     Cervical pain     Past Medical History:   Diagnosis Date     CAD (coronary artery disease) 7/26/2011 7/2011 ([place]): s/p MI, PTCA - RCA      Cancer of kidney (H)      Chest pain 1/12/2012     Coronary artery disease      History of angina      History of blood transfusion      Hyperlipidaemia      Hyperlipidemia LDL goal <100 9/23/2010     Malignant neoplasm (H)     Prostate CA (removed)     Myocardial infarction (H)     s/p MI 7/29/14, stent placement     NSTEMI (non-ST elevated myocardial infarction) (H)     07-25-14 CATH- RCA, L.Main and CFX  had minor luminal irregularites. Mid LAD high grade stenosis 80-90%.Stent placed to mid LAD     Other and unspecified hyperlipidemia     MI in July 2011     Right bundle branch block      Type II or unspecified type diabetes mellitus without mention of complication, not  stated as uncontrolled      Unspecified essential hypertension      Past Surgical History:   Procedure Laterality Date     ARTHROSCOPY KNEE  2/11/2011    ARTHROSCOPY KNEE performed by LEY, JEFFREY DUANE at [facility]     ARTHROSCOPY KNEE WITH MEDIAL MENISCECTOMY  6/26/2012    Procedure: ARTHROSCOPY KNEE WITH MEDIAL MENISCECTOMY;  Right Knee Arthroscopy With Medial Menisectomy;  Surgeon: Ley, Jeffrey Duane, MD;  Location: [facility]     BACK SURGERY      back surgery x4     BIOPSY       CARDIAC SURGERY  7/2011    stentt placed     COLONOSCOPY       CORONARY ANGIOGRAPHY ADULT ORDER  07-25-14    RCA, L.Main and CFX  had minor luminal irregularites. Mid LAD high grade stenosis 80-90%.Stent placed to mid LAD     CV CORONARY ANGIOGRAM  3/28/2019    Procedure: CV CORONARY ANGIOGRAM;  Surgeon: Dominguez Mishra MD;  Location: [facility] CARDIAC CATH LAB     ESOPHAGOSCOPY, GASTROSCOPY, DUODENOSCOPY (EGD), COMBINED  10/25/2012    Procedure: COMBINED ESOPHAGOSCOPY, GASTROSCOPY, DUODENOSCOPY (EGD), BIOPSY SINGLE OR MULTIPLE;  Gastroscopy  ;  Surgeon: Lucius Dasilva MD;  Location: [facility]     HEART CATH, ANGIOPLASTY  07-25-14    mid LAD      ORTHOPEDIC SURGERY       back surgery     MEDICATIONS:                                                    I personally reviewed patient's medications and allergies with the patient at today's visit.  Current Outpatient Medications on File Prior to Visit:  acetaminophen (TYLENOL) 325 MG tablet Take 2 tablets (650 mg) by mouth every 4 hours as needed for mild pain   amLODIPine (NORVASC) 5 MG tablet Take 1 tablet (5 mg) by mouth daily   ascorbic acid (VITAMIN C) 500 MG tablet Take 500 mg by mouth daily    aspirin (ASA) 81 MG tablet Take 81 mg by mouth daily   atorvastatin (LIPITOR) 80 MG tablet TAKE 1 TABLET BY MOUTH DAILY (Patient taking differently: TAKE 1 TABLET BY MOUTH EVERY EVENING)   blood glucose (ONETOUCH ULTRA) test strip TEST BLOOD SUGAR TWICE DAILY OR AS DIRECTED   blood glucose monitoring (NO  BRAND SPECIFIED) meter device kit Use to test blood sugar 1 times daily or as directed.   Calcium Carbonate-Vitamin D (CALCIUM + D) 600-200 MG-UNIT per tablet Take 2 tablets by mouth daily.   cholecalciferol (VITAMIN  -D) 1000 UNITS capsule Take 1 capsule by mouth daily   clopidogrel (PLAVIX) 75 MG tablet TAKE 1 TABLET BY MOUTH DAILY   Cyanocobalamin (VITAMIN B-12 PO) Take 500 mcg by mouth daily    ferrous sulfate (FE TABS) 325 (65 Fe) MG EC tablet Take 1 tablet (325 mg) by mouth daily   gabapentin (NEURONTIN) 100 MG capsule Take 4 capsules (400 mg) by mouth 2 times daily   hydrochlorothiazide (HYDRODIURIL) 25 MG tablet TAKE 1 TABLET BY MOUTH DAILY   Lancets Misc. (SELECT-LITE DEVICE/LANCETS) KIT 1 kit 2 times daily   IntraStage FINEPOINT LANCETS MISC Use to test blood sugars 1 times daily or as directed.   lisinopril (PRINIVIL/ZESTRIL) 40 MG tablet TAKE 1 TABLET(40 MG) BY MOUTH DAILY   Melatonin 10 MG TBDP Take 10 mg by mouth At Bedtime   metFORMIN (GLUCOPHAGE-XR) 500 MG 24 hr tablet TAKE 2 TABLETS BY MOUTH TWICE DAILY WITH MEALS.   nitroGLYcerin (NITROSTAT) 0.4 MG sublingual tablet Place 1 tablet (0.4 mg) under the tongue every 5 minutes as needed for chest pain   omeprazole (PRILOSEC) 20 MG CR capsule TAKE ONE CAPSULE BY MOUTH TWICE DAILY   order for DME Equipment being ordered: walker wheeled also include  With skis for indoor carpet use   order for DME Equipment being ordered: compression stockings   order for DME Equipment being ordered: TENS   order for DME Equipment being ordered: Wheelchair motorized for patient with spinal stenosis and neurogenic claudication   oxyCODONE (OXYCONTIN) 30 MG 12 hr tablet Take 1 tablet (30 mg) by mouth every morning   oxyCODONE (ROXICODONE) 5 MG tablet Take 1 daily as needed  for severe pain /at bedtime may have up to 2 per day max 40 per month   polyethylene glycol (MIRALAX/GLYCOLAX) powder Take 17 g (1 capful) by mouth daily   sertraline (ZOLOFT) 100 MG tablet TAKE 2  "TABLETS(200 MG) BY MOUTH DAILY   traZODone (DESYREL) 100 MG tablet TAKE 3 TABLETS(300 MG) BY MOUTH EVERY NIGHT AT BEDTIME AS NEEDED FOR SLEEP     ALLERGIES:                                                      Allergies   Allergen Reactions     Prozac [Fluoxetine] Other (See Comments)     \"I went crazy.\"       Benadryl [Diphenhydramine Hcl] Other (See Comments)     Starts to shake, and paranoid     Codeine Itching     Diphenhydramine Other (See Comments)     Hallucinations, See [facility] records scanned on 7/16/15     Labetalol Other (See Comments)     See [facility] records scanned on 7/16/15  Bradycardia down to 30 on holter, dizziness. Stopped med and symptoms resolved     Metoprolol Other (See Comments)     Bradycardia even at 12.5 mg     Morphine Visual Disturbance     FAMILY/SOCIAL HISTORY:                                                    Family and social history was reviewed with the patient at today's visit.  No family history of neurological disorders.  Never smoker.  Denies current alcohol and recreational drug use.  , lives with family.  Retired.  REVIEW OF SYSTEMS:                                                    Patient has completed a [facility] Patient Health History, including a 14-system review, which was personally reviewed, and pertinent positives are listed in HPI. He denies any additional problems on the further questioning.  EXAM:                                                    VITAL SIGNS:   /82 (BP Location: Left arm, Patient Position: Sitting, Cuff Size: Adult Regular)   Pulse 64   Temp 98.3  F (36.8  C) (Oral)   Resp 16   Wt 88 kg (194 lb)   SpO2 97%   BMI 29.50 kg/m      Repeat blood pressure: /80   Pulse 66   Temp 98.3  F (36.8  C) (Oral)   Resp 16   Wt 88 kg (194 lb)   SpO2 97%   BMI 29.50 kg/m   .  Mini-Cog Assessment:  Mini Cog Assessment  Clock Draw Score: 2 Normal  3 Item Recall: 3 objects " recalled  Mini Cog Total Score: 5  Administered by: : Karthik Carney MA    General: pt is in NAD, cooperative.  Skin: normal turgor, moist mucous membranes, no lesions/rashes noticed.  HEENT: ATNC, EOMI, PERRL, white sclera, normal conjunctiva, no nystagmus or ptosis. No carotid bruits bilaterally.  Respiratory: lung sounds clear to auscultation bilaterally, no crackles, wheezes, rhonchi. Symmetric lung excursion, no accessory respiratory muscle use.  Cardiovascular: normal S1/S2, no murmurs/rubs/gallops.   Abdomen: Not distended.  : deferred.    Neurological:  Mental: alert, follows commands, Mini Cog Total Score: 5/5 with 3/3 on memory recall, no aphasia or dysarthria. Fund of knowledge is appropriate for age.  Cranial Nerves:  CN II: visual acuity - able to accurately count fingers with each eye. Visual fields intact, fundi: discs sharp, no papilledema and normal vessels bilaterally.  CN III, IV, VI: EOM intact, pupils equal and reactive  CN V: facial sensation nl  CN VII: face symmetric, no facial droop  CN VIII: hearing bilaterally reduced  CN IX: palate elevation symmetric, uvula at midline  CN XI SCM normal, shoulder shrug nl  CN XII: tongue midline  Motor: Strength: 5/5 in all major groups of both upper extremities and the right leg, left knee extension and dorsiflexion are 4/5, the rest of the strength in the left lower extremity is preserved. Normal tone.  No significant tremor.  The patient noted to have mild asterixis.  The patient completed tremor spiral worksheet that can be scanned into his medical record.  No other abnormal movements. No pronator drift b/l.  Reflexes: Triceps, biceps, brachioradialis are symmetric, patellar (absent on the right, reduced on the left), and achilles reflexes absent bilaterally. No clonus noted. Toes are down-going b/l.   Sensory: temperature, light touch, pinprick, and vibration reduced in both lower extremities, left more than right.   Coordination: FNF intact b/l.    Gait: Unsteady, walks with a walker and left foot steppage.  DATA:   LABS/IMAGING/OTHER STUDIES: I reviewed pertinent medical records, including previous neurology notes, personal review of spine and brain MRI/head CT images, and Care Everywhere, as detailed in the history of present illness.  ASSESSMENT AND PLAN:      ASSESSMENT: Vincent Mishra is a 79 year old male patient with past medical history of B12 deficiency, polyneuropathy, headaches, cervical radiculopathy, hypertension, depression, left renal cell carcinoma, prostate cancer, low back pain, status post multiple lumbar surgeries, and diabetes mellitus type 2, who presents with intermittent abnormal movements of his head, both arms, and upper body started approximately 6-8 weeks ago in context of increasing left neck pain and difficulty walking following a fall.    We had a prolonged discussion with the patient and his wife regarding his symptoms.  His neurological exam likely reflects chronic changes related to peripheral polyneuropathy and multilevel lumbar radiculopathy.  At the same time, the patient reports worsening of left neck pain following the fall, which could exacerbate his cervical radiculopathy or cause compressive myelopathy, especially in light of the perceived worsening of chronic gait difficulty.  I ordered cervical spine MRI for further evaluation.    The clinical presentation of abnormal movements could be related to side effects of the medications, especially gabapentin and OxyContin/oxycodone.  They should be gradually reduced to see if movements resolve given his mild asterixis on the exam and impaired renal function (gabapentin is renally-cleared).  If his movements continue, we might consider referral to movement clinic at the [facility].    Vincent to follow up with Primary Care provider regarding elevated blood pressure.    DIAGNOSES:    ICD-10-CM    1. Abnormal involuntary movement R25.9    2. Cervicalgia M54.2   Cervical Spine w/o Contrast   3. Difficulty walking R26.2 MR Cervical Spine w/o Contrast     PLAN: At today's visit we thoroughly discussed various diagnostic possibilities for patient's symptoms, necessary evaluation, and the plan, which includes:  Orders Placed This Encounter   Procedures     MR Cervical Spine w/o Contrast     We discussed that certain medications could cause abnormal movements and I advised the patient to reduce gabapentin and oxycodone to see if they improve.    Additional recommendations after the work-up.    Next follow-up appointment is in the next 2 weeks or earlier if needed.    Total Time:  82 minutes with > 50% spent counseling the patient and his wife on stated above assessment and recommendations, including nature of the diagnosis, needed w/u, and proposed plan.  Additional time was used to answer questions regarding patient's symptoms, my recommendations, and the plan.    Solomon Patrick MD  / Neurology  [place]  (Chart documentation was completed in part with Dragon voice-recognition software. Even though reviewed, some grammatical, spelling, and word errors may remain.)            Again, thank you for allowing me to participate in the care of your patient.        Sincerely,        Solomon Patrick MD

## 2019-04-16 NOTE — PATIENT INSTRUCTIONS
AFTER VISIT SUMMARY (AVS):    At today's visit we thoroughly discussed various diagnostic possibilities for your symptoms, necessary evaluation, and the plan, which includes:  Orders Placed This Encounter   Procedures     MR Cervical Spine w/o Contrast     We discussed that certain medications could cause abnormal movements of your body.  Please reduce gabapentin and oxycodone to see if your movements improved.    Additional recommendations after the work-up.    Next follow-up appointment is in the next 2 weeks or earlier if needed.    Please do not hesitate to call me with any questions or concerns.    Thanks.

## 2019-04-18 NOTE — PROGRESS NOTES
"SUBJECTIVE/OBJECTIVE:                Vincent Mishra is a 79 year old male called for a follow-up visit for Medication Therapy Management.  He was referred to me from [facility]. He was discharged from [facility] on 03/28/19 for atypical chest pain-noncardiac. He was initially called on 04/01/19, and today is a follow up. His wife, Pat will be discussing her husbands medications, per Vincent's request.    Chief Complaint: Follow up from our visit on 04/01/19. Reports he continues to experience \"unusual movements\". Reports they spoke to the neurologist and will cut down on gabapentin dose to 200 mg twice daily. Reports Vincent is having a MRI and will be seeing the doctor soon.  Personal Healthcare Goals: Feel well.  Tobacco: No tobacco use  Alcohol: none    Medication Adherence/Access:  no issues reported    Hypertension/NSTEMI/Chest pain: Reports continues to take: aspirin 81 mg, 1 tablet daily, Plavix 75 mg, 1 tablet daily, amlodipine 5 mg, 1 tablet daily, hydrochlorothiazide 25 mg, 1 tablet daily, lisinopril 40 mg, 1 tablet daily, nitroglycerin 0.4 mg, 1 tablet under the tongue as needed for chest pain, patient reports has never had to use nitroglycerin.  Wife reports her  continues to tolerate medications pretty well.  Reports today's blood pressure was \"good\" at 136/80.      Hyperlipidemia: Reports he continues to take, atorvastatin 80 mg once daily.  Pt reports no significant myalgias or other side effects.  The ASCVD Risk score ([place] DELMIS Jr., et al., 2013) failed to calculate for the following reasons:    The patient has a prior MI or stroke diagnosis     Diabetes: Reports he continues to take, metformin 1000 mg twice daily with meals. Pt is not experiencing side effects.  SMBG: Admitts has not check blood sugars since we spoke on 04/01/19. Reports there has been a lot going on, but will start checking again at least once daily. Reports blood sugars have always been < 178.   Patient is not experiencing " "hypoglycemia  Recent symptoms of high blood sugar? none  Eye exam: up to date  Foot exam: up to date  ACEi/ARB: Yes: Lisinopril.   Urine Albumin:   Aspirin: Taking 81mg daily and denies side effects  Diet/Exercise: watches diet, no activity.      Pain/neuropathy pain: Reports he continues to take oxycodone 30 mg, 12-hour tablet, once every morning, reports will take 1 oxycodone 5 mg if needed during the day.  Reports takes gabapentin, 400 mg by mouth twice daily, and will start decreasing the dose to 200 mg twice daily to see if his \"unusual movement\" symptoms improve.    Jamel has acetaminophen 325 mg tablets, and uses this very seldomly.  Reports pain is reasonably controlled.     Depression/insomnia: Reports continues to take sertraline 200 mg by mouth daily.  Wife reports his mood needs improvement.  Reports because of health conditions his mood needs improvement.  Reports takes trazodone 300 mg at bedtime, and is using melatonin 10 mg at bedtime.  Reports sleeping is okay.     GERD: Reports he continues to take, omeprazole 20 mg twice daily.  Reports this stable.     Constipation: Reports is using polyethylene glycol, 17 g mixed in liquid daily and symptoms are stable.       Supplements: Reports continues to take, vitamin C 500 mg daily, calcium carbonate-vitamin D, 600- 200 mg, 2 tablets daily, cholecalciferol 1000 units, once per day cyanocobalamin 500 mcg once daily, ferrous sulfate 325 mg, once daily.  Takes all of these without difficulty.    Today's Vitals: There were no vitals taken for this visit.      ASSESSMENT:              Current medications were reviewed today as discussed above.      Medication Adherence: excellent, no issues identified    Hypertension/NSTEMI/Chest pain: Appears stable.  Patient is meeting blood pressure goal of less than 140/90.  Continue current medications.     Hyperlipidemia: Cannot accurately assess, as do not have recent lipid panel.  On 11/20/2018, LDL was at goal.  PCP " "may consider rechecking LDL panel.     Diabetes: Stable.  Patient is meeting hemoglobin A1c goal of less than 8.  Continue current medication. They will restart checking blood sugars more regularly again.      Pain/neuropathy pain: Stable.  It appears pain is been reasonably controlled. He will start decreased dose of gabapentin per neurologist to see if this improves \"unusual movement\" symptoms.      Depression/insomnia: Depression needs improvement.  Sleep stable.  Patient seening primary care doctor and will discuss depression symptoms, due to health conditions.     GERD: Stable.     Constipation: Improved/Stable.     Supplements: Hemoglobin low but stable.  Other supplements stable.  Continue current supplements.     PLAN:                  1) No considerations for medication changes to day.    I spent 30 minutes with this patient today. I offer these suggestions with the understanding that I don't fully understand Vincent's past medical history and the complexity of his health conditions. Vincent should make no changes without the approval of his physician. A copy of the visit note was provided to the patient's primary care provider.       The patient declined a summary of these recommendations as an after visit summary.    Claire Peng [facility] Pharmacist.   [phone]      "

## 2019-04-23 NOTE — PROGRESS NOTES
ESTABLISHED PATIENT NEUROLOGY NOTE    DATE OF VISIT: 4/24/2019  CLINIC LOCATION: [facility]  MRN: [id number]  PATIENT NAME: Vincent Mishra  YOB: 1940    PCP: Keyla Fonseca MD    REASON FOR VISIT:   Chief Complaint   Patient presents with     Follow Up     MRI     SUBJECTIVE:                                                      HISTORY OF PRESENT ILLNESS: Patient is here for follow up regarding intermittent abnormal movements of his head, both arms, and upper body in context of increasing left neck pain and difficulty walking following a fall. Please refer to my initial note from 04/16/2019 for further information.  The patient is accompanied by his wife, who participates in interview.    Since the last visit, the patient reports no significant changes in his symptoms, except that his abnormal upper body movements are resolved after his wife reduced the dose of gabapentin in half.  He continues to experience severe left-sided neck pain and difficulty walking.  He denies interval development of new focal neurological symptoms.    The patient completed the recommended cervical spine MRI on 04/17/2019, which demonstrated multilevel degenerative disc and facet disease, most advanced at C4-5 where there is moderate to severe central canal and bilateral neuroforaminal stenosis along with moderate cord deformity.  Additionally, moderate central canal stenosis at C3-4 is also noticed.    On review of systems, patient endorses no other active complaints. Medications, allergies, family and social history were also reviewed. There are no changes reported by patient.  REVIEW OF SYSTEMS:                                                    10-system review was completed. Pertinent positives are included in HPI. The remainder of ROS is negative.  EXAM:                                                    Physical Exam:   Vitals: /74 (BP Location: Left arm, Patient Position: Sitting, Cuff Size:  Adult Large)   Pulse 68   Temp 97.6  F (36.4  C) (Oral)   Resp 16   Wt 87.1 kg (192 lb)   SpO2 97%   BMI 29.19 kg/m      General: pt is in NAD, cooperative.  Skin: normal turgor, moist mucous membranes, no lesions/rashes noticed.  HEENT: ATNC, white sclera, normal conjunctiva.  Respiratory: Symmetric lung excursion, no accessory respiratory muscle use.  Abdomen: Non distended.  Neurological: awake, cooperative, follows commands, no significant change compared to the initial visit.  DATA:   Imaging: I personally reviewed cervical spine MRI images and findings with the patient and his wife, as detailed in the history of present illness.  ASSESSMENT AND PLAN:                                                    Assessment: 79-year-old male patient presents for follow-up of intermittent abnormal movements of his head, both arms, upper body in context of increasing neck pain and difficulty walking following the fall.  Abnormal movements resolved after reducing the gabapentin dose in half, but neck pain and gait difficulty continue.    We had a detailed discussion regarding the test results.  He completed recommended cervical spine MRI, which demonstrated moderate to severe central canal stenosis at C4-5 level with moderate cord deformity without signal cord changes along with advanced bilateral neuroforaminal stenosis and milder changes at the other levels.  I reviewed the images with the patient and his wife.  We also discussed available treatment options including physical therapy (already arranged), epidural steroid injections, and a referral to a neurosurgeon.  I also reviewed with the patient that unfortunate head injury or a fall could result in paralysis of his limbs and advised him to be very cautious to avoid future falls and head injuries.  I placed a referral to neurosurgery and pain clinic for epidural steroid injections.  I advised him to continue physical therapy.    Diagnoses:    ICD-10-CM    1.  Cervical stenosis of spinal canal M48.02 NEUROSURGERY REFERRAL     PAIN MANAGEMENT REFERRAL   2. Cervical radiculopathy M54.12 PAIN MANAGEMENT REFERRAL     Plan: At today's visit we thoroughly discussed current symptoms, cervical spine MRI results, available treatment options, and the plan, which includes:  Orders Placed This Encounter   Procedures     NEUROSURGERY REFERRAL     PAIN MANAGEMENT REFERRAL     No new medications.    Next follow-up appointment is on as needed basis.    Total Time: 42 minutes with > 50% spent counseling the patient and his wife on stated above assessment and recommendations.    Solomon Patrick MD  HP/ Neurology

## 2019-04-24 NOTE — PATIENT INSTRUCTIONS
AFTER VISIT SUMMARY (AVS):    At today's visit we thoroughly discussed current symptoms, cervical spine MRI results, available treatment options, and the plan, which includes:  Orders Placed This Encounter   Procedures     NEUROSURGERY REFERRAL     PAIN MANAGEMENT REFERRAL     Continue PT for neck pain.    No new medications.    Next follow-up appointment is on as needed basis.    Please do not hesitate to call me with any questions or concerns.    Thanks.

## 2019-04-24 NOTE — TELEPHONE ENCOUNTER
"ATORVASTATIN 80MG TABLETS      Last Written Prescription Date:  5/1/18  Last Fill Quantity: 90,   # refills: 3  Last Office Visit: 4/5/19  Future Office visit:    Next 5 appointments (look out 90 days)    Apr 30, 2019 10:00 AM CDT  Return Visit with Fatimah Shoemaker PA-C  [facility] [address] [place] [id number] [phone]           Requested Prescriptions   Pending Prescriptions Disp Refills     atorvastatin (LIPITOR) 80 MG tablet [Pharmacy Med Name: ATORVASTATIN 80MG TABLETS] 90 tablet 0     Sig: TAKE 1 TABLET BY MOUTH DAILY       Statins Protocol Passed - 4/24/2019 10:12 AM        Passed - LDL on file in past 12 months     Recent Labs   Lab Test 11/20/18  1418   LDL 43             Passed - No abnormal creatine kinase in past 12 months     Recent Labs   Lab Test 07/25/14 07/29/11  0505   CKT  --   --  109   CKTOTAL 98   < >  --     < > = values in this interval not displayed.                Passed - Recent (12 mo) or future (30 days) visit within the authorizing provider's specialty     Patient had office visit in the last 12 months or has a visit in the next 30 days with authorizing provider or within the authorizing provider's specialty.  See \"Patient Info\" tab in inbasket, or \"Choose Columns\" in Meds & Orders section of the refill encounter.              Passed - Medication is active on med list        Passed - Patient is age 18 or older          "

## 2019-04-24 NOTE — LETTER
4/24/2019         RE: Vincent Mishra  [address] [place] [address]        Dear Colleague,    Thank you for referring your patient, Vincent Mishra, to the [facility]. Please see a copy of my visit note below.    ESTABLISHED PATIENT NEUROLOGY NOTE    DATE OF VISIT: 4/24/2019  CLINIC LOCATION: [facility]  MRN: [id number]  PATIENT NAME: Vincent Mishra  YOB: 1940    PCP: Keyla Fonseca MD    REASON FOR VISIT:   Chief Complaint   Patient presents with     Follow Up     MRI     SUBJECTIVE:                                                      HISTORY OF PRESENT ILLNESS: Patient is here for follow up regarding intermittent abnormal movements of his head, both arms, and upper body in context of increasing left neck pain and difficulty walking following a fall. Please refer to my initial note from 04/16/2019 for further information.  The patient is accompanied by his wife, who participates in interview.    Since the last visit, the patient reports no significant changes in his symptoms, except that his abnormal upper body movements are resolved after his wife reduced the dose of gabapentin in half.  He continues to experience severe left-sided neck pain and difficulty walking.  He denies interval development of new focal neurological symptoms.    The patient completed the recommended cervical spine MRI on 04/17/2019, which demonstrated multilevel degenerative disc and facet disease, most advanced at C4-5 where there is moderate to severe central canal and bilateral neuroforaminal stenosis along with moderate cord deformity.  Additionally, moderate central canal stenosis at C3-4 is also noticed.    On review of systems, patient endorses no other active complaints. Medications, allergies, family and social history were also reviewed. There are no changes reported by patient.  REVIEW OF SYSTEMS:                                                    10-system review  was completed. Pertinent positives are included in HPI. The remainder of ROS is negative.  EXAM:                                                    Physical Exam:   Vitals: /74 (BP Location: Left arm, Patient Position: Sitting, Cuff Size: Adult Large)   Pulse 68   Temp 97.6  F (36.4  C) (Oral)   Resp 16   Wt 87.1 kg (192 lb)   SpO2 97%   BMI 29.19 kg/m       General: pt is in NAD, cooperative.  Skin: normal turgor, moist mucous membranes, no lesions/rashes noticed.  HEENT: ATNC, white sclera, normal conjunctiva.  Respiratory: Symmetric lung excursion, no accessory respiratory muscle use.  Abdomen: Non distended.  Neurological: awake, cooperative, follows commands, no significant change compared to the initial visit.  DATA:   Imaging: I personally reviewed cervical spine MRI images and findings with the patient and his wife, as detailed in the history of present illness.  ASSESSMENT AND PLAN:                                                    Assessment: 79-year-old male patient presents for follow-up of intermittent abnormal movements of his head, both arms, upper body in context of increasing neck pain and difficulty walking following the fall.  Abnormal movements resolved after reducing the gabapentin dose in half, but neck pain and gait difficulty continue.    We had a detailed discussion regarding the test results.  He completed recommended cervical spine MRI, which demonstrated moderate to severe central canal stenosis at C4-5 level with moderate cord deformity without signal cord changes along with advanced bilateral neuroforaminal stenosis and milder changes at the other levels.  I reviewed the images with the patient and his wife.  We also discussed available treatment options including physical therapy (already arranged), epidural steroid injections, and a referral to a neurosurgeon.  I also reviewed with the patient that unfortunate head injury or a fall could result in paralysis of his limbs  and advised him to be very cautious to avoid future falls and head injuries.  I placed a referral to neurosurgery and pain clinic for epidural steroid injections.  I advised him to continue physical therapy.    Diagnoses:    ICD-10-CM    1. Cervical stenosis of spinal canal M48.02 NEUROSURGERY REFERRAL     PAIN MANAGEMENT REFERRAL   2. Cervical radiculopathy M54.12 PAIN MANAGEMENT REFERRAL     Plan: At today's visit we thoroughly discussed current symptoms, cervical spine MRI results, available treatment options, and the plan, which includes:  Orders Placed This Encounter   Procedures     NEUROSURGERY REFERRAL     PAIN MANAGEMENT REFERRAL     No new medications.    Next follow-up appointment is on as needed basis.    Total Time: 42 minutes with > 50% spent counseling the patient and his wife on stated above assessment and recommendations.    Solomno Patrick MD  / Neurology      Again, thank you for allowing me to participate in the care of your patient.        Sincerely,        Solomon Patrick MD

## 2019-04-25 NOTE — TELEPHONE ENCOUNTER
Pre-screening Questions for Radiology Injections:    Injection to be done at which interventional clinic site? [facility] [place]    Instruct patient to arrive as directed prior to the scheduled appointment time:    [place] minutes before      [place]: 30 minutes before; if IV needed 1 hour before     Procedure ordered by D[place]      Procedure ordered? Cervical Epidural Steroid Injection    What insurance would patient like us to bill for this procedure? MEDICARE/C      Worker's comp or MVA (motor vehicle accident) -Any injection DO NOT SCHEDULE and route to Isabel Lloyd.      [employer] insurance - For SI joint injections, DO NOT SCHEDULE and route Isabel Lloyd.       [employer] - Any injection besides hip/shoulder/knee joint DO NOT SCHEDULE and route to Isabel Lloyd. She will obtain PA and call pt back to schedule procedure or notify pt of denial.        [employer]-Route to Isabel for review        IF SCHEDULING IN [place] AND NEEDS A PA, IT IS OKAY TO SCHEDULE. [place] HANDLES THEIR OWN PA'S AFTER THE PATIENT IS SCHEDULED. PLEASE SCHEDULE AT LEAST 1 WEEK OUT SO A PA CAN BE OBTAINED.      Any chance of pregnancy? Not Applicable   If YES, do NOT schedule and route to RN [place]    Is an  needed? No     Patient has a drive home? (mandatory) YES:     Is patient taking any blood thinners (plavix, coumadin, jantoven, warfarin, heparin, pradaxa or dabigatran )? Yes - PLAVIX   If hold needed, do NOT schedule, route to RN pool     Is patient taking any aspirin products (includes Excedrin and Fiorinal)? Yes - Pt takes 81mg daily; instructed to hold 6 day(s) prior to procedure.      If more than 325mg/day do NOT schedule; route to RN pool     For CERVICAL procedures, hold all aspirin products for 6 days.     Tell pt that if aspirin product is not held for 6 days, the procedure WILL BE cancelled.      Does the patient have a bleeding or clotting disorder? No     If YES, okay to schedule  AND route to RN nurse pool    For any patients with platelet count <100, must be forwarded to provider    Is patient diabetic?  Yes  If YES, have them bring their glucometer.    Does patient have an active infection or treated for one within the past week? No     Is patient currently taking any antibiotics?  No     For patients on chronic, preventative, or prophylactic antibiotics, procedures may be scheduled.     For patients on antibiotics for active or recent infection:antibiotic course must have been completed for 4 days    Is patient currently taking any steroid medications? (i.e. Prednisone, Medrol)  No     For patients on steroid medications, course must have been completed for 4 days    Reviewed with patient:  If you are started on any steroids or antibiotics between now and your appointment, you must contact us because the procedure may need to be cancelled.  Yes    Is patient actively being treated for cancer or immunocompromised? No  If YES, do NOT schedule and route to RN pool     Are you able to get on and off an exam table with minimal or no assistance? Yes  If NO, do NOT schedule and route to RN pool    Are you able to roll over and lay on your stomach with minimal or no assistance? Yes  If NO, do NOT schedule and route to RN pool     Any allergies to contrast dye, iodine, shellfish, or numbing and steroid medications? No  If YES, route to RN pool AND add allergy information to appointment notes    Allergies: Prozac [fluoxetine]; Benadryl [diphenhydramine hcl]; Codeine; Diphenhydramine; Labetalol; Metoprolol; and Morphine      Has the patient had a flu shot or any other vaccinations within 7 days before or after the procedure.  No     Does patient have an MRI/CT?  YES:   (SI joint, hip injections, lumbar sympathetic blocks, and stellate ganglion blocks do not require an MRI)    Was the MRI done w/in the last 3 years?  Yes    Was MRI done at [place]? Yes      If not, where was it done? N/A       If  MRI was not done at [place], [facility] or [facility] do NOT schedule and route to nursing.  If pt has an imaging disc, the injection may be scheduled but pt has to bring disc to appt. If they show up w/out disc the injection cannot be done    Reminders (please tell patient if applicable):       Instructed pt to arrive 30 minutes early for IV start if this is for a cervical procedure, ALL sympathetic (stellate ganglion, hypogastric, or lumbar sympathetic block) and all sedation procedures (RFA, spinal cord stimulation trials).  Not Applicable   -IVs are not routinely placed for Dr. Ascencio cervical cases   -Dr. Greenwood: IVs for cervical ESIs and cervical TBDs (not CMBBs/facet inj)      If NPO for sedation, informed patient that it is okay to take medications with sips of water (except if they are to hold blood thinners).  Not Applicable   *DO take blood pressure medication if it is prescribed*      If this is for a cervical ALISON, informed patient that aspirin needs to be held for 6 days.   Not Applicable      For all patients not having spinal cord stimulator (SCS) trials or radiofrequency ablations (RFAs), informed patient:    IV sedation is not provided for this procedure.  If you feel that an oral anti-anxiety medication is needed, you can discuss this further with your referring provider or primary care provider.  The Pain Clinic provider will discuss specifics of what the procedure includes at your appointment.  Most procedures last 10-20 minutes.  We use numbing medications to help with any discomfort during the procedure.  Not Applicable      Do not schedule procedures requiring IV placement in the first appointment of the day or first appointment after lunch. Do NOT schedule at 0745, 0815 or 1245.       For patients 85 or older we recommend having an adult stay w/ them for the remainder of the day.       Does the patient have any questions?    Isabel Lloyd  [facility]

## 2019-04-26 NOTE — PROGRESS NOTES
"                                                                              [facility]      OUTPATIENT OCCUPATIONAL THERAPY  EVALUATION  PLAN OF TREATMENT FOR OUTPATIENT REHABILITATION  (COMPLETE FOR INITIAL CLAIMS ONLY)  Patient's Last Name, First Name, M.I.  YOB: 1940  Vincent Mishra                          Provider's Name  [facility] Medical Record No.  [id number]                               Onset Date:  03/27/19   Start of Care Date:  03/28/19     Type:     ___PT   _X_OT   ___SLP Medical Diagnosis:  \"atypical chest pain and elevated troponin.\"                        OT Diagnosis:  decreased IND and safety with ADLs   Visits from SOC:  1   _________________________________________________________________________________  Plan of Treatment/Functional Goals    Planned Interventions: ADL retraining, strengthening, transfer training, progressive activity/exercise, risk factor education,       Goals: See Occupational Therapy Goals on Care Plan in [facility] electronic health record.    Therapy Frequency: 5 times/wk  Predicted Duration of Therapy Intervention: 1 week  _________________________________________________________________________________    I CERTIFY THE NEED FOR THESE SERVICES FURNISHED UNDER        THIS PLAN OF TREATMENT AND WHILE UNDER MY CARE     (Physician co-signature of this document indicates review and certification of the therapy plan).                Certification date from: 03/28/19, Certification date to: 03/28/19                 Initial Assessment        See Occupational Therapy evaluation dated 03/28/19 in Epic electronic health record.                  "

## 2019-04-29 NOTE — TELEPHONE ENCOUNTER
Patient is requesting to schedule an injection with the [facility].     This would require the patient to hold:               Clopidogrel (Plavix)       Hold 7 days prior to procedure, restart 12 hours after procedure    ASA:  6 day hold required    We are requesting your approval to hold the plavix and ASA  for the above time frames.    Please keep call open and route back to the PAIN NURSE [[id number]] pool.     Routed to Dr. Velez, Family Practice.    Melissa Rodriguez, RN-BSN  [facility]

## 2019-04-30 NOTE — LETTER
"    4/30/2019         RE: Vincent Mishra  [address] [place] [address]        Dear Colleague,    Thank you for referring your patient, Vincent Mishra, to the [facility]. Please see a copy of my visit note below.    [facility]  Neurosurgery followup:    HPI: Vincent Mishra is a 77 year old male with a long-standing history of low back pain.  He has had multiple lumbar surgeries x4, with his last surgeyr in 2001.  He states the pain was somewhat well controlled after his last surgery, however, the pain started to intensify over the past 4 years. He recalls falling getting out of his pick-up and he fell backwards onto a rock, \"And my back hit the rock.\"  He states the pain started to increase after this incident.  He describes his pain as a constant dull pain to the lower back bilaterally and it radiates into the groin and posterior leg bilaterally.  He states he has falls frequently.  He denies any changes to his bowel or bladder; has urinary issues related to previous prostate surgery. He does have an extensive cardiac history and is on blood thinners for previous stent placement.    Returns today for follow up. Is now having worsening neck pain and gait issues. Has been seen by Dr. Patrick and cervical MRI obtained. Was seen in the past for lumbar issues and recommended injections at previous visit vs surgical intervention due to extensive cardiac history. He has had an increase in falls and gait problems have increased. Per wife he has difficulty getting out of bed most days and feels his QOL is decreasing.   Exam:  Constitutional:  Alert, well nourished, NAD.  HEENT: Normocephalic, atraumatic.   Pulm:  Without shortness of breath   CV:  No pitting edema of BLE.      Neurological:  Awake  Alert  Oriented x 3  Motor exam:     Shoulder Abduction:  Right:  5/5    Left:  5/5  Biceps:                      Right:  5/5    Left:  5/5  Triceps:                     Right:  5/5    " Left:  5/5  Wrist Extensors:       Right:  5/5    Left:  5/5  Wrist Flexors:           Right:  5/5    Left:  5/5  Intrinsics:                  Right:  5/5    Left:  5/5        IP Q DF PF EHL  R   5  5   5   5    5  L   5  5   5   5    5     Reflexes are 2+ in the patellar and Achilles. There is no clonus. Downgoing Babinski.    Able to spontaneously move L/E bilaterally  Sensation intact throughout all L/E dermatomes'    Able to go from seated to standing but has myelopathic gait and is only able to ambulate on his own for a few steps     Imaging: Lumbar MRI from 10/5/2017 with adjacent level disease leading to severe bilateral foraminal stenosis.   A/P: Returns today for follow up. Is now having worsening neck pain and gait issues. Has been seen by Dr. Patrick and cervical MRI obtained. Was seen in the past for lumbar issues and recommended injections at previous visit vs surgical intervention due to extensive cardiac history. He has had an increase in falls and gait problems have increased. Per wife he has difficulty getting out of bed most days and feels his QOL is decreasing. Cervical MRI reviewed in office today. Will have him continue PT and also follow up with Dr. Mccarthy. Patient voiced understanding and agreement.        Fatimah Shoemaker PA-C  [facility]  [address] [address]    Tel [phone]  Pager [phone]      Again, thank you for allowing me to participate in the care of your patient.        Sincerely,        Fatimah Shoemaker PA-C

## 2019-04-30 NOTE — NURSING NOTE
"Vincent Mishra is a 79 year old male who presents for:  Chief Complaint   Patient presents with     Neurologic Problem     Spinal stenosis - lumbar. Last seen Alina on 10/3/17. No recent imaging. Patient is accompanied by wife and daughter. Wife states patient has fallen a lot because his legs give out on him. He notes his legs go numb and he has weakness and he falls. He has low back pain constantly. Positive for N/T in bilateral leg. He would like to discuss injection therapy as he was told not a candidate for surgery. He uses pain medication.         Vitals:    Vitals:    04/30/19 1014   BP: 149/69   Pulse: 72   Resp: 16   Temp: 98  F (36.7  C)   TempSrc: Oral   SpO2: 97%   Weight: 195 lb 3.2 oz (88.5 kg)   Height: 5' 8\" (1.727 m)       BMI:  Estimated body mass index is 29.68 kg/m  as calculated from the following:    Height as of this encounter: 5' 8\" (1.727 m).    Weight as of this encounter: 195 lb 3.2 oz (88.5 kg).    Pain Score:  Worst Pain (10)        Ruby Schwab      "

## 2019-04-30 NOTE — TELEPHONE ENCOUNTER
Called pt and spoke w/ wife.  Pt saw a PAROGELIO in neurosurgery today.  They referred him to see Dr. Mccarthy  neurosurgeon.  They will hold off on the injection until he sees him.    Melissa Rodriguez RN-BSN  [facility]

## 2019-04-30 NOTE — PROGRESS NOTES
"[facility]  Neurosurgery followup:    HPI: Vincent Mishra is a 77 year old male with a long-standing history of low back pain.  He has had multiple lumbar surgeries x4, with his last surgeyr in 2001.  He states the pain was somewhat well controlled after his last surgery, however, the pain started to intensify over the past 4 years. He recalls falling getting out of his pick-up and he fell backwards onto a rock, \"And my back hit the rock.\"  He states the pain started to increase after this incident.  He describes his pain as a constant dull pain to the lower back bilaterally and it radiates into the groin and posterior leg bilaterally.  He states he has falls frequently.  He denies any changes to his bowel or bladder; has urinary issues related to previous prostate surgery. He does have an extensive cardiac history and is on blood thinners for previous stent placement.    Returns today for follow up. Is now having worsening neck pain and gait issues. Has been seen by Dr. Patrick and cervical MRI obtained. Was seen in the past for lumbar issues and recommended injections at previous visit vs surgical intervention due to extensive cardiac history. He has had an increase in falls and gait problems have increased. Per wife he has difficulty getting out of bed most days and feels his QOL is decreasing.   Exam:  Constitutional:  Alert, well nourished, NAD.  HEENT: Normocephalic, atraumatic.   Pulm:  Without shortness of breath   CV:  No pitting edema of BLE.      Neurological:  Awake  Alert  Oriented x 3  Motor exam:     Shoulder Abduction:  Right:  5/5    Left:  5/5  Biceps:                      Right:  5/5    Left:  5/5  Triceps:                     Right:  5/5    Left:  5/5  Wrist Extensors:       Right:  5/5    Left:  5/5  Wrist Flexors:           Right:  5/5    Left:  5/5  Intrinsics:                  Right:  5/5    Left:  5/5        IP Q DF PF EHL  R   5  5   5   5    5  L   5  5   5   5    5 "     Reflexes are 2+ in the patellar and Achilles. There is no clonus. Downgoing Babinski.    Able to spontaneously move L/E bilaterally  Sensation intact throughout all L/E dermatomes'    Able to go from seated to standing but has myelopathic gait and is only able to ambulate on his own for a few steps     Imaging: Lumbar MRI from 10/5/2017 with adjacent level disease leading to severe bilateral foraminal stenosis.   A/P: Returns today for follow up. Is now having worsening neck pain and gait issues. Has been seen by Dr. Patrick and cervical MRI obtained. Was seen in the past for lumbar issues and recommended injections at previous visit vs surgical intervention due to extensive cardiac history. He has had an increase in falls and gait problems have increased. Per wife he has difficulty getting out of bed most days and feels his QOL is decreasing. Cervical MRI reviewed in office today. Will have him continue PT and also follow up with Dr. Mccarthy. Patient voiced understanding and agreement.        Fatimah Shoemaker PA-C  [facility]  [address] [address]    Tel [phone]  Pager [phone]

## 2019-05-06 NOTE — PROGRESS NOTES
Xray   SUBJECTIVE:   Vincent Mishra is a 79 year old male who presents to clinic today for the following   health issues:      Joint Pain    Onset: Last night 5/5    Description:   Location: Right leg   Character: Sharp    Intensity: moderate    Progression of Symptoms: worse    Accompanying Signs & Symptoms:  Other symptoms: none    History:   Previous similar pain: no       Precipitating factors:   Trauma or overuse: no     Alleviating factors:  Improved by: nothing    Therapies Tried and outcome: None            Additional history:     Doesn't remember injuring the area but later states that due to his mobility issues he has some trouble getting in and out of cars  He has been riding with his daughter and to get his foot in he has to lift it up and he gets rubbing on this part of his leg - Keeps hitting it     Reviewed  and updated as needed this visit by clinical staff         Reviewed and updated as needed this visit by Provider         Current Outpatient Medications   Medication Sig Dispense Refill     acetaminophen (TYLENOL) 325 MG tablet Take 2 tablets (650 mg) by mouth every 4 hours as needed for mild pain 30 tablet 1     amLODIPine (NORVASC) 5 MG tablet Take 1 tablet (5 mg) by mouth daily 90 tablet 3     ascorbic acid (VITAMIN C) 500 MG tablet Take 500 mg by mouth daily        aspirin (ASA) 81 MG tablet Take 81 mg by mouth daily       atorvastatin (LIPITOR) 80 MG tablet TAKE 1 TABLET BY MOUTH EVERY EVENING 90 tablet 1     blood glucose (ONETOUCH ULTRA) test strip TEST BLOOD SUGAR TWICE DAILY OR AS DIRECTED 200 strip 0     blood glucose monitoring (NO BRAND SPECIFIED) meter device kit Use to test blood sugar 1 times daily or as directed. 1 kit 0     Calcium Carbonate-Vitamin D (CALCIUM + D) 600-200 MG-UNIT per tablet Take 2 tablets by mouth daily. 100 tablet 12     cholecalciferol (VITAMIN  -D) 1000 UNITS capsule Take 1 capsule by mouth daily       clopidogrel (PLAVIX) 75 MG tablet TAKE 1 TABLET BY MOUTH  DAILY 90 tablet 0     Cyanocobalamin (VITAMIN B-12 PO) Take 500 mcg by mouth daily        ferrous sulfate (FE TABS) 325 (65 Fe) MG EC tablet Take 1 tablet (325 mg) by mouth daily 90 tablet 3     gabapentin (NEURONTIN) 100 MG capsule Take 4 capsules (400 mg) by mouth 2 times daily 360 capsule 1     hydrochlorothiazide (HYDRODIURIL) 25 MG tablet TAKE 1 TABLET BY MOUTH DAILY 90 tablet 0     Lancets Misc. (SELECT-LITE DEVICE/LANCETS) KIT 1 kit 2 times daily 1 kit 1     ONE RECOVERYCAN FINEPOINT LANCETS MISC Use to test blood sugars 1 times daily or as directed. 100 each 12     lisinopril (PRINIVIL/ZESTRIL) 40 MG tablet TAKE 1 TABLET(40 MG) BY MOUTH DAILY 90 tablet 0     Melatonin 10 MG TBDP Take 10 mg by mouth At Bedtime 90 tablet      metFORMIN (GLUCOPHAGE-XR) 500 MG 24 hr tablet TAKE 2 TABLETS BY MOUTH TWICE DAILY WITH MEALS. 360 tablet 0     nitroGLYcerin (NITROSTAT) 0.4 MG sublingual tablet Place 1 tablet (0.4 mg) under the tongue every 5 minutes as needed for chest pain 25 tablet 1     omeprazole (PRILOSEC) 20 MG CR capsule TAKE ONE CAPSULE BY MOUTH TWICE DAILY 180 capsule 3     order for DME Equipment being ordered: walker wheeled also include  With skis for indoor carpet use 1 Device 0     order for DME Equipment being ordered: compression stockings 1 Units 0     order for DME Equipment being ordered: TENS 1 Units 0     order for DME Equipment being ordered: Wheelchair motorized for patient with spinal stenosis and neurogenic claudication 1 Device 0     oxyCODONE (OXYCONTIN) 30 MG 12 hr tablet Take 1 tablet (30 mg) by mouth every morning 30 tablet 0     oxyCODONE (ROXICODONE) 5 MG tablet Take 1 daily as needed  for severe pain /at bedtime may have up to 2 per day max 40 per month 40 tablet 0     polyethylene glycol (MIRALAX/GLYCOLAX) powder Take 17 g (1 capful) by mouth daily 119 g 3     sertraline (ZOLOFT) 100 MG tablet TAKE 2 TABLETS(200 MG) BY MOUTH DAILY 180 tablet 1     traZODone (DESYREL) 100 MG tablet TAKE 3  "TABLETS(300 MG) BY MOUTH EVERY NIGHT AT BEDTIME AS NEEDED FOR SLEEP 270 tablet 1     Allergies   Allergen Reactions     Prozac [Fluoxetine] Other (See Comments)     \"I went crazy.\"       Benadryl [Diphenhydramine Hcl] Other (See Comments)     Starts to shake, and paranoid     Codeine Itching     Diphenhydramine Other (See Comments)     Hallucinations, See [facility] records scanned on 7/16/15     Labetalol Other (See Comments)     See [facility] records scanned on 7/16/15  Bradycardia down to 30 on holter, dizziness. Stopped med and symptoms resolved     Metoprolol Other (See Comments)     Bradycardia even at 12.5 mg     Morphine Visual Disturbance     BP Readings from Last 3 Encounters:   05/06/19 144/75   04/30/19 149/69   04/24/19 132/74    Wt Readings from Last 3 Encounters:   05/06/19 88 kg (194 lb)   04/30/19 88.5 kg (195 lb 3.2 oz)   04/24/19 87.1 kg (192 lb)                    ROS:  Remainder of ROS obtained and found to be negative other than that which was documented above      OBJECTIVE:     /75   Pulse 66   Temp 98.3  F (36.8  C) (Tympanic)   Resp 16   Ht 1.727 m (5' 8\")   Wt 88 kg (194 lb)   BMI 29.50 kg/m    Body mass index is 29.5 kg/m .  GENERAL: healthy, alert and no distress  RESP: lungs clear to auscultation - no rales, rhonchi or wheezes  CV: regular rates and rhythm, normal S1 S2, no S3 or S4 and no murmur, click or rub  LEG, right area of increased redness and warmth over abrasions noted on right lower leg. No open wound. 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  Comment: due for refill   Plan: oxyCODONE " (OXYCONTIN) 30 MG 12 hr tablet,         oxyCODONE (ROXICODONE) 5 MG tablet            (M54.16) Lumbar back pain with radiculopathy affecting left lower extremity  Comment: due for refill  Plan: oxyCODONE (OXYCONTIN) 30 MG 12 hr tablet,         oxyCODONE (ROXICODONE) 5 MG tablet            (L03.115) Cellulitis of right lower extremity  Comment:   Plan: cephALEXin (KEFLEX) 500 MG capsule        Monitor for spreading redness, return fo it doesn't improve    (I73.9) Claudication (H)  Comment:   Plan:           Renetta Strickland PA-C  [facility]

## 2019-05-09 NOTE — TELEPHONE ENCOUNTER
"IBUPROFEN 800MG TABLETS      Last Written Prescription Date:  Not stated  Last Fill Quantity: na,   # refills: na  Last Office Visit: 5/6/19  Future Office visit:    Next 5 appointments (look out 90 days)    May 30, 2019  1:15 PM CDT  Return Visit with Primo Mccarthy MD  [facility] [address]  [place] [address]  [phone]           Requested Prescriptions   Pending Prescriptions Disp Refills     ibuprofen (ADVIL/MOTRIN) 800 MG tablet [Pharmacy Med Name: IBUPROFEN 800MG TABLETS] 90 tablet 0     Sig: TAKE 1 TABLET BY MOUTH EVERY 8 HOURS AS NEEDED FOR MODERATE PAIN       NSAID Medications Failed - 5/9/2019  3:08 PM        Failed - Blood pressure under 140/90 in past 12 months     BP Readings from Last 3 Encounters:   05/06/19 144/75   04/30/19 149/69   04/24/19 132/74                 Failed - Patient is age 6-64 years        Failed - Normal CBC on file in past 12 months     Recent Labs   Lab Test 03/28/19  0534   WBC 6.7   RBC 4.30*   HGB 10.2*   HCT 35.0*                    Failed - Medication is active on med list        Failed - Normal serum creatinine on file in past 12 months     Recent Labs   Lab Test 03/28/19  0534  01/18/19  0837   CR 1.32*   < >  --    CREAT  --   --  1.3*    < > = values in this interval not displayed.             Passed - Normal ALT on file in past 12 months     Recent Labs   Lab Test 03/27/19  1330   ALT 28             Passed - Normal AST on file in past 12 months     Recent Labs   Lab Test 03/27/19  1330   AST 27             Passed - Recent (12 mo) or future (30 days) visit within the authorizing provider's specialty     Patient had office visit in the last 12 months or has a visit in the next 30 days with authorizing provider or within the authorizing provider's specialty.  See \"Patient Info\" tab in inbasket, or \"Choose Columns\" in Meds & Orders section of the refill encounter.                "

## 2019-05-20 NOTE — TELEPHONE ENCOUNTER
"Requested Prescriptions   Pending Prescriptions Disp Refills     clopidogrel (PLAVIX) 75 MG tablet 90 tablet 0     Sig: Take 1 tablet (75 mg) by mouth daily       Plavix Failed - 5/20/2019  9:38 AM        Failed - No active PPI on record unless is Protonix        Failed - Normal HGB on file in past 12 months     Recent Labs   Lab Test 03/28/19  0534   HGB 10.2*               Passed - Normal Platelets on file in past 12 months     Recent Labs   Lab Test 03/28/19  0534                  Passed - Recent (12 mo) or future (30 days) visit within the authorizing provider's specialty     Patient had office visit in the last 12 months or has a visit in the next 30 days with authorizing provider or within the authorizing provider's specialty.  See \"Patient Info\" tab in inbasket, or \"Choose Columns\" in Meds & Orders section of the refill encounter.              Passed - Medication is active on med list        Passed - Patient is age 18 or older        Last Written Prescription Date:  02/7/19  Last Fill Quantity: 90,  # refills: 0   Last office visit: 5/6/2019 with prescribing provider:  Svetlana    Pt is out of this medication     Future Office Visit:   Next 5 appointments (look out 90 days)    May 30, 2019  1:15 PM CDT  Return Visit with Primo Mccarthy MD  [facility] [place] ([facility]) [address]  [place] [address] [phone]             "

## 2019-05-21 NOTE — TELEPHONE ENCOUNTER
Sheyla Parrabear calling to state that Cindy said that the [facility] Velez was no longer at this clinic.  (What!)      Could you please review as he was out of plavix yesterday and has not taken yet today.  Leaving town tomorrow for long weekend.      Thank anca.Ebony Dickson

## 2019-05-30 NOTE — PROGRESS NOTES
"[facility]  Neurosurgery followup:    HPI: Vincent Mishra is a 77 year old male with a long-standing history of low back pain.  He has had multiple lumbar surgeries x4, with his last surgeyr in 2001.  He states the pain was somewhat well controlled after his last surgery, however, the pain started to intensify over the past 4 years. He recalls falling getting out of his pick-up and he fell backwards onto a rock, \"And my back hit the rock.\"  He states the pain started to increase after this incident.  He describes his pain as a constant dull pain to the lower back bilaterally and it radiates into the groin and posterior leg bilaterally.  He states he has falls frequently.  He denies any changes to his bowel or bladder; has urinary issues related to previous prostate surgery. He does have an extensive cardiac history and is on blood thinners for previous stent placement.    Returns today for follow up. Is now having worsening neck pain and gait issues. Has been seen by Dr. Patrick and cervical MRI obtained. Was seen in the past for lumbar issues and recommended injections at previous visit vs surgical intervention due to extensive cardiac history. He has had an increase in falls and gait problems have increased. Per wife he has difficulty getting out of bed most days and feels his QOL is decreasing.     Returns for follow up.  Continued neck and back symptoms as above.    Exam:  Constitutional:  Alert, well nourished, NAD.  HEENT: Normocephalic, atraumatic.   Pulm:  Without shortness of breath   CV:  No pitting edema of BLE.      Neurological:  Awake  Alert  Oriented x 3  Motor exam:     Shoulder Abduction:  Right:  5/5    Left:  5/5  Biceps:                      Right:  5/5    Left:  5/5  Triceps:                     Right:  5/5    Left:  5/5  Wrist Extensors:       Right:  5/5    Left:  5/5  Wrist Flexors:           Right:  5/5    Left:  5/5  Intrinsics:                  Right:  5/5    Left:  " 5/5        IP Q DF PF EHL  R   5  5   5   5    5  L   5  5   5   5    5     Reflexes are 2+ in the patellar and Achilles. There is no clonus. Downgoing Babinski. Positive bilateral Her's    Able to spontaneously move L/E bilaterally  Sensation intact throughout all L/E dermatomes'    Able to go from seated to standing but has myelopathic gait and is only able to ambulate on his own for a few steps    A/P:     Will obtain MR and CT Lumbar  Will subsequently order injections for lumbar region  Discussed overall recommendation for C4-5 ACDF, and will continue to address after lumbar injection

## 2019-05-30 NOTE — LETTER
"    5/30/2019         RE: Vincent Mishra  [address] [place] [address]        Dear Colleague,    Thank you for referring your patient, Vincent Mishra, to the [facility]. Please see a copy of my visit note below.    [facility]  Neurosurgery followup:    HPI: Vincent Mishra is a 77 year old male with a long-standing history of low back pain.  He has had multiple lumbar surgeries x4, with his last surgeyr in 2001.  He states the pain was somewhat well controlled after his last surgery, however, the pain started to intensify over the past 4 years. He recalls falling getting out of his pick-up and he fell backwards onto a rock, \"And my back hit the rock.\"  He states the pain started to increase after this incident.  He describes his pain as a constant dull pain to the lower back bilaterally and it radiates into the groin and posterior leg bilaterally.  He states he has falls frequently.  He denies any changes to his bowel or bladder; has urinary issues related to previous prostate surgery. He does have an extensive cardiac history and is on blood thinners for previous stent placement.    Returns today for follow up. Is now having worsening neck pain and gait issues. Has been seen by Dr. Patrick and cervical MRI obtained. Was seen in the past for lumbar issues and recommended injections at previous visit vs surgical intervention due to extensive cardiac history. He has had an increase in falls and gait problems have increased. Per wife he has difficulty getting out of bed most days and feels his QOL is decreasing.     Returns for follow up.  Continued neck and back symptoms as above.    Exam:  Constitutional:  Alert, well nourished, NAD.  HEENT: Normocephalic, atraumatic.   Pulm:  Without shortness of breath   CV:  No pitting edema of BLE.      Neurological:  Awake  Alert  Oriented x 3  Motor exam:     Shoulder Abduction:  Right:  5/5    Left:  5/5  Biceps:                      " Right:  5/5    Left:  5/5  Triceps:                     Right:  5/5    Left:  5/5  Wrist Extensors:       Right:  5/5    Left:  5/5  Wrist Flexors:           Right:  5/5    Left:  5/5  Intrinsics:                  Right:  5/5    Left:  5/5        IP Q DF PF EHL  R   5  5   5   5    5  L   5  5   5   5    5     Reflexes are 2+ in the patellar and Achilles. There is no clonus. Downgoing Babinski. Positive bilateral Her's    Able to spontaneously move L/E bilaterally  Sensation intact throughout all L/E dermatomes'    Able to go from seated to standing but has myelopathic gait and is only able to ambulate on his own for a few steps    A/P:     Will obtain MR and CT Lumbar  Will subsequently order injections for lumbar region  Discussed overall recommendation for C4-5 ACDF, and will continue to address after lumbar injection    Again, thank you for allowing me to participate in the care of your patient.        Sincerely,        Primo Mccarthy MD

## 2019-05-30 NOTE — PATIENT INSTRUCTIONS
1. Order for Lumbar MRI and CT scan please call [phone] to schedule.     Please call our clinic with any questions or concerns: [phone]

## 2019-06-04 NOTE — PROGRESS NOTES
[facility]      OUTPATIENT PHYSICAL THERAPY EVALUATION  PLAN OF TREATMENT FOR OUTPATIENT REHABILITATION  (COMPLETE FOR INITIAL CLAIMS ONLY)  Patient's Last Name, First Name, M.I.  YOB: 1940  Vincent Mishra                        Provider's Name  [facility] Medical Record No.  [id number]                               Onset Date:  12/11/18   Start of Care Date:  12/12/18      Type:     _X_PT   ___OT   ___SLP Medical Diagnosis:  flank pain                        PT Diagnosis:  Impaired functional mobility   Visits from SOC:  1   _________________________________________________________________________________  Plan of Treatment/Functional Goals    Planned Interventions:  ,     ,       Goals: See Physical Therapy Goals on Care Plan in [employer] electronic health record.    Therapy Frequency: daily  Predicted Duration of Therapy Intervention: 12/13/18  _________________________________________________________________________________    I CERTIFY THE NEED FOR THESE SERVICES FURNISHED UNDER        THIS PLAN OF TREATMENT AND WHILE UNDER MY CARE     (Physician co-signature of this document indicates review and certification of the therapy plan).                Certification date from: 12/12/18, Certification date to: 12/12/18    Referring Physician: Teo Nuñez APRN CNP            Initial Assessment        See Physical Therapy evaluation dated 12/12/18 in Epic electronic health record.

## 2019-06-05 NOTE — TELEPHONE ENCOUNTER
Called and informed patient that Dr. Mccarthy reviewed lumbar MRI and CT scan and is recommending L2-3 ALISON. Patient and wife agree with proceeding, order placed

## 2019-06-06 NOTE — TELEPHONE ENCOUNTER
Pre-screening Questions for Radiology Injections:    Injection to be done at which interventional clinic site? [facility]    Instruct patient to arrive as directed prior to the scheduled appointment time:    [place] minutes before      [place]: 30 minutes before; if IV needed 1 hour before     Procedure ordered by Dr. Mccarthy    Procedure ordered? Lumbar ALISON L2-3        Transforaminal Cervical ALISON - Dr. Lesli Andrew ONLY    What insurance would patient like us to bill for this procedure? Medicare, [facility]/[employer]      Worker's comp or MVA (motor vehicle accident) -Any injection DO NOT SCHEDULE and route to Isabel Lloyd.      [employer] insurance - For SI joint injections, DO NOT SCHEDULE and route Isabel Gabino.       [employer] - Any injection besides hip/shoulder/knee joint DO NOT SCHEDULE and route to Isabel Lloyd. She will obtain PA and call pt back to schedule procedure or notify pt of denial.       [employer]-Route to Isabel for review      IF SCHEDULING IN [place] AND NEEDS A PA, IT IS OKAY TO SCHEDULE. [place] HANDLES THEIR OWN PA'S AFTER THE PATIENT IS SCHEDULED. PLEASE SCHEDULE AT LEAST 1 WEEK OUT SO A PA CAN BE OBTAINED.      Any chance of pregnancy? NO   If YES, do NOT schedule and route to RN pool    Is an  needed? No     Patient has a drive home? (mandatory) YES: INFORMED    Is patient taking any blood thinners (plavix, coumadin, jantoven, warfarin, heparin, pradaxa or dabigatran )? Yes - Plavix   If hold needed, do NOT schedule, route to RN pool     Is patient taking any aspirin products (includes Excedrin and Fiorinal)? Yes - Pt takes 81mg daily; instructed to hold 0 day(s) prior to procedure.      If more than 325mg/day do NOT schedule; route to RN pool     For CERVICAL procedures, hold all aspirin products for 6 days.     Tell pt that if aspirin product is not held for 6 days, the procedure WILL BE cancelled.      Does the patient have a bleeding or clotting disorder? No     If YES, okay  to schedule AND route to RN nurse pool    For any patients with platelet count <100, must be forwarded to provider    Is patient diabetic?  Yes  If YES, have them bring their glucometer.    Does patient have an active infection or treated for one within the past week? No     Is patient currently taking any antibiotics?  No     For patients on chronic, preventative, or prophylactic antibiotics, procedures may be scheduled.     For patients on antibiotics for active or recent infection:antibiotic course must have been completed for 4 days    Is patient currently taking any steroid medications? (i.e. Prednisone, Medrol)  No     For patients on steroid medications, course must have been completed for 4 days    Reviewed with patient:  If you are started on any steroids or antibiotics between now and your appointment, you must contact us because the procedure may need to be cancelled.  Yes    Is patient actively being treated for cancer or immunocompromised? No  If YES, do NOT schedule and route to RN pool     Are you able to get on and off an exam table with minimal or no assistance? Yes  If NO, do NOT schedule and route to RN pool    Are you able to roll over and lay on your stomach with minimal or no assistance? Yes  If NO, do NOT schedule and route to RN pool     Any allergies to contrast dye, iodine, shellfish, or numbing and steroid medications? No  If YES, route to RN pool AND add allergy information to appointment notes    Allergies: Prozac [fluoxetine]; Benadryl [diphenhydramine hcl]; Codeine; Diphenhydramine; Labetalol; Metoprolol; and Morphine      Has the patient had a flu shot or any other vaccinations within 7 days before or after the procedure.  No     Does patient have an MRI/CT?  YES: MRI  (SI joint, hip injections, lumbar sympathetic blocks, and stellate ganglion blocks do not require an MRI)    Was the MRI done w/in the last 3 years?  Yes    Was MRI done at [facility]? Yes      If not, where was it done?  N/A       If MRI was not done at [facility], [facility] or [facility] do NOT schedule and route to nursing.  If pt has an imaging disc, the injection may be scheduled but pt has to bring disc to appt. If they show up w/out disc the injection cannot be done    Reminders (please tell patient if applicable):       Instructed pt to arrive 30 minutes early for IV start if this is for a cervical procedure, ALL sympathetic (stellate ganglion, hypogastric, or lumbar sympathetic block) and all sedation procedures (RFA, spinal cord stimulation trials).  Not Applicable   -IVs are not routinely placed for Dr. Ascencio cervical cases   -Dr. Greenwood: IVs for cervical ESIs and cervical TBDs (not CMBBs/facet inj)      If NPO for sedation, informed patient that it is okay to take medications with sips of water (except if they are to hold blood thinners).  Not Applicable   *DO take blood pressure medication if it is prescribed*      If this is for a cervical ALISON, informed patient that aspirin needs to be held for 6 days.   Not Applicable      For all patients not having spinal cord stimulator (SCS) trials or radiofrequency ablations (RFAs), informed patient:    IV sedation is not provided for this procedure.  If you feel that an oral anti-anxiety medication is needed, you can discuss this further with your referring provider or primary care provider.  The Pain Clinic provider will discuss specifics of what the procedure includes at your appointment.  Most procedures last 10-20 minutes.  We use numbing medications to help with any discomfort during the procedure.  Not Applicable      Do not schedule procedures requiring IV placement in the first appointment of the day or first appointment after lunch. Do NOT schedule at 0745, 0815 or 1245. N/A      For patients 85 or older we recommend having an adult stay w/ them for the remainder of the day.   N/A    Does the patient have any questions?  NO  Nimco Chu  [facility]

## 2019-06-06 NOTE — TELEPHONE ENCOUNTER
Patient is requesting to schedule an injection with the [facility].     This would require the patient to hold:                 Clopidogrel (Plavix)         Hold 7 days prior to procedure, restart 12 hours after procedure      We are requesting your approval to hold the medication for this time frame.    Please keep call open and route back to the PAIN NURSE [[id number]] pool.     If hold approved, we will contact the patient for scheduling.    Thank you.    Routed to prescribing provider    Vita GABRIELN-RN Care Coordinator  [facility]

## 2019-06-10 NOTE — TELEPHONE ENCOUNTER
Pat notified and scripts left at  for . Pat reminded not to lose scripts.     Ester Luu, [facility] [place]

## 2019-06-10 NOTE — TELEPHONE ENCOUNTER
"IBUPROFEN 800MG TABLETS      Last Written Prescription Date:  5/9/19  Last Fill Quantity: 90,   # refills: 0  Last Office Visit: 5/6/19  Future Office visit:       Requested Prescriptions   Pending Prescriptions Disp Refills     ibuprofen (ADVIL/MOTRIN) 800 MG tablet [Pharmacy Med Name: IBUPROFEN 800MG TABLETS] 90 tablet 0     Sig: TAKE 1 TABLET BY MOUTH EVERY 8 HOURS AS NEEDED FOR MODERATE PAIN       NSAID Medications Failed - 6/10/2019  9:03 AM        Failed - Patient is age 6-64 years        Failed - Normal CBC on file in past 12 months     Recent Labs   Lab Test 03/28/19  0534   WBC 6.7   RBC 4.30*   HGB 10.2*   HCT 35.0*                    Failed - Normal serum creatinine on file in past 12 months     Recent Labs   Lab Test 03/28/19  0534  01/18/19  0837   CR 1.32*   < >  --    CREAT  --   --  1.3*    < > = values in this interval not displayed.             Passed - Blood pressure under 140/90 in past 12 months     BP Readings from Last 3 Encounters:   05/30/19 130/63   05/06/19 144/75   04/30/19 149/69                 Passed - Normal ALT on file in past 12 months     Recent Labs   Lab Test 03/27/19  1330   ALT 28             Passed - Normal AST on file in past 12 months     Recent Labs   Lab Test 03/27/19  1330   AST 27             Passed - Recent (12 mo) or future (30 days) visit within the authorizing provider's specialty     Patient had office visit in the last 12 months or has a visit in the next 30 days with authorizing provider or within the authorizing provider's specialty.  See \"Patient Info\" tab in inbasket, or \"Choose Columns\" in Meds & Orders section of the refill encounter.              Passed - Medication is active on med list          "

## 2019-06-10 NOTE — TELEPHONE ENCOUNTER
Oxycontin  Last Written Prescription Date: 5/6/19  Last Fill Quantity: 30   # refills: 0  Last Office Visit: 5/6/19  Future Office visit:       Routing refill request to provider for review/approval because:  Drug not on the [facility], [facility] or [facility] refill protocol or controlled substance      Would like to have three months of hard copy scripts. Dr. Fonseca used to do this for them while they travelled during the summer. Please review and advise.     Ester Luu, [facility] [place]

## 2019-06-10 NOTE — TELEPHONE ENCOUNTER
3 months of paper scripts completed. Please remind patient that should he lose any of the paper scripts they cannot be replaced  Renetta

## 2019-06-10 NOTE — TELEPHONE ENCOUNTER
Routing refill request to provider for review/approval because:  Labs out of range:  See below  Pt 78 y/o    Polly COSTELLO RN

## 2019-06-12 NOTE — TELEPHONE ENCOUNTER
Routed to primary care provider's pool to review.    Melissa Rodriguez, RN-BSN  [facility] [place]

## 2019-06-14 NOTE — TELEPHONE ENCOUNTER
Scheduled for 6/24. plavix hold starting 6/17    Vita Alvarenga   BSN-RN Care Coordinator  [facility]

## 2019-06-21 NOTE — TELEPHONE ENCOUNTER
"Requested Prescriptions   Pending Prescriptions Disp Refills     hydrochlorothiazide (HYDRODIURIL) 25 MG tablet [Pharmacy Med Name: HYDROCHLOROTHIAZIDE 25MG TABLETS]  Last Written Prescription Date:  3/25/19  Last Fill Quantity: 90,  # refills: 0   Last office visit: 5/6/2019 with prescribing provider:  marcelina   [facility] Office Visit:     90 tablet 0     Sig: TAKE 1 TABLET BY MOUTH DAILY       Diuretics (Including Combos) Protocol Failed - 6/21/2019 10:10 AM        Failed - Normal serum creatinine on file in past 12 months     Recent Labs   Lab Test 03/28/19  0534   CR 1.32*              Passed - Blood pressure under 140/90 in past 12 months     BP Readings from Last 3 Encounters:   05/30/19 130/63   05/06/19 144/75   04/30/19 149/69                 Passed - Recent (12 mo) or future (30 days) visit within the authorizing provider's specialty     Patient had office visit in the last 12 months or has a visit in the next 30 days with authorizing provider or within the authorizing provider's specialty.  See \"Patient Info\" tab in inbasket, or \"Choose Columns\" in Meds & Orders section of the refill encounter.              Passed - Medication is active on med list        Passed - Patient is age 18 or older        Passed - Normal serum potassium on file in past 12 months     Recent Labs   Lab Test 03/28/19  0534   POTASSIUM 4.1                    Passed - Normal serum sodium on file in past 12 months     Recent Labs   Lab Test 03/28/19  0534                 lisinopril (PRINIVIL/ZESTRIL) 40 MG tablet [Pharmacy Med Name: LISINOPRIL 40MG TABLETS]  Last Written Prescription Date:  3/25/19  Last Fill Quantity: 90,  # refills: 0   Last office visit: 5/6/2019 with prescribing provider:  marcelina   [facility] Office Visit:     90 tablet 0     Sig: TAKE 1 TABLET(40 MG) BY MOUTH DAILY       ACE Inhibitors (Including Combos) Protocol Failed - 6/21/2019 10:10 AM        Failed - Normal serum creatinine on file in past 12 months     " "Recent Labs   Lab Test 03/28/19  0534  01/18/19  0837   CR 1.32*   < >  --    CREAT  --   --  1.3*    < > = values in this interval not displayed.             Passed - Blood pressure under 140/90 in past 12 months     BP Readings from Last 3 Encounters:   05/30/19 130/63   05/06/19 144/75   04/30/19 149/69                 Passed - Recent (12 mo) or future (30 days) visit within the authorizing provider's specialty     Patient had office visit in the last 12 months or has a visit in the next 30 days with authorizing provider or within the authorizing provider's specialty.  See \"Patient Info\" tab in inbasket, or \"Choose Columns\" in Meds & Orders section of the refill encounter.              Passed - Medication is active on med list        Passed - Patient is age 18 or older        Passed - Normal serum potassium on file in past 12 months     Recent Labs   Lab Test 03/28/19  0534   POTASSIUM 4.1               "

## 2019-06-21 NOTE — TELEPHONE ENCOUNTER
Routing refill request to provider for review/approval because:  Labs out of range:    Jessica Ball RN

## 2019-06-24 NOTE — NURSING NOTE
Pre-procedure Intake    Have you been fasting? NA    If yes, for how long? NA    Are you taking a prescribed blood thinner such as coumadin, Plavix, Xarelto?    Yes -   Plavix    If yes, when did you take your last dose? NA    Do you take aspirin?  No    If cervical procedure, have you held aspirin for 6 days?   NA    Do you have any allergies to contrast dye, iodine, steroid and/or numbing medications?  NO    Are you currently taking antibiotics or have an active infection?  NO    Have you had a fever/elevated temperature within the past week? NO    Are you currently taking oral steroids? NO    Do you have a ? Yes       Are you pregnant or breastfeeding?  Not Applicable    Are the vital signs normal?  Yes

## 2019-06-24 NOTE — NURSING NOTE
Discharge Information    IV Discontiued Time:  NA    Amount of Fluid Infused:  NA    Discharge Criteria = When patient returns to baseline or as per MD order    Consciousness:  Pt is fully awake    Circulation:  BP +/- 20% of pre-procedure level    Respiration:  Patient is able to breathe deeply    O2 Sat:  Patient is able to maintain O2 Sat >92% on room air    Activity:  Moves 4 extremities on command    Ambulation:  Patient is able to stand and walk or stand and pivot into wheelchair    Dressing:  Clean/dry or No Dressing    Notes:   Discharge instructions and AVS given to patient    Patient meets criteria for discharge?  YES    Admitted to PCU?  No    Responsible adult present to accompany patient home?  Yes    Signature/Title:    Jayy Owen RN Care Coordinator  [facility]

## 2019-06-24 NOTE — PATIENT INSTRUCTIONS
[facility]   Procedure Discharge Instructions    Today you saw: Dr. Monet Judd      You had an:  Lumbar Epidural steroid injection       Medications used:  Lidocaine  Dexamethasone   Omnipaque  Ropivicaine   Normal Saline             If you were holding your blood thinning medication, please restart taking it: Restart Plavix in 24 hours     Be cautious when walking. Numbness and/or weakness in the lower extremities may occur for up to 6-8 hours after the procedure due to effect of the local anesthetic    Do not drive for 6 hours. The effect of the local anesthetic could slow your reflexes.     You may resume your regular activities after 24 hours    Avoid strenuous activity for the first 24 hours    You may shower, however avoid swimming, tub baths or hot tubs for 24 hours following your procedure    You may have a mild to moderate increase in pain for several days following the injection.    It may take up to 14 days for the steroid medication to start working although you may feel the effect as early as a few days after the procedure.       You may use ice packs for 10-15 minutes, 3 to 4 times a day at the injection site for comfort    Do not use heat to painful areas for 6 to 8 hours. This will give the local anesthetic time to wear off and prevent you from accidentally burning your skin.     Unless you have been directed to avoid the use of anti-inflammatory medications (NSAIDS), you may use medications such as ibuprofen, Aleve or Tylenol for pain control if needed.     If you have diabetes, check your blood sugar more frequently than usual as your blood sugar may be higher than normal for 10-14 days following a steroid injection. Contact your doctor who manages your diabetes if your blood sugar is higher than usual    Possible side effects of steroids that you may experience include flushing, elevated blood pressure, increased appetite, mild headaches and restlessness.  All of these  symptoms will get better with time.    If you experience any of the following, call the Pain Clinic during work hours at [phone] or the Provider Line after hours at [phone]:  -Fever over 100 degree F  -Swelling, bleeding, redness, drainage, warmth at the injection site  -Progressive weakness or numbness in your legs   -Loss of bowel or bladder function  -Unusual new onset of pain that is not improving

## 2019-06-24 NOTE — PROGRESS NOTES
Pre procedure Diagnosis: lumbar radiculopathy   Post procedure Diagnosis: Same  Procedure performed: L2-3 interlaminar epidural steroid injection   Anesthesia: none  Complications: none. Challenging anatomy due to scar tissue and surgery  Operators: India Judd MD and Anne Albrecht DO     Indications:   Vincent Mishra is a 79 year old male was sent by Dr. Mccarthy for an epidural steroid injection.  They have a history of low back and bilateral groin pain.  He has had previous surgery, and states his pain is so severe he has been bedbound.  Exam shows antalgic gait with diffuse muscle atrophy and they have tried conservative treatment including medications, PT.    CT was done on 6/5/19 which showed   1. L3-L4 anterior interbody threaded cage without evidence of solid  anterior interbody fusion. Lucencies are present within the endplates  adjacent to the threaded cage. Additionally, small amount of  intradiscal gas would suggest ongoing anterior motion and lack of  fusion.  2. Solid-appearing anterior/posterior fusion from L4 to the sacrum.  There is also solid anterior fusion, likely spontaneous, at the L1-L2  level.  3. L2-L3 severe degenerative disc disease and degenerative  retrolisthesis.  4. Severe bilateral L2 and bilateral L3 foraminal stenosis.  5. Left renal 3 cm mass or hyperdense cyst.    Options/alternatives, benefits and risks were discussed with the patient including bleeding, infection, no pain relief, tissue trauma, exposure to radiation, reaction to medications, spinal cord injury, dural puncture, weakness, numbness and headache.  Questions were answered to his satisfaction and he agrees to proceed. Voluntary informed consent was obtained and signed.     Vitals were reviewed: Yes  Allergies were reviewed:  Yes   Medications were reviewed:  Yes - plavix held  Pre-procedure pain score: 10/10    Procedure:  After getting informed consent, patient was brought into the procedure suite and was placed  in a prone position on the procedure table.   A Pause for the Cause was performed.  Patient was prepped and draped in sterile fashion.     The L2-3 interspace was identified with AP fluoroscopy.  This was somewhat challenging due to anatomy variances. A total of 2 ml of 1% lidocaine was used to anesthetize the skin for a left paramedian approach.   (midline approach initially done, but due to significant scar tissue almost feeling like bone in what appeared to be an open interlaminar space- had to come in from the left.  A 20gauge 3.5inch Touhy needle was advanced under intermittent fluoroscopy.  A LIZY syringe was used to advance the needle into the epidural space.   After loss of resistance, there was no evidence of blood or CSF on aspiration. Location was verified with both AP and lateral fluoroscopic imaging.    A total of 2ml of Omnipaque 300 was injected demonstrating appropriate epidural spread and was checked in both the AP and lateral views. 8ml was wasted. There was no evidence of intravascular or intrathecal spread.    2 ml of 0.5% bupivacaine with 10mg of dexamethasone and 2ml of preservative free saline was injected.  The needle was removed from the epidural space, flushed with 1% lidocaine and removed.     Hemostasis was achieved, the area was cleaned, and bandaids were placed when appropriate.  The patient tolerated the procedure well, and was taken to the recovery room.    Images were saved to PACS.    Post-procedure pain score: 0/10  Follow-up includes:   -f/u phone call in one week  -f/u with referring provider  -restart Plavix    The patient was seen and discussed with staff, Dr. Judd, who was present for the entire procedure.    Anne Albrecht DO  Pain Medicine Fellow, PGY-5      India Judd MD  [facility]

## 2019-06-24 NOTE — Clinical Note
Interlaminar spaces looked like they should be easy, but significant luda matterial keeping me from getting in a direct midline.

## 2019-07-02 NOTE — TELEPHONE ENCOUNTER
Received call from patient's spouse who is returning a call. She states that the injection didn't work for the patient as he is still experiencing pain. Phone #[phone]      Nimco Chu    [facility]

## 2019-07-02 NOTE — TELEPHONE ENCOUNTER
RN, please see note that patient called  Please remind them that they need to give 2 weeks, and they should return to Dr. Mccarthy for next steps.    Indai Judd MD  [facility]

## 2019-07-02 NOTE — TELEPHONE ENCOUNTER
Patient had a L2-3 interlaminar epidural steroid  injection on 6/24/19.  Called patient for an update.      Left message that we were calling for an update about how s/he was doing after the injection.  LM that if s/he has any problems or questions to call the clinic at [phone].

## 2019-07-03 NOTE — TELEPHONE ENCOUNTER
"TRAZODONE 100MG TABLETS      Last Written Prescription Date:  1/7/19  Last Fill Quantity: 270,   # refills: 1  Last Office Visit: 5/6/19  Future Office visit:       Requested Prescriptions   Pending Prescriptions Disp Refills     traZODone (DESYREL) 100 MG tablet [Pharmacy Med Name: TRAZODONE 100MG TABLETS] 270 tablet 0     Sig: TAKE 3 TABLETS BY MOUTH EVERY NIGHT AT BEDTIME AS NEEDED FOR SLEEP       Serotonin Modulators Passed - 7/2/2019  5:07 PM        Passed - Recent (12 mo) or future (30 days) visit within the authorizing provider's specialty     Patient had office visit in the last 12 months or has a visit in the next 30 days with authorizing provider or within the authorizing provider's specialty.  See \"Patient Info\" tab in inbasket, or \"Choose Columns\" in Meds & Orders section of the refill encounter.              Passed - Medication is active on med list        Passed - Patient is age 18 or older          "

## 2019-07-03 NOTE — TELEPHONE ENCOUNTER
Below information was relayed to patients spouse.    HARVEY PinedoN, RN  Care Coordinator  [facility]

## 2019-07-05 PROBLEM — N18.30 CKD (CHRONIC KIDNEY DISEASE) STAGE 3, GFR 30-59 ML/MIN (H): Status: ACTIVE | Noted: 2019-01-01

## 2019-07-05 NOTE — PROGRESS NOTES
Subjective     Vincent Mishra is a 79 year old male who presents to clinic today for the following health issues:    Here with his wife.     Vomiting started 4 days ago - repetitively vomiting for an hour or so in the evening on two of the last three days     Lost weight since his last visit to clinic   Lost appetite overall.    No diarrhea  Reporting some pain in the epigastric area   History of gastric ulcer in 1975   Not taking any nsaids even though they are on his med list     No black/tarry/bloody stools.   EGD 2012 - normal    No new meds   No antibiotics     Still has his gallbladder     Wife brings him meals three plates a day   He is in bed all day long   He eats very little. Sometimes hides food    Appointment with cancer doctor next week   Kidney cancer     Wt Readings from Last 10 Encounters:   07/05/19 83 kg (183 lb)   05/30/19 88 kg (194 lb)   05/06/19 88 kg (194 lb)   04/30/19 88.5 kg (195 lb 3.2 oz)   04/24/19 87.1 kg (192 lb)   04/16/19 88 kg (194 lb)   04/05/19 87.1 kg (192 lb 1.6 oz)   03/27/19 85.7 kg (188 lb 14.4 oz)   03/27/19 86.2 kg (190 lb)   02/26/19 90.5 kg (199 lb 8 oz)       Review of systems:  No f/c   No chest pain or shortness of breath   No bloody stool  No abd cramping  No diarrhea  No rash   Feeling sad and down, low motivation.     Reviewed and updated as needed this visit by Provider       Patient Active Problem List   Diagnosis     Tension headache     Parotid mass     Diplopia     Neuropathy     Neck pain     B12 deficiency     Tear of meniscus of left knee     Hypertension goal BP (blood pressure) < 140/90     C6-7 disc with radiculopathy     Right bundle branch block     Chest pain     Anatomic airway obstruction     Abnormal antinuclear antibody titer     Osteoarthritis, knee     Moderate major depression (H)     Orchitis, epididymitis, and epididymo-orchitis     Malignant neoplasm of kidney excluding renal pelvis (H)     Renal mass, left     Health Care Home     Insomnia      Abdominal pain, right upper quadrant     Esophageal reflux     Hard of hearing     Constipation     NSTEMI (non-ST elevated myocardial infarction) (H)     Myocardial infarction, nontransmural (H)     Acute myocardial infarction of inferolateral wall (H)     Obesity     Sinoatrial node dysfunction (H)     Right bundle branch block (RBBB)     Essential hypertension     Hyperlipidemia     Type 2 diabetes mellitus with other circulatory complication, without long-term current use of insulin (H)     Thyroid nodule     Hip pain, bilateral     Claudication (H)     Bilateral low back pain with right-sided sciatica     Bilateral low back pain with left-sided sciatica     ACS (acute coronary syndrome) (H)     Chronic bilateral low back pain with sciatica, sciatica laterality unspecified     Severe episode of recurrent major depressive disorder, without psychotic features (H)     Renal hematoma     Skin picking habit     Iron deficiency anemia, unspecified iron deficiency anemia type     Atypical chest pain     Cervical pain     CKD (chronic kidney disease) stage 3, GFR 30-59 ml/min (H)     Current Outpatient Medications   Medication     acetaminophen (TYLENOL) 325 MG tablet     amLODIPine (NORVASC) 5 MG tablet     ascorbic acid (VITAMIN C) 500 MG tablet     aspirin (ASA) 81 MG tablet     atorvastatin (LIPITOR) 80 MG tablet     blood glucose (ONETOUCH ULTRA) test strip     blood glucose monitoring (NO BRAND SPECIFIED) meter device kit     Calcium Carbonate-Vitamin D (CALCIUM + D) 600-200 MG-UNIT per tablet     cholecalciferol (VITAMIN  -D) 1000 UNITS capsule     clopidogrel (PLAVIX) 75 MG tablet     Cyanocobalamin (VITAMIN B-12 PO)     ferrous sulfate (FE TABS) 325 (65 Fe) MG EC tablet     hydrochlorothiazide (HYDRODIURIL) 25 MG tablet     ibuprofen (ADVIL/MOTRIN) 800 MG tablet     Lancets Misc. (SELECT-LITE DEVICE/LANCETS) KIT     [employer] FINEPOINT LANCETS MISC     lisinopril (PRINIVIL/ZESTRIL) 40 MG tablet     Melatonin  "10 MG TBDP     metFORMIN (GLUCOPHAGE-XR) 500 MG 24 hr tablet     nitroGLYcerin (NITROSTAT) 0.4 MG sublingual tablet     omeprazole (PRILOSEC) 20 MG DR capsule     order for DME     order for DME     order for DME     order for DME     [START ON 8/8/2019] oxyCODONE (OXYCONTIN) 30 MG 12 hr tablet     oxyCODONE (ROXICODONE) 5 MG tablet     polyethylene glycol (MIRALAX/GLYCOLAX) powder     sertraline (ZOLOFT) 100 MG tablet     traZODone (DESYREL) 100 MG tablet     No current facility-administered medications for this visit.         Allergies   Allergen Reactions     Prozac [Fluoxetine] Other (See Comments)     \"I went crazy.\"       Benadryl [Diphenhydramine Hcl] Other (See Comments)     Starts to shake, and paranoid     Codeine Itching     Diphenhydramine Other (See Comments)     Hallucinations, See [facility] records scanned on 7/16/15     Labetalol Other (See Comments)     See [facility] records scanned on 7/16/15  Bradycardia down to 30 on holter, dizziness. Stopped med and symptoms resolved     Metoprolol Other (See Comments)     Bradycardia even at 12.5 mg     Morphine Visual Disturbance           Objective    /71   Pulse 60   Temp 98.2  F (36.8  C) (Tympanic)   Ht 1.753 m (5' 9\")   Wt 83 kg (183 lb)   BMI 27.02 kg/m    Body mass index is 27.02 kg/m .  Wt Readings from Last 4 Encounters:   07/05/19 83 kg (183 lb)   05/30/19 88 kg (194 lb)   05/06/19 88 kg (194 lb)   04/30/19 88.5 kg (195 lb 3.2 oz)   GENERAL - Pt is alert and oriented in no acute distress.  Affect is appropriate. Good eye contact.  NECK - Neck is supple w/o LA or thyromegaly  RESPIRATORY - Clear to auscultation bilaterally.  No wheezing noted  CV - RRR, no murmurs, rubs, gallops.  ABD - +BS, soft, tender to palpation in RUQ. Won't allow dubon test. No palpable organomegaly.        Assessment/Plan -  (R11.2) Non-intractable vomiting with nausea, unspecified vomiting type  (primary encounter " diagnosis)  Comment: unclear etiology but ddx includes gallbladder dz or ulcer. Will begin w/u for gallbladder. Eat low fat diet for now.  Labs and ultrasound ordered.   Plan: Lipase, Comprehensive metabolic panel, CBC with        platelets differential, US Abdomen Limited            (N18.3) CKD (chronic kidney disease) stage 3, GFR 30-59 ml/min (H)  Comment:   Plan:     (E11.59) Type 2 diabetes mellitus with other circulatory complication, without long-term current use of insulin (H)  Comment: check A1c. The patient indicates understanding of these issues and agrees with the plan.   Plan: Hemoglobin A1c              ARYA Velez MD

## 2019-07-08 PROBLEM — M54.2 CERVICAL PAIN: Status: RESOLVED | Noted: 2019-01-01 | Resolved: 2019-01-01

## 2019-07-08 NOTE — TELEPHONE ENCOUNTER
Most likely this sludge ball is not causing his symptoms as they saw no inflammation in the gall bladder wall around it, but yes it could.  I suggest making the med changes, then follow up ion 2-3 weeks . And see how much that helpped.

## 2019-07-08 NOTE — PROGRESS NOTES
DISCHARGE SUMMARY    Vincent Mishra was seen 4 times for evaluation and treatment.  Patient did not return for further treatment and current status is unknown.  Due to short treatment duration, no objective or functional changes were made.  Please see goal flow sheet from episode noted date below and initial evaluation for further information.  Patient is discharged from therapy and therapy episode is resolved as of 07/08/19.      Linked Episodes   Type: Episode: Status: Noted: Resolved: Last update: Updated by:   PHYSICAL THERAPY Cervical 4/4/19 Active 4/4/2019 5/9/2019 12:59 PM Jefe Wood, PT      Comments:

## 2019-07-08 NOTE — TELEPHONE ENCOUNTER
"Pat, wife,  reports that yesterday he felt well. He felt pain again this morning; pain score of 3. Oat meal helped make it feel better. He had hot and sour soup \" spicey\" yesterday. Did not have nuts. Did not have deep fried foods. They want to know, \"if the sludge ball or polyp could be causing this pain or could he have an ulcer?\"  Yvon Dickens RN     "

## 2019-07-08 NOTE — TELEPHONE ENCOUNTER
His diabetes has been well controlled, since he has lost weight.  WE will have follow up and recheck ihis a1c after stopping.     But metformin does have nausea, and weight loss as a side efect , and since he has lost weight he may not need it anymore.

## 2019-07-08 NOTE — TELEPHONE ENCOUNTER
Pat called today asking for results from US on 7/6/19. I read her notes in US from Dr. Marc. She was concerned about stopping the metaformin due to Vincent's diabetes. Please call and advise further.     Ester Luu, [facility]

## 2019-07-08 NOTE — TELEPHONE ENCOUNTER
"ONE TOUCH ULTRA BLUE TESTST(NEW)100      Last Written Prescription Date:  4/8/19  Last Fill Quantity: 200,   # refills: 0  Last Office Visit: 7/5/19  Future Office visit:       Requested Prescriptions   Pending Prescriptions Disp Refills     blood glucose (ONETOUCH ULTRA) test strip [Pharmacy Med Name: ONE TOUCH ULTRA BLUE TESTST(NEW)100] 200 strip 0     Sig: USE TO TEST TWICE DAILY       Diabetic Supplies Protocol Passed - 7/6/2019 10:08 AM        Passed - Medication is active on med list        Passed - Patient is 18 years of age or older        Passed - Recent (6 mo) or future (30 days) visit within the authorizing provider's specialty     Patient had office visit in the last 6 months or has a visit in the next 30 days with authorizing provider.  See \"Patient Info\" tab in inbasket, or \"Choose Columns\" in Meds & Orders section of the refill encounter.              "

## 2019-07-08 NOTE — TELEPHONE ENCOUNTER
Sheyla notified of all of Dr. Marc's instructions as noted below. She agreed with plan.  Yvon Dickens RN

## 2019-07-12 NOTE — LETTER
2019       RE: Vincent Mishra  [address] [place] [address]     Dear Colleague,    Thank you for referring your patient, Vincent Mishra, to the [facility] UROLOGY AND INST FOR PROSTATE AND UROLOGIC CANCERS at [facility]. Please see a copy of my visit note below.      Urology Clinic    Lucius Capone MD  Date of Service: 2019     Name: Vincent Mishra  MRN: [id number]  Age: 79 year old  : 1940  Referring provider: Keyla Fonseca     Assessment and Plan:  Assessment:  Vincent Mishra has a history of Gr 1 renal cell carcinoma in  s/p percutaneous cryoablation of the left kidney.     Chronic perineal pain  Still having chronic perineal pain.  His exam and previous CTs have been negative.  MRI and Xrays demonstrate multilevel degenerative disc disease and bilateral neural foraminal stenosis. He did have a Neurosurgery consult but was determined to be a poor surgical candidate given his age and hx of 4 MIs. He plans to try CBD oil. They will look into this on their own.     Renal cell carcinoma.    Successful cryoablation 2018. His most recent CR is 1.34. Discussed that his both his CT and chest x-ray completed today were normal per my reading.     Plan:   -follow up with repeat abdominal CT in one year.      ---------------------------------------------------------------------------------------------------------------------    Chief Complaint:   Chief Complaint   Patient presents with     RECHECK     Follow up- [facility]       HPI:   Vincent Mishra  is a 79 year old male is status post percutaneous cryoablation of the left kidney for Gr 1 renal cell carcinoma in .  Today he is here for groin pain.      His pain continues to be constant, occurring every day/night.  It is located bilaterally in the low back and radiates into the groin and posterior leg bilaterally. He reports 10/10 intensity, at times he feels it is 20/10. Uses a walker  to get around. Continues to take oxycodone 30mg and this helps the pain. He denies any recent hematuria.      Hx of 4 MIs, on chronic anticoagulation.     He had a Neurosurgery consult in 10/2017. MRI and Xrays obtained at this time demonstrated multilevel degenerative disc disease and bilateral neural foraminal stenosis. Given his age and extensive cardiac history, surgical intervention was not recommended.  Today, he reports that his legs are not responding causing him to fall on the ground. His foot slipped and he feel down hitting 3 different parts of his back. He is unable to walk and is wheelchair/bed bound. In addition to his back his shoulders have pain which he associates to laying in bed constantly. He would like to get exercise but feels fatigue most days.      CT from 01/17/2019 continued to demonstrate the presence of a complex enhancing lesion, however this could not be well evaluated without IV contrast.     He had intermittent vomiting last week most significant in the morning but resolving after he eating. He had an ultrasound soon after (07/06) ago which showed a 1cm gallbladder density but there was no evidence of bilary duct dilation or cholecystitis or choledocholithiasis. He was requested to discontinue his metformin so he has been holding off for the last 6 days. A1c 07/08 was 5.9%.     Review of Systems:   Pertinent items are noted in HPI or as below, remainder of complete ROS is negative.      Active Medications:      acetaminophen (TYLENOL) 325 MG tablet, Take 2 tablets (650 mg) by mouth every 4 hours as needed for mild pain, Disp: 30 tablet, Rfl: 1     amLODIPine (NORVASC) 5 MG tablet, Take 1 tablet (5 mg) by mouth daily, Disp: 90 tablet, Rfl: 3     ascorbic acid (VITAMIN C) 500 MG tablet, Take 500 mg by mouth daily , Disp: , Rfl:      aspirin (ASA) 81 MG tablet, Take 81 mg by mouth daily, Disp: , Rfl:      atorvastatin (LIPITOR) 80 MG tablet, TAKE 1 TABLET BY MOUTH EVERY EVENING, Disp: 90  tablet, Rfl: 1     blood glucose (ONETOUCH ULTRA) test strip, USE TO TEST TWICE DAILY, Disp: 200 strip, Rfl: 3     Calcium Carbonate-Vitamin D (CALCIUM + D) 600-200 MG-UNIT per tablet, Take 2 tablets by mouth daily., Disp: 100 tablet, Rfl: 12     cholecalciferol (VITAMIN  -D) 1000 UNITS capsule, Take 1 capsule by mouth daily, Disp: , Rfl:      clopidogrel (PLAVIX) 75 MG tablet, Take 1 tablet (75 mg) by mouth daily, Disp: 90 tablet, Rfl: 0     Cyanocobalamin (VITAMIN B-12 PO), Take 500 mcg by mouth daily , Disp: , Rfl:      ferrous sulfate (FE TABS) 325 (65 Fe) MG EC tablet, Take 1 tablet (325 mg) by mouth daily, Disp: 90 tablet, Rfl: 3     gabapentin (NEURONTIN) 100 MG capsule, , Disp: , Rfl: 1     hydrochlorothiazide (HYDRODIURIL) 25 MG tablet, TAKE 1 TABLET BY MOUTH DAILY, Disp: 90 tablet, Rfl: 0     ibuprofen (ADVIL/MOTRIN) 800 MG tablet, TK 1 T PO Q 8 H PRF MODERATE PAIN, Disp: , Rfl: 0     Lancets Misc. (SELECT-LITE DEVICE/LANCETS) KIT, 1 kit 2 times daily, Disp: 1 kit, Rfl: 1     [place] FINEPOINT LANCETS MISC, Use to test blood sugars 1 times daily or as directed., Disp: 100 each, Rfl: 12     lisinopril (PRINIVIL/ZESTRIL) 40 MG tablet, TAKE 1 TABLET(40 MG) BY MOUTH DAILY, Disp: 90 tablet, Rfl: 0     Melatonin 10 MG TBDP, Take 10 mg by mouth At Bedtime, Disp: 90 tablet, Rfl:      nitroGLYcerin (NITROSTAT) 0.4 MG sublingual tablet, Place 1 tablet (0.4 mg) under the tongue every 5 minutes as needed for chest pain, Disp: 25 tablet, Rfl: 1     omeprazole (PRILOSEC) 20 MG DR capsule, TAKE ONE CAPSULE BY MOUTH TWICE DAILY, Disp: 180 capsule, Rfl: 0     [START ON 8/8/2019] oxyCODONE (OXYCONTIN) 30 MG 12 hr tablet, Take 1 tablet (30 mg) by mouth every morning, Disp: 30 tablet, Rfl: 0     oxyCODONE (ROXICODONE) 5 MG tablet, Take 1 daily as needed  for severe pain /at bedtime may have up to 2 per day max 40 per month, Disp: 40 tablet, Rfl: 0     polyethylene glycol (MIRALAX/GLYCOLAX) powder, Take 17 g (1 capful) by  "mouth daily, Disp: 119 g, Rfl: 3     sertraline (ZOLOFT) 100 MG tablet, TAKE 2 TABLETS(200 MG) BY MOUTH DAILY, Disp: 180 tablet, Rfl: 1     traZODone (DESYREL) 100 MG tablet, TAKE 3 TABLETS BY MOUTH EVERY NIGHT AT BEDTIME AS NEEDED FOR SLEEP, Disp: 270 tablet, Rfl: 0     metFORMIN (GLUCOPHAGE-XR) 500 MG 24 hr tablet, TAKE 2 TABLETS BY MOUTH TWICE DAILY WITH MEALS. (Patient not taking: Reported on 7/12/2019), Disp: 360 tablet, Rfl: 0  No current facility-administered medications for this visit.       Allergies:   Prozac [fluoxetine]; Benadryl [diphenhydramine hcl]; Codeine; Diphenhydramine; Labetalol; Metoprolol; and Morphine      Past Medical History:  Chronic kidney disease; stage 3   Prostate cancer   Kidney cancer   Coronary artery disease   Myocardial infarction   Diabetes mellitus type 2   Obesity   Hyperlipidemia   Hypertension   Osteoarthritis   Chronic bilateral low back pain      Family History:   Sister: kidney cancer       Social History:   The patient was accompanied to the appointment by: wife   Smoking Status: never   Smokeless Tobacco: never    Alcohol Use: no       Physical Exam:   PHYSICAL EXAM  /72   Pulse 54   Ht 1.753 m (5' 9\")   Wt 83 kg (183 lb)   BMI 27.02 kg/m     Constitutional: Alert, no acute distress  Psychiatric: Normal mood and affect  Gastrointestinal: Abdomen soft, non-tender.    Imaging:   I have personally reviewed the results of the below imaging studies. The results were discussed with the patient.     Results for orders placed or performed during the hospital encounter of 07/06/19   US Abdomen Limited    Narrative    RIGHT UPPER QUADRANT ULTRASOUND 7/6/2019 10:26 AM    HISTORY:  Right upper quadrant pain; Non-intractable vomiting with  nausea, unspecified vomiting type    COMPARISON: 1/18/2019 CT abdomen and pelvis    FINDINGS:    Gallbladder: 1 cm round density in the gallbladder fundus without  shadowing. Not seen to move with changes in patient positioning.  Possibly " tumefactive sludge versus hypoechoic gallbladder polyp.  Twelve-month follow-up suggested. No gallbladder wall thickening,  stones or tenderness during scanning of the gallbladder.    Bile ducts:   CHD is normal diameter.  No intrahepatic biliary  dilatation. 0.4 cm common hepatic duct. Common bile duct partially  obscured by bowel gas.    Liver:  Normal echogenicity. 17 cm in length.    Pancreas:  Almost completely obscured by bowel gas. Short segment of  proximal pancreas is unremarkable.    Right kidney:  Normal, 12.4 cm in length, 1.3 cm renal cortex  thickness.      Impression    IMPRESSION:    1. 1 cm gallbladder density suggesting sludge ball or polyp. Consider  follow-up ultrasound in one year.  2. No evidence for biliary duct dilatation. No evidence for  cholecystitis or choledocholithiasis.    JEREMI BENAVIDEZ MD     Exam: XR CHEST 2 VW, 7/12/2019 10:46 AM     Indication: Malignant neoplasm of left kidney excluding renal pelvis  (H)     Comparison: 3/27/2019, 12/11/2018     Findings:   PA and lateral views of the chest. The trachea is midline. Cardiac  silhouette is within normal limits. No pneumothorax or pleural  effusion. No acute airspace opacity. Rounded, pleural-based lesion in  the lateral left upper lobe correlates with lipoma seen on prior chest  CT. The visualized upper abdomen is unremarkable. No acute osseous  abnormality. Degenerative changes of the thoracic spine.                                                                      Impression:   1. No acute cardiopulmonary abnormality.  2. Stable left pleural-based lipoma.     I have personally reviewed the examination and initial interpretation  and I agree with the findings.     CHIP ZABALA MD    Laboratory:   I personally reviewed all applicable laboratory data and went over findings with patient  Significant for:    CBC RESULTS:  Recent Labs   Lab Test 07/05/19  1416 03/28/19  0534 03/27/19  1330 02/26/19  1434  12/12/18  0601   WBC 7.2  6.7 6.6  --   --  9.6   HGB 11.6* 10.2* 10.4* 10.3*   < > 8.6*    306 341  --   --  472*    < > = values in this interval not displayed.     BMP RESULTS:  Recent Labs   Lab Test 07/05/19  1416 03/28/19  0534 03/27/19  1330 01/18/19  0837 12/12/18  0601    138 140  --  137   POTASSIUM 3.9 4.1 4.5  --  4.4   CHLORIDE 105 106 107  --  104   CO2 26 24 25  --  25   ANIONGAP 7 8 8  --  8   * 100* 92  --  126*   BUN 29 28 27  --  22   CR 1.34* 1.32* 1.31*  --  1.40*   GFRESTIMATED 50* 51* 52* 54* 49*   GFRESTBLACK 58* 59* 60* 65 59*   LIOR 9.0 9.0 8.6  --  9.3     UA RESULTS:   Recent Labs   Lab Test 12/11/18  1856 10/16/18  1018 09/14/18  1200 05/15/18  1004   SG 1.040* 1.015 >1.030 1.020   URINEPH 6.0 6.0 5.5 5.5   NITRITE Negative Negative Positive* Negative   RBCU 17*  --  O - 2 10-25*   WBCU <1  --  0 - 5 *     PSA RESULTS:   PSA   Date Value Ref Range Status   09/23/2010 <0.10 0 - 4 ug/L Final         Scribe Disclosure:  I, Em Hansen, am serving as a scribe to document services personally performed by Lucius Capone MD at this visit, based upon the provider's statements to me. All documentation has been reviewed by the aforementioned provider prior to being entered into the official medical record.     I, Em Hansen, a scribe, prepared the chart for today's encounter.       Em Hansen served as the scribe for this patient's visit and documented my history and physical exam.  I performed the history and physical exam.  I have edited and agree with the note.  WHITLEY Capone MD

## 2019-07-12 NOTE — PATIENT INSTRUCTIONS
Follow up with Dr. Capone in one year with imaging prior to appointment.    It was a pleasure meeting with you today.  Thank you for allowing me and my team the privilege of caring for you today.  YOU are the reason we are here, and I truly hope we provided you with the excellent service you deserve.  Please let us know if there is anything else we can do for you so that we can be sure you are leaving completely satisfied with your care experience.        TEMI Doherty

## 2019-07-12 NOTE — DISCHARGE INSTRUCTIONS

## 2019-07-12 NOTE — PROGRESS NOTES
Urology Clinic    Lucius Capone MD  Date of Service: 2019     Name: Vincent Mishra  MRN: [id number]  Age: 79 year old  : 1940  Referring provider: Keyla Fonseca     Assessment and Plan:  Assessment:  Vincent Mishra has a history of Gr 1 renal cell carcinoma in 2012 s/p percutaneous cryoablation of the left kidney.     Chronic perineal pain  Still having chronic perineal pain.  His exam and previous CTs have been negative.  MRI and Xrays demonstrate multilevel degenerative disc disease and bilateral neural foraminal stenosis. He did have a Neurosurgery consult but was determined to be a poor surgical candidate given his age and hx of 4 MIs. He plans to try CBD oil. They will look into this on their own.     Renal cell carcinoma.    Successful cryoablation 2018. His most recent CR is 1.34. Discussed that his both his CT and chest x-ray completed today were normal per my reading.     Plan:   -follow up with repeat abdominal CT in one year.      ---------------------------------------------------------------------------------------------------------------------    Chief Complaint:   Chief Complaint   Patient presents with     RECHECK     Follow up- [facility]       HPI:   Vincent Mishra  is a 79 year old male is status post percutaneous cryoablation of the left kidney for Gr 1 renal cell carcinoma in .  Today he is here for groin pain.      His pain continues to be constant, occurring every day/night.  It is located bilaterally in the low back and radiates into the groin and posterior leg bilaterally. He reports 10/10 intensity, at times he feels it is 20/10. Uses a walker to get around. Continues to take oxycodone 30mg and this helps the pain. He denies any recent hematuria.      Hx of 4 MIs, on chronic anticoagulation.     He had a Neurosurgery consult in 10/2017. MRI and Xrays obtained at this time demonstrated multilevel degenerative disc disease and bilateral neural foraminal  stenosis. Given his age and extensive cardiac history, surgical intervention was not recommended.  Today, he reports that his legs are not responding causing him to fall on the ground. His foot slipped and he feel down hitting 3 different parts of his back. He is unable to walk and is wheelchair/bed bound. In addition to his back his shoulders have pain which he associates to laying in bed constantly. He would like to get exercise but feels fatigue most days.      CT from 01/17/2019 continued to demonstrate the presence of a complex enhancing lesion, however this could not be well evaluated without IV contrast.     He had intermittent vomiting last week most significant in the morning but resolving after he eating. He had an ultrasound soon after (07/06) ago which showed a 1cm gallbladder density but there was no evidence of bilary duct dilation or cholecystitis or choledocholithiasis. He was requested to discontinue his metformin so he has been holding off for the last 6 days. A1c 07/08 was 5.9%.     Review of Systems:   Pertinent items are noted in HPI or as below, remainder of complete ROS is negative.      Active Medications:      acetaminophen (TYLENOL) 325 MG tablet, Take 2 tablets (650 mg) by mouth every 4 hours as needed for mild pain, Disp: 30 tablet, Rfl: 1     amLODIPine (NORVASC) 5 MG tablet, Take 1 tablet (5 mg) by mouth daily, Disp: 90 tablet, Rfl: 3     ascorbic acid (VITAMIN C) 500 MG tablet, Take 500 mg by mouth daily , Disp: , Rfl:      aspirin (ASA) 81 MG tablet, Take 81 mg by mouth daily, Disp: , Rfl:      atorvastatin (LIPITOR) 80 MG tablet, TAKE 1 TABLET BY MOUTH EVERY EVENING, Disp: 90 tablet, Rfl: 1     blood glucose (ONETOUCH ULTRA) test strip, USE TO TEST TWICE DAILY, Disp: 200 strip, Rfl: 3     Calcium Carbonate-Vitamin D (CALCIUM + D) 600-200 MG-UNIT per tablet, Take 2 tablets by mouth daily., Disp: 100 tablet, Rfl: 12     cholecalciferol (VITAMIN  -D) 1000 UNITS capsule, Take 1 capsule  by mouth daily, Disp: , Rfl:      clopidogrel (PLAVIX) 75 MG tablet, Take 1 tablet (75 mg) by mouth daily, Disp: 90 tablet, Rfl: 0     Cyanocobalamin (VITAMIN B-12 PO), Take 500 mcg by mouth daily , Disp: , Rfl:      ferrous sulfate (FE TABS) 325 (65 Fe) MG EC tablet, Take 1 tablet (325 mg) by mouth daily, Disp: 90 tablet, Rfl: 3     gabapentin (NEURONTIN) 100 MG capsule, , Disp: , Rfl: 1     hydrochlorothiazide (HYDRODIURIL) 25 MG tablet, TAKE 1 TABLET BY MOUTH DAILY, Disp: 90 tablet, Rfl: 0     ibuprofen (ADVIL/MOTRIN) 800 MG tablet, TK 1 T PO Q 8 H PRF MODERATE PAIN, Disp: , Rfl: 0     Lancets Misc. (SELECT-LITE DEVICE/LANCETS) KIT, 1 kit 2 times daily, Disp: 1 kit, Rfl: 1     [place] FINEPOINT LANCETS MISC, Use to test blood sugars 1 times daily or as directed., Disp: 100 each, Rfl: 12     lisinopril (PRINIVIL/ZESTRIL) 40 MG tablet, TAKE 1 TABLET(40 MG) BY MOUTH DAILY, Disp: 90 tablet, Rfl: 0     Melatonin 10 MG TBDP, Take 10 mg by mouth At Bedtime, Disp: 90 tablet, Rfl:      nitroGLYcerin (NITROSTAT) 0.4 MG sublingual tablet, Place 1 tablet (0.4 mg) under the tongue every 5 minutes as needed for chest pain, Disp: 25 tablet, Rfl: 1     omeprazole (PRILOSEC) 20 MG DR capsule, TAKE ONE CAPSULE BY MOUTH TWICE DAILY, Disp: 180 capsule, Rfl: 0     [START ON 8/8/2019] oxyCODONE (OXYCONTIN) 30 MG 12 hr tablet, Take 1 tablet (30 mg) by mouth every morning, Disp: 30 tablet, Rfl: 0     oxyCODONE (ROXICODONE) 5 MG tablet, Take 1 daily as needed  for severe pain /at bedtime may have up to 2 per day max 40 per month, Disp: 40 tablet, Rfl: 0     polyethylene glycol (MIRALAX/GLYCOLAX) powder, Take 17 g (1 capful) by mouth daily, Disp: 119 g, Rfl: 3     sertraline (ZOLOFT) 100 MG tablet, TAKE 2 TABLETS(200 MG) BY MOUTH DAILY, Disp: 180 tablet, Rfl: 1     traZODone (DESYREL) 100 MG tablet, TAKE 3 TABLETS BY MOUTH EVERY NIGHT AT BEDTIME AS NEEDED FOR SLEEP, Disp: 270 tablet, Rfl: 0     metFORMIN (GLUCOPHAGE-XR) 500 MG 24 hr  "tablet, TAKE 2 TABLETS BY MOUTH TWICE DAILY WITH MEALS. (Patient not taking: Reported on 7/12/2019), Disp: 360 tablet, Rfl: 0  No current facility-administered medications for this visit.       Allergies:   Prozac [fluoxetine]; Benadryl [diphenhydramine hcl]; Codeine; Diphenhydramine; Labetalol; Metoprolol; and Morphine      Past Medical History:  Chronic kidney disease; stage 3   Prostate cancer   Kidney cancer   Coronary artery disease   Myocardial infarction   Diabetes mellitus type 2   Obesity   Hyperlipidemia   Hypertension   Osteoarthritis   Chronic bilateral low back pain      Family History:   Sister: kidney cancer       Social History:   The patient was accompanied to the appointment by: wife   Smoking Status: never   Smokeless Tobacco: never    Alcohol Use: no       Physical Exam:   PHYSICAL EXAM  /72   Pulse 54   Ht 1.753 m (5' 9\")   Wt 83 kg (183 lb)   BMI 27.02 kg/m    Constitutional: Alert, no acute distress  Psychiatric: Normal mood and affect  Gastrointestinal: Abdomen soft, non-tender.    Imaging:   I have personally reviewed the results of the below imaging studies. The results were discussed with the patient.     Results for orders placed or performed during the hospital encounter of 07/06/19   US Abdomen Limited    Narrative    RIGHT UPPER QUADRANT ULTRASOUND 7/6/2019 10:26 AM    HISTORY:  Right upper quadrant pain; Non-intractable vomiting with  nausea, unspecified vomiting type    COMPARISON: 1/18/2019 CT abdomen and pelvis    FINDINGS:    Gallbladder: 1 cm round density in the gallbladder fundus without  shadowing. Not seen to move with changes in patient positioning.  Possibly tumefactive sludge versus hypoechoic gallbladder polyp.  Twelve-month follow-up suggested. No gallbladder wall thickening,  stones or tenderness during scanning of the gallbladder.    Bile ducts:   CHD is normal diameter.  No intrahepatic biliary  dilatation. 0.4 cm common hepatic duct. Common bile duct " partially  obscured by bowel gas.    Liver:  Normal echogenicity. 17 cm in length.    Pancreas:  Almost completely obscured by bowel gas. Short segment of  proximal pancreas is unremarkable.    Right kidney:  Normal, 12.4 cm in length, 1.3 cm renal cortex  thickness.      Impression    IMPRESSION:    1. 1 cm gallbladder density suggesting sludge ball or polyp. Consider  follow-up ultrasound in one year.  2. No evidence for biliary duct dilatation. No evidence for  cholecystitis or choledocholithiasis.    JEREMI BENAVIDEZ MD     Exam: XR CHEST 2 VW, 7/12/2019 10:46 AM     Indication: Malignant neoplasm of left kidney excluding renal pelvis  (H)     Comparison: 3/27/2019, 12/11/2018     Findings:   PA and lateral views of the chest. The trachea is midline. Cardiac  silhouette is within normal limits. No pneumothorax or pleural  effusion. No acute airspace opacity. Rounded, pleural-based lesion in  the lateral left upper lobe correlates with lipoma seen on prior chest  CT. The visualized upper abdomen is unremarkable. No acute osseous  abnormality. Degenerative changes of the thoracic spine.                                                                      Impression:   1. No acute cardiopulmonary abnormality.  2. Stable left pleural-based lipoma.     I have personally reviewed the examination and initial interpretation  and I agree with the findings.     CHIP ZABALA MD    Laboratory:   I personally reviewed all applicable laboratory data and went over findings with patient  Significant for:    CBC RESULTS:  Recent Labs   Lab Test 07/05/19  1416 03/28/19  0534 03/27/19  1330 02/26/19  1434  12/12/18  0601   WBC 7.2 6.7 6.6  --   --  9.6   HGB 11.6* 10.2* 10.4* 10.3*   < > 8.6*    306 341  --   --  472*    < > = values in this interval not displayed.     BMP RESULTS:  Recent Labs   Lab Test 07/05/19  1416 03/28/19  0534 03/27/19  1330 01/18/19  0837 12/12/18  0601    138 140  --  137   POTASSIUM 3.9  4.1 4.5  --  4.4   CHLORIDE 105 106 107  --  104   CO2 26 24 25  --  25   ANIONGAP 7 8 8  --  8   * 100* 92  --  126*   BUN 29 28 27  --  22   CR 1.34* 1.32* 1.31*  --  1.40*   GFRESTIMATED 50* 51* 52* 54* 49*   GFRESTBLACK 58* 59* 60* 65 59*   LIOR 9.0 9.0 8.6  --  9.3     UA RESULTS:   Recent Labs   Lab Test 12/11/18  1856 10/16/18  1018 09/14/18  1200 05/15/18  1004   SG 1.040* 1.015 >1.030 1.020   URINEPH 6.0 6.0 5.5 5.5   NITRITE Negative Negative Positive* Negative   RBCU 17*  --  O - 2 10-25*   WBCU <1  --  0 - 5 *     PSA RESULTS:   PSA   Date Value Ref Range Status   09/23/2010 <0.10 0 - 4 ug/L Final         Scribe Disclosure:  I, mE Hansen, am serving as a scribe to document services personally performed by Lucius Capone MD at this visit, based upon the provider's statements to me. All documentation has been reviewed by the aforementioned provider prior to being entered into the official medical record.     I, Em Hansen, a scribe, prepared the chart for today's encounter.       Em Hansen served as the scribe for this patient's visit and documented my history and physical exam.  I performed the history and physical exam.  I have edited and agree with the note.  WHITLEY Capone MD

## 2019-07-15 NOTE — TELEPHONE ENCOUNTER
Renetta,    Patient's wife Pat called this morning as she was concerned that the patient had been off his metformin (see telephone encounter on 7-8-19 per Dr. Marc) and patient has been checking blood sugars twice daily. Pat states patient had high blood sugar on Saturday of 363, Sunday 354, so wife got worried and gave patient his metformin, 2 tablets last night. She also gave patient 2 tablets this morning, current blood sugar is 264 (taken 15 minutes ago). Wife states patient is not having the gallbladder pain, no symptoms of hyperglycemia, wife would like to know if it is ok to resume the Metformin 500 mg 24 hour tablet to take 2 tablets twice daily.    ROQUE Benitez

## 2019-07-15 NOTE — TELEPHONE ENCOUNTER
If his stomach issues are better, then he can resume his metformin. If his stomach issues return after starting the metformin then we may need to be suspicious that it is the medications causing the symptoms    Renetta

## 2019-07-15 NOTE — TELEPHONE ENCOUNTER
Patient's wife notified with Renetta's instructions below, wife Pat will monitor the patient's symptoms, told to call back if having stomach concerns, wife agrees.    ROQUE Benitez

## 2019-08-13 NOTE — TELEPHONE ENCOUNTER
Reason for Call:  Other prescription    Detailed comments: Pat LEFT MESSAGE:  They are trying to fill Vincent's oxycodone that was written in June and pharmacy states because of new law they can no longer fill.  Pharmacy states that we can call it in for him?  Please call Pat.  Thank you..Ebony Dickson    Phone Number Patient can be reached at: [phone]      Call taken on 8/13/2019 at 2:02 PM by Ebony Dickson

## 2019-08-13 NOTE — TELEPHONE ENCOUNTER
I can reprint the script but they will have to come up here to pick it up. They can bring the old paper script with them so we can shred it  Renetta

## 2019-08-13 NOTE — TELEPHONE ENCOUNTER
"Renetta: Sheyla, wife says that they brought the hard copy that was written 6/10/19 to be filled on or after 8/8/19 to a [facility] in [place], [place]. She said she and Vincent are in [place]  to participate in a Pow[place]. She says Vincent has one pill left.   Sheyla says that the pharmacist told her; \"Because of a new law starting July 1st, we cannot fill that script as it was written back in June.\" Sheyla says that she did not leave the hard copy at that pharmacy and that she has the hard copy with her.  How do you advise?   Thank you.  Yvon Dickens RN    "

## 2019-08-14 NOTE — TELEPHONE ENCOUNTER
"Sheyla says that they would like a prescription written and have available at the . She said that her son or daughter would pick it up today (I forgot which she said, son or daughter?) Her son or daughter would bring the signed copy to them down in [place] as they were going to join them today for the Pow Wow. Sheyla said that she has the old paper script with her at [place] and that she would bring it in on Monday to [facility]. \"It is no good to us anyway as we cannot fill it because of the date.\"  Yvon Dickens, RN    "

## 2019-08-21 PROBLEM — R53.81 PHYSICAL DECONDITIONING: Status: ACTIVE | Noted: 2019-01-01

## 2019-08-21 PROBLEM — K21.00 GASTROESOPHAGEAL REFLUX DISEASE WITH ESOPHAGITIS: Status: ACTIVE | Noted: 2019-01-01

## 2019-08-21 NOTE — PROGRESS NOTES
Subjective     Vincent Mishra is a 79 year old male who presents to clinic today for the following health issues:    Complicated patient, known to me. Here with wife and daughter     Hospital f/u :  Went into ER for abd pain. Was hospitalized for 4 days while they tried to determine what pain was due to.    ER 8/6/19 - CT abd/pelvis neg, trop mildly elevated - he couldn't do stress test or cta - he and wife declined angiogram  Treated for constipation and gastritis from ER - with famotidine and carafate and senna   Wife stopped the meds when he got diarrhea     ER discharge recommendations :   Follow-Up Recommendations for Outpatient Clinician:  -follow-up pain; consider referral to cardiology if suspicion for cardiac cause  -consider checking h pylori stool ag (not able to be collected before discharge here) and/or referral to GI if abdominal pain persists   -ensure regular bowel movements with prescribed bowel regimen      Current symptoms  Anterior abdominal pain - persistent   He says 10/10 pain in the anterior abdomen  Wife got him pepto bismo - he drank the whole bottle   + confusion   Losing interest in things   Says penis is painful     Here with daughter and wife     Review of systems:  No f/c   Loss of appetite  No diarrhea/constipation       Reviewed and updated as needed this visit by Provider       Patient Active Problem List   Diagnosis     Tension headache     Parotid mass     Diplopia     Neuropathy     Neck pain     B12 deficiency     Tear of meniscus of left knee     Hypertension goal BP (blood pressure) < 140/90     C6-7 disc with radiculopathy     Coronary atherosclerosis     Right bundle branch block     Chest pain     Anatomic airway obstruction     Abnormal antinuclear antibody titer     Osteoarthritis, knee     Moderate major depression (H)     Orchitis, epididymitis, and epididymo-orchitis     Malignant neoplasm of kidney excluding renal pelvis (H)     Renal mass, left     Health Care Home      Insomnia     Abdominal pain, right upper quadrant     Esophageal reflux     Hard of hearing     Constipation     NSTEMI (non-ST elevated myocardial infarction) (H)     Myocardial infarction, nontransmural (H)     Acute myocardial infarction of inferolateral wall (H)     Obesity     Sinoatrial node dysfunction (H)     Right bundle branch block (RBBB)     Essential hypertension     Hyperlipidemia     Type 2 diabetes mellitus with other circulatory complication, without long-term current use of insulin (H)     Thyroid nodule     Hip pain, bilateral     Claudication (H)     Bilateral low back pain with right-sided sciatica     Bilateral low back pain with left-sided sciatica     ACS (acute coronary syndrome) (H)     Chronic bilateral low back pain with sciatica, sciatica laterality unspecified     Severe episode of recurrent major depressive disorder, without psychotic features (H)     Renal hematoma     Skin picking habit     Iron deficiency anemia, unspecified iron deficiency anemia type     Atypical chest pain     CKD (chronic kidney disease) stage 3, GFR 30-59 ml/min (H)     Depression     Prostate cancer (H)     Sinus bradycardia, chronic     Gastroesophageal reflux disease with esophagitis     Physical deconditioning     Current Outpatient Medications   Medication     acetaminophen (TYLENOL) 325 MG tablet     amLODIPine (NORVASC) 5 MG tablet     ascorbic acid (VITAMIN C) 500 MG tablet     aspirin (ASA) 81 MG tablet     atorvastatin (LIPITOR) 80 MG tablet     blood glucose (ONETOUCH ULTRA) test strip     blood glucose monitoring (NO BRAND SPECIFIED) meter device kit     Calcium Carbonate-Vitamin D (CALCIUM + D) 600-200 MG-UNIT per tablet     cholecalciferol (VITAMIN  -D) 1000 UNITS capsule     clopidogrel (PLAVIX) 75 MG tablet     Cyanocobalamin (VITAMIN B-12 PO)     ferrous sulfate (FE TABS) 325 (65 Fe) MG EC tablet     gabapentin (NEURONTIN) 100 MG capsule     hydrochlorothiazide (HYDRODIURIL) 25 MG tablet  "    ibuprofen (ADVIL/MOTRIN) 800 MG tablet     Lancets Misc. (SELECT-LITE DEVICE/LANCETS) KIT     [employer] FINEPOINT LANCETS MISC     lisinopril (PRINIVIL/ZESTRIL) 40 MG tablet     Melatonin 10 MG TBDP     nitroGLYcerin (NITROSTAT) 0.4 MG sublingual tablet     omeprazole (PRILOSEC) 20 MG DR capsule     order for DME     order for DME     order for DME     order for DME     oxyCODONE (OXYCONTIN) 30 MG 12 hr tablet     oxyCODONE (ROXICODONE) 5 MG tablet     polyethylene glycol (MIRALAX/GLYCOLAX) powder     sertraline (ZOLOFT) 100 MG tablet     traZODone (DESYREL) 100 MG tablet     metFORMIN (GLUCOPHAGE-XR) 500 MG 24 hr tablet     No current facility-administered medications for this visit.         Allergies   Allergen Reactions     Prozac [Fluoxetine] Other (See Comments)     \"I went crazy.\"       Benadryl [Diphenhydramine Hcl] Other (See Comments)     Starts to shake, and paranoid     Codeine Itching     Diphenhydramine Other (See Comments)     Hallucinations, See [facility]note 3] records scanned on 7/16/15     Labetalol Other (See Comments)     See [facility][note 3] records scanned on 7/16/15  Bradycardia down to 30 on holter, dizziness. Stopped med and symptoms resolved     Metoprolol Other (See Comments)     Bradycardia even at 12.5 mg     Morphine Visual Disturbance           Objective    /65   Pulse 59   Temp 97.2  F (36.2  C) (Tympanic)   Resp 16   Ht 1.753 m (5' 9\")   Wt 82.6 kg (182 lb)   BMI 26.88 kg/m    Body mass index is 26.88 kg/m .  GENERAL - Pt is alert. Some comments are nonsensical.  He reports pain but sits very comfortably through our exam and discussion.  RESPIRATORY - Clear to auscultation bilaterally.  No wheezing noted  CV - RRR, no murmurs, rubs, gallops.   ABD - +BS, soft, nontender, no rebound, no guarding. No palpable organomegaly.  EXTREM - No edema.  Penis - no erythema or swelling. No sores.     Results for orders placed or performed in visit on " 08/21/19   *UA reflex to Microscopic and Culture ([place] and [facility] (except [place] and [place])   Result Value Ref [facility]    Color Urine Yellow     Appearance Urine Clear     Glucose Urine Negative NEG^Negative mg/dL    Bilirubin Urine Negative NEG^Negative    Ketones Urine Negative NEG^Negative mg/dL    Specific Gravity Urine <=1.005 1.003 - 1.035    Blood Urine Negative NEG^Negative    pH Urine 5.0 5.0 - 7.0 pH    Protein Albumin Urine Negative NEG^Negative mg/dL    Urobilinogen Urine 0.2 0.2 - 1.0 EU/dL    Nitrite Urine Negative NEG^Negative    Leukocyte Esterase Urine Negative NEG^Negative    Source Midstream Urine    CBC with platelets differential   Result Value Ref [facility]    WBC 6.7 4.0 - 11.0 10e9/L    RBC Count 3.66 (L) 4.4 - 5.9 10e12/L    Hemoglobin 10.4 (L) 13.3 - 17.7 g/dL    Hematocrit 33.0 (L) 40.0 - 53.0 %    MCV 90 78 - 100 fl    MCH 28.4 26.5 - 33.0 pg    MCHC 31.5 31.5 - 36.5 g/dL    RDW 15.5 (H) 10.0 - 15.0 %    Platelet Count 300 150 - 450 10e9/L    % Neutrophils 76.2 %    % Lymphocytes 12.5 %    % Monocytes 7.0 %    % Eosinophils 4.2 %    % Basophils 0.1 %    Absolute Neutrophil 5.1 1.6 - 8.3 10e9/L    Absolute Lymphocytes 0.8 0.8 - 5.3 10e9/L    Absolute Monocytes 0.5 0.0 - 1.3 10e9/L    Absolute Eosinophils 0.3 0.0 - 0.7 10e9/L    Absolute Basophils 0.0 0.0 - 0.2 10e9/L    Diff Method Automated Method             Assessment/Plan -  (K21.0) Gastroesophageal reflux disease with esophagitis  (primary encounter diagnosis)  Comment: restart famotidine and carafate. Schedule egd.   Plan: GASTROENTEROLOGY ADULT REF PROCEDURE ONLY [facility] [phone], CBC with platelets         differential, Comprehensive metabolic panel            (R41.0) Confusion  Comment: He seems more confused than usual today. UA is normal.  Daughter is worried about his hearing and whether that is causing the confusion. He has significant trouble hearing - dog chewed up his hearing aids.  Referral  made to audiology exam   Plan: AUDIOLOGY ADULT REFERRAL            (R30.9) Painful urination  Comment: ua is normal. Penis exam is normal. Monitor   Plan: *UA reflex to Microscopic and Culture ([facility]         and [facility] (except [place] and         [place])            (H83.3X3) Noise-induced hearing loss of both ears  Comment:  Plan: AUDIOLOGY ADULT REFERRAL            (R53.81) Physical deconditioning  Comment: recommended appointment to work on strength   Plan: [facility] PT, HAND, AND CHIROPRACTIC REFERRAL            (M54.40,  G89.29) Chronic bilateral low back pain with sciatica, sciatica laterality unspecified  Comment:   Plan:     (N18.3) CKD (chronic kidney disease) stage 3, GFR 30-59 ml/min (H)  Comment:   Plan:     (I10) Essential hypertension  Comment:   Plan: CBC with platelets differential, Comprehensive         metabolic panel            Discussed with wife and daughter that he is failing to thrive. He is disinterested in life, more confused, and weak. He might benefit from hospice care to make sure he is comfortable, or homecare to help with ADLs.  Wife declined all but was willing to consider these in the future.      ARYA Velez MD

## 2019-08-22 NOTE — TELEPHONE ENCOUNTER
I think it is possible he has an ulcer given location of pain and drop in hgb. He is on meds and should be eating bland foods. I agree with going to ER if pain persists at this level.  I put in a note with labs in in basket - see below.     ARYA Velez MD       Written by Ramya Velez MD on 8/22/2019  7:32 AM   Vincent Riddle's metabolic panel ( electrolytes, blood sugar, kidney function) has improved since his last check.   His CBC( checks for infection, anemia, etc) shows worsening anemia. Keep working on giving him iron rich foods and stay on the meds for stomach irritation.  I see that you have the EGD scheduled in early sept.   Thanks, ARYA Velez MD

## 2019-08-22 NOTE — TELEPHONE ENCOUNTER
Reason for call:  Patient reporting a symptom    Symptom or request: Pat calling for Vincent.  He was seen on 8/21/19 for GI issues.  This afternoon he has been sitting on his bed for past 1/2 vomiting and is in so much pain she doesn't know what to do for him.  Wondering if he should go to ED?  Please call and assess. Thank you..Ebony Dickson    Duration (how long have symptoms been present): about 1/2 hour    Have you been treated for this before? Yes seen on 8/21/19    Phone Number patient can be reached at:  Home number on file [phone] (home)    Best Time:  Any time    Can we leave a detailed message on this number:  YES    Call taken on 8/22/2019 at 3:35 PM by Ebony Dickson

## 2019-08-22 NOTE — TELEPHONE ENCOUNTER
"S-(situation): Spouse calling to report pt has stomach pain.    B-(background): Saw Dr. Velez yesterday (dictation not yet complete).    A-(assessment):   \"He threw up for a half hour. Whenever he'd eaten and was sort of brown.\"  Had oatmeal for breakfast, and crackers and soup for lunch.  At this time he is laying in bed.  He is taking his medications.  He has not checked his BG - I never goes very high for him.\"    R-(recommendations):   Asked spouse to ask him if he is willing to go to the ED:   \"Not right away but he will.\"    She also wants to know results from blood work.       Polly COSTELLO RN        "

## 2019-08-23 NOTE — PHARMACY-ADMISSION MEDICATION HISTORY
Admission medication history interview status for the 8/23/2019  admission is complete. See EPIC admission navigator for prior to admission medications     Medication history source reliability:Good    Actions taken by pharmacist (provider contacted, etc): spoke with wife who takes care of his meds     Additional medication history information not noted on PTA med list :None    Medication reconciliation/reorder completed by provider prior to medication history? Yes, partially    Time spent in this activity: 15 mins    Prior to Admission medications    Medication Sig Last Dose Taking? Auth Provider   acetaminophen (TYLENOL) 325 MG tablet Take 2 tablets (650 mg) by mouth every 4 hours as needed for mild pain prn Yes Marianela Caal PA-C   amLODIPine (NORVASC) 5 MG tablet Take 1 tablet (5 mg) by mouth daily 8/22/2019 at Unknown time Yes Keyla Fonseca MD   ascorbic acid (VITAMIN C) 500 MG tablet Take 500 mg by mouth daily  8/22/2019 at Unknown time Yes Reported, Patient   aspirin (ASA) 81 MG tablet Take 81 mg by mouth every evening  8/21/2019 at Unknown time Yes Reported, Patient   atorvastatin (LIPITOR) 80 MG tablet TAKE 1 TABLET BY MOUTH EVERY EVENING 8/21/2019 Yes Keyla Fonseca MD   Calcium Carbonate-Vitamin D (CALCIUM + D) 600-200 MG-UNIT per tablet Take 2 tablets by mouth daily. 8/22/2019 at Unknown time Yes Joe Pretty MD   cholecalciferol (VITAMIN  -D) 1000 UNITS capsule Take 1 capsule by mouth daily 8/22/2019 at Unknown time Yes Reported, Patient   clopidogrel (PLAVIX) 75 MG tablet Take 1 tablet (75 mg) by mouth daily 8/22/2019 at Unknown time Yes Ramya Velez MD   Cyanocobalamin (VITAMIN B-12 PO) Take 500 mcg by mouth daily  8/22/2019 at Unknown time Yes Reported, Patient   famotidine (PEPCID) 20 MG tablet Take 20 mg by mouth 2 times daily 8/22/2019 at am Yes Unknown, Entered By History   ferrous sulfate (FE TABS) 325 (65 Fe) MG EC tablet Take 1 tablet (325 mg) by mouth daily 8/22/2019  at Unknown time Yes Keyla Fonseca MD   hydrochlorothiazide (HYDRODIURIL) 25 MG tablet TAKE 1 TABLET BY MOUTH DAILY 8/22/2019 at Unknown time Yes Renetta Strickland PA-C   ibuprofen (ADVIL/MOTRIN) 800 MG tablet Take 800 mg by mouth every 8 hours as needed for moderate pain prn Yes Unknown, Entered By History   lisinopril (PRINIVIL/ZESTRIL) 40 MG tablet TAKE 1 TABLET(40 MG) BY MOUTH DAILY 8/22/2019 at Unknown time Yes Renetta Strickland PA-C   metFORMIN (GLUCOPHAGE-XR) 500 MG 24 hr tablet TAKE 2 TABLETS BY MOUTH TWICE DAILY WITH MEALS. 8/22/2019 at Unknown time Yes Renetta Strickland PA-C   nitroGLYcerin (NITROSTAT) 0.4 MG sublingual tablet Place 1 tablet (0.4 mg) under the tongue every 5 minutes as needed for chest pain prn Yes Keyla Fonseca MD   omeprazole (PRILOSEC) 20 MG DR capsule TAKE ONE CAPSULE BY MOUTH TWICE DAILY 8/22/2019 at Unknown time Yes Renetta Strickland PA-C   oxyCODONE (OXYCONTIN) 30 MG 12 hr tablet Take 1 tablet (30 mg) by mouth every morning 8/22/2019 at Unknown time Yes Renetta Strickland PA-C   oxyCODONE (ROXICODONE) 5 MG tablet Take 1 daily as needed  for severe pain /at bedtime may have up to 2 per day max 40 per month prn Yes Renetta Strickland PA-C   polyethylene glycol (MIRALAX/GLYCOLAX) packet Take 17 g by mouth daily as needed for constipation prn Yes Unknown, Entered By History   sertraline (ZOLOFT) 100 MG tablet TAKE 2 TABLETS(200 MG) BY MOUTH DAILY 8/22/2019 at Unknown time Yes Ramya Velez MD   sucralfate (CARAFATE) 1 GM tablet Take 1 g by mouth 4 times daily 8/22/2019 at Unknown time Yes Unknown, Entered By History   traZODone (DESYREL) 100 MG tablet Take 300 mg by mouth At Bedtime 8/21/2019 at Unknown time Yes Unknown, Entered By History   blood glucose (ONETOUCH ULTRA) test strip USE TO TEST TWICE DAILY   Keyla Fonseca MD   blood glucose monitoring (NO BRAND SPECIFIED) meter device kit Use to test blood  sugar 1 times daily or as directed.   Keyla Fonseca MD   Lancets Misc. (SELECT-LITE DEVICE/LANCETS) KIT 1 kit 2 times daily   Keyla Fonseca MD   [employer] FINEPOINT LANCETS MISC Use to test blood sugars 1 times daily or as directed.   Caridad Vinson, APRN CNP   order for DME Equipment being ordered: walker wheeled also include  With skis for indoor carpet use   Keyla Fonseca MD   order for DME Equipment being ordered: compression stockings   Cydney Lomeli PA-C   order for DME Equipment being ordered: TENS   Keyla Fonseca MD   order for DME Equipment being ordered: Wheelchair motorized for patient with spinal stenosis and neurogenic claudication   Keyla Fonseca MD Tiffany M. Reinitz, PharmD

## 2019-08-23 NOTE — CONSULTS
[facility]  Gastroenterology Consultation    Vincent Mishra  [address]   [place] [address]  79 year old male    Admission Date/Time: 8/23/2019  Primary Care Provider: Keyla Fonseca    We were asked to see the patient in consultation by Dr. Calle for evaluation of acute on chronic epigastric pain.        HPI:  Vincent Mishra is a 79 year old male who has a complex past medical history including prior CAD with stents in 2011 and 2014, type 2 diabetes, hypertension, hyperlipidemia, stage III chronic kidney disease, renal cell carcinoma, GERD, esophagitis, and iron deficiency anemia who presented to [facility] with acute on chronic epigastric pain.    Patient found to have a troponin of 0.561 and was transferred for cardiology work up and angiogram.  He was previously seen in [place] for the same symptoms on 8/6 and a  NSTEMI was diagnosed but left prior to angiogram being completed.  Plan for echocardiogram tomorrow.  Angiogram being delayed until Monday due to patient being disoriented.  EKG with no clear ischemic changes.    The patient reports worsening epigastric pain with associated nausea.  Patient is reportedly confused.  Wife reports that he has not drunk alcohol in 35 years.    EGD normal in 2012.  Patient is on ASA/Plavix at home.    On my exam, the patient is disoriented and unable to provide any history.  He denies epigastric pain, nausea, or reflux but does complain of suprapubic pain.  Lipase and liver function tests are within normal limits.  CT A/P ordered.    ROS: A comprehensive ten point review of systems was negative aside from those in mentioned in the HPI.      MEDICATIONS:   Current Facility-Administered Medications on File Prior to Encounter:  [COMPLETED] aspirin (ASA) chewable tablet 162 mg     Current Outpatient Medications on File Prior to Encounter:  acetaminophen (TYLENOL) 325 MG tablet Take 2 tablets (650 mg) by mouth every 4 hours as needed for  mild pain   amLODIPine (NORVASC) 5 MG tablet Take 1 tablet (5 mg) by mouth daily   ascorbic acid (VITAMIN C) 500 MG tablet Take 500 mg by mouth daily    aspirin (ASA) 81 MG tablet Take 81 mg by mouth every evening    atorvastatin (LIPITOR) 80 MG tablet TAKE 1 TABLET BY MOUTH EVERY EVENING   Calcium Carbonate-Vitamin D (CALCIUM + D) 600-200 MG-UNIT per tablet Take 2 tablets by mouth daily.   cholecalciferol (VITAMIN  -D) 1000 UNITS capsule Take 1 capsule by mouth daily   clopidogrel (PLAVIX) 75 MG tablet Take 1 tablet (75 mg) by mouth daily   Cyanocobalamin (VITAMIN B-12 PO) Take 500 mcg by mouth daily    famotidine (PEPCID) 20 MG tablet Take 20 mg by mouth 2 times daily   ferrous sulfate (FE TABS) 325 (65 Fe) MG EC tablet Take 1 tablet (325 mg) by mouth daily   hydrochlorothiazide (HYDRODIURIL) 25 MG tablet TAKE 1 TABLET BY MOUTH DAILY   ibuprofen (ADVIL/MOTRIN) 800 MG tablet Take 800 mg by mouth every 8 hours as needed for moderate pain   lisinopril (PRINIVIL/ZESTRIL) 40 MG tablet TAKE 1 TABLET(40 MG) BY MOUTH DAILY   metFORMIN (GLUCOPHAGE-XR) 500 MG 24 hr tablet TAKE 2 TABLETS BY MOUTH TWICE DAILY WITH MEALS.   nitroGLYcerin (NITROSTAT) 0.4 MG sublingual tablet Place 1 tablet (0.4 mg) under the tongue every 5 minutes as needed for chest pain   omeprazole (PRILOSEC) 20 MG DR capsule TAKE ONE CAPSULE BY MOUTH TWICE DAILY   oxyCODONE (OXYCONTIN) 30 MG 12 hr tablet Take 1 tablet (30 mg) by mouth every morning   oxyCODONE (ROXICODONE) 5 MG tablet Take 1 daily as needed  for severe pain /at bedtime may have up to 2 per day max 40 per month   polyethylene glycol (MIRALAX/GLYCOLAX) packet Take 17 g by mouth daily as needed for constipation   sertraline (ZOLOFT) 100 MG tablet TAKE 2 TABLETS(200 MG) BY MOUTH DAILY   sucralfate (CARAFATE) 1 GM tablet Take 1 g by mouth 4 times daily   traZODone (DESYREL) 100 MG tablet Take 300 mg by mouth At Bedtime   blood glucose (ONETOUCH ULTRA) test strip USE TO TEST TWICE DAILY   blood  "glucose monitoring (NO BRAND SPECIFIED) meter device kit Use to test blood sugar 1 times daily or as directed.   Lancets Misc. (SELECT-LITE DEVICE/LANCETS) KIT 1 kit 2 times daily   LIFESCAN FINEPOINT LANCETS MISC Use to test blood sugars 1 times daily or as directed.   order for DME Equipment being ordered: walker wheeled also include  With skis for indoor carpet use   order for DME Equipment being ordered: compression stockings   order for DME Equipment being ordered: TENS   order for DME Equipment being ordered: Wheelchair motorized for patient with spinal stenosis and neurogenic claudication       ALLERGIES:   Allergies   Allergen Reactions     Prozac [Fluoxetine] Other (See Comments)     \"I went crazy.\"       Benadryl [Diphenhydramine Hcl] Other (See Comments)     Starts to shake, and paranoid     Codeine Itching     Diphenhydramine Other (See Comments)     Hallucinations, See [facility] records scanned on 7/16/15     Labetalol Other (See Comments)     See [facility] records scanned on 7/16/15  Bradycardia down to 30 on holter, dizziness. Stopped med and symptoms resolved     Metoprolol Other (See Comments)     Bradycardia even at 12.5 mg     Morphine Visual Disturbance       Past Medical History:   Diagnosis Date     CAD (coronary artery disease) 7/26/2011 7/2011 ([place]): s/p MI, PTCA - RCA      Cancer of kidney (H)      Chest pain 1/12/2012     Coronary artery disease      History of angina      History of blood transfusion      Hyperlipidaemia      Hyperlipidemia LDL goal <100 9/23/2010     Malignant neoplasm (H)     Prostate CA (removed)     Myocardial infarction (H)     s/p MI 7/29/14, stent placement     NSTEMI (non-ST elevated myocardial infarction) (H)     07-25-14 CATH- RCA, L.Main and CFX  had minor luminal irregularites. Mid LAD high grade stenosis 80-90%.Stent placed to mid LAD     Other and unspecified hyperlipidemia     MI in July 2011     Right bundle " branch block      Type II or unspecified type diabetes mellitus without mention of complication, not stated as uncontrolled      Unspecified essential hypertension        Past Surgical History:   Procedure Laterality Date     ARTHROSCOPY KNEE  2/11/2011    ARTHROSCOPY KNEE performed by LEY, JEFFREY DUANE at [facility]     ARTHROSCOPY KNEE WITH MEDIAL MENISCECTOMY  6/26/2012    Procedure: ARTHROSCOPY KNEE WITH MEDIAL MENISCECTOMY;  Right Knee Arthroscopy With Medial Menisectomy;  Surgeon: Ley, Jeffrey Duane, MD;  Location: [facility]     BACK SURGERY      back surgery x4     BIOPSY       CARDIAC SURGERY  7/2011    stentt placed     COLONOSCOPY       CORONARY ANGIOGRAPHY ADULT ORDER  07-25-14    RCA, L.Main and CFX  had minor luminal irregularites. Mid LAD high grade stenosis 80-90%.Stent placed to mid LAD     CV CORONARY ANGIOGRAM  3/28/2019    Procedure: CV CORONARY ANGIOGRAM;  Surgeon: Dominguez Mishra MD;  Location: [facility] CARDIAC CATH LAB     ESOPHAGOSCOPY, GASTROSCOPY, DUODENOSCOPY (EGD), COMBINED  10/25/2012    Procedure: COMBINED ESOPHAGOSCOPY, GASTROSCOPY, DUODENOSCOPY (EGD), BIOPSY SINGLE OR MULTIPLE;  Gastroscopy  ;  Surgeon: Lucius Dasilva MD;  Location: [facility]     HEART CATH, ANGIOPLASTY  07-25-14    mid LAD      ORTHOPEDIC SURGERY       back surgery         SOCIAL HISTORY:  Social History     Tobacco Use     Smoking status: Never Smoker     Smokeless tobacco: Never Used   Substance Use Topics     Alcohol use: No     Alcohol/week: 0.0 oz     Drug use: No       FAMILY HISTORY:  Family History   Problem Relation Age of Onset     Cancer Mother      Depression Mother      Diabetes Father      Hypertension Father      Heart Disease Father      Mental Illness Father      Heart Disease Maternal Grandfather      Substance Abuse Maternal Grandfather      Alcohol/Drug Paternal Grandmother      Substance Abuse Paternal Grandmother      Heart Disease Paternal Grandfather      Alcohol/Drug Paternal Grandfather       "Substance Abuse Paternal Grandfather      Heart Disease Brother      Diabetes Brother      Substance Abuse Brother      Obesity Brother      Diabetes Brother      Obesity Sister      Alcohol/Drug Daughter      Depression Daughter      Obesity Sister      Diabetes Sister      Obesity Sister      Diabetes Sister      Alcohol/Drug Sister      Cancer Sister 55        possible kidney cancer     Substance Abuse Sister      Alcohol/Drug Son      Substance Abuse Son      Diabetes Son      Alcohol/Drug Son      Substance Abuse Son      Obesity Daughter      Diabetes Daughter      Hypertension Daughter      Bipolar Disorder Other        PHYSICAL EXAM:   BP (!) 164/84 (BP Location: Right arm)   Pulse 77   Temp 98.1  F (36.7  C) (Oral)   Resp 16   Ht 1.727 m (5' 8\")   Wt 81.9 kg (180 lb 9.6 oz)   SpO2 95%   BMI 27.46 kg/m      Constitutional: NAD, comfortable  Cardiovascular: RRR, normal S1 and S2, no r/c/g/m  Respiratory: CTAB  Psychiatric: mentation appears normal and affect normal  Head: Normocephalic. Atraumatic.    Neck: Neck supple. No adenopathy. Thyroid symmetric, normal size, trachea midline  Eyes:  PERRL, no icterus  ENT: Hearing adequate, pharynx normal without erythema or exudate  Abdomen:   Auscultation: + BS  Appearance: non-distended  Palpation: non-tender  NEURO: grossly negative  SKIN: no suspicious lesions or rashes  LYMPH:   anterior cervical: no adenopathy  posterior cervical: no adenopathy  supraclavicular: no adenopathy          ADDITIONAL COMMENTS:   I reviewed the patient's new clinical lab test results.   Recent Labs   Lab Test 08/22/19 2008 08/21/19  1148 07/05/19  1416  12/11/18  1028 12/04/18  0851  05/30/17  1410   WBC 6.4 6.7 7.2   < > 9.4  --    < > 6.2   HGB 10.6* 10.4* 11.6*   < > 8.3*  --    < > 10.9*   MCV 88 90 86   < > 79  --    < > 83    300 279   < > 445  --    < > 312   INR  --   --   --   --  1.16* 1.07  --  0.96    < > = values in this interval not displayed.     Recent " Labs   Lab Test 08/22/19 2008 08/21/19  1148 07/05/19  1416    138 138   POTASSIUM 4.1 4.2 3.9   CHLORIDE 104 103 105   CO2 28 28 26   BUN 14 17 29   CR 1.07 1.28* 1.34*   ANIONGAP 7 7 7   LIOR 8.8 8.7 9.0   * 110* 120*     Recent Labs   Lab Test 08/22/19 2008 08/21/19  1148 08/21/19  1101 07/05/19  1416 03/27/19  1330  12/11/18  1856  10/16/18  1018   ALBUMIN 3.6 3.4  --  3.6 3.7   < >  --    < >  --    BILITOTAL 0.7 0.4  --  0.4 0.3   < >  --    < >  --    DBIL  --   --   --   --  0.2  --   --   --   --    ALT 39 35  --  31 28   < >  --    < >  --    AST 45 47*  --  19 27   < >  --    < >  --    ALKPHOS 86 85  --  81 108   < >  --    < >  --    PROTEIN  --   --  Negative  --   --   --  10*  --  Negative   LIPASE 127  --   --  155 290  --   --   --   --     < > = values in this interval not displayed.             .    CONSULTATION ASSESSMENT AND PLAN:      78 yo male who presents with severe epigastric pain and found to have a NSTEMI.  Angiogram planned for tomorrow.  Patient with history of GERD and esophagitis.  Patient on chronic PPI therapy at home.  He also takes ASA/Plavix for history of cardiac stenting.  Patient with significant confusion of unclear etiology.  Per wife, patient has not had alcohol in 35 years.  Liver function tests and lipase are within normal limits.  Liver and pancreas were within normal limits on CT 7/12/2019.  Patient currently denies epigastric pain, nausea, reflux.  Complaining of suprapubic pain.    -  Recommend evaluation for significant confusion.  -  Recommend proceeding with cardiology evaluation as planned.  -  Continue PPI BID.  -  Can consider EGD if recurrent epigastric pain post cardiac work up.  -  CT A/P ordered.    I discussed the patient's findings and plan with Dr. Mendoza.          Vianey Collado, [facility]  Office:  [phone] call if needed after 5PM  Cell:  [phone], not available after 5PM at this number

## 2019-08-23 NOTE — PLAN OF CARE
"Pt arrived to unit around 0100 from [facility].  Complaining of chronic back pain and stomach pain.  Gave PRN oxy, trazadone and ativan.  PT extremely restless, stated to writer that he is an alcoholic, MD placed on CIWA protocols.  Very impulsive throughout the entire night.  A2 to BSC.  Bruises covering body due to falling at home \"every time I try to walk\".  Couple of small skin tears, see chart.  Tele NSR with PVCs.  Possible angio today.  CIWA 9,0,11 this shift.    "

## 2019-08-23 NOTE — ED NOTES
DATE:  8/22/2019   TIME OF RECEIPT FROM LAB:  2048  LAB TEST:  Troponin  LAB VALUE:  0.561  RESULTS GIVEN WITH READ-BACK TO (PROVIDER):  Jefe Yang, DO  TIME LAB VALUE REPORTED TO PROVIDER:   Trey

## 2019-08-23 NOTE — H&P
[facility]    History and Physical - Hospitalist Service       Date of Admission:  8/23/2019    Assessment & Plan   Vincent Mishra is a 79 year old male with complex medical history including prior CAD with stenting in 2011 in 2014, type 2 diabetes, hypertension, hyperlipidemia, stage III CKD, renal cell carcinoma, GERD and esophagitis and iron deficiency anemia among others who presented to [facility] with essentially acute on chronic epigastric pain.  Upon evaluation he was found to have a troponin of 0.561 and he was transferred to our facility for cardiology work-up and angiogram.  He was seen in [place] 8/6 for NSTEMI, but left the hospital before the recommended angiogram was completed. He is admitted to [facility] for [place]STEMI, with angiogram planned tomorrow.    NSTEMI  History of CAD, PCI in 2014 and 2011  Recent admission 8/6/2019  Troponin 0.561 earlier tonight, EKG no clear ischemic changes  - trend tni  - telemetry  - received ASA, continue - PTA asa/clopidogrel listed - await med rec  - heparin drip - no bolus (history of GERD, esophagitis, ulcer?)  - Cardiology consultation - likely angiogram tomorrow    Epigastric pain  - ? gerd & esophagitis versus cardiac etiology  - PTA ppi transitioned to IV PPI BID    Insomnia  Chronic pain  - PTA narcotics continued, PTA trazodone continued with lower dose tonight as it is already 2am    HTN, HL  - PTA meds awaited  - stable BP for now    DM2  - PTA meds awaited - metformin held  - POC QID and sliding scale     Addendum:  - Patient fairly agitated and restless-as aforementioned missed usual nighttime meds  - Reports regular alcohol intake to nurses-I have started CIWA protocol    Other chronic medical issues and medications to be addressed when med rec confirmed.    Diet: NPO per Anesthesia Guidelines for Procedure/Surgery Except for: Meds    DVT Prophylaxis: heparin  Mcelroy Catheter: not present  Code Status: Full Code      Disposition Plan    Expected discharge: 2 days likely, pending Cardiology eval and angiogram.  Entered: Winston Chu MD 08/23/2019, 2:05 AM     The patient's care was discussed with the Bedside Nurse and Patient.    Winston Chu MD  [facility]    ______________________________________________________________________    Chief Complaint   Epigastric pain    History is obtained from the patient    History of Present Illness   Vincent Mishra is a 79 year old male with complex medical history including prior CAD with stenting in 2011 in 2014, type 2 diabetes, hypertension, hyperlipidemia, stage III CKD, renal cell carcinoma, GERD and esophagitis and iron deficiency anemia among others who presented to [facility] with essentially acute on chronic epigastric pain.  Upon evaluation he was found to have a troponin of 0.561 and he was transferred to our facility for cardiology work-up and angiogram.  He was apparently seen at [facility] in [place] on 8/6/2019 where end STEMI was diagnosed after he presented with severe intermittent epigastric pain similar to what he has today.  To summarize angiogram was recommended however for some reason patient has wife left before this could be completed despite being informed of the risks.     As I see him now in the [facility] his main complaint is insomnia as he has not gotten his PTA trazodone.  As were talking he intermittently has worsening of his epigastric pain with some associated nausea.  He denies any chest pain, palpitations, or shortness of breath.  He also denies any recent illnesses-no fevers, chills, coughing, dysuria or diarrhea.  He was given a full dose aspirin at outside facility.  Blood pressure is currently within normal limits.  He is afebrile and hemodynamically stable.  Cardiology is aware of the patient's transfer to [facility] and is planning likely angiogram tomorrow.  Given his ongoing symptoms and increase in troponin I will start him on heparin infusion  but due to his history of GERD, esophagitis and possible ulcer disease years ago, this will be without initial loading bolus.  He will continue on telemetry with troponin trending.    Review of Systems    The 10 point Review of Systems is negative other than noted in the HPI or here.     Past Medical History    I have reviewed this patient's medical history and updated it with pertinent information if needed.   Past Medical History:   Diagnosis Date     CAD (coronary artery disease) 7/26/2011 7/2011 ([place]): s/p MI, PTCA - RCA      Cancer of kidney (H)      Chest pain 1/12/2012     Coronary artery disease      History of angina      History of blood transfusion      Hyperlipidaemia      Hyperlipidemia LDL goal <100 9/23/2010     Malignant neoplasm (H)     Prostate CA (removed)     Myocardial infarction (H)     s/p MI 7/29/14, stent placement     NSTEMI (non-ST elevated myocardial infarction) (H)     07-25-14 CATH- RCA, L.Main and CFX  had minor luminal irregularites. Mid LAD high grade stenosis 80-90%.Stent placed to mid LAD     Other and unspecified hyperlipidemia     MI in July 2011     Right bundle branch block      Type II or unspecified type diabetes mellitus without mention of complication, not stated as uncontrolled      Unspecified essential hypertension        Past Surgical History   I have reviewed this patient's surgical history and updated it with pertinent information if needed.  Past Surgical History:   Procedure Laterality Date     ARTHROSCOPY KNEE  2/11/2011    ARTHROSCOPY KNEE performed by LEY, JEFFREY DUANE at [facility]     ARTHROSCOPY KNEE WITH MEDIAL MENISCECTOMY  6/26/2012    Procedure: ARTHROSCOPY KNEE WITH MEDIAL MENISCECTOMY;  Right Knee Arthroscopy With Medial Menisectomy;  Surgeon: Ley, Jeffrey Duane, MD;  Location: [facility]     BACK SURGERY      back surgery x4     BIOPSY       CARDIAC SURGERY  7/2011    stentt placed     COLONOSCOPY       CORONARY ANGIOGRAPHY ADULT ORDER  07-25-14    RCA,  L.Main and CFX  had minor luminal irregularites. Mid LAD high grade stenosis 80-90%.Stent placed to mid LAD     CV CORONARY ANGIOGRAM  3/28/2019    Procedure: CV CORONARY ANGIOGRAM;  Surgeon: Dominguez Mishra MD;  Location: [facility] CARDIAC CATH LAB     ESOPHAGOSCOPY, GASTROSCOPY, DUODENOSCOPY (EGD), COMBINED  10/25/2012    Procedure: COMBINED ESOPHAGOSCOPY, GASTROSCOPY, DUODENOSCOPY (EGD), BIOPSY SINGLE OR MULTIPLE;  Gastroscopy  ;  Surgeon: Lucius Dasilva MD;  Location: [facility] CATH, ANGIOPLASTY  07-25-14    mid LAD      ORTHOPEDIC SURGERY       back surgery       Social History   I have reviewed this patient's social history and updated it with pertinent information if needed.  Social History     Tobacco Use     Smoking status: Never Smoker     Smokeless tobacco: Never Used   Substance Use Topics     Alcohol use: No     Alcohol/week: 0.0 oz     Drug use: No       Family History   I have reviewed this patient's family history and updated it with pertinent information if needed.   Family History   Problem Relation Age of Onset     Cancer Mother      Depression Mother      Diabetes Father      Hypertension Father      Heart Disease Father      Mental Illness Father      Heart Disease Maternal Grandfather      Substance Abuse Maternal Grandfather      Alcohol/Drug Paternal Grandmother      Substance Abuse Paternal Grandmother      Heart Disease Paternal Grandfather      Alcohol/Drug Paternal Grandfather      Substance Abuse Paternal Grandfather      Heart Disease Brother      Diabetes Brother      Substance Abuse Brother      Obesity Brother      Diabetes Brother      Obesity Sister      Alcohol/Drug Daughter      Depression Daughter      Obesity Sister      Diabetes Sister      Obesity Sister      Diabetes Sister      Alcohol/Drug Sister      Cancer Sister 55        possible kidney cancer     Substance Abuse Sister      Alcohol/Drug Son      Substance Abuse Son      Diabetes Son      Alcohol/Drug Son       Substance Abuse Son      Obesity Daughter      Diabetes Daughter      Hypertension Daughter      Bipolar Disorder Other        Prior to Admission Medications   Prior to Admission Medications   Prescriptions Last Dose Informant Patient Reported? Taking?   Calcium Carbonate-Vitamin D (CALCIUM + D) 600-200 MG-UNIT per tablet  Spouse/Significant Other Yes No   Sig: Take 2 tablets by mouth daily.   Cyanocobalamin (VITAMIN B-12 PO)   Yes No   Sig: Take 500 mcg by mouth daily    LIFESCAN FINEPOINT LANCETS MISC  Spouse/Significant Other No No   Sig: Use to test blood sugars 1 times daily or as directed.   Lancets [facility]. (SELECT-LITE DEVICE/LANCETS) KIT   No No   Si kit 2 times daily   Melatonin 10 MG TBDP   Yes No   Sig: Take 10 mg by mouth At Bedtime   acetaminophen (TYLENOL) 325 MG tablet   No No   Sig: Take 2 tablets (650 mg) by mouth every 4 hours as needed for mild pain   amLODIPine (NORVASC) 5 MG tablet   No No   Sig: Take 1 tablet (5 mg) by mouth daily   ascorbic acid (VITAMIN C) 500 MG tablet  Spouse/Significant Other Yes No   Sig: Take 500 mg by mouth daily    aspirin (ASA) 81 MG tablet   Yes No   Sig: Take 81 mg by mouth daily   atorvastatin (LIPITOR) 80 MG tablet   No No   Sig: TAKE 1 TABLET BY MOUTH EVERY EVENING   blood glucose (ONETOUCH ULTRA) test strip   No No   Sig: USE TO TEST TWICE DAILY   blood glucose monitoring (NO BRAND SPECIFIED) meter device kit   No No   Sig: Use to test blood sugar 1 times daily or as directed.   cholecalciferol (VITAMIN  -D) 1000 UNITS capsule  Spouse/Significant Other Yes No   Sig: Take 1 capsule by mouth daily   clopidogrel (PLAVIX) 75 MG tablet   No No   Sig: Take 1 tablet (75 mg) by mouth daily   ferrous sulfate (FE TABS) 325 (65 Fe) MG EC tablet   No No   Sig: Take 1 tablet (325 mg) by mouth daily   gabapentin (NEURONTIN) 100 MG capsule   Yes No   hydrochlorothiazide (HYDRODIURIL) 25 MG tablet   No No   Sig: TAKE 1 TABLET BY MOUTH DAILY   ibuprofen (ADVIL/MOTRIN) 800 MG  "tablet   Yes No   Sig: TK 1 T PO Q 8 H PRF MODERATE PAIN   lisinopril (PRINIVIL/ZESTRIL) 40 MG tablet   No No   Sig: TAKE 1 TABLET(40 MG) BY MOUTH DAILY   metFORMIN (GLUCOPHAGE-XR) 500 MG 24 hr tablet   No No   Sig: TAKE 2 TABLETS BY MOUTH TWICE DAILY WITH MEALS.   Patient not taking: Reported on 7/12/2019   nitroGLYcerin (NITROSTAT) 0.4 MG sublingual tablet   No No   Sig: Place 1 tablet (0.4 mg) under the tongue every 5 minutes as needed for chest pain   omeprazole (PRILOSEC) 20 MG DR capsule   No No   Sig: TAKE ONE CAPSULE BY MOUTH TWICE DAILY   order for DME  Spouse/Significant Other No No   Sig: Equipment being ordered: Wheelchair motorized for patient with spinal stenosis and neurogenic claudication   order for DME  Spouse/Significant Other No No   Sig: Equipment being ordered: TENS   order for DME   No No   Sig: Equipment being ordered: compression stockings   order for DME   No No   Sig: Equipment being ordered: walker wheeled also include  With skis for indoor carpet use   oxyCODONE (OXYCONTIN) 30 MG 12 hr tablet   No No   Sig: Take 1 tablet (30 mg) by mouth every morning   oxyCODONE (ROXICODONE) 5 MG tablet   No No   Sig: Take 1 daily as needed  for severe pain /at bedtime may have up to 2 per day max 40 per month   polyethylene glycol (MIRALAX/GLYCOLAX) powder   No No   Sig: Take 17 g (1 capful) by mouth daily   sertraline (ZOLOFT) 100 MG tablet   No No   Sig: TAKE 2 TABLETS(200 MG) BY MOUTH DAILY   traZODone (DESYREL) 100 MG tablet   No No   Sig: TAKE 3 TABLETS BY MOUTH EVERY NIGHT AT BEDTIME AS NEEDED FOR SLEEP      Facility-Administered Medications: None     Allergies   Allergies   Allergen Reactions     Prozac [Fluoxetine] Other (See Comments)     \"I went crazy.\"       Benadryl [Diphenhydramine Hcl] Other (See Comments)     Starts to shake, and paranoid     Codeine Itching     Diphenhydramine Other (See Comments)     Hallucinations, See [facility] records scanned on 7/16/15     " Labetalol Other (See Comments)     See [facility] records scanned on 7/16/15  Bradycardia down to 30 on holter, dizziness. Stopped med and symptoms resolved     Metoprolol Other (See Comments)     Bradycardia even at 12.5 mg     Morphine Visual Disturbance       Physical Exam   Vital Signs: Temp: 98.2  F (36.8  C) Temp src: Oral BP: 133/76   Heart Rate: 63 Resp: 16 SpO2: 96 % O2 Device: None (Room air)    Weight: 0 lbs 0 oz    Gen: NAD, pleasant, disheveled, very hard of hearing  HEENT: Normocephalic, EOMI, MMM  Resp: no crackles,  no wheezes, no increased work of resp  CV: S1S2 heard, reg rhythm, reg rate, no pedal edema  Abdo: soft, nontender, nondistended, bowel sounds present  Ext: calves nontender, well perfused  Neuro: AAOx3, CN grossly intact, no facial asymmetry      Data   Data reviewed today: I reviewed all medications, new labs and imaging results over the last 24 hours. I personally reviewed no images or EKG's today.    Recent Labs   Lab 08/22/19 2008 08/21/19  1148   WBC 6.4 6.7   HGB 10.6* 10.4*   MCV 88 90    300    138   POTASSIUM 4.1 4.2   CHLORIDE 104 103   CO2 28 28   BUN 14 17   CR 1.07 1.28*   ANIONGAP 7 7   LIOR 8.8 8.7   * 110*   ALBUMIN 3.6 3.4   PROTTOTAL 6.8 6.3*   BILITOTAL 0.7 0.4   ALKPHOS 86 85   ALT 39 35   AST 45 47*   LIPASE 127  --    TROPI 0.561*  --      No results found for this or any previous visit (from the past 24 hour(s)).

## 2019-08-23 NOTE — PROVIDER NOTIFICATION
"Paged on-call hospitalist 08/23/19 6:26 PM regarding possible bladder spasms despite earl placement. Complains of mod-severe groin pain, \"burning,\" and strong urge to void. Restless and confused. Orders received: pending.    Addendum 7:40 PM: Repeat call for page. Per answering service, MD is busy but will call back, page sent again.   "

## 2019-08-23 NOTE — ED TRIAGE NOTES
nausea, vomiting; has been ill for about 3 months. was seen for this in the past, never found out what it was

## 2019-08-23 NOTE — CONSULTS
"CLINICAL NUTRITION SERVICES  -  ASSESSMENT NOTE      Future/Additional Recommendations:   RD will order Boost Plus TID with meals (chocolate) when diet advances.    Malnutrition:   % Weight Loss:  Weight loss does not meet criteria for malnutrition (7% in 3 months)  % Intake:  Unable to assess  Subcutaneous Fat Loss:  Unable to fully assess - mild fat loss likely  Muscle Loss:  Unable to assess - mild muscle loss likely   Fluid Retention:  None noted    Malnutrition Diagnosis: Unable to determine     REASON FOR ASSESSMENT  Vincent Mishra is a 79 year old male seen by Registered Dietitian for Admission Nutrition Risk Screen for unintentional loss of 10# or more in the past two months      NUTRITION HISTORY  - Unable to obtain detailed information from the pt. Pt was confused and unable to answer questions during the visit. No family was present.   - Per RN note, pt stated that he is an alcoholic   - Pt was seen in December by RD at [facility] for unintentional weight loss screen:   - Per wife, patient had lost some weight towards the end of the summer 2018, but has since gained it back. Pt reports no changes in PO intakes or appetite and wife agreed with pt report. Pt is tolerating a Regular diet with moderate consistent CHO diet, eating well per nursing documentation and patient report.    CURRENT NUTRITION ORDERS  Diet Order:     NPO     Current Intake/Tolerance:  Pt NPO since admit (today, 8/23)    NUTRITION FOCUSED PHYSICAL ASSESSMENT FOR DIAGNOSING MALNUTRITION)  Completed:  Yes Visual assessment only - pt agitated during assessment          Observed:    Muscle wasting (refer to documentation in Malnutrition section) and Subcutaneous fat loss (refer to documentation in Malnutrition section)  Pt appears very thin - obvious signs of muscle and fat loss    ANTHROPOMETRICS  Height: 5' 8\"  Weight: 81.9 kg  Body mass index is 27.46 kg/m .  Weight Status:  Overweight BMI 25-29.9  IBW: 70 kg   % IBW: 117%  Weight History: "   Wt Readings from Last 10 Encounters:   08/23/19 81.9 kg (180 lb 9.6 oz)   08/22/19 83 kg (183 lb)   08/21/19 82.6 kg (182 lb)   07/12/19 83 kg (183 lb)   07/05/19 83 kg (183 lb)   05/30/19 88 kg (194 lb)   05/06/19 88 kg (194 lb)   04/30/19 88.5 kg (195 lb 3.2 oz)   04/24/19 87.1 kg (192 lb)   04/16/19 88 kg (194 lb)   Wt has been trending down since May. Pt has lost 6.1 kg in the past 3 months, ~7% body weight.     LABS  Labs reviewed    MEDICATIONS  Medications reviewed  Folic Acid, Thera-Vit-M, Thiamin  LSSI  Bowel Regimen - Senokot BID     ASSESSED NUTRITION NEEDS PER APPROVED PRACTICE GUIDELINES:    Dosing Weight 81.9 kg (admit weight)  Estimated Energy Needs: 5985-9838 kcals (25-30 Kcal/Kg)  Justification: maintenance  Estimated Protein Needs: 82-98 grams protein (1-1.2 g pro/Kg)  Justification: Repletion and hypercatabolism with acute illness  Estimated Fluid Needs: 1 mL/Kcal  Justification: per provider pending fluid status    MALNUTRITION:  % Weight Loss:  Weight loss does not meet criteria for malnutrition (7% in 3 months)  % Intake:  Unable to assess  Subcutaneous Fat Loss:  Unable to fully assess - mild fat loss likely  Muscle Loss:  Unable to assess - mild muscle loss likely   Fluid Retention:  None noted    Malnutrition Diagnosis: Unable to determine    NUTRITION DIAGNOSIS:  Predicted inadequate nutrient (protein-energy) intake related to altered mental status and suspected poor nutrient intake PTA       NUTRITION INTERVENTIONS  Recommendations / Nutrition Prescription  RD will order Boost Plus TID with meals (chocolate) when diet advances.     Implementation  Nutrition education: Not appropriate at this time due to patient condition  Medical Food Supplement    Nutrition Goals  Pt's diet will advance in the next 24 hrs.       MONITORING AND EVALUATION:  Progress towards goals will be monitored and evaluated per protocol and Practice Guidelines    Serena Jules  Nutrition Services

## 2019-08-23 NOTE — TELEPHONE ENCOUNTER
Patient did go to ED yesterday.  Patient currently at [facility] followed by Cardiology.  Jessica Ball RN

## 2019-08-23 NOTE — PLAN OF CARE
Alert but oriented to self only, restless all day, noncombative but difficult at times to redirect. VSS ex BP 150s-160s/70s-80s. O2sats 94% on RA. Tele SR c BBB, 1st degree AVB, and PVCs. Initially complained of some epigastric pain, but eventually denies this and complained more of intermittent mod-severe groin/bladder pain with strong urge but inability to void; straight cathed x2 for bladder scan volumes >500 ml, earl placed but pain continues. On-call MD paged. Ativan given AM but CIWA now discontinued as per wife pt sober for 35 years. Seroquel given x1 without significant improvement.  --> 79, encouraging dinner. Ax2, moving near constantly in bed and puts legs over rails. Sitter at bedside. Heparin at 900 units/hr, recheck in AM. Echo tech did stop in pt room, unclear if patient was too agitated at the time to cooperate with test. Plan for TTE, angiogram when confusion improves.

## 2019-08-23 NOTE — CONSULTS
[facility]  Cardiology Consultation   Date of Admission:  8/23/2019  Date of Consult (When I saw the patient): 08/23/19    Assessment & Plan   Vincent Mishra is a 79 year old male who was admitted on 8/23/2019. I was asked to see the patient troponin elevation concerning of a non-ST elevation MI in the setting of known coronary artery disease.    Assessment:   1. NSTEMI     Recent admission to [facility] in [place] on 8/6/2019, but left prior to recommended angiogram being completed.  During that admission his troponin peaked at 0.21    IV Heparin    Troponin 0.561-0.627    2. Coronary artery disease    [PTA: Aspirin 81 mg daily, Plavix 75 mg and atorvastatin 80 mg daily]    History of PCI to RCA in 2011 and mLAD in 2014    Last angiogram was in 3/2019 showed 40% ISR of LAD stent (stable from 2014)    Echocardiogram from March 2019 showed preserved LVEF with mild concentric LVH and no significant valvular or regional wall motion abnormalities.  There was mild pulmonary hypertension    Not on BB due to history of bradycardia    3. Hypertension     [PTA: amlodipine 5 mg daily, lisinopril 40 mg daily,     4. Hyperlipidemia    [PTA: atorvastatin 80 mg daily]    5. Aortic root and ascending aortic dilatation    Echocardiogram from 2019 showed aortic root was 4.2 cm and ascending aorta was 4.1 cm    Plan:  1. Echocardiogram   2. Will postpone angiogram until Monday due to patient's disorienation    EDD HAND, APRN, CNP    Code Status    Full Code    Reason for Consult   Reason for consult: NSTEMI    Primary Care Physician   Keyla Fonseca    Chief Complaint   Abdominal pain    History is obtained from the electronic health record    History of Present Illness   Vincent Mishra is a 79 year old male who presents with     His past medical history is significant for type 2 diabetes, chronic kidney disease, stage III, previous renal cancer, iron deficiency anemia, GERD with esophagitis, CAD with  PCI in 2011 to the RCA and [facility] health in [place] and PCI to the LAD 2014 in [place], [place].    He presented to the [facility] emergency department with complaints of abdominal pain.  He reported that he had been suffering from severe intermittent epigastric abdominal discomfort described as sharp and associated with nausea and vomiting, but did improve prior to arrival.  He has had similar complaints for 1 to 2 months prior to admission.  He was seen by his primary care provider and is scheduled for an endoscopy next month and has been treated with omeprazole and Carafate but without significant symptomatic improvement.    On 8/6/2019 he was admitted to [facility] in [place] for possible non-STEMI.  Unfortunately, he left prior to undergoing an angiogram as recommended.      Past Medical History   I have reviewed this patient's medical history and updated it with pertinent information if needed.   Past Medical History:   Diagnosis Date     CAD (coronary artery disease) 7/26/2011 7/2011 ([place]): s/p MI, PTCA - RCA      Cancer of kidney (H)      Chest pain 1/12/2012     Coronary artery disease      History of angina      History of blood transfusion      Hyperlipidaemia      Hyperlipidemia LDL goal <100 9/23/2010     Malignant neoplasm (H)     Prostate CA (removed)     Myocardial infarction (H)     s/p MI 7/29/14, stent placement     NSTEMI (non-ST elevated myocardial infarction) (H)     07-25-14 CATH- RCA, L.Main and CFX  had minor luminal irregularites. Mid LAD high grade stenosis 80-90%.Stent placed to mid LAD     Other and unspecified hyperlipidemia     MI in July 2011     Right bundle branch block      Type II or unspecified type diabetes mellitus without mention of complication, not stated as uncontrolled      Unspecified essential hypertension        Past Surgical History   I have reviewed this patient's surgical history and updated it with pertinent information if needed.  Past  Surgical History:   Procedure Laterality Date     ARTHROSCOPY KNEE  2011    ARTHROSCOPY KNEE performed by LEY, JEFFREY DUANE at [facility]     ARTHROSCOPY KNEE WITH MEDIAL MENISCECTOMY  2012    Procedure: ARTHROSCOPY KNEE WITH MEDIAL MENISCECTOMY;  Right Knee Arthroscopy With Medial Menisectomy;  Surgeon: Ley, Jeffrey Duane, MD;  Location: [facility]     BACK SURGERY      back surgery x4     BIOPSY       CARDIAC SURGERY  2011    stentt placed     COLONOSCOPY       CORONARY ANGIOGRAPHY ADULT ORDER  14    RCA, L.Main and CFX  had minor luminal irregularites. Mid LAD high grade stenosis 80-90%.Stent placed to mid LAD     CV CORONARY ANGIOGRAM  3/28/2019    Procedure: CV CORONARY ANGIOGRAM;  Surgeon: Dominguez Mishra MD;  Location: [facility] CARDIAC CATH LAB     ESOPHAGOSCOPY, GASTROSCOPY, DUODENOSCOPY (EGD), COMBINED  10/25/2012    Procedure: COMBINED ESOPHAGOSCOPY, GASTROSCOPY, DUODENOSCOPY (EGD), BIOPSY SINGLE OR MULTIPLE;  Gastroscopy  ;  Surgeon: Lucius Dasilva MD;  Location: [facility]     HEART CATH, ANGIOPLASTY  14    mid LAD      ORTHOPEDIC SURGERY       back surgery       Prior to Admission Medications   Prior to Admission Medications   Prescriptions Last Dose Informant Patient Reported? Taking?   Calcium Carbonate-Vitamin D (CALCIUM + D) 600-200 MG-UNIT per tablet  Spouse/Significant Other Yes No   Sig: Take 2 tablets by mouth daily.   Cyanocobalamin (VITAMIN B-12 PO)   Yes No   Sig: Take 500 mcg by mouth daily    [employer] LANCETS MISC  Spouse/Significant Other No No   Sig: Use to test blood sugars 1 times daily or as directed.   Lancets Misc. (SELECT-LITE DEVICE/LANCETS) KIT   No No   Si kit 2 times daily   Melatonin 10 MG TBDP   Yes No   Sig: Take 10 mg by mouth At Bedtime   acetaminophen (TYLENOL) 325 MG tablet   No No   Sig: Take 2 tablets (650 mg) by mouth every 4 hours as needed for mild pain   amLODIPine (NORVASC) 5 MG tablet   No No   Sig: Take 1 tablet (5 mg) by mouth daily    ascorbic acid (VITAMIN C) 500 MG tablet  Spouse/Significant Other Yes No   Sig: Take 500 mg by mouth daily    aspirin (ASA) 81 MG tablet   Yes No   Sig: Take 81 mg by mouth daily   atorvastatin (LIPITOR) 80 MG tablet   No No   Sig: TAKE 1 TABLET BY MOUTH EVERY EVENING   blood glucose (ONETOUCH ULTRA) test strip   No No   Sig: USE TO TEST TWICE DAILY   blood glucose monitoring (NO BRAND SPECIFIED) meter device kit   No No   Sig: Use to test blood sugar 1 times daily or as directed.   cholecalciferol (VITAMIN  -D) 1000 UNITS capsule  Spouse/Significant Other Yes No   Sig: Take 1 capsule by mouth daily   clopidogrel (PLAVIX) 75 MG tablet   No No   Sig: Take 1 tablet (75 mg) by mouth daily   ferrous sulfate (FE TABS) 325 (65 Fe) MG EC tablet   No No   Sig: Take 1 tablet (325 mg) by mouth daily   gabapentin (NEURONTIN) 100 MG capsule   Yes No   hydrochlorothiazide (HYDRODIURIL) 25 MG tablet   No No   Sig: TAKE 1 TABLET BY MOUTH DAILY   ibuprofen (ADVIL/MOTRIN) 800 MG tablet   Yes No   Sig: TK 1 T PO Q 8 H PRF MODERATE PAIN   lisinopril (PRINIVIL/ZESTRIL) 40 MG tablet   No No   Sig: TAKE 1 TABLET(40 MG) BY MOUTH DAILY   metFORMIN (GLUCOPHAGE-XR) 500 MG 24 hr tablet   No No   Sig: TAKE 2 TABLETS BY MOUTH TWICE DAILY WITH MEALS.   Patient not taking: Reported on 7/12/2019   nitroGLYcerin (NITROSTAT) 0.4 MG sublingual tablet   No No   Sig: Place 1 tablet (0.4 mg) under the tongue every 5 minutes as needed for chest pain   omeprazole (PRILOSEC) 20 MG DR capsule   No No   Sig: TAKE ONE CAPSULE BY MOUTH TWICE DAILY   order for DME  Spouse/Significant Other No No   Sig: Equipment being ordered: Wheelchair motorized for patient with spinal stenosis and neurogenic claudication   order for DME  Spouse/Significant Other No No   Sig: Equipment being ordered: TENS   order for DME   No No   Sig: Equipment being ordered: compression stockings   order for DME   No No   Sig: Equipment being ordered: walker wheeled also include  With  "skis for indoor carpet use   oxyCODONE (OXYCONTIN) 30 MG 12 hr tablet   No No   Sig: Take 1 tablet (30 mg) by mouth every morning   oxyCODONE (ROXICODONE) 5 MG tablet   No No   Sig: Take 1 daily as needed  for severe pain /at bedtime may have up to 2 per day max 40 per month   polyethylene glycol (MIRALAX/GLYCOLAX) powder   No No   Sig: Take 17 g (1 capful) by mouth daily   sertraline (ZOLOFT) 100 MG tablet   No No   Sig: TAKE 2 TABLETS(200 MG) BY MOUTH DAILY   traZODone (DESYREL) 100 MG tablet   No No   Sig: TAKE 3 TABLETS BY MOUTH EVERY NIGHT AT BEDTIME AS NEEDED FOR SLEEP      Facility-Administered Medications: None     Allergies   Allergies   Allergen Reactions     Prozac [Fluoxetine] Other (See Comments)     \"I went crazy.\"       Benadryl [Diphenhydramine Hcl] Other (See Comments)     Starts to shake, and paranoid     Codeine Itching     Diphenhydramine Other (See Comments)     Hallucinations, See [facility] records scanned on 7/16/15     Labetalol Other (See Comments)     See [facility] records scanned on 7/16/15  Bradycardia down to 30 on holter, dizziness. Stopped med and symptoms resolved     Metoprolol Other (See Comments)     Bradycardia even at 12.5 mg     Morphine Visual Disturbance       Social History   I have reviewed this patient's social history and updated it with pertinent information if needed. Vincent Parraja  reports that he has never smoked. He has never used smokeless tobacco. He reports that he does not drink alcohol or use drugs.    Family History   I have reviewed this patient's family history and updated it with pertinent information if needed.   Family History   Problem Relation Age of Onset     Cancer Mother      Depression Mother      Diabetes Father      Hypertension Father      Heart Disease Father      Mental Illness Father      Heart Disease Maternal Grandfather      Substance Abuse Maternal Grandfather      Alcohol/Drug Paternal " Grandmother      Substance Abuse Paternal Grandmother      Heart Disease Paternal Grandfather      Alcohol/Drug Paternal Grandfather      Substance Abuse Paternal Grandfather      Heart Disease Brother      Diabetes Brother      Substance Abuse Brother      Obesity Brother      Diabetes Brother      Obesity Sister      Alcohol/Drug Daughter      Depression Daughter      Obesity Sister      Diabetes Sister      Obesity Sister      Diabetes Sister      Alcohol/Drug Sister      Cancer Sister 55        possible kidney cancer     Substance Abuse Sister      Alcohol/Drug Son      Substance Abuse Son      Diabetes Son      Alcohol/Drug Son      Substance Abuse Son      Obesity Daughter      Diabetes Daughter      Hypertension Daughter      Bipolar Disorder Other        Review of Systems   The 10 point Review of Systems is negative other than noted in the HPI.     Physical Exam   Temp: 98.1  F (36.7  C) Temp src: Oral BP: (!) 159/85 Pulse: 80 Heart Rate: 63 Resp: 16 SpO2: 95 % O2 Device: None (Room air)    Vital Signs with Ranges  Temp:  [97.7  F (36.5  C)-98.2  F (36.8  C)] 98.1  F (36.7  C)  Pulse:  [65-80] 80  Heart Rate:  [63-73] 63  Resp:  [11-36] 16  BP: (102-179)/(36-99) 159/85  SpO2:  [95 %-98 %] 95 %  180 lbs 9.6 oz    Constitutional:   NAD   Skin:   Warm and dry   Head:   Nontraumatic   Neck:   no JVD   Lungs:   symmetric, clear to auscultation   Cardiovascular:   regular rate and rhythm and normal S1 and S2   Abdomen:   Benign   Extremities and Back:   No edema   Neurological:   Grossly nonfocal   Data   Recent Labs   Lab Test 08/22/19 2008 08/21/19  1148  12/11/18  1028 12/04/18  0851   WBC 6.4 6.7   < > 9.4  --    HGB 10.6* 10.4*   < > 8.3*  --    MCV 88 90   < > 79  --     300   < > 445  --    INR  --   --   --  1.16* 1.07    < > = values in this interval not displayed.      Recent Labs   Lab Test 08/22/19 2008 08/21/19  1148    138   POTASSIUM 4.1 4.2   CHLORIDE 104 103   BUN 14 17   CR 1.07  1.28*     Recent Labs   Lab 08/22/19 2008 08/21/19  1148   * 110*     Recent Labs   Lab Test 08/22/19 2008 08/21/19  1148   ALT 39 35   AST 45 47*     Troponin I ES   Date Value Ref Range Status   08/23/2019 0.627 (HH) 0.000 - 0.045 ug/L Final     Comment:     The 99th percentile for upper reference range is 0.045 ug/L.  Troponin values   in the range of 0.045 - 0.120 ug/L may be associated with risks of adverse   clinical events.  Critical Value called to and read back by  RONI العراقي RN IN [facility] 0646 RK     08/22/2019 0.561 (HH) 0.000 - 0.045 ug/L Final     Comment:     Critical Value called to and read back by  DELIA PENALOZA RN ED 2045 08/22/2019 JK [id number]     03/27/2019 0.229 (HH) 0.000 - 0.045 ug/L Final     Comment:     The 99th percentile for upper reference range is 0.045 ug/L.  Troponin values   in the range of 0.045 - 0.120 ug/L may be associated with risks of adverse   clinical events.  SHERRY GARCIA RN ED [id number] [facility]     03/27/2019 0.246 (HH) 0.000 - 0.045 ug/L Final     Comment:     The 99th percentile for upper reference range is 0.045 ug/L.  Troponin values   in the range of 0.045 - 0.120 ug/L may be associated with risks of adverse   clinical events.  Critical Value called to and read back by  CUCO LITTLE RN BY ARS [id number] 3/27/19     12/12/2018 0.372 (HH) 0.000 - 0.045 ug/L Final     Comment:     The 99th percentile for upper reference range is 0.045 ug/L.  Troponin values   in the range of 0.045 - 0.120 ug/L may be associated with risks of adverse   clinical events.  Consistent with previous critical result         Recent Labs   Lab Test 10/08/14  0640 07/24/14 04/24/12  1038   MAG 1.5* 1.6 1.5*       Lab Results   Component Value Date    CHOL 119 11/20/2018     Lab Results   Component Value Date    HDL 60 11/20/2018     Lab Results   Component Value Date    LDL 43 11/20/2018     Lab Results   Component Value Date    TRIG 78 11/20/2018     Lab Results   Component Value Date     CHOLHDLRATIO 1.9 09/25/2014          TSH   Date Value Ref Range Status   11/20/2018 3.06 0.40 - 4.00 mU/L Final

## 2019-08-23 NOTE — LETTER
Transition Communication Hand-off for Care Transitions to Next Level of Care Provider    Name: Vincent Mishra  : 1940  MRN #: [id number]  Primary Care Provider: Keyla Fonseca     Primary Clinic: [address] [place] [id number]     Reason for Hospitalization:  NSTEMI  NSTEMI (non-ST elevated myocardial infarction) (H)  Admit Date/Time: 2019 12:45 AM  Discharge Date: 9/3/19  Payor Source: Payor: MEDICARE / Plan: MEDICARE / Product Type: Medicare /     Readmission Assessment Measure (KARLIE) Risk Score/category: elevated    Plan of Care Goals/Milestone Events:          Reason for Communication Hand-off Referral: Fragility. Therapies recommended TCU @ discharge but unable to place r/t confusion/bedside attendant. Family wishes to discharge home- family assist. Family refuses Homecare.    Discharge Plan: has follow up with Urolog retention/earl cath. . Discuss any follow up with cardiology and General surgery (carmen)        Concern for non-adherence with plan of care: yes     Discharge Needs Assessment:  Needs      Most Recent Value   Equipment Currently Used at Home  wheelchair, manual, walker, standard, shower chair, crutches            Follow-up specialty is recommended: Yes    Follow-up plan:    Future Appointments   Date Time Provider Department Center   2019  5:30 AM Miladis Gupta OTA SHOT [facility]   2019  6:00 AM Candy Viveros, PT [facility]   2019  3:00 PM Phuong Puckett PA [facility]   2019  1:00 PM Keyla Fonseca MD [facility]   7/10/2020 11:00 AM [facility]   7/10/2020 11:40 AM Lucius Capone MD [facility]       Any outstanding tests or procedures:        Referrals     Future Labs/Procedures    Physical Therapy Referral     Process Instructions:    Work Related Injury: Functional Capacity and Work Conditioning are only offered at [facility] and [facility] (service can be provided by PT or OT).    *This therapy referral  will be filtered to a centralized scheduling office at [facility] and the patient will receive a call to schedule an appointment at a [facility] location most convenient for them. *    Comments:    If you have not heard from the scheduling office within 2 business days, please call [phone] for all locations, with the exception of [facility], please call [phone] and [place], please call [phone].    Please be aware that coverage of these services is subject to the terms and limitations of your health insurance plan.  Call member services at your health plan with any benefit or coverage questions.              Key Recommendations:      Danielle Blount    AVS/Discharge Summary is the source of truth; this is a helpful guide for improved communication of patient story

## 2019-08-23 NOTE — PROGRESS NOTES
Interval Hospitalist Note:    Patient admitted earlier this morning per Dr. Chu with NSTEMI. H&P reviewed. Patient is a 80yo male with complex PMHx. Was recently hospitalized at [facility] in [place], [place] earlier this morning but left hospital prior to completing angiogram. Presented to [facility] overnight for evaluation of chest pain. Trop elevated at 0.56. Started on heparin gtt and transferred to [facility] for cardiac evaluation.     Since arrival, has been endorsing some ongoing epigastric pain. Noted to be restless and agitated. Initially told staff that he drinks EtOH so was palced on CIWA protocol with prn Ativan.     Remains restless and agitated today. Wife at bedside. States he has hx of EtOH use but has been sober for 30yrs now. Has been observed to having ongoing urinary retention, prompting placement of a earl catheter. Contines to endorse epigastric discomfort. Serial trops remain elevated. Tentative plan per cards is for echo today and angiogram on Mon.    Patient was sleeping quietly when I stopped by to see him this afternoon. Wife had already left    A/P:  NSTEMI:   Hx of CAD s/p PCI in 2011 and 2014  HTN, HLP  -Cont heparin gtt, asa, PLavixstatin  -Cont antihypertensives  -Echo pending  -Angiogram Mon 8/26    Epigastric Pain:   Acute worsening of chronic discomfort.   -Cont H2 blocker, carafate, avoid NSAIDs  -GI consult -- ?EGD    DM II:  -Hold metformin, has sliding scale insulin    Restlessness / Agitation:  Unclear etiology. ?urinary retention and Ativan contributing  -dc'd CIWA/Ativan, earl placed as below  -redirect  -prn Seroquel and Zyprexa available    Chronic Pain  Insomnia:  -Cont chronic narcs, including OxyContin 30mg daily and prn oxycodone (max of 2 tabs per day)  -Cont sertraline, Trazodone HX    Hx of EtOH Abuse:  -Per wife has been sober for 30yrs now -- dc'd CIWA and prn Ativan    Urinary Retention:  ?underlying BPH  -required placement of earl as retaining >500cc    DVT ppx: on  heparin gtt for NSTEMI  Full Code    Mi Calle DO  Internal Medicine - Hospitalist  8/23/2019  3:02 PM

## 2019-08-24 NOTE — PLAN OF CARE
Alert but oriented to self only, dozes off briefly but no significant amount of sleep. Difficult to redirect at times, even with family; throws limbs over siderails, pads in place. Pulls at lines. Able to answer some questions. Difficult to assess but complains of back pain radiating to groin and turns frequently; abdomen soft/nontender. PRN oxycodone given 14:10. PRN seroquel given 17:45.   VSS, O2sats 97% on RA. Tele SR c BBB, 1st degree AVB, HR 80s, temp 99 F. Ax2, up with lift to commode but no BM. Very ecchymotic with largest bruises on L lower back as well as BLE. Heparin at 900 units/hr, 10A in AM. Head CT, abd CT, abd US, and TTE completed. Suggesting acute pancreatitis; NS at 150 ml/hr, ewelina urine, no nausea or vomiting after lunch, now NPO. BG 81, 90, 114. Neurology and general surgery consulted, no new additions to plan at this time. Wife and daughter updated. Plan for to continue fluids, monitor closely.

## 2019-08-24 NOTE — PROVIDER NOTIFICATION
MD Notification    Notified Person: MD    Notified Person Name: Dr. Chu    Notification Date/Time: 08/24/19 06:46    Notification Interaction: paged    Purpose of Notification: trop peak at 0.839, asymptomatic    Orders Received: Verbal orders to monitor, pt on heparin gtt.    Comments:

## 2019-08-24 NOTE — CONSULTS
REPORT OF CONSULTATION  Patient Name: Vincent Mishra   MRN: [id number]   : 1940   Admission Date/Time: 2019 12:45 AM   Primary Care Physician: Keyla Fonseca   Consulting Physician: Lisa Sharma MD     REASON FOR CONSULTATION  I am asked to see this patient in consultation at the request of Chow, Tino Hope MD for evaluation of confusion.     HPI: This is a 79 year old male with Hx of CAD, renal cancer, HLD, HTN, DM who is currently admitted for abdominal pain and elevated troponins.   Also noted to be confused and agitated. Was diagnosed with pancreatitis. Also is dehydrated. Was agitated whole day and at night and was medicated with seroquel, zyprexa and trazodone.   Currently patient frequently changes his position in bed and points to his stomach when asked what is going on .   He is able to answer some questions, but missing teeth and tries to fall asleep. What he was able to answer correct.   No prior Hx of dementia, but some episodes of confusion were reported by wife. Patient himself not able to provide any further Hx.     PAST MEDICAL HISTORY  Past Medical History:   Diagnosis Date     CAD (coronary artery disease) 2011 ([place]): s/p MI, PTCA - RCA      Cancer of kidney (H)      Chest pain 2012     Coronary artery disease      History of angina      History of blood transfusion      Hyperlipidaemia      Hyperlipidemia LDL goal <100 2010     Malignant neoplasm (H)     Prostate CA (removed)     Myocardial infarction (H)     s/p MI 14, stent placement     NSTEMI (non-ST elevated myocardial infarction) (H)     14 CATH- RCA, L.Main and CFX  had minor luminal irregularites. Mid LAD high grade stenosis 80-90%.Stent placed to mid LAD     Other and unspecified hyperlipidemia     MI in 2011     Right bundle branch block      Type II or unspecified type diabetes mellitus without mention of complication, not stated as uncontrolled      Unspecified  "essential hypertension         PAST SURGICAL HISTORY  Past Surgical History:   Procedure Laterality Date     ARTHROSCOPY KNEE  2/11/2011    ARTHROSCOPY KNEE performed by LEY, JEFFREY DUANE at [facility]     ARTHROSCOPY KNEE WITH MEDIAL MENISCECTOMY  6/26/2012    Procedure: ARTHROSCOPY KNEE WITH MEDIAL MENISCECTOMY;  Right Knee Arthroscopy With Medial Menisectomy;  Surgeon: Ley, Jeffrey Duane, MD;  Location: [facility]     BACK SURGERY      back surgery x4     BIOPSY       CARDIAC SURGERY  7/2011    stentt placed     COLONOSCOPY       CORONARY ANGIOGRAPHY ADULT ORDER  07-25-14    RCA, L.Main and CFX  had minor luminal irregularites. Mid LAD high grade stenosis 80-90%.Stent placed to mid LAD     CV CORONARY ANGIOGRAM  3/28/2019    Procedure: CV CORONARY ANGIOGRAM;  Surgeon: Dominguez Misrha MD;  Location: [facility] CARDIAC CATH LAB     ESOPHAGOSCOPY, GASTROSCOPY, DUODENOSCOPY (EGD), COMBINED  10/25/2012    Procedure: COMBINED ESOPHAGOSCOPY, GASTROSCOPY, DUODENOSCOPY (EGD), BIOPSY SINGLE OR MULTIPLE;  Gastroscopy  ;  Surgeon: Lucius Dasilva MD;  Location: [facility]     HEART CATH, ANGIOPLASTY  07-25-14    mid LAD      ORTHOPEDIC SURGERY       back surgery        ALLERGIES/SENSITIVITIES  Allergies   Allergen Reactions     Prozac [Fluoxetine] Other (See Comments)     \"I went crazy.\"       Benadryl [Diphenhydramine Hcl] Other (See Comments)     Starts to shake, and paranoid     Codeine Itching     Diphenhydramine Other (See Comments)     Hallucinations, See [facility] records scanned on 7/16/15     Labetalol Other (See Comments)     See [facility] records scanned on 7/16/15  Bradycardia down to 30 on holter, dizziness. Stopped med and symptoms resolved     Metoprolol Other (See Comments)     Bradycardia even at 12.5 mg     Morphine Visual Disturbance         CURRENT MEDS  No current outpatient medications on file.        SOCIAL HISTORY  Social History     Socioeconomic History     Marital " status:      Spouse name: Not on file     Number of children: Not on file     Years of education: Not on file     Highest education level: Not on file   Occupational History     Not on file   Social Needs     Financial resource strain: Not on file     Food insecurity:     Worry: Not on file     Inability: Not on file     Transportation needs:     Medical: Not on file     Non-medical: Not on file   Tobacco Use     Smoking status: Never Smoker     Smokeless tobacco: Never Used   Substance and Sexual Activity     Alcohol use: No     Alcohol/week: 0.0 oz     Drug use: No     Sexual activity: Yes     Partners: Female   Lifestyle     Physical activity:     Days per week: Not on file     Minutes per session: Not on file     Stress: Not on file   Relationships     Social connections:     Talks on phone: Not on file     Gets together: Not on file     Attends [employer] service: Not on file     Active member of club or organization: Not on file     Attends meetings of clubs or organizations: Not on file     Relationship status: Not on file     Intimate partner violence:     Fear of current or ex partner: Not on file     Emotionally abused: Not on file     Physically abused: Not on file     Forced sexual activity: Not on file   Other Topics Concern     Parent/sibling w/ CABG, MI or angioplasty before 65F 55M? No      Service Not Asked     Blood Transfusions Not Asked     Caffeine Concern No     Comment: 4-5 cups per day     Occupational Exposure Not Asked     Hobby Hazards Not Asked     Sleep Concern Not Asked     Stress Concern Not Asked     Weight Concern Not Asked     Special Diet Yes     Comment: smaller portions     Back Care Not Asked     Exercise No     Bike Helmet Not Asked     Seat Belt Not Asked     Self-Exams Not Asked   Social History Narrative     Not on file       FAMILY HISTORY  Family History   Problem Relation Age of Onset     Cancer Mother      Depression Mother      Diabetes Father       "Hypertension Father      Heart Disease Father      Mental Illness Father      Heart Disease Maternal Grandfather      Substance Abuse Maternal Grandfather      Alcohol/Drug Paternal Grandmother      Substance Abuse Paternal Grandmother      Heart Disease Paternal Grandfather      Alcohol/Drug Paternal Grandfather      Substance Abuse Paternal Grandfather      Heart Disease Brother      Diabetes Brother      Substance Abuse Brother      Obesity Brother      Diabetes Brother      Obesity Sister      Alcohol/Drug Daughter      Depression Daughter      Obesity Sister      Diabetes Sister      Obesity Sister      Diabetes Sister      Alcohol/Drug Sister      Cancer Sister 55        possible kidney cancer     Substance Abuse Sister      Alcohol/Drug Son      Substance Abuse Son      Diabetes Son      Alcohol/Drug Son      Substance Abuse Son      Obesity Daughter      Diabetes Daughter      Hypertension Daughter      Bipolar Disorder Other         REVIEW OF SYSTEMS: Systems (general, cardiovascular, respiratory, ENT/eyes, GI, , musculo-skeletal, neuro, psychiatric, allergology-immunologic and endocrine) were reviewed with pertinent positives noted in HPI and otherwise all others were negative.     EXAM: /72 (BP Location: Right arm)   Pulse 73   Temp 99  F (37.2  C) (Oral)   Resp 23   Ht 1.727 m (5' 8\")   Wt 81.9 kg (180 lb 9.6 oz)   SpO2 97%   BMI 27.46 kg/m     General Appearance: moderate distress, moving in bed  HEENT: NC/AT  Neck: supple  Chest: normal respiratory effort  Cardio: RRR; no murmurs; normal peripheral pulses  Abd: non-distended, tender  Mental status: sleepy, wakes up easily and follows some commands. Speech dysarthric (patient also missing all his teeth) and appears fluent  Cranial nerves: Pupils equal, round, and reactive to light. Extraocular movements intact. Visual fields - unable to test Facial strength normal on grimace and sensation unable to test. Palate not visible. Hearing intact " to voice. Tongue protrusion midline.   Motor: moves all four extremities antigravity fairly equally. Muscle tone appears normal.   Sensory: responded to pain bilaterally.  Coordination: deferred  Reflexes:  Flexor plantar response on the L and equivocal on the R  Gait: deferred    IMAGING: I have personally reviewed the patient's imaging:   CT head:  CONCLUSION:  1.  No acute intracranial abnormality.    LABS:     Component      Latest Ref Rng & Units 8/24/2019   WBC      4.0 - 11.0 10e9/L 12.5 (H)   RBC Count      4.4 - 5.9 10e12/L 3.90 (L)   Hemoglobin      13.3 - 17.7 g/dL 11.0 (L)   Hematocrit      40.0 - 53.0 % 35.4 (L)   MCV      78 - 100 fl 91   MCH      26.5 - 33.0 pg 28.2   MCHC      31.5 - 36.5 g/dL 31.1 (L)   RDW      10.0 - 15.0 % 15.9 (H)   Platelet Count      150 - 450 10e9/L 288   Diff Method       Automated Method   % Neutrophils      % 84.8   % Lymphocytes      % 6.8   % Monocytes      % 7.8   % Eosinophils      % 0.2   % Basophils      % 0.1   % Immature Granulocytes      % 0.3   Nucleated RBCs      0 /100 0   Absolute Neutrophil      1.6 - 8.3 10e9/L 10.6 (H)   Absolute Lymphocytes      0.8 - 5.3 10e9/L 0.9   Absolute Monocytes      0.0 - 1.3 10e9/L 1.0   Absolute Eosinophils      0.0 - 0.7 10e9/L 0.0   Absolute Basophils      0.0 - 0.2 10e9/L 0.0   Abs Immature Granulocytes      0 - 0.4 10e9/L 0.0   Absolute Nucleated RBC       0.0   Troponin I ES      0.000 - 0.045 ug/L 0.839 (HH)   Glucose      70 - 99 mg/dL 90   Lactate for Sepsis Protocol      0.7 - 2.0 mmol/L 0.5 (L)       CONSULTATION IMPRESSION/RECOMMENDATIONS:   Active Problems:    NSTEMI (non-ST elevated myocardial infarction) (H)     This is a 79 year old man who presents with pancreatitis and NSTEMI. Currently is encephalopathic. No clear signs of non-convulsive status. No other clear focal neurological changes.   Possibly delirium on top of underlying cognitive disorder, but difficult diagnose right now as patient has other medical  issues.   Currently exam was compromised in part by pain as well.  -recommend pain control defer to primary team (patient is not opioid naive per chart)  -if patient not better will attempt EEG tomorrow (currently will not be able to comply with study)  -agitation treatment as instituted     Will continue to follow.   Please call or page with questions: [phone] or pager [phone]  I thank Dr. Quiñones for the opportunity to participate in the patient's care.     Total consult time 110 minutes with the time spent on chart review, imaging and lab results review, and more than 50 % of the time spent on coordination of cares and face-to-face time with the patient including counseling regarding pathophysiology of the above conditions, results of the tests and further directions of care.     Lisa Sharma MD   Neurology

## 2019-08-24 NOTE — PROVIDER NOTIFICATION
Brief update:    Patient continues to be restless    Give HS trazodone now (PTA med)  Have increased seroquel to 50 mg BID PRN    Tino Chow MD  11:29 PM

## 2019-08-24 NOTE — PROGRESS NOTES
[facility]    Hospitalist Progress Note    Date of Service (when I saw the patient): 08/24/2019    Assessment & Plan   Vincent Mishra is a 79 year old male who was admitted on 8/23/2019.  Assessment & Plan     Vincent Mishra is a 79 year old male with complex medical history including prior CAD with stenting in 2011 in 2014, type 2 diabetes, hypertension, hyperlipidemia, stage III CKD, renal cell carcinoma, GERD and esophagitis and iron deficiency anemia among others who presented to [facility] with essentially acute on chronic epigastric pain.  Upon evaluation he was found to have a troponin of 0.561 and he was transferred to our facility for cardiology work-up and angiogram.  He was seen in [place] 8/6 for NSTEMI, but left the hospital before the recommended angiogram was completed. He is admitted to [facility] for NSTEMI, with angiogram needed prior to discharge. Hospital course compliacted by confusion and agitation needing zyprexa and seroquel      NSTEMI  History of CAD, PCI in 2014 and 2011  Recent admission 8/6/2019  Troponin 0.561 earlier tonight, EKG no clear ischemic changes  - trend tni  - telemetry  - received ASA, continue - PTA asa/clopidogrel listed - await med rec  - heparin drip - no bolus (history of GERD, esophagitis, ulcer?)  - Cardiology consultation obtained.  Angiogram on hold due to issues with agitation    Acute encephalopathy of unclear etiology;  No signs of active infection currently  UA was negative . electrolytes are normal.   No history of recent alcohol use as per wife  Was on oxycontin  30 mg every morning and oxycodone as needed for chronic pain issues  CT head without contrast was done and returned negative  Received a total of 75 mg of Seroquel and 5 mg of Zyprexa on 300 mg of trazodone overnight  On Seroquel 50 mg p.o. every 12 hours PRN for agitation now appears to be sleeping comfortably this morning  Neurology consultation today for further evaluation of the  acute encephalopathy will be obtained  Undiagnosed underlying dementia with acute delirium is a possibility    Urinary retention;  Patient bladder scan showed greater than 500 mL of urine needing Mcelroy placement  We will start him on Flomax 0.4 mg p.o. Daily  Appears to be dehydrated with dark urine so given 500 cc normal saline bolus and maintenance IV fluids at  100 mL/h ordered  Acute on chronic epigastric pain and groin pain  - ? gerd & esophagitis versus cardiac etiology  - PTA ppi transitioned to IV PPI BID  Patient had an EGD in 2012 because of upper abdominal pain which was negative  [facility] consulted and are following.  As per GI due to the chronic nature of symptoms and with a non-STEMI he will need EGD as an outpatient  Sucralfate 1 g 4 times daily ordered added on August 23 of 2019  CT scan of the abdomen pelvis with contrast in the right upper quadrant ultrasound ordered today for evaluation of abdominal and groin pains   Addendum:  CT abdomen showed acute pancreatitis. RUQ ultrasound showed cholelithiasis with no sign of acute cholecystitis. Made him NPO and increased his IVF to 150ml/hr   D/zhao sucralfate today   Insomnia  Chronic pain  - PTA narcotics continued, PTA trazodone continued      HTN, HL  - PTA meds awaited  - stable BP for now     DM2  - PTA meds awaited - metformin held  - POC QID and sliding scale         Diet:  moderate carb controlled diet   DVT Prophylaxis: heparin  Mcelroy Catheter: not present  Code Status: Full Code          Disposition Plan;  In the next 2 to 3 days once mental status improves and after coronary angiogram is done    Martin Quiñones MD  [phone] (P)      Interval History   Restless and agitated overnight requiring Seroquel and Zyprexa.  Sleeping comfortably this morning in bed a CT scan of the head without contrast was done and returned negative.  He was complaining of right groin pain yesterday still CT scan of the abdomen pelvis with contrast was also  ordered and the results are currently pending    -Data reviewed today: I reviewed all new labs and imaging results over the last 24 hours. I personally reviewed the head CT image(s) showing Normal.    Physical Exam   Temp: 98.3  F (36.8  C) Temp src: Oral BP: (!) 145/68 Pulse: 73 Heart Rate: 84 Resp: 19 SpO2: 97 % O2 Device: None (Room air)    Vitals:    08/23/19 0200   Weight: 81.9 kg (180 lb 9.6 oz)     Vital Signs with Ranges  Temp:  [98.1  F (36.7  C)-99.2  F (37.3  C)] 98.3  F (36.8  C)  Pulse:  [72-85] 73  Heart Rate:  [74-84] 84  Resp:  [16-20] 19  BP: (129-176)/(68-89) 145/68  SpO2:  [94 %-97 %] 97 %  I/O last 3 completed shifts:  In: 371.2 [P.O.:250; I.V.:121.2]  Out: 2150 [Urine:2150]    Constitutional: Sleeping comfortably in bed  Respiratory: Clear to auscultation bilaterally, no crackles or wheezing  Cardiovascular: Regular rate and rhythm, normal S1 and S2, and no murmur noted  GI: Normal bowel sounds, soft, non-distended, non-tender  Skin/Integumen: No rashes, no cyanosis, no edema.  Mcelroy draining dark-colored urine  Other:     Medications     HEParin 900 Units/hr (08/24/19 1034)     sodium chloride 100 mL/hr at 08/24/19 0950       amLODIPine  5 mg Oral Daily     aspirin  81 mg Oral Daily     atorvastatin  80 mg Oral QPM     insulin aspart  1-7 Units Subcutaneous TID AC     insulin aspart  1-5 Units Subcutaneous At Bedtime     lidocaine  10 mL Urethral Once     lidocaine  10 mL Urethral Once     lisinopril  40 mg Oral Daily     oxyCODONE  30 mg Oral QAM     pantoprazole  40 mg Oral BID AC     senna-docusate  1 tablet Oral BID    Or     senna-docusate  2 tablet Oral BID     sertraline  200 mg Oral Daily     sodium chloride (PF)  3 mL Intracatheter Q8H     sucralfate  1 g Oral 4x Daily     traZODone  300 mg Oral At Bedtime       Data   Recent Labs   Lab 08/24/19  0539 08/23/19  0607 08/22/19 2008 08/21/19  1148   WBC 12.5*  --  6.4 6.7   HGB 11.0*  --  10.6* 10.4*   MCV 91  --  88 90     --   307 300   NA  --   --  139 138   POTASSIUM  --   --  4.1 4.2   CHLORIDE  --   --  104 103   CO2  --   --  28 28   BUN  --   --  14 17   CR  --   --  1.07 1.28*   ANIONGAP  --   --  7 7   LIOR  --   --  8.8 8.7   GLC  --   --  141* 110*   ALBUMIN  --   --  3.6 3.4   PROTTOTAL  --   --  6.8 6.3*   BILITOTAL  --   --  0.7 0.4   ALKPHOS  --   --  86 85   ALT  --   --  39 35   AST  --   --  45 47*   LIPASE  --   --  127  --    TROPI 0.839* 0.627* 0.561*  --        Recent Results (from the past 24 hour(s))   CT Head w/o Contrast    Narrative    INDICATION: Confusion   TECHNIQUE: Routine CT Head without IV contrast. Dose reduction  techniques were used.  COMPARISON: MRI brain 3/27/2019    FINDINGS:  INTRACRANIAL CONTENTS: No intracranial hemorrhage, extraaxial  collection, or mass effect.  No CT evidence of acute infarct.  Unchanged mild to moderate diffuse parenchymal volume loss with ex  vacuo ventricular dilatation. Periventricular low attenuation most in  keeping with sequela of chronic microvascular ischemic change.    VISUALIZED PARANASAL SINUSES: No significant disease.     VISUALIZED MASTOIDS: No significant disease.     VISUALIZED ORBITS: No significant disease.    OSSEOUS STRUCTURES AND SOFT TISSUES: Unremarkable.     CONCLUSION:  1.  No acute intracranial abnormality.    LEEANN CARLISLE MD

## 2019-08-24 NOTE — PROGRESS NOTES
[facility]  Cardiology Consultation   Date of Admission:  8/23/2019  Date of Consult (When I saw the patient): 08/23/19    Assessment & Plan   Vincent Mishra is a 79 year old male who was admitted on 8/23/2019. I was asked to see the patient troponin elevation concerning of a non-ST elevation MI in the setting of known coronary artery disease.    Assessment:   1. NSTEMI     Recent admission to [facility] in [place] on 8/6/2019, but left prior to recommended angiogram being completed.  During that admission his troponin peaked at 0.21    IV Heparin    Troponin 0.561-0.627    2. Coronary artery disease    [PTA: Aspirin 81 mg daily, Plavix 75 mg and atorvastatin 80 mg daily]    History of PCI to RCA in 2011 and mLAD in 2014    Last angiogram was in 3/2019 showed 40% ISR of LAD stent (stable from 2014)    Echocardiogram from March 2019 showed preserved LVEF with mild concentric LVH and no significant valvular or regional wall motion abnormalities.  There was mild pulmonary hypertension    Not on BB due to history of bradycardia    3. Hypertension     [PTA: amlodipine 5 mg daily, lisinopril 40 mg daily,     4. Hyperlipidemia    [PTA: atorvastatin 80 mg daily]    5. Aortic root and ascending aortic dilatation    Echocardiogram from 2019 showed aortic root was 4.2 cm and ascending aorta was 4.1 cm    Plan:  1. Echocardiogram notes an LVEF of 60 to 65%.  There were no regional wall motion normalities.  2. Will postpone angiogram until Monday due to patient's disorienation    Lucius Hooper     Code Status    Full Code    Reason for Consult   Reason for consult: NSTEMI    Primary Care Physician   Keyla Fonseca    Physical Exam   Temp: 99  F (37.2  C) Temp src: Oral BP: 136/72 Pulse: 73 Heart Rate: 80 Resp: 20 SpO2: 97 % O2 Device: None (Room air)    Vital Signs with Ranges  Temp:  [98.3  F (36.8  C)-99.2  F (37.3  C)] 99  F (37.2  C)  Pulse:  [73-83] 73  Heart Rate:  [80-87] 80  Resp:   [19-23] 20  BP: (129-176)/(68-89) 136/72  SpO2:  [96 %-97 %] 97 %  180 lbs 9.6 oz    Constitutional:   NAD   Skin:   Warm and dry   Head:   Nontraumatic   Neck:   no JVD   Lungs:   symmetric, clear to auscultation   Cardiovascular:   regular rate and rhythm and normal S1 and S2   Abdomen:   Benign   Extremities and Back:   No edema   Neurological:   Grossly nonfocal, confused, moving all extremities   Data   Recent Labs   Lab Test 08/24/19  0539 08/22/19 2008 12/11/18  1028 12/04/18  0851   WBC 12.5* 6.4   < > 9.4  --    HGB 11.0* 10.6*   < > 8.3*  --    MCV 91 88   < > 79  --     307   < > 445  --    INR  --   --   --  1.16* 1.07    < > = values in this interval not displayed.      Recent Labs   Lab Test 08/22/19 2008 08/21/19  1148    138   POTASSIUM 4.1 4.2   CHLORIDE 104 103   BUN 14 17   CR 1.07 1.28*     Recent Labs   Lab 08/22/19 2008 08/21/19  1148   * 110*     Recent Labs   Lab Test 08/22/19 2008 08/21/19  1148   ALT 39 35   AST 45 47*     Troponin I ES   Date Value Ref Range Status   08/24/2019 0.839 (HH) 0.000 - 0.045 ug/L Final     Comment:     The 99th percentile for upper reference range is 0.045 ug/L.  Troponin values   in the range of 0.045 - 0.120 ug/L may be associated with risks of adverse   clinical events.  Critical result, provider not notified due to previous critical result   notification.     08/23/2019 0.627 (HH) 0.000 - 0.045 ug/L Final     Comment:     The 99th percentile for upper reference range is 0.045 ug/L.  Troponin values   in the range of 0.045 - 0.120 ug/L may be associated with risks of adverse   clinical events.  Critical Value called to and read back by  RONI العراقي RN IN [facility] [id number] RK     08/22/2019 0.561 (HH) 0.000 - 0.045 ug/L Final     Comment:     Critical Value called to and read back by  DELIA PENALOZA RN ED [id number] 08/22/2019 MAHSEHK [id number]     03/27/2019 0.229 (HH) 0.000 - 0.045 ug/L Final     Comment:     The 99th percentile for upper reference range  is 0.045 ug/L.  Troponin values   in the range of 0.045 - 0.120 ug/L may be associated with risks of adverse   clinical events.  SHERRY GARCIA RN  [id number] [facility]     03/27/2019 0.246 () 0.000 - 0.045 ug/L Final     Comment:     The 99th percentile for upper reference range is 0.045 ug/L.  Troponin values   in the range of 0.045 - 0.120 ug/L may be associated with risks of adverse   clinical events.  Critical Value called to and read back by  CUCO LITTLE RN BY ARS [id number] 3/27/19         Recent Labs   Lab Test 10/08/14  0640 07/24/14 04/24/12  1038   MAG 1.5* 1.6 1.5*       Lab Results   Component Value Date    CHOL 119 11/20/2018     Lab Results   Component Value Date    HDL 60 11/20/2018     Lab Results   Component Value Date    LDL 43 11/20/2018     Lab Results   Component Value Date    TRIG 78 11/20/2018     Lab Results   Component Value Date    CHOLHDLRATIO 1.9 09/25/2014          TSH   Date Value Ref Range Status   11/20/2018 3.06 0.40 - 4.00 mU/L Final

## 2019-08-24 NOTE — PROVIDER NOTIFICATION
Paged Dr. Quiñones 08/24/19 4:16 PM regarding changing insulin to every 4 hours as now NPO. Orders received: telephone order to change dosing schedule, maintain current insulin resistance dosing.

## 2019-08-24 NOTE — PROGRESS NOTES
"General Surgery Progress Note    Subjective: confused, pleasant.  Stays having pain all over.    Objective: BP (!) 141/77 (BP Location: Left arm)   Pulse 73   Temp 98.6  F (37  C) (Oral)   Resp 23   Ht 1.727 m (5' 8\")   Wt 81.9 kg (180 lb 9.6 oz)   SpO2 96%   BMI 27.46 kg/m    Gen: confused  Mouth: mucosa well hydrated  Eyes: anicteric  Pulm: nonlabored breathing  Abd: soft  Ext: WWP    Assessment/Plan:   Vincent Mishra  is a 79 year old male with  1. NSTEMI  2. Pancreatitis  3 . Gallstones    Medical treatment of pancreatitis on going, cardiac work up on going, no cholecystitis at based on CT and US.  Gallbladder could addressed once cardiac situation improved/treated, as there is no cholecystitis.  If cholecystitis developed during this cardiac event, then percutaneous gallbladder drainage might be necessary.  No changes to current treatment.    William Brar MD  [facility].  [phone]        "

## 2019-08-24 NOTE — PROGRESS NOTES
"GASTROENTEROLOGY PROGRESS NOTE    SUBJECTIVE:  Non verbal, agitated    OBJECTIVE:    BP (!) 145/68 (BP Location: Left arm)   Pulse 73   Temp 98.3  F (36.8  C) (Oral)   Resp 19   Ht 1.727 m (5' 8\")   Wt 81.9 kg (180 lb 9.6 oz)   SpO2 97%   BMI 27.46 kg/m    Temp (24hrs), Av.5  F (36.9  C), Min:98.1  F (36.7  C), Max:99.2  F (37.3  C)    Patient Vitals for the past 72 hrs:   Weight   19 0200 81.9 kg (180 lb 9.6 oz)       Intake/Output Summary (Last 24 hours) at 2019 1016  Last data filed at 2019 0556  Gross per 24 hour   Intake 271.2 ml   Output 1350 ml   Net -1078.8 ml         PHYSICAL EXAM    Constitutional: NAD, comfortable, sleepy  Abdomen: soft, non-tender, nondistended, on upper abdomen. Some suprapubic tenderness.        Additional Comments:  ROS, FH, SH: See initial GI consult for details.    I have reviewed the patient's new clinical lab results:    Recent Labs   Lab Test 19  0539 19  1028 18  0851  17  1410   WBC 12.5* 6.4 6.7   < > 9.4  --    < > 6.2   HGB 11.0* 10.6* 10.4*   < > 8.3*  --    < > 10.9*   MCV 91 88 90   < > 79  --    < > 83    307 300   < > 445  --    < > 312   INR  --   --   --   --  1.16* 1.07  --  0.96    < > = values in this interval not displayed.     Recent Labs   Lab Test 198 19  1416    138 138   POTASSIUM 4.1 4.2 3.9   CHLORIDE 104 103 105   CO2 28 28 26   BUN 14 17 29   CR 1.07 1.28* 1.34*   ANIONGAP 7 7 7   LIOR 8.8 8.7 9.0     Recent Labs   Lab Test 19  1148 19  1101 19  1416 19  1330  18  1856  10/16/18  1018   ALBUMIN 3.6 3.4  --  3.6 3.7   < >  --    < >  --    BILITOTAL 0.7 0.4  --  0.4 0.3   < >  --    < >  --    ALT 39 35  --  31 28   < >  --    < >  --    AST 45 47*  --  19 27   < >  --    < >  --    ALKPHOS 86 85  --  81 108   < >  --    < >  --    PROTEIN  --   --  Negative  --   --   --  10*  --  " Negative   LIPASE 127  --   --  155 290  --   --   --   --     < > = values in this interval not displayed.         Active Problems:    NSTEMI (non-ST elevated myocardial infarction) (H)    Abdominal pain    Assessment: History of CAD with stents, diabetes, CKD, RCC, admitted 8/23/19 with elevated troponin and epigastric pain. GI consulted 8/23/19 for  pain.  Pain had resolved by time of initial GI consult.  Has been restless and agitated. Possible history of alcohol abuse, per wife has been sober for 30 years.  Angiogram planned for Monday.    Increasingly agitated overnight. No signs of GI bleeding.  On exam some mild suprapubic tenderness, no upper abdominal tenderness or guarding.  CT head and CT abdomen/pelvis ordered this morning.      Plan:   - continue PPI  - await CT results  - no endoscopy planned at this point given MI and chronic nature of GI symptoms and no signs of bleeding  - consider EGD down the road, likely as outpatient, timing to be determined by cardiac work up/results        Leslye Suero  [facility]  Office:  [phone]  Cell/Pager:  [phone]

## 2019-08-24 NOTE — PROVIDER NOTIFICATION
Paged Dr. Quiñones 08/24/19 1:46 PM regarding CT suggesting acute pancreatitis. Orders received: general surgery consult, increase fluids, lipase lab add-on, NPO.    Addendum 2:47 PM: Dr. Suero GI, updated regarding results.

## 2019-08-24 NOTE — PLAN OF CARE
Alert to confused, disoriented to place, time and situation. Restless this night, zyprexa, melatonin, trazodone, oxycodone and tylenol given. Complains of abdominal/ groin pain. Sitter at the bedside for safety. Heparin infusing at 900 units/hr. Mcelroy in place with yellow colored urine. Tele-SR with 1st AVB. Bedrest this night due to confusion. On mod CHO diet, assist of 2 with activities.  BG 76/72. Suppository opium- belladonna given this night without much relief.

## 2019-08-25 NOTE — PROVIDER NOTIFICATION
MD Notification    Notified Person: MD    Notified Person Name:     Notification Date/Time: 08/24/19 1696    Notification Interaction:paged    Purpose of Notification:Pt. NPO, struggles to swallow pill. IV meds instead of po?    Orders Received:    Comments:

## 2019-08-25 NOTE — PROVIDER NOTIFICATION
Brief update    Pt NPO for aspiration/coughing and choking on meds late day/evening.    Added IV dilaudid for pain control (has pancreatitis)    Tino Chow MD  1:20 AM

## 2019-08-25 NOTE — CONSULTS
Urology Consult History and Physical    Name: Vincent Mishra    MRN: [id number]   YOB: 1940       We were asked to see Vincent Mishra at the request of Dr. Quiñones for evaluation and treatment of the following chief complaint.        Chief Complaint:   Retention    Unable to obtain a history from the patient due to confusion          History of Present Illness:   Vincent Mishra is a 79 year old male with complex medical history including prior CAD with stenting in 2011 in 2014, type 2 diabetes, hypertension, hyperlipidemia, stage III CKD, renal cell carcinoma, GERD and esophagitis and iron deficiency anemia among others who presented to [facility] with essentially acute on chronic epigastric pain. Urology consulted for retention, bladder scan over 500 ml and patient unable to urinate.  Unable to get further history from patient. UA negative          Past Medical History:     Past Medical History:   Diagnosis Date     CAD (coronary artery disease) 7/26/2011 7/2011 ([place]): s/p MI, PTCA - RCA      Cancer of kidney (H)      Chest pain 1/12/2012     Coronary artery disease      History of angina      History of blood transfusion      Hyperlipidaemia      Hyperlipidemia LDL goal <100 9/23/2010     Malignant neoplasm (H)     Prostate CA (removed)     Myocardial infarction (H)     s/p MI 7/29/14, stent placement     NSTEMI (non-ST elevated myocardial infarction) (H)     07-25-14 CATH- RCA, L.Main and CFX  had minor luminal irregularites. Mid LAD high grade stenosis 80-90%.Stent placed to mid LAD     Other and unspecified hyperlipidemia     MI in July 2011     Right bundle branch block      Type II or unspecified type diabetes mellitus without mention of complication, not stated as uncontrolled      Unspecified essential hypertension             Past Surgical History:     Past Surgical History:   Procedure Laterality Date     ARTHROSCOPY KNEE  2/11/2011    ARTHROSCOPY KNEE performed by FRANKLYN COLES  DUANE at [facility]     ARTHROSCOPY KNEE WITH MEDIAL MENISCECTOMY  6/26/2012    Procedure: ARTHROSCOPY KNEE WITH MEDIAL MENISCECTOMY;  Right Knee Arthroscopy With Medial Menisectomy;  Surgeon: Ley, Jeffrey Duane, MD;  Location: [facility]     BACK SURGERY      back surgery x4     BIOPSY       CARDIAC SURGERY  7/2011    stentt placed     COLONOSCOPY       CORONARY ANGIOGRAPHY ADULT ORDER  07-25-14    RCA, L.Main and CFX  had minor luminal irregularites. Mid LAD high grade stenosis 80-90%.Stent placed to mid LAD     CV CORONARY ANGIOGRAM  3/28/2019    Procedure: CV CORONARY ANGIOGRAM;  Surgeon: Dominguez Mishra MD;  Location: [facility] CARDIAC CATH LAB     ESOPHAGOSCOPY, GASTROSCOPY, DUODENOSCOPY (EGD), COMBINED  10/25/2012    Procedure: COMBINED ESOPHAGOSCOPY, GASTROSCOPY, DUODENOSCOPY (EGD), BIOPSY SINGLE OR MULTIPLE;  Gastroscopy  ;  Surgeon: Lucius Dasilva MD;  Location: [facility]     HEART CATH, ANGIOPLASTY  07-25-14    mid LAD      ORTHOPEDIC SURGERY       back surgery            Social History:     Social History     Tobacco Use     Smoking status: Never Smoker     Smokeless tobacco: Never Used   Substance Use Topics     Alcohol use: No     Alcohol/week: 0.0 oz            Family History:     Family History   Problem Relation Age of Onset     Cancer Mother      Depression Mother      Diabetes Father      Hypertension Father      Heart Disease Father      Mental Illness Father      Heart Disease Maternal Grandfather      Substance Abuse Maternal Grandfather      Alcohol/Drug Paternal Grandmother      Substance Abuse Paternal Grandmother      Heart Disease Paternal Grandfather      Alcohol/Drug Paternal Grandfather      Substance Abuse Paternal Grandfather      Heart Disease Brother      Diabetes Brother      Substance Abuse Brother      Obesity Brother      Diabetes Brother      Obesity Sister      Alcohol/Drug Daughter      Depression Daughter      Obesity Sister      Diabetes Sister      Obesity Sister      Diabetes Sister  "     Alcohol/Drug Sister      Cancer Sister 55        possible kidney cancer     Substance Abuse Sister      Alcohol/Drug Son      Substance Abuse Son      Diabetes Son      Alcohol/Drug Son      Substance Abuse Son      Obesity Daughter      Diabetes Daughter      Hypertension Daughter      Bipolar Disorder Other             Allergies:     Allergies   Allergen Reactions     Prozac [Fluoxetine] Other (See Comments)     \"I went crazy.\"       Benadryl [Diphenhydramine Hcl] Other (See Comments)     Starts to shake, and paranoid     Codeine Itching     Diphenhydramine Other (See Comments)     Hallucinations, See [facility] records scanned on 7/16/15     Labetalol Other (See Comments)     See [facility] records scanned on 7/16/15  Bradycardia down to 30 on holter, dizziness. Stopped med and symptoms resolved     Metoprolol Other (See Comments)     Bradycardia even at 12.5 mg     Morphine Visual Disturbance            Medications:     Current Facility-Administered Medications   Medication     0.9% sodium chloride BOLUS     acetaminophen (TYLENOL) tablet 650 mg     amLODIPine (NORVASC) tablet 5 mg     aspirin EC tablet 81 mg     atorvastatin (LIPITOR) tablet 80 mg     glucose gel 15-30 g    Or     dextrose 50 % injection 25-50 mL    Or     glucagon injection 1 mg     glucose gel 15-30 g    Or     dextrose 50 % injection 25-50 mL    Or     glucagon injection 1 mg     heparin infusion 25,000 units in 0.45% NaCl 250 mL     HYDROmorphone (PF) (DILAUDID) injection 0.5 mg     insulin aspart (NovoLOG) inj (RAPID ACTING)     lidocaine (LMX4) cream     lidocaine (XYLOCAINE) 2 % external gel 10 mL     lidocaine (XYLOCAINE) 2 % external gel 10 mL     lidocaine 1 % 0.1-1 mL     lisinopril (PRINIVIL/ZESTRIL) tablet 40 mg     melatonin tablet 1 mg     naloxone (NARCAN) injection 0.1-0.4 mg     nitroGLYcerin (NITROSTAT) sublingual tablet 0.4 mg     OLANZapine zydis (zyPREXA) ODT tab 5 mg     " ondansetron (ZOFRAN-ODT) ODT tab 4 mg    Or     ondansetron (ZOFRAN) injection 4 mg     opium-belladonna (B&O SUPPRETTES) 30-16.2 MG per suppository 0.5 suppository     oxyCODONE (OxyCONTIN) 12 hr tablet 30 mg     oxyCODONE (ROXICODONE) tablet 5 mg     pantoprazole (PROTONIX) 40 mg IV push injection     polyethylene glycol (MIRALAX/GLYCOLAX) Packet 17 g     QUEtiapine (SEROquel) tablet 50 mg     senna-docusate (SENOKOT-S/PERICOLACE) 8.6-50 MG per tablet 1 tablet    Or     senna-docusate (SENOKOT-S/PERICOLACE) 8.6-50 MG per tablet 2 tablet     sertraline (ZOLOFT) tablet 200 mg     sodium chloride (PF) 0.9% PF flush 3 mL     sodium chloride (PF) 0.9% PF flush 3 mL     sodium chloride 0.9% infusion     tamsulosin (FLOMAX) capsule 0.4 mg     traZODone (DESYREL) tablet 200 mg     traZODone (DESYREL) tablet 300 mg             Review of Systems:    ROS: 10 point ROS neg other than the symptoms noted above in the HPI           Physical Exam:   VS:  T: 97.7    HR: 87    BP: 140/65    RR: 18   GEN:  AOx3.  NAD.    CV:  RRR  LUNGS: Non-labored breathing.   BACK:  No midline or CVA tenderness.  ABD:  Soft.  NT.  ND.  No rebound or guarding.  No masses.  :  uncircumcised.  Normal penile shaft.   EXT:  Warm, well perfused..  SKIN:  Warm.  Dry.  No rashes.  NEURO:  CN grossly intact.            Data:   All laboratory data reviewed:    Recent Labs   Lab 08/25/19  0624 08/24/19  0539 08/22/19 2008 08/21/19  1148   WBC 15.4* 12.5* 6.4 6.7   HGB 10.2* 11.0* 10.6* 10.4*    288 307 300     Recent Labs   Lab 08/25/19  0624 08/22/19 2008 08/21/19  1148    139 138   POTASSIUM 3.9 4.1 4.2   CHLORIDE 106 104 103   CO2 19* 28 28   BUN 33* 14 17   CR 1.41* 1.07 1.28*   * 141* 110*   LIOR 8.5 8.8 8.7     Recent Labs   Lab 08/21/19  1101   COLOR Yellow   APPEARANCE Clear   URINEGLC Negative   URINEBILI Negative   URINEKETONE Negative   SG <=1.005   URINEPH 5.0   PROTEIN Negative   UROBILINOGEN 0.2   NITRITE Negative    LEUKEST Negative              Impression and Plan:   Impression:   Vincent Mishra is a 79 year old male with urinary retention       Plan:   - Avoid anticholinergic, antihistamine, and alpha-agonist medications as these will exacerbate urinary retention  - Minimize narcotic pain medication regimen as tolerated  - Increased ambulation/mobility will also usually help with improvement in the degree of urinary retention  - Start tamsulosin 0.4 mg qday  - TOV in clinic after discharge, when patient is back to normal activity level and off medications         Nicholas Mahoney MD  Urology PGY5

## 2019-08-25 NOTE — PLAN OF CARE
"Alert to self only. Tele SD with 1st degree AVB. Pt continues to state pain is 10/10 but will fall asleep for periods of time, managed with oxycontin and PRN oxycodone, and abd pain managed with heat pad. Pt was more alert this am but has started yelling out \"help\" frequently this evening and is inconsolable. Pt restless and frequently rolling in bed. Mcelroy in place, 1500cc bolus today, urine tea colored. Pending angio depending cognitive improvement and creat  "

## 2019-08-25 NOTE — PROGRESS NOTES
"Neurology Progress Note.    Subjective: No acute events overnight.  Today patient complains of the pain in his stomach. Doesn't want to answer too many questions and repeatedly asking to sit up.     Objective:  BP (!) 140/65 (BP Location: Left arm)   Pulse 87   Temp 97.7  F (36.5  C) (Oral)   Resp 18   Ht 1.727 m (5' 8\")   Wt 81.9 kg (180 lb 9.6 oz)   SpO2 93%   BMI 27.46 kg/m    General: no acute distress  Neuro:  Patient appears more alert and able to focus on examiner. Unable to do full examination due to patient complaining of pain    I have personally reviewed all the labs and imaging studies. Pertinent findings:   Component      Latest Ref Rng & Units 8/25/2019   Cholesterol      <200 mg/dL 120   Triglycerides      <150 mg/dL 90   HDL Cholesterol      >39 mg/dL 63   LDL Cholesterol Calculated      <100 mg/dL 39   Non HDL Cholesterol      <130 mg/dL 57   Lipase      73 - 393 U/L 1,629 (H)   Bilirubin Direct      0.0 - 0.2 mg/dL 0.4 (H)       Assessment and Plan:  This is a 79 year old man who presents with pancreatitis and NSTEMI. Currently is encephalopathic. No clear signs of non-convulsive status. No other clear focal neurological changes.   Possibly delirium on top of underlying cognitive disorder, but difficult diagnose right now as patient has other medical issues.   Since patient doing better from alertness stand point will omit EEG at this time.   -recommend patient to be seen in the neurology clinic once his medical issues resolved for cognitive evaluation  Will sign off, please call with questions.     Thank you for allowing me to participate in cares of this patient. Please contact me with any questions of concerns (cell phone [phone] or pager [phone]).     Total time of visit: 35 minutes with the time spent on chart review, imaging and lab results review, and more than 50 % of the time spent on coordination of cares and face-to-face time with the patient including counseling " regarding pathophysiology of the above conditions, results of the tests and further directions of care.     Lisa Sharma MD, MD

## 2019-08-25 NOTE — PLAN OF CARE
Pt restless this night, unable to redirect, trying to get out of bed and pulling lines.NPO, unable to swallow pills this night, spitting pills and pocketing pills in mouth. IV dilaudid q2 for pain.Heparin infusing at 900 units/hr. Sitter at bedside, earl in place with dark tea colored urine. Tele-SR with 1st degree AVB.NS infusing at 150 ml/hr.

## 2019-08-25 NOTE — PROGRESS NOTES
"GASTROENTEROLOGY PROGRESS NOTE    SUBJECTIVE:  Talking today, complaining of lower abdominal pain. No epigastric pain.      OBJECTIVE:    BP (!) 140/65 (BP Location: Left arm)   Pulse 87   Temp 97.7  F (36.5  C) (Oral)   Resp 18   Ht 1.727 m (5' 8\")   Wt 81.9 kg (180 lb 9.6 oz)   SpO2 93%   BMI 27.46 kg/m    Temp (24hrs), Av.5  F (36.9  C), Min:98.1  F (36.7  C), Max:99.2  F (37.3  C)    Patient Vitals for the past 72 hrs:   Weight   19 0200 81.9 kg (180 lb 9.6 oz)       Intake/Output Summary (Last 24 hours) at 2019 1016  Last data filed at 2019 0556  Gross per 24 hour   Intake 271.2 ml   Output 1350 ml   Net -1078.8 ml         PHYSICAL EXAM    Constitutional: NAD, comfortable  Abdomen: soft, non-tender to palpation.  Neuro: mildly restless but verbal today        Additional Comments:  ROS, FH, SH: See initial GI consult for details.    I have reviewed the patient's new clinical lab results:    Recent Labs   Lab Test 19  0539 19  1028 18  0851  17  1410   WBC 15.4* 12.5* 6.4   < > 9.4  --    < > 6.2   HGB 10.2* 11.0* 10.6*   < > 8.3*  --    < > 10.9*   MCV 89 91 88   < > 79  --    < > 83    288 307   < > 445  --    < > 312   INR  --   --   --   --  1.16* 1.07  --  0.96    < > = values in this interval not displayed.     Recent Labs   Lab Test 19  1148    139 138   POTASSIUM 3.9 4.1 4.2   CHLORIDE 106 104 103   CO2 19* 28 28   BUN 33* 14 17   CR 1.41* 1.07 1.28*   ANIONGAP 14 7 7   LIOR 8.5 8.8 8.7     Recent Labs   Lab Test 19  0624 19  0539 19  1148 19  1101  18  1856  10/16/18  1018   ALBUMIN 2.9*  --  3.6 3.4  --    < >  --    < >  --    BILITOTAL 0.9  --  0.7 0.4  --    < >  --    < >  --    ALT 49  --  39 35  --    < >  --    < >  --    AST 92*  --  45 47*  --    < >  --    < >  --    ALKPHOS 62  --  86 85  --    < >  --    < >  --  "   PROTEIN  --   --   --   --  Negative  --  10*  --  Negative   LIPASE 1,629* 4,177* 127  --   --    < >  --   --   --     < > = values in this interval not displayed.     CT 8/24/19:  1. Consistent with acute pancreatitis.  2. Two evolving areas of cryoablation in medial upper left kidney and  lateral mid left kidney. No evidence for recurrent tumor.      Active Problems:    NSTEMI (non-ST elevated myocardial infarction) (H)    Abdominal pain    Assessment: History of CAD with stents, diabetes, CKD, RCC, admitted 8/23/19 with elevated troponin and epigastric pain. GI consulted 8/23/19 for  pain.    Angiogram planned for Monday. CT 8/24/19 showed evidence of pancreatitis and lipase elevated to 4177, had been normal on 8/22/19.    No pain on palpation of abdomen today.  Lipase improving.  Etiology of pancreatitis unclear, remote history of alcohol use but it has been years, consider gallstones.      Plan:   - continue PPI  - patient with no pain on palpation today, if continues to do well today I would recommend starting clear liquids   - no endoscopy planned at this point given MI and chronic nature of GI symptoms and no signs of bleeding          Leslye Suero  [facility]  Office:  [phone]  Cell/Pager:  [phone]

## 2019-08-25 NOTE — PROGRESS NOTES
[facility]    Hospitalist Progress Note    Date of Service (when I saw the patient): 08/25/2019    Assessment & Plan   Vincent Mishra is a 79 year old male who was admitted on 8/23/2019.  Assessment & Plan     Vincent Mishra is a 79 year old male with complex medical history including prior CAD with stenting in 2011 in 2014, type 2 diabetes, hypertension, hyperlipidemia, stage III CKD, renal cell carcinoma, GERD and esophagitis and iron deficiency anemia among others who presented to [facility] with essentially acute on chronic epigastric pain.  Upon evaluation he was found to have a troponin of 0.561 and he was transferred to our facility for cardiology work-up and angiogram.  He was seen in [place] 8/6 for NSTEMI, but left the hospital before the recommended angiogram was completed. [place] [facility] [place], with angiogram needed prior to discharge. Hospital course compliacted by confusion and agitation needing zyprexa and seroquel     Abdominal pain secondary to acute pancreatitis:  Ct abdomen with contrast done on 8/24/2019 showed acute pancreatitis  Aggressive hydration with IV fluids at 150 mL/h started after fluid bolus yesterday  He is on PPI twice daily  Clear liquid diet started today per GI  [facility] consulted and are following  Right upper quadrant ultrasound showed cholelithiasis with no CBD obstruction  Needs outpatient cholecystectomy as per general surgery who were consulted and are following him as well  No evidence of cholecystitis on the CT and ultrasound  If he gets sick during this hospitalization might need cholecystostomy tube placement as per general surgery   lipase was 4000 yesterday improved to 1600 today  Continues to complain of abdominal pain its improving since yesterday       NSTEMI  History of CAD, PCI in 2014 and 2011  Recent admission 8/6/2019  , EKG no clear ischemic changes  Peak troponin at 0.83  - telemetry  -On aspirin, amlodipine,  lisinopril, on Lipitor 80 mg p.o. daily  - heparin drip - no bolus (history of GERD, esophagitis, ulcer?)  - Cardiology consultation obtained.  Angiogram on hold due to issues with agitation  Timing of the angiogram per cardiology    Echocardiogram from March 2019 showed preserved LVEF with mild concentric LVH and no significant valvular or regional wall motion abnormalities.  There was mild pulmonary hypertension    Not on BB due to history of bradycardia    Repeat echo during this hospitalization showed no acute changes  Acute encephalopathy of unclear etiology;  No signs of active infection currently  UA was negative . electrolytes are normal.   No history of recent alcohol use as per wife  Was on oxycontin  30 mg every morning and oxycodone as needed for chronic pain issues  CT head without contrast was done and returned negative  Received a total of 75 mg of Seroquel and 5 mg of Zyprexa on 300 mg of trazodone overnight on 8/24  On Seroquel 50 mg p.o. every 12 hours PRN for agitation now appears to be sleeping comfortably this morning  Neurology consultation obtained and called most likely this is hospitalization induced delirium in the setting of underlying cognitive impairment  Might need EEG if encephalopathy persists  Urinary retention;  Patient bladder scan showed greater than 500 mL of urine needing Mcelroy placement  We will start him on Flomax 0.4 mg p.o. Daily  Has Mcelroy in place  Urology consulted and recommended voiding trial outpatient and continuing the Mcelroy catheter for now  Acute kidney injury;  Secondary to hypovolemia in the setting of acute pancreatitis and being n.p.o. and encephalopathy  Given 1 L IV fluid bolus today ending continue IV fluids at 150 mL/h  Follow BMP closely  And avoid nephrotoxins  Insomnia  Chronic pain  - PTA narcotics continued, PTA trazodone continued      HTN, HL  Continue norvasc and lisinopril   lipitor 80mg Po daily      DM2  - PTA meds awaited - metformin held  -  glucose checks  Q 4hours while NPO  and sliding scale         Diet:  NPO   DVT Prophylaxis: heparin drip   Mcelroy Catheter: placed for urinary retention   Code Status: Full Code          Disposition Plan;  In the next 2 to 3 days once mental status improves and after coronary angiogram is done and pancreatitis improves     Martin Quiñones MD  [phone] (P)      Interval History      Little less agitated this morning.More alert and awake this AM. C/o pain diffusely in abdomen. Cr went up to 1.4 today. Reduce lisinopril from 40mg to 20mg PO daily today . Lipase improving     -Data reviewed today: I reviewed all new labs and imaging results over the last 24 hours. I personally reviewed the head CT image(s) showing Normal.    Physical Exam   Temp: 97.7  F (36.5  C) Temp src: Oral BP: (!) 140/65 Pulse: 87 Heart Rate: 85 Resp: 18 SpO2: 93 % O2 Device: None (Room air)    Vitals:    08/23/19 0200   Weight: 81.9 kg (180 lb 9.6 oz)     Vital Signs with Ranges  Temp:  [97.5  F (36.4  C)-99.3  F (37.4  C)] 97.7  F (36.5  C)  Pulse:  [] 87  Heart Rate:  [80-97] 85  Resp:  [18-23] 18  BP: (107-151)/(56-90) 140/65  SpO2:  [92 %-97 %] 93 %  I/O last 3 completed shifts:  In: 1125.83 [I.V.:1125.83]  Out: 1000 [Urine:1000]    Constitutional: Restless at times but mental status seem to have improved today   Respiratory: Clear to auscultation bilaterally, no crackles or wheezing  Cardiovascular: Regular rate and rhythm, normal S1 and S2, and no murmur noted  GI: Normal bowel sounds, soft, non-distended, diffusely tender   Skin/Integumen: No rashes, no cyanosis, no edema.  Mcelroy draining dark-colored urine  Other:     Medications     HEParin 900 Units/hr (08/25/19 8568)     sodium chloride 150 mL/hr at 08/24/19 2202       sodium chloride 0.9%  1,000 mL Intravenous Once     amLODIPine  5 mg Oral Daily     aspirin  81 mg Oral Daily     atorvastatin  80 mg Oral QPM     insulin aspart  1-7 Units Subcutaneous Q4H     lidocaine  10 mL  Urethral Once     lidocaine  10 mL Urethral Once     lisinopril  40 mg Oral Daily     oxyCODONE  30 mg Oral QAM     pantoprazole (PROTONIX) IV  40 mg Intravenous BID     senna-docusate  1 tablet Oral BID    Or     senna-docusate  2 tablet Oral BID     sertraline  200 mg Oral Daily     sodium chloride (PF)  3 mL Intracatheter Q8H     tamsulosin  0.4 mg Oral Daily     traZODone  300 mg Oral At Bedtime       Data   Recent Labs   Lab 08/25/19  0624 08/24/19  0539 08/23/19  0607 08/22/19 2008 08/21/19  1148   WBC 15.4* 12.5*  --  6.4  --  6.7   HGB 10.2* 11.0*  --  10.6*  --  10.4*   MCV 89 91  --  88  --  90    288  --  307  --  300     --   --  139  --  138   POTASSIUM 3.9  --   --  4.1  --  4.2   CHLORIDE 106  --   --  104  --  103   CO2 19*  --   --  28  --  28   BUN 33*  --   --  14  --  17   CR 1.41*  --   --  1.07  --  1.28*   ANIONGAP 14  --   --  7  --  7   LIOR 8.5  --   --  8.8  --  8.7   *  --   --  141*  --  110*   ALBUMIN 2.9*  --   --  3.6  --  3.4   PROTTOTAL 5.8*  --   --  6.8  --  6.3*   BILITOTAL 0.9  --   --  0.7  --  0.4   ALKPHOS 62  --   --  86  --  85   ALT 49  --   --  39  --  35   AST 92*  --   --  45  --  47*   LIPASE 1,629* 4,177*  --  127   < >  --    TROPI  --  0.839* 0.627* 0.561*  --   --     < > = values in this interval not displayed.       No results found for this or any previous visit (from the past 24 hour(s)).

## 2019-08-25 NOTE — PROGRESS NOTES
[facility]  Cardiology Consultation   Date of Admission:  8/23/2019    Assessment & Plan   Vincent Mishra is a 79 year old male who was admitted on 8/23/2019. I was asked to see the patient troponin elevation concerning of a non-ST elevation MI in the setting of known coronary artery disease.    Patient continues to have intermittent confusion.  However this is improving.    Assessment:   1. NSTEMI     Recent admission to [facility] in [place] on 8/6/2019, but left prior to recommended angiogram being completed.  During that admission his troponin peaked at 0.21    IV Heparin    Troponin 0.561-0.627    2. Coronary artery disease    [PTA: Aspirin 81 mg daily, Plavix 75 mg and atorvastatin 80 mg daily]    History of PCI to RCA in 2011 and mLAD in 2014    Last angiogram was in 3/2019 showed 40% ISR of LAD stent (stable from 2014)    Echocardiogram from March 2019 showed preserved LVEF with mild concentric LVH and no significant valvular or regional wall motion abnormalities.  There was mild pulmonary hypertension    Not on BB due to history of bradycardia    3. Hypertension     [PTA: amlodipine 5 mg daily, lisinopril 40 mg daily,     4. Hyperlipidemia    [PTA: atorvastatin 80 mg daily]    5. Aortic root and ascending aortic dilatation    Echocardiogram from 2019 showed aortic root was 4.2 cm and ascending aorta was 4.1 cm    Plan:  1. Echocardiogram notes an LVEF of 60 to 65%.  There were no regional wall motion normalities.  2. Will postpone angiogram until Monday/Tuesday due to patient's disorienation    Lucius Hooper     Code Status    Full Code    Reason for Consult   Reason for consult: NSTEMI    Primary Care Physician   Keyla Fonseca    Physical Exam   Temp: 97.7  F (36.5  C) Temp src: Oral BP: (!) 140/65 Pulse: 87 Heart Rate: 85 Resp: 18 SpO2: 93 % O2 Device: None (Room air)    Vital Signs with Ranges  Temp:  [97.5  F (36.4  C)-99.3  F (37.4  C)] 97.7  F (36.5  C)  Pulse:   [] 87  Heart Rate:  [80-97] 85  Resp:  [18-23] 18  BP: (107-151)/(56-90) 140/65  SpO2:  [92 %-97 %] 93 %  180 lbs 9.6 oz    Constitutional:   NAD   Skin:   Warm and dry   Head:   Nontraumatic   Neck:   no JVD   Lungs:   symmetric, clear to auscultation   Cardiovascular:   regular rate and rhythm and normal S1 and S2   Abdomen:   Benign   Extremities and Back:   No edema   Neurological:   Grossly nonfocal, confused, moving all extremities   Data   Recent Labs   Lab Test 08/25/19 0624 08/24/19  0539  12/11/18  1028 12/04/18  0851   WBC 15.4* 12.5*   < > 9.4  --    HGB 10.2* 11.0*   < > 8.3*  --    MCV 89 91   < > 79  --     288   < > 445  --    INR  --   --   --  1.16* 1.07    < > = values in this interval not displayed.      Recent Labs   Lab Test 08/25/19 0624 08/22/19 2008    139   POTASSIUM 3.9 4.1   CHLORIDE 106 104   BUN 33* 14   CR 1.41* 1.07     Recent Labs   Lab 08/25/19 0624 08/22/19 2008 08/21/19  1148   * 141* 110*     Recent Labs   Lab Test 08/25/19 0624 08/22/19 2008   ALT 49 39   AST 92* 45     Troponin I ES   Date Value Ref Range Status   08/24/2019 0.839 () 0.000 - 0.045 ug/L Final     Comment:     The 99th percentile for upper reference range is 0.045 ug/L.  Troponin values   in the range of 0.045 - 0.120 ug/L may be associated with risks of adverse   clinical events.  Critical result, provider not notified due to previous critical result   notification.     08/23/2019 0.627 (HH) 0.000 - 0.045 ug/L Final     Comment:     The 99th percentile for upper reference range is 0.045 ug/L.  Troponin values   in the range of 0.045 - 0.120 ug/L may be associated with risks of adverse   clinical events.  Critical Value called to and read back by  RONI العراقي [place] [facility] [place]      08/22/2019 0.561 () 0.000 - 0.045 ug/L Final     Comment:     Critical Value called to and read back by  DELIA PENALOZA RN ED 2045 08/22/2019 JK TROPI2     03/27/2019 0.229 () 0.000 - 0.045 ug/L  Final     Comment:     The 99th percentile for upper reference range is 0.045 ug/L.  Troponin values   in the range of 0.045 - 0.120 ug/L may be associated with risks of adverse   clinical events.  SHERRY GARCIA RN  [id number] 1622 [facility]     03/27/2019 0.246 () 0.000 - 0.045 ug/L Final     Comment:     The 99th percentile for upper reference range is 0.045 ug/L.  Troponin values   in the range of 0.045 - 0.120 ug/L may be associated with risks of adverse   clinical events.  Critical Value called to and read back by  CUCO LITTLE RN BY ARS [id number] 3/27/19         Recent Labs   Lab Test 10/08/14  0640 07/24/14 04/24/12  1038   MAG 1.5* 1.6 1.5*       Lab Results   Component Value Date    CHOL 119 11/20/2018     Lab Results   Component Value Date    HDL 60 11/20/2018     Lab Results   Component Value Date    LDL 43 11/20/2018     Lab Results   Component Value Date    TRIG 78 11/20/2018     Lab Results   Component Value Date    CHOLHDLRATIO 1.9 09/25/2014          TSH   Date Value Ref Range Status   11/20/2018 3.06 0.40 - 4.00 mU/L Final

## 2019-08-26 NOTE — PROGRESS NOTES
[facility]  Cardiology Consultation   Date of Admission:  8/23/2019    Assessment & Plan   Vincent Mishra is a 79 year old male who was admitted on 8/23/2019.   Patient continues to have intermittent confusion.  He has a sitter by the bedside.  Still complaining of abdominal discomfort.  He was somewhat confused.  Did not know which month or year it was but knew that the president was [employer].    Assessment:   1. NSTEMI     Recent admission to [facility] in [place] on 8/6/2019, but left prior to recommended angiogram being completed.  During that admission his troponin peaked at 0.21. now, the peak troponins were 1.1.  He has no chest pain.    IV Heparin    Echo reveals normal EF without wall motion abnormalities.    Will need coronary angiography once mental status and pancreatitis improves.    Has history of PCI to RCA in 2011 and LAD in 2014 with a repeat angiogram in 2019 March showing 40% in-stent restenosis of the LAD stent.    Not on beta-blockers due to prior bradycardia.      Acute pancreatitis  Still complaining of abdominal discomfort.  Management per hospitalist.  I would consider GI evaluation.  Surgery signed off and will consider outpatient cholecystectomy.  Lipase decreased to 700.    2. Hypertension     [PTA: amlodipine 5 mg daily, lisinopril 40 mg daily,     3. Hyperlipidemia    [PTA: atorvastatin 80 mg daily]    4. Aortic root and ascending aortic dilatation  mild, stable at 4.1 cm       Graeme Reddy     Code Status    Full Code    Reason for Consult   Reason for consult: NSTEMI    Primary Care Physician   Keyla Fonseca    Physical Exam   Temp: 97.9  F (36.6  C) Temp src: Oral BP: 131/81 Pulse: 75 Heart Rate: 78 Resp: 18 SpO2: 93 % O2 Device: None (Room air)    Vital Signs with Ranges  Temp:  [97.9  F (36.6  C)] 97.9  F (36.6  C)  Pulse:  [71-75] 75  Heart Rate:  [] 78  Resp:  [18-20] 18  BP: (101-138)/(41-81) 131/81  SpO2:  [93 %-95 %] 93 %  182 lbs  14.4 oz    Constitutional:   NAD   Skin:   Warm and dry   Head:   Nontraumatic   Neck:   no JVD   Lungs:   symmetric, clear to auscultation   Cardiovascular:   regular rate and rhythm and normal S1 and S2   Abdomen:   Soft, non tender   Extremities and Back:   No edema   Neurological:   Grossly nonfocal, confused, moving all extremities   Data   Recent Labs   Lab Test 08/26/19 0544 08/25/19 0624  12/11/18  1028 12/04/18  0851   WBC 13.7* 15.4*   < > 9.4  --    HGB 9.4* 10.2*   < > 8.3*  --    MCV 90 89   < > 79  --     254   < > 445  --    INR  --   --   --  1.16* 1.07    < > = values in this interval not displayed.      Recent Labs   Lab Test 08/26/19 0544 08/25/19 0624    139   POTASSIUM 3.7 3.9   CHLORIDE 105 106   BUN 36* 33*   CR 1.37* 1.41*     Recent Labs   Lab 08/26/19 0544 08/25/19 0624 08/22/19 2008 08/21/19  1148   GLC 86 104* 141* 110*     Recent Labs   Lab Test 08/26/19 0544 08/25/19 0624   ALT 56 49   * 92*     Troponin I ES   Date Value Ref Range Status   08/25/2019 1.121 () 0.000 - 0.045 ug/L Final     Comment:     The 99th percentile for upper reference range is 0.045 ug/L.  Troponin values   in the range of 0.045 - 0.120 ug/L may be associated with risks of adverse   clinical events.  Critical result, provider not notified due to previous critical result   notification.     08/25/2019 1.080 () 0.000 - 0.045 ug/L Final     Comment:     The 99th percentile for upper reference range is 0.045 ug/L.  Troponin values   in the range of 0.045 - 0.120 ug/L may be associated with risks of adverse   clinical events.  Critical result, provider not notified due to previous critical result   notification.     08/24/2019 0.839 () 0.000 - 0.045 ug/L Final     Comment:     The 99th percentile for upper reference range is 0.045 ug/L.  Troponin values   in the range of 0.045 - 0.120 ug/L may be associated with risks of adverse   clinical events.  Critical result, provider not  notified due to previous critical result   notification.     08/23/2019 0.627 (HH) 0.000 - 0.045 ug/L Final     Comment:     The 99th percentile for upper reference range is 0.045 ug/L.  Troponin values   in the range of 0.045 - 0.120 ug/L may be associated with risks of adverse   clinical events.  Critical Value called to and read back by  RONI العراقي RN [place] [facility] [place] RK     08/22/2019 0.561 (HH) 0.000 - 0.045 ug/L Final     Comment:     Critical Value called to and read back by  DELIA PENALOZA RN ED 2045 08/22/2019 JK TROPI2         Recent Labs   Lab Test 10/08/14  0640 07/24/14 04/24/12  1038   MAG 1.5* 1.6 1.5*       Lab Results   Component Value Date    CHOL 119 11/20/2018     Lab Results   Component Value Date    HDL 60 11/20/2018     Lab Results   Component Value Date    LDL 43 11/20/2018     Lab Results   Component Value Date    TRIG 78 11/20/2018     Lab Results   Component Value Date    CHOLHDLRATIO 1.9 09/25/2014          TSH   Date Value Ref Range Status   11/20/2018 3.06 0.40 - 4.00 mU/L Final

## 2019-08-26 NOTE — PROGRESS NOTES
[facility]    Medicine Progress Note - Hospitalist Service       Date of Admission:  8/23/2019  Assessment & Plan   Vincent Mishra is a 79 year old male with complex medical history including prior CAD with stenting in 2011 in 2014, type 2 diabetes, hypertension, hyperlipidemia, stage III CKD, renal cell carcinoma, GERD and esophagitis and iron deficiency anemia among others who presented to [facility] with essentially acute on chronic epigastric pain.  Upon evaluation he was found to have a troponin of 0.561 and he was transferred to our facility for cardiology work-up and angiogram.  He was seen in [place] 8/6 for NSTEMI, but left the hospital before the recommended angiogram was completed. He is admitted to [facility] for NSTEMI, with angiogram needed prior to discharge. Hospital course compliacted by confusion and agitation needing zyprexa and seroquel.      Abdominal pain secondary to acute pancreatitis:  - Ct abdomen with contrast done on 8/24/2019 showed acute pancreatitis  - Aggressive hydration with IV fluids at 150 mL/h started after fluid bolus yesterday  - PPI twice daily  - [facility] GI following, clears tolerated, trial full  - Right upper quadrant ultrasound showed cholelithiasis with no CBD obstruction  - Needs outpatient cholecystectomy as per general surgery who were consulted - signed off 8/26  - No evidence of cholecystitis on the CT and ultrasound  - If he gets sick during this hospitalization might need cholecystostomy tube placement as per general surgery  - Lipase was 4000 on 24th, now 752 8/26  Continues to complain of abdominal pain its improving since yesterday     NSTEMI  History of CAD, PCI in 2014 and 2011  Recent admission 8/6/2019  - EKG no clear ischemic changes  - Peak troponin at 1.121 8/25 (steadily increasing)  - telemetry  - On aspirin, amlodipine, lisinopril, on Lipitor 80 mg p.o. daily  - heparin drip - no bolus (history of GERD, esophagitis, ulcer?)  - Cardiology  "consultation obtained.  Angiogram on hold due to issues with agitation  Timing of the angiogram per cardiology    Echocardiogram from March 2019 showed preserved LVEF with mild concentric LVH and no significant valvular or regional wall motion abnormalities.  There was mild pulmonary hypertension    Not on BB due to history of bradycardia  - Repeat echo during this hospitalization showed no acute changes    Acute encephalopathy of unclear etiology;  Possible hospital delirium  - No signs of active infection currently  - UA was negative . electrolytes are normal.   - No history of recent alcohol use as per wife  - Was on oxycontin  30 mg every morning and oxycodone as needed for chronic pain issues  - CT head without contrast was done and returned negative  - Received a total of 75 mg of Seroquel and 5 mg of Zyprexa on 300 mg of trazodone overnight on 8/24  - On Seroquel 50 mg p.o. every 12 hours PRN for agitation now appears to be sleeping comfortably this morning  - Neurology consultation obtained and called most likely this is hospitalization induced delirium in the setting of underlying cognitive impairment  - Neurology signed off 8/25  - 8/26 continues to be confused often, but possibly improved, oriented to self, wife, hospital    Urinary retention;  - Patient bladder scan showed greater than 500 mL of urine needing Mcelroy placement  - We will start him on Flomax 0.4 mg p.o. Daily  - Has Mcelroy in place  - Urology consulted and recommended voiding trial outpatient and continuing the Mcelroy catheter for now    Acute kidney injury  - Secondary to hypovolemia in the setting of acute pancreatitis and being n.p.o. and encephalopathy  - Given 1 L IV fluid bolus 8/25 ending continue IV fluids at 150 mL/h  - Follow BMP closely    Anion gap metabolic acidosis  Possibly secondary to starvation ketoacidosis? Alcoholic ketoacidosis?  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[employer]\"  - Bicarb 13, AG 20  - lactic 0.6 late last night  - checking beta hydroxybutyrate, on D5 NS, diet as able       Insomnia  Chronic pain  - PTA narcotics continued, PTA trazodone continued      HTN, HL  - Continue norvasc and lisinopril   - Lipitor 80mg Po daily      DM2  - PTA meds awaited - metformin held  - glucose checks  Q 4hours while NPO  and sliding scale        Diet: Snacks/Supplements Adult: Other; chocolate Boost GLucose Control with meals  (RD); With Meals  Clear Liquid Diet    DVT Prophylaxis: on heparin  Mcelroy Catheter: in place, indication: Retention  Code Status: Full Code      Disposition Plan   Expected discharge: 2+ days, multiple issues to resolve, cor angio when able  Entered: Winston Chu MD 08/26/2019, 11:32 AM       The patient's care was discussed with the Bedside Nurse, Patient and Patient's Family.    Winston Chu MD  Hospitalist Service  [facility]    ______________________________________________________________________    Interval History   Seen and examined. Resting upon arrival. Talked with wife Sheyla and answered questions. Groin pain is apparently chronic from back issues. Sounds like he does not eat much at baseline. Denies chest pain or SOB. Intermittently says has pain in abdomen, but not consistent and exam nonpersistent. No fevers or chills.    Data reviewed today: I reviewed all medications, new labs and imaging results over the last 24 hours. I personally reviewed no images or EKG's today.    Physical Exam   Vital Signs: Temp: 97.9  F (36.6  C) Temp src: Oral BP: 131/81 Pulse: 75 Heart Rate: 78 Resp: 20 SpO2: 93 % O2 Device: None (Room air)    Weight: 182 lbs 14.4 oz  Gen: NAD, intermittently uncomfortable and squirming (chronic back pain vs abdominal pain?)  HEENT: Normocephalic, EOMI, MMM  Resp: no crackles,  no wheezes, no increased work of resp  CV: S1S2 heard, reg rhythm, reg rate, no " pedal edema  Abdo: soft, nontender, nondistended, bowel sounds present  Ext: calves nontender, well perfused  Neuro: drowsy but arouses to voice - oriented to self, wife, and place, not routinely answering questions, CN grossly intact, no facial asymmetry      Data   Recent Labs   Lab 08/26/19  0544 08/25/19  1729 08/25/19  1401 08/25/19  0624 08/24/19  0539  08/22/19 2008   WBC 13.7*  --   --  15.4* 12.5*  --  6.4   HGB 9.4*  --   --  10.2* 11.0*  --  10.6*   MCV 90  --   --  89 91  --  88     --   --  254 288  --  307     --   --  139  --   --  139   POTASSIUM 3.7  --   --  3.9  --   --  4.1   CHLORIDE 105  --   --  106  --   --  104   CO2 13*  --   --  19*  --   --  28   BUN 36*  --   --  33*  --   --  14   CR 1.37*  --   --  1.41*  --   --  1.07   ANIONGAP 20*  --   --  14  --   --  7   LIOR 8.3*  --   --  8.5  --   --  8.8   GLC 86  --   --  104*  --   --  141*   ALBUMIN 2.6*  --   --  2.9*  --   --  3.6   PROTTOTAL 5.8*  --   --  5.8*  --   --  6.8   BILITOTAL 0.9  --   --  0.9  --   --  0.7   ALKPHOS 63  --   --  62  --   --  86   ALT 56  --   --  49  --   --  39   *  --   --  92*  --   --  45   LIPASE 752*  --   --  1,629* 4,177*  --  127   TROPI  --  1.121* 1.080*  --  0.839*   < > 0.561*    < > = values in this interval not displayed.     No results found for this or any previous visit (from the past 24 hour(s)).

## 2019-08-26 NOTE — PROGRESS NOTES
"General Surgery Progress Note    Admission Date: 8/23/2019  Today's Date: 8/26/2019         Assessment:      Vincent Mishra is a 79 year old male with multiple medical issues admitted with acute pancreatitis, NSTEMI. No evidence of cholecystitis or biliary obstruction, completely benign abdominal exam         Plan:   - Continue treatment for pancreatitis, ongoing cardiac workup  - No new recommendations from surgical standpoint. Patient does not have evidence of cholecystitis.  - Patient can follow-up with our office for discussion of outpatient laparoscopic cholecystectomy once he has recovered from these current medical issues. Our contact information is in the AVS  - We will sign off. Please call us with any questions or concerns.        Interval History:   Afebrile. Went into afib overnight. Tolerated clear liquids yesterday. Main complaint on my exam today is right groin pain. Patient states this is chronic and from his spine          Physical Exam:   /81 (BP Location: Right arm)   Pulse 75   Temp 97.9  F (36.6  C) (Oral)   Resp 18   Ht 1.727 m (5' 8\")   Wt 83 kg (182 lb 14.4 oz)   SpO2 93%   BMI 27.81 kg/m    I/O last 3 completed shifts:  In: 960 [P.O.:960]  Out: 750 [Urine:750]  General: not properly oriented but does answer some questions appropriately  Abdomen: soft, nontender to palpation including deep palpation of RUQ. No Sepulveda sign.  Groin: normal genitalia. No hernias palpated    LABS:  Recent Labs   Lab Test 08/26/19  0544 08/25/19  0624 08/24/19  0539   WBC 13.7* 15.4* 12.5*   HGB 9.4* 10.2* 11.0*   MCV 90 89 91    254 288      Recent Labs   Lab Test 08/26/19  0544 08/25/19  0624 08/22/19 2008   POTASSIUM 3.7 3.9 4.1   CHLORIDE 105 106 104   CO2 13* 19* 28   BUN 36* 33* 14   CR 1.37* 1.41* 1.07   ANIONGAP 20* 14 7      Recent Labs   Lab Test 08/26/19  0544 08/25/19  0624 08/22/19 2008   ALBUMIN 2.6* 2.9* 3.6   BILITOTAL 0.9 0.9 0.7   ALT 56 49 39   * 92* 45   ALKPHOS " 63 62 86      Recent Labs   Lab Test 08/26/19  0544 08/25/19  0624 08/24/19  0539   LIPASE 752* 1,629* 4,177*         Shae Porras PA-C  [facility]  [phone]

## 2019-08-26 NOTE — PROVIDER NOTIFICATION
MD Notification    Notified Person: MD Chu    Notified Person Name:    Notification Date/Time: 8/26/19 3183     Notification Interaction: Text page     Purpose of Notification: Hi , just received a call from lab with a critical lab value of ketones of 4.4. Thanks.    Orders Received:    Comments:

## 2019-08-26 NOTE — PROVIDER NOTIFICATION
Brief update:    Paged re: hypoglycemia into 70s.    Added D5 to NS fluids.  Continue hypoglycemia protocol    Tino Chow MD  4:18 AM

## 2019-08-26 NOTE — PLAN OF CARE
CR: Notes indicate with new onset of a-fib, also noted pt to have possible angiogram today; however, remains with delirium and agitation with minimal command following. Not appropriate for CR eval/treat at this time. Recommend nursing mobilize up to the chair.

## 2019-08-26 NOTE — PROGRESS NOTES
"GASTROENTEROLOGY PROGRESS NOTE     SUBJECTIVE: Confused. Family and sitter at bedside. Tolerating clear liquids. C/o chest pain this morning, but denied when asked later. Denies pain, nausea, vomiting currently. No BM charted since admission.     OBJECTIVE:   /81 (BP Location: Right arm)   Pulse 75   Temp 97.9  F (36.6  C) (Oral)   Resp 20   Ht 1.727 m (5' 8\")   Wt 83 kg (182 lb 14.4 oz)   SpO2 93%   BMI 27.81 kg/m     Temp (24hrs), Av.9  F (36.6  C), Min:97.9  F (36.6  C), Max:97.9  F (36.6  C)     Patient Vitals for the past 72 hrs:   Weight   19 0617 83 kg (182 lb 14.4 oz)        Intake/Output Summary (Last 24 hours) at 2019 1116  Last data filed at 2019 1016  Gross per 24 hour   Intake 660 ml   Output 750 ml   Net -90 ml      PHYSICAL EXAM   Constitutional: In bed, appears mildly restless, appears confused   Cardiovascular: iregular rhythm.   Respiratory: Clear to auscultation bilaterally  Abdomen: Soft, non-tender, non-distended, normally active bowel sounds.    ROS, FH, SH: See initial GI consult for details.     I have reviewed the patient's new clinical lab results:   Recent Labs   Lab Test 1939  18  1028 18  0851  17  1410   WBC 13.7* 15.4* 12.5*   < > 9.4  --    < > 6.2   HGB 9.4* 10.2* 11.0*   < > 8.3*  --    < > 10.9*   MCV 90 89 91   < > 79  --    < > 83    254 288   < > 445  --    < > 312   INR  --   --   --   --  1.16* 1.07  --  0.96    < > = values in this interval not displayed.      Recent Labs   Lab Test 19    139 139   POTASSIUM 3.7 3.9 4.1   CHLORIDE 105 106 104   CO2 13* 19* 28   BUN 36* 33* 14   CR 1.37* 1.41* 1.07   ANIONGAP 20* 14 7   LIOR 8.3* 8.5 8.8      Recent Labs   Lab Test 19  0544 19  0624 19  0539 19  1101  18  1856  10/16/18  1018   ALBUMIN 2.6* 2.9*  --  3.6   < >  --    < >  --    < >  --  "   BILITOTAL 0.9 0.9  --  0.7   < >  --    < >  --    < >  --    ALT 56 49  --  39   < >  --    < >  --    < >  --    * 92*  --  45   < >  --    < >  --    < >  --    ALKPHOS 63 62  --  86   < >  --    < >  --    < >  --    PROTEIN  --   --   --   --   --  Negative  --  10*  --  Negative   LIPASE 752* 1,629* 4,177* 127  --   --    < >  --   --   --     < > = values in this interval not displayed.        IMAGING   CT 8/24/19  FINDINGS:   Abdomen and Pelvis: Minimal atelectasis at the lung bases. There is increased attenuation of upper abdominal and peripancreatic fat and peripancreatic fluid as well as fluid in the left upper quadrant adjacent to spleen and splenic flexure of the colon. Findings consistent with acute pancreatitis. Clinical correlation. No evidence for pancreatic necrosis or loculated fluid collection.     Redemonstrated cryoablation changes along the lateral mid left kidney with partially calcified and partially fat attenuation surrounding the central 1.3 cm nodular area without significant change since 7/12/2019. Larger 4.5 cm low-attenuation area along the medial superior left kidney identified. There is a previous heterogeneous mass in the upper left kidney on earlier 10/5/2018 CT abdomen and this is consistent with evolving second area of cryoablation. Clinical correlation.     Redemonstrated right renal cysts. Indeterminate right adrenal nodule 1.5 cm in size is stable. Oval 2.4 x 1.7 cm left adrenal nodule is unchanged. Probable tiny gallstone coronal image 16. Elevated right hemidiaphragm. Normal-appearing liver, and spleen. No periaortic or pelvic adenopathy.     Mcelroy catheter in the bladder. No acute appearing bowel abnormality.  Postop changes and prominent degenerative changes lumbar spine.                                                                      IMPRESSION:   1. Consistent with acute pancreatitis.  2. Two evolving areas of cryoablation in medial upper left kidney  and  lateral mid left kidney. No evidence for recurrent tumor.       US 8/24/19  IMPRESSION:   1. Mobile nonshadowing sludge ball or nonshadowing calculus is noted  in the gallbladder. No evidence for acute cholecystitis is identified.  2. Simple right renal cysts.  3. No other significant abnormalities are identified. Please see also  CT abdomen and pelvis report from same date for findings of probable  acute pancreatitis.    ENDOSCOPIC WORKUP  EGD normal in 2012.    IMPRESSION: 79 year old male admitted with NSTEMI, found to have pancreatitis - hx of CAD with stents, diabetes, CKD, RCC, admitted 8/23/19 with elevated troponin and epigastric pain. He has delirium - d/t dilaudid? Lipase is trending down. (4K-->1.6K-->750). He does have cholelithiasis. Surgery is recommending discussion regarding possible lap carmen outpatient once he has recovered from his acute medical conditions given he does not have cholecystitis. 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     PLAN/RECOMMENDATIONS:   -Ok to try full liquids (low fat)   -Continue Senna. Dulcolax suppositories daily PRN   -Supportive cares     Alina Apodaca, CNP  [facility]   Cell: [phone]   After 5PM: [phone]

## 2019-08-26 NOTE — PLAN OF CARE
VSS: WNL for this pt  Neuro/Orientation: oriented to self only, continues to have delerium requiring sitter  Transfer/ activity: A2 with LIFT  LS/O2 sats: diminished on RA  CV/Rhythm: went into Afib overnight  GI/diet: clears started yesterday  /Earl: earl for retention  Lines/ drains/ IVs/ GTT: D5 NS, heperin  Procedures/Test results/Labs:na  Arterial sites/Bruit/ CMS /hematoma: na  Isolation/Wounds/ Drains/ Dressing: skin tears and scabs  Pain/PRN meds: taking oxycodone and dilauded frequently during NOC  Behaviors/ sitters/ CIWA: sitter for delerium and restless/ agitation  Edema:na  Discharge Plan:pending    Other: new Afib, plan for cards to see this am  Will pass on and continue to monitor

## 2019-08-26 NOTE — PROVIDER NOTIFICATION
MD Notification    Notified Person: MD    Notified Person Name: Dr Capone    Notification Date/Time: 8/26/2019  1820      Notification Interaction: Telephone interaction    Purpose of Notification: Critical lab for ketone of 3.3    Orders Received: No intervention at this time, anticipate it will drift down on its own.    Comments:

## 2019-08-26 NOTE — PROVIDER NOTIFICATION
MD Notification    Notified Person: MD    Notified Person Name: Dr Loredoen    Notification Date/Time: 8/26/19, 0200    Notification Interaction: Talked with MD    Purpose of Notification: Pt in Afib, no previous hx    Orders Received: Day cardiologist to see. Pt on anticoagulant and rate controlled. Call hospitalist if rate becomes rapid and sustains at that rate.    Comments:

## 2019-08-27 NOTE — PLAN OF CARE
Pt continues to be confused/ delirium requiring sitter at bedside. Pt was sleeping late evening.VSS except temp of 100.4. Notified MD.stat blood culture drawn. Prn tylenol given.Pt get restless on and off. On Full liquid diet. No issues with swallow. Heparin at 900 untis / hr. 10a check in am.BG monitoring q4h. Bed rest. Bowel care given.Abdomen pain not reproducible.No record of BM sicne Admission. Abdomen soft to touch. No tenderness upon touch. Bowel care given with senna and suppository.No bm yet but passing gas.Will continue to monitor.

## 2019-08-27 NOTE — PLAN OF CARE
[id number]. Oriented to self and place at times, restless when awake, cooperative at times, sitter at bedside. Tele Afib CVR. VSS on RA. C/o abdomen pain given Tylenol and oxycodone PRN. PRN Zyprexa given for restlessness this morning. Tolerating full liquid diet, swallows pills whole or with pudding/applesauce. Multiple scabs and bruises. +2 generalized edema. Mcelroy for retention, adequate UOP. Heparin gtt 9 ml/hr, Hep 10 A  Check at 0600 8/28. A2 lift T/r q2hrs. Incontinent of BM this shift. IVF @  75 ml/hr. GI and cardiology following. Discharge pending.

## 2019-08-27 NOTE — PLAN OF CARE
Pt continues to be delirious/confused. Keeps c/o pain, always points to the abd and states I am hurting. Complaints of pain are inconsistent as pt either complains or denies pain just a few minutes after.Pain in abd was no reproducible. No signs of pain on palpation. Pt very restless, tosses and turns in bed and falls back to sleep in between cares. Family does not like pt to take dilaudid as family thinks pt started getting delirious after getting dilaudid. Narcotic pain meds avoided this shift as it does not seem to alleviate pt's pain and pt continues to be delirious. A dose of Seroquel given which was effective. BG has been stable this shift. Diet advanced to full liquid. BS+. No BM this shift. Plan for suppository tonight. Mcelroy patent with adequate UO. Critical lab for Ketones of 4.4, now trending down to 3.3. MD aware. Hep gtt at 9mL/hr. Next recheck tomorrow AM. Otherwise VSS on RA. Tele has been afib CVR with PVCs and at times tachycardiac.Bedside attendant present.Continue to monitor.

## 2019-08-27 NOTE — PLAN OF CARE
Discharge Planner PT   Patient plan for discharge: defer to medical team  Current status: CR/PT orders received, chart reviewed. Pt cont to have sitter at bedside, confused/delirium, per chart awaiting angio. Pt not appropriate for therapy intervention at this time. RN in agreement. Will complete orders. Please re order when appropriate     Barriers to return to prior living situation: defer to medical team  Recommendations for discharge: defer to medical team  Rationale for recommendations: defer to medical team       Entered by: Daiana Knight 08/27/2019 10:23 AM

## 2019-08-27 NOTE — PROGRESS NOTES
X-cover    Fever to 100.4. Vitals stable otherwise (intermittent tachy). Admit with pancreatitis. Not on abx.   - check blood cultures x 2    Wilman Ryan M.D.  Hospitalist  Pager [phone]  Text Page

## 2019-08-27 NOTE — PROGRESS NOTES
"GASTROENTEROLOGY PROGRESS NOTE     SUBJECTIVE: Confused. Sitter at bedside. Tolerating full liquids. Points towards his mid abdomen/groin when asked if he has pain. Appears restless and confused still, but able to answer questions and more interactive in comparison to yesterday. Had a BM this morning.     OBJECTIVE:   BP (!) 120/91   Pulse 67   Temp 97.7  F (36.5  C) (Oral)   Resp 18   Ht 1.727 m (5' 8\")   Wt 83 kg (182 lb 14.4 oz)   SpO2 92%   BMI 27.81 kg/m     Temp (24hrs), Av.9  F (36.6  C), Min:97.9  F (36.6  C), Max:97.9  F (36.6  C)     Patient Vitals for the past 72 hrs:   Weight   19 0617 83 kg (182 lb 14.4 oz)        Intake/Output Summary (Last 24 hours) at 2019 1116  Last data filed at 2019 1016  Gross per 24 hour   Intake 660 ml   Output 750 ml   Net -90 ml      PHYSICAL EXAM   Constitutional: In bed, appears mildly restless, appears confused   Cardiovascular: iregular rhythm  Respiratory: Clear to auscultation bilaterally  Abdomen: Soft, non-tender, non-distended, normally active bowel sounds.    ROS, FH, SH: See initial GI consult for details.     I have reviewed the patient's new clinical lab results:   Recent Labs   Lab Test 1944 19  0624  18  1028 18  0851  17  1410   WBC 9.8 13.7* 15.4*   < > 9.4  --    < > 6.2   HGB 9.1* 9.4* 10.2*   < > 8.3*  --    < > 10.9*   MCV 86 90 89   < > 79  --    < > 83    236 254   < > 445  --    < > 312   INR  --   --   --   --  1.16* 1.07  --  0.96    < > = values in this interval not displayed.      Recent Labs   Lab Test 19  0519  0544 19    138 139   POTASSIUM 3.4 3.7 3.9   CHLORIDE 106 105 106   CO2 24 13* 19*   BUN 36* 36* 33*   CR 1.32* 1.37* 1.41*   ANIONGAP 6 20* 14   LIOR 8.8 8.3* 8.5      Recent Labs   Lab Test 19  0544 19  0544 19  0624  19  1101  18  1856  10/16/18  1018   ALBUMIN 2.4* 2.6* 2.9*   < >  --    < >  " --    < >  --    BILITOTAL 0.9 0.9 0.9   < >  --    < >  --    < >  --    ALT 54 56 49   < >  --    < >  --    < >  --    AST 80* 105* 92*   < >  --    < >  --    < >  --    ALKPHOS 89 63 62   < >  --    < >  --    < >  --    PROTEIN  --   --   --   --  Negative  --  10*  --  Negative   LIPASE 1,339* 752* 1,629*   < >  --    < >  --   --   --     < > = values in this interval not displayed.        IMAGING   CT 8/24/19  FINDINGS:   Abdomen and Pelvis: Minimal atelectasis at the lung bases. There is increased attenuation of upper abdominal and peripancreatic fat and peripancreatic fluid as well as fluid in the left upper quadrant adjacent to spleen and splenic flexure of the colon. Findings consistent with acute pancreatitis. Clinical correlation. No evidence for pancreatic necrosis or loculated fluid collection.     Redemonstrated cryoablation changes along the lateral mid left kidney with partially calcified and partially fat attenuation surrounding the central 1.3 cm nodular area without significant change since 7/12/2019. Larger 4.5 cm low-attenuation area along the medial superior left kidney identified. There is a previous heterogeneous mass in the upper left kidney on earlier 10/5/2018 CT abdomen and this is consistent with evolving second area of cryoablation. Clinical correlation.     Redemonstrated right renal cysts. Indeterminate right adrenal nodule 1.5 cm in size is stable. Oval 2.4 x 1.7 cm left adrenal nodule is unchanged. Probable tiny gallstone coronal image 16. Elevated right hemidiaphragm. Normal-appearing liver, and spleen. No periaortic or pelvic adenopathy.     Mcelroy catheter in the bladder. No acute appearing bowel abnormality.  Postop changes and prominent degenerative changes lumbar spine.                                                                      IMPRESSION:   1. Consistent with acute pancreatitis.  2. Two evolving areas of cryoablation in medial upper left kidney and  lateral mid  left kidney. No evidence for recurrent tumor.       US 8/24/19  IMPRESSION:   1. Mobile nonshadowing sludge ball or nonshadowing calculus is noted  in the gallbladder. No evidence for acute cholecystitis is identified.  2. Simple right renal cysts.  3. No other significant abnormalities are identified. Please see also  CT abdomen and pelvis report from same date for findings of probable  acute pancreatitis.    ENDOSCOPIC WORKUP  EGD normal in 2012.    IMPRESSION: 79 year old male admitted with NSTEMI, found to have pancreatitis - hx of CAD with stents, diabetes, CKD, RCC, admitted 8/23/19 with elevated troponin and epigastric pain. He has delirium - d/t dilaudid? Lipase is trending down, but did increase today. (4K-->1.6K-->750-->1.3K). He does have cholelithiasis. Surgery is recommending discussion regarding possible lap carmen outpatient once he has recovered from his acute medical conditions given he does not have cholecystitis. 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     PLAN/RECOMMENDATIONS:   -Will discuss with Dr. Edgar  -Full liquids (low fat)   -Continue Senna. Dulcolax suppositories daily PRN   -Supportive cares      lAina Apodaca CNP  [facility]   Cell: [phone]   After 5PM: [phone]

## 2019-08-27 NOTE — PROGRESS NOTES
[facility]    Medicine Progress Note - Hospitalist Service       Date of Admission:  8/23/2019  Assessment & Plan      Vincent Mishra is a 79 year old male with complex medical history including prior CAD with stenting in 2011 in 2014, type 2 diabetes, hypertension, hyperlipidemia, stage III CKD, renal cell carcinoma, GERD and esophagitis and iron deficiency anemia among others who presented to [facility] with essentially acute on chronic epigastric pain.  Upon evaluation he was found to have a troponin of 0.561 and he was transferred to our facility for cardiology work-up and angiogram.  He was seen in [place] 8/6 for NSTEMI, but left the hospital before the recommended angiogram was completed. He is [place] [facility] [place], with angiogram needed prior to discharge. Hospital course compliacted by confusion and agitation needing zyprexa and seroquel.      Abdominal pain secondary to acute pancreatitis:  - Ct abdomen with contrast done on 8/24/2019 showed acute pancreatitis  - Aggressive hydration with IV fluids at 150 mL/h started after fluid bolus yesterday  - PPI twice daily  - [facility] following, clears tolerated, trial full  - Right upper quadrant ultrasound showed cholelithiasis with no CBD obstruction  - Needs outpatient cholecystectomy as per general surgery who were consulted - signed off 8/26  - No evidence of cholecystitis on the CT and ultrasound  - If he gets sick during this hospitalization might need cholecystostomy tube placement as per general surgery  - Lipase was 4000 on 24th, down to 752, now back up to 1.3k 8/27  - pain improved, occasionally endorsed, but abdo exam benign  - would think NPO would be ideal for pancreatitis, but will defer to GI  - IVF restarted 8/27     NSTEMI  History of CAD, PCI in 2014 and 2011  Recent admission 8/6/2019  - EKG no clear ischemic changes  - Peak troponin at 1.121 8/25 (steadily increasing)  - telemetry  - On aspirin, amlodipine,  lisinopril, on Lipitor 80 mg p.o. daily  - heparin drip - no bolus (history of GERD, esophagitis, ulcer?)  - Cardiology consultation obtained.  Angiogram on hold due to issues with agitation  Timing of the angiogram per cardiology    Echocardiogram from March 2019 showed preserved LVEF with mild concentric LVH and no significant valvular or regional wall motion abnormalities.  There was mild pulmonary hypertension    Not on BB due to history of bradycardia  - Repeat echo during this hospitalization showed no acute changes  - possible lexiscan in next day or two, likely not necessary to repeat angio    Acute encephalopathy of unclear etiology;  Possible hospital delirium  - No signs of active infection currently  - UA was negative . electrolytes are normal.   - No history of recent alcohol use as per wife  - Was on oxycontin  30 mg every morning and oxycodone as needed for chronic pain issues  - CT head without contrast was done and returned negative  - Received a total of 75 mg of Seroquel and 5 mg of Zyprexa on 300 mg of trazodone overnight on 8/24  - On Seroquel 50 mg p.o. every 12 hours PRN for agitation now appears to be sleeping comfortably this morning  - Neurology consultation obtained and called most likely this is hospitalization induced delirium in the setting of underlying cognitive impairment  - Neurology signed off 8/25  - 8/26 continues to be confused often, but possibly improved, oriented to self, wife, hospital  - 8/27 slight improvement, discussed with wife    Urinary retention;  - Patient bladder scan showed greater than 500 mL of urine needing Mcelroy placement  - We will start him on Flomax 0.4 mg p.o. Daily  - Has Mcelroy in place  - Urology consulted and recommended voiding trial outpatient and continuing the Mcelroy catheter for now    Acute kidney injury  - Secondary to hypovolemia in the setting of acute pancreatitis and being n.p.o. and encephalopathy  - Given 1 L IV fluid bolus 8/25 ending  "continue IV fluids at 150 mL/h  - Follow BMP closely    Anion gap metabolic acidosis  Possibly secondary to starvation ketoacidosis? Alcoholic ketoacidosis?  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[employer]\"  - Bicarb 13, AG 20  - lactic 0.6 late last night  - checking beta hydroxybutyrate, on D5 NS, diet as able       Insomnia  Chronic pain  - PTA narcotics continued, PTA trazodone continued      HTN, HL  - Continue norvasc and lisinopril   - Lipitor 80mg Po daily      DM2  - PTA meds awaited - metformin held  - glucose checks  Q 4hours while NPO  and sliding scale        Diet: Snacks/Supplements Adult: Other; chocolate Boost GLucose Control with meals  (RD); With Meals  Full Liquid Diet    DVT Prophylaxis: on heparin  Mcelroy Catheter: in place, indication: Retention  Code Status: Full Code      Disposition Plan   Expected discharge: 2 days likely, pending improvement of pancreatitis, cardio eval, and clearing of encephalopathy  Entered: Winston Chu MD 08/27/2019, 12:46 PM       The patient's care was discussed with the Bedside Nurse, Patient, Patient's Family and cardiologist.    Winston Chu MD  Hospitalist Service  [facility]    ______________________________________________________________________    Interval History   Seen and examined. A little more oriented and alert today. Still occasional pain complaint, which per wife, is his baseline. Abdo exam not consistent with pain, benign. Wants to go home.    Data reviewed today: I reviewed all medications, new labs and imaging results over the last 24 hours. I personally reviewed no images or EKG's today.    Physical Exam   Vital Signs: Temp: 97.7  F (36.5  C) Temp src: Oral BP: (!) 120/91 Pulse: 67 Heart Rate: 92 Resp: 18 SpO2: 92 % O2 Device: None (Room air)    Weight: 182 lbs 14.4 oz  Gen: NAD, pleasant  HEENT: Normocephalic, EOMI, " MMM  Resp: no crackles,  no wheezes, no increased work of resp  CV: S1S2 heard, reg rhythm, reg rate, no pedal edema  Abdo: soft, nontender, nondistended, bowel sounds present  Ext: calves nontender, well perfused  Neuro: AAOx self, spouse, and hospital, CN grossly intact moving all 4      Data   Recent Labs   Lab 08/27/19  0544 08/26/19  0544 08/25/19  1729 08/25/19  1401 08/25/19  0624 08/24/19  0539   WBC 9.8 13.7*  --   --  15.4* 12.5*   HGB 9.1* 9.4*  --   --  10.2* 11.0*   MCV 86 90  --   --  89 91    236  --   --  254 288    138  --   --  139  --    POTASSIUM 3.4 3.7  --   --  3.9  --    CHLORIDE 106 105  --   --  106  --    CO2 24 13*  --   --  19*  --    BUN 36* 36*  --   --  33*  --    CR 1.32* 1.37*  --   --  1.41*  --    ANIONGAP 6 20*  --   --  14  --    LIOR 8.8 8.3*  --   --  8.5  --    * 86  --   --  104*  --    ALBUMIN 2.4* 2.6*  --   --  2.9*  --    PROTTOTAL 5.6* 5.8*  --   --  5.8*  --    BILITOTAL 0.9 0.9  --   --  0.9  --    ALKPHOS 89 63  --   --  62  --    ALT 54 56  --   --  49  --    AST 80* 105*  --   --  92*  --    LIPASE 1,339* 752*  --   --  1,629* 4,177*   TROPI  --   --  1.121* 1.080*  --  0.839*     No results found for this or any previous visit (from the past 24 hour(s)).

## 2019-08-27 NOTE — PROVIDER NOTIFICATION
Notified Person: MD    Notified Person's Name: MD     Notification Date/Time: 8/26/19 at 2238    Notification Interaction: paged    Purpose of Notification: temp 100.4 axillary other Vss. Last lactic was done on 8/25    Orders Received: order received for blood culture and tylenol.    Comments:

## 2019-08-27 NOTE — PLAN OF CARE
Oriented to self, restless when awake, cooperative, sitter at bedside. Tele Afib CVR. VSS ex. Temp 100.4 at bedtime Tylenol given with rebound Temp of 99.4, 98.2. CMS intact ex. +2 generalized and BUE arm edema. C/o abdominal pain, Oxy given x1 with relief, rested comfortable for several hours. Seroquel given for restlessness this morning. Tolerating full liquid diet, swallows pills whole or with pudding. Multiple scabs and bruises, GAIL no drainage. Mcelroy for retention, adequate UOP. Heparin gtt 9 ml/hr, Hep 10 A  Check at 0600. Not OOB, A2 lift T/r q2hrs. BM x2 this shift, suppository and stool softeners given at bedtime. Angio pending improvement in mentation. Discharge pending. Continue to monitor.

## 2019-08-27 NOTE — PROGRESS NOTES
[facility]  Cardiology Consultation   Date of Admission:  8/23/2019    Assessment & Plan   Vincent Mishra is a 79 year old male who was admitted on 8/23/2019.   Patient continues to have intermittent confusion-wife feels he is much better; he still does not know where he is, the month or the time; is asking to go home.  He has a sitter by the bedside.  Less abdominal discomfort.  Did not know which month or year it was but knew that the president was Checo Jones.    Assessment:     1. NSTEMI     Recent admission to [facility] in [place] on 8/6/2019, but left prior to recommended angiogram being completed.  During that admission his troponin peaked at 0.21. now, the peak troponins were 1.1.  He has no chest pain.    IV Heparin    Echo reveals normal EF without wall motion abnormalities.    Will need coronary angiography once mental status and pancreatitis improves.    Has history of PCI to RCA in 2011 and LAD in 2014 with a repeat angiogram in 2019 March showing 40% in-stent restenosis of the LAD stent.    Not on beta-blockers due to prior bradycardia-heart rate 88-92      Acute pancreatitis-fever to 100.4 overnight    Blood cultures sent   abdominal discomfort seems better    GI evaluation   Lipase up today 1339  Surgery signed off --considering outpatient cholecystectomy.      2. Hypertension -reasonable control          -on amlodipine 5mg daily and Lisinopril 20mg daily      [PTA: amlodipine 5 mg daily, lisinopril 40 mg daily,     3. Hyperlipidemia    [PTA: atorvastatin 80 mg daily]-restarted    ast almost twice normal ALT 54  Better but will hold statin for now with acute pancreatitis  Restart later    4. Aortic root and ascending aortic dilatation  mild, stable at 4.1 cm   Continue good BP control    5. CKD creat stable at 1.32  6. Hx of renal cancer-cryoablation of two tumors left kidney at [facility] 2012    Alan Philip     Code Status    Full Code    Reason for Consult   Reason  for consult: NSTEMI    Primary Care Physician   Keyla Fonseca    Physical Exam   Temp: 97.7  F (36.5  C) Temp src: Oral BP: (!) 120/91 Pulse: 67 Heart Rate: 92 Resp: 18 SpO2: 92 % O2 Device: None (Room air)    Vital Signs with Ranges  Temp:  [97.7  F (36.5  C)-100.4  F (38  C)] 97.7  F (36.5  C)  Pulse:  [67] 67  Heart Rate:  [] 92  Resp:  [18-22] 18  BP: (109-131)/(60-91) 120/91  SpO2:  [92 %-100 %] 92 %  182 lbs 14.4 oz    Constitutional:   NAD   Skin:   Warm and dry   Head:   Nontraumatic   Neck:   no JVD   Lungs:   symmetric, clear to auscultation   Cardiovascular:   regular rate and rhythm and normal S1 and S2   Abdomen:   Soft, non tender   Extremities and Back:   No edema   Neurological:   Grossly nonfocal, confused, moving all extremities   Data   Recent Labs   Lab Test 08/27/19 0544 08/26/19  0544  12/11/18  1028 12/04/18  0851   WBC 9.8 13.7*   < > 9.4  --    HGB 9.1* 9.4*   < > 8.3*  --    MCV 86 90   < > 79  --     236   < > 445  --    INR  --   --   --  1.16* 1.07    < > = values in this interval not displayed.      Recent Labs   Lab Test 08/27/19 0544 08/26/19  0544    138   POTASSIUM 3.4 3.7   CHLORIDE 106 105   BUN 36* 36*   CR 1.32* 1.37*     Recent Labs   Lab 08/27/19 0544 08/26/19 0544 08/25/19 0624 08/22/19 2008   * 86 104* 141*     Recent Labs   Lab Test 08/27/19 0544 08/26/19  0544   ALT 54 56   AST 80* 105*     Troponin I ES   Date Value Ref Range Status   08/25/2019 1.121 () 0.000 - 0.045 ug/L Final     Comment:     The 99th percentile for upper reference range is 0.045 ug/L.  Troponin values   in the range of 0.045 - 0.120 ug/L may be associated with risks of adverse   clinical events.  Critical result, provider not notified due to previous critical result   notification.     08/25/2019 1.080 () 0.000 - 0.045 ug/L Final     Comment:     The 99th percentile for upper reference range is 0.045 ug/L.  Troponin values   in the range of 0.045 - 0.120 ug/L  may be associated with risks of adverse   clinical events.  Critical result, provider not notified due to previous critical result   notification.     08/24/2019 0.839 (HH) 0.000 - 0.045 ug/L Final     Comment:     The 99th percentile for upper reference range is 0.045 ug/L.  Troponin values   in the range of 0.045 - 0.120 ug/L may be associated with risks of adverse   clinical events.  Critical result, provider not notified due to previous critical result   notification.     08/23/2019 0.627 (HH) 0.000 - 0.045 ug/L Final     Comment:     The 99th percentile for upper reference range is 0.045 ug/L.  Troponin values   in the range of 0.045 - 0.120 ug/L may be associated with risks of adverse   clinical events.  Critical Value called to and read back by  RONI العراقي RN IN [facility] [id number]      08/22/2019 0.561 (HH) 0.000 - 0.045 ug/L Final     Comment:     Critical Value called to and read back by  DELIA PENALOZA RN ED [id number] 08/22/2019 JK [id number]         Recent Labs   Lab Test 10/08/14  0640 07/24/14 04/24/12  1038   MAG 1.5* 1.6 1.5*       Lab Results   Component Value Date    CHOL 119 11/20/2018     Lab Results   Component Value Date    HDL 60 11/20/2018     Lab Results   Component Value Date    LDL 43 11/20/2018     Lab Results   Component Value Date    TRIG 78 11/20/2018     Lab Results   Component Value Date    CHOLHDLRATIO 1.9 09/25/2014          TSH   Date Value Ref Range Status   11/20/2018 3.06 0.40 - 4.00 mU/L Final

## 2019-08-28 NOTE — PROGRESS NOTES
"GASTROENTEROLOGY PROGRESS NOTE     SUBJECTIVE: Still restless and appears confused, but improved today. Able to tell me he has moderate pain in the epigastric area. Told the CNA he had pain in the left side of his abdomen wrapping around the abdomen, radiating to the back.     OBJECTIVE:   BP (!) 171/87   Pulse 67   Temp 97.9  F (36.6  C) (Oral)   Resp 16   Ht 1.727 m (5' 8\")   Wt 90.5 kg (199 lb 9.6 oz)   SpO2 91%   BMI 30.35 kg/m     Temp (24hrs), Av.9  F (36.6  C), Min:97.9  F (36.6  C), Max:97.9  F (36.6  C)     Patient Vitals for the past 72 hrs:   Weight   19 0132 90.5 kg (199 lb 9.6 oz)   19 0617 83 kg (182 lb 14.4 oz)        Intake/Output Summary (Last 24 hours) at 2019 1116  Last data filed at 2019 1016  Gross per 24 hour   Intake 660 ml   Output 750 ml   Net -90 ml      PHYSICAL EXAM   Constitutional: In bed, appears mildly restless, appears confused   Cardiovascular: Iregular rhythm  Respiratory: Clear to auscultation bilaterally  Abdomen: Soft, mild epigastric tenderness, non-distended, normally active bowel sounds.      I have reviewed the patient's new clinical lab results:   Recent Labs   Lab Test 19  0544 19  0544 19  0624  18  1028 18  0851  17  1410   WBC 9.8 13.7* 15.4*   < > 9.4  --    < > 6.2   HGB 9.1* 9.4* 10.2*   < > 8.3*  --    < > 10.9*   MCV 86 90 89   < > 79  --    < > 83    236 254   < > 445  --    < > 312   INR  --   --   --   --  1.16* 1.07  --  0.96    < > = values in this interval not displayed.      Recent Labs   Lab Test 19  0557 19  0544 19  0544    136 138   POTASSIUM 3.2* 3.4 3.7   CHLORIDE 108 106 105   CO2 24 24 13*   BUN 25 36* 36*   CR 0.92 1.32* 1.37*   ANIONGAP 8 6 20*   LIOR 8.5 8.8 8.3*      Recent Labs   Lab Test 19  0557 19  0544 19  0544  19  1101  18  1856  10/16/18  1018   ALBUMIN 2.6* 2.4* 2.6*   < >  --    < >  --    < >  --    BILITOTAL " 1.0 0.9 0.9   < >  --    < >  --    < >  --    ALT 67 54 56   < >  --    < >  --    < >  --    AST 83* 80* 105*   < >  --    < >  --    < >  --    ALKPHOS 97 89 63   < >  --    < >  --    < >  --    PROTEIN  --   --   --   --  Negative  --  10*  --  Negative   LIPASE 1,899* 1,339* 752*   < >  --    < >  --   --   --     < > = values in this interval not displayed.        IMAGING   CT 8/24/19  FINDINGS:   Abdomen and Pelvis: Minimal atelectasis at the lung bases. There is increased attenuation of upper abdominal and peripancreatic fat and peripancreatic fluid as well as fluid in the left upper quadrant adjacent to spleen and splenic flexure of the colon. Findings consistent with acute pancreatitis. Clinical correlation. No evidence for pancreatic necrosis or loculated fluid collection.     Redemonstrated cryoablation changes along the lateral mid left kidney with partially calcified and partially fat attenuation surrounding the central 1.3 cm nodular area without significant change since 7/12/2019. Larger 4.5 cm low-attenuation area along the medial superior left kidney identified. There is a previous heterogeneous mass in the upper left kidney on earlier 10/5/2018 CT abdomen and this is consistent with evolving second area of cryoablation. Clinical correlation.     Redemonstrated right renal cysts. Indeterminate right adrenal nodule 1.5 cm in size is stable. Oval 2.4 x 1.7 cm left adrenal nodule is unchanged. Probable tiny gallstone coronal image 16. Elevated right hemidiaphragm. Normal-appearing liver, and spleen. No periaortic or pelvic adenopathy.     Mcelroy catheter in the bladder. No acute appearing bowel abnormality.  Postop changes and prominent degenerative changes lumbar spine.                                                                      IMPRESSION:   1. Consistent with acute pancreatitis.  2. Two evolving areas of cryoablation in medial upper left kidney and  lateral mid left kidney. No evidence for  recurrent tumor.       US 8/24/19  IMPRESSION:   1. Mobile nonshadowing sludge ball or nonshadowing calculus is noted  in the gallbladder. No evidence for acute cholecystitis is identified.  2. Simple right renal cysts.  3. No other significant abnormalities are identified. Please see also  CT abdomen and pelvis report from same date for findings of probable  acute pancreatitis.    ENDOSCOPIC WORKUP  EGD normal in 2012.    IMPRESSION: 79 year old male admitted with NSTEMI, found to have pancreatitis - hx of CAD with stents, diabetes, CKD, RCC, admitted 8/23/19 with elevated troponin and epigastric pain. He has delirium - d/t dilaudid?  He does have cholelithiasis. Surgery is recommending discussion regarding possible lap carmen outpatient once he has recovered from his acute medical conditions given he does not have cholecystitis. 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   Lipase was trending down, but has increased over the last two days and he is now able to verbalize upper abdominal pain. Was tolerating clears/fulls last two days, but now given he is verbalizing pain, will made NPO for bowel rest. Lipase 4K-->1.6K-->750-->1.3K-->1.8K. Kidney function improving--> BUN 36->25, Creatinine 1.32-->0.92.     PLAN/RECOMMENDATIONS:   -NPO with IV fluids for hydration - meds/ice chips OK   -Continue PPI BID   -Stool softeners as needed   -Supportive cares   -Discussed with Dr. Edgar who will also assess patient   -Consider repeat imaging pending clinical course, cautiously given kidney function     Alina Apodaca, CNP  [facility]   Cell: [phone] until 1PM   After 1PM, please call Dr. Edgar; after 5PM: [phone]

## 2019-08-28 NOTE — PROGRESS NOTES
[facility] note     Attempted to see patient today and discuss with his family the current clinical course and work-up. He was down getting his stress test, unable to see and his family was not present. Discussed with Dr. Chu and Alina Apodaca. Per discussion, patient's exam was improved, but reports abdominal pain. Kidney function just normalized. We will monitor clinically overnight, if any increase in abdominal pain reported or on exam, will pursue CT with IV contrast and pancreas protocol tomorrow. Will hold on daily lipase for now, will follow clinically.     [facility] has seen patient daily, please see notes by Alina Apodaca for details. Please call with any concerning clinical changes.    Jayy Edgar MD   [facility]  Cell [phone]  After 5 PM, please call [phone]

## 2019-08-28 NOTE — PROGRESS NOTES
[facility]  Cardiology Consultation   Date of Admission:  8/23/2019    Assessment & Plan   Vincent Mishra is a 79 year old male who was admitted on 8/23/2019.   Patient continues to have intermittent confusion-wife feels he is much better; he still does not know where he is     Ongoing abdominal discomfort    Assessment:     1. NSTEMI     Recent admission to [facility] in [place] on 8/6/2019, but left prior to recommended angiogram being completed.  During that admission his troponin peaked at 0.21. now, the peak troponins was1.1.  He has no chest pain.    IV Heparin    Echo reveals normal EF without wall motion abnormalities.        Has history of PCI to RCA in 2011 and LAD in 2014 with a repeat angiogram in 2019 March showing 40% in-stent restenosis of the LAD stent.    Not on beta-blockers due to prior bradycardia-heart rate 88-92    Nuclear stress test ordered for today    2.Acute pancreatitis-fever to 100.4 overnight    Blood cultures sent   abdominal discomfort seems better    GI evaluation   Lipase up 1339--1899  Surgery signed off --considering outpatient cholecystectomy.      3. Hypertension -uncontrolled         -on amlodipine 5mg daily and Lisinopril 20mg daily  -increase to amlodipine 10mg daily-watch for edema      [PTA: amlodipine 5 mg daily, lisinopril 40 mg daily,     4. Hyperlipidemia    [PTA: atorvastatin 80 mg daily]-restarted    ast almost twice normal ALT 54  Better but will hold statin for now with acute pancreatitis  Restart later       5. Aortic root and ascending aortic dilatation  mild, stable at 4.1 cm   Continue good BP control    6. CKD creat 0.82 today  7. Hx of renal cancer-cryoablation of two tumors left kidney at [facility] 2012    8. Hypokalemic-replete    Alan Philip     Code Status    Full Code    Reason for Consult   Reason for consult: NSTEMI    Primary Care Physician   Keyla Fonseca    Physical Exam   Temp: 97.9  F (36.6  C) Temp src: Oral BP:  (!) 171/87   Heart Rate: 59 Resp: 16 SpO2: 91 % O2 Device: None (Room air)    Vital Signs with Ranges  Temp:  [97.8  F (36.6  C)-98.9  F (37.2  C)] 97.9  F (36.6  C)  Heart Rate:  [59-79] 59  Resp:  [16-18] 16  BP: (133-171)/(69-92) 171/87  SpO2:  [91 %-95 %] 91 %  199 lbs 9.6 oz    Constitutional:   NAD   Skin:   Warm and dry   Head:   Nontraumatic   Neck:   no JVD   Lungs:   symmetric, clear to auscultation   Cardiovascular:   regular rate and rhythm and normal S1 and S2   Abdomen:   Soft, non tender   Extremities and Back:   No edema   Neurological:   Grossly nonfocal, confused, moving all extremities   Data   Recent Labs   Lab Test 08/27/19  0544 08/26/19  0544  12/11/18  1028 12/04/18  0851   WBC 9.8 13.7*   < > 9.4  --    HGB 9.1* 9.4*   < > 8.3*  --    MCV 86 90   < > 79  --     236   < > 445  --    INR  --   --   --  1.16* 1.07    < > = values in this interval not displayed.      Recent Labs   Lab Test 08/28/19 0557 08/27/19  0544    136   POTASSIUM 3.2* 3.4   CHLORIDE 108 106   BUN 25 36*   CR 0.92 1.32*     Recent Labs   Lab 08/28/19  0557 08/27/19  0544 08/26/19  0544 08/25/19  0624   * 148* 86 104*     Recent Labs   Lab Test 08/28/19  0557 08/27/19  0544   ALT 67 54   AST 83* 80*     Troponin I ES   Date Value Ref Range Status   08/25/2019 1.121 () 0.000 - 0.045 ug/L Final     Comment:     The 99th percentile for upper reference range is 0.045 ug/L.  Troponin values   in the range of 0.045 - 0.120 ug/L may be associated with risks of adverse   clinical events.  Critical result, provider not notified due to previous critical result   notification.     08/25/2019 1.080 () 0.000 - 0.045 ug/L Final     Comment:     The 99th percentile for upper reference range is 0.045 ug/L.  Troponin values   in the range of 0.045 - 0.120 ug/L may be associated with risks of adverse   clinical events.  Critical result, provider not notified due to previous critical result   notification.      08/24/2019 0.839 (HH) 0.000 - 0.045 ug/L Final     Comment:     The 99th percentile for upper reference range is 0.045 ug/L.  Troponin values   in the range of 0.045 - 0.120 ug/L may be associated with risks of adverse   clinical events.  Critical result, provider not notified due to previous critical result   notification.     08/23/2019 0.627 (HH) 0.000 - 0.045 ug/L Final     Comment:     The 99th percentile for upper reference range is 0.045 ug/L.  Troponin values   in the range of 0.045 - 0.120 ug/L may be associated with risks of adverse   clinical events.  Critical Value called to and read back by  RONI العراقي RN IN [facility] 0646 RK     08/22/2019 0.561 (HH) 0.000 - 0.045 ug/L Final     Comment:     Critical Value called to and read back by  DELIA PENALOZA RN ED 2045 08/22/2019 JK TROPI2         Recent Labs   Lab Test 10/08/14  0640 07/24/14 04/24/12  1038   MAG 1.5* 1.6 1.5*       Lab Results   Component Value Date    CHOL 119 11/20/2018     Lab Results   Component Value Date    HDL 60 11/20/2018     Lab Results   Component Value Date    LDL 43 11/20/2018     Lab Results   Component Value Date    TRIG 78 11/20/2018     Lab Results   Component Value Date    CHOLHDLRATIO 1.9 09/25/2014          TSH   Date Value Ref Range Status   11/20/2018 3.06 0.40 - 4.00 mU/L Final

## 2019-08-28 NOTE — PLAN OF CARE
"VSS.  Tele afib with CVR.  Pt very restless all shift, ripped out 1 IV and despite medications he was all over the bed saying \"please help me.\"  Oxycodone 5mg given x2, also given seroquel prn and trazadone scheduled and he was able to sleep x2 hours.  Oriented to person but unable to answer other questions correctly.  Is following most commands but forgetful and needing lots of redirection.  Used lift to get pt to chair and also commode but he is so restless he isn't safe on either. Heparin gtt continues and IVF with D5 continue.  Will continue with current plan of care.  "

## 2019-08-28 NOTE — PROGRESS NOTES
[facility]    Medicine Progress Note - Hospitalist Service       Date of Admission:  8/23/2019  Assessment & Plan    Vincent Mishra is a 79 year old male with complex medical history including prior CAD with stenting in 2011 in 2014, type 2 diabetes, hypertension, hyperlipidemia, stage III CKD, renal cell carcinoma, GERD and esophagitis and iron deficiency anemia among others who presented to [facility] with essentially acute on chronic epigastric pain.  Upon evaluation he was found to have a troponin of 0.561 and he was transferred to our facility for cardiology work-up and angiogram.  He was seen in [place] 8/6 for NSTEMI, but left the hospital before the recommended angiogram was completed. [place] [facility], with angiogram needed prior to discharge. Hospital course compliacted by confusion and agitation needing zyprexa and seroquel.      Abdominal pain secondary to acute pancreatitis:  - Ct abdomen with contrast done on 8/24/2019 showed acute pancreatitis  - Aggressive hydration with IV fluids at 150 mL/h started after fluid bolus yesterday  - PPI twice daily  - [facility] following, clears tolerated, trial full  - Right upper quadrant ultrasound showed cholelithiasis with no CBD obstruction  - Needs outpatient cholecystectomy as per general surgery who were consulted - signed off 8/26  - No evidence of cholecystitis on the CT and ultrasound  - If he gets sick during this hospitalization might need cholecystostomy tube placement as per general surgery  - Lipase was 4000 on 24th, down to 752, now back up to 1.3k 8/27 --> 1.8k 8/28; however clinically improved  - pain improved, occasionally endorsed, but abdo exam benign  - would think NPO would be ideal for pancreatitis, but will defer to GI  - IVF restarted 8/27   - Lipase increased today, but clinically he has improved - discussed with Dr Edgar of [facility], if pain in abdo tmrw, will obtain CT imaging  - continue  NPO     NSTEMI  History of CAD, PCI in 2014 and 2011  Recent admission 8/6/2019  - EKG no clear ischemic changes  - Peak troponin at 1.121 8/25 (steadily increasing)  - telemetry  - On aspirin, amlodipine, lisinopril, on Lipitor 80 mg p.o. daily  - heparin drip - no bolus (history of GERD, esophagitis, ulcer?)  - Cardiology consultation obtained.  Angiogram on hold due to issues with agitation  Timing of the angiogram per cardiology    Echocardiogram from March 2019 showed preserved LVEF with mild concentric LVH and no significant valvular or regional wall motion abnormalities.  There was mild pulmonary hypertension    Not on BB due to history of bradycardia  - Repeat echo during this hospitalization showed no acute changes  - lexiscan 8/28 - per cardiology can stop heparin drip  - resume clopidogrel 8/29     Acute encephalopathy of unclear etiology;  Possible hospital delirium  - No signs of active infection currently  - UA was negative . electrolytes are normal.   - No history of recent alcohol use as per wife  - Was on oxycontin  30 mg every morning and oxycodone as needed for chronic pain issues  - CT head without contrast was done and returned negative  - Received a total of 75 mg of Seroquel and 5 mg of Zyprexa on 300 mg of trazodone overnight on 8/24  - On Seroquel 50 mg p.o. every 12 hours PRN for agitation now appears to be sleeping comfortably this morning  - Neurology consultation obtained and called most likely this is hospitalization induced delirium in the setting of underlying cognitive impairment  - Neurology signed off 8/25  - 8/26 continues to be confused often, but possibly improved, oriented to self, wife, hospital  - 8/27 slight improvement, discussed with wife  - 8/28 substantial improvement, nearing baseline per wife    Urinary retention;  - Patient bladder scan showed greater than 500 mL of urine needing Mcelroy placement  - We will start him on Flomax 0.4 mg p.o. Daily  - Has Mcelroy in place  -  "Urology consulted and recommended voiding trial outpatient and continuing the Mcelroy catheter for now    Acute kidney injury - resolved  - Secondary to hypovolemia in the setting of acute pancreatitis and being n.p.o. and encephalopathy  - Given 1 L IV fluid bolus 8/25 ending continue IV fluids at 150 mL/h  - Follow BMP closely    Anion gap metabolic acidosis - resolved  Possibly secondary to starvation ketoacidosis? Alcoholic ketoacidosis?  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   - acidosis and AG resolved subsequently    Insomnia  Chronic pain  - PTA narcotics continued, PTA trazodone continued      HTN, HL  - Continue norvasc and lisinopril   - Lipitor 80mg Po daily      DM2  - PTA meds awaited - metformin held  - glucose checks  Q 4hours while NPO  and sliding scale        Diet: Snacks/Supplements Adult: Other; chocolate Boost GLucose Control with meals  (RD); With Meals  NPO for Medical/Clinical Reasons Except for: Meds, Ice Chips    DVT Prophylaxis: heparin previously, per Cardio ok to stop, pcd after  Mcelroy Catheter: in place, indication: Retention  Code Status: Full Code      Disposition Plan   Expected discharge: Likely within next 2 days or so, pending lexiscan results and improvement of abdominal pain, GI recs  Entered: Winston Chu MD 08/28/2019, 11:47 AM       The patient's care was discussed with the Bedside Nurse, Patient, Patient's Family and Cardiology MD.    Winston Chu MD  Hospitalist Service  [facility]    ______________________________________________________________________    Interval History   Seen and examined. Calmer and more alert today. Denies pain currently. No sob, f/c. Family " and wife updated at bedside.     Data reviewed today: I reviewed all medications, new labs and imaging results over the last 24 hours. I personally reviewed no images or EKG's today.    Physical Exam   Vital Signs: Temp: 97.9  F (36.6  C) Temp src: Oral BP: (!) 160/79   Heart Rate: 76 Resp: 16 SpO2: 95 % O2 Device: None (Room air)    Weight: 199 lbs 9.6 oz  Gen: NAD, pleasant  HEENT: Normocephalic, EOMI, MMM  Resp: no crackles,  no wheezes, no increased work of resp  CV: S1S2 heard, reg rhythm, reg rate, no pedal edema  Abdo: soft, nontender, nondistended, bowel sounds present  Ext: calves nontender, well perfused  Neuro: AAOx self and place, does not always answer questions, CN grossly intact, no facial asymmetry      Data   Recent Labs   Lab 08/28/19  0557 08/27/19  0544 08/26/19  0544 08/25/19  1729 08/25/19  1401 08/25/19  0624 08/24/19  0539   WBC  --  9.8 13.7*  --   --  15.4* 12.5*   HGB  --  9.1* 9.4*  --   --  10.2* 11.0*   MCV  --  86 90  --   --  89 91   PLT  --  244 236  --   --  254 288    136 138  --   --  139  --    POTASSIUM 3.2* 3.4 3.7  --   --  3.9  --    CHLORIDE 108 106 105  --   --  106  --    CO2 24 24 13*  --   --  19*  --    BUN 25 36* 36*  --   --  33*  --    CR 0.92 1.32* 1.37*  --   --  1.41*  --    ANIONGAP 8 6 20*  --   --  14  --    LIOR 8.5 8.8 8.3*  --   --  8.5  --    * 148* 86  --   --  104*  --    ALBUMIN 2.6* 2.4* 2.6*  --   --  2.9*  --    PROTTOTAL 6.1* 5.6* 5.8*  --   --  5.8*  --    BILITOTAL 1.0 0.9 0.9  --   --  0.9  --    ALKPHOS 97 89 63  --   --  62  --    ALT 67 54 56  --   --  49  --    AST 83* 80* 105*  --   --  92*  --    LIPASE 1,899* 1,339* 752*  --   --  1,629* 4,177*   TROPI  --   --   --  1.121* 1.080*  --  0.839*     No results found for this or any previous visit (from the past 24 hour(s)).

## 2019-08-29 NOTE — PROGRESS NOTES
[facility]    Medicine Progress Note - Hospitalist Service       Date of Admission:  8/23/2019  Assessment & Plan    Vincent Mishra is a 79 year old male with complex medical history including prior CAD with stenting in 2011 in 2014, type 2 diabetes, hypertension, hyperlipidemia, stage III CKD, renal cell carcinoma, GERD and esophagitis and iron deficiency anemia among others who presented to [facility] with essentially acute on chronic epigastric pain.  Upon evaluation he was found to have a troponin of 0.561 and he was transferred to our facility for cardiology work-up and angiogram.  He was seen in [place] 8/6 for NSTEMI, but left the hospital before the recommended angiogram was completed. He is admitted [place] [facility] [place]STEMI, with angiogram needed prior to discharge. Hospital course compliacted by confusion and agitation needing zyprexa and seroquel.      Abdominal pain secondary to acute pancreatitis:  - Ct abdomen with contrast done on 8/24/2019 showed acute pancreatitis  - Aggressive hydration with IV fluids at 150 mL/h started after fluid bolus yesterday  - PPI twice daily  - [facility] following, clears tolerated, trial full  - Right upper quadrant ultrasound showed cholelithiasis with no CBD obstruction  - Needs outpatient cholecystectomy as per general surgery who were consulted - signed off 8/26  - No evidence of cholecystitis on the CT and ultrasound  - If he gets sick during this hospitalization might need cholecystostomy tube placement as per general surgery  - Lipase had trended down before increasing a few days ago; clinically he has improved.   - [facility] ordered CT abdo pancreas protocol, but patient couldn't tolerate study  - advancing diet to low fat again 8/29, will monitor progress  - continues to report abdo pain, but on exam is not tender even with deep palp     NSTEMI  History of CAD, PCI in 2014 and 2011  Recent admission 8/6/2019  - EKG no clear ischemic changes  - Peak  troponin at 1.121 8/25 (steadily increasing)  - telemetry  - On aspirin, amlodipine, lisinopril, on Lipitor 80 mg p.o. daily  - heparin drip - no bolus (history of GERD, esophagitis, ulcer?)  - Cardiology consultation obtained.  Angiogram on hold due to issues with agitation  Timing of the angiogram per cardiology    Echocardiogram from March 2019 showed preserved LVEF with mild concentric LVH and no significant valvular or regional wall motion abnormalities.  There was mild pulmonary hypertension    Not on BB due to history of bradycardia  - Repeat echo during this hospitalization showed no acute changes  - unable to complete lexiscan 8/28, ok for heparin to stop per Cardiology  - resume clopidogrel, no surgery or interventions planned at this point    Acute encephalopathy of unclear etiology;  Possible hospital delirium  - No signs of active infection currently  - UA was negative . electrolytes are normal.   - No history of recent alcohol use as per wife  - Was on oxycontin  30 mg every morning and oxycodone as needed for chronic pain issues  - CT head without contrast was done and returned negative  - Received a total of 75 mg of Seroquel and 5 mg of Zyprexa on 300 mg of trazodone overnight on 8/24  - On Seroquel 50 mg p.o. every 12 hours PRN for agitation now appears to be sleeping comfortably this morning  - Neurology consultation obtained and called most likely this is hospitalization induced delirium in the setting of underlying cognitive impairment  - Neurology signed off 8/25  - 8/26 continues to be confused often, but possibly improved, oriented to self, wife, hospital  - 8/27 slight improvement, discussed with wife  - 8/28 substantial improvement, nearing baseline per wife  - 8/29 waxing/waning, will consult Psychiatry, questionable baseline cognitive issues    Urinary retention;  - Patient bladder scan showed greater than 500 mL of urine needing Mcelroy placement  - We will start him on Flomax 0.4 mg p.o.  "Daily  - Has Mcelroy in place  - Urology consulted and recommended voiding trial outpatient and continuing the Mcelroy catheter for now    Acute kidney injury - resolved  - Secondary to hypovolemia in the setting of acute pancreatitis and being n.p.o. and encephalopathy  - Given 1 L IV fluid bolus 8/25 ending continue IV fluids at 150 mL/h  - Follow BMP closely    Anion gap metabolic acidosis - resolved  Possibly secondary to starvation ketoacidosis? Alcoholic ketoacidosis?  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   - acidosis and AG resolved subsequently    Insomnia  Chronic pain  - PTA narcotics continued, PTA trazodone continued      HTN, HL  - Continue norvasc and lisinopril   - resume Lipitor 80mg Po daily at or before discharge     DM2  - PTA metformin held  - glucose checks  Q 4hours while NPO  and sliding scale      Diet: Snacks/Supplements Adult: Other; chocolate Boost GLucose Control with meals  (RD); With Meals  NPO for Medical/Clinical Reasons Except for: Meds    DVT Prophylaxis: Pneumatic Compression Devices  Mcelroy Catheter: in place, indication: Strict 1-2 Hour I&O  Code Status: Full Code      Disposition Plan   Expected discharge: unclear, pending resolution/improvement of abdominal pain - GI recommendations. May need TCU.    Entered: Winston Chu MD 08/29/2019, 9:11 AM       The patient's care was discussed with the Bedside Nurse and Patient.    Winston Chu MD  Hospitalist Service  [facility]    ______________________________________________________________________    Interval History   Seen and examined.  Patient continues to reports somewhat vague abdominal pain as in previous days.  However on " exam even with deep palpation does not seem to be tender.  Attempted Lexiscan yesterday which could not be completed due to patient movement and agitation apparently.  GI ordered CT Abdo pelvis with pancreas protocol which also could not be completed.  Patient seems to be perhaps slightly less oriented than yesterday but still better than previous days.  Attempted to speak with wife before she left but was unsuccessful.    Data reviewed today: I reviewed all medications, new labs and imaging results over the last 24 hours. I personally reviewed no images or EKG's today.    Physical Exam   Vital Signs: Temp: 97.9  F (36.6  C) Temp src: Oral BP: (!) 129/94   Heart Rate: 93 Resp: 18 SpO2: 96 % O2 Device: None (Room air)    Weight: 195 lbs 11.2 oz  Gen: NAD, uncomfortable at times  HEENT: Normocephalic, EOMI, MMM  Resp: no crackles,  no wheezes, no increased work of resp  CV: S1S2 heard, reg rhythm, reg rate, no pedal edema  Abdo: soft, nontender on deep palpation, nondistended, bowel sounds present  Ext: calves nontender, well perfused  Neuro: [facility],  grossly intact, no facial asymmetry      Data   Recent Labs   Lab 08/29/19  1559 08/29/19  0536 08/28/19  0557 08/27/19  0544 08/26/19  0544 08/25/19  1729 08/25/19  1401  08/24/19  0539   WBC  --  9.1  --  9.8 13.7*  --   --    < > 12.5*   HGB  --  9.2*  --  9.1* 9.4*  --   --    < > 11.0*   MCV  --  85  --  86 90  --   --    < > 91   PLT  --  258  --  244 236  --   --    < > 288   NA  --  142 140 136 138  --   --    < >  --    POTASSIUM 3.2* 2.9* 3.2* 3.4 3.7  --   --    < >  --    CHLORIDE  --  109 108 106 105  --   --    < >  --    CO2  --  29 24 24 13*  --   --    < >  --    BUN  --  16 25 36* 36*  --   --    < >  --    CR  --  0.73 0.92 1.32* 1.37*  --   --    < >  --    ANIONGAP  --  4 8 6 20*  --   --    < >  --    LIOR  --  8.4* 8.5 8.8 8.3*  --   --    < >  --    GLC  --  172* 183* 148* 86  --   --    < >  --    ALBUMIN  --   --  2.6* 2.4* 2.6*   --   --    < >  --    PROTTOTAL  --   --  6.1* 5.6* 5.8*  --   --    < >  --    BILITOTAL  --   --  1.0 0.9 0.9  --   --    < >  --    ALKPHOS  --   --  97 89 63  --   --    < >  --    ALT  --   --  67 54 56  --   --    < >  --    AST  --   --  83* 80* 105*  --   --    < >  --    LIPASE  --   --  1,899* 1,339* 752*  --   --    < > 4,177*   TROPI  --   --   --   --   --  1.121* 1.080*  --  0.839*    < > = values in this interval not displayed.     No results found for this or any previous visit (from the past 24 hour(s)).

## 2019-08-29 NOTE — PROGRESS NOTES
MD Notification    Notified Person: MD    Notified Person Name: Dr. Vásquez    Notification Date/Time: 8/29/2019 6:02 AM    Notification Interaction: Paged    Purpose of Notification: K 2.9    Orders Received:    Comments:

## 2019-08-29 NOTE — PLAN OF CARE
Pt alert w/ intermittent lethargy throughout day. Disoriented to situation and occasionally to place. Tele: A fib, CVR. Elevated BP throughout day, increased Norvasc to 10 mg daily. Denies CP. C/o abdominal pain throughout day, uncontrolled w/ PTA oxycontin, PRN oxycodone and tylenol. Potassium replaced this AM. Pt sometimes redirectable, pulled out multiple IV's today and still restless at times. Patient unable to complete nuclear stress test d/t inability to lay flat w/o trying to jump off table per nuclear med staff. Stopped heparin today. Pt NPO minus meds. Up w/ lift to chair and BSC today. Sitter at bedside.

## 2019-08-29 NOTE — PROGRESS NOTES
"GASTROENTEROLOGY PROGRESS NOTE     SUBJECTIVE: Still restless and confused, but improving. More talkative, rambling. Complains of pain and points to pain in the epigastric area. No tenderness on palpation. Also states he had pain in the left chest earlier.      OBJECTIVE:     BP (!) 129/94 (BP Location: Left arm)   Pulse 67   Temp 97.9  F (36.6  C) (Oral)   Resp 18   Ht 1.727 m (5' 8\")   Wt 88.8 kg (195 lb 11.2 oz)   SpO2 96%   BMI 29.76 kg/m     Temp (24hrs), Av.9  F (36.6  C), Min:97.9  F (36.6  C), Max:97.9  F (36.6  C)     Patient Vitals for the past 72 hrs:   Weight   19 0400 88.8 kg (195 lb 11.2 oz)   19 0132 90.5 kg (199 lb 9.6 oz)        Intake/Output Summary (Last 24 hours) at 2019 1116  Last data filed at 2019 1016  Gross per 24 hour   Intake 660 ml   Output 750 ml   Net -90 ml      PHYSICAL EXAM   Constitutional: In bed, appears mildly restless, appears confused   Cardiovascular: Iregular rhythm  Respiratory: Clear to auscultation bilaterally  Abdomen: Soft, no tenderness, non-distended, normally active bowel sounds.      I have reviewed the patient's new clinical lab results:   Recent Labs   Lab Test 19  0536 19  0544 19  0544  18  1028 18  0851  17  1410   WBC 9.1 9.8 13.7*   < > 9.4  --    < > 6.2   HGB 9.2* 9.1* 9.4*   < > 8.3*  --    < > 10.9*   MCV 85 86 90   < > 79  --    < > 83    244 236   < > 445  --    < > 312   INR  --   --   --   --  1.16* 1.07  --  0.96    < > = values in this interval not displayed.      Recent Labs   Lab Test 19  0536 19  0557 19  0544    140 136   POTASSIUM 2.9* 3.2* 3.4   CHLORIDE 109 108 106   CO2 29 24 24   BUN 16 25 36*   CR 0.73 0.92 1.32*   ANIONGAP 4 8 6   LIOR 8.4* 8.5 8.8      Recent Labs   Lab Test 19  0557 19  0544 19  0544  19  1101  18  1856  10/16/18  1018   ALBUMIN 2.6* 2.4* 2.6*   < >  --    < >  --    < >  --    BILITOTAL 1.0 " 0.9 0.9   < >  --    < >  --    < >  --    ALT 67 54 56   < >  --    < >  --    < >  --    AST 83* 80* 105*   < >  --    < >  --    < >  --    ALKPHOS 97 89 63   < >  --    < >  --    < >  --    PROTEIN  --   --   --   --  Negative  --  10*  --  Negative   LIPASE 1,899* 1,339* 752*   < >  --    < >  --   --   --     < > = values in this interval not displayed.        IMAGING   CT 8/24/19  FINDINGS:   Abdomen and Pelvis: Minimal atelectasis at the lung bases. There is increased attenuation of upper abdominal and peripancreatic fat and peripancreatic fluid as well as fluid in the left upper quadrant adjacent to spleen and splenic flexure of the colon. Findings consistent with acute pancreatitis. Clinical correlation. No evidence for pancreatic necrosis or loculated fluid collection.     Redemonstrated cryoablation changes along the lateral mid left kidney with partially calcified and partially fat attenuation surrounding the central 1.3 cm nodular area without significant change since 7/12/2019. Larger 4.5 cm low-attenuation area along the medial superior left kidney identified. There is a previous heterogeneous mass in the upper left kidney on earlier 10/5/2018 CT abdomen and this is consistent with evolving second area of cryoablation. Clinical correlation.     Redemonstrated right renal cysts. Indeterminate right adrenal nodule 1.5 cm in size is stable. Oval 2.4 x 1.7 cm left adrenal nodule is unchanged. Probable tiny gallstone coronal image 16. Elevated right hemidiaphragm. Normal-appearing liver, and spleen. No periaortic or pelvic adenopathy.     Mcelroy catheter in the bladder. No acute appearing bowel abnormality.  Postop changes and prominent degenerative changes lumbar spine.                                                     IMPRESSION:   1. Consistent with acute pancreatitis.  2. Two evolving areas of cryoablation in medial upper left kidney and  lateral mid left kidney. No evidence for recurrent tumor.        US 8/24/19  IMPRESSION:   1. Mobile nonshadowing sludge ball or nonshadowing calculus is noted  in the gallbladder. No evidence for acute cholecystitis is identified.  2. Simple right renal cysts.  3. No other significant abnormalities are identified. Please see also  CT abdomen and pelvis report from same date for findings of probable  acute pancreatitis.    ENDOSCOPIC WORKUP  EGD normal in 2012.    IMPRESSION: 79 year old male admitted with NSTEMI, found to have pancreatitis - hx of CAD with stents, diabetes, CKD, RCC, admitted 8/23/19 with elevated troponin and epigastric pain. He has delirium - d/t dilaudid?  He does have cholelithiasis, gallstone pancreatitis suspected. LFTs have been normal other than minimally elevated AST. Surgery is recommending discussion regarding possible lap carmen outpatient once he has recovered from his acute medical conditions given he does not have cholecystitis. 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   Lipase was trending down, but had increased 3 days ago. Now able to verbalize upper abdominal pain over the last 2 days so was made NPO yesterday. Seems to be improving and has no tenderness on exam. Kidney function has improved.  Attempted to obtain CT scan to assess the pancreas for objective data of improvement, but patient was unable to tolerate. Discussed with Dr. Edgar - will start diet -->ADAT to low fat, and monitor.     PLAN/RECOMMENDATIONS:    -Start clears - ADAT to low fat   -Continue PPI BID   -Stool softeners as needed   -Supportive cares/IV fluids      Alina Apodaca, CNP  [facility]   Cell: [phone] until 1PM   After 1PM, please call Dr. Edgar; after 5PM: [phone]

## 2019-08-29 NOTE — PROGRESS NOTES
[facility]  Cardiology Consultation   Date of Admission:  8/23/2019    Assessment & Plan   Vincent Mishra is a 79 year old male who was admitted on 8/23/2019.   More confused and restless today   Ongoing abdominal discomfort    Assessment:     1. NSTEMI     Recent admission to [facility] [place] on 8/6/2019, but left prior to recommended angiogram being completed.  During that admission his troponin peaked at 0.21. now, the peak troponins was 1.1.      He has no chest pain-left epigastric pain      Echo reveals normal EF without wall motion abnormalities.    Has history of PCI to RCA in 2011 and LAD in 2014 with a repeat angiogram in 3-2019 showing 40% in-stent restenosis of the LAD stent.    Not on beta-blockers due to prior bradycardia-heart rate 88-92    Nuclear stress test ordered but could not be completed as patient could not cooperate    Due to restlessness, ischemic work up will be a challenge    2.Acute pancreatitis-  Afebrile overnight    Blood cultures no growth so far   abdominal discomfort   GI made NPO and started IV fluids  Lipase up 1339--1899  Surgery signed off --considering outpatient cholecystectomy.      3. Hypertension -uncontrolled-may be up due to agitation         -on amlodipine 5mg daily and Lisinopril 20mg daily  -increase to amlodipine 10mg daily-watch for edema  -history of significant bradycardia in the past on Labetalol and very low dose Metoprolol        [PTA: amlodipine 5 mg daily, lisinopril 40 mg daily,     4. Hyperlipidemia    [PTA: atorvastatin 80 mg daily]    ast almost twice normal ALT 54-improving  -hold statin for now with acute pancreatitis  Restart later       5. Aortic root and ascending aortic dilatation  mild, stable at 4.1 cm   Continue good BP control    6. CKD creat 0.73 today    7. Hx of renal cancer-cryoablation of two tumors left kidney at [facility] 2012    8. Hypokalemic-ongoing to 2.9  Replete per protocol    Alan Philip     Code  Status    Full Code    Reason for Consult   Reason for consult: NSTEMI    Primary Care Physician   Keyla Fonseca    Physical Exam   Temp: 97.9  F (36.6  C) Temp src: Oral BP: (!) 129/94   Heart Rate: 93 Resp: 18 SpO2: 96 % O2 Device: None (Room air)    Vital Signs with Ranges  Temp:  [97.6  F (36.4  C)-98.7  F (37.1  C)] 97.9  F (36.6  C)  Heart Rate:  [74-93] 93  Resp:  [16-18] 18  BP: (129-160)/(71-94) 129/94  SpO2:  [95 %-98 %] 96 %  195 lbs 11.2 oz    Constitutional:   NAD   Skin:   Warm and dry   Head:   Nontraumatic   Neck:   no JVD   Lungs:   symmetric, clear to auscultation   Cardiovascular:   regular rate and rhythm and normal S1 and S2   Abdomen:   Soft, non tender   Extremities and Back:   No edema   Neurological:   Grossly nonfocal, confused, moving all extremities   Data   Recent Labs   Lab Test 08/29/19  0536 08/27/19  0544  12/11/18  1028 12/04/18  0851   WBC 9.1 9.8   < > 9.4  --    HGB 9.2* 9.1*   < > 8.3*  --    MCV 85 86   < > 79  --     244   < > 445  --    INR  --   --   --  1.16* 1.07    < > = values in this interval not displayed.      Recent Labs   Lab Test 08/29/19 0536 08/28/19  0557    140   POTASSIUM 2.9* 3.2*   CHLORIDE 109 108   BUN 16 25   CR 0.73 0.92     Recent Labs   Lab 08/29/19  0536 08/28/19  0557 08/27/19  0544 08/26/19  0544   * 183* 148* 86     Recent Labs   Lab Test 08/28/19  0557 08/27/19  0544   ALT 67 54   AST 83* 80*     Troponin I ES   Date Value Ref Range Status   08/25/2019 1.121 (HH) 0.000 - 0.045 ug/L Final     Comment:     The 99th percentile for upper reference range is 0.045 ug/L.  Troponin values   in the range of 0.045 - 0.120 ug/L may be associated with risks of adverse   clinical events.  Critical result, provider not notified due to previous critical result   notification.     08/25/2019 1.080 () 0.000 - 0.045 ug/L Final     Comment:     The 99th percentile for upper reference range is 0.045 ug/L.  Troponin values   in the range of  0.045 - 0.120 ug/L may be associated with risks of adverse   clinical events.  Critical result, provider not notified due to previous critical result   notification.     08/24/2019 0.839 (HH) 0.000 - 0.045 ug/L Final     Comment:     The 99th percentile for upper reference range is 0.045 ug/L.  Troponin values   in the range of 0.045 - 0.120 ug/L may be associated with risks of adverse   clinical events.  Critical result, provider not notified due to previous critical result   notification.     08/23/2019 0.627 (HH) 0.000 - 0.045 ug/L Final     Comment:     The 99th percentile for upper reference range is 0.045 ug/L.  Troponin values   in the range of 0.045 - 0.120 ug/L may be associated with risks of adverse   clinical events.  Critical Value called to and read back by  RONI العراقي RN [place] [facility] [id number]      08/22/2019 0.561 (HH) 0.000 - 0.045 ug/L Final     Comment:     Critical Value called to and read back by  DELIA PENALOZA RN ED [id number] 08/22/2019 [id number]         Recent Labs   Lab Test 10/08/14  0640 07/24/14 04/24/12  1038   MAG 1.5* 1.6 1.5*       Lab Results   Component Value Date    CHOL 119 11/20/2018     Lab Results   Component Value Date    HDL 60 11/20/2018     Lab Results   Component Value Date    LDL 43 11/20/2018     Lab Results   Component Value Date    TRIG 78 11/20/2018     Lab Results   Component Value Date    CHOLHDLRATIO 1.9 09/25/2014          TSH   Date Value Ref Range Status   11/20/2018 3.06 0.40 - 4.00 mU/L Final

## 2019-08-29 NOTE — PLAN OF CARE
VSS.  Tele afib with CVR.  Pt very restless all shift,  despite medications. Oxycodone 5mg given x3, also given seroquel prn and trazadone scheduled & PRN slept very little during shift.  Oriented to person and place.  Is following most commands but forgetful and needing lots of redirection.  Used lift to get pt to commode b IVF with D5 continue.  Will continue with current plan of care.

## 2019-08-30 NOTE — PROGRESS NOTES
CLINICAL NUTRITION SERVICES - REASSESSMENT NOTE      RECOMMENDATIONS FOR MD/PROVIDER TO ORDER:   - Consider Post-pyloric FT for alternate means of nutrition (inadeqaute PO x7 days this admission)   Malnutrition: 8/27  % Weight Loss:  Weight loss does not meet criteria for malnutrition   % Intake:  </= 50% for >/= 5 days (severe malnutrition)  Subcutaneous Fat Loss:  Orbital region at least mild depletion  Muscle Loss:  Temporal region at least mild depletion and Clavicle bone region at least mild depletion  Fluid Retention:  Mild 2+     Malnutrition Diagnosis: Non-Severe malnutrition  In Context of:  Acute illness or injury  Chronic illness or disease       EVALUATION OF PROGRESS TOWARD GOALS   Diet:  NPO (ADAT to low fat per GI)  Gelatein Plus TID with meals     Intake/Tolerance:    Has been NPO/CL over the past 3 days.  Per review of meal ordering system and flow sheets, patient with inadequate PO x7 days this admission d/t cognitive status, pancreatitis pain and limited diet.    Chart reviewed. Patient confused and restless with ongoing abdominal discomfort.     Stooling: Receiving Senokot (last BM 8/27)    Medications reviewed: D5 IVF at 75 mL/hr    Recent Labs   Lab 08/30/19  0715 08/30/19  0448 08/29/19  2353 08/29/19  2043 08/29/19  1645 08/29/19  1257 08/29/19  0945 08/29/19  0536  08/28/19  0557  08/27/19  0544  08/26/19  0544  08/25/19  0624   *  --   --   --   --   --   --  172*  --  183*  --  148*  --  86  --  104*   BGM  --  174* 178* 243* 179* 172* 166*  --    < >  --    < >  --    < >  --    < >  --     < > = values in this interval not displayed.       Vitals:    08/23/19 0200 08/26/19 0617 08/28/19 0132 08/29/19 0400   Weight: 81.9 kg (180 lb 9.6 oz) 83 kg (182 lb 14.4 oz) 90.5 kg (199 lb 9.6 oz) 88.8 kg (195 lb 11.2 oz)    08/30/19 0600   Weight: 88.7 kg (195 lb 9.6 oz)       ASSESSED NUTRITION NEEDS:  Dosing Weight 81.9 kg (admit weight)  Estimated Energy Needs: 5244-7483 kcals (25-30  Kcal/Kg)  Justification: maintenance  Estimated Protein Needs: 82-98 grams protein (1-1.2 g pro/Kg)  Justification: Repletion and hypercatabolism with acute illness  Estimated Fluid Needs: 1 mL/Kcal  Justification: per provider pending fluid status      NEW FINDINGS:   Plan Abd CT; Psych consult today    Previous Goals:   Tolerance of diet >FLD vs conversations regarding alternate means of nutrition in the next 2-3 days.  Evaluation: Not met    Previous Nutrition Diagnosis:   Inadequate oral intake related to abdominal pain, altered mental status, diet restriction as evidenced by intakes meeting <50% x5 days since admission, FLD x2 days.   Evaluation: No change      CURRENT NUTRITION DIAGNOSIS  Inadequate protein-energy intake related to abdominal pain, AMS, diet restriction as evidenced by meeting <25-50% needs x7 days this admission     INTERVENTIONS  Recommendations / Nutrition Prescription  ADAT, encouraging PO    If unable to advance diet within 24 hrs, consider post-pyloric FT placement for alternate means of nutrition     Implementation  None at this time    Goals  Diet tolerance >FL vs nutrition support within 24-48 hrs       MONITORING AND EVALUATION:  Progress towards goals will be monitored and evaluated per protocol and Practice Guidelines      Luisana Crowe RD, LD  Clinical Dietitian

## 2019-08-30 NOTE — PLAN OF CARE
Patient A/O to self. Up w/ 2 assist.GB, walker. Sitter at bedside. VSS on RA. NPO since midnight, CT of Abd scheduled for this am.Trazodone given x1 dose w/ no result, continued restlessness. IVF infusing. Mcelroy patent.  and 174. Oxy and tylenol given for C/o pain. Tele: A-fib w/ 1 degree AVB. BLE and BUE skin tears and scabs open to air. Psych consult today. Will continue to monitor.

## 2019-08-30 NOTE — PROGRESS NOTES
[facility]    Medicine Progress Note - Hospitalist Service       Date of Admission:  8/23/2019  Assessment & Plan    Vincent Mishra is a 79 year old male with complex medical history including prior CAD with stenting in 2011 in 2014, type 2 diabetes, hypertension, hyperlipidemia, stage III CKD, renal cell carcinoma, GERD and esophagitis and iron deficiency anemia among others who presented to [facility] with essentially acute on chronic epigastric pain.  Upon evaluation he was found to have a troponin of 0.561 and he was transferred to our facility for cardiology work-up and angiogram.      Abdominal pain, RESOLVED secondary to acute vs chronic pancreatitis   * CT abdomen with contrast done on 8/24/2019 showed acute pancreatitis, awaiting repeat CT scan on 08/30/19.   * Right upper quadrant ultrasound showed cholelithiasis with no CBD obstruction. Outpatient cholecystectomy as per general surgery who were consulted - signed off 8/26    - Currently eating without pain  - Continues on PTA PPI twice daily  - Continues with elevated lipase in the context of clinical improvement, suggestive of some degree of chronic pancreatitis.   - On IVF, tolerating good PO, stop IVF once current bag complete.     Lab Test 08/30/19  0715 08/28/19  0557 08/27/19  0544 08/26/19  0544 08/25/19  0624 08/24/19  0539 08/22/19 2008   ALBUMIN 2.4* 2.6* 2.4* 2.6* 2.9*  --  3.6   BILITOTAL 0.8 1.0 0.9 0.9 0.9  --  0.7   ALT 56 67 54 56 49  --  39   AST 53* 83* 80* 105* 92*  --  45   ALKPHOS 97 97 89 63 62  --  86   PROTEIN  --   --   --   --   --   --   --    LIPASE  --  1,899* 1,339* 752* 1,629* 4,177* 127   INR  --   --   --   --   --   --   --        NSTEMI  History of CAD, PCI in 2014 and 2011  HTN   * EKG no clear ischemic changes. Peak troponin at 1.121 8/25 (steadily increasing). Angiogram on 3/27/2019: 2V CAD with prior stents in mid LAD and mid RCA with 40% in-stent stenosis, unchanged from 2014. Echo this stay  "no significantly changed from echo in 3/2019 nl LVEF nl EF without WMAs   - Continue PTA aspirin, Plavix, amlodipine, lisinopril, on Lipitor   - No BB due to bradycardia  - Started on Plavix this stay    - Started on spironolactone 12.5 mg on 08/30/19.     Underlying Dementia, not previously recognized with   With recurring delirium, behavioral disturbance, possible acute encephalopathy in the context of the the above. No evidence of infection, metabolic disturbance. Remote history of EtOH abuse, sober for many years. No change in chronic pain medications. Head CT negative. Neurology consultation obtained and called most likely this is hospitalization induced delirium in the setting of underlying cognitive impairment. Neurology signed off 8/25  - On 08/30/19, near baseline. Unable to tell me what kind of building he is in. Can not name month or year.   - Psychiatry consult pending regarding medical mgt.   - Has received a lot of PRN seroquel during his stay for agitation, 50 mg x 2 PRN on 8/29.     Urinary retention -   Patient bladder scan showed greater than 500 mL of urine needing Mcelroy placement  - Started on Flomax 0.4 mg on 8/27  - TOV here before discharge. If unable to void, with replace Mcelroy catheter   - Care coordinator consult to arrange f/u in clinic.     Acute kidney injury, mild - RESOLVED   Anion gap metabolic acidosis - RESOLVED - At admission bicarbonate 13, AG 20. Ketones elevated. Lactic acid nl.   Secondary to hypovolemia, starvation ketoacidosis or mild diabetic ketoacidosis?,  In the setting of acute pancreatitis and being n.p.o. and encephalopathy. No recent drinking. BS in the 200s/   Recent Labs   Lab 08/30/19  0715 08/29/19  0536 08/28/19  0557 08/27/19  0544 08/26/19  0544 08/25/19  0624   CR 0.71 0.73 0.92 1.32* 1.37* 1.41*       Hypkalemia   Insomnia  Chronic pain  H/o chronic groin pain: Per 7/2019 clinic note: chronic perineal pain.  \"His exam and previous CTs have been negative.  MRI " "and Xrays demonstrate multilevel degenerative disc disease and bilateral neural foraminal stenosis. Saw Neurosurgery consult but was determined to be a poor surgical candidate given his age and hx of 4 MIs. He plans to try CBD oil. They will look into this on their own.\"   - Continue on PTA OxyContin 30 mg every morning and oxycodone as needed for chronic pain issues  - PTA narcotics continued, PTA trazodone continued      HTN, HL  - Continue norvasc and lisinopril   - resume Lipitor 80mg Po daily at or before discharge     DM2  - PTA metformin held and will continue to hold, since patient just had SHIRA and now received contrast.  - Novolog 3 units with meals added to orders.   - sliding scale insulin changed to QID.   Recent Labs   Lab 08/30/19  1124 08/30/19  0906 08/30/19  0715 08/30/19  0448 08/29/19  2353 08/29/19  2043 08/29/19  1645  08/29/19  0536  08/28/19  0557  08/27/19  0544  08/26/19  0544  08/25/19  0624   GLC  --   --  186*  --   --   --   --   --  172*  --  183*  --  148*  --  86  --  104*   * 182*  --  174* 178* 243* 179*   < >  --    < >  --    < >  --    < >  --    < >  --     < > = values in this interval not displayed.          Diet: Snacks/Supplements Adult: Other; chocolate Boost GLucose Control with meals  (RD); With Meals  Advance Diet as Tolerated: Full Liquid Diet; Low Fat Diet    DVT Prophylaxis: Pneumatic Compression Devices  Mcelroy Catheter: in place, indication: Strict 1-2 Hour I&O  Code Status: Full Code      Disposition Plan   Possibly as early as tomorrow. PT/OT to eval for discharge, OT please perform SLUMS  Awaiting psychiatry and CT resutls.   Social work consulted.     Entered: Shani Best MD 08/30/2019, 7:49 AM       The patient's care was discussed with the Bedside Nurse and Patient.    Shani Best MD  Hospitalist Service  [facility]    ______________________________________________________________________    Interval History   Seen and " examined.  Patient continues to reports somewhat vague abdominal pain as in previous days.  However on exam even with deep palpation does not seem to be tender.  Attempted Lexiscan yesterday which could not be completed due to patient movement and agitation apparently.  GI ordered CT Abdo pelvis with pancreas protocol which also could not be completed.  Patient seems to be perhaps slightly less oriented than yesterday but still better than previous days.  Attempted to speak with wife before she left but was unsuccessful.    Data reviewed today: I reviewed all medications, new labs and imaging results over the last 24 hours. I personally reviewed no images or EKG's today.    Physical Exam   Vital Signs: Temp: 98  F (36.7  C) Temp src: Oral BP: (!) 165/82(BP was taken 3x on both arms. Notified RN) Pulse: 76 Heart Rate: 78 Resp: 18 SpO2: 98 % O2 Device: None (Room air)    Weight: 195 lbs 9.6 oz  Gen: NAD, uncomfortable at times  HEENT: Normocephalic, EOMI, MMM  Resp: no crackles,  no wheezes, no increased work of resp  CV: S1S2 heard, reg rhythm, reg rate, no pedal edema  Abdo: soft, NT/ND nontender on deep palpation, nondistended, bowel sounds present  Ext: calves nontender, well perfused  Neuro:Not able to tell me the year or month, or that he is in a hospital, otherwise, answers questions appropriately, CN grossly intact, no facial asymmetry      Data   Recent Labs   Lab 08/29/19  2030 08/29/19  1559 08/29/19  0536 08/28/19  0557 08/27/19  0544 08/26/19  0544 08/25/19  1729 08/25/19  1401  08/24/19  0539   WBC  --   --  9.1  --  9.8 13.7*  --   --    < > 12.5*   HGB  --   --  9.2*  --  9.1* 9.4*  --   --    < > 11.0*   MCV  --   --  85  --  86 90  --   --    < > 91   PLT  --   --  258  --  244 236  --   --    < > 288   NA  --   --  142 140 136 138  --   --    < >  --    POTASSIUM 3.4 3.2* 2.9* 3.2* 3.4 3.7  --   --    < >  --    CHLORIDE  --   --  109 108 106 105  --   --    < >  --    CO2  --   --  29 24 24 13*  --    --    < >  --    BUN  --   --  16 25 36* 36*  --   --    < >  --    CR  --   --  0.73 0.92 1.32* 1.37*  --   --    < >  --    ANIONGAP  --   --  4 8 6 20*  --   --    < >  --    LIOR  --   --  8.4* 8.5 8.8 8.3*  --   --    < >  --    GLC  --   --  172* 183* 148* 86  --   --    < >  --    ALBUMIN  --   --   --  2.6* 2.4* 2.6*  --   --    < >  --    PROTTOTAL  --   --   --  6.1* 5.6* 5.8*  --   --    < >  --    BILITOTAL  --   --   --  1.0 0.9 0.9  --   --    < >  --    ALKPHOS  --   --   --  97 89 63  --   --    < >  --    ALT  --   --   --  67 54 56  --   --    < >  --    AST  --   --   --  83* 80* 105*  --   --    < >  --    LIPASE  --   --   --  1,899* 1,339* 752*  --   --    < > 4,177*   TROPI  --   --   --   --   --   --  1.121* 1.080*  --  0.839*    < > = values in this interval not displayed.     No results found for this or any previous visit (from the past 24 hour(s)).

## 2019-08-30 NOTE — PROGRESS NOTES
08/30/19 1500   Visit Information   Visit Made By Staff    SPIRITUAL HEALTH SERVICES  Progress Note  [facility] [place] [facility]       visit with pt per length of stay. Pt awake, responsive, pleasant, manifesting lightness, laugh, some freedom to banter, though mostly oriented only to self. Pt exhibited restlessness, picking at his bandages, etc.   Pt responsive in talking a bit about being  to Pat 60 yrs and attending many pow wows. Pt expecting his wife to visit, seemingly forgetting she had already been here this AM and would not be back until tomorrow AM. Pt acknowledges being [place], and that he and his wife met when he was 18 and she was 16 at a pow wow in [facility].     / team available for follow-up spiritual and emotional support as requested by pt/family/as needed.           JUDSON Barnett.Laquita  Staff   Pager [phone]

## 2019-08-30 NOTE — CONSULTS
Consult Date:  08/30/2019      REASON FOR CONSULTATION:  Delirium, question of baseline cognitive deficits, admitted with NSTEMI and pancreatitis.      REQUESTING CLINICIAN:  Jayy Chow MD.      CHIEF COMPLAINT:  None.      HISTORY OF PRESENT ILLNESS:  Vincent Mishra is a 79-year-old male with no known formal psychiatric history, though inquiries and possible baseline dementing illness that was admitted to the hospital after presenting with acute on chronic epigastric pain.  Laboratory investigation demonstrated troponin elevations at 0.561 and was transferred to [facility] for Cardiology admission and workup.  Cardiology has been following and their current considerations are likely type 2 myocardial ischemia, though they have not excluded NSTEMI.  They discussed with the wife and the patient pros and cons of further workup and both prefer conservative treatment.  GI has been involved as well for acute pancreatitis and he is receiving narcotic pain medication for management of this indication.  Since being in the hospital; however, he has demonstrated symptoms consistent with delirium, noting increasing behavioral disruption in the context of hospitalization.  Neurology saw the patient as well and notes that he had no prior diagnostic history of dementia, though some episodes of confusion were reported by his wife.      In response to episodic restlessness and confusion in the hospital, he has received antipsychotic intervention.  He has received typically Seroquel 50 mg b.i.d. along with olanzapine every morning at 5 mg.  These medications were being utilized in a p.r.n. fashion.  Most recently he received olanzapine this morning for restlessness, per nursing, and that he was not following commands.  They he was up all night as well.  Of note, the patient has received anywhere from 300-500 mg of trazodone in the evening in conjunction with other prior psychotropics to include Sertraline 200 mg daily.  On my  interview with the patient, he still remains not fully aware of circumstances.  He tells me that he is in [place], though recognizes he is in the hospital.  He was disoriented to circumstance and year.  He is not aware why he is in the hospital.  He tended to perseverate on abdominal discomfort and bowel movements throughout our interaction.  I made efforts to contact his wife, Mima, through the phone numbers in the EMR, though he was not available for further collateral history.  Per the primary team, the disposition is likely return to home with his wife once medically stabilized.      PAST PSYCHIATRIC HISTORY:  As per HPI.      PAST MEDICAL HISTORY:  Coronary artery disease with stenting in 2011 and 2014, diabetes type 2, hypertension, hyperlipidemia, stage III chronic kidney disease, renal cell carcinoma, GERD, esophagitis, iron deficiency anemia.      FAMILY HISTORY:  Unable to obtain from patient secondary to mental status.      SOCIAL HISTORY:  Unable to gather from the patient, though he is  and lives with his wife, per nursing.      ALLERGIES:  PROZAC (I WENT CRAZY), PER EMR BENADRYL, CODEINE, DIPHENHYDRAMINE, LABETALOL, METOPROLOL, MORPHINE.      PRIOR TO ADMISSION MEDICATIONS:  Acetaminophen, amlodipine, Ascorbic acid, aspirin, atorvastatin, calcium carbonate, cholecalciferol, clopidogrel, Cyanocobalamin, famotidine, ferrous sulfate, hydrochlorothiazide, ibuprofen, lisinopril, metformin, nitroglycerin, omeprazole, oxycodone, polyethylene glycol, sertraline 200 mg daily, sucralfate, Trazodone 300 mg at bedtime.      MENTAL STATUS EXAMINATION:  Elderly appearing male with poor dentition, accompanied by nursing at bedside.  The patient was pleasant, but not participating in interaction in a meaningful fashion.  He is oriented to person and place, though disoriented to time and situation.  No evidence of active response to internal stimuli.  Behaviorally calm and pleasant.  Insight and judgment are  limited.      VITAL SIGNS:  Temperature 98, pulse 77, respirations 16, blood pressure 132/79, oxygen saturation 99% on room air.      DIAGNOSES:   1.  Delirium.    2.  Rule out primary dementing illness.   3.  Multiple medical comorbidities.      IMPRESSION:  Vincent Mishra is a 79-year-old male who is on psychotropic medication, though has no formal past diagnoses of dementia that was admitted for complaints of acute on chronic epigastric pain.  Workup revealed likely contribution of pancreatitis and potential cardiac event secondary to elevated troponins.  He continues on the Cardiology Service at present, though our service was consulted for some episodic confusion requiring antipsychotic administration for behavioral control in the hospital.  He has been receiving varied doses of Seroquel as well as low olanzapine on top of his prior to admission regimen of trazodone and Zoloft.  I attempted to contact his wife for more clarity on his history without success.  The primary team has observed some improvement in sensorium and behaviors over the course of his hospitalization, though sleep still remains an active challenge.  For now we will simplify medications a bit.  We will discontinue the Seroquel and focus on optimizing the olanzapine for now.  He is on maximum dose of Zoloft and has received rather high doses of trazodone in the 300 to 500 mg range.  This combination is not the ideal, so I have discontinued the second p.r.n. 200 mg dose of trazodone and will hold steady at the 300 mg for now, though this may be a target for further reduction and replacement with an alternative in the future.  In lieu of this, we will incorporate scheduled olanzapine at night with continued allowance of PRNs during the day.      PLAN:   1.  Discontinue Seroquel.   2.  Adjust olanzapine as follows:  Scheduled 2.5 mg at bedtime with allowance of 2.5 mg b.i.d. p.r.n.   3.  Will discontinue p.r.n. trazodone dosing of 200 mg thus  restricting current dosing to 300 mg at night; at this dose in conjunction with his high-dose SSRI, would like to see further reduction and possible replacement with an alternative the future (could consider mirtazapine versus antipsychotic pending response).   4.  Reconsult Psychiatry.         CEFERINO LOGAN DO             D: 2019   T: 2019   MT: JOLENE      Name:     ISELA AGUIRRE   MRN:      [id number]        Account:       [id number]   :      1940           Consult Date:  2019      Document: [id number]

## 2019-08-30 NOTE — PROGRESS NOTES
[facility]  Cardiology Consultation   Date of Admission:  8/23/2019    Assessment & Plan   Vincent Mishra is a 79 year old male who was admitted on 8/23/2019.   More confused and restless today   Ongoing abdominal discomfort    Assessment:     1. NSTEMI -likely Type II demand    Recent admission to [facility] in [place] on 8/6/2019, but left prior to recommended angiogram being completed.  During that admission his troponin peaked at 0.21. now, the peak troponins was 1.1.      He has no chest pain-left epigastric pain      Echo reveals normal EF without wall motion abnormalities.    Has history of PCI to RCA in 2011 and LAD in 2014 with a repeat angiogram in 3-2019 showing 40% in-stent restenosis of the LAD stent.    Not on beta-blockers due to prior bradycardia-heart rate 88-92    Nuclear stress test ordered but could not be completed as patient could not cooperate    Due to restlessness, ischemic work up will be a challenge    PLAN: medically manage; added plavix/continue ASA    Optimize BP-see below    2.Acute pancreatitis-  Afebrile     Blood cultures no growth so far   abdominal discomfort   GI made NPO and started IV fluids and now NPO for attempt at repeat CT scan  Lipase up 1339--1899  Surgery signed off --considering outpatient cholecystectomy.      3. Hypertension -uncontrolled-may be up due to agitation   on amlodipine 10 mg daily and Lisinopril 20mg daily  Given low potassiums and high BP and normal renal function, I will add aldcatone 12.5mg daily    Try to stop potassium for now  Check BMP in am    -history of significant bradycardia in the past on Labetalol and very low dose Metoprolol        [PTA: amlodipine 5 mg daily, lisinopril 40 mg daily}    4. Hyperlipidemia    [PTA: atorvastatin 80 mg daily]    ast almost twice normal ALT 54-improving  -hold statin for now with acute pancreatitis  Restart later       5. Aortic root and ascending aortic dilatation  mild, stable at 4.1 cm    Continue good BP control    6. CKD creat 0.71 today    7. Hx of renal cancer-cryoablation of two tumors left kidney at [facility] 2012    8. Hypokalemic-3.7 today after replacements  Replete per protocol as needed    Alan Philip     Code Status    Full Code    Reason for Consult   Reason for consult: NSTEMI    Primary Care Physician   Keyla Fonseca    Physical Exam   Temp: 98  F (36.7  C) Temp src: Oral BP: (!) 165/82(BP was taken 3x on both arms. Notified RN) Pulse: 76 Heart Rate: 78 Resp: 18 SpO2: 98 % O2 Device: None (Room air)    Vital Signs with Ranges  Temp:  [97.8  F (36.6  C)-98.3  F (36.8  C)] 98  F (36.7  C)  Pulse:  [73-77] 76  Heart Rate:  [70-78] 78  Resp:  [16-18] 18  BP: (147-165)/(72-82) 165/82  SpO2:  [95 %-98 %] 98 %  195 lbs 9.6 oz    Constitutional:   NAD   Skin:   Warm and dry   Head:   Nontraumatic   Neck:   no JVD   Lungs:   symmetric, clear to auscultation   Cardiovascular:   regular rate and rhythm and normal S1 and S2   Abdomen:   Soft, non tender   Extremities and Back:   No edema   Neurological:   Grossly nonfocal, confused, moving all extremities   Data   Recent Labs   Lab Test 08/29/19 0536 08/27/19  0544  12/11/18  1028 12/04/18  0851   WBC 9.1 9.8   < > 9.4  --    HGB 9.2* 9.1*   < > 8.3*  --    MCV 85 86   < > 79  --     244   < > 445  --    INR  --   --   --  1.16* 1.07    < > = values in this interval not displayed.      Recent Labs   Lab Test 08/30/19 0715 08/29/19 2030 08/29/19 0536     --   --  142   POTASSIUM 3.7 3.4   < > 2.9*   CHLORIDE 107  --   --  109   BUN 11  --   --  16   CR 0.71  --   --  0.73    < > = values in this interval not displayed.     Recent Labs   Lab 08/30/19 0715 08/29/19  0536 08/28/19  0557 08/27/19  0544   * 172* 183* 148*     Recent Labs   Lab Test 08/30/19  0715 08/28/19  0557   ALT 56 67   AST 53* 83*     Troponin I ES   Date Value Ref Range Status   08/25/2019 1.121 () 0.000 - 0.045 ug/L Final     Comment:      The 99th percentile for upper reference range is 0.045 ug/L.  Troponin values   in the range of 0.045 - 0.120 ug/L may be associated with risks of adverse   clinical events.  Critical result, provider not notified due to previous critical result   notification.     08/25/2019 1.080 (HH) 0.000 - 0.045 ug/L Final     Comment:     The 99th percentile for upper reference range is 0.045 ug/L.  Troponin values   in the range of 0.045 - 0.120 ug/L may be associated with risks of adverse   clinical events.  Critical result, provider not notified due to previous critical result   notification.     08/24/2019 0.839 (HH) 0.000 - 0.045 ug/L Final     Comment:     The 99th percentile for upper reference range is 0.045 ug/L.  Troponin values   in the range of 0.045 - 0.120 ug/L may be associated with risks of adverse   clinical events.  Critical result, provider not notified due to previous critical result   notification.     08/23/2019 0.627 (HH) 0.000 - 0.045 ug/L Final     Comment:     The 99th percentile for upper reference range is 0.045 ug/L.  Troponin values   in the range of 0.045 - 0.120 ug/L may be associated with risks of adverse   clinical events.  Critical Value called to and read back by  RONI العراقي RN IN [facility] 0646 RK     08/22/2019 0.561 (HH) 0.000 - 0.045 ug/L Final     Comment:     Critical Value called to and read back by  DELIA PENALOZA RN ED 2045 08/22/2019 JK [id number]         Recent Labs   Lab Test 08/30/19  0715 10/08/14  0640 07/24/14   MAG 1.6 1.5* 1.6       Lab Results   Component Value Date    CHOL 119 11/20/2018     Lab Results   Component Value Date    HDL 60 11/20/2018     Lab Results   Component Value Date    LDL 43 11/20/2018     Lab Results   Component Value Date    TRIG 78 11/20/2018     Lab Results   Component Value Date    CHOLHDLRATIO 1.9 09/25/2014          TSH   Date Value Ref Range Status   11/20/2018 3.06 0.40 - 4.00 mU/L Final                       [facility]  Cardiology  Consultation   Date of Admission:  8/23/2019    Assessment & Plan   Vincent Mishra is a 79 year old male who was admitted on 8/23/2019.   More confused and restless today   Ongoing abdominal discomfort    Assessment:     2. NSTEMI     Recent admission to [facility] in [place] on 8/6/2019, but left prior to recommended angiogram being completed.  During that admission his troponin peaked at 0.21. now, the peak troponins was 1.1.      He has no chest pain-left epigastric pain      Echo reveals normal EF without wall motion abnormalities.    Has history of PCI to RCA in 2011 and LAD in 2014 with a repeat angiogram in 3-2019 showing 40% in-stent restenosis of the LAD stent.    Not on beta-blockers due to prior bradycardia-heart rate 88-92    Nuclear stress test ordered but could not be completed as patient could not cooperate    Due to restlessness, ischemic work up will be a challenge    2.Acute pancreatitis-  Afebrile overnight    Blood cultures no growth so far   abdominal discomfort   GI made NPO and started IV fluids  Lipase up 1339--1899  Surgery signed off --considering outpatient cholecystectomy.      3. Hypertension -uncontrolled-may be up due to agitation         -on amlodipine 5mg daily and Lisinopril 20mg daily  -increase to amlodipine 10mg daily-watch for edema  -history of significant bradycardia in the past on Labetalol and very low dose Metoprolol        [PTA: amlodipine 5 mg daily, lisinopril 40 mg daily,     4. Hyperlipidemia    [PTA: atorvastatin 80 mg daily]    ast almost twice normal ALT 54-improving  -hold statin for now with acute pancreatitis  Restart later       5. Aortic root and ascending aortic dilatation  mild, stable at 4.1 cm   Continue good BP control    6. CKD creat 0.73 today    7. Hx of renal cancer-cryoablation of two tumors left kidney at [facility] 2012    8. Hypokalemic-ongoing to 2.9  Replete per protocol    Alan Philip     Code Status    Full Code    Reason for  Consult   Reason for consult: NSTEMI    Primary Care Physician   Keyla Fonseca    Physical Exam   Temp: 98  F (36.7  C) Temp src: Oral BP: (!) 165/82(BP was taken 3x on both arms. Notified RN) Pulse: 76 Heart Rate: 78 Resp: 18 SpO2: 98 % O2 Device: None (Room air)    Vital Signs with Ranges  Temp:  [97.8  F (36.6  C)-98.3  F (36.8  C)] 98  F (36.7  C)  Pulse:  [73-77] 76  Heart Rate:  [70-78] 78  Resp:  [16-18] 18  BP: (147-165)/(72-82) 165/82  SpO2:  [95 %-98 %] 98 %  195 lbs 9.6 oz    Constitutional:   NAD   Skin:   Warm and dry   Head:   Nontraumatic   Neck:   no JVD   Lungs:   symmetric, clear to auscultation   Cardiovascular:   regular rate and rhythm and normal S1 and S2   Abdomen:   Soft, non tender   Extremities and Back:   No edema   Neurological:   Grossly nonfocal, confused, moving all extremities   Data   Recent Labs   Lab Test 08/29/19 0536 08/27/19  0544  12/11/18  1028 12/04/18  0851   WBC 9.1 9.8   < > 9.4  --    HGB 9.2* 9.1*   < > 8.3*  --    MCV 85 86   < > 79  --     244   < > 445  --    INR  --   --   --  1.16* 1.07    < > = values in this interval not displayed.      Recent Labs   Lab Test 08/30/19 0715 08/29/19  2030  08/29/19 0536     --   --  142   POTASSIUM 3.7 3.4   < > 2.9*   CHLORIDE 107  --   --  109   BUN 11  --   --  16   CR 0.71  --   --  0.73    < > = values in this interval not displayed.     Recent Labs   Lab 08/30/19 0715 08/29/19  0536 08/28/19  0557 08/27/19  0544   * 172* 183* 148*     Recent Labs   Lab Test 08/30/19 0715 08/28/19  0557   ALT 56 67   AST 53* 83*     Troponin I ES   Date Value Ref Range Status   08/25/2019 1.121 (HH) 0.000 - 0.045 ug/L Final     Comment:     The 99th percentile for upper reference range is 0.045 ug/L.  Troponin values   in the range of 0.045 - 0.120 ug/L may be associated with risks of adverse   clinical events.  Critical result, provider not notified due to previous critical result   notification.     08/25/2019  1.080 (HH) 0.000 - 0.045 ug/L Final     Comment:     The 99th percentile for upper reference range is 0.045 ug/L.  Troponin values   in the range of 0.045 - 0.120 ug/L may be associated with risks of adverse   clinical events.  Critical result, provider not notified due to previous critical result   notification.     08/24/2019 0.839 (HH) 0.000 - 0.045 ug/L Final     Comment:     The 99th percentile for upper reference range is 0.045 ug/L.  Troponin values   in the range of 0.045 - 0.120 ug/L may be associated with risks of adverse   clinical events.  Critical result, provider not notified due to previous critical result   notification.     08/23/2019 0.627 (HH) 0.000 - 0.045 ug/L Final     Comment:     The 99th percentile for upper reference range is 0.045 ug/L.  Troponin values   in the range of 0.045 - 0.120 ug/L may be associated with risks of adverse   clinical events.  Critical Value called to and read back by  RONI العراقي RN IN [facility] 0646 RK     08/22/2019 0.561 (HH) 0.000 - 0.045 ug/L Final     Comment:     Critical Value called to and read back by  DELIA PENALOZA RN ED 2045 08/22/2019 JK [email or web address]         Recent Labs   Lab Test 08/30/19  0715 10/08/14  0640 07/24/14   MAG 1.6 1.5* 1.6       Lab Results   Component Value Date    CHOL 119 11/20/2018     Lab Results   Component Value Date    HDL 60 11/20/2018     Lab Results   Component Value Date    LDL 43 11/20/2018     Lab Results   Component Value Date    TRIG 78 11/20/2018     Lab Results   Component Value Date    CHOLHDLRATIO 1.9 09/25/2014          TSH   Date Value Ref Range Status   11/20/2018 3.06 0.40 - 4.00 mU/L Final

## 2019-08-30 NOTE — PLAN OF CARE
OT:Orders received and chart reviewed. Pt. admitted due to pancreatitis+ NSTEMI. Per nursing/chart, pt. resides w/spouse and daughter, has h/o dementia. Per nursing, hold on evaluation this date. Pt. continues to be confused, restless, not appropriate for OT intervention at this time. Will reschedule.    PT: See OT note above. Will hold PT evaluation today and reschedule.

## 2019-08-30 NOTE — PLAN OF CARE
A&O to self. VSS on JOE Mcelroy patent. K+ recheck was 3.4.at 2100. Blood glucose monitoring. Full liquid diet. Up with Assist of 2/lift to BSC. c/o low back ache. Repositioned  frequently and applied heat with some relief. Continue to be delirium and restless requiring Sitter at bedside.Seroquil g iven at bedtime  NPO in the AM for the CT abdomen. 500 cc to hater to be given 15 mins prior to procedure.Will continue to monitor.

## 2019-08-30 NOTE — PROGRESS NOTES
"GASTROENTEROLOGY PROGRESS NOTE     SUBJECTIVE: Still confused, but appears less restless, improved over the course of the week. Complains of pain in left upper quadrant and groin. No tenderness on palpation. Is NPO currently, thirsty and hungry. States he can't think.      OBJECTIVE:     BP (!) 165/82 (BP Location: Left arm)   Pulse 76   Temp 98  F (36.7  C) (Oral)   Resp 18   Ht 1.727 m (5' 8\")   Wt 88.7 kg (195 lb 9.6 oz)   SpO2 98%   BMI 29.74 kg/m     Temp (24hrs), Av.9  F (36.6  C), Min:97.9  F (36.6  C), Max:97.9  F (36.6  C)     Patient Vitals for the past 72 hrs:   Weight   19 0600 88.7 kg (195 lb 9.6 oz)   19 0400 88.8 kg (195 lb 11.2 oz)   19 0132 90.5 kg (199 lb 9.6 oz)        Intake/Output Summary (Last 24 hours) at 2019 1116  Last data filed at 2019 1016  Gross per 24 hour   Intake 660 ml   Output 750 ml   Net -90 ml      PHYSICAL EXAM   Constitutional: In bed  Cardiovascular: Iregular rhythm  Respiratory: Clear to auscultation bilaterally  Abdomen: Soft, no tenderness, non-distended, normally active bowel sounds.    I have reviewed the patient's new clinical lab results:   Recent Labs   Lab Test 19  0536 19  0544 19  0544  18  1028 18  0851  17  1410   WBC 9.1 9.8 13.7*   < > 9.4  --    < > 6.2   HGB 9.2* 9.1* 9.4*   < > 8.3*  --    < > 10.9*   MCV 85 86 90   < > 79  --    < > 83    244 236   < > 445  --    < > 312   INR  --   --   --   --  1.16* 1.07  --  0.96    < > = values in this interval not displayed.      Recent Labs   Lab Test 19  0715 19  2030 19  1559 19  0536 19  0557     --   --  142 140   POTASSIUM 3.7 3.4 3.2* 2.9* 3.2*   CHLORIDE 107  --   --  109 108   CO2 28  --   --  29 24   BUN 11  --   --  16 25   CR 0.71  --   --  0.73 0.92   ANIONGAP 6  --   --  4 8   LIOR 8.2*  --   --  8.4* 8.5      Recent Labs   Lab Test 19  0557 19  0544 19  0544  " 08/21/19  1101  12/11/18  1856  10/16/18  1018   ALBUMIN 2.6* 2.4* 2.6*   < >  --    < >  --    < >  --    BILITOTAL 1.0 0.9 0.9   < >  --    < >  --    < >  --    ALT 67 54 56   < >  --    < >  --    < >  --    AST 83* 80* 105*   < >  --    < >  --    < >  --    ALKPHOS 97 89 63   < >  --    < >  --    < >  --    PROTEIN  --   --   --   --  Negative  --  10*  --  Negative   LIPASE 1,899* 1,339* 752*   < >  --    < >  --   --   --     < > = values in this interval not displayed.        IMAGING   CT 8/24/19  FINDINGS:   Abdomen and Pelvis: Minimal atelectasis at the lung bases. There is increased attenuation of upper abdominal and peripancreatic fat and peripancreatic fluid as well as fluid in the left upper quadrant adjacent to spleen and splenic flexure of the colon. Findings consistent with acute pancreatitis. Clinical correlation. No evidence for pancreatic necrosis or loculated fluid collection.     Redemonstrated cryoablation changes along the lateral mid left kidney with partially calcified and partially fat attenuation surrounding the central 1.3 cm nodular area without significant change since 7/12/2019. Larger 4.5 cm low-attenuation area along the medial superior left kidney identified. There is a previous heterogeneous mass in the upper left kidney on earlier 10/5/2018 CT abdomen and this is consistent with evolving second area of cryoablation. Clinical correlation.     Redemonstrated right renal cysts. Indeterminate right adrenal nodule 1.5 cm in size is stable. Oval 2.4 x 1.7 cm left adrenal nodule is unchanged. Probable tiny gallstone coronal image 16. Elevated right hemidiaphragm. Normal-appearing liver, and spleen. No periaortic or pelvic adenopathy.     Mcelroy catheter in the bladder. No acute appearing bowel abnormality.  Postop changes and prominent degenerative changes lumbar spine.                                                     IMPRESSION:   1. Consistent with acute pancreatitis.  2. Two  evolving areas of cryoablation in medial upper left kidney and  lateral mid left kidney. No evidence for recurrent tumor.       US 8/24/19  IMPRESSION:   1. Mobile nonshadowing sludge ball or nonshadowing calculus is noted  in the gallbladder. No evidence for acute cholecystitis is identified.  2. Simple right renal cysts.  3. No other significant abnormalities are identified. Please see also  CT abdomen and pelvis report from same date for findings of probable  acute pancreatitis.    ENDOSCOPIC WORKUP  EGD normal in 2012.    IMPRESSION: 79 year old male admitted with NSTEMI, found to have pancreatitis - hx of CAD with stents, diabetes, CKD, RCC, admitted 8/23/19 with elevated troponin and epigastric pain. He has delirium - d/t dilaudid?  He does have cholelithiasis, gallstone pancreatitis suspected. LFTs have been normal other than minimally elevated AST. Surgery is recommending discussion regarding possible lap carmen outpatient once he has recovered from his acute medical conditions given he does not have cholecystitis. 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   Lipase was trending down, but had increased 3 days ago. Now able to verbalize upper abdominal pain over the last 3 days so was made NPO 3 days ago. Pain moves around the abdomen. Seems to be overall improving and has no tenderness on exam. Kidney function has improved.  Attempted to obtain CT scan 8/29 to assess the pancreas for objective data of improvement, but patient was unable to tolerate.   Plan 8/29 was to start diet -->ADAT to low fat, and monitor. He tolerated full liquids without apparent increase in pain.  Discussed with nursing staff. OK to attempt CT again, but if he cannot comply, would not use any sedatives to obtain imaging, but would advance diet and monitor.     PLAN/RECOMMENDATIONS:    -He was made NPO to attempt CT again today   -OK to advance diet to low fat after   -Continue PPI BID   -Stool softeners as  needed   -Supportive cares/IV fluids      Alina Apodaca CNP  [facility]   Cell: [phone] until 1PM   After 1PM, please call Dr. Edgar; after 5PM: [phone]

## 2019-08-30 NOTE — PLAN OF CARE
Patient alert to self.  Forgetful and confused.  VSS on room air, BP elevated.  Lungs diminished.  NPO for CT of abdomen, patient was able to cooperate.  Zyprexa given for agitation this morning.  Also given scheduled oxycontin and PRN oxycodone for pain in abdomen and groin rated 6-10/10.  Patient reported relief for awhile but by 1400 was restless again, another dose of zyprexa and oxycodone given.  Skin is samantha with multiple bruises and scabbing as he picks until he bleeds.  He feels like he needs to have a BM, stool softener given, sat on BSC with assist 2 and lift, passing flatus.  Mcelroy catheter removed at 1230.  DTV.  He was able to tolerate full liquid dist, ADAT diet to low fat.  Wife at bedside, plan is to discharge to home tomorrow with her.

## 2019-08-31 NOTE — PLAN OF CARE
Pt is alert to self only. Confused and forgetful. VSS on RA except elevated BP at times. Tele SR w/ 1st AVB and occasional PVCs. C/o chest pain 8/10-relieved w/ sub nitroglycerin x1. EKG done-no change from last EKG. Restless and agitated at times. Gave scheduled and prn Zyprexa x1 for agitation. Gave prn oxy x1 for lower back/abdomen/groin pain. Bladder scanned max 490ml. Pt unable to void. Straight cath 450ml at 0130. Pt then voided 300cc at 0430. Last bladder scanned 85ml at 0500. Passing flatus, no stool this shift. Tolerating diet. No N&V.  and 179. Up w/ mechanical lift. Multiple scabs and small skin tears to BUE and BLE-covered w/ mepilex-CDI. Pt likes to pick skin and take off dressings. Plan is OT and PT eval today. Sitter at bedside for safety. Continue to monitor.

## 2019-08-31 NOTE — PROGRESS NOTES
Restless, notified by sitter pt continuously attempting to pull at catheter. PRN zyprexa given for agitation.

## 2019-08-31 NOTE — PROGRESS NOTES
"[facility]    Fall Note  8/31/2019   Time Called: 3: 34 PM    Assessment & Plan   Fall, lower to floor by sitter  Called to evaluate Mr. Mishra after he attempted to stand up from chair. His legs became very weak and sitter was able to gently lower to floor without fall and without obvious injury. He offers no specific complaints at this time.     INTERVENTIONS:  - injuries are not always readily apparent at the time of fall, please call a House Officer is injuries are noted     Discussed with and defer further cares to Dr. Best, Hospitalist.     Code Status: Full Code     Nimco Khan, APRN, CNP  Hospitalist Service, House Officer  [facility]     Text Page  Pager: [phone]    Allergies   Allergies   Allergen Reactions     Prozac [Fluoxetine] Other (See Comments)     \"I went crazy.\"       Benadryl [Diphenhydramine Hcl] Other (See Comments)     Starts to shake, and paranoid     Codeine Itching     Diphenhydramine Visual Disturbance     Hallucinations, See [facility] records scanned on 7/16/15     Labetalol Other (See Comments)     See [facility] records scanned on 7/16/15  Bradycardia down to 30 on holter, dizziness. Stopped med and symptoms resolved     Metoprolol Other (See Comments)     Bradycardia even at 12.5 mg     Morphine Visual Disturbance       Physical Exam   Vital Signs with Ranges:  Temp:  [97.4  F (36.3  C)-98.8  F (37.1  C)] 98.3  F (36.8  C)  Pulse:  [70-75] 70  Heart Rate:  [73-81] 73  Resp:  [16-20] 18  BP: (117-155)/(68-87) 117/84  SpO2:  [96 %-98 %] 97 %  I/O last 3 completed shifts:  In: 2426 [P.O.:690; I.V.:1736]  Out: 1850 [Urine:1850]    Constitutional: 79- year old male sitting in chair without obvious acute distress.   Pulmonary: He is not hypoxic. No obvious respiratory distress.   Cardiovascular: Warm, appears well perfused.   Skin/Integumen: No obvious injuries on exposed skin.   Neuro: Awake, " alert, oriented to person which is not new.   Psych:  Calm, cooperative at this time.   Extremities: Moves all extremities.     CBC with Diff:  Recent Labs   Lab Test 08/29/19  0536  12/11/18  1028   WBC 9.1   < > 9.4   HGB 9.2*   < > 8.3*   MCV 85   < > 79      < > 445   INR  --   --  1.16*    < > = values in this interval not displayed.        Comprehensive Metabolic Panel:  Recent Labs   Lab 08/31/19  0534 08/30/19  0715    141   POTASSIUM 3.6 3.7   CHLORIDE 106 107   CO2 30 28   ANIONGAP 5 6   * 186*   BUN 12 11   CR 0.69 0.71   GFRESTIMATED >90 89   GFRESTBLACK >90 >90   LIOR 8.4* 8.2*   MAG  --  1.6   PROTTOTAL  --  5.7*   ALBUMIN  --  2.4*   BILITOTAL  --  0.8   ALKPHOS  --  97   AST  --  53*   ALT  --  56       INR:    Recent Labs   Lab Test 12/11/18  1028   INR 1.16*     Time Spent on this Encounter   I spent 20 minutes on the unit/floor managing the care of Vincent Mishra. Over 50% of my time was spent counseling the patient and/or coordinating care regarding services listed in this note.

## 2019-08-31 NOTE — PLAN OF CARE
Discharge Planner OT   Patient plan for discharge: family wants to take pt back home but unable to care for pt at his current level and interested in TCU placement     Current status: order received, chart reviewed, eval completed, tx initiated. Pt admitted with increased confusion found to have NSTEMI and acute pancreatitis. Pt lives in house with spouse and other family members. Pt was able to walk from bed > bathroom with use of walker. Pt was using w/c for community mobility. Pt has 24/7 supervision and A at home. Spouse A with all ADL's but pt was able to feed himself and toilet himself. Spouse A with bathing and dressing.     Sitter reports she just transferred him with ceiling lift to chair with A x 2.  Pt not appropriate for cognitive testing this date as he is severely confused and only following about 50% of commands with multiple cues. OT facilitated sit <> stand transfers in prep for BSC transfer. Pt required max A x 2 for sit <> stand with max cues for posture and hand placement. Pt very kyphotic in posture unable to achieve full uprigth posture. Pt attempted sit <> stand x 8 attempts with max A and max cues for sequencing of steps. Did demonstrate progress with sit <> stand but still not able to obtain errect standing posture. Very fearful of falling. Per discussion with family, unable to take pt home in this condition. Pt requiring use of ceiling lift for all transfers at this time.    Barriers to return to prior living situation: A x 2, cognition, not at baseline mobility, family does not have lift equipment to support pt in current condition     Recommendations for discharge: TCU     Rationale for recommendations: Pt would benefit from continued skilled OT services to improve independence and safety with ADL's and functional transfers as pt is not at baseline.            Entered by: Genesis Silva 08/31/2019 11:26 AM

## 2019-08-31 NOTE — PROGRESS NOTES
08/31/19 1052   Quick Adds   Type of Visit Initial Occupational Therapy Evaluation   Living Environment   Lives With child(adán), dependent;child(adán), adult;spouse   Living Arrangements house   Transportation Anticipated family or friend will provide   Living Environment Comment lives in house with spouse and children. spouse A with all ADL's- shower, dressing. pt can feed himself.    Self-Care   Usual Activity Tolerance fair   Current Activity Tolerance poor   Regular Exercise No   Equipment Currently Used at Home wheelchair, manual;walker, standard;shower chair;crutches   Activity/Exercise/Self-Care Comment was able to walk a few feet with AE but uses w/c for community mobility    Functional Level   Ambulation 3-->assistive equipment and person   Transferring 3-->assistive equipment and person   Toileting 3-->assistive equipment and person   Bathing 3-->assistive equipment and person   Dressing 2-->assistive person   Eating 0-->independent   Communication 2-->difficulty understanding (not related to language barrier)   Swallowing 0-->swallows foods/liquids without difficulty   Cognition 1 - attention or memory deficits   Fall history within last six months yes   Which of the above functional risks had a recent onset or change? ambulation;transferring;toileting;cognition   Prior Functional Level Comment per family- pt has 24/7 A/supervision with all ADL/IADLs and transfrs   General Information   Onset of Illness/Injury or Date of Surgery - Date 08/23/19   Referring Physician Shani Best MD   Patient/Family Goals Statement family open to TCU    Additional Occupational Profile Info/Pertinent History of Current Problem admitted for confusion found to have NSTEMI and acute pancreatitis    Precautions/Limitations fall precautions   Cognitive Status Examination   Orientation person   Level of Consciousness lethargic/somnolent   Follows Commands (Cognition) follows one step commands;25-49% accuracy   Memory  impaired   Attention Distractible during evaluation   Organization/Problem Solving Sequencing impaired;Problem solving impaired   Executive Function Impulsive   Visual Perception   Visual Perception No deficits were identified   Sensory Examination   Sensory Quick Adds No deficits were identified   Pain Assessment   Patient Currently in Pain No   Posture   Posture kyphosis   Range of Motion (ROM)   ROM Comment not tested due to limited ability to follow commands   Strength   Strength Comments not tested due to limited ability to follow commands   Muscle Tone Assessment   Muscle Tone Quick Adds No deficits were identified   Transfer Skill: Sit to Stand   Level of [place]: Sit/Stand maximum assist (25% patients effort)   Physical Assist/Nonphysical Assist: Sit/Stand 2 persons   Transfer Skill: Toilet Transfer   Level of [place]: Toilet unable to perform   Upper Body Dressing   Level of [place]: Dress Upper Body maximum assist (25% patients effort)   Lower Body Dressing   Level of [place]: Dress Lower Body maximum assist (25% patients effort)   Toileting   Level of [place]: Toilet dependent (less than 25% patients effort)   Grooming   Level of [place]: Grooming maximum assist (25% patients effort)   Eating/Self Feeding   Level of [place]: Eating moderate assist (50% patients effort)   Instrumental Activities of Daily Living (IADL)   IADL Comments family A with IADL's   Activities of Daily Living Analysis   Impairments Contributing to Impaired Activities of Daily Living cognition impaired;balance impaired;pain;ROM decreased;strength decreased   General Therapy Interventions   Planned Therapy Interventions ADL retraining;transfer training;cognition   Clinical Impression   Criteria for Skilled Therapeutic Interventions Met yes, treatment indicated   OT Diagnosis dec IND with ADL's and transfers   Influenced by the following impairments dec strength, balance, and impaired cognition  "  Assessment of Occupational Performance 5 or more Performance Deficits   Identified Performance Deficits all ADL's and transfers   Clinical Decision Making (Complexity) Low complexity   Therapy Frequency 4x/week   Predicted Duration of Therapy Intervention (days/wks) 3 days   Anticipated Discharge Disposition Transitional Care Facility   Risks and Benefits of Treatment have been explained. Yes   Patient, Family & other staff in agreement with plan of care Yes   [facility] TM \"6 Clicks\"   2016, Trustees of [facility], under license to "[employer].  All rights reserved.   6 Clicks Short Forms Daily Activity Inpatient Short Form   [facility]  \"6 Clicks\" Daily Activity Inpatient Short Form   1. Putting on and taking off regular lower body clothing? 1 - Total   2. Bathing (including washing, rinsing, drying)? 1 - Total   3. Toileting, which includes using toilet, bedpan or urinal? 1 - Total   4. Putting on and taking off regular upper body clothing? 2 - A Lot   5. Taking care of personal grooming such as brushing teeth? 2 - A Lot   6. Eating meals? 3 - A Little   Daily Activity Raw Score (Score out of 24.Lower scores equate to lower levels of function) 10   Total Evaluation Time   Total Evaluation Time (Minutes) 8     "

## 2019-08-31 NOTE — PLAN OF CARE
Discharge Planner PT   Patient plan for discharge: Not stated  Current status: Vincent Mishra is a 79 year old male who presented to [facility] with essentially acute on chronic epigastric pain.  Upon evaluation he was found to have a troponin of 0.561 and he was transferred to our facility for cardiology work-up and angiogram. Stay complicated by confusion. Per chart review, pt lives in house with his spouse and daughter and was Maggie with ADLs but quite sedentary.  Pt now transfers supine>sit with need for maxA x1, sit<>stand modAx2 with FWW and unable to trial walking due to impulsiveness and confusion.   Barriers to return to prior living situation: Stairs to enter home, confusion, generalized weakness, high falls risk, level of assist, high caregiver burden  Recommendations for discharge: TCU  Rationale for recommendations: Pt will benefit from skilled PT intervention at TCU to improve strength and balance as pt well below PLOF at this time.       Entered by: Leticia Swanson 08/31/2019 9:02 AM

## 2019-08-31 NOTE — PLAN OF CARE
Neuro: alert but confused. Delirious and impulsive at times, sitter at the bedside, up with lift to the chair  CV: Afib with SVR  Pulm: Ra  GI/: tolerating PO, earl inplace  Skin: multiple scabs and small wounds on arms and legs from pt picking.  Lines: PIVX1  Restraints: NA  Plan: Rest overnight, continue  to monitor

## 2019-08-31 NOTE — PROGRESS NOTES
"[facility] - GASTROENTEROLOGY PROGRESS NOTE     IMPRESSION:  1. Pancreatitis - probable gallstone pancreatitis. Follow-up CT just shows pancreatitis, no complication. No ongoing abdominal pain. Tolerating a low fat diet. Episode appears to be resolved.   2. NSTEMI - cardiology have signed off.     RECOMMENDATIONS:  1. Follow-up with surgery as an outpatient to discuss cholecystectomy timing.  2. We will sign off, please call with any questions or concerns    Jayy Edgar  [facility] - [facility]  Cell [phone]  After 5 PM, please call [phone]  ________________________________________________________________________      SUBJECTIVE:  Patient is sitting up. No abdominal pain.        OBJECTIVE:  BP (!) 155/83 (BP Location: Left arm)   Pulse 75   Temp 98.8  F (37.1  C) (Oral)   Resp 20   Ht 1.727 m (5' 8\")   Wt 88.8 kg (195 lb 12.8 oz)   SpO2 96%   BMI 29.77 kg/m    Temp (24hrs), Av.1  F (36.7  C), Min:97.4  F (36.3  C), Max:98.8  F (37.1  C)    Patient Vitals for the past 72 hrs:   Weight   19 0600 88.8 kg (195 lb 12.8 oz)   19 0600 88.7 kg (195 lb 9.6 oz)   19 0400 88.8 kg (195 lb 11.2 oz)        PHYSICAL EXAM  GEN: Alert, NAD.    HRT: reg  LUNGS: CTA  ABD: +BS, non-tender, non-distended, no rebound or guarding.    Additional Data:  I have reviewed the patient's new clinical lab results:     Recent Labs   Lab Test 19  0536 19  0544 19  0544  18  1028 18  0851  17  1410   WBC 9.1 9.8 13.7*   < > 9.4  --    < > 6.2   HGB 9.2* 9.1* 9.4*   < > 8.3*  --    < > 10.9*   MCV 85 86 90   < > 79  --    < > 83    244 236   < > 445  --    < > 312   INR  --   --   --   --  1.16* 1.07  --  0.96    < > = values in this interval not displayed.     Recent Labs   Lab Test 19  0534 19  0715 19  2030  19  0536   POTASSIUM 3.6 3.7 3.4   < > 2.9*   CHLORIDE 106 107  --   --  109   CO2 30 28  --   --  29   BUN 12 11  --   --  16   ANIONGAP 5 6  " --   --  4    < > = values in this interval not displayed.     Recent Labs   Lab Test 08/30/19  0715 08/28/19  0557 08/27/19  0544 08/26/19  0544  08/21/19  1101  12/11/18  1856  10/16/18  1018   ALBUMIN 2.4* 2.6* 2.4* 2.6*   < >  --    < >  --    < >  --    BILITOTAL 0.8 1.0 0.9 0.9   < >  --    < >  --    < >  --    ALT 56 67 54 56   < >  --    < >  --    < >  --    AST 53* 83* 80* 105*   < >  --    < >  --    < >  --    PROTEIN  --   --   --   --   --  Negative  --  10*  --  Negative   LIPASE  --  1,899* 1,339* 752*   < >  --    < >  --   --   --     < > = values in this interval not displayed.

## 2019-08-31 NOTE — CONSULTS
[facility] Psychiatric Consult Progress Note    Interval History:   Pt seen, chart reviewed, case discussed with nursing staff and treating clinicians.  I met with the patient and his wife, daughter was also at bedside.  The patient is completely confused, he does not really know where he is, why he is here.  He was eating, fumbling with his food.  He did not sleep well last night.  We discussed the benefits of trying adjustment in his olanzapine.  Clinically he looks delirious, family says at baseline his cognitive functioning is much better than this.  I do not think he is appropriate for geriatric psychiatry at this point, historically he does have some depression and he is been on a high dose of trazodone.  There may be some alternative medication to look at down the line, will need to take this 1 day at a time.  He is on chronic narcotics which probably is not helping the situation.     Review of systems:   10 point Review of Systems completed by Dr. Lopez, and is  is negative other than noted in the HPI     Medications:       amLODIPine  10 mg Oral Daily     aspirin  81 mg Oral Daily     clopidogrel  75 mg Oral Daily     insulin aspart  3 Units Subcutaneous TID w/meals     insulin aspart  1-7 Units Subcutaneous TID AC     insulin aspart  1-5 Units Subcutaneous At Bedtime     lidocaine  10 mL Urethral Once     lisinopril  20 mg Oral Daily     OLANZapine zydis  2.5 mg Oral At Bedtime     oxyCODONE  30 mg Oral QAM     pantoprazole  40 mg Oral BID AC     senna-docusate  1 tablet Oral BID    Or     senna-docusate  2 tablet Oral BID     sertraline  200 mg Oral Daily     sodium chloride (PF)  10 mL Intravenous Once     sodium chloride (PF)  3 mL Intracatheter Q8H     spironolactone  12.5 mg Oral Daily     tamsulosin  0.4 mg Oral Daily     traZODone  300 mg Oral At Bedtime     acetaminophen, bisacodyl, glucose **OR** dextrose **OR** glucagon, HOLD MEDICATION, HOLD MEDICATION, HOLD MEDICATION,  HYDROmorphone, HOLD MEDICATION, lidocaine 4%, lidocaine (buffered or not buffered), melatonin, naloxone, nitroGLYcerin, OLANZapine zydis, ondansetron **OR** ondansetron, oxyCODONE, polyethylene glycol, potassium chloride, potassium chloride with lidocaine, potassium chloride, potassium chloride, potassium chloride, sodium chloride (PF), sodium chloride (PF)    Mental Status Examination:     Appearance:  awake, alert, appeared older than stated age and poorly groomed  Eye Contact:  fair  Speech:  mumbling  Language:Normal  Psychomotor Behavior:  no evidence of tardive dyskinesia, dystonia, or tics and fidgeting  Mood:  NA  Affect:  intensity is flat  Thought Process:  disorganized no loose associations  Thought Content:  no evidence of suicidal ideation or homicidal ideation and no evidence of psychotic thought  Oriented to:  person only  Attention Span and Concentration:  poor  Recent and Remote Memory:  poor  Fund of Knowledge: delayed  Muscle Strength and Tone: diminished  Gait and Station: Normal  Insight:  limited  Judgment:  poor        Labs/Vitals:     Recent Results (from the past 24 hour(s))   Glucose by meter    Collection Time: 08/30/19  4:58 PM   Result Value Ref Range    Glucose 203 (H) 70 - 99 mg/dL   Glucose by meter    Collection Time: 08/30/19  9:00 PM   Result Value Ref Range    Glucose 312 (H) 70 - 99 mg/dL   Glucose by meter    Collection Time: 08/31/19  2:15 AM   Result Value Ref Range    Glucose 179 (H) 70 - 99 mg/dL   EKG 12-lead, tracing only    Collection Time: 08/31/19  2:52 AM   Result Value Ref Range    Interpretation ECG Click View Image link to view waveform and result    Basic metabolic panel    Collection Time: 08/31/19  5:34 AM   Result Value Ref Range    Sodium 141 133 - 144 mmol/L    Potassium 3.6 3.4 - 5.3 mmol/L    Chloride 106 94 - 109 mmol/L    Carbon Dioxide 30 20 - 32 mmol/L    Anion Gap 5 3 - 14 mmol/L    Glucose 199 (H) 70 - 99 mg/dL    Urea Nitrogen 12 7 - 30 mg/dL     Creatinine 0.69 0.66 - 1.25 mg/dL    GFR Estimate >90 >60 mL/min/[1.73_m2]    GFR Estimate If Black >90 >60 mL/min/[1.73_m2]    Calcium 8.4 (L) 8.5 - 10.1 mg/dL   Glucose by meter    Collection Time: 08/31/19  8:01 AM   Result Value Ref Range    Glucose 198 (H) 70 - 99 mg/dL   Glucose by meter    Collection Time: 08/31/19 11:20 AM   Result Value Ref Range    Glucose 244 (H) 70 - 99 mg/dL     B/P: 117/84, T: 98.3, P: 70, R: 16    Impression:   Vincent presents with delirium, multiple medical comorbidities.  I do not think geriatric psychiatry is an appropriate referral for him.  He is going to need a transitional care unit.  We can adjust his Zyprexa to 5 mg nightly, and at some point it may be prudent to lower his antidepressant dose and eventually reduce his trazodone.  He is in an unfamiliar environment, I cannot rule out the possibility that he has some elements of dementia at baseline though the family is denying this.  Historically he has had some depression but that is not a clear concern at this point, he has certainly had chronic sleep issues.      DIagnoses:   1.Delirium, multifactorial  2.MDD by history   3.NSTEMI         Plan:   1. Written information given on medications. Side effects, risks, benefits reviewed.  2. Adjust zyprexa to 5mg at bedtime, dose at 8pm   3. Likely needs TCU  4. Psych f/u as needed      Attestation:  Patient has been seen and evaluated by me,  Reza Lopez MD

## 2019-08-31 NOTE — PROGRESS NOTES
"   08/31/19 0809   Quick Adds   Type of Visit Initial PT Evaluation   Living Environment   Lives With child(adán), adult;spouse   Living Arrangements house   Home Accessibility stairs to enter home   Number of Stairs, Main Entrance other (see comments)  (could not state)   Transportation Anticipated family or friend will provide   Living Environment Comment Pt lives at home with his spouse and adult daughter.   Self-Care   Usual Activity Tolerance moderate   Current Activity Tolerance poor   Regular Exercise No   Equipment Currently Used at Home crutches;grab bar, tub/shower;walker, standard;walker, rolling;wheelchair, manual;shower chair   Activity/Exercise/Self-Care Comment Per pt, pt has not been moving well lately. States he has a FWW and uses it \"off and on\". Pt states he mainly stays home   Functional Level Prior   Ambulation 1-->assistive equipment   Transferring 1-->assistive equipment   Toileting 1-->assistive equipment   Bathing 1-->assistive equipment   Communication 0-->understands/communicates without difficulty   Swallowing 0-->swallows foods/liquids without difficulty   Cognition 1 - attention or memory deficits   Fall history within last six months yes   Number of times patient has fallen within last six months   (no details provided)   Which of the above functional risks had a recent onset or change? ambulation;transferring   Prior Functional Level Comment Unclear PLOF as pt with confusion; per chart review, pt Maggie but sedentary   General Information   Onset of Illness/Injury or Date of Surgery - Date 08/23/19   Referring Physician Shani Best MD   Pertinent History of Current Problem (include personal factors and/or comorbidities that impact the POC) Vincent Mishra is a 79 year old male with complex medical history including prior CAD with stenting in 2011 in 2014, type 2 diabetes, hypertension, hyperlipidemia, stage III CKD, renal cell carcinoma, GERD and esophagitis and iron deficiency " anemia among others who presented to [facility] with essentially acute on chronic epigastric pain.  Upon evaluation he was found to have a troponin of 0.561 and he was transferred to our facility for cardiology work-up and angiogram.    Precautions/Limitations fall precautions   General Observations Pt confused; brief in place   General Info Comments Eval & tx for discharge recommendations. Up with assist   Cognitive Status Examination   Orientation person   Level of Consciousness alert;confused   Follows Commands and Answers Questions 50% of the time;able to follow single-step instructions   Personal Safety and Judgment impaired;impulsive   Memory impaired   Cognitive Comment Pt confused; difficult to assess secondary to urgency   Pain Assessment   Patient Currently in Pain No   Integumentary/Edema   Integumentary/Edema Comments R UE bruise noted    Posture    Posture Forward head position;Protracted shoulders   Range of Motion (ROM)   ROM Comment WFL; unable to formally test secondary to urgency   Strength   Strength Comments Demos AROM shoulder flexion/ABD to 90deg, knee flexion in supine to 45deg bilat   Bed Mobility   Bed Mobility Comments supine>sit maxA x1,   Transfer Skills   Transfer Comments sit<>stand modAx2 FWW   Gait   Gait Comments NT; unsafe at this time. Pt impulsive with urgency for BM   Sensory Examination   Sensory Perception Comments Pt c/o numbness in bilat LEs   General Therapy Interventions   Planned Therapy Interventions balance training;bed mobility training;gait training;neuromuscular re-education;strengthening;ROM;transfer training;home program guidelines;risk factor education;progressive activity/exercise   Clinical Impression   Criteria for Skilled Therapeutic Intervention yes, treatment indicated   PT Diagnosis generalized weakness   Influenced by the following impairments weakness, poor balance,    Functional limitations due to impairments difficulty transferring, ambulating  "  Clinical Presentation Evolving/Changing   Clinical Presentation Rationale clinical judgement   Clinical Decision Making (Complexity) Moderate complexity   Therapy Frequency Daily   Predicted Duration of Therapy Intervention (days/wks) 6 days   Anticipated Discharge Disposition Transitional Care Facility   Risk & Benefits of therapy have been explained Yes   Patient, Family & other staff in agreement with plan of care Yes   [facility] TM \"6 Clicks\"   2016, Trustees of [facility], under license to [employer].  All rights reserved.   6 Clicks Short Forms Basic Mobility Inpatient Short Form   [facility]  \"6 Clicks\" V.2 Basic Mobility Inpatient Short Form   1. Turning from your back to your side while in a flat bed without using bedrails? 2 - A Lot   2. Moving from lying on your back to sitting on the side of a flat bed without using bedrails? 2 - A Lot   3. Moving to and from a bed to a chair (including a wheelchair)? 2 - A Lot   4. Standing up from a chair using your arms (e.g., wheelchair, or bedside chair)? 2 - A Lot   5. To walk in hospital room? 2 - A Lot   6. Climbing 3-5 steps with a railing? 1 - Total   Basic Mobility Raw Score (Score out of 24.Lower scores equate to lower levels of function) 11   Total Evaluation Time   Total Evaluation Time (Minutes) 8     "

## 2019-08-31 NOTE — PROGRESS NOTES
[facility]    Medicine Progress Note - Hospitalist Service       Date of Admission:  8/23/2019  Assessment & Plan    Vincent Mishra is a 79 year old male with complex medical history including prior CAD with stenting in 2011 in 2014, type 2 diabetes, hypertension, hyperlipidemia, stage III CKD, renal cell carcinoma, GERD and esophagitis and iron deficiency anemia among others who presented to [facility] with essentially acute on chronic epigastric pain.  Upon evaluation he was found to have a troponin of 0.561 and he was transferred to our facility for cardiology work-up and angiogram.      Abdominal pain, RESOLVED secondary to acute vs chronic pancreatitis   * CT abdomen with contrast done on 8/24/2019 showed acute pancreatitis, awaiting repeat CT scan on 08/30/19.   - Repeat CT on 08/30/19: acute pancreatitis without evidence of necrosis or loculated fluids.   * Right upper quadrant ultrasound showed cholelithiasis with no CBD obstruction. Outpatient cholecystectomy as per general surgery who were consulted - signed off 8/26    - Currently eating without pain  - Continues on PTA PPI twice daily  - Continues with elevated lipase in the context of clinical improvement.      Lab Test 08/30/19  0715 08/28/19  0557 08/27/19  0544 08/26/19  0544 08/25/19  0624 08/24/19  0539 08/22/19 2008   ALBUMIN 2.4* 2.6* 2.4* 2.6* 2.9*  --  3.6   BILITOTAL 0.8 1.0 0.9 0.9 0.9  --  0.7   ALT 56 67 54 56 49  --  39   AST 53* 83* 80* 105* 92*  --  45   ALKPHOS 97 97 89 63 62  --  86   PROTEIN  --   --   --   --   --   --   --    LIPASE  --  1,899* 1,339* 752* 1,629* 4,177* 127   INR  --   --   --   --   --   --   --        NSTEMI  History of CAD, PCI in 2014 and 2011  HTN   * EKG no clear ischemic changes. Peak troponin at 1.121 8/25 (steadily increasing). Angiogram on 3/27/2019: 2V CAD with prior stents in mid LAD and mid RCA with 40% in-stent stenosis, unchanged from 2014. Echo this stay no significantly  changed from echo in 3/2019 nl LVEF nl EF without WMAs   - Continue PTA aspirin, Plavix, amlodipine, lisinopril, on Lipitor   - No BB due to bradycardia  - Started on Plavix this stay    - Started on spironolactone 12.5 mg on 08/30/19.     Underlying Dementia, not previously recognized with   With recurring delirium, behavioral disturbance, possible acute encephalopathy in the context of the the above. No evidence of infection, metabolic disturbance. Remote history of EtOH abuse, sober for many years. No change in chronic pain medications. Head CT negative. Neurology consultation obtained this hospitalization and agree this is induced delirium in the setting of underlying cognitive impairment. Neurology signed off 8/25.   - On 08/31/19, at baseline. Not oriented to hospital or date.   - Appreciate psychiatry consult. They recommended olazapine 2.5 mg scheduled at night with BID PRN doses available.   - He is currently on serotonin specific reuptake inhibitor and trazodone 300 mg. Psychiatry stopped PRN trazodone dose and  recommend HS dose is eventually decreased.      Fluid overload, Third spacing - small ascites and new effusion on CT 08/30/19  - spironolactone started on 8/30  - give lasix 20  Mg IV x 1 on 08/31/19 with potassium 20 meq x 1.     Urinary retention -   Patient bladder scan showed greater than 500 mL of urine needing Earl placement  - Started on Flomax 0.4 mg on 8/27  - Required straight cath with 400 ml PVR, but was able to void today with nil PVR, continue to follow. Place earl if he continues to have retention.   - Care coordinator consult to arrange f/u in clinic.     Acute kidney injury, mild - RESOLVED   Anion gap metabolic acidosis - RESOLVED - At admission bicarbonate 13, AG 20. Ketones elevated. Lactic acid nl.   Secondary to hypovolemia, starvation ketoacidosis or mild diabetic ketoacidosis?,  In the setting of acute pancreatitis and being n.p.o. and encephalopathy. No recent drinking.  "BS in the 200s.     Recent Labs   Lab 08/31/19  0534 08/30/19  0715 08/29/19  0536 08/28/19  0557 08/27/19  0544 08/26/19  0544   CR 0.69 0.71 0.73 0.92 1.32* 1.37*       Hypkalemia   Insomnia  Chronic pain  H/o chronic groin pain: Per 7/2019 clinic note: chronic perineal pain.  \"His exam and previous CTs have been negative.  MRI and Xrays demonstrate multilevel degenerative disc disease and bilateral neural foraminal stenosis. Saw Neurosurgery consult but was determined to be a poor surgical candidate given his age and hx of 4 MIs. He plans to try CBD oil. They will look into this on their own.\"   - Continue on PTA OxyContin 30 mg every morning and oxycodone as needed for chronic pain issues  - PTA narcotics continued, PTA trazodone continued      HTN, HL  - Continue norvasc and lisinopril   - resume Lipitor 80mg Po daily at or before discharge     DM2  - PTA metformin held and will continue to hold, since patient just had SHIRA and now received contrast.Will resume 48 hours after contrast.   - Novolog 3 units with meals added to orders.   - sliding scale insulin changed to QID.   Recent Labs   Lab 08/31/19  0801 08/31/19  0534 08/31/19  0215 08/30/19  2100 08/30/19  1658 08/30/19  1124 08/30/19  0906 08/30/19  0715  08/29/19  0536  08/28/19  0557  08/27/19  0544  08/26/19  0544   GLC  --  199*  --   --   --   --   --  186*  --  172*  --  183*  --  148*  --  86   *  --  179* 312* 203* 206* 182*  --    < >  --    < >  --    < >  --    < >  --     < > = values in this interval not displayed.          Diet: Snacks/Supplements Adult: Other; chocolate Boost GLucose Control with meals  (RD); With Meals  Advance Diet as Tolerated: Regular Diet Adult; 2920-0103 Calories: Moderate Consistent CHO (4-6 CHO units/meal); Low Fat Diet    DVT Prophylaxis: Pneumatic Compression Devices  Mcelroy Catheter: not present  Code Status: Full Code      Disposition Plan   Medically stable as soon as we have a discharge plan. I " appreciate social work's help.   Awaiting psychiatry and CT resutls.   Social work consulted.     Entered: Shani Best MD 08/31/2019, 10:39 AM       The patient's care was discussed with the Bedside Nurse, Patient, Patient's Family and Social Work.    Shani Best MD  Hospitalist Service  [facility]    ______________________________________________________________________    Interval History    Patient is stable, eating without abdominal pain, N/V. No CP or SOB.     Data reviewed today: I reviewed all medications, new labs and imaging results over the last 24 hours. I personally reviewed no images or EKG's today.    Physical Exam   Vital Signs: Temp: 98.8  F (37.1  C) Temp src: Oral BP: (!) 155/83 Pulse: 75 Heart Rate: 81 Resp: 20 SpO2: 96 % O2 Device: None (Room air)    Weight: 195 lbs 12.8 oz  Gen: NAD, uncomfortable at times  HEENT: Normocephalic, EOMI, MMM  Resp: no crackles,  no wheezes, no increased work of resp  CV: S1S2 heard, reg rhythm, reg rate, no pedal edema  Abdo: soft, NT/ND nontender on deep palpation, nondistended, bowel sounds present  Ext: calves nontender, well perfused  Neuro:Not able to tell me the year or month, or that he is in a hospital, otherwise, answers questions appropriately, CN grossly intact, no facial asymmetry      Data   Recent Labs   Lab 08/31/19  0534 08/30/19  0715 08/29/19  2030  08/29/19  0536 08/28/19  0557 08/27/19  0544 08/26/19  0544 08/25/19  1729 08/25/19  1401   WBC  --   --   --   --  9.1  --  9.8 13.7*  --   --    HGB  --   --   --   --  9.2*  --  9.1* 9.4*  --   --    MCV  --   --   --   --  85  --  86 90  --   --    PLT  --   --   --   --  258  --  244 236  --   --     141  --   --  142 140 136 138  --   --    POTASSIUM 3.6 3.7 3.4   < > 2.9* 3.2* 3.4 3.7  --   --    CHLORIDE 106 107  --   --  109 108 106 105  --   --    CO2 30 28  --   --  29 24 24 13*  --   --    BUN 12 11  --   --  16 25 36* 36*  --   --    CR 0.69 0.71  --   --   0.73 0.92 1.32* 1.37*  --   --    ANIONGAP 5 6  --   --  4 8 6 20*  --   --    LIOR 8.4* 8.2*  --   --  8.4* 8.5 8.8 8.3*  --   --    * 186*  --   --  172* 183* 148* 86  --   --    ALBUMIN  --  2.4*  --   --   --  2.6* 2.4* 2.6*  --   --    PROTTOTAL  --  5.7*  --   --   --  6.1* 5.6* 5.8*  --   --    BILITOTAL  --  0.8  --   --   --  1.0 0.9 0.9  --   --    ALKPHOS  --  97  --   --   --  97 89 63  --   --    ALT  --  56  --   --   --  67 54 56  --   --    AST  --  53*  --   --   --  83* 80* 105*  --   --    LIPASE  --   --   --   --   --  1,899* 1,339* 752*  --   --    TROPI  --   --   --   --   --   --   --   --  1.121* 1.080*    < > = values in this interval not displayed.     No results found for this or any previous visit (from the past 24 hour(s)).

## 2019-08-31 NOTE — PLAN OF CARE
Confused, alert to self only. Restless and agitated at times. Sitter present in room. Picks at skin and scabs, need reminders not to scratch. PRN zyprexa available. Oxycontin, oxycodone improved chronic groin pain. Denies abd pain, nausea. Tolerating diet. Tele SR with 1st degree AV block + PVC's. Mcelroy placed for urinary retention. Lg BM today. Up with A2 with WW/GB to commode, requires lift to get off commode or tx to chair d/t LE weakness. +1-2 edema in BUE, +1 in LE. Discharge pending TCU placement.

## 2019-08-31 NOTE — PROGRESS NOTES
Up with A2 to commode with PT. Required heavy A3 to tx back to chair d/t weakness, inability to follow commands, confusion. Will use lift for future tx's until strength improves.

## 2019-09-01 NOTE — PLAN OF CARE
Pt continues to need a stter although has been better behaviorally. He has tolerated sitting up in a chair and watching some TV, up with ceiling lift. Good urine output. Plan for discharge to home VS TCU. No new issues.  Afib controlled.

## 2019-09-01 NOTE — PLAN OF CARE
"Discharge Planner PT   Patient plan for discharge: Family would love to take him home, per wife. However, they do not have enough help to get him up the steps. Wife tearful states \"I just miss him so much, I get so lonely without him.\" Hopeful for a TCU close to [place].  Current status: Sup>sit Max A. Pt tries to return to supine, demonstrates excellent resistant strength. Follows cues with single steps 75% of the time, very fearful of falling. Unable to stand with first trial and A x 2. Second and third trial with walker provided, able to clear bottom, but not fully stand upright. Universal sling transfer bed>chair with A x 2. Conversation is tangential, nonsensical and \"not normal\" per his wife. Wife states \"He isn't like this at home, he gets this way every time he has to come to the hospital.\"  Barriers to return to prior living situation: Level of assist, confusion, unable to ambulate  Recommendations for discharge: TCU  Rationale for recommendations: Pt will benefit from a TCU stay to increase functional activity tolerance, strength and balance for improved safety and independence with mobility, prior to discharge home.       Entered by: Taryn Glasgow 09/01/2019 4:46 PM       "

## 2019-09-01 NOTE — PLAN OF CARE
Confused, alert to self only. VSS RA, Tele: AF O/PVC, Up w/Lift - Restless and agitated at times. Sitter present in room. Picks at skin,scabs and earl - Pulled IV out, replaced and almost pulled the 2nd one out even with sleeves on and it being wrapped. Mitts put on to prevent scratching, picking and pulling at lines/tubes. C/O aches and pains all of over body at different times and requests different narcotic pain medications each time.  Attention seeking behavior.  Pt did not sleep overnight.  Discharge pending

## 2019-09-01 NOTE — PROGRESS NOTES
[facility]    Medicine Progress Note - Hospitalist Service       Date of Admission:  8/23/2019  Assessment & Plan    Vincent Mishra is a 79 year old male with complex medical history including prior CAD with stenting in 2011 in 2014, type 2 diabetes, hypertension, hyperlipidemia, stage III CKD, renal cell carcinoma, GERD and esophagitis and iron deficiency anemia among others who presented to [facility] with essentially acute on chronic epigastric pain.  Upon evaluation he was found to have a troponin of 0.561 and he was transferred to our facility for cardiology work-up and angiogram.      Abdominal pain, RESOLVED secondary to acute vs chronic pancreatitis   * CT abdomen with contrast done on 8/24/2019 showed acute pancreatitis, awaiting repeat CT scan on 08/30/19.   - Repeat CT on 08/30/19: acute pancreatitis without evidence of necrosis or loculated fluids.   * Right upper quadrant ultrasound showed cholelithiasis with no CBD obstruction. Outpatient cholecystectomy as per general surgery who were consulted - signed off 8/26    - Currently eating without pain  - Continues on PTA PPI twice daily  - Continues with elevated lipase in the context of clinical improvement.      Lab Test 08/30/19  0715 08/28/19  0557 08/27/19  0544 08/26/19  0544 08/25/19  0624 08/24/19  0539 08/22/19 2008   ALBUMIN 2.4* 2.6* 2.4* 2.6* 2.9*  --  3.6   BILITOTAL 0.8 1.0 0.9 0.9 0.9  --  0.7   ALT 56 67 54 56 49  --  39   AST 53* 83* 80* 105* 92*  --  45   ALKPHOS 97 97 89 63 62  --  86   PROTEIN  --   --   --   --   --   --   --    LIPASE  --  1,899* 1,339* 752* 1,629* 4,177* 127   INR  --   --   --   --   --   --   --          NSTEMI  History of CAD, PCI in 2014 and 2011  HTN   * EKG no clear ischemic changes. Peak troponin at 1.121 8/25 (steadily increasing). Angiogram on 3/27/2019: 2V CAD with prior stents in mid LAD and mid RCA with 40% in-stent stenosis, unchanged from 2014. Echo this stay no significantly  changed from echo in 3/2019 nl LVEF nl EF without WMAs   - Continue PTA aspirin, Plavix, amlodipine, lisinopril, and Lipitor   - No BB due to bradycardia  - Started on Plavix this stay    - Started on spironolactone 12.5 mg on 08/30/19.     Underlying Dementia, not previously recognized with   With recurring delirium, behavioral disturbance, possible acute encephalopathy in the context of the the above. No evidence of infection, metabolic disturbance. Remote history of EtOH abuse, sober for many years. No change in chronic pain medications. Head CT negative. Neurology consultation obtained this hospitalization and agree this is induced delirium in the setting of underlying cognitive impairment. Neurology signed off 8/25.   - On 09/01/19, near baseline, but wife today reports he is more confused than at home, but there was definitely signs of significant baseline dementia. Patient not keeping track of month, had to ask if they had celebrated 4th of July yet, no longer driving as unable to keep trak of the road.    - Appreciate psychiatry consult. Increased HS olazapine to 5 mg scheduled at night with BID PRN doses available. Do not recommend gabi-psyche.   - He is currently on serotonin specific reuptake inhibitor and trazodone 300 mg. Psychiatry stopped PRN trazodone dose and  recommend HS dose is eventually decreased as well as serotonin specific reuptake inhibitor.     Fluid overload, Third spacing - small ascites and new effusion on CT 08/30/19  - spironolactone started on 8/30  - given lasix 20  Mg IV x 1 on 08/31/19 with potassium 20 meq x 1.     Urinary retention -   Patient bladder scan showed greater than 500 mL of urine needing Earl placement  - Started on Flomax 0.4 mg on 8/27  - Failed TOV on 8/31, earl replaced.   - Care coordinator consulted to arrange f/u in clinic.     Acute kidney injury, mild - RESOLVED   Anion gap metabolic acidosis - RESOLVED - At admission bicarbonate 13, AG 20. Ketones  "elevated. Lactic acid nl.   Secondary to hypovolemia, starvation ketoacidosis or mild diabetic ketoacidosis?,  In the setting of acute pancreatitis and being n.p.o. and encephalopathy. No recent drinking. BS in the 200s.     Recent Labs   Lab 09/01/19  0540 08/31/19  0534 08/30/19  0715 08/29/19  0536 08/28/19  0557 08/27/19  0544   CR 0.77 0.69 0.71 0.73 0.92 1.32*       Hypkalemia   Insomnia  Chronic pain  H/o chronic groin pain: Per 7/2019 clinic note: chronic perineal pain.  \"His exam and previous CTs have been negative.  MRI and Xrays demonstrate multilevel degenerative disc disease and bilateral neural foraminal stenosis. Saw Neurosurgery consult but was determined to be a poor surgical candidate given his age and hx of 4 MIs. He plans to try CBD oil. They will look into this on their own.\"   - Continue on PTA OxyContin 30 mg every morning and oxycodone as needed for chronic pain issues  - PTA narcotics continued, PTA trazodone continued      HTN, HL  - Continue norvasc and lisinopril   - resume Lipitor 80mg Po daily at or before discharge     DM2  - Resume PTA metformin on 09/02/19 and stop Novolog 3 units with meals.   - sliding scale insulin QID  Recent Labs   Lab 09/01/19  1743 09/01/19  1322 09/01/19  0809 09/01/19  0540 09/01/19  0308 08/31/19  2339 08/31/19  1628  08/31/19  0534  08/30/19  0715  08/29/19  0536  08/28/19  0557  08/27/19  0544   GLC  --   --   --  189*  --   --   --   --  199*  --  186*  --  172*  --  183*  --  148*   * 134* 166*  --  190* 187* 87   < >  --    < >  --    < >  --    < >  --    < >  --     < > = values in this interval not displayed.          Diet: Snacks/Supplements Adult: Other; chocolate Boost GLucose Control with meals  (RD); With Meals  Advance Diet as Tolerated: Regular Diet Adult; 2864-3747 Calories: Moderate Consistent CHO (4-6 CHO units/meal); Low Fat Diet    DVT Prophylaxis: Pneumatic Compression Devices  Mcelroy Catheter: in place, indication: Retention, " Strict 1-2 Hour I&O  Code Status: Full Code      Disposition Plan   Medically stable as soon as we have a discharge plan. I appreciate social work's help. As he is requiring 124 hours sitter, we will not be able to discharge him to TCU, and likely will plan to discharge home on Tuesday when family has more assist.     Entered: Shani Best MD 09/01/2019, 6:02 PM       The patient's care was discussed with the Bedside Nurse, Patient, Patient's Family and Social Work on 09/01/19.     Shani Best MD  Hospitalist Service  [facility]    ______________________________________________________________________    Interval History    Patient is stable, eating without abdominal pain, N/V. No CP or SOB. History as above.     Data reviewed today: I reviewed all medications, new labs and imaging results over the last 24 hours. I personally reviewed no images or EKG's today.    Physical Exam   Vital Signs: Temp: 98.3  F (36.8  C) Temp src: Oral BP: (!) 140/73 Pulse: 74 Heart Rate: 73 Resp: 16 SpO2: 96 % O2 Device: None (Room air)    Weight: 200 lbs 1.6 oz  Gen: NAD, uncomfortable at times  HEENT: Normocephalic, EOMI, MMM  Resp: no crackles,  no wheezes, no increased work of resp  CV: S1S2 heard, reg rhythm, reg rate, no pedal edema  Abdo: soft, NT/ND, bowel sounds present  Ext: calves nontender, well perfused  Neuro:Not able to tell me the year or month, or that he is in a hospital, otherwise, answers questions appropriately, CN grossly intact, no facial asymmetry      Data   Recent Labs   Lab 09/01/19  0540 08/31/19  0534 08/30/19  0715  08/29/19  0536 08/28/19  0557 08/27/19  0544 08/26/19  0544   WBC  --   --   --   --  9.1  --  9.8 13.7*   HGB  --   --   --   --  9.2*  --  9.1* 9.4*   MCV  --   --   --   --  85  --  86 90   PLT  --   --   --   --  258  --  244 236    141 141  --  142 140 136 138   POTASSIUM 3.7 3.6 3.7   < > 2.9* 3.2* 3.4 3.7   CHLORIDE 106 106 107  --  109 108 106 105   CO2 31  30 28  --  29 24 24 13*   BUN 13 12 11  --  16 25 36* 36*   CR 0.77 0.69 0.71  --  0.73 0.92 1.32* 1.37*   ANIONGAP 4 5 6  --  4 8 6 20*   LIOR 8.4* 8.4* 8.2*  --  8.4* 8.5 8.8 8.3*   * 199* 186*  --  172* 183* 148* 86   ALBUMIN  --   --  2.4*  --   --  2.6* 2.4* 2.6*   PROTTOTAL  --   --  5.7*  --   --  6.1* 5.6* 5.8*   BILITOTAL  --   --  0.8  --   --  1.0 0.9 0.9   ALKPHOS  --   --  97  --   --  97 89 63   ALT  --   --  56  --   --  67 54 56   AST  --   --  53*  --   --  83* 80* 105*   LIPASE  --   --   --   --   --  1,899* 1,339* 752*    < > = values in this interval not displayed.     No results found for this or any previous visit (from the past 24 hour(s)).

## 2019-09-02 NOTE — CONSULTS
Care Transition Initial Assessment -      Met with: Patient and daughter Carolann  Active Problems:    NSTEMI (non-ST elevated myocardial infarction) (H)       DATA  Lives With: child(adán), adult, spouse   Living Arrangements: house  Quality of Family Relationships: involved, supportive  Description of Support System: Supportive, Involved  Who is your support system?: Children, Wife  Support Assessment: Adequate family and caregiver support.   Identified issues/concerns regarding health management:       Quality of Family Relationships: involved, supportive  Transportation Anticipated: family or friend will provide    Per social work consult for discharge planning.  Patient was admitted on 8-23-19 with a n stemi.  The tentative date of discharge is 9-3-19.  Reviewed chart and had a lengthy conversation with patient's wife and daughter Carolann regarding discharge plans.  Per patient's wife and daughter's report, they live in a house with 10 family members all of which can assist with patient's care but 2 who are children. Patient's wife and daughter are the primary care giver's.  Patient's wife and daughter describe patient as using a wheelchair and arm crutches to mostly get around so he is not very mobile at baseline.  Initially we discussed patient going to tcu to get stronger however with patient's confusion we have had difficulty getting the 1:1 sitter removed.  Patient's wife and family have decided to bring patient home.  Patient's wife will bring patient to outpatient therapy in [place].  Patient's wife states that her family will assist her and they plan on bring patient home tomorrow.  Patient's wife anticipates no other needs.    ASSESSMENT  Cognitive Status:  Did not meet patient, spoke with wife and daughter  Concerns to be addressed: discharge planning.     PLAN  Financial costs for the patient includes N/A.  Patient given options and choices for discharge homecare versus outpatient therapy.  Patient/family  is agreeable to the plan?  Yes  Transportation/person available to transport on day of discharge  is family and have they been notified/set up time to be determined  Patient Goals and Preferences: home with outpatient therapy.  Patient anticipates discharging to:  Home.    Will continue to follow and assist with a safe discharge plan.    Taryn Cross [facility], MSW  [phone]

## 2019-09-02 NOTE — PLAN OF CARE
Pt confused and restless overnight.  Pt didn't sleep last night even with medications/interventions to help him rest.  Pt is alert to self only, is having hallucinations, but is cooperative and redirectable. Frequent redirection needed as pt is very forgetful.  EKG done once for chest, left arm, low back pain and no changes noted.  Vitals remain stable and pt is afib with CVR.  Pt on room air and crackles noted in bases.  Sitter in room.

## 2019-09-02 NOTE — PROGRESS NOTES
[facility]    Medicine Progress Note - Hospitalist Service       Date of Admission:  8/23/2019  Assessment & Plan    Vincent Mishra is a 79 year old male with complex medical history including prior CAD with stenting in 2011 in 2014, type 2 diabetes, hypertension, hyperlipidemia, stage III CKD, renal cell carcinoma, GERD and esophagitis and iron deficiency anemia among others who presented to [facility] with essentially acute on chronic epigastric pain.  Upon evaluation he was found to have a troponin of 0.561 and he was transferred to our facility for cardiology work-up and angiogram.      Abdominal pain, RESOLVED secondary to acute vs chronic pancreatitis   * CT abdomen with contrast done on 8/24/2019 showed acute pancreatitis, awaiting repeat CT scan on 08/30/19.   - Repeat CT on 08/30/19: acute pancreatitis without evidence of necrosis or loculated fluids.   * Right upper quadrant ultrasound showed cholelithiasis with no CBD obstruction. Outpatient cholecystectomy as per general surgery who were consulted - signed off 8/26    - currently eating without pain  - continues on PTA PPI twice daily  - continued with elevated lipase in the context of clinical improvement.     Lab Test 08/30/19  0715 08/28/19  0557 08/27/19  0544 08/26/19  0544 08/25/19  0624 08/24/19  0539 08/22/19 2008   ALBUMIN 2.4* 2.6* 2.4* 2.6* 2.9*  --  3.6   BILITOTAL 0.8 1.0 0.9 0.9 0.9  --  0.7   ALT 56 67 54 56 49  --  39   AST 53* 83* 80* 105* 92*  --  45   ALKPHOS 97 97 89 63 62  --  86   PROTEIN  --   --   --   --   --   --   --    LIPASE  --  1,899* 1,339* 752* 1,629* 4,177* 127   INR  --   --   --   --   --   --   --        NSTEMI  History of CAD, PCI in 2014 and 2011  HTN   * EKG no clear ischemic changes. Peak troponin at 1.121 8/25 (steadily increasing). Angiogram on 3/27/2019: 2V CAD with prior stents in mid LAD and mid RCA with 40% in-stent stenosis, unchanged from 2014. Echo this stay no significantly  changed from echo in 3/2019 nl LVEF nl EF without WMAs   - continue PTA aspirin, Plavix, amlodipine, lisinopril, on Lipitor   - started on spironolactone 12.5 mg on 08/30/19.   - no BB due to bradycardia    Underlying Dementia, not previously recognized with   With recurring delirium, behavioral disturbance, possible acute encephalopathy in the context of the the above. No evidence of infection, metabolic disturbance. Remote history of EtOH abuse, sober for many years. No change in chronic pain medications. Head CT negative. Neurology consultation obtained this hospitalization and agree this is induced delirium in the setting of underlying cognitive impairment. Neurology signed off 8/25.   - On 09/02/19, continues to have confusion, agitation, requires sitter. Not sleeping well at night. Wife reports he is more confused than at home, but there was definitely signs of significant baseline dementia. Patient not keeping track of month, had to ask if they had celebrated 4th of July yet, no longer driving as unable to keep trak of the road.    - Appreciate psychiatry consult. Increased HS olazapine to 5 mg scheduled at night with BID PRN doses available. Do not recommend gabi-psyche.   - He is currently on serotonin specific reuptake inhibitor and trazodone 300 mg. Psychiatry stopped PRN trazodone dose and  recommend HS dose is eventually decreased as well as serotonin specific reuptake inhibitor.     Fluid overload, Third spacing - small ascites and new effusion on CT 08/30/19, wt up to 90.8 from 82 kg on admission.   - spironolactone started on 8/30  - given lasix 20  Mg IV x 1 on 08/31/19 with potassium 20 meq x 1.   - Wt 87.1 on 09/02/19, up from 82 kg on admission.   - repeat lasix + potassium on 09/02/19.     Urinary retention -   Patient bladder scan showed greater than 500 mL of urine needing Mcelroy placement  - started on Flomax 0.4 mg on 8/27  - required straight cath with 400 ml PVR, but was able to void today  "with nil PVR, continue to follow. Place earl if he continues to have retention.   - care coordinator consult to arrange f/u in clinic.     Acute kidney injury, mild - RESOLVED   Anion gap metabolic acidosis - RESOLVED - At admission bicarbonate 13, AG 20. Ketones elevated. Lactic acid nl.   Secondary to hypovolemia, starvation ketoacidosis or mild diabetic ketoacidosis?,  In the setting of acute pancreatitis and being n.p.o. and encephalopathy. No recent drinking. BS in the 200s.     Recent Labs   Lab 09/01/19  0540 08/31/19  0534 08/30/19  0715 08/29/19  0536 08/28/19  0557 08/27/19  0544   CR 0.77 0.69 0.71 0.73 0.92 1.32*       History of alcoholism - Given pancreatitis, metabolic acidosis and then pancreatitis, it was initially thought this was due to ETOH abuse. Patient has remote h/o alcoholism but per family has been sober for several years and he is under their care 24/7 and they do not feel there it is possible that he started drinking again. We explained in the context of dementia, patient could easily start using again. However, family feels this is impossible.    - start IV thiamine.     Hypkalemia   Insomnia  Chronic pain  H/o chronic groin pain: Per 7/2019 clinic note: chronic perineal pain.  \"His exam and previous CTs have been negative. MRI and Xrays demonstrate multilevel degenerative disc disease and bilateral neural foraminal stenosis. Saw Neurosurgery consult but was determined to be a poor surgical candidate given his age and hx of 4 MIs. He plans to try CBD oil. They will look into this on their own.\"   - continue on PTA OxyContin 30 mg every morning and oxycodone as needed for chronic pain issues  - PTA narcotics continued, PTA trazodone continued      HTN, HL  - continue norvasc and lisinopril   - resume Lipitor 80mg PO daily at or before discharge      DM2  - resumed metformin 500 mg BID on 09/02/19, increase to PTA of 1000 mg BID at discharge  - sliding scale insulin changed to QID  - " monitor BS on olanzapine     Recent Labs   Lab 09/02/19  1223 09/02/19  0814 09/02/19  0216 09/01/19  2128 09/01/19  1743 09/01/19  1322  09/01/19  0540  08/31/19  0534  08/30/19  0715  08/29/19  0536  08/28/19  0557  08/27/19  0544   GLC  --   --   --   --   --   --   --  189*  --  199*  --  186*  --  172*  --  183*  --  148*   * 147* 154* 129* 166* 134*   < >  --    < >  --    < >  --    < >  --    < >  --    < >  --     < > = values in this interval not displayed.       Diet: Snacks/Supplements Adult: Other; chocolate Boost GLucose Control with meals  (RD); With Meals  Advance Diet as Tolerated: Regular Diet Adult; 3071-7827 Calories: Moderate Consistent CHO (4-6 CHO units/meal); Low Fat Diet    DVT Prophylaxis: Pneumatic Compression Devices  Mcelroy Catheter: in place, indication: Retention, Retention  Code Status: Full Code      Disposition Plan   Medically stable as soon as we have a discharge plan. I appreciate social work's help.   Awaiting psychiatry and CT resutls.   Social work consulted.     Entered: Shani Best MD 09/02/2019, 4:05 PM       The patient's care was discussed with the Bedside Nurse and Patient.    Shani Best MD  Hospitalist Service  [facility]    ______________________________________________________________________    Interval History    Patient is stable, eating without abdominal pain, N/V. No CP or SOB. Unable to give further history from patient  Per nursing staff, he did not sleep last night and restless.      Data reviewed today: I reviewed all medications, new labs and imaging results over the last 24 hours. I personally reviewed no images or EKG's today.    Physical Exam   Vital Signs: Temp: 98.1  F (36.7  C) Temp src: Oral BP: 127/68   Heart Rate: 75 Resp: 16 SpO2: 96 % O2 Device: None (Room air)    Weight: 192 lbs 1.6 oz     Constitutional: NAD,   Neuropsyche:  alert but not oriented, answers questions appropriately.   Respiratory: Breathing  comfortably, good air exchange, no wheezes, no crackles.   Cardiovascular: Regular rate and rhythm, trace edema.  GI: soft, NT/ND, BS normal  Skin/Integumen: No acute rash, bruising or sign of bleeding.     Data   Recent Labs   Lab 09/01/19  0540 08/31/19  0534 08/30/19  0715  08/29/19  0536 08/28/19  0557 08/27/19  0544   WBC  --   --   --   --  9.1  --  9.8   HGB  --   --   --   --  9.2*  --  9.1*   MCV  --   --   --   --  85  --  86   PLT  --   --   --   --  258  --  244    141 141  --  142 140 136   POTASSIUM 3.7 3.6 3.7   < > 2.9* 3.2* 3.4   CHLORIDE 106 106 107  --  109 108 106   CO2 31 30 28  --  29 24 24   BUN 13 12 11  --  16 25 36*   CR 0.77 0.69 0.71  --  0.73 0.92 1.32*   ANIONGAP 4 5 6  --  4 8 6   LIOR 8.4* 8.4* 8.2*  --  8.4* 8.5 8.8   * 199* 186*  --  172* 183* 148*   ALBUMIN  --   --  2.4*  --   --  2.6* 2.4*   PROTTOTAL  --   --  5.7*  --   --  6.1* 5.6*   BILITOTAL  --   --  0.8  --   --  1.0 0.9   ALKPHOS  --   --  97  --   --  97 89   ALT  --   --  56  --   --  67 54   AST  --   --  53*  --   --  83* 80*   LIPASE  --   --   --   --   --  1,899* 1,339*    < > = values in this interval not displayed.     No results found for this or any previous visit (from the past 24 hour(s)).

## 2019-09-02 NOTE — PLAN OF CARE
Patient alert to self. He has been having some hallucinations.  VSS on room air.  Lungs with crackles in bases. 20 mg IV lasix given this evening.  Mcelroy catheter in place for retention with adequate urine output.  No BM this shift. Abdomen distended and firm, +BS, +flatus. He c/o nausea this afternoon, given zofran with some relief.  Glucose 147, 190, 180.  He c/o pain in right groin and back, 8-10/10.  Given scheduled oxycontin, and PRN tylenol and oxycodone with some relief.  Given PRN zyprexa for restlessness, anxiety. Soft mitts applied at times because he starts picking at his skin until it bleeds, multiple scabs/scars, some GAIL, foam mepilex on arms and legs, soft arm sleeves applied.  Up to the chair with ceiling lift, repositioned frequently.  Sitter at bedside.  Plan is to possibly discharge home with his wife Sheyla tomorrow.

## 2019-09-03 NOTE — PLAN OF CARE
A&Ox1. Reoriented PRN. VSS on RA. Tele afib CVR. Up with lift. Q2H repo. PRN Tylenol given for pain. Melatonin and scheduled Zyprexa given to assist with sleep. Mcelroy in place for retention. Multiple scabs and open areas on BUE due to pt picking at skin. Arm sleeves and mitts on. Pt hasn't had BM since 8/31. BS active and flatus present. Scheduled Senna given. Plan for discharge home with family 9/3. Continue to monitor.

## 2019-09-03 NOTE — PLAN OF CARE
"OT: Attempted to see patient. He reports he already completed grooming tasks, isn't interested in completing again. Reports he's waiting for \"surgery\" but it was canceled. Confused.   "

## 2019-09-03 NOTE — CONSULTS
PCP follow up scheduled 2 weeks 9/19/19.   Urology follow up scheduled 9/17/19  General surgery info on AVS to discuss w/PCP. Communication handoff sent to PCP. Elevated readmission risk

## 2019-09-03 NOTE — PLAN OF CARE
VSS.  Tele: SR with PAC.  Pt disoriented to time, sitter at bedside.  Pain managed by oral acetaminophen, oxycodone, and scheduled oxycontin.   and 203, insulin administered per order parameters.  Pt reports shortness of breath at times.  R knee wound is scabbed, open to air.  L hand is covered, dressing is C/D/I.  Coccyx wound is covered, C/D/I.  Pt has diminished but clear lung sounds, +1 to +2 edema.  Mcelroy is patent, good UOP.  Pt on mod carb/low fat diet.  Up with A2, turn/reposition, lift used to transfer pt.  Plan of care: continue to monitor.

## 2019-09-03 NOTE — PLAN OF CARE
Pt remains confused, oriented to self only. Sitter at bedside. Afebrile, VSS on room air, tele is SR with 1st degree AVB, & PAC's.   Pt complained of abdominal pain; treated with oxycodone, also given zyprexa for agitation.   Mcelroy patent, good urine output. Turned and repositioned.  Plan for discharge to home today.

## 2019-09-03 NOTE — PLAN OF CARE
"Discharge Planner PT   Patient plan for discharge: Family plan is home with outpatient PT  Current status:  Pt requires max assist x 2 for supine to sit transition, constant cues. Once sitting at EOB, pt requries max assist to scoot towards EOB. Pt performs sit to/from stand x 5 reps with max assist x 2, from an elevated bed. Once in standing, pt requires mod assist x 2 to tolerate standing position 15-30\" with max verbal and tactile cues. Pt unable to perform stand pivot transfer 2/2 inability to unweight LEs. Time spent educating patients wife on assist that patient will require at discharge. Pts wife reports that family will lift him up and down the stairs and \"there are strong men that will care for him at home.\" Pts wife insisted on outpatient PT at discharge. Wife educated on benefits of TCU or home health PT, however, wife insists that patient will be safe at home. Advised wife that patient will require assist of 2-3 people with all mobility, recommend use of w/c for all mobility.  Barriers to return to prior living situation: Level of assist, confusion, unable to ambulate, max assist x 2  Recommendations for discharge: TCU  Rationale for recommendations: Pt will benefit from a TCU stay to increase functional activity tolerance, strength and balance for improved safety and independence with mobility, prior to discharge home. If patient discharges home, would recommend assist of 2-3 people for all mobility and use of wheelchair as patient currently does not tolerate much activity. Pt would benefit from HHPT as pt will require assist of 2-3, assistive device, and considerable effort to come and go from house. Pts wife reports that she is not interested in HHPT and prefers outpatient PT. Pts wife has vehicle that has a wheelchair lift. Wife reports that her family will lift patient up and down stairs in wheelchair and she is confident in their ability to care for patient at home and manage stairs in home " environment.       Entered by: Sommer Taylor 09/03/2019 11:56 AM

## 2019-09-03 NOTE — PROGRESS NOTES
CLINICAL NUTRITION SERVICES - REASSESSMENT NOTE      Malnutrition: (8/27)  % Weight Loss:  Weight loss does not meet criteria for malnutrition   % Intake:  </= 50% for >/= 5 days (severe malnutrition)  Subcutaneous Fat Loss:  Orbital region at least mild depletion  Muscle Loss:  Temporal region at least mild depletion and Clavicle bone region at least mild depletion  Fluid Retention:  Mild 2+     Malnutrition Diagnosis: Non-Severe malnutrition  In Context of:  Acute illness or injury  Chronic illness or disease        EVALUATION OF PROGRESS TOWARD GOALS   Diet:  Moderate CHO, Low Fat (diet advanced 8/30) + Gelatein TID with meals (has been getting this over the Boost Glucose Control)    Intake/Tolerance:  Visited with patient this morning - he states that his appetite is good, abdominal pain is gone.  He is up eating breakfast and so far has consumed 100% of the eggs, 75% of the English muffin, and 25% of the oatmeal.  He likes the Gelatein and is taking 100% of these as well.  Per flowsheets, intake has been % of meals overall.        NEW FINDINGS:   Delirium and pancreatitis improved  Noted possible discharge home today     Previous Goals (8/30):   Diet tolerance >FL vs nutrition support within 24-48 hrs   Evaluation: Met    Previous Nutrition Diagnosis (8/30):   Inadequate protein-energy intake related to abdominal pain, AMS, diet restriction as evidenced by meeting <25-50% needs x7 days this admission   Evaluation: Improving      CURRENT NUTRITION DIAGNOSIS  Inadequate oral intake related to improving appetite and abdominal pain with pancreatitis as evidenced by consuming % of meals     INTERVENTIONS  Recommendations / Nutrition Prescription  Continue Moderate CHO, Low Fat diet   Continue Gelatein TID with meals     Implementation  None     Goals  Patient will consistently consume >/= 75% of meals and supplements     MONITORING AND EVALUATION:  Progress towards goals will be monitored and evaluated  per protocol and Practice Guidelines    Nancy Zaidi RD, LD, CNSC   Clinical Dietitian - [facility]

## 2019-09-03 NOTE — TELEPHONE ENCOUNTER
"AMLODIPINE BESYLATE 5MG TABLETS      Last Written Prescription Date:  9/14/18  Last Fill Quantity: 90,   # refills: 3  Last Office Visit: 8/21/19  Future Office visit:    Next 5 appointments (look out 90 days)    Sep 04, 2019  2:30 PM CDT  Return Visit with Conner Archuleta MD  [facility] ([facility] [address] [place] [id number] [phone]           Requested Prescriptions   Pending Prescriptions Disp Refills     amLODIPine (NORVASC) 5 MG tablet [Pharmacy Med Name: AMLODIPINE BESYLATE 5MG TABLETS] 90 tablet 0     Sig: TAKE 1 TABLET(5 MG) BY MOUTH DAILY       Calcium Channel Blockers Protocol  Passed - 9/2/2019  5:06 PM        Passed - Blood pressure under 140/90 in past 12 months     BP Readings from Last 3 Encounters:   09/03/19 134/75   08/22/19 (!) 156/75   08/21/19 105/65                 Passed - Recent (12 mo) or future (30 days) visit within the authorizing provider's specialty     Patient had office visit in the last 12 months or has a visit in the next 30 days with authorizing provider or within the authorizing provider's specialty.  See \"Patient Info\" tab in inbasket, or \"Choose Columns\" in Meds & Orders section of the refill encounter.              Passed - Medication is active on med list        Passed - Patient is age 18 or older        Passed - Normal serum creatinine on file in past 12 months     Recent Labs   Lab Test 09/03/19  0545  01/18/19  0837   CR 0.95   < >  --    CREAT  --   --  1.3*    < > = values in this interval not displayed.               "

## 2019-09-03 NOTE — DISCHARGE SUMMARY
[facility]  Hospitalist Discharge Summary       Date of Admission:  8/23/2019  Date of Discharge:  9/3/2019  Discharging Provider: Shani Best MD      Discharge Diagnoses   See hospital course below.     Follow-ups Needed After Discharge   Follow-up Appointments     Follow-up and recommended labs and tests       You have a PCP appointment scheduled for 2 weeks 9/19 at 1:00pm. See   below  Dr Keyla Fonseca  [phone]  [address] [facility] [address]    Urology follow up Appointment has been scheduled for 9/17 @ 3:00pm. See   below  [facility]  [address], [place] [address] [phone]      After discharge from the hospital, it is recommended that you make an   appointment to see a general surgeon to discuss outpatient removal of your   gallbladder (cholecystectomy). You were seen by Dr. William Brar (surgeon)   during your hospital stay. You can call Dr. Brar's office at   [phone] to schedule an appointment with him or one of his partners to   discuss surgery.  Our clinic's name is [facility]. The address is [address] [place] [address], [facility] [address], [place], [address]         Follow-up and recommended labs and tests       Follow up with primary care provider, Keyla Fonseca, within 7 days to   evaluate medication change, for hospital follow- up and regarding new   diagnosis.  The following labs/tests are recommended: CMP, CBC.   Appointment has been scheduled  Follow up with urology regarding urinary catheter within 2 weeks.   Appointment has been scheduled  Discuss consideration of cholecystectomy and cardiology follow up with   your primary care doctor.             Unresulted Labs Ordered in the Past 30 Days of this Admission     No orders found from 7/24/2019 to 8/24/2019.          Discharge Disposition   Discharged to home  Condition at discharge: [facility]    Hospital Course       Vincent Mishra is a 79 year old male with complex medical history including  prior CAD with stenting in 2011 in 2014, type 2 diabetes, hypertension, hyperlipidemia, stage III CKD, renal cell carcinoma, GERD and esophagitis and iron deficiency anemia among others who presented to [facility] with essentially acute on chronic epigastric pain.  Upon evaluation he was found to have a troponin of 0.561 and he was transferred to our facility for cardiology work-up and angiogram.     Abdominal pain, RESOLVED secondary to acute vs chronic pancreatitis   Patient with continued abdominal pain and found to have elevated lipase > 4000. CT abdomen with contrast done on 8/24/2019 showed acute pancreatitis, awaiting repeat CT scan on 08/30/19. Repeat CT on 08/30/19: acute pancreatitis without evidence of necrosis or loculated fluids. Right upper quadrant ultrasound showed cholelithiasis with no CBD obstruction. Seen by general surgery and gastroenterology during his stay. They recommended outpatient cholecystectomy. Given patient's significant confusion during his stay (stay below), this should be re-addressed at follow up with PCP to determine if patient is in good enough shape to undergo cholecystectomy..     His pain resolved with bowel rest and IVF and at time of discharge eating without pain. Continues on PTA PPI twice daily. Held H2 blocker and sucralfate during his stay without recurrence of pain. Therefore these were stopped at discharge.     Lab Test 08/30/19  0715 08/28/19  0557 08/27/19  0544 08/26/19  0544 08/25/19  0624 08/24/19  0539 08/22/19 2008   ALBUMIN 2.4* 2.6* 2.4* 2.6* 2.9*  --  3.6   BILITOTAL 0.8 1.0 0.9 0.9 0.9  --  0.7   ALT 56 67 54 56 49  --  39   AST 53* 83* 80* 105* 92*  --  45   ALKPHOS 97 97 89 63 62  --  86   PROTEIN  --   --   --   --   --   --   --    LIPASE  --  1,899* 1,339* 752* 1,629* 4,177* 127   INR  --   --   --   --   --   --   --        NSTEMI  History of CAD, PCI in 2014 and 2011  HTN   EKG showed no clear ischemic changes. Peak troponin at 1.121 8/25  (steadily increasing). Angiogram on 3/27/2019: 2V CAD with prior stents in mid LAD and mid RCA with 40% in-stent stenosis, unchanged from 2014. Echo this stay not significantly changed from echo in 3/2019 nl LVEF nl EF without WMAs. Followed by cardiology during this stay.   - We continued PTA aspirin, Plavix, amlodipine, lisinopril, on Lipitor   - Started on spironolactone 12.5 mg on 08/30/19.   - no BB due to bradycardia  - If patient has improvement in his mental status and functioning, he should follow up with cardiologist re: stress test.      Underlying Dementia, not previously recognized   Recurring delirium, behavioral disturbance, possible acute encephalopathy in the context of the the above. No evidence of infection, metabolic disturbance. Remote history of EtOH abuse, sober for many years. No change in chronic pain medications. Head CT negative. Neurology consultation obtained this hospitalization and agree this is induced delirium in the setting of underlying cognitive impairment. Neurology signed off 8/25.   - He continued to have confusion, impulsive behavior and required sitter throughout his stay. Wife reports he is more confused than at home, but there was definitely signs of significant baseline dementia. Patient not keeping track of month, had to ask if they had celebrated 4th of July yet, no longer driving as unable to keep trak of the road. Other family members corroborate baseline memory problems.   - Appreciate psychiatry consult. Started on HS olazapine to 5 mg scheduled at night with 2.5 mg BID PRN doses available for agitation.   - NOTE: He is currently on serotonin specific reuptake inhibitor and trazodone 300 mg. Psychiatry recommended that his HS dose is eventually decreased as well as serotonin specific reuptake inhibitor. Then, consider mirtazapine for sleep in instead of trazodone. We defer to his primary care doctor who had prescribed these mediations.     Fluid overload, Third  "spacing - small ascites and new effusion on CT 08/30/19, wt up to 90.8 from 82 kg on admission.   - spironolactone started on 8/30  - given lasix 20  Mg IV on on 08/31/19 and 9/2/19 with potassium 20 meq.  - Wt 87.1 on 09/02/19, up from 82 kg on admission. No wt on day of discharge.     Urinary retention - Patient had urinary retention over 500 ml. Wife reports difficulty voiding at home as well. He was started on Flomax 0.4 mg on 8/27. We removed earl and attempted TOV on 8/31 and had to replace earl. He will go home with earl with f/u in Urology clinic per Urology consult recommendation during this stay.     Acute kidney injury, mild - RESOLVED   Anion gap metabolic acidosis - RESOLVED - At admission bicarbonate 13, AG 20. Ketones elevated. Lactic acid nl.   Secondary to hypovolemia, starvation ketoacidosis or mild diabetic ketoacidosis?,  In the setting of acute pancreatitis and being n.p.o. and encephalopathy. No recent drinking. BS in the 200s.     Recent Labs   Lab 09/01/19  0540 08/31/19  0534 08/30/19  0715 08/29/19  0536 08/28/19  0557 08/27/19  0544   CR 0.77 0.69 0.71 0.73 0.92 1.32*       History of alcoholism - Given pancreatitis, metabolic acidosis and then pancreatitis, it was initially thought this was due to ETOH abuse. Patient has remote h/o alcoholism but per family has been sober for several years and he is under their care 24/7 and they do not feel there it is possible that he started drinking again. We explained in the context of dementia, patient could easily start using again. However, family feels this is impossible.    - start IV thiamine. Discharge on 1 month of thiamine.     Insomnia  Chronic pain  H/o chronic groin pain: Per 7/2019 clinic note: chronic perineal pain.  \"His exam and previous CTs have been negative. MRI and Xrays demonstrate multilevel degenerative disc disease and bilateral neural foraminal stenosis. Saw Neurosurgery consult but was determined to be a poor surgical " "candidate given his age and hx of 4 MIs. He plans to try CBD oil. They will look into this on their own.\"   - continue on PTA OxyContin 30 mg every morning and oxycodone as needed for chronic pain issues  - PTA narcotics continued, PTA trazodone continued      HTN, HL  - continue norvasc and lisinopril   - resumed Lipitor 80mg PO daily at discharge.       DM2  - resumed metformin 500 mg BID on 09/02/19, increase to PTA of 1000 mg BID at discharge  - monitor BS on olanzapine     Recent Labs   Lab 09/03/19  1212 09/03/19  0836 09/03/19  0545 09/03/19  0200 09/02/19  2130 09/02/19  1741 09/02/19  1223  09/01/19  0540  08/31/19  0534  08/30/19  0715  08/29/19  0536  08/28/19  0557   GLC  --   --  158*  --   --   --   --   --  189*  --  199*  --  186*  --  172*  --  183*   * 142*  --  146* 354* 180* 190*   < >  --    < >  --    < >  --    < >  --    < >  --     < > = values in this interval not displayed.             Consultations This Hospital Stay   CARDIOLOGY IP CONSULT  PHARMACY TO DOSE HEPARIN  GASTROENTEROLOGY IP CONSULT  GASTROENTEROLOGY IP CONSULT  NEUROLOGY IP CONSULT  SURGERY GENERAL IP CONSULT  UROLOGY IP CONSULT  CARDIAC REHAB IP CONSULT  PSYCHIATRY IP CONSULT  OCCUPATIONAL THERAPY ADULT IP CONSULT  PHYSICAL THERAPY ADULT IP CONSULT  SOCIAL WORK IP CONSULT  CARE COORDINATOR IP CONSULT  PSYCHIATRY IP CONSULT  PSYCHIATRY IP CONSULT    Code Status   Full Code    Time Spent on this Encounter   I, Shani Best MD, personally saw the patient today and spent greater than 30 minutes discharging this patient.       Shani Best MD  [facility]  ______________________________________________________________________    Physical Exam   Vital Signs: Temp: 98.2  F (36.8  C) Temp src: Oral BP: (!) 141/69 Pulse: 71 Heart Rate: 69 Resp: 18 SpO2: 93 % O2 Device: None (Room air) Oxygen Delivery: 2 LPM  Weight: 192 lbs 1.6 oz  Constitutional: NAD,   Neuropsyche:  alert but not oriented, answers " simple questions appropriately.   Respiratory:       Breathing comfortably, good air exchange, no wheezes, no crackles.   Cardiovascular: Regular rate and rhythm, trace - 1+ edema.  GI: soft, NT/ND, BS normal  Skin/Integumen: No acute rash, bruising or sign of bleeding         Primary Care Physician   Keyla Fonseca    Discharge Orders      Physical Therapy Referral      Follow-up and recommended labs and tests     You have a PCP appointment scheduled for 2 weeks 9/19 at 1:00pm. See below  Dr Keyla Fonseca  [phone] [address] [facility] [address]    Urology follow up Appointment has been scheduled for 9/17 @ 3:00pm. See below  [facility]  [address] [place] [address] [phone]      After discharge from the hospital, it is recommended that you make an appointment to see a general surgeon to discuss outpatient removal of your gallbladder (cholecystectomy). You were seen by Dr. William Brar (surgeon) during your hospital stay. You can call Dr. Brar's office at [phone] to schedule an appointment with him or one of his partners to discuss surgery.  Our clinic's name is [facility]. The address is [facility] [address], [address], [place], [place], [address]     Reason for your hospital stay    You were admitted due to abdominal and chest pain with evidence of a heart damage and pancreatitis. Your course was complicated by recurring confusion likely due to baseline memory problems. You were evaluated by a neurology, cardiology, gastroenterology and general surgery during your stay.     Follow-up and recommended labs and tests     Follow up with primary care provider, Keyla Fonseca, within 7 days to evaluate medication change, for hospital follow- up and regarding new diagnosis.  The following labs/tests are recommended: CMP, CBC. Appointment has been scheduled  Follow up with urology regarding urinary catheter within 2 weeks. Appointment has been scheduled  Discuss consideration  of cholecystectomy and cardiology follow up with your primary care doctor.     Activity    Your activity upon discharge: activity as tolerated     Tubes and drains    You are going home with the following tubes or drains: earl catheter.  Tube cares per hospital or home care instructions     Diet    Snacks/Supplements Adult: Other; Gelatein with meals; With Meals    Advance Diet as Tolerated: Regular Diet Adult; [id number] Calories: Moderate Consistent CHO (4-6 CHO units/meal); Low Fat Diet       Significant Results and Procedures   Most Recent 3 CBC's:  Recent Labs   Lab Test 08/29/19  0536 08/27/19  0544 08/26/19  0544   WBC 9.1 9.8 13.7*   HGB 9.2* 9.1* 9.4*   MCV 85 86 90    244 236     Most Recent 3 BMP's:  Recent Labs   Lab Test 09/03/19  0545 09/01/19  0540 08/31/19  0534    141 141   POTASSIUM 3.9 3.7 3.6   CHLORIDE 104 106 106   CO2 31 31 30   BUN 22 13 12   CR 0.95 0.77 0.69   ANIONGAP 3 4 5   LIOR 8.5 8.4* 8.4*   * 189* 199*     Most Recent 2 LFT's:  Recent Labs   Lab Test 08/30/19  0715 08/28/19  0557   AST 53* 83*   ALT 56 67   ALKPHOS 97 97   BILITOTAL 0.8 1.0     Most Recent 3 INR's:  Recent Labs   Lab Test 12/11/18  1028 12/04/18  0851 05/30/17  1410   INR 1.16* 1.07 0.96     Most Recent 3 Troponin's:  Recent Labs   Lab Test 08/25/19  1729 08/25/19  1401 08/24/19  0539  07/25/14 07/24/14   TROPI 1.121* 1.080* 0.839*   < >  --   --    TROPONIN  --   --   --   --  <0.012 0.012    < > = values in this interval not displayed.     Most Recent 3 BNP's:  Recent Labs   Lab Test 03/27/19  1330 04/24/12  1038 01/12/12  1234 09/13/11  1015 08/08/11  1730   NTBNPI 251  --  149  --   --    NTBNP  --  305*  --  138* 137*     Most Recent TSH and T4:  Recent Labs   Lab Test 11/20/18  1418  07/25/14   TSH 3.06   < > 2.15   T4  --   --  1.21    < > = values in this interval not displayed.     Most Recent Hemoglobin A1c:  Recent Labs   Lab Test 07/05/19  1416   A1C 5.9*   ,   Results for orders placed  or performed during the hospital encounter of 08/23/19   US Abdomen Limited    Narrative    LIMITED ABDOMINAL ULTRASOUND August 24, 2019 10:14 AM     HISTORY: Epigastric pain.    COMPARISON: CT abdomen/pelvis dated 9/24/2019.    FINDINGS:    Gallbladder: Mobile nonshadowing 0.6 cm hyperechoic focus in the  gallbladder could represent sludge ball or nonshadowing calculus. The  gallbladder is otherwise normal in appearance without wall thickening,  pericholecystic fluid, or sonographic Sepulveda's sign.    Bile ducts: CHD is normal diameter. No intrahepatic biliary  dilatation.    Liver: Normal.     Pancreas: Partially obscured by bowel gas, but grossly unremarkable,  where seen.     Right kidney: Cyst in the superior pole of the right kidney measures  2.3 x 1.8 x 1.9 cm and one in the midpole of the right kidney measures  1.4 x 1.6 x 1.4 cm. These are simple in appearance. The right kidney  is otherwise normal in appearance.      Impression    IMPRESSION:   1. Mobile nonshadowing sludge ball or nonshadowing calculus is noted  in the gallbladder. No evidence for acute cholecystitis is identified.  2. Simple right renal cysts.  3. No other significant abnormalities are identified. Please see also  CT abdomen and pelvis report from same date for findings of probable  acute pancreatitis.    PHONG MATTSON MD   CT Head w/o Contrast    Narrative    INDICATION: Confusion   TECHNIQUE: Routine CT Head without IV contrast. Dose reduction  techniques were used.  COMPARISON: MRI brain 3/27/2019    FINDINGS:  INTRACRANIAL CONTENTS: No intracranial hemorrhage, extraaxial  collection, or mass effect.  No CT evidence of acute infarct.  Unchanged mild to moderate diffuse parenchymal volume loss with ex  vacuo ventricular dilatation. Periventricular low attenuation most in  keeping with sequela of chronic microvascular ischemic change.    VISUALIZED PARANASAL SINUSES: No significant disease.     VISUALIZED MASTOIDS: No significant  disease.     VISUALIZED ORBITS: No significant disease.    OSSEOUS STRUCTURES AND SOFT TISSUES: Unremarkable.     CONCLUSION:  1.  No acute intracranial abnormality.    LEEANN CARLISLE MD   CT Abdomen Pelvis w Contrast    Narrative    CT ABDOMEN/PELVIS WITH CONTRAST August 24, 2019 9:56 AM    HISTORY: Abdominal pain and leukocytosis and groin pain.    TECHNIQUE: Images from diaphragm to pubic symphysis 91mL Isovue-370.  Radiation dose for this scan was reduced using automated exposure  control, adjustment of the mA and/or kV according to patient size, or  iterative reconstruction technique.    COMPARISON: 7/12/2019 CT abdomen/pelvis.    FINDINGS:   Abdomen and Pelvis: Minimal atelectasis at the lung bases. There is  increased attenuation of upper abdominal and peripancreatic fat and  peripancreatic fluid as well as fluid in the left upper quadrant  adjacent to spleen and splenic flexure of the colon. Findings  consistent with acute pancreatitis. Clinical correlation. No evidence  for pancreatic necrosis or loculated fluid collection.    Redemonstrated cryoablation changes along the lateral mid left kidney  with partially calcified and partially fat attenuation surrounding the  central 1.3 cm nodular area without significant change since  7/12/2019. Larger 4.5 cm low-attenuation area along the medial  superior left kidney identified. There is a previous heterogeneous  mass in the upper left kidney on earlier 10/5/2018 CT abdomen and this  is consistent with evolving second area of cryoablation. Clinical  correlation.    Redemonstrated right renal cysts. Indeterminate right adrenal nodule  1.5 cm in size is stable. Oval 2.4 x 1.7 cm left adrenal nodule is  unchanged. Probable tiny gallstone coronal image 16. Elevated right  hemidiaphragm. Normal-appearing liver, and spleen. No periaortic or  pelvic adenopathy.    Mcelroy catheter in the bladder. No acute appearing bowel abnormality.  Postop changes and prominent  degenerative changes lumbar spine.      Impression    IMPRESSION:   1. Consistent with acute pancreatitis.  2. Two evolving areas of cryoablation in medial upper left kidney and  lateral mid left kidney. No evidence for recurrent tumor.                 JEREMI BENAVIDEZ MD   NM MPI Single Rest Or Stress    Narrative     8/28/2019 3:30 PM  ISELA AGUIRRE  79 years  Male  1940.    Indication/Clinical History: Elevated troponin    Impression  Myocardial perfusion imaging could not be performed as patient could  not cooperate with the imaging.      Procedure    Patient was injected with 10.6 mCi of technetium 99-m Myoview. However  post injection myocardial perfusion imaging could not be performed due  to patient not cooperating.        ADALBERTO UPTON MD   CT Pancreas wo & w Contrast    Narrative    CT PANCREAS WITHOUT AND WITH CONTRAST  8/30/2019 10:31 AM     HISTORY:  Pancreatitis, epigastric pain.    COMPARISON: CT of the abdomen/pelvis dated 8/24/2019.    TECHNIQUE: CT of the abdomen was performed without contrast followed  by a CT of the abdomen and pelvis with intravenous contrast. 100 cc  intravenous contrast was utilized. Radiation dose for this scan was  reduced using automated exposure control, adjustment of the mA and/or  kV according to patient size, or iterative reconstruction technique.    FINDINGS: There is a small amount of ascites in the abdomen. This is  increased when compared to the previous exam. Again noted are  inflammatory changes surrounding the pancreas. The pancreas appears  thickened. The pancreas appears to enhance uniformly. No definite  peripancreatic fluid collections are identified.    Again noted are cystic lesions within the kidneys. These are not  significantly changed. Nodular densities in the adrenal glands are  unchanged.    Again noted is a dilated appendix which has a maximum diameter of  approximately 11 mm. This is not significantly changed. There is air  and fluid within  the lumen of the appendix. Moderate to large amount  of stool in the distal colon. The bowel otherwise appears grossly  unremarkable.    There are new small bilateral pleural effusions with adjacent  atelectasis.      Impression    IMPRESSION:  1. Slight increase in a small amount of ascites. New small bilateral  pleural effusions.  2. Changes about the pancreas consistent with acute pancreatitis. No  evidence for necrosis or loculated fluid collections.  3. Nonspecific dilated appendix is unchanged.    OLIVA BONILLA MD       Discharge Medications   Current Discharge Medication List      START taking these medications    Details   OLANZapine (ZYPREXA) 2.5 MG tablet Take 1 tablet (2.5 mg) by mouth 2 times daily as needed (agitation)  Qty: 30 tablet, Refills: 0    Comments: Future refills by PCP Dr. Keyla Fonseca with phone number [phone].  Associated Diagnoses: Dementia due to medical condition with behavioral disturbance      OLANZapine zydis (ZYPREXA) 5 MG ODT Take 1 tablet (5 mg) by mouth At Bedtime  Qty: 30 tablet, Refills: 0    Associated Diagnoses: Dementia due to medical condition with behavioral disturbance      spironolactone (ALDACTONE) 25 MG tablet Take 0.5 tablets (12.5 mg) by mouth daily  Qty: 30 tablet, Refills: 0    Comments: Future refills by PCP Dr. Keyla Fonseca with phone number [phone].  Associated Diagnoses: Hypertension goal BP (blood pressure) < 140/90      tamsulosin (FLOMAX) 0.4 MG capsule Take 1 capsule (0.4 mg) by mouth daily  Qty: 30 capsule, Refills: 0    Comments: Future refills by PCP Dr. Keyla Fonseca with phone number [phone].  Associated Diagnoses: Benign prostatic hyperplasia with urinary retention      vitamin B1 (THIAMINE) 100 MG tablet Take 1 tablet (100 mg) by mouth daily X 1 month.  Qty: 30 tablet, Refills: 0    Associated Diagnoses: Dementia due to medical condition with behavioral disturbance         CONTINUE these medications which have NOT CHANGED     Details   acetaminophen (TYLENOL) 325 MG tablet Take 2 tablets (650 mg) by mouth every 4 hours as needed for mild pain  Qty: 30 tablet, Refills: 1    Associated Diagnoses: Closed fracture of multiple ribs of left side, initial encounter      amLODIPine (NORVASC) 5 MG tablet Take 1 tablet (5 mg) by mouth daily  Qty: 90 tablet, Refills: 3    Associated Diagnoses: Hypertension goal BP (blood pressure) < 140/90      ascorbic acid (VITAMIN C) 500 MG tablet Take 500 mg by mouth daily       aspirin (ASA) 81 MG tablet Take 81 mg by mouth every evening       atorvastatin (LIPITOR) 80 MG tablet TAKE 1 TABLET BY MOUTH EVERY EVENING  Qty: 90 tablet, Refills: 1    Associated Diagnoses: Hyperlipidemia LDL goal <100      Calcium Carbonate-Vitamin D (CALCIUM + D) 600-200 MG-UNIT per tablet Take 2 tablets by mouth daily.  Qty: 100 tablet, Refills: 12      cholecalciferol (VITAMIN  -D) 1000 UNITS capsule Take 1 capsule by mouth daily      clopidogrel (PLAVIX) 75 MG tablet Take 1 tablet (75 mg) by mouth daily  Qty: 90 tablet, Refills: 0    Associated Diagnoses: Right bundle branch block; NSTEMI (non-ST elevated myocardial infarction) (H)      Cyanocobalamin (VITAMIN B-12 PO) Take 500 mcg by mouth daily       ferrous sulfate (FE TABS) 325 (65 Fe) MG EC tablet Take 1 tablet (325 mg) by mouth daily  Qty: 90 tablet, Refills: 3    Associated Diagnoses: Iron deficiency anemia due to chronic blood loss      ibuprofen (ADVIL/MOTRIN) 800 MG tablet Take 800 mg by mouth every 8 hours as needed for moderate pain      lisinopril (PRINIVIL/ZESTRIL) 40 MG tablet TAKE 1 TABLET(40 MG) BY MOUTH DAILY  Qty: 90 tablet, Refills: 0    Associated Diagnoses: Essential hypertension with goal blood pressure less than 140/90      metFORMIN (GLUCOPHAGE-XR) 500 MG 24 hr tablet TAKE 2 TABLETS BY MOUTH TWICE DAILY WITH MEALS.  Qty: 360 tablet, Refills: 0    Associated Diagnoses: Type 2 diabetes mellitus with other circulatory complication, without long-term  current use of insulin (H)      nitroGLYcerin (NITROSTAT) 0.4 MG sublingual tablet Place 1 tablet (0.4 mg) under the tongue every 5 minutes as needed for chest pain  Qty: 25 tablet, Refills: 1    Associated Diagnoses: Chest pain, unspecified type      omeprazole (PRILOSEC) 20 MG DR capsule TAKE ONE CAPSULE BY MOUTH TWICE DAILY  Qty: 180 capsule, Refills: 0    Associated Diagnoses: Gastroesophageal reflux disease without esophagitis      oxyCODONE (OXYCONTIN) 30 MG 12 hr tablet Take 1 tablet (30 mg) by mouth every morning  Qty: 30 tablet, Refills: 0    Associated Diagnoses: Failed back surgical syndrome; Lumbar back pain with radiculopathy affecting left lower extremity      oxyCODONE (ROXICODONE) 5 MG tablet Take 1 daily as needed  for severe pain /at bedtime may have up to 2 per day max 40 per month  Qty: 40 tablet, Refills: 0    Associated Diagnoses: Lumbar back pain with radiculopathy affecting left lower extremity; Failed back surgical syndrome      polyethylene glycol (MIRALAX/GLYCOLAX) packet Take 17 g by mouth daily as needed for constipation      sertraline (ZOLOFT) 100 MG tablet TAKE 2 TABLETS(200 MG) BY MOUTH DAILY  Qty: 180 tablet, Refills: 1    Associated Diagnoses: Severe episode of recurrent major depressive disorder, without psychotic features (H)      traZODone (DESYREL) 100 MG tablet Take 300 mg by mouth At Bedtime         STOP taking these medications       blood glucose (ONETOUCH ULTRA) test strip Comments:   Reason for Stopping:         blood glucose monitoring (NO BRAND SPECIFIED) meter device kit Comments:   Reason for Stopping:         famotidine (PEPCID) 20 MG tablet Comments:   Reason for Stopping:         hydrochlorothiazide (HYDRODIURIL) 25 MG tablet Comments:   Reason for Stopping:         Lancets Misc. (SELECT-LITE DEVICE/LANCETS) KIT Comments:   Reason for Stopping:         LIFESCAN FINEPOINT LANCETS MISC Comments:   Reason for Stopping:         order for DME Comments:   Reason for  "Stopping:         order for DME Comments:   Reason for Stopping:         order for DME Comments:   Reason for Stopping:         order for DME Comments:   Reason for Stopping:         sucralfate (CARAFATE) 1 GM tablet Comments:   Reason for Stopping:             Allergies   Allergies   Allergen Reactions     Prozac [Fluoxetine] Other (See Comments)     \"I went crazy.\"       Benadryl [Diphenhydramine Hcl] Other (See Comments)     Starts to shake, and paranoid     Codeine Itching     Diphenhydramine Visual Disturbance     Hallucinations, See [facility] records scanned on 7/16/15     Labetalol Other (See Comments)     See [facility] records scanned on 7/16/15  Bradycardia down to 30 on holter, dizziness. Stopped med and symptoms resolved     Metoprolol Other (See Comments)     Bradycardia even at 12.5 mg     Morphine Visual Disturbance     "

## 2019-09-03 NOTE — PLAN OF CARE
Patient discharged at 5:44 PM to home.  IV was discontinued by nursing assistant.  Belongings returned to patient.  Discharge instructions and medications reviewed with patient and his wife.  Patient's wife verbalized understanding and all questions were answered.  Prescriptions given to patient.  At time of discharge, patient condition was stable and left the unit via wheelchair escorted by nursing assistants.

## 2019-09-04 NOTE — PLAN OF CARE
Physical Therapy Discharge Summary    Reason for therapy discharge:    Discharged to home with home therapy.    Progress towards therapy goal(s). See goals on Care Plan in [facility] electronic health record for goal details.  Goals not met.  Barriers to achieving goals:   limited tolerance for therapy and discharge from facility.    Therapy recommendation(s):    TCU was recommended but family took patient home

## 2019-09-04 NOTE — LETTER
[facility] Home  Complex Care Plan  About Me:    Patient Name:  Vincent Aguirre    YOB: 1940  Age:         79 year old   [facility] MRN:    [id number] Telephone Information:  Home Phone [phone]   Mobile [phone]       Address:  [address] [place] [address] Email address:  [email or web address]      Emergency Contact(s)    Name Relationship Lgl Grd Work Phone Home Phone Mobile Phone   1. LEONARD AGUIRRE Spouse No  [phone]   2. SWATHI AGUIRRE Son No  [phone]    3. VALERIE MORLEY Daughter No  [phone]            Primary language:  English     needed? No   [facility] [employer] [facility]:  [phone] op. 1  Other communication barriers: Cognitive impairment, Physical impairment  Preferred Method of Communication:  Mail  Current living arrangement: I live in a private home with family  Mobility Status/ Medical Equipment: Dependent/Assisted by Another    Health Maintenance  Health Maintenance Reviewed: Due/Overdue   Health Maintenance Due   Topic Date Due     URINE DRUG SCREEN  1940     ZOSTER IMMUNIZATION (1 of 2) 03/01/1991     MEDICARE ANNUAL WELLNESS VISIT  03/01/2006     EYE EXAM  11/15/2015     MICROALBUMIN  09/20/2018     DIABETIC FOOT EXAM  04/25/2019     INFLUENZA VACCINE (1) 09/01/2019     PHQ-9  08/26/2019       My Access Plan  Medical Emergency 911   Primary Clinic Line [facility] - [phone]   24 Hour Appointment Line [phone] or  [phone]) (toll-free)   24 Hour Nurse Line [phone] (toll-free)   Preferred Urgent Care [facility], [phone]   Preferred [facility] [place] [place]  [phone]   Preferred Pharmacy [id number] - [place] - [address] AT [facility]     Behavioral Health Crisis Line The National Suicide Prevention Lifeline at 1-401.156.5352 or 911             My Care Team  Members  Patient Care Team       Relationship Specialty Notifications Start End    Keyla Fonseca MD PCP - General [facility] All results, Admissions 11/28/16     Phone: [phone] Fax: [phone] [address] [facility] [address]    Keyla Fonseca MD Referring Physician Family Practice  10/24/18     Phone: [phone] Fax: [phone]         [address] [facility] [address]    Lucius Capone MD MD Urology  10/24/18     Phone: [phone] Fax: [phone]         [address] [facility] [address]    Julia Cross, RN Registered Nurse Urology  10/24/18     Phone: [phone]         Ramya Velez MD Assigned PCP   4/28/19     Phone: [phone] Fax: [phone] [address] [facility] [address]    Ekaterina Shook RN Lead Care Coordinator Primary Care - [facility] Admissions 9/4/19     Phone: [phone] Fax: [phone]                My Care Plans  Self Management and Treatment Plan  Goals and (Comments)  Goals        General    Problem Solving (pt-stated)     Notes - Note created  9/4/2019 11:22 AM by Ekaterina Shook RN    Goal Statement: Family wants to care for patient at home    Measure of Success: When patient is not so confused    Supportive Steps to Achieve: Medications as advised  Consider private pay home care for night time  Family alternate caring for patient at night so other family members can sleep    Barriers: Patient very confused and agitated    Strengths: Family support    Date to Achieve By: November 2019    Patient expressed understanding of goal: Yes               Action Plans on File:            Depression          Advance Care Plans/Directives Type:        My Medical and Care Information  Problem List   Patient Active Problem List   Diagnosis     Tension headache     Parotid mass     Diplopia     Neuropathy     Neck pain     B12 deficiency     Tear of meniscus of left knee     Hypertension goal BP (blood pressure) < 140/90     C6-7  disc with radiculopathy     Coronary atherosclerosis     Right bundle branch block     Chest pain     Anatomic airway obstruction     Abnormal antinuclear antibody titer     Osteoarthritis, knee     Moderate major depression (H)     Orchitis, epididymitis, and epididymo-orchitis     Malignant neoplasm of kidney excluding renal pelvis (H)     Renal mass, left     Health Care Home     Insomnia     Abdominal pain, right upper quadrant     Esophageal reflux     Hard of hearing     Constipation     NSTEMI (non-ST elevated myocardial infarction) (H)     Myocardial infarction, nontransmural (H)     Acute myocardial infarction of inferolateral wall (H)     Obesity     Sinoatrial node dysfunction (H)     Right bundle branch block (RBBB)     Essential hypertension     Hyperlipidemia     Type 2 diabetes mellitus with other circulatory complication, without long-term current use of insulin (H)     Thyroid nodule     Hip pain, bilateral     Claudication (H)     Bilateral low back pain with right-sided sciatica     Bilateral low back pain with left-sided sciatica     ACS (acute coronary syndrome) (H)     Chronic bilateral low back pain with sciatica, sciatica laterality unspecified     Severe episode of recurrent major depressive disorder, without psychotic features (H)     Renal hematoma     Skin picking habit     Iron deficiency anemia, unspecified iron deficiency anemia type     Atypical chest pain     CKD (chronic kidney disease) stage 3, GFR 30-59 ml/min (H)     Depression     Prostate cancer (H)     Sinus bradycardia, chronic     Gastroesophageal reflux disease with esophagitis     Physical deconditioning      Current Medications and Allergies:  See printed Medication Report.    Care Coordination Start Date: 9/4/2019   Frequency of Care Coordination: monthly   Form Last Updated: 09/04/2019

## 2019-09-04 NOTE — PROGRESS NOTES
"Clinic Care Coordination Contact    Clinic Care Coordination Contact  OUTREACH    Referral Information:  Referral Source: IP Handoff    Primary Diagnosis: Cognitive Impairment  Chief Complaint   Patient presents with     Clinic Care Coordination - [facility]     RN   Universal Utilization: Patient initially presented to [facility] ED with a troponin of over .5 He was transferred to [facility]. He was inpatient from 8/23/19 to 9/3/19  Clinic Utilization  Difficulty keeping appointments:: No  Compliance Concerns: No  No-Show Concerns: No  No PCP office visit in Past Year: No  Utilization    Last refreshed: 9/4/2019  7:46 AM:  Hospital Admissions 3           Last refreshed: 9/4/2019  7:46 AM:  ED Visits 4           Last refreshed: 9/4/2019  7:46 AM:  No Show Count (past year) 1              Current as of: 9/4/2019  7:46 AM          Clinical Concerns:    Current Medical Concerns:  Patient had abdominal pain which resolved but thought to be secondary to pancreatitis. I has been recommended that patient have Iveth.  Patient was extremely confused during hospital stay and required sitter 24/7 for the entire stay.    Patient's spouse, Sheyla, states they had a terrible first night home. Patient remains very confused and was agitated all night long. She states he throws his legs off the side of the bed and then slides out of bed. She states he is not able to walk or stand on his own at all. He was walking with a walker or his arm crutches previous to admission.  Patient has a earl catheter in place.     Sheyla states their daughter and son live with them. Pat is not able to lift patient but her son and daughter are able to assist him to the bathroom. She states he is so confused. He wants to be in bed, then he wants to be in the chair, then he wants to go to the  for a BM and all of this occurs within five minutes.          Sheyla states the [facility] social workde told them that \"none of the rehabs would take the patient if he " "needed a sitter\" so no referrals were made.       Patient states she would really like patient to have o/p therapy that was ordered for strengthening but she can not take him in this state of agitation. She also understands they want him to have gallbladder surgery but she is not willing to \"put him through anything more currently.\" It is doubtful that he could tolerate a surgery at this time.        Current Behavioral Concerns: Very confused, agitated, calling out,  Attempting to get up on his own     Education Provided to patient: We discussed private pay sitters. Discussed it may take a few days for new medications and orientation to his home to help with confusion.   Advised Pat to call [email or web address] and determine if there are any services they may be able to provide to them.  Patient states they are part of the [facility] and do not have the financial support as other bands do.       Pain  Pain (GOAL):: No    Health Maintenance Reviewed: Due/Overdue   Health Maintenance Due   Topic Date Due     URINE DRUG SCREEN  1940     ZOSTER IMMUNIZATION (1 of 2) 03/01/1991     MEDICARE ANNUAL WELLNESS VISIT  03/01/2006     EYE EXAM  11/15/2015     MICROALBUMIN  09/20/2018     DIABETIC FOOT EXAM  04/25/2019     INFLUENZA VACCINE (1) 09/01/2019     PHQ-9  08/26/2019     Clinical Pathway: None    Medication Management:  Family manages all medications     Functional Status:  Dependent ADLs:: Wheelchair-with assist, Grooming, Dressing, Bathing, Transfers, Toileting  Dependent IADLs:: Transportation, Money Management, Medication Management, Meal Preparation, Shopping, Laundry, Cooking, Cleaning  Bed or wheelchair confined:: Yes  Mobility Status: Dependent/Assisted by Another  Fallen 2 or more times in the past year?: No  Any fall with injury in the past year?: No    Living Situation:  Current living arrangement:: I live in a private home with family  Type of residence:: Private home - " stairs    Diet/Exercise/Sleep:  Diet:: Diabetic diet  Inadequate nutrition (GOAL):: No  Food Insecurity: No  Tube Feeding: No  Exercise:: Unable to exercise  Inadequate activity/exercise (GOAL):: No  Significant changes in sleep pattern (GOAL): No    Transportation:  Transportation concerns (GOAL):: No  Transportation means:: Accessible car, Family. They have a lift van for transportation     Psychosocial:  Congregation or spiritual beliefs that impact treatment:: No  Mental health DX:: Yes  Mental health DX how managed:: Medication  Mental health management concern (GOAL):: No  Informal Support system:: Family, Spouse     Financial/Insurance:   Financial/Insurance concerns (GOAL):: No     Resources and Interventions:  Current Resources:       Supplies used at home:: Other(Mcelroy catheter)  Equipment Currently Used at Home: wheelchair, manual, walker, standard, shower chair, crutches    Advance Care Plan/Directive  Advanced Care Plans/Directives on file:: No  Advanced Care Plan/Directive Status: Considering Options    Referrals Placed: [facility] office     Goals:   Goals        General    Problem Solving (pt-stated)     Notes - Note created  9/4/2019 11:22 AM by Ekaterina Shook RN    Goal Statement: Family wants to care for patient at home    Measure of Success: When patient is not so confused    Supportive Steps to Achieve: Medications as advised  Consider private pay home care for night time  Family alternate caring for patient at night so other family members can sleep    Barriers: Patient very confused and agitated    Strengths: Family support    Date to Achieve By: November 2019    Patient expressed understanding of goal: Yes            Patient/Caregiver understanding: Caregiver has understanding, but is currently overwhelmed    Outreach Frequency: monthly  Future Appointments              In 6 days Keyla Fonseca MD [facility], [place]    In 1 week Phuong Pucktet PA [facility] and [facility], [facility]    In 10 months [facility], [facility]    In 10 months Lucius Capone MD [facility] and [facility] for Prostate and Urologic C[facility], [facility]      Plan: Spouse and children are going to discuss options for patient care and call clinic care coordinator back.     Patient will attend PCP appointment on 9/10/19.    Clinic care coordinator will continue to follow.     Ekaterina Shook RN, [facility] - Primary Care Clinic RN Coordinator  [facility]   9/4/2019    11:46 AM  [phone]

## 2019-09-04 NOTE — PLAN OF CARE
Occupational Therapy Discharge Summary    Reason for therapy discharge:    Discharged to home with home therapy.    Progress towards therapy goal(s). See goals on Care Plan in [facility] electronic health record for goal details.  Goals not met.  Barriers to achieving goals:   discharge from facility.    Therapy recommendation(s):    Continued therapy is recommended.  Rationale/Recommendations:  To maximize I in ADL/IADL. TCU was recommended however family chose to take patient home..

## 2019-09-06 NOTE — ED TRIAGE NOTES
Pt returned home from 13 days at [facility] on Tues, too weak to walk; groin pain, bleeding infected ulcers in arms, and altered mental status. Wife is caregiver at home. She notes that his mental status has not returned to normal since coming home from the hospital. She was advised on discharge that could take 1 week; it has only been a couple days, but no improvement. Also pt is unable to walk/bear weight; this is new over the past couple weeks. Upon arrival at triage desk pt appears disheveled, confused, dirty clothing, dirty wheelchair, catheter bag dragging on floor, bleeding wounds on arm with plastic bag wrapped around it. Wife acknowledges she is having trouble meeting his needs at home.

## 2019-09-06 NOTE — DISCHARGE INSTRUCTIONS
If symptoms worsen after leaving here, return or call 911.    Try the additional dose of Oxycontin 10mg at bedtime.    Cut back the Zoloft to 1 1/2 tabs daily.    Start the Remeron 15mg at bedtime.    Try melatonin 5mg at bedtime.

## 2019-09-06 NOTE — PHARMACY
Medication Reconciliation completed with patient wife/cargiver. Patient has had all AM medications today. Wife has not started 2.5 mg Zyprexa yet, stated she felt the 5 mg was ineffective at bedtime.    List is accurate.

## 2019-09-06 NOTE — ED AVS SNAPSHOT
[facility]  [address] [address]  Phone:  [phone]  Fax:  [phone]                                    Vincent Mishra   MRN: [id number]    Department:  [facility] Department   Date of Visit:  9/6/2019           After Visit Summary Signature Page    I have received my discharge instructions, and my questions have been answered. I have discussed any challenges I see with this plan with the nurse or doctor.    ..........................................................................................................................................  Patient/Patient Representative Signature      ..........................................................................................................................................  Patient Representative Print Name and Relationship to Patient    ..................................................               ................................................  Date                                   Time    ..........................................................................................................................................  Reviewed by Signature/Title    ...................................................              ..............................................  Date                                               Time          22EPIC Rev 08/18

## 2019-09-06 NOTE — ED PROVIDER NOTES
"  History     Chief Complaint   Patient presents with     Groin Pain     home from 13 days at [facility] on Tues, too weak to walk; groin pain, bleeding infected ulcers in arms.      Generalized Weakness     HPI  Vincent Mishra is a 79 year old male who presents with his wife.  He is only been home from the hospital for 3 days prior to being in the [facility] for 13 days.  He is not doing well at all while at home.  He has increased confusion, he is up all night, he complains of pain, he continues to pick at his chronic wounds on his extremities.  Referral to his [facility] discharge summary details extensive list of problems that were dealt with in the hospital including coronary artery disease and chronic pain as well as pancreatitis.  At this point the patient's spouse is his primary caregiver and she is essentially worn out as she has not been able to sleep during the night she has to sit with him all night long and all day long.  They had tried him on a new medication for sleep, trying olanzapine but so far that has not worked.  He is still taking trazodone 300 mg and that also was not allowing him to sleep.  He has fairly severe back problems which resulted in chronic pain in the groin and leg.  He has been deemed a nonsurgical candidate from 2 different neurosurgeons.  He is also had discovery of gallstones while in the hospital and again was felt to be an poor surgical candidate.  The history is primarily obtained from patient's spouse as the patient is very hard of hearing and not particularly good historian.    Patient was also sent home with Mcelroy.  He had a attempt at removal of Mcelroy while in [facility] and had excessive retained urine to the left the Mcelroy in place.    Allergies:  Allergies   Allergen Reactions     Prozac [Fluoxetine] Other (See Comments)     \"I went crazy.\"       Benadryl [Diphenhydramine Hcl] Other (See Comments)     Starts to shake, and paranoid     Codeine " Itching     Diphenhydramine Visual Disturbance     Hallucinations, See [facility] records scanned on 7/16/15     Labetalol Other (See Comments)     See [facility] records scanned on 7/16/15  Bradycardia down to 30 on holter, dizziness. Stopped med and symptoms resolved     Metoprolol Other (See Comments)     Bradycardia even at 12.5 mg     Morphine Visual Disturbance       Problem List:    Patient Active Problem List    Diagnosis Date Noted     Chest pain 01/12/2012     Priority: High     Gastroesophageal reflux disease with esophagitis 08/21/2019     Priority: Medium     Physical deconditioning 08/21/2019     Priority: Medium     CKD (chronic kidney disease) stage 3, GFR 30-59 ml/min (H) 07/05/2019     Priority: Medium     Atypical chest pain 03/28/2019     Priority: Medium     Skin picking habit 02/26/2019     Priority: Medium     Iron deficiency anemia, unspecified iron deficiency anemia type 02/26/2019     Priority: Medium     Renal hematoma 12/11/2018     Priority: Medium     Severe episode of recurrent major depressive disorder, without psychotic features (H) 02/20/2018     Priority: Medium     Chronic bilateral low back pain with sciatica, sciatica laterality unspecified 09/20/2017     Priority: Medium     ACS (acute coronary syndrome) (H) 05/30/2017     Priority: Medium     Regarding his cardiac history, patient underwent PCI to the RCA 2011 at [facility] in [place] and to the mid LAD in 2014 in [facility].  He underwent cardiac catheterization 2014 without evidence of worsening obstructive disease.  Patient was later admitted in July 2015 for an STEMI and placed on DVT at that time.  Angiogram 4/2019 - normal        Bilateral low back pain with right-sided sciatica 04/03/2017     Priority: Medium     Bilateral low back pain with left-sided sciatica 04/03/2017     Priority: Medium     Claudication (H) 05/09/2016     Priority: Medium     Hip pain, bilateral  04/05/2016     Priority: Medium     Thyroid nodule 10/30/2015     Priority: Medium     Type 2 diabetes mellitus with other circulatory complication, without long-term current use of insulin (H) 10/22/2015     Priority: Medium     Myocardial infarction, nontransmural (H) 07/16/2015     Priority: Medium     Sinoatrial node dysfunction (H) 07/16/2015     Priority: Medium     Right bundle branch block (RBBB) 07/16/2015     Priority: Medium     Essential hypertension 07/16/2015     Priority: Medium     Hyperlipidemia 07/16/2015     Priority: Medium     Sinus bradycardia, chronic 07/16/2015     Priority: Medium     NSTEMI (non-ST elevated myocardial infarction) (H)      Priority: Medium     07-25-14 CATH- RCA, L.Main and CFX  had minor luminal irregularites. Mid LAD high grade stenosis 80-90%.Stent placed to mid LAD       Hard of hearing 06/05/2014     Priority: Medium     Constipation 06/05/2014     Priority: Medium     Abdominal pain, right upper quadrant 03/26/2014     Priority: Medium     Esophageal reflux 03/26/2014     Priority: Medium     Insomnia 10/21/2013     Priority: Medium     Health Care Home 10/08/2013     Priority: Medium     *See Letters for [facility] Care Plan: My Access Plan           Renal mass, left 12/05/2012     Priority: Medium     Malignant neoplasm of kidney excluding renal pelvis (H) 11/16/2012     Priority: Medium     SURGERY, PATHOLOGY, AND TREATMENT HISTORY FOR KIDNEY CANCER  11/12/12:  Left lower pole kidney mass biopsy: Renal cell carcinoma, clear cell type; Viktor grade 1  12/5/12 Left percutaneous cryoablation of two renal tumors.  These were located adjacent to one another in the lateral kidney.  Dr Asif Capone, [facility].  Problem list name updated by automated process. Provider to review       Moderate major depression (H) 09/17/2012     Priority: Medium     Orchitis, epididymitis, and epididymo-orchitis 09/17/2012     Priority: Medium     Osteoarthritis, knee  09/05/2012     Priority: Medium     Abnormal antinuclear antibody titer 03/12/2012     Priority: Medium     Anatomic airway obstruction 02/09/2012     Priority: Medium     fixed obstruction on PFTs with dyspnea       Right bundle branch block 07/26/2011     Priority: Medium     Seen on EKG 2/2011.  Different configuration on 7/26/2011 s/p MI - but same at [place] post-MI as here        Coronary atherosclerosis 07/14/2011     Priority: Medium     7/2011 ([place]): s/p MI, PTCA - RCA    Coronary Atherosclerosis of Unspecified Type of Vessel, Native or Graft  CAD (coronary artery disease) 7/2011 ([place]): s/p MI, PTCA - RCA       Acute myocardial infarction of inferolateral wall (H) 07/14/2011     Priority: Medium     Overview:   IMO Update       Obesity 07/14/2011     Priority: Medium     Hypertension goal BP (blood pressure) < 140/90 02/28/2011     Priority: Medium     C6-7 disc with radiculopathy 02/28/2011     Priority: Medium     consider second Epidural steroid injection at [facility].  Continue PT.  Await accupuncture       Tear of meniscus of left knee 02/11/2011     Priority: Medium     Neck pain 11/12/2010     Priority: Medium     B12 deficiency 11/12/2010     Priority: Medium     Diplopia 11/02/2010     Priority: Medium     Neuropathy 11/02/2010     Priority: Medium     Parotid mass 10/04/2010     Priority: Medium     Tension headache 09/23/2010     Priority: Medium     Depression 04/21/2009     Priority: Medium     Prostate cancer (H) 04/21/2009     Priority: Medium     Overview:   4/09 Robotic prostatectomy          Past Medical History:    Past Medical History:   Diagnosis Date     CAD (coronary artery disease) 7/26/2011     Cancer of kidney (H)      Chest pain 1/12/2012     Coronary artery disease      History of angina      History of blood transfusion      Hyperlipidaemia      Hyperlipidemia LDL goal <100 9/23/2010     Malignant neoplasm (H)      Myocardial infarction (H)      NSTEMI (non-ST elevated myocardial  infarction) (H)      Other and unspecified hyperlipidemia      Right bundle branch block      Type II or unspecified type diabetes mellitus without mention of complication, not stated as uncontrolled      Unspecified essential hypertension        Past Surgical History:    Past Surgical History:   Procedure Laterality Date     ARTHROSCOPY KNEE  2/11/2011    ARTHROSCOPY KNEE performed by LEY, JEFFREY DUANE at [facility]     ARTHROSCOPY KNEE WITH MEDIAL MENISCECTOMY  6/26/2012    Procedure: ARTHROSCOPY KNEE WITH MEDIAL MENISCECTOMY;  Right Knee Arthroscopy With Medial Menisectomy;  Surgeon: Ley, Jeffrey Duane, MD;  Location: [facility]     BACK SURGERY      back surgery x4     BIOPSY       CARDIAC SURGERY  7/2011    stentt placed     COLONOSCOPY       CORONARY ANGIOGRAPHY ADULT ORDER  07-25-14    RCA, L.Main and CFX  had minor luminal irregularites. Mid LAD high grade stenosis 80-90%.Stent placed to mid LAD     CV CORONARY ANGIOGRAM  3/28/2019    Procedure: CV CORONARY ANGIOGRAM;  Surgeon: Dominguez Mishra MD;  Location: [facility] CARDIAC CATH LAB     ESOPHAGOSCOPY, GASTROSCOPY, DUODENOSCOPY (EGD), COMBINED  10/25/2012    Procedure: COMBINED ESOPHAGOSCOPY, GASTROSCOPY, DUODENOSCOPY (EGD), BIOPSY SINGLE OR MULTIPLE;  Gastroscopy  ;  Surgeon: Lucius Dasilva MD;  Location: [facility]     HEART CATH, ANGIOPLASTY  07-25-14    mid LAD      ORTHOPEDIC SURGERY       back surgery       Family History:    Family History   Problem Relation Age of Onset     Cancer Mother      Depression Mother      Diabetes Father      Hypertension Father      Heart Disease Father      Mental Illness Father      Heart Disease Maternal Grandfather      Substance Abuse Maternal Grandfather      Alcohol/Drug Paternal Grandmother      Substance Abuse Paternal Grandmother      Heart Disease Paternal Grandfather      Alcohol/Drug Paternal Grandfather      Substance Abuse Paternal Grandfather      Heart Disease Brother      Diabetes Brother      Substance Abuse  Brother      Obesity Brother      Diabetes Brother      Obesity Sister      Alcohol/Drug Daughter      Depression Daughter      Obesity Sister      Diabetes Sister      Obesity Sister      Diabetes Sister      Alcohol/Drug Sister      Cancer Sister 55        possible kidney cancer     Substance Abuse Sister      Alcohol/Drug Son      Substance Abuse Son      Diabetes Son      Alcohol/Drug Son      Substance Abuse Son      Obesity Daughter      Diabetes Daughter      Hypertension Daughter      Bipolar Disorder Other        Social History:  Marital Status:   [2]  Social History     Tobacco Use     Smoking status: Never Smoker     Smokeless tobacco: Never Used   Substance Use Topics     Alcohol use: No     Alcohol/week: 0.0 oz     Drug use: No        Medications:      amLODIPine (NORVASC) 5 MG tablet   aspirin (ASA) 81 MG tablet   atorvastatin (LIPITOR) 80 MG tablet   Calcium Carbonate-Vitamin D (CALCIUM + D) 600-200 MG-UNIT per tablet   cholecalciferol (VITAMIN  -D) 1000 UNITS capsule   clopidogrel (PLAVIX) 75 MG tablet   Cyanocobalamin (VITAMIN B-12 PO)   ferrous sulfate (FE TABS) 325 (65 Fe) MG EC tablet   lisinopril (PRINIVIL/ZESTRIL) 40 MG tablet   metFORMIN (GLUCOPHAGE-XR) 500 MG 24 hr tablet   OLANZapine zydis (ZYPREXA) 5 MG ODT   omeprazole (PRILOSEC) 20 MG DR capsule   oxyCODONE (OXYCONTIN) 30 MG 12 hr tablet   oxyCODONE (ROXICODONE) 5 MG tablet   sertraline (ZOLOFT) 100 MG tablet   spironolactone (ALDACTONE) 25 MG tablet   tamsulosin (FLOMAX) 0.4 MG capsule   traZODone (DESYREL) 100 MG tablet   vitamin B1 (THIAMINE) 100 MG tablet   acetaminophen (TYLENOL) 325 MG tablet   ascorbic acid (VITAMIN C) 500 MG tablet   ibuprofen (ADVIL/MOTRIN) 800 MG tablet   nitroGLYcerin (NITROSTAT) 0.4 MG sublingual tablet   OLANZapine (ZYPREXA) 2.5 MG tablet   polyethylene glycol (MIRALAX/GLYCOLAX) packet         Review of Systems   Constitutional: Negative for chills and fever.   HENT: Positive for hearing loss. Negative  for rhinorrhea and sore throat.    Respiratory: Negative for cough and shortness of breath.    Cardiovascular: Negative for chest pain.   Gastrointestinal: Positive for abdominal pain (epigastric).   Genitourinary: Positive for difficulty urinating (has catheter).   Musculoskeletal: Positive for back pain.   Skin: Positive for wound (from chronic picking).   Psychiatric/Behavioral: Positive for confusion and sleep disturbance.       Physical Exam   BP: (!) 169/80  Pulse: 70  Heart Rate: 64  Temp: 98  F (36.7  C)  Resp: 16  Weight: 87.5 kg (193 lb)  SpO2: 99 %      Physical Exam   Constitutional: He appears well-developed and well-nourished. No distress.   HENT:   Head: Normocephalic and atraumatic.   Mucous membranes dry   Eyes: Pupils are equal, round, and reactive to light. EOM are normal.   Neck: Normal range of motion. Neck supple.   Cardiovascular: Normal rate, regular rhythm and normal heart sounds.   Pulmonary/Chest: Effort normal and breath sounds normal.   Abdominal: Soft. He exhibits no distension. There is no tenderness. There is no guarding.   Neurological: He is alert.   Skin: Skin is warm. Rash (many picked at areas on arms and legs) noted.       ED Course        Procedures               EKG Interpretation:      Interpreted by Nawaf Gama MD  Time reviewed: 1525  Symptoms at time of EKG: None   Rhythm: atrial fibrillation - controlled  Rate: Normal  Axis: Normal  Ectopy: none  Conduction: right bundle branch block (incomplete)  ST Segments/ T Waves: No ST-T wave changes  Q Waves: none  Comparison to prior: Unchanged from 09/02/2019    Clinical Impression: no acute changes                Critical Care time:  none               Results for orders placed or performed during the hospital encounter of 09/06/19 (from the past 24 hour(s))   CBC with platelets differential   Result Value Ref Range    WBC 13.5 (H) 4.0 - 11.0 10e9/L    RBC Count 3.58 (L) 4.4 - 5.9 10e12/L    Hemoglobin 10.1 (L) 13.3 - 17.7  g/dL    Hematocrit 32.3 (L) 40.0 - 53.0 %    MCV 90 78 - 100 fl    MCH 28.2 26.5 - 33.0 pg    MCHC 31.3 (L) 31.5 - 36.5 g/dL    RDW 15.9 (H) 10.0 - 15.0 %    Platelet Count 634 (H) 150 - 450 10e9/L    Diff Method Automated Method     % Neutrophils 87.8 %    % Lymphocytes 6.0 %    % Monocytes 4.8 %    % Eosinophils 0.9 %    % Basophils 0.1 %    % Immature Granulocytes 0.4 %    Nucleated RBCs 0 0 /100    Absolute Neutrophil 11.8 (H) 1.6 - 8.3 10e9/L    Absolute Lymphocytes 0.8 0.8 - 5.3 10e9/L    Absolute Monocytes 0.7 0.0 - 1.3 10e9/L    Absolute Eosinophils 0.1 0.0 - 0.7 10e9/L    Absolute Basophils 0.0 0.0 - 0.2 10e9/L    Abs Immature Granulocytes 0.1 0 - 0.4 10e9/L    Absolute Nucleated RBC 0.0    Comprehensive metabolic panel   Result Value Ref Range    Sodium 140 133 - 144 mmol/L    Potassium 3.9 3.4 - 5.3 mmol/L    Chloride 104 94 - 109 mmol/L    Carbon Dioxide 29 20 - 32 mmol/L    Anion Gap 7 3 - 14 mmol/L    Glucose 134 (H) 70 - 99 mg/dL    Urea Nitrogen 12 7 - 30 mg/dL    Creatinine 0.87 0.66 - 1.25 mg/dL    GFR Estimate 82 >60 mL/min/[1.73_m2]    GFR Estimate If Black >90 >60 mL/min/[1.73_m2]    Calcium 8.6 8.5 - 10.1 mg/dL    Bilirubin Total 0.7 0.2 - 1.3 mg/dL    Albumin 2.8 (L) 3.4 - 5.0 g/dL    Protein Total 6.7 (L) 6.8 - 8.8 g/dL    Alkaline Phosphatase 122 40 - 150 U/L    ALT 39 0 - 70 U/L    AST 39 0 - 45 U/L   Troponin I   Result Value Ref Range    Troponin I ES 1.036 (HH) 0.000 - 0.045 ug/L   Amylase   Result Value Ref Range    Amylase 118 (H) 30 - 110 U/L   UA reflex to Microscopic and Culture   Result Value Ref Range    Color Urine Yellow     Appearance Urine Clear     Glucose Urine Negative NEG^Negative mg/dL    Bilirubin Urine Negative NEG^Negative    Ketones Urine Negative NEG^Negative mg/dL    Specific Gravity Urine 1.013 1.003 - 1.035    Blood Urine Negative NEG^Negative    pH Urine 8.0 (H) 5.0 - 7.0 pH    Protein Albumin Urine Negative NEG^Negative mg/dL    Urobilinogen mg/dL 4.0 (H) 0.0 -  2.0 mg/dL    Nitrite Urine Negative NEG^Negative    Leukocyte Esterase Urine Trace (A) NEG^Negative    Source Catheterized Urine     RBC Urine 4 (H) 0 - 2 /HPF    WBC Urine 7 (H) 0 - 5 /HPF       Medications   oxyCODONE-acetaminophen (PERCOCET) 5-325 MG per tablet 1 tablet (1 tablet Oral Given 9/6/19 [id number])       Assessments & Plan (with Medical Decision Making)     I have reviewed the nursing notes.    I have reviewed the findings, diagnosis, plan and need for follow up with the patient.    The elevated troponin is somewhat concerning however this was discussed with cardiology and they felt that he probably had chronic troponin elevation given his prior episodes and prior lab values.  They did not feel that he was a candidate for catheterization at this time.    His EKG does come back looking very similar to his prior EKG.  And he is really not complaining of any chest pain.    Talk to the hospitalist about him and they agreed to take him in admission.  I think he will benefit from some adjustment of his medication regimen to help his sleeping pattern which would allow him to be more apt to be able to return to home and his caregivers to be able to sustain their care for him.    While being prepared for admission the wife and patient decided they did not want to be admitted today.  Wife would like to try something at home and we talked about perhaps using a smaller dose of his OxyContin at bedtime to see if that would allow him sleep.  We had extensive discussion about this and the wife was quite certain she can handle it despite the increased stress lately.  Given that we canceled his admission and will send him home with follow-up next week as scheduled return to the department this weekend if any concerns or problems.    Also discussed his elevated troponin and the need for rechecking mass and she is aware that but feels that this also is not likely any type of acute MI but more likely his chronic situation.        New Prescriptions    No medications on file       Final diagnoses:   Primary insomnia   Chronic bilateral low back pain with right-sided sciatica   Atherosclerosis of native coronary artery of native heart with other form of angina pectoris (H)       9/6/2019   [facility] EMERGENCY DEPARTMENT     Nawaf Gama MD  09/06/19 1609       Nawaf Gama MD  09/06/19 [id number]

## 2019-09-06 NOTE — ED NOTES
"Pt arrives in wheelchair with wife - wife states increased inability to care for himself, immobility, continued confusion and just worsening since his discharge from [facility] 3 days ago. Patient states he was not able to be placed in NH due to need for constant observation and falls. Patient does have wounds on arms and legs \"from picking\" per wife. Patient has earl in place on arrival with ~400cc cloudy urine - changed bag and tubing on arrival. Patient has chronic left groin pain - no wound - states \"it's because of my back\" - \"can I have a pain pill, that'll help\"   "

## 2019-09-06 NOTE — ED NOTES
DATE:  9/6/2019   TIME OF RECEIPT FROM LAB:  1508  LAB TEST:  trop  LAB VALUE:  0.136  RESULTS GIVEN WITH READ-BACK TO (PROVIDER):  Dr Gama  TIME LAB VALUE REPORTED TO PROVIDER:   [id number]  /Tiny Munoz RN

## 2019-09-07 NOTE — H&P
Admitted:     09/06/2019      CHIEF COMPLAINT:  Confusion, pain, and picking at wounds.      HISTORY OF PRESENT ILLNESS:  The patient was just discharged from [facility] 3 days ago after a 13-day stay with multiple medical problems including gallstone pancreatitis with a lipase high at 4000, elevated troponins with a high of 1.080 on 08/25 thought secondary to demand ischemia in a patient with known CAD, chronic groin pain thought secondary to degenerative disk disease nonoperative, and acute delirium/on top of underlying dementia, presumed Alzheimer's type.  Apparently, he was 1:1 sitter up until the day of discharge.  The family elected to try to take him home since  did not feel he would be eligible to go into any TCUs because of his 1:1 needs.        Family brings him back in because of marked agitation, primarily at night with moaning and calling out.  The wife thinks he seems to be in pain.  He has had this right groin pain for years, but she thinks it has gotten worse.  He has been on OxyContin 30 mg in a.m., plus 5 mg tablets of immediate release he can take 1-2 other times per day.  He seems to be in increased pain at night as he is moaning and calling out.  However, he also has been more agitated and confused than he was prior to the admission with exacerbation of his longstanding picking at wounds to the point that he is picking down through the cutis into the subcutaneous tissues.  She is concerned about the bleeding that he is doing from these wounds.      She gives a history that for about a month prior to this last admission he was weak enough so that he was mostly bedbound, using a wheelchair to get around in the house with assist of 1 to get him into the wheelchair.  He was admitted because of abdominal pain which was found to be gallstone pancreatitis.  Surgery felt he was too high risk to do any surgeries on his gallstones until he showed some improvement in his other underlying  issues, including the NSTEMI/demand ischemia and his acute delirium.      He was seen by psych for the acute delirium.  They felt this was most likely related to a hospital-induced delirium on top of some underlying dementia, likely Alzheimer's type.  They changed from Seroquel initially given to olanzapine 5 mg at bedtime, plus 2.5 mg b.i.d. p.r.n.  They recommended trying to decrease his Trazodone 300 mg at bedtime, which he takes for sleep, but was leaving that up to primary care.  Family has not changed that.  They also recommended decreasing his Zoloft and considering a course of Remeron for nighttime agitation and sleep.  Family has not done that yet.      PAST MEDICAL HISTORY:   1.  Recent gallstone pancreatitis.  Again, he was felt to be in no shape to do surgeries.  His lipase had come down to normal prior to discharge.  His abdominal pain has been resolved.   2.  History of alcohol use.  Apparently dry for 30 years.   3.  Chronic groin pain secondary to degenerative disk disease, nonoperative.   4.  Hypertension.   5.  Type 2 diabetes mellitus in excellent control with just metformin.  Last A1c was 5.8.   6.  Acute urinary retention requiring Mcelroy.   7.  Dementia, uncharacterized but presumed Alzheimer's type with recent delirium secondary to his prolonged hospital stay.   8.  Recent starvation ketosis.  His serum ketones were elevated and we have presumed it was either alcohol or starvation ketosis, but the family insisted there is no way he could get to any alcohol.   9.  Chronic neuropathy.   10.  Right bundle branch block.   11.  Moderate major depression.   12.  Renal cell carcinoma in 2015, treated surgically with cryoablation.   13.  Chronic insomnia.   14.  Constipation.   15.  Gastroesophageal reflux disease.      MEDICATIONS PRIOR TO ADMISSION:  See Epic for the medication list.  These were reviewed.  The olanzapine 5 mg at bedtime, plus 2.5 mg b.i.d. is new.      FAMILY HISTORY:  Reviewed in  Epic.  Multiple members with diabetes, coronary artery disease, alcohol and drug use.      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  He is a never smoker.  No drug use.      He lives with his wife and 8 other family members in the house.  A daughter and son live in the house and assist with his cares.      REVIEW OF SYSTEMS:  A 10-point review of systems was done.  He has had some weight loss.  He has been basically bedbound except with the assist of a son getting him up to the toilet for bowel movements.  He has been continent of bowel movements.  He has had a Mcelroy in since his last hospitalization.  He has had chronic picking of his skin that has been worse lately with picking deep enough to go through the entire cutis level.  He has had chronic groin pain, which he states has been there for years, but wife insists it is getting worse.  He has been on OxyContin 30 mg daily for a couple of years now.  The urine retention is new since last hospitalization, but they could not remove the Mcelroy without significant residual, so it was left in.  He was to be treated with tamsulosin and followup voiding trials.      His CAD was deemed nonoperative because of his other issues.  His last angiogram done 03/2019 showed 2-vessel CAD with prior stents in the proximal to mid-LAD and the mid RCA with a 40% in-stent restenosis in the LAD which has been unchanged since 2014.  At that time, medical management was recommended.  He denies any recurrence of his abdominal pain from his pancreatitis and wife notes that he has been increasingly confused since his hospitalization, increasingly restless, particularly at night.      PHYSICAL EXAMINATION:   GENERAL:  He is awake, alert, oriented x1.  Does not know the date or the place or the month.  He is very restless and tends to roll  "side-to-side on the cart frequently and grab at his hands, bite at a wound on the dorsum of his left hand.  He is, however, conversant and tries to answer my questions as best he can, though he gets confused and will state, \"well that's always been that way.\"   VITAL SIGNS:  He is afebrile, pulse 77, respirations 16, blood pressure 148/70, O2 sat 97% on room air.   HEENT:  Eyes show pupils equal and reactive, EOMs intact.  TMs normal.  Nasal mucosa normal.  Throat is normal.   NECK:  Supple, without masses, nodes or thyromegaly.   CHEST:  Clear to A and P.   CARDIOVASCULAR:  Regular rate and rhythm without murmur, click or rub.  Pulses are brisk and equal.  No JVD, HJR.  No edema.   ABDOMEN:  Soft, completely nontender at this time without HSM or masses.  Bowel sounds are active.   GENITOURINARY:  Normal male, descended.  He has significant atrophy of the testes.  He has a Mcelroy in place.   EXTREMITIES:  He has multiple excoriations, some of them 1-1.5 cm in diameter, least one in the left dorsal hand and one on the right dorsal wrist which are through the cutis into the subcutaneous tissues.  The rest are more superficial than that.  He has got some on the shins and feet as well.  None on the trunk.   NEUROLOGIC:  Shows again this restlessness with constant motion.      LABORATORY DATA:  Show chemistries essentially within normal limits except for albumin slightly low at 2.8.  Troponin at 1.036, is down slightly from the last one done at [facility].  Amylase is slightly elevated at 118.  CBC:  White count is elevated at 13.5, hemoglobin 10.1, MCV is 90, and platelet count is actually elevated at 634.      ASSESSMENT:   1.  Acute confusion on top of underlying dementia, unclear whether this is continuation of his hospital delirium that he had during his 13 days at [facility] or whether this could be some of his underlying dementia plus now some neuroleptic side effects such as akathisia.  He seems very restless.  I " would recommend a trial off the neuroleptics to see if he improves while we try something else for his sleep and to sedate him at night.  I believe there is some underlying severe dementia that has gotten significantly worse for some reason.  CT was negative at [facility].   2.  New on top of chronic skin wounds.  None of these appeared infected, though they are getting deeper.  This is from his picking, which I do not believe is itching, but more of a psychiatric problem.  We would need to really follow with Psychiatry.  Would have pharmacy also look through his drug list to see if there is anything that might exacerbate this picking behavior.   3.  Chronic pain secondary to degenerative disk disease with right groin pain primarily.  Neurosurgery felt it was not amenable to surgical intervention, particularly given his other issues.  However, the OxyContin once a day seems to be very inappropriate since he is likely going through withdrawal at night.  I would change that to 15 mg b.i.d. and see if we can get some improvement in his agitation and pain behaviors at night.  Would consider the 5 mg rescue as well 1-2 times a day.   4.  Insomnia.  This has been a longstanding issue and his agitation seems to be worse at night.  I would consider adding some ramelteon 8 mg at bedtime and Mirtazapine 15 mg at bedtime and backing off on his Zoloft to initially 150 and possibly down to 100 mg before moving up on the mirtazapine.  This was also recommended by Psychiatry.   5.  Urinary retention.  He has a Mcelroy in place and needs this until he sees Urology for voiding trial once he has been on tamsulosin for a while.   6.  Diabetes mellitus.  Actually excellent control with metformin.  Would continue this and use sliding scale as needed in the hospital.   7.  Chronic skin wounds.  Would have wound therapy see him.  In the meantime, we needs to cover these up so that he does not pick at them, with a Telfa and ACE.   8.  Recent  gallstone pancreatitis.  This seems to be resolved at this time.  He was nonoperative for surgery because of his other underlying issues including a very tenuous cardiac status.   9.  Recent starvation ketosis versus alcoholic ketosis.  Family insists he has not been drinking and there is no way he could get any alcohol because he is immobile.  Therefore, it was felt this was starvation ketosis but fairly severe acidosis.  Resolved with dextrose.  This again points to the tenuous nature of his underlying state.   10.  CODE STATUS:  Full.  This was discussed and the wife just could not get herself to change his code status, he has always wanted resuscitation, though I tried to convince her that with his current state, it would be futile and put him through more suffering and a very unpeaceful death.   11.  Prophylaxis:  None.  Low risk.   12.  Disposition:  He was going to be inpatient.  However, just as I was finishing this and had discussed the entire course with the wife, he apparently begged his wife enough to take him home and therefore, she is taking him home.  I discussed with the emergency room physician to at least consider writing him a few days of ramelteon and Remeron to try at bedtime and decrease his Sertraline to 150 and his Trazodone to 200 mg at bedtime.  Would also change his OxyContin to 15 mg b.i.d. from the 30 mg that he takes in the morning, and then have him follow up in the next 5 days in the clinic.         REBEKAH TORRES MD             D: 2019   T: 2019   MT: WT      Name:     ISELA AGUIRRE   MRN:      [id number]        Account:      [id number]   :      1940        Admitted:     2019                   Document: [id number]       cc: Keyla Fonseca MD

## 2019-09-09 NOTE — TELEPHONE ENCOUNTER
Requested Prescriptions   Pending Prescriptions Disp Refills     oxyCODONE (OXYCONTIN) 10 MG 12 hr tablet 10 tablet 0     Sig: Take 1 tablet (10 mg) by mouth At Bedtime maximum 2 tablet(s) per day       There is no refill protocol information for this order         Last Written Prescription Date:  09/06/19  Last Fill Quantity: 10,  # refills: 0   Last office visit: 8/21/2019 with prescribing provider:  Marian    Pt wife has called and stated that their appt for tomorrow has been cancelled with Dr. Fonseca and would like to  a prescription, or partial this afternoon to refill.  The next appt they got is for this thursday and he will be out of this medication.\     Future Office Visit:   Next 5 appointments (look out 90 days)    Sep 12, 2019 10:20 AM CDT  SHORT with Cydney Lomeli PA-C  [facility] ([facility] [address] [facility] [address]  [phone]

## 2019-09-09 NOTE — PROGRESS NOTES
"Clinic Care Coordination Contact    Follow Up Progress Note      Assessment: Patient was seen in ED on 9/6/19 because family could not care for him at home.  It was recommended that patient be admitted, but patient wanted to go home so spouse agreed and returned home.     Spoke with Sheyla, spouse. She states that patient did sleep a little better last night. He fell out of bed at midnight. Pulled aprt the catheter and bag and had urine all over the floor, bed and patient.     Pat states he continues to be confused. They had a black shampoo bottle on the edge of the tub and he thought it was a \"little black girl sitting there.\"     Pat states he is constantly picking at his arms and hands. He now has multiple open wounds that are bleeding. She states she has tried several attempts to cover them but patient pulls them all off.     She states he is eating, but not well. She states she found his dinner plate in the cupboard and he had not eaten anything.     Goals addressed this encounter:   Goals Addressed                 This Visit's Progress      Problem Solving (pt-stated)   10%     Goal Statement: Family wants to care for patient at home    Measure of Success: When patient is not so confused    Supportive Steps to Achieve: Medications as advised  Consider private pay home care for night time  Family alternate caring for patient at night so other family members can sleep    Barriers: Patient very confused and agitated    Strengths: Family support    Date to Achieve By: November 2019    Patient expressed understanding of goal: Yes          Intervention/Education provided during outreach: Long discussion regarding patient's ability to make decisions. Pat agrees that patient is not able to make decisions due to his confusion. She states he was crying and begging to go home in the ED. She states she and her children would not allow him to make the decision again. She would have him admitted even though patient does not " want it.      Outreach Frequency: monthly    Plan:   Patient will attend appointment in PCP clinic on 9/12/19. Spouse will call clinic and request refill of oxycodone for patient.     Spouse states that if PCP clinic feels patient needs to be admitted again when he is seen, she would agree with that decision and support it.      Care Coordinator will follow up in 2-4 weeks.      Ekaterina Shook RN, [facility] RN Coordinator  [facility]   9/9/2019    11:50 AM  [phone]

## 2019-09-09 NOTE — TELEPHONE ENCOUNTER
"IBUPROFEN 800MG TABLETS      Last Written Prescription Date:  na  Last Fill Quantity: na,   # refills: na  Last Office Visit: 8/21/19  Future Office visit:    Next 5 appointments (look out 90 days)    Sep 10, 2019 11:00 AM CDT  SHORT with Keyla Fonseca MD  [facility] ([facility] [address] [facility] [address]  [phone]           Requested Prescriptions   Pending Prescriptions Disp Refills     ibuprofen (ADVIL/MOTRIN) 800 MG tablet [Pharmacy Med Name: IBUPROFEN 800MG TABLETS] 90 tablet 0     Sig: TAKE 1 TABLET BY MOUTH EVERY 8 HOURS AS NEEDED FOR MODERATE PAIN       NSAID Medications Failed - 9/8/2019  1:23 PM        Failed - Blood pressure under 140/90 in past 12 months     BP Readings from Last 3 Encounters:   09/06/19 (!) 148/76   09/03/19 (!) 141/69   08/22/19 (!) 156/75                 Failed - Patient is age 6-64 years        Failed - Normal CBC on file in past 12 months     Recent Labs   Lab Test 09/06/19  1331   WBC 13.5*   RBC 3.58*   HGB 10.1*   HCT 32.3*   *                 Passed - Normal ALT on file in past 12 months     Recent Labs   Lab Test 09/06/19  1331   ALT 39             Passed - Normal AST on file in past 12 months     Recent Labs   Lab Test 09/06/19  1331   AST 39             Passed - Recent (12 mo) or future (30 days) visit within the authorizing provider's specialty     Patient had office visit in the last 12 months or has a visit in the next 30 days with authorizing provider or within the authorizing provider's specialty.  See \"Patient Info\" tab in inbasket, or \"Choose Columns\" in Meds & Orders section of the refill encounter.              Passed - Medication is active on med list        Passed - Normal serum creatinine on file in past 12 months     Recent Labs   Lab Test 09/06/19  1331  01/18/19  0837   CR 0.87   < >  --    CREAT  --   --  1.3*    < > = values in this interval not displayed.               "

## 2019-09-09 NOTE — TELEPHONE ENCOUNTER
Routing refill request to provider for review/approval because:  Patient has 9/12/19 appointment .  Yvon Dickens RN

## 2019-09-10 NOTE — TELEPHONE ENCOUNTER
Last filled the 30mg on 8/15/19. Would be early to refill today.     Recently inpatient where he was noted to have significant confusion. Has f/u scheduled for 9/12 (2 days). Can address refill at that time    Renetta

## 2019-09-10 NOTE — TELEPHONE ENCOUNTER
Pat (spouse) states that the wrong oxycodone was filled.  Not sure who ordered the oxy 10mg but he does not need that one.  What he needs is the oxycodone 30mg .  Please review and fill if appropriate.  Thank you..Ebony ARAGONIsaias Dickson    Oxycodone 30mg      Last Written Prescription Date:  8/14/19  Last Fill Quantity: 30,   # refills: 0  Last Office Visit: 8/21/19  Future Office visit:    Next 5 appointments (look out 90 days)    Sep 12, 2019 10:20 AM CDT  SHORT with Cydney Lomeli PA-C  [facility] ([facility]) [address] [facility] [address] [phone]           Routing refill request to provider for review/approval because:  Drug not on the [facility], [facility] or [facility] refill protocol or controlled substance

## 2019-09-10 NOTE — TELEPHONE ENCOUNTER
Pat found the script for Vincent for the 30mg of oxycodone.  No need for refill yet.  Thank you..Ebony Dickson

## 2019-09-11 NOTE — TELEPHONE ENCOUNTER
Chief Complaint : Return-Emergency Department F/u    Hx/Sx: Acute Retention/Kidney Cancer    Records/Orders: Mcelroy Placed 8/22 and 9/6    Pt Contacted: n/a    At Rooming: TOV/CIC?

## 2019-09-12 PROBLEM — R41.82 ALTERED MENTAL STATUS: Status: ACTIVE | Noted: 2019-01-01

## 2019-09-12 NOTE — PROGRESS NOTES
"Emergency Social Work Services Note    Date of  Intervention: 09/12/19  Last Emergency Department Visit:  9/6/19  Care Plan:  No  Collaborated with:  ROQUE Chauhan; pt's wife, Sheyla; EMR for previous SW consulted dated 9/2/19 by NICK Ace    Data:  Pt presents from clinic today by ambulance for increased confusion and family's inability to care for pt at home.  SW consulted.    Intervention:  Chart reviewed.  SW attempted to meet with the pt, but pt meeting with the Medicine team.  Pt's 1:1 at his bedside.    SW called and spoke with pt's wife, Sheyla, [phone].  Pat states pt had a lengthy hospitalization at [facility] in which pt had a 1:1 sitter for the entire duration of the hospitalization.  TCU placement was attempted for discharge placement however Sheyla states she found out that TCU's cannot take pt's on a 1:1 and since pt has a large family that can help take care of him at home, they decided to bring him home.  Sheyla states she was told that pt had 'hospital delirium' that would likely clear in a week.  She states it was quite difficult caring for him because he did not sleep well at night and was often up screaming \"Pat, help me!\".  After a week, they took pt into his hospital follow up visit with his PCP (the appt today) and informed the provider that they think his confusion has actually gotten worse.  Also, she states that pt usually walks with arm crutches at baseline, but when he returned home from [facility] he was so weak he could not stand at all and had to be carried for all transfers.  Sheyla states she advocated for him to be hospitalized at [facility] in hopes that \"he could be helped\".  She states she supports rehab placement if this could be found and he is off the 1:1 sitter but would want him to be cared for at home indefinitely.  SIMRAN informed Sheyla that there are also geriatric mental health units that treat delirium and that this may be one option if the delirium persists.  " "    Pat states they are on their way to the hospital now.  Updated ROQUE Chauhan.    Previous SW assessment indicates that pt \"lives in a house with 10 family members all of which can assist with patient's care but 2 who are children.  Patient's wife and daughter are the primary caregivers.\"       Assessment:  Family involved and supportive.  Pt's wife supports temporary rehab placement if possible.  Pt on a 1:1 here in the ED.    Plan:    Anticipated Disposition:  Hospitalization.    Barriers to d/c plan:  Medical stability.    Follow Up:  SW will continue to follow.    FLIP Dillon  Social Work Services  Emergency Department   [phone] phone  [phone] pager  On-call pager, [phone], 1600 to midnight  9am-9pm      "

## 2019-09-12 NOTE — H&P
"[facility] History and Physical        Date of Admission:  9/12/2019  Date of Service: 9/12/2019         HPI      Chief Complaint   Altered mental status, skin lesions with concern for cellulitis    Patient is a difficult historian, history obtained primarily through chart review and collateral from patient's wife Pat.      History of Present Illness     Vincent Mishra is a 78yo male with PMHx significant for recent hospitalization for acute on chronic pancreatitis 8/22/19 - 9/3/19, left renal cell carcinoma s/p cryoablation 12/2019, chronic skin lesions 2/2 picking, NSTEMI s/p PCI in 07/2011 on clopidogrel, chronic back pain on long term opioids presenting with subacute presentation of altered mental status.    Per his wife Pat, Vincent was evaluated at [facility] in late August for abdominal pain. He was found to have an elevated lipase of 4100 and managed for acute on chronic pancreatitis suspected 2/2 gallstones. Vincent had been at his baseline mental status prior to his hospitalization. During his hospital stay, he developed altered mental status and difficulty sleeping. Neuro consult thought his AMS was due to inpatient delirium on baseline cognitive impairment. He seen by Psychiatry and was prescribed olanzapine for agitation.    Since his hospitalization, he has been having increasing difficulty with activities of daily living. On further questioning, Sheyla states that prior to the [facility] admission he was \"no more confused than any 79 year old\" but was having difficulty driving. Has never paid attention to the date or done his bills. He now is unable to walk on his own (had previously walked with arm crutches).. He has had trouble sleeping and has stayed up late throughout the night. His wife reports that she has had increasing difficulty taking care of him given his confusion and staying up with during the night calling out to " her.    Per chart review, he was seen in the ED at [facility] on 9/6/19 for generalized weakness and insomnia. He was found to have an elevated troponin, likely chronic. He was about to be admitted but he and his wife decided to try to manage him at home. He was sent home, counseled about decreasing oxycodone dose at bedtime to see if that would help with sleep.    Prior to admission today, he was seen at [facility] for continued altered mental status, weakness, and insomnia. Family reported that he had fallen out of bed, per wife did not hit his head. He had not complained of any fevers, chills, headaches, lightheadedness. Family reported it was increasingly difficult taking care of him at home in his current condition and requested he be admitted for evaluation.     In the ED, he was noted to have multiple chronic wounds in both his arms and legs, the most concerning for cellulitis is an area of erythema and warmth along his right forearm. He was noted to have altered mental status, with difficulty tracking and impaired memory and cognition.    Denies headache, lightheadedness, vision changes, blurry vision, cough, sore throat, chest pain, palpitations, abdominal pain, pain with eating, nausea/vomting, diarrhea, bloody stool/urine, numbness/tingling in hands/feet. He reported back pain similar to his daily pain, tenderness to palpation of hand wounds.         History:   Review of Systems   The 10 point Review of Systems is negative other than noted in the HPI or here.   Review of Systems   Constitutional: Positive for activity change.   Eyes: Negative for visual disturbance.   Respiratory: Negative for cough and shortness of breath.    Cardiovascular: Negative for chest pain and palpitations.   Gastrointestinal: Negative for abdominal pain, blood in stool, diarrhea, nausea and vomiting.   Genitourinary: Negative for difficulty urinating (has catheter).   Musculoskeletal: Positive for back pain.    Neurological: Negative for dizziness, facial asymmetry, light-headedness, numbness and headaches.       Past Medical History    I have reviewed this patient's medical history and updated it with pertinent information if needed.   Past Medical History:   Diagnosis Date     CAD (coronary artery disease) 7/26/2011 7/2011 ([facility]): s/p MI, PTCA - RCA      Cancer of kidney (H)      Chest pain 1/12/2012     Coronary artery disease      History of angina      History of blood transfusion      Hyperlipidaemia      Hyperlipidemia LDL goal <100 9/23/2010     Malignant neoplasm (H)     Prostate CA (removed)     Myocardial infarction (H)     s/p MI 7/29/14, stent placement     NSTEMI (non-ST elevated myocardial infarction) (H)     07-25-14 CATH- RCA, L.Main and CFX  had minor luminal irregularites. Mid LAD high grade stenosis 80-90%.Stent placed to mid LAD     Other and unspecified hyperlipidemia     MI in July 2011     Right bundle branch block      Type II or unspecified type diabetes mellitus without mention of complication, not stated as uncontrolled      Unspecified essential hypertension    - S/p hospitalization 8/22 - 9/3 for acute on chronic pancreatitis    Past Surgical History   I have reviewed this patient's surgical history and updated it with pertinent information if needed.  Past Surgical History:   Procedure Laterality Date     ARTHROSCOPY KNEE  2/11/2011    ARTHROSCOPY KNEE performed by LEY, JEFFREY DUANE at [facility]     ARTHROSCOPY KNEE WITH MEDIAL MENISCECTOMY  6/26/2012    Procedure: ARTHROSCOPY KNEE WITH MEDIAL MENISCECTOMY;  Right Knee Arthroscopy With Medial Menisectomy;  Surgeon: Ley, Jeffrey Duane, MD;  Location: [facility]     BACK SURGERY      back surgery x4     BIOPSY       CARDIAC SURGERY  7/2011    stentt placed     COLONOSCOPY       CORONARY ANGIOGRAPHY ADULT ORDER  07-25-14    RCA, L.Main and CFX  had minor luminal irregularites. Mid LAD high grade stenosis 80-90%.Stent placed to mid LAD     CV  CORONARY ANGIOGRAM  3/28/2019    Procedure: CV CORONARY ANGIOGRAM;  Surgeon: Dominguez Mishra MD;  Location: [facility] CARDIAC CATH LAB     ESOPHAGOSCOPY, GASTROSCOPY, DUODENOSCOPY (EGD), COMBINED  10/25/2012    Procedure: COMBINED ESOPHAGOSCOPY, GASTROSCOPY, DUODENOSCOPY (EGD), BIOPSY SINGLE OR MULTIPLE;  Gastroscopy  ;  Surgeon: Lucius Dasilva MD;  Location: [facility]     HEART CATH, ANGIOPLASTY  07-25-14    mid LAD      ORTHOPEDIC SURGERY       back surgery       Social History   Social History     Tobacco Use     Smoking status: Never Smoker     Smokeless tobacco: Never Used   Substance Use Topics     Alcohol use: No     Alcohol/week: 0.0 oz     Drug use: No     History of past alcohol use disorder. Has been sober 30 years per wife's report.    Family History   I have reviewed this patient's family history and updated it with pertinent information if needed.   Family History   Problem Relation Age of Onset     Cancer Mother      Depression Mother      Diabetes Father      Hypertension Father      Heart Disease Father      Mental Illness Father      Heart Disease Maternal Grandfather      Substance Abuse Maternal Grandfather      Alcohol/Drug Paternal Grandmother      Substance Abuse Paternal Grandmother      Heart Disease Paternal Grandfather      Alcohol/Drug Paternal Grandfather      Substance Abuse Paternal Grandfather      Heart Disease Brother      Diabetes Brother      Substance Abuse Brother      Obesity Brother      Diabetes Brother      Obesity Sister      Alcohol/Drug Daughter      Depression Daughter      Obesity Sister      Diabetes Sister      Obesity Sister      Diabetes Sister      Alcohol/Drug Sister      Cancer Sister 55        possible kidney cancer     Substance Abuse Sister      Alcohol/Drug Son      Substance Abuse Son      Diabetes Son      Alcohol/Drug Son      Substance Abuse Son      Obesity Daughter      Diabetes Daughter      Hypertension Daughter      Bipolar Disorder Other       Extensive family history of substance use disorders, mental health issues.    Prior to Admission Medications   Prior to Admission Medications   Prescriptions Last Dose Informant Patient Reported? Taking?   Calcium Carbonate-Vitamin D (CALCIUM + D) 600-200 MG-UNIT per tablet  Spouse/Significant Other Yes No   Sig: Take 2 tablets by mouth daily.   Cyanocobalamin (VITAMIN B-12 PO)  Spouse/Significant Other Yes No   Sig: Take 500 mcg by mouth daily    OLANZapine (ZYPREXA) 2.5 MG tablet  Spouse/Significant Other No No   Sig: Take 1 tablet (2.5 mg) by mouth 2 times daily as needed (agitation)   OLANZapine zydis (ZYPREXA) 5 MG ODT  Spouse/Significant Other No No   Sig: Take 1 tablet (5 mg) by mouth At Bedtime   acetaminophen (TYLENOL) 325 MG tablet  Spouse/Significant Other No No   Sig: Take 2 tablets (650 mg) by mouth every 4 hours as needed for mild pain   amLODIPine (NORVASC) 5 MG tablet  Spouse/Significant Other No No   Sig: Take 1 tablet (5 mg) by mouth daily   ascorbic acid (VITAMIN C) 500 MG tablet  Spouse/Significant Other Yes No   Sig: Take 500 mg by mouth daily    aspirin (ASA) 81 MG tablet  Spouse/Significant Other Yes No   Sig: Take 81 mg by mouth every evening    atorvastatin (LIPITOR) 80 MG tablet  Spouse/Significant Other No No   Sig: TAKE 1 TABLET BY MOUTH EVERY EVENING   cholecalciferol (VITAMIN  -D) 1000 UNITS capsule  Spouse/Significant Other Yes No   Sig: Take 1 capsule by mouth daily   clopidogrel (PLAVIX) 75 MG tablet  Spouse/Significant Other No No   Sig: Take 1 tablet (75 mg) by mouth daily   ferrous sulfate (FE TABS) 325 (65 Fe) MG EC tablet  Spouse/Significant Other No No   Sig: Take 1 tablet (325 mg) by mouth daily   ibuprofen (ADVIL/MOTRIN) 800 MG tablet  Spouse/Significant Other Yes No   Sig: Take 800 mg by mouth every 8 hours as needed for moderate pain   lisinopril (PRINIVIL/ZESTRIL) 40 MG tablet  Spouse/Significant Other No No   Sig: TAKE 1 TABLET(40 MG) BY MOUTH DAILY   metFORMIN  "(GLUCOPHAGE-XR) 500 MG 24 hr tablet  Spouse/Significant Other No No   Sig: TAKE 2 TABLETS BY MOUTH TWICE DAILY WITH MEALS.   mirtazapine (REMERON) 15 MG tablet   No No   Sig: Take 1 tablet (15 mg) by mouth At Bedtime   nitroGLYcerin (NITROSTAT) 0.4 MG sublingual tablet  Spouse/Significant Other No No   Sig: Place 1 tablet (0.4 mg) under the tongue every 5 minutes as needed for chest pain   omeprazole (PRILOSEC) 20 MG DR capsule  Spouse/Significant Other No No   Sig: TAKE ONE CAPSULE BY MOUTH TWICE DAILY   oxyCODONE (OXYCONTIN) 10 MG 12 hr tablet   No No   Sig: Take 1 tablet (10 mg) by mouth At Bedtime maximum 2 tablet(s) per day   oxyCODONE (OXYCONTIN) 30 MG 12 hr tablet  Spouse/Significant Other No No   Sig: Take 1 tablet (30 mg) by mouth every morning   oxyCODONE (ROXICODONE) 5 MG tablet  Spouse/Significant Other No No   Sig: Take 1 daily as needed  for severe pain /at bedtime may have up to 2 per day max 40 per month   polyethylene glycol (MIRALAX/GLYCOLAX) packet  Spouse/Significant Other Yes No   Sig: Take 17 g by mouth daily as needed for constipation   sertraline (ZOLOFT) 100 MG tablet  Spouse/Significant Other No No   Sig: TAKE 2 TABLETS(200 MG) BY MOUTH DAILY   spironolactone (ALDACTONE) 25 MG tablet  Spouse/Significant Other No No   Sig: Take 0.5 tablets (12.5 mg) by mouth daily   tamsulosin (FLOMAX) 0.4 MG capsule  Spouse/Significant Other No No   Sig: Take 1 capsule (0.4 mg) by mouth daily   traZODone (DESYREL) 100 MG tablet  Spouse/Significant Other Yes No   Sig: Take 300 mg by mouth At Bedtime   vitamin B1 (THIAMINE) 100 MG tablet  Spouse/Significant Other No No   Sig: Take 1 tablet (100 mg) by mouth daily X 1 month.      Facility-Administered Medications: None     Allergies   Allergies   Allergen Reactions     Prozac [Fluoxetine] Other (See Comments)     \"I went crazy.\"       Benadryl [Diphenhydramine Hcl] Other (See Comments)     Starts to shake, and paranoid     Codeine Itching     Diphenhydramine " "Visual Disturbance     Hallucinations, See [facility] records scanned on 7/16/15     Labetalol Other (See Comments)     See [facility] records scanned on 7/16/15  Bradycardia down to 30 on holter, dizziness. Stopped med and symptoms resolved     Metoprolol Other (See Comments)     Bradycardia even at 12.5 mg     Morphine Visual Disturbance           Physical Exam      Vital Signs: Temp: 98.2  F (36.8  C) Temp src: Oral BP: 135/82 Pulse: 72   Resp: 16 SpO2: 99 % O2 Device: None (Room air)    Weight: 0 lbs 0 oz    Physical Exam  Constitutional: Elderly disheveled appearing man sitting uncomfortably in chair.  In no acute distress. Cooperative on interview.  HEENT: No scalp lesions noted. No scleral icterus. No apparent trauma noted.  Chest: Chest symmetrical. No open wounds, no obvious rib fractures. CTAB  Cardiac: regular rate and rhythm, no murmurs  GI: nontender, nondistended.    : catheter in place  MSK/Extremities: No obvious skeletal deformities. Extremities warm and dry.  Skin: Multiple excoriations in various stages of healing noted on bilateral upper extremities. Multiple open wounds with surrounding erythema noted, no active drainage or purulence seen. Mildly tender to palpation. Open wound on left hand with small amount of bleeding, no purulence. Multiple chronic wounds on bilateral lower extremities, largest of which was on lateral right lower leg. Skin hard over chronic wound, no active drainage. Most concerning region, patch of erythema and warmth on right forearm.  Psych: Mood \"ok,\" affect congruent. Does state he is hearing music, although no music present  Neuro: Oriented to self, knows he is at a hospital, but states wrong name. Knows the season.      Assessment & Plan      Vincent Mishra is a 78yo male with PMHx significant for recent hospitalization for acute on chronic pancreatitis 8/22/19 - 9/3/19, left renal cell carcinoma s/p cryoablation 12/2019, " chronic skin lesions, NSTEMI s/p PCI in 07/2011 on clopidogrel, chronic back pain on long term opioids presenting with subacute presentation of altered mental status of unclear etiology. He is currently hemodynamically stable and in no acute distress.    # Subacute altered mental status  # Insomnia  # depression  AMS since hospital admission in 08/2019, worsening since discharge. Differential includes stroke, metabolic derangement, seizure, nutritional deficiency, substance use, polypharmacy, thyroid disorder, infection, etc. Labs obtained negative for electrolyte abnormalities, hypercalcemia, hypoglycemia, hyperammonemia, uremia, alcohol use. No elevated WBC, fever, tachycardia to suggest infection. Recent CT 8/24/19 with no evidence of acute hemorrhage or mass. Given subacute presentation, will r/o other reversible causes of delirium. During previous hospitalization neurology was consulted, suspected hospital acquired delirium.  - Check TSH.  - Consult neurology for recs regarding further workup, consider MRI, EEG  - Psych consult to assess for psychiatric causes of AMS (depression, acute psychosis).  - pharmacy consult for concern for polypharmacy  - UA pending.  - Continue PTA olanzapine.  - Continue PTA mirtazapine for sleep, depression.    # Chronic skin wounds due to picking behavior  # Concern for left hand cellulitis  Extensive skin wounds 2/2 scratching on bilateral upper and lower extremities. No active purulence or drainage from wounds. Given 1 dose vancomycin IV in the ED. Concern for cellulitis on right hand as well as wound management for other chronic wounds.  - Wound care consult.  - Day 1 of Cefazolin 1g IV q8h  - Continue monitoring for signs of infection/sepsis (elevated WBC, tachycardia, fever, hypotension, worsening of AMS, spreading erythema).    Chronic issues  # Type 2 diabetes mellitus  - Hold PTA metformin.  - Medium intensity sliding scale insulin.    # Hypertension  Nonhypertensive on  admission.  - Continue PTA amlodipine.  - Continue PTA lisinopril.  - Continue PTA spironolactone.    # BPH  # Urinary retention  Has had catheter in place since prior admission.   - Continue PTA tamsulosin.  - voiding trial once medically stable    # History of multiple ACS events with PCI  # Coronary stent  - Continue PTA clopidogrel.    # Chronic back pain  # History of multiple spinal fusions  - Continue PTA scheduled oxycodone  - holding PTA PRN oxycodone in setting of AMS    # Pain Assessment:  Current Pain Score 9/12/2019   Patient currently in pain? denies   Pain score (0-10) -   Pain location -   Pain descriptors -   - Vincent is experiencing pain due to chronic back pain 2/2 multiple spinal fusion surgeries. Pain management was discussed and the plan was created in a collaborative fashion.  Vincent's response to the current recommendations: engaged  - Opioid regimen: PTA oxycodone scheduled and PRN  - Response to opioid medications: pain is managed  - Bowel regimen: senna and miralax, scheduled  - Pharmacologic adjuvants: NSAIDs and Acetaminophen    Diet: regular diet  Fluids: PO intake + 100cc/hr NS + 20 KCl  DVT Prophylaxis: Enoxaparin (Lovenox) SQ  Code Status: Full Code    Disposition Plan   Expected discharge: 2 - 3 days; recommended placement to be determined after further evaluation once adequate pain management/ tolerating PO medications, antibiotic plan established and mentation improved, goals of hospitalization met, safe discharge. Dispo: Expected Discharge Date: 09/16/19       Entered: Monet Mandujano 09/12/2019, 7:39 PM   Information in the above section will display in the discharge planner report.    Discussed with and examined by attending, Dr. Feliz.    Louis Caballero, MS4  [facility]    [facility]  [facility]   Pager: [phone]  Please see sticky note for cross cover information      Results:     Data    Recent Labs   Lab 09/12/19  1420 09/06/19  1331   WBC 10.1 13.5*   HGB 9.2* 10.1*   MCV 91 90   * 634*    140   POTASSIUM 3.6 3.9   CHLORIDE 103 104   CO2 28 29   BUN 14 12   CR 0.89 0.87   ANIONGAP 8 7   LIOR 8.6 8.6   * 134*   ALBUMIN 2.6* 2.8*   PROTTOTAL 6.7* 6.7*   BILITOTAL 0.7 0.7   ALKPHOS 118 122   ALT 35 39   AST 31 39   LIPASE 389  --    TROPI  --  1.036*     Lipase 389.  Ethanol <0.01.

## 2019-09-12 NOTE — PHARMACY-VANCOMYCIN DOSING SERVICE
Pharmacy Vancomycin Initial Note  Date of Service 2019  Patient's  1940  79 year old, male    Indication: Skin and Soft Tissue Infection    Current estimated CrCl = Estimated Creatinine Clearance: 73.5 mL/min (based on SCr of 0.89 mg/dL).    Creatinine for last 3 days  2019:  2:20 PM Creatinine 0.89 mg/dL    Recent Vancomycin Level(s) for last 3 days  No results found for requested labs within last 72 hours.      Vancomycin IV Administrations (past 72 hours)                   vancomycin 1250 mg in 0.9% NaCl 250 mL intermittent infusion 1,250 mg (mg) 1,250 mg Given 19 1458                Nephrotoxins and other renal medications (From now, onward)    Start     Dose/Rate Route Frequency Ordered Stop    19 0300  vancomycin 1250 mg in 0.9% NaCl 250 mL intermittent infusion 1,250 mg      15 mg/kg × 87.5 kg  over 90 Minutes Intravenous EVERY 12 HOURS 19 1540      19 1515  vancomycin 1250 mg in 0.9% NaCl 250 mL intermittent infusion 1,250 mg      15 mg/kg × 87.5 kg  over 90 Minutes Intravenous ONCE 19 1440            Contrast Orders - past 72 hours (72h ago, onward)    None                Plan:  1.  Start vancomycin  1250 mg IV q12h.   2.  Goal Trough Level: 10-15 mg/L   3.  Pharmacy will check trough levels as appropriate in 1-3 Days.    4. Serum creatinine levels will be ordered daily for the first week of therapy and at least twice weekly for subsequent weeks.    5. [place] method utilized to dose vancomycin therapy: Method 2    Lulú Casarez RP

## 2019-09-12 NOTE — PROGRESS NOTES
Subjective     Vincent Mishra is a 79 year old male who presents to clinic today for the following health issues:    His wife Sheyla returned a hand script for Oxycodone 30mg(30 tablet) that they did not fill and were instructed to return to clinic.   Order date 6/10/19  Start date 8/8/19  Order number [id number]  Cydney Lomeli PA-C and SWEETIE tore up the scrip together and put in shred it bin.   Kassidy Reardon CMA        HPI     ED/UC Followup:  Facility:  [facility]   Date of visit: 9/6/19   Reason for visit: Primary insomnia   Chronic bilateral low back pain with right-sided sciatica   Atherosclerosis of native coronary artery of native heart with other form of angina pectoris (H)   Current Status:   Worse since ED and hospital visits. Can't stand at all, confusion.   Olanzapine is not working  He is hurting everywhere, this is after having a 30mg Oxycodone this morning.           Hospital Follow-up Visit:    Hospital/Nursing Home/IP Rehab Facility: [facility]  Date of Admission: 8/23/19  Date of Discharge: 9/3/19  Reason(s) for Admission: NSTEMI (non-ST elevated myocardial infarction), Abdominal pain, RESOLVED secondary to acute vs chronic pancreatitis, Underlying Dementia, Recurring delirium, behavioral disturbance, possible acute encephalopathy, Fluid overload, Acute kidney injury, Insomnia, Chronic pain, H/o chronic groin pain              Problems taking medications regularly:  None       Medication changes since discharge: see medications        Problems adhering to non-medication therapy:  None      Summary of hospitalization:  [facility] discharge summary reviewed  Diagnostic Tests/Treatments reviewed.  Follow up needed: none  Other Healthcare Providers Involved in Patient s Care:         None  Update since discharge: worsened.   Post Discharge Medication Reconciliation: sent to ER.  Plan of care communicated with patient, family - wife and daughter     Coding guidelines  "for this visit:  Type of Medical   Decision Making Face-to-Face Visit       within 7 Days of discharge Face-to-Face Visit        within 14 days of discharge   Moderate Complexity [id number]   High Complexity [id number]          Since getting out of the hospital he has not been walking, he is weak, he has been confused - told \"hospital delerium\"  Wife and daughter and son are transferring him   They were told no one (TCU, nursing home, rehab) can take him as he is one on one  Still confused  He is staying up all night, calling constantly for family members to help him. Many medical complaints. Trying to get out of bed, did fall out of bed yesterday. They are not sure if he hit his head  They cannot keep caring for him at home as he is    No fevers/chills  Picking skin, deeper than usual, \"gouging himself\" some are red   He is eating at home, but not much. He is drinking fluids  He complained of chest pain earlier today. He complained of abdominal pain, they worry about his pancreas    Patient Active Problem List   Diagnosis     Tension headache     Parotid mass     Diplopia     Neuropathy     Neck pain     B12 deficiency     Tear of meniscus of left knee     Hypertension goal BP (blood pressure) < 140/90     C6-7 disc with radiculopathy     Coronary atherosclerosis     Right bundle branch block     Chest pain     Anatomic airway obstruction     Abnormal antinuclear antibody titer     Osteoarthritis, knee     Moderate major depression (H)     Orchitis, epididymitis, and epididymo-orchitis     Malignant neoplasm of kidney excluding renal pelvis (H)     Renal mass, left     Health Care Home     Insomnia     Abdominal pain, right upper quadrant     Esophageal reflux     Hard of hearing     Constipation     NSTEMI (non-ST elevated myocardial infarction) (H)     Myocardial infarction, nontransmural (H)     Acute myocardial infarction of inferolateral wall (H)     Obesity     Sinoatrial node dysfunction (H)     Right " bundle branch block (RBBB)     Essential hypertension     Hyperlipidemia     Type 2 diabetes mellitus with other circulatory complication, without long-term current use of insulin (H)     Thyroid nodule     Hip pain, bilateral     Claudication (H)     Bilateral low back pain with right-sided sciatica     Bilateral low back pain with left-sided sciatica     ACS (acute coronary syndrome) (H)     Chronic bilateral low back pain with sciatica, sciatica laterality unspecified     Severe episode of recurrent major depressive disorder, without psychotic features (H)     Renal hematoma     Skin picking habit     Iron deficiency anemia, unspecified iron deficiency anemia type     Atypical chest pain     CKD (chronic kidney disease) stage 3, GFR 30-59 ml/min (H)     Depression     Prostate cancer (H)     Sinus bradycardia, chronic     Gastroesophageal reflux disease with esophagitis     Physical deconditioning     Past Surgical History:   Procedure Laterality Date     ARTHROSCOPY KNEE  2/11/2011    ARTHROSCOPY KNEE performed by LEY, JEFFREY DUANE at [facility]     ARTHROSCOPY KNEE WITH MEDIAL MENISCECTOMY  6/26/2012    Procedure: ARTHROSCOPY KNEE WITH MEDIAL MENISCECTOMY;  Right Knee Arthroscopy With Medial Menisectomy;  Surgeon: Ley, Jeffrey Duane, MD;  Location: [facility]     BACK SURGERY      back surgery x4     BIOPSY       CARDIAC SURGERY  7/2011    stentt placed     COLONOSCOPY       CORONARY ANGIOGRAPHY ADULT ORDER  07-25-14    RCA, L.Main and CFX  had minor luminal irregularites. Mid LAD high grade stenosis 80-90%.Stent placed to mid LAD     CV CORONARY ANGIOGRAM  3/28/2019    Procedure: CV CORONARY ANGIOGRAM;  Surgeon: Dominguez Mishra MD;  Location: [facility] CARDIAC CATH LAB     ESOPHAGOSCOPY, GASTROSCOPY, DUODENOSCOPY (EGD), COMBINED  10/25/2012    Procedure: COMBINED ESOPHAGOSCOPY, GASTROSCOPY, DUODENOSCOPY (EGD), BIOPSY SINGLE OR MULTIPLE;  Gastroscopy  ;  Surgeon: Lucius Dasilva MD;  Location: [facility]     HEART CATH,  ANGIOPLASTY  07-25-14    mid LAD      ORTHOPEDIC SURGERY       back surgery           Reviewed and updated as needed this visit by Provider  Allergies  Meds  Problems  Med Hx  Surg Hx         Review of Systems   Other than noted above, general, HEENT, respiratory, cardiac, MS, and gastrointestinal systems are negative.       Objective    /80   Pulse 60   Temp 98.4  F (36.9  C) (Tympanic)   SpO2 96%   There is no height or weight on file to calculate BMI.  Physical Exam   GENERAL: healthy, alert and no distress - some agitation.  Alert to his name and birth date, thought we were in [facility] but knew it was clinic, could not name president  Pinpoint pupils  RESP: lungs clear to auscultation - no rales, rhonchi or wheezes  CV: regular rate and rhythm, normal S1 S2, no S3 or S4, no murmur, click or rub, no peripheral edema and peripheral pulses strong  ABDOMEN: soft, nontender, no hepatosplenomegaly, no masses and bowel sounds normal  MS: no gross musculoskeletal defects noted, no edema  Left CVA tenderness to palpation  SKIN: POSITIVE multiple small wounds and excoriations from skin picking on arms/legs/hands. Most have granulation tissue/appear to be healing.   Lateral bilateral knees show wounds that do have some spreading erythema/mild swelling surrounding  Right dorsum hand shows spreading erythema from wound  Groin area shows erythematous macular rash  No rash at penis/catheter site but some wetness around the catheter        Assessment & Plan     ASSESSMENT/PLAN:      ICD-10-CM    1. Confusion R41.0    2. Type 2 diabetes mellitus with other circulatory complication, without long-term current use of insulin (H) E11.59    3. ACS (acute coronary syndrome) (H) I24.9    4. Chronic bilateral low back pain with sciatica, sciatica laterality unspecified M54.40     G89.29    5. Primary insomnia F51.01    6. Skin picking habit F42.4      Recommended patient be transferred to the ER due to his symptoms today  as well as continued delirium. Patient's wife requested ambulance transfer, patient agreed. Wife requested [facility] as they had bad experience at [facility].    Earlier today he had complained of chest pain and LUQ/epigastric pain - recent NSTEMI and pancreatitis. This has resolved by visit. He complains of left low back pain. These concerns have been chronic/intermittent for patient, but not the past few days. His catheter is leaking starting today, some hematuria in bag. He complains of burning in groin but does have rash from the wetness. He has been picking skin, deeper now recently, and concern with starting cellulitis on his lower legs. Multiple wounds seen today. Did fall out of bed yesterday, not sure if he hit his head, he is on plavix.    Patient's wife and children unable to care for patient at home due to confusion/delirium, insomnia, frequent medical complaints, and unable to ambulate. They declined admission at last ER visit as patient did not want to, but feel this is necessary at this point. They were told that he cannot be admitted to nursing home/TCU as he needs one on one care. We did discuss hospice as getting them the help they need when he is out of the hospital.    Wife Pat: [phone]    Return today (on 9/12/2019) for ER.    Cydney Lomeli PA-C  [facility]

## 2019-09-12 NOTE — ED PROVIDER NOTES
[place] EMERGENCY DEPARTMENT ([facility])  September 12, 2019    History     Chief Complaint   Patient presents with     Altered Mental Status     The history is provided by the EMS personnel and medical records. History limited by: altered mental status.     Vincent Mishra is a 79 year old male with history notable for renal cell carcinoma, CKD, DM 2, CAD, MI (s/p stent placement),  HTN, and HLD who was brought in by ambulance from home to the ED for altered mental status.    Per chart review, patient was seen in the ED on 9/6/2019 for generalized weakness.  It was noted that the patient's spouse is his primary caregiver and is unable to take care of him anymore.  He was offered admission, but he declined.  He did have an elevated troponin of 1.036, but after discussion with Cardiology, they felt this was chronic troponin elevation given his prior episodes and prior lab value. He also had an echocardiogram from his last hospitalization which showed normal LVEF of 60 to 65%.  Otherwise no significant changes compared to previous in 3/2019.  He was prescribed mirtazapine to help with sleep.    He was also hospitalized from 8/23-9/3/2019 for cute on chronic epigastric abdominal pain. Patient diagnosed with acute pancreatitis with elevated lipase of over 4000 on RUQ ultrasound cholelithiasis was seen without general surgery and gastroenterology recommended outpatient cholecystectomy.  Patient was also seen by Psychiatry and started on olanzapine for agitation.      PAST MEDICAL HISTORY  Past Medical History:   Diagnosis Date     CAD (coronary artery disease) 7/26/2011 7/2011 ([place]): s/p MI, PTCA - RCA      Cancer of kidney (H)      Chest pain 1/12/2012     Coronary artery disease      History of angina      History of blood transfusion      Hyperlipidaemia      Hyperlipidemia LDL goal <100 9/23/2010     Malignant neoplasm (H)     Prostate CA (removed)     Myocardial infarction (H)     s/p MI  7/29/14, stent placement     NSTEMI (non-ST elevated myocardial infarction) (H)     07-25-14 CATH- RCA, L.Main and CFX  had minor luminal irregularites. Mid LAD high grade stenosis 80-90%.Stent placed to mid LAD     Other and unspecified hyperlipidemia     MI in July 2011     Right bundle branch block      Type II or unspecified type diabetes mellitus without mention of complication, not stated as uncontrolled      Unspecified essential hypertension      PAST SURGICAL HISTORY  Past Surgical History:   Procedure Laterality Date     ARTHROSCOPY KNEE  2/11/2011    ARTHROSCOPY KNEE performed by LEY, JEFFREY DUANE at [facility]     ARTHROSCOPY KNEE WITH MEDIAL MENISCECTOMY  6/26/2012    Procedure: ARTHROSCOPY KNEE WITH MEDIAL MENISCECTOMY;  Right Knee Arthroscopy With Medial Menisectomy;  Surgeon: Ley, Jeffrey Duane, MD;  Location: [facility]     BACK SURGERY      back surgery x4     BIOPSY       CARDIAC SURGERY  7/2011    stentt placed     COLONOSCOPY       CORONARY ANGIOGRAPHY ADULT ORDER  07-25-14    RCA, L.Main and CFX  had minor luminal irregularites. Mid LAD high grade stenosis 80-90%.Stent placed to mid LAD     CV CORONARY ANGIOGRAM  3/28/2019    Procedure: CV CORONARY ANGIOGRAM;  Surgeon: Dominguez Mishra MD;  Location: [facility] CARDIAC CATH LAB     ESOPHAGOSCOPY, GASTROSCOPY, DUODENOSCOPY (EGD), COMBINED  10/25/2012    Procedure: COMBINED ESOPHAGOSCOPY, GASTROSCOPY, DUODENOSCOPY (EGD), BIOPSY SINGLE OR MULTIPLE;  Gastroscopy  ;  Surgeon: Lucius Dasilva MD;  Location: [facility]     HEART CATH, ANGIOPLASTY  07-25-14    mid LAD      ORTHOPEDIC SURGERY       back surgery     FAMILY HISTORY  Family History   Problem Relation Age of Onset     Cancer Mother      Depression Mother      Diabetes Father      Hypertension Father      Heart Disease Father      Mental Illness Father      Heart Disease Maternal Grandfather      Substance Abuse Maternal Grandfather      Alcohol/Drug Paternal Grandmother      Substance Abuse Paternal  Grandmother      Heart Disease Paternal Grandfather      Alcohol/Drug Paternal Grandfather      Substance Abuse Paternal Grandfather      Heart Disease Brother      Diabetes Brother      Substance Abuse Brother      Obesity Brother      Diabetes Brother      Obesity Sister      Alcohol/Drug Daughter      Depression Daughter      Obesity Sister      Diabetes Sister      Obesity Sister      Diabetes Sister      Alcohol/Drug Sister      Cancer Sister 55        possible kidney cancer     Substance Abuse Sister      Alcohol/Drug Son      Substance Abuse Son      Diabetes Son      Alcohol/Drug Son      Substance Abuse Son      Obesity Daughter      Diabetes Daughter      Hypertension Daughter      Bipolar Disorder Other      SOCIAL HISTORY  Social History     Tobacco Use     Smoking status: Never Smoker     Smokeless tobacco: Never Used   Substance Use Topics     Alcohol use: No     Alcohol/week: 0.0 oz     MEDICATIONS  Current Facility-Administered Medications   Medication     sodium chloride 0.9% infusion     [START ON 9/13/2019] vancomycin 1250 mg in 0.9% NaCl 250 mL intermittent infusion 1,250 mg     Current Outpatient Medications   Medication     acetaminophen (TYLENOL) 325 MG tablet     amLODIPine (NORVASC) 5 MG tablet     ascorbic acid (VITAMIN C) 500 MG tablet     aspirin (ASA) 81 MG tablet     atorvastatin (LIPITOR) 80 MG tablet     Calcium Carbonate-Vitamin D (CALCIUM + D) 600-200 MG-UNIT per tablet     cholecalciferol (VITAMIN  -D) 1000 UNITS capsule     clopidogrel (PLAVIX) 75 MG tablet     Cyanocobalamin (VITAMIN B-12 PO)     ferrous sulfate (FE TABS) 325 (65 Fe) MG EC tablet     ibuprofen (ADVIL/MOTRIN) 800 MG tablet     lisinopril (PRINIVIL/ZESTRIL) 40 MG tablet     metFORMIN (GLUCOPHAGE-XR) 500 MG 24 hr tablet     mirtazapine (REMERON) 15 MG tablet     nitroGLYcerin (NITROSTAT) 0.4 MG sublingual tablet     OLANZapine (ZYPREXA) 2.5 MG tablet     OLANZapine zydis (ZYPREXA) 5 MG ODT     omeprazole (PRILOSEC)  "20 MG DR capsule     oxyCODONE (OXYCONTIN) 10 MG 12 hr tablet     oxyCODONE (OXYCONTIN) 30 MG 12 hr tablet     oxyCODONE (ROXICODONE) 5 MG tablet     polyethylene glycol (MIRALAX/GLYCOLAX) packet     sertraline (ZOLOFT) 100 MG tablet     spironolactone (ALDACTONE) 25 MG tablet     tamsulosin (FLOMAX) 0.4 MG capsule     traZODone (DESYREL) 100 MG tablet     vitamin B1 (THIAMINE) 100 MG tablet     ALLERGIES  Allergies   Allergen Reactions     Prozac [Fluoxetine] Other (See Comments)     \"I went crazy.\"       Benadryl [Diphenhydramine Hcl] Other (See Comments)     Starts to shake, and paranoid     Codeine Itching     Diphenhydramine Visual Disturbance     Hallucinations, [place] [facility] [place]     Labetalol Other (See Comments)     See [facility] [place] on 7/16/15  Bradycardia down to 30 on holter, dizziness. Stopped med and symptoms resolved     Metoprolol Other (See Comments)     Bradycardia even at 12.5 mg     Morphine Visual Disturbance       I have reviewed the Medications, Allergies, Past Medical and Surgical History, and Social History in the Epic system.    Review of Systems   Unable to perform ROS: Mental status change       Physical Exam   BP: (!) 148/78  Pulse: 65  Temp: 98.2  F (36.8  C)  Resp: 15  SpO2: 98 %      Physical Exam   Constitutional: He has a sickly appearance.   HENT:   Mouth/Throat: Oropharynx is clear and moist.   Eyes: Pupils are equal, round, and reactive to light.   Neck: Neck supple.   Cardiovascular: Normal rate, regular rhythm and normal heart sounds.   Pulmonary/Chest: Effort normal and breath sounds normal.   Abdominal: Soft.   Musculoskeletal:        Arms:  The patient has multiple areas of skin defect/wound from skin picking primarily on the upper extremities below the elbows.  The area of concern is the left hand on the second digit extending onto the dorsum of the hand.  There is a wound there from skin picking " with associated cellulitis spreading proximally.  No abscess no purulent drainage.   Neurological: He is alert.   Skin: There is erythema.   Psychiatric: He has a normal mood and affect. His speech is normal and behavior is normal. Thought content is delusional. Cognition and memory are impaired. He expresses impulsivity and inappropriate judgment.   Confused/delusional   Nursing note and vitals reviewed.      ED Course        Procedures        Medications   sodium chloride 0.9% infusion ( Intravenous New Bag 9/12/19 1436)   vancomycin 1250 mg in 0.9% NaCl 250 mL intermittent infusion 1,250 mg (has no administration in time range)   OLANZapine zydis (zyPREXA) ODT tab 10 mg (10 mg Oral Given 9/12/19 1436)   vancomycin 1250 mg in 0.9% NaCl 250 mL intermittent infusion 1,250 mg (1,250 mg Intravenous Given 9/12/19 1458)            Labs Ordered and Resulted from Time of ED Arrival Up to the Time of Departure from the ED   CBC WITH PLATELETS DIFFERENTIAL - Abnormal; Notable for the following components:       Result Value    RBC Count 3.37 (*)     Hemoglobin 9.2 (*)     Hematocrit 30.5 (*)     MCHC 30.2 (*)     RDW 15.3 (*)     Platelet Count 514 (*)     Absolute Neutrophil 8.4 (*)     All other components within normal limits   COMPREHENSIVE METABOLIC PANEL - Abnormal; Notable for the following components:    Glucose 117 (*)     Albumin 2.6 (*)     Protein Total 6.7 (*)     All other components within normal limits   AMMONIA   ALCOHOL ETHYL   LIPASE   ROUTINE UA WITH MICROSCOPIC            Assessments & Plan (with Medical Decision Making)     79-year-old gentleman who was in the clinic today with family members and then referred here.  There are number of issues. First, the family according to the chart, they are not here currently, they cannot care for him any longer. It is not clear why this is. Objectively and acutely he does pick at his skin and has a cellulitis on his left hand and being a diabetic, this is very  concerning.  He is not agitated here, but he is confused. I told him he needed to be admitted for IV antibiotics and he expressed his resistance to this I asked on a number of occasions.  I placed him on a 72-hour hold. He has no disposition at this point and he needs his infections treated. I did give him a dose of his Zyprexa and first dose of vancomycin. Report was called to the Family Medicine service who has accepted him. He is not septic appearing. He is afebrile with a normal white count, currently calm and cooperative.    This part of the medical record was transcribed by Kellen Maynard, Medical Scribe, from a dictation done by Tk Ch MD.       I have reviewed the nursing notes.    I have reviewed the findings, diagnosis, plan and need for follow up with the patient.    New Prescriptions    No medications on file       Final diagnoses:   Delirium   Cellulitis of left upper extremity   Skin picking habit     I, Quan Nj, am serving as a trained medical scribe to document services personally performed by Tk Ch MD, based on the provider's statements to me.      Tk PITT MD, was physically present and have reviewed and verified the accuracy of this note documented by Quan Nj.     9/12/2019   [facility], [place], EMERGENCY DEPARTMENT     Tk Ch MD  09/12/19 [id number]

## 2019-09-12 NOTE — ED TRIAGE NOTES
Pt lives at home with his wife and daughter. He was taken by family to [facility] clinic today for increased confusion and generalized weakness. Pt has become too hard for his wife to care for, per EMS

## 2019-09-12 NOTE — ED NOTES
"[facility], [place]   ED Nurse to Floor Handoff     Vincent Mishra is a 79 year old male who speaks English and lives with family members,  in a home  They arrived in the ED by ambulance from clinic    ED Chief Complaint: Altered Mental Status    ED Dx;   Final diagnoses:   Delirium   Cellulitis of left upper extremity   Skin picking habit         Needed?: No    Allergies:   Allergies   Allergen Reactions     Prozac [Fluoxetine] Other (See Comments)     \"I went crazy.\"       Benadryl [Diphenhydramine Hcl] Other (See Comments)     Starts to shake, and paranoid     Codeine Itching     Diphenhydramine Visual Disturbance     Hallucinations, See [facility] records scanned on 7/16/15     Labetalol Other (See Comments)     See [facility] records scanned on 7/16/15  Bradycardia down to 30 on holter, dizziness. Stopped med and symptoms resolved     Metoprolol Other (See Comments)     Bradycardia even at 12.5 mg     Morphine Visual Disturbance   .  Past Medical Hx:   Past Medical History:   Diagnosis Date     CAD (coronary artery disease) 7/26/2011 7/2011 (Juan[place]): s/p MI, PTCA - RCA      Cancer of kidney (H)      Chest pain 1/12/2012     Coronary artery disease      History of angina      History of blood transfusion      Hyperlipidaemia      Hyperlipidemia LDL goal <100 9/23/2010     Malignant neoplasm (H)     Prostate CA (removed)     Myocardial infarction (H)     s/p MI 7/29/14, stent placement     NSTEMI (non-ST elevated myocardial infarction) (H)     07-25-14 CATH- RCA, L.Main and CFX  had minor luminal irregularites. Mid LAD high grade stenosis 80-90%.Stent placed to mid LAD     Other and unspecified hyperlipidemia     MI in July 2011     Right bundle branch block      Type II or unspecified type diabetes mellitus without mention of complication, not stated as uncontrolled      Unspecified essential hypertension       Baseline Mental " status: Pt w HX of dementia. He lives at home w wife and daughter. Wife is caregiver. Pt has become more weak and confused than his usual, and he was taken to Health system Clinic today for that reason, and PA called EMS to bring Pt in for a work up.  Current Mental Status changes: more confused than usual. Pt oriented to self and place, but not date, time, or circumstances.    Infection present or suspected this encounter: yes skin/wound/contact  Sepsis suspected: No  Isolation type: No active isolations     Activity level - Baseline/Home:  Stand with Assist of 2  Activity Level - Current:   Stand with Assist of 2    Bariatric equipment needed?: No    In the ED these meds were given:   Medications   sodium chloride 0.9% infusion ( Intravenous New Bag 9/12/19 1436)   vancomycin 1250 mg in 0.9% NaCl 250 mL intermittent infusion 1,250 mg (has no administration in time range)   OLANZapine zydis (zyPREXA) ODT tab 10 mg (10 mg Oral Given 9/12/19 1436)   vancomycin 1250 mg in 0.9% NaCl 250 mL intermittent infusion 1,250 mg (1,250 mg Intravenous Given 9/12/19 1458)       Drips running?  No    Home pump  No    Current LDAs  Peripheral IV 09/12/19 Left Upper arm (Active)   Site Assessment WDL 9/12/2019  2:29 PM   Phlebitis Scale 0-->no symptoms 9/12/2019  2:29 PM   Infiltration Scale 0 9/12/2019  2:29 PM   Extravasation? No 9/12/2019  2:29 PM   Dressing Intervention New dressing  9/12/2019  2:29 PM   Number of days: 0       Urethral Catheter (Active)   Number of days: 12       Wound 12/04/18 Left Back Surgical (Active)   Number of days: 282       Wound 08/23/19 Anterior;Right Knee Skin tear (Active)   Number of days: 20       Wound 08/23/19 Left Hand Skin tear 3 skin tears, two covered up one GAIL (Active)   Number of days: 20       Wound 08/23/19 Left Toe (Comment  which one) Abrasion(s) Scab that reopened (Active)   Number of days: 20       Labs results:   Labs Ordered and Resulted from Time of ED Arrival Up to the Time of  Departure from the ED   CBC WITH PLATELETS DIFFERENTIAL - Abnormal; Notable for the following components:       Result Value    RBC Count 3.37 (*)     Hemoglobin 9.2 (*)     Hematocrit 30.5 (*)     MCHC 30.2 (*)     RDW 15.3 (*)     Platelet Count 514 (*)     Absolute Neutrophil 8.4 (*)     All other components within normal limits   COMPREHENSIVE METABOLIC PANEL - Abnormal; Notable for the following components:    Glucose 117 (*)     Albumin 2.6 (*)     Protein Total 6.7 (*)     All other components within normal limits   AMMONIA   ALCOHOL ETHYL   LIPASE   ROUTINE UA WITH MICROSCOPIC       Imaging Studies: No results found for this or any previous visit (from the past 24 hour(s)).    Recent vital signs:   /70   Pulse 77   Temp 98.2  F (36.8  C) (Oral)   Resp 16   SpO2 98%     [place] Coma Scale Score: 14 (09/12/19 1530)       Cardiac Rhythm: regular  Pt needs tele? no  Skin/wound Issues: Pt is a  and picks at his extremities until there are wounds. He picks off the scabs and they bleed. He has scattered scabs and open wounds on his extremities with surrounding redness and warmth.     Code Status: Full Code    Pain control: fair    Nausea control: good    Abnormal labs/tests/findings requiring intervention: Wound cares    Family present during ED ? No  Family Comments/Social Situation comments: Wife and daughter called to say that they are driving here from [place] @1800    Tasks needing completion: Plan of care to limit obsessive picking of wounds.    Radha Saenz, RN    [phone] [facility]

## 2019-09-13 NOTE — PLAN OF CARE
D/I: Pt alert but confused, BP is 160/80, otherwise vitally stable on room air. Bilateral upper extremities wounds. Dressings to Bilateral upper extremities: wound cleanser used, vaseline gauze, Kerlix applied. Pt continues with bedside attendant for impulsivity and picking at wounds.  Also biting the wounds on his hands. Has a chronic earl with milky white discharge coming out of the urethral meatus. Earl cares done.   P: Continue to monitor and notify MD of any changes. Pt will need wound nurse consult and possible urology consult. Please follow up with MDs

## 2019-09-13 NOTE — PHARMACY-CONSULT NOTE
Pharmacy Delirium Chart Review    Upon chart review, the following medications may contribute to possible altered mental status: Please consult unit pharmacist with further questions.    Olanzapine: confusion (4% incidence), impaired cognition, highly anticholinergic per Beers Criteria   Spironolactone: confusion (undefined)  Trazodone: confusion (up to 5.7%), disoriented (2%)    Nila Guy, [facility]

## 2019-09-13 NOTE — PLAN OF CARE
RN assumed cares at 1930, Pt alert but confused, VS stable upon admission.  Pt continues with bedside attendant for impulsivity and picking at wounds.  Pt was observed biting the wounds on his hands; dressing applied by RN and order obtained for soft restraints.  Pt does not endorse pain this shift.  IV abx given per plan of care.  Pt able to turn himself in the bed.  Admitted for acute altered mental status with dementia.  No other incidents this shift.  Continue to monitor and notify MD of any changes.

## 2019-09-13 NOTE — PROGRESS NOTES
[facility], [facility] Progress Note - [facility]       Main Plans for Today   - neurology consult  - psychiatry consult   - PT/ OT consult  - MOCA  - trend troponin  - repeat UA after catheter has been exchanged  - delirium precautions    Assessment & Plan   Vincent Mishra is a 80yo male with PMHx significant for recent hospitalization for acute on chronic pancreatitis 8/22/19 - 9/3/19, left renal cell carcinoma s/p cryoablation 12/2019, chronic skin lesions, NSTEMI s/p PCI in 07/2011 on clopidogrel, chronic back pain on long term opioids presenting with subacute presentation of altered mental status of unclear etiology, likely polypharmacy vs infectious. Patient has right hand cellulitis, on ancef. He is currently hemodynamically stable and in no acute distress.      # Subacute altered mental status  # Insomnia  # depression  AMS since hospital admission in 08/2019, worsening since discharge. Differential includes stroke, metabolic derangement, seizure, nutritional deficiency, substance use, polypharmacy, malignancy, thyroid disorder, infection, etc. Labs obtained negative for electrolyte abnormalities, hypercalcemia, hypoglycemia, hyperammonemia, uremia, alcohol use. No elevated WBC, fever, tachycardia to suggest infection. Recent CT 8/24/19 with no evidence of acute hemorrhage or mass. Given subacute presentation, will r/o other reversible causes of delirium. TSH slightly elevated at 4, unlikely culprit.   - neurology consulted, appreciate recs: likely medications vs infectious      -- delirium precaustions      -- B12 check      -- defer additional evaluations (MRI, EEG, LP) unless patient does not improve mentally after 3-4 days.  - Psych consulted, appreciate recs      -- discontinued zyprexa today, taper trazodone by 100 mg/night (will start this 9/14 in efforts to not change all meds at once)      -- start lurasidone 20 mg qam and 40 mg qpm      -- In the  future, one could certainly consider adding donepezil or memantine.   - pharmacy consult, appreciate recs      -- meds that may cause impaired cognition and confusion: Olanzapine, Spironolactone, Trazodone.   - UA pending.  - Continue PTA mirtazapine for sleep, depression.    # Prolonged QTc  Consider decreasing Sertrazine, omeprazole     # Chronic skin wounds due to picking behavior  # Concern for right hand cellulitis  Extensive skin wounds 2/2 scratching on bilateral upper and lower extremities. No active purulence or drainage from wounds. Given 1 dose vancomycin IV in the ED. Concern for cellulitis on right hand as well as wound management for other chronic wounds.  - Wound care consult.  - Day 2 of Cefazolin 1g IV q8h  - Continue monitoring for signs of infection/sepsis (elevated WBC, tachycardia, fever, hypotension, worsening of AMS, spreading erythema).     Chronic issues  # Type 2 diabetes mellitus  - Hold PTA metformin.  - Medium intensity sliding scale insulin.     # Hypertension  Nonhypertensive on admission.  - Continue PTA amlodipine.  - Continue PTA lisinopril.  - Continue PTA spironolactone- consider discontinuing due to side effect of confusion     # BPH  # Urinary retention  Has had catheter in place since prior admission- exchanged 9/13/19  - Continue PTA tamsulosin.  - voiding trial once medically stable, consider urology consult (had outpt appt scheduled)     # History of multiple ACS events with PCI  # Coronary stent  - Continue PTA clopidogrel.     # Chronic back pain  # History of multiple spinal fusions  - home regimen: 30 mg oxy in am, 10 in pm + additional prns  - holding PTA PRN oxycodone and bedtime oxycodone (as this was recently added) in setting of AMS    # Pain Assessment:  Current Pain Score 9/13/2019   Patient currently in pain? yes   Pain score (0-10) -   Pain location -   Pain descriptors Aching   - Vincent is experiencing pain due to chronic back pain 2/2 multiple spinal fusion  surgeries. Pain management was discussed and the plan was created in a collaborative fashion.  Vincent's response to the current recommendations: engaged  - Opioid regimen: am oxycodone scheduled  - Response to opioid medications: pain is managed  - Bowel regimen: senna and miralax, scheduled  - Pharmacologic adjuvants: NSAIDs and Acetaminophen    Diet: Combination Diet Regular Diet Adult  Fluids: d5 1/2 NS at 75 ml/hr  DVT Prophylaxis: Enoxaparin (Lovenox) SQ  Code Status: Full Code    Disposition Plan   Expected discharge: 2 - 3 days, recommended to transitional care unit once safe disposition plan/ TCU bed available. Dispo: Expected Discharge Date: 09/16/19        Entered: Monet Mandujano 09/13/2019, 4:23 PM   Information in the above section will display in the discharge planner report.      The patient's care was discussed with the Attending Physician, Dr. Bishop.    Monet Mandujano  [facility]  Pager: [phone]  Please see sticky note for cross cover information    Interval History   Patient is frustrated that his hands are in mitts. On initial exam he has groin pain and back pain, receive his pta pain medications and on re-examine denies pain.     Physical Exam   Vital Signs: Temp: 97.3  F (36.3  C) Temp src: Oral BP: (!) 165/70 Pulse: 63   Resp: 16 SpO2: 96 % O2 Device: None (Room air)    Weight: 0 lbs 0 oz  General Appearance: Elderly disheveled male, resting in bed, appears comfortable. Dressings over wounds. Mitts on bilateral hands.   Respiratory: CTAB  Cardiovascular: RRR  GI: nontender, nondistended  Skin: Multiple excoriations in various stages of healing noted on bilateral upper and lower extremities. Multiple open wounds with surrounding erythema noted, no active drainage or purulence seen. Mildly tender to palpation.  Most concerning region, patch of erythema and warmth on right forearm.  psych: Cooperative. Not responding to internal stimuli       Data   Recent  Labs   Lab 09/13/19  0631 09/12/19 2024 09/12/19  1420   WBC 9.4  --  10.1   HGB 8.5*  --  9.2*   MCV 92  --  91    455* 514*     --  139   POTASSIUM 3.6  --  3.6   CHLORIDE 106  --  103   CO2 24  --  28   BUN 14  --  14   CR 0.87 0.94 0.89   ANIONGAP 11  --  8   LIOR 8.3*  --  8.6   *  --  117*   ALBUMIN  --   --  2.6*   PROTTOTAL  --   --  6.7*   BILITOTAL  --   --  0.7   ALKPHOS  --   --  118   ALT  --   --  35   AST  --   --  31   LIPASE  --   --  389   TROPI 1.179*  --   --
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
## 2019-09-13 NOTE — PROGRESS NOTES
Spoke with wife zeb earl placed at [facility]. And went home with the earl. New rajat placed this afternoon

## 2019-09-13 NOTE — PROGRESS NOTES
Eliceo fierro team at [phone]   HIGH 5B [phone] M.MARISA Calder[place] RN [id number] troponin=1.179. do you want EKG and med/surg tele?    MD called writer back and said troponin is chronically elevated for pt and will order an EKG now and  Recheck troponin this afternoon.

## 2019-09-13 NOTE — PLAN OF CARE
Bedside attendant for safety. Pt had mitts on hands until about 1pm to prevent him from biting his arms, but is redirectable without mitts. Writer changed earl and pt khris well. BG's stable. Wound dressings to arm completed. Tums ordered for abd pain/acid reflux. Cont to monitor behavior and update MD with changes.

## 2019-09-13 NOTE — CONSULTS
"[facility]  Neurology AMS Consultation    Patient Name:  Vnicent Mishra  MRN:  [id number]    :  1940  Date of Service:  2019  Primary care provider:  Keyla Fonseca      Neurology consultation service was asked to see Vincent Mishra by Dr. Mandujano to evaluate for altered mental status.    History of Present Illness:   80yo male with PMHx significant for recent hospitalization for acute on chronic pancreatitis 19 - 9/3/19, left renal cell carcinoma s/p cryoablation 2019, chronic skin lesions 2/2 picking, NSTEMI s/p PCI in 2011 on clopidogrel, chronic back pain on long term opioids presenting with subacute presentation of altered mental status.   Neurology consulted 19 for encephalopathy.  Neurology previously consulted in late August for similar issues.    80yo male with PMHx significant for recent hospitalization for acute on chronic pancreatitis 19 - 9/3/19, left renal cell carcinoma s/p cryoablation 2019, chronic skin lesions 2/2 picking, NSTEMI s/p PCI in 2011 on clopidogrel, chronic back pain on long term opioids presenting with subacute presentation of altered mental status.     Per his wife Sheyla, Vincent was evaluated at [facility] in late August for abdominal pain. He was found to have an elevated lipase of 4100 and managed for acute on chronic pancreatitis suspected 2/2 gallstones. Vincent had been at his baseline mental status prior to his hospitalization. During his hospital stay, he developed altered mental status and difficulty sleeping. Neuro consult thought his AMS was due to inpatient delirium on baseline cognitive impairment. He seen by Psychiatry and was prescribed olanzapine for agitation.     Since his hospitalization, he has been having increasing difficulty with activities of daily living. On further questioning, Sheyla states that prior to the [facility] admission he was \"no more confused than any " "79 year old\" but was having difficulty driving. Has never paid attention to the date or done his bills. He now is unable to walk on his own (had previously walked with arm crutches).. He has had trouble sleeping and has stayed up late throughout the night. His wife reports that she has had increasing difficulty taking care of him given his confusion and staying up with during the night calling out to her.     Per chart review, he was seen in the ED at [facility] on 9/6/19 for generalized weakness and insomnia. He was found to have an elevated troponin, likely chronic. He was about to be admitted but he and his wife decided to try to manage him at home. He was sent home, counseled about decreasing oxycodone dose at bedtime to see if that would help with sleep.     Prior to admission today, he was seen at [facility] for continued altered mental status, weakness, and insomnia. Family reported that he had fallen out of bed, per wife did not hit his head. He had not complained of any fevers, chills, headaches, lightheadedness. Family reported it was increasingly difficult taking care of him at home in his current condition and requested he be admitted for evaluation.      In the ED, he was noted to have multiple chronic wounds in both his arms and legs, the most concerning for cellulitis is an area of erythema and warmth along his right forearm. He was noted to have altered mental status, with difficulty tracking and impaired memory and cognition.      8/24 note Dr. Sharma:  \"79 year old male with Hx of CAD, renal cancer, HLD, HTN, DM who is currently admitted for abdominal pain and elevated troponins.   Also noted to be confused and agitated. Was diagnosed with pancreatitis. Also is dehydrated. Was agitated whole day and at night and was medicated with seroquel, zyprexa and trazodone.   Currently patient frequently changes his position in bed and points to his stomach when asked what is going on . " "  He is able to answer some questions, but missing teeth and tries to fall asleep. What he was able to answer correct.   No prior Hx of dementia, but some episodes of confusion were reported by wife. Patient himself not able to provide any further Hx. \"      Meds receiving that are CNS acting per MAR:  -30mg scheduled oxycodone    ROS    Unable to perform d/t altered mental status    PMH  Past Medical History:   Diagnosis Date     CAD (coronary artery disease) 7/26/2011 7/2011 ([place]): s/p MI, PTCA - RCA      Cancer of kidney (H)      Chest pain 1/12/2012     Coronary artery disease      History of angina      History of blood transfusion      Hyperlipidaemia      Hyperlipidemia LDL goal <100 9/23/2010     Malignant neoplasm (H)     Prostate CA (removed)     Myocardial infarction (H)     s/p MI 7/29/14, stent placement     NSTEMI (non-ST elevated myocardial infarction) (H)     07-25-14 CATH- RCA, L.Main and CFX  had minor luminal irregularites. Mid LAD high grade stenosis 80-90%.Stent placed to mid LAD     Other and unspecified hyperlipidemia     MI in July 2011     Right bundle branch block      Type II or unspecified type diabetes mellitus without mention of complication, not stated as uncontrolled      Unspecified essential hypertension      Past Surgical History:   Procedure Laterality Date     ARTHROSCOPY KNEE  2/11/2011    ARTHROSCOPY KNEE performed by LEY, JEFFREY DUANE at [facility]     ARTHROSCOPY KNEE WITH MEDIAL MENISCECTOMY  6/26/2012    Procedure: ARTHROSCOPY KNEE WITH MEDIAL MENISCECTOMY;  Right Knee Arthroscopy With Medial Menisectomy;  Surgeon: Ley, Jeffrey Duane, MD;  Location: [facility]     BACK SURGERY      back surgery x4     BIOPSY       CARDIAC SURGERY  7/2011    stentt placed     COLONOSCOPY       CORONARY ANGIOGRAPHY ADULT ORDER  07-25-14    RCA, L.Main and CFX  had minor luminal irregularites. Mid LAD high grade stenosis 80-90%.Stent placed to mid LAD     CV CORONARY ANGIOGRAM  3/28/2019    " Procedure: CV CORONARY ANGIOGRAM;  Surgeon: Dominguez Mishra MD;  Location:  HEART CARDIAC CATH LAB     ESOPHAGOSCOPY, GASTROSCOPY, DUODENOSCOPY (EGD), COMBINED  10/25/2012    Procedure: COMBINED ESOPHAGOSCOPY, GASTROSCOPY, DUODENOSCOPY (EGD), BIOPSY SINGLE OR MULTIPLE;  Gastroscopy  ;  Surgeon: Lucius Dasilva MD;  Location: [facility]     HEART CATH, ANGIOPLASTY  07-25-14    mid LAD      ORTHOPEDIC SURGERY       back surgery       Medications     Current Facility-Administered Medications:      0.9% sodium chloride + KCl 20 mEq/L infusion, , Intravenous, Continuous, Monet Mandujano MD, Last Rate: 100 mL/hr at 09/13/19 0031     acetaminophen (TYLENOL) tablet 650 mg, 650 mg, Oral, Q4H PRN, Monet Mandujano MD     amLODIPine (NORVASC) tablet 5 mg, 5 mg, Oral, Daily, Monet Mandujano MD, 5 mg at 09/12/19 2221     aspirin EC tablet 81 mg, 81 mg, Oral, QPM, Monet Mandujano MD, 81 mg at 09/12/19 2221     atorvastatin (LIPITOR) tablet 80 mg, 80 mg, Oral, Daily, Monet Mandujano MD, 80 mg at 09/12/19 2223     ceFAZolin (ANCEF) 1 g vial to attach to  ml bag for ADULT or 50 ml bag for PEDS, 1 g, Intravenous, Q8H, Monet Mandujano MD, Last Rate: 200 mL/hr at 09/13/19 0403, 1 g at 09/13/19 0403     clopidogrel (PLAVIX) tablet 75 mg, 75 mg, Oral, Daily, Monet Mandujano MD, 75 mg at 09/12/19 2222     cyanocobalamin (VITAMIN B-12) tablet 500 mcg, 500 mcg, Oral, Daily, Monet Mandujano MD, 500 mcg at 09/12/19 2231     glucose gel 15-30 g, 15-30 g, Oral, Q15 Min PRN **OR** dextrose 50 % injection 25-50 mL, 25-50 mL, Intravenous, Q15 Min PRN **OR** glucagon injection 1 mg, 1 mg, Subcutaneous, Q15 Min PRN, Monet Mandujano MD     enoxaparin (LOVENOX) injection 40 mg, 40 mg, Subcutaneous, Q24H, Monet Mandujano MD, 40 mg at 09/12/19 2223     ferrous sulfate (FEROSUL) tablet 325 mg, 325 mg, Oral, Daily, Monet Mandujano MD, 325 mg at 09/12/19 2223     insulin aspart (NovoLOG) inj (RAPID ACTING), 1-3 Units, Subcutaneous, TID AC, Delebrt,  MD Monet     insulin aspart (NovoLOG) inj (RAPID ACTING), 1-3 Units, Subcutaneous, At Bedtime, Monet Mandujano MD     lidocaine (LMX4) cream, , Topical, Q1H PRN, Monet Mandujano MD     lidocaine 1 % 0.1-1 mL, 0.1-1 mL, Other, Q1H PRN, Monet Mandujano MD     lisinopril (PRINIVIL/ZESTRIL) tablet 40 mg, 40 mg, Oral, Daily, Monet Mandujano MD, 40 mg at 09/12/19 2223     magnesium sulfate 4 g in 100 mL sterile water (premade), 4 g, Intravenous, Q4H PRN, Monet Mandujano MD     melatonin tablet 1 mg, 1 mg, Oral, At Bedtime PRN, Monet Mandujano MD     naloxone (NARCAN) injection 0.1-0.4 mg, 0.1-0.4 mg, Intravenous, Q2 Min PRN, Monet Mandujano MD     OLANZapine (zyPREXA) tablet 2.5 mg, 2.5 mg, Oral, BID PRN, Monet Mandujano MD     omeprazole (priLOSEC) CR capsule 20 mg, 20 mg, Oral, BID AC, Monet Mandujano MD, 20 mg at 09/12/19 2222     oxyCODONE (oxyCONTIN) 12 hr tablet 30 mg, 30 mg, Oral, LIAN, Monet Mandujano MD     polyethylene glycol (MIRALAX/GLYCOLAX) Packet 17 g, 17 g, Oral, Daily PRN, Monet Mandujano MD     potassium chloride (KLOR-CON) Packet 20-40 mEq, 20-40 mEq, Oral or Feeding Tube, Q2H PRN, Monet Mandujano MD     potassium chloride 10 mEq in 100 mL intermittent infusion with 10 mg lidocaine, 10 mEq, Intravenous, Q1H PRN, Monet Mandujano MD     potassium chloride 10 mEq in 100 mL sterile water intermittent infusion (premix), 10 mEq, Intravenous, Q1H PRN, Monet Mandujano MD     potassium chloride 20 mEq in 50 mL intermittent infusion, 20 mEq, Intravenous, Q1H PRN, Monet Mandujano MD     potassium chloride ER (K-DUR/KLOR-CON M) CR tablet 20-40 mEq, 20-40 mEq, Oral, Q2H PRN, Monet Mandujano MD     sertraline (ZOLOFT) tablet 200 mg, 200 mg, Oral, Daily, Monet Mandujano MD, 200 mg at 09/12/19 2220     sodium chloride (PF) 0.9% PF flush 3 mL, 3 mL, Intracatheter, q1 min prn, Monet Mandujano MD     sodium chloride (PF) 0.9% PF flush 3 mL, 3 mL, Intracatheter, Q8H, Monet Mandujano MD     sodium chloride (PF) 0.9% PF  "flush 3 mL, 3 mL, Intracatheter, q1 min prn, Monet Mandujano MD     sodium chloride (PF) 0.9% PF flush 3 mL, 3 mL, Intracatheter, Q8H, Monet Mandujano MD     spironolactone (ALDACTONE) half-tab 12.5 mg, 12.5 mg, Oral, Daily, Monet Mandujano MD, 12.5 mg at 09/12/19 2231     tamsulosin (FLOMAX) capsule 0.4 mg, 0.4 mg, Oral, Daily, Monet Mandujano MD, 0.4 mg at 09/12/19 2221     traZODone (DESYREL) tablet 300 mg, 300 mg, Oral, At Bedtime, Monet Mandujano MD, 300 mg at 09/12/19 2222     vitamin B1 (THIAMINE) tablet 100 mg, 100 mg, Oral, Daily, Monet Mandujano MD, 100 mg at 09/12/19 2223     vitamin C (ASCORBIC ACID) tablet 500 mg, 500 mg, Oral, Daily, Monet Mandujano MD, 500 mg at 09/12/19 2222     vitamin D3 (CHOLECALCIFEROL) 1000 units (25 mcg) tablet 1,000 Units, 1,000 Units, Oral, Daily, Monet Mandujano MD, 1,000 Units at 09/12/19 2222    Allergies  Allergies   Allergen Reactions     Prozac [Fluoxetine] Other (See Comments)     \"I went crazy.\"       Benadryl [Diphenhydramine Hcl] Other (See Comments)     Starts to shake, and paranoid     Codeine Itching     Diphenhydramine Visual Disturbance     Hallucinations, See [facility] records scanned on 7/16/15     Labetalol Other (See Comments)     See [facility] records scanned on 7/16/15  Bradycardia down to 30 on holter, dizziness. Stopped med and symptoms resolved     Metoprolol Other (See Comments)     Bradycardia even at 12.5 mg     Morphine Visual Disturbance       Social History  Social History     Tobacco Use     Smoking status: Never Smoker     Smokeless tobacco: Never Used   Substance Use Topics     Alcohol use: No     Alcohol/week: 0.0 oz       Family History    Family History   Problem Relation Age of Onset     Cancer Mother      Depression Mother      Diabetes Father      Hypertension Father      Heart Disease Father      Mental Illness Father      Heart Disease Maternal Grandfather      Substance Abuse Maternal " Grandfather      Alcohol/Drug Paternal Grandmother      Substance Abuse Paternal Grandmother      Heart Disease Paternal Grandfather      Alcohol/Drug Paternal Grandfather      Substance Abuse Paternal Grandfather      Heart Disease Brother      Diabetes Brother      Substance Abuse Brother      Obesity Brother      Diabetes Brother      Obesity Sister      Alcohol/Drug Daughter      Depression Daughter      Obesity Sister      Diabetes Sister      Obesity Sister      Diabetes Sister      Alcohol/Drug Sister      Cancer Sister 55        possible kidney cancer     Substance Abuse Sister      Alcohol/Drug Son      Substance Abuse Son      Diabetes Son      Alcohol/Drug Son      Substance Abuse Son      Obesity Daughter      Diabetes Daughter      Hypertension Daughter      Bipolar Disorder Other          Physical Examination   Vitals: BP (!) 160/80 (BP Location: Right arm)   Pulse 84   Temp 97.2  F (36.2  C) (Oral)   Resp 16   SpO2 95%   General: Adult, in pain and screaming at provider, confused  HEENT: NC/AT, no icterus, op pink and moist  Neuro:  Mental status: somnolent but easily aroused. Oriented to self but not place or situation or time. Follows some commands but not consistently. Speech fluency, comprehension and repetition are all intact. Modest dysarthria.  Cranial nerves: Eyes conjugate, PERRLA, EOMI, visual fields full to threat, face symmetric, hearing intact to conversation.  Motor: Tone normal with degree of paratonia. Moving all extremities equally and strongly. No drift in UEs. No abnormal movements noted (no myoclonus). Patient would not allow examiner to test for asterixis  Reflexes: hypereflexic and symmetric biceps, brachioradialis, patellar, and achilles. Toes down-going.  Sensory: Intact to pain x 4 extremities  Coordination/gait: unable to perform - mental status    Investigations   INDICATION: Confusion   TECHNIQUE: Routine CT Head without IV contrast. Dose reduction  techniques were  used.  COMPARISON: MRI brain 3/27/2019     FINDINGS:  INTRACRANIAL CONTENTS: No intracranial hemorrhage, extraaxial  collection, or mass effect.  No CT evidence of acute infarct.  Unchanged mild to moderate diffuse parenchymal volume loss with ex  vacuo ventricular dilatation. Periventricular low attenuation most in  keeping with sequela of chronic microvascular ischemic change.     VISUALIZED PARANASAL SINUSES: No significant disease.      VISUALIZED MASTOIDS: No significant disease.      VISUALIZED ORBITS: No significant disease.     OSSEOUS STRUCTURES AND SOFT TISSUES: Unremarkable.      CONCLUSION:  1.  No acute intracranial abnormality.    No results found for: PH, PHARTERIAL, PO2, SO1MUVPHPAX, SAT, PCO2, HCO3, BASEEXCESS, ROJELIO, BEB       Lab Results   Component Value Date     09/13/2019    Lab Results   Component Value Date    CHLORIDE 106 09/13/2019    Lab Results   Component Value Date    BUN 14 09/13/2019      Lab Results   Component Value Date    POTASSIUM 3.6 09/13/2019    Lab Results   Component Value Date    CO2 24 09/13/2019    Lab Results   Component Value Date    CR 0.87 09/13/2019        Lab Results   Component Value Date    NTBNPI 251 03/27/2019    NTBNP 305 (H) 04/24/2012     Lab Results   Component Value Date    WBC 9.4 09/13/2019    HGB 8.5 (L) 09/13/2019    HCT 28.4 (L) 09/13/2019    MCV 92 09/13/2019     09/13/2019     Lab Results   Component Value Date    CR 0.87 09/13/2019     Lab Results   Component Value Date    DD 0.3 08/20/2012     Lab Results   Component Value Date     09/13/2019    POTASSIUM 3.6 09/13/2019    CHLORIDE 106 09/13/2019    CO2 24 09/13/2019     (H) 09/13/2019     Lab Results   Component Value Date    SED 13 10/30/2017     Lab Results   Component Value Date    GFRESTIMATED 82 09/13/2019    GFRESTBLACK >90 09/13/2019     Lab Results   Component Value Date     (H) 09/13/2019     Lab Results   Component Value Date    HGB 8.5 (L) 09/13/2019      Lab Results   Component Value Date    AST 31 09/12/2019    ALT 35 09/12/2019    ALKPHOS 118 09/12/2019    BILITOTAL 0.7 09/12/2019    BILICONJ 0.0 02/28/2004    LAILA 18 09/12/2019     Lab Results   Component Value Date    INR 1.16 (H) 12/11/2018     No results found for: BILINEONATAL, TCBIL  Lab Results   Component Value Date     09/13/2019     Lab Results   Component Value Date    BUN 14 09/13/2019    CR 0.87 09/13/2019     Lab Results   Component Value Date    TSH 4.39 (H) 09/12/2019     Lab Results   Component Value Date    TROPONIN <0.012 07/25/2014    TROPI 1.036 (HH) 09/06/2019     Lab Results   Component Value Date    URINEKETONE 10 (A) 09/13/2019     Lab Results   Component Value Date    WBC 9.4 09/13/2019     CRP Inflammation   Date Value Ref Range Status   09/12/2019 120.0 (H) 0.0 - 8.0 mg/L Final         Impression  79 year old man who presents with pancreatitis and NSTEMI. Currently is encephalopathic. No clear signs of non-convulsive status. No other clear focal neurological changes.   Possibly delirium on top of underlying cognitive disorder, but difficult diagnose right now as patient has other medical issues.   Currently exam was compromised in part by pain as well.  Likely oxycodone is playing a role here.  Can check TSH and B12, limit opiates as able in setting of intolerable pain.    #Encephalopathy, delirium in the setting of cellulitis, recent pancreatitis, recent NSTEMI  #Possible dementia, if present would be multi-factorial    No focal neurologic deficits to suspect stroke as etiology. No history of seizures or abnormal movements to suggest seizure activity.   Low suspicion for meningitis/encephalitis. Differential for encephalopathy includes metabolic and infectious derangements primarily then  toxins/drugs (notably scheduled oxycodone.    and then hypoxic ischemic encephalopathy, endocrine abnormalities, trauma, uremia, psychogenic.        Recommendations  -treat underlying  infection, correct metabolic derangements as able.  -check TSH, B12  -recommend pain control defer to primary team (patient is not opioid naive per chart)  -likely patient would not comply for EEG or MRI and we can wait for the time being, can follow up with primary providers ultimately for this  -agitation treatment as instituted, psych already consulted  -avoid CNS acting drugs such as oxycodone  -supportive medical cares  -delirium precautions (order set): frequent reorientations, normal day night cycles (blinds up and awake during day, sleeping at night), etc.      Thank you for involving neurology in the care of Vincent Mishra.  Please do not hesitate to call with questions/concerns (consult pager [phone]).      Patient was seen and discussed with Dr. Wolff.    Joshua Spears MD/PhD  [facility] Neurology  [phone]

## 2019-09-13 NOTE — PROGRESS NOTES
Wound care completed on both arms. Patient has his mitts off and has a sitter to keep him from picking.

## 2019-09-13 NOTE — PLAN OF CARE
Paged vadim at [phone]   LOW 5B 5-230 LEA [place] RN [id number] please order WOCN consult. pt has indigestion-can you order tums? family states pt takes trazadone 300mg, thanks.

## 2019-09-14 NOTE — PLAN OF CARE
Discharge Planner OT   Patient plan for discharge: Unable to report.  Current status: Patient able to participate in initial evaluation and treatment session this afternoon in collaboration with Physical Therapist. To facilitate core strength, patient assisted supine to sidelying to sitting with maximal assistance x2 and sat edge of bed x10 minutes with CGA to minimal assistance required to achieve balance. Patient only oriented to self this afternoon (reported month to be March, year to be 1930 and unable to identify where he was). MoCA not administered at this time as patient unable to cognitively participate in a assessment like this. Overall, therapy and movement did seem to decrease patient's restlessness. Granddaughter educated on delirium precautions to maintain with understanding verbalized.  Barriers to return to prior living situation: Cognition, strength, endurance, balance.  Recommendations for discharge: TCU.  Rationale for recommendations: OT will continue to follow patient to progress functional independence.        Entered by: Luisana Pyle 09/14/2019 3:40 PM

## 2019-09-14 NOTE — PLAN OF CARE
Pt alert to self only and seeing dogs on wall this morning. Wife states this is normal for pt as he has visual hallucinations at home. 2 small soft stools. Pt grimacing and complaining of a lot pain today. Tylenol given X1 this afternoon. Writer paged team for home dose of oxycodone with no response, will report to oncoming RN to follow up about pain meds. Cath cares done. Pt placed on pulsate mattress this afternoon. Pt khris sitting on edge of bed this afternoon with OT. OT will find a recliner chair and get pt up oob tomorrow. Daughter at bedside. Dressings to BUE not changed yet today as pt was working with OT. Cont to monitor pain and mentation.

## 2019-09-14 NOTE — PLAN OF CARE
PT - 5B  Discharge Planner PT   Patient plan for discharge: not stated  Current status: Pt evaluated and treatment initiated today. Pt very confused, oriented to self only. He requires significant time for repetition of commands, however, does participate throughout session. Pt requires mod A x2 for bed mobility trunk and LE management with verbal cues in sequencing. Pt assisted in 3x sit<>stand transfers with mod Ax2. Pt and daughter educated on role of therapies and POC.  Barriers to return to prior living situation: medical status, safety, level of assist  Recommendations for discharge: TCU  Rationale for recommendations: Pt requires 24 hour care for safety and needs significant assistance for all mobility. Would benefit from continued PT in order to address LE strengthening, transfers, bed mobility and standing tolerance for functional mobility. Pt has needs for multiple disciplines.  Entered by: Maty Moss 09/14/2019 3:57 PM

## 2019-09-14 NOTE — PROVIDER NOTIFICATION
Paged vadim at [phone]   5B 5-230 MMARYLIN Calder[place] [id number] family states pt also takes oxycodone 5mg in the afternoon for breakthrough pain. pt having more pain. can you write for pain meds    MD did not respond or enter orders. End of writers shift now. Will report to oncoming RN about f/u with pain management.

## 2019-09-14 NOTE — PROVIDER NOTIFICATION
vadim team here to see pt and writer updated MDs that   pt states he has chest pain, and points to epigastric region. family at bedside and also want to talk to MD about delirium.     md ordered GI cocktail as this helped yesterday

## 2019-09-14 NOTE — PLAN OF CARE
AxO to self only. VSS on RA except hypertensive. Pt has bedside attendant. Pt reported severe abdominal pain this shift; relieved with GI cocktail. Pt denied nausea. Pt on regular diet and had limited appetite this shift. Pt has urinary catheter in place and voiding adequately. No BM this shift. Pt tolerated arm/wrist wound cares well this shift. Continue to monitor and follow plan of care.

## 2019-09-14 NOTE — PROGRESS NOTES
09/14/19 1440   Quick Adds   Type of Visit Initial PT Evaluation   Living Environment   Lives With spouse;child(adán), adult   Living Arrangements house   Living Environment Comment Pt unable to state information. Information obtained from granddaughter   Self-Care   Usual Activity Tolerance poor   Current Activity Tolerance poor   Regular Exercise No   Equipment Currently Used at Home walker, standard;wheelchair, manual;shower chair   Activity/Exercise/Self-Care Comment Previously pt was independent. However, as condition declined pt has been total dependent in all cares and mobility   Functional Level Prior   Ambulation 3-->assistive equipment and person   Transferring 3-->assistive equipment and person   Toileting 3-->assistive equipment and person   Bathing 3-->assistive equipment and person   Communication 2-->difficulty understanding and speaking (not related to language barrier)   Cognition 1 - attention or memory deficits   Fall history within last six months yes   Number of times patient has fallen within last six months 4   Which of the above functional risks had a recent onset or change? ambulation;transferring;toileting;bathing;dressing;fall history;cognition   Prior Functional Level Comment Pt fully dependent on family for assistance. Information provided from pt's granddaughter   General Information   Onset of Illness/Injury or Date of Surgery - Date 09/12/19   Referring Physician Monet Mandujano MD   Patient/Family Goals Statement not able to state. Family wants him to improve strength   Pertinent History of Current Problem (include personal factors and/or comorbidities that impact the POC) Per chart, Vincent Mishra is a 78yo male with PMHx significant for recent hospitalization for acute on chronic pancreatitis 8/22/19 - 9/3/19, left renal cell carcinoma s/p cryoablation 12/2019, chronic skin lesions, NSTEMI s/p PCI in 07/2011 on clopidogrel, chronic back pain on long term opioids presenting with  subacute presentation of altered mental status of unclear etiology, likely polypharmacy vs infectious. Patient has right hand cellulitis, on ancef. He is currently hemodynamically stable and in no acute distress.    Precautions/Limitations fall precautions   General Observations Pt confused and needs to be redirected throughout session   General Info Comments Acitivity order: up with assist   Cognitive Status Examination   Orientation person   Level of Consciousness alert;confused   Follows Commands and Answers Questions 50% of the time  (requires repetition of commands)   Personal Safety and Judgment impaired;at risk behaviors demonstrated;impulsive   Memory impaired   Integumentary/Edema   Integumentary/Edema Comments   (bruising and scratchs on B UEs and LEs)   Posture    Posture Forward head position;Protracted shoulders;Kyphosis   Range of Motion (ROM)   ROM Comment WFL   Strength   Strength Comments Unable to formally assess, >3+/5   Bed Mobility   Bed Mobility Comments mod A x2   Transfer Skills   Transfer Comments sit<>stand mod A x2   Gait   Gait Comments not assessed d/t safety concerns   Balance   Balance Comments unsteady sitting balance, improves with verbal cues   General Therapy Interventions   Planned Therapy Interventions balance training;neuromuscular re-education;strengthening;transfer training   Clinical Impression   Criteria for Skilled Therapeutic Intervention yes, treatment indicated   PT Diagnosis Impaired functional mobility   Influenced by the following impairments weakness, confusion, balance, pain   Functional limitations due to impairments transfers, bed mobility, ambulation, ADLs   Clinical Presentation Unstable/Unpredictable   Clinical Presentation Rationale chart review and clinical judgement   Clinical Decision Making (Complexity) High complexity   Therapy Frequency 4x/week   Predicted Duration of Therapy Intervention (days/wks) 1 week   Anticipated Discharge Disposition Transitional  "Care Facility   Risk & Benefits of therapy have been explained Yes   Patient, Family & other staff in agreement with plan of care Yes   Clinical Impression Comments Pt confused and oriented to self only. Information about PLOF obtained from granddaughter. Pt will benefit from skilled PT in order to progress functional mobility and strengthening   [facility] AM-PAC  \"6 Clicks\" V.2 Basic Mobility Inpatient Short Form   1. Turning from your back to your side while in a flat bed without using bedrails? 2 - A Lot   2. Moving from lying on your back to sitting on the side of a flat bed without using bedrails? 2 - A Lot   3. Moving to and from a bed to a chair (including a wheelchair)? 1 - Total   4. Standing up from a chair using your arms (e.g., wheelchair, or bedside chair)? 1 - Total   5. To walk in hospital room? 1 - Total   6. Climbing 3-5 steps with a railing? 1 - Total   Basic Mobility Raw Score (Score out of 24.Lower scores equate to lower levels of function) 5   Total Evaluation Time   Total Evaluation Time (Minutes) 5     "

## 2019-09-15 NOTE — PROVIDER NOTIFICATION
Eliceo fierro at 0775  HIGH: 5B 5-230 LEA Calderon RN [id number] I know pt troponin is chronically high. just wanted to let you know am troponin=1.266.    MD called back and said she didn't get the page but saw my note and will recheck troponin in 6h

## 2019-09-15 NOTE — PLAN OF CARE
Discharge Planner OT   Patient plan for discharge: Unable to report.  Current status: Patient remained restless, confused and disoriented this morning. Family present and delirium precautions being followed with shades open/lights on and providing patient with reorientation. Spouse asked to bring glasses in for patient to use as sensory aid. To facilitate upright positioning, patient transferred from bed to recliner chair using universal sling and ceiling lift. All staff encouraged to use lift equipment for safe patient handling at this time. Sitting goal is 30 minutes to 1 hour, however, patient very restless in chair and requiring distraction.  Barriers to return to prior living situation: Cognition, strength, endurance, balance.  Recommendations for discharge: TCU.  Rationale for recommendations: OT will continue to follow patient while in hospital to progress functional independence and quality of life.       Entered by: Luisana Pyle 09/15/2019 10:47 AM

## 2019-09-15 NOTE — PLAN OF CARE
AxO to self and place; disoriented to time and situation. VSS on Ra except hypertensive. Pt has bedside attendant. Pt reported epigastric pain this shift; relieved with GI cocktail and tylenol. Pt on regular diet and tolerating well. Pt had one BM this shift. BUE wound cares done this shift. Pt was restless and agitated this shift but slept for about 1 hour late in shift. Pt is occasionally redirectable and was calmed by aromatherapy sticks on occasion. IV abx given. LPIV infusing D5 1/2 NS at 75 mL/hr. Continue to monitor and follow plan of care.

## 2019-09-15 NOTE — PROGRESS NOTES
Progress note    Patient complains of chest pain while sitting in bed. Says going on for 1 year, but this is worse. Currently 10/10. No SOB. GI cocktail earlier for similar complaints in past few days with improvement, but currently unclear if earlier evening dose was helpful. Pt states he is confused. Wants to know when he can go home.    BP (!) 157/104 (BP Location: Right arm)   Pulse 84   Temp 98  F (36.7  C) (Axillary)   Resp 20   Wt 81 kg (178 lb 9.2 oz)   SpO2 93%   BMI 27.15 kg/m    No diaphoresis. Appears rather comfortable, sitting in bed.  Regular rate, rhythm. S1/S2 normal.  Lungs clear.    Will obtain EKG, troponin.  May continue tylenol, GI cocktail prn for pain.    Update: EKG without significant change compared to 9/13. No findings to indicate ischemia. QTc mild improvement, rhythm without significant change. If pain persists will follow serial EKGs.    Kristina Caballero MD

## 2019-09-15 NOTE — PROGRESS NOTES
"   09/14/19 1600   Quick Adds   Type of Visit Initial Occupational Therapy Evaluation   Living Environment   Lives With spouse;grandchild(adán)   Living Arrangements house   Living Environment Comment Granddaughter (Ely) present this afternoon providing as much occupational profile information as possible. Teeya reported patient is dependent on spouse for all cares.    Self-Care   Usual Activity Tolerance poor   Current Activity Tolerance poor   Regular Exercise No   Equipment Currently Used at Home walker, rolling;wheelchair, manual   Activity/Exercise/Self-Care Comment Dependent for all cares. Granddaughter reported family has been assisting patient with transfers using 2-3 people to get on/off toilet.   Functional Level   Ambulation 4-->completely dependent   Transferring 3-->assistive equipment and person   Toileting 3-->assistive equipment and person   Bathing 3-->assistive equipment and person   Dressing 2-->assistive person   Eating 2-->assistive person   Communication 2-->difficulty understanding and speaking (not related to language barrier)   Cognition 1 - attention or memory deficits   Fall history within last six months yes   Number of times patient has fallen within last six months 4   Which of the above functional risks had a recent onset or change? ambulation;transferring;bathing;toileting;dressing;fall history;cognition   Prior Functional Level Comment Enjoys [place] music per granddaughter.       Present no   General Information   Onset of Illness/Injury or Date of Surgery - Date 09/13/19   Referring Physician Monet Mandujano MD   Patient/Family Goals Statement Unable to state   Additional Occupational Profile Info/Pertinent History of Current Problem \"Vincent Mishra is a 80yo male with PMHx significant for recent hospitalization for acute on chronic pancreatitis 8/22/19 - 9/3/19, left renal cell carcinoma s/p cryoablation 12/2019, chronic skin lesions, NSTEMI s/p PCI in " "07/2011 on clopidogrel, chronic back pain on long term opioids presenting with subacute presentation of altered mental status of unclear etiology, likely polypharmacy vs infectious. Patient has right hand cellulitis, on ancef. He is currently hemodynamically stable and in no acute distress.\"   General Info Comments Activity orders: up with assist   Cognitive Status Examination   Orientation person   Visual Perception   Visual Perception Wears glasses  (Does not have them in hospital, dog ate hearing aids)   Pain Assessment   Patient Currently in Pain Yes, see Vital Sign flowsheet   Range of Motion (ROM)   ROM Quick Adds Shoulder, Left  (Limited to 90*)   Strength   Manual Muscle Testing Quick Adds   (Deconditined grossly. Left upper extremity weaker than right)   Hand Strength   Hand Strength Comments WFL   Coordination   Coordination Comments WFL   Mobility   Bed Mobility Bed mobility skill: Supine to sit   Bed Mobility Skill: Supine to Sit   Level of [place]: Supine/Sit maximum assist (25% patients effort)   Physical Assist/Nonphysical Assist: Supine/Sit 2 persons   Lower Body Dressing   Level of [place]: Dress Lower Body dependent (less than 25% patients effort)   Activities of Daily Living Analysis   Impairments Contributing to Impaired Activities of Daily Living balance impaired;cognition impaired;pain;strength decreased;ROM decreased   General Therapy Interventions   Planned Therapy Interventions ADL retraining;fine motor coordination training;strengthening;ROM;transfer training   Clinical Impression   Criteria for Skilled Therapeutic Interventions Met yes, treatment indicated   OT Diagnosis Patient presented to OT with decreased independence with ADL tasks.   Influenced by the following impairments Strength, endurance, balance, cognition.   Assessment of Occupational Performance 5 or more Performance Deficits   Identified Performance Deficits Dressing, bathing, toileting, ambulating, transferring. " "  Clinical Decision Making (Complexity) Low complexity   Therapy Frequency 5x/week   Predicted Duration of Therapy Intervention (days/wks) 1 week   Anticipated Discharge Disposition Transitional Care Facility   Risks and Benefits of Treatment have been explained. Yes   Patient, Family & other staff in agreement with plan of care Yes   [facility] AM-PAC  \"6 Clicks\" Daily Activity Inpatient Short Form   1. Putting on and taking off regular lower body clothing? 1 - Total   2. Bathing (including washing, rinsing, drying)? 1 - Total   3. Toileting, which includes using toilet, bedpan or urinal? 1 - Total   4. Putting on and taking off regular upper body clothing? 1 - Total   5. Taking care of personal grooming such as brushing teeth? 2 - A Lot   6. Eating meals? 2 - A Lot   Daily Activity Raw Score (Score out of 24.Lower scores equate to lower levels of function) 8   Total Evaluation Time   Total Evaluation Time (Minutes) 5     "

## 2019-09-15 NOTE — PLAN OF CARE
Pt slept first hour of shift ([id number]) but was restless and agitated the remainder of the night. Pt asking for help but unable to verbalize desires. Pt indicated pain in abdomen and PRN GI cocktail and tylenol was given w/ adequate relief. Tolerating soft safety mitts well w/ attendant at bedside for redirection when pt tries to pull out IV or bite hands. Pt oriented to person. VSS. Turns assist x2 on Pulsate mattress. Mcelroy catheter in place. BM incontinence x1. PIV infusing D51/2NS @ 75mL/hr.     Continue with plan of care.

## 2019-09-15 NOTE — PROVIDER NOTIFICATION
"Web paged cross-cover:   Pt c/o worsening chest pain and pain in left arm.     ECG result: \"atrial fibrillation, R bundle branch block, T wave abnormality, consider lateral ischemia.\"   Trop I was 1.266.   VSS w/ elevated BP.     MD at bedside.   "

## 2019-09-15 NOTE — PLAN OF CARE
Attendant at beside for safety, mitts in place to prevent picking at wounds. Pt alert to self only. Agitated most of day, but tolerated sitting in chair and working with OT. Tylenol X2 for back pain and GI cocktail X1 for epigastric pain. Arm and leg wound dressings changed. Abd ultrasound completed this afternoon. AC=853, 258. Possible care conference tomorrow-MD will call family in AM. Cont to monitor neuros and update MD with changes.

## 2019-09-15 NOTE — PROGRESS NOTES
[facility] Progress Note - [place]       Main Plans for Today   - neurology consult, recommendations appreciated- will revisit 9/16, requesting dementia resources  - psychiatry consult, recommendations appreciated   - paliative and dementia resources on Monday   - PT/ OT consulted  - continue to taper oxycodone and trazadone   - continue ancef (5 days)  - delirium precautions  - cardiology curbside for rising troponin    Assessment & Plan   Vincent Mishra is a 78yo male with PMHx significant for recent hospitalization for acute on chronic pancreatitis 8/22/19 - 9/3/19, left renal cell carcinoma s/p cryoablation 12/2019, chronic skin lesions, NSTEMI s/p PCI in 07/2011 on clopidogrel, chronic back pain on long term opioids presenting with subacute presentation of altered mental status of unclear etiology, suspected polypharmacy vs infectious (mutliple wounds and right hand cellulitis), however has not improved with medication adjustment and antibiotics. Old CT showing chronic microvascular ischemic changes. He is currently hemodynamically stable and in no acute distress.      # Subacute altered mental status  # Insomnia  # depression  AMS since hospital admission in 08/2019, worsening since discharge. Differential includes strokes, metabolic derangement, seizure, nutritional deficiency, substance use, polypharmacy, malignancy, thyroid disorder, infection, etc. Labs obtained negative for electrolyte abnormalities, hypercalcemia, hypoglycemia, hyperammonemia, uremia, alcohol use. No elevated WBC, fever, tachycardia to suggest infection. Recent CT 8/24/19 with no evidence of acute hemorrhage or mass, but did show chronic microvascular ischemic changes. Initially suspected polypharmacy vs infectious (mutliple wounds and right hand cellulitis), however has not improved with medication adjustment and antibiotics. Given microvascular changes on CT, likely  small strokes, suspect this is likely the patients new baseline mental status.   - neurology consulted, appreciate recs, re-eval 9/16, requesting dementia resources      -- delirium precaustions      -- defer additional evaluations (MRI, EEG, LP) unless patient does not improve mentally after 3-4 days.  - Psych consulted, appreciate recs      -- discontinued zyprexa today, taper trazodone by 100 mg/night (off tonight).      -- start lurasidone 20 mg qam and 40 mg qpm      -- In the future, one could certainly consider adding donepezil or memantine.   - pharmacy consult, appreciate recs      -- meds that may cause impaired cognition and confusion: Olanzapine, Spironolactone, Trazodone.     # Prolonged QTc  Consider decreasing Sertrazine, omeprazole     # Chronic skin wounds due to picking behavior  # Concern for right hand cellulitis  Extensive skin wounds 2/2 scratching on bilateral upper and lower extremities. No active purulence or drainage from wounds. Given 1 dose vancomycin IV in the ED. Concern for cellulitis on right hand as well as wound management for other chronic wounds.  - Wound care consult.  - Day 4 of Cefazolin 1g IV q8h  - Continue monitoring for signs of infection/sepsis     # Chest and Abdominal pain   Reports are intermittent. Labs reassuring with stable chronic troponin elevation. Exam not concerning.   - GI cocktail   - continue to monitor     # Hypertension  # History of multiple ACS events with PCI  # Coronary stent  # chronic troponin leak  - Continue PTA clopidogrel.  - continue PTA asa 81mg, consider increasing  - cardiology curbside  Nonhypertensive on admission.  - Continue PTA amlodipine.  - Continue PTA lisinopril.  - Continue PTA spironolactone- consider discontinuing due to side effect of confusion  - unable to tolerate BB, symptomatic bradycardiac down to 30s    Chronic issues  # Type 2 diabetes mellitus  - Hold PTA metformin.  - Medium intensity sliding scale insulin.      # BPH  #  Urinary retention  Has had catheter in place since prior admission- exchanged 9/13/19  - Continue PTA tamsulosin.  - voiding trial once medically stable, consider urology consult (had outpt appt scheduled)      # Chronic back pain  # History of multiple spinal fusions  - home regimen: 30 mg oxy in am, 10 in pm + additional prns  - holding PTA PRN oxycodone   - currently at 20 mg in am and 10 mg in pm, continue to taper to 10/10 on 9/16    # Pain Assessment:  Current Pain Score 9/15/2019   Patient currently in pain? yes   Pain score (0-10) -   Pain location -   Pain descriptors Aching   - Vincent is experiencing pain due to chronic back pain 2/2 multiple spinal fusion surgeries. Pain management was discussed and the plan was created in a collaborative fashion.  Vincent's response to the current recommendations: engaged  - Opioid regimen: am oxycodone scheduled  - Response to opioid medications: pain is managed  - Bowel regimen: senna and miralax, scheduled  - Pharmacologic adjuvants: NSAIDs and Acetaminophen    Diet: Combination Diet Regular Diet Adult  Fluids: d5 1/2 NS at 75 ml/hr  DVT Prophylaxis: Enoxaparin (Lovenox) SQ  Code Status: Full Code    Disposition Plan   Expected discharge: 2 - 3 days, recommended to transitional care unit once safe disposition plan/ TCU bed available. Dispo: Expected Discharge Date: 09/16/19        Entered: Monet Mandujano 09/15/2019, 12:47 PM   Information in the above section will display in the discharge planner report.      The patient's care was discussed with the Attending Physician, Dr. Bishop.    Monet Mandujano  [facility]  Pager: [phone]  Please see sticky note for cross cover information    Interval History   Patient continues to be frustrated, agitated, and confused. Patient and family's goal: home. Currently denies pain, but overnight did have some chest pain that improved with GI cocktail. Not sleeping at night.     Physical Exam    Vital Signs: Temp: 98.1  F (36.7  C) Temp src: Axillary BP: (!) 153/83 Pulse: 89   Resp: 20 SpO2: 92 % O2 Device: None (Room air)    Weight: 178 lbs 9.16 oz  General Appearance: Elderly disheveled male, resting in recliner, intermittently agitated. Dressings over wounds. Mitts on bilateral hands.   Respiratory: CTAB, no rales, no rhonchi, no wheeze  Cardiovascular: RRR, no murmur, no beryl, no rub   GI: nontender, nondistended  Skin: Multiple excoriations in various stages of healing noted on bilateral upper and lower extremities. Multiple open wounds with surrounding erythema noted, no active drainage or purulence seen. Mildly tender to palpation.  Most concerning region, patch of erythema and warmth on right forearm.  Neuro: agitated and disoriented x 3.   Neuro: disoriented this am, Oriented to self only. Repeating that he wants to go home.      Data

## 2019-09-15 NOTE — PROVIDER NOTIFICATION
Eliceo fierro MD at 0793   LOW 5B 5-230 M.RIsaias Calderon RN [id number] FYI: I know you are watching for this level,  0930 troponin=1.314

## 2019-09-16 NOTE — PROGRESS NOTES
[facility] Nurse Inpatient Wound Assessment   Reason for consultation: Evaluate and treat BUE and RLE wounds    Assessment  BUE and RLE wound due to Skin Tear and self inflicted wounds  Status: initial assessment    Treatment Plan  BUE and RLE wound: Daily cleanse with microklenz and pat dry.  Apply vaseline over wounds with dried drainage.  Apply vaseline gauze over all wounds, cover with ABD, secure with kerlix and tubigrip.  Avoid ace bandage or coban as pt will pick off.       Orders Written    WO Nurse follow-up plan:weekly  Nursing to notify the Provider(s) and re-consult the WO Nurse if wound(s) deteriorates or new skin concern.    Patient History  According to provider note(s):  Vincent Mishra is a 78yo male with PMHx significant for recent hospitalization for acute on chronic pancreatitis 8/22/19 - 9/3/19, left renal cell carcinoma s/p cryoablation 12/2019, chronic skin lesions, NSTEMI s/p PCI in 07/2011 on clopidogrel, chronic back pain on long term opioids presenting with subacute presentation of altered mental status of unclear etiology, suspected polypharmacy vs infectious (mutliple wounds and right hand cellulitis), however has not improved with medication adjustment and antibiotics. Old CT showing chronic microvascular ischemic changes. He is currently hemodynamically stable and in no acute distress.     Objective Data    Active Diet Order  Orders Placed This Encounter      Combination Diet Regular Diet Adult      Output:   I/O last 3 completed shifts:  In: 2220 [P.O.:420; I.V.:1800]  Out: 480 [Urine:480]    Risk Assessment:   Sensory Perception: 3-->slightly limited  Moisture: 3-->occasionally moist  Activity: 1-->bedfast  Mobility: 2-->very limited  Nutrition: 2-->probably inadequate  Friction and Shear: 2-->potential problem  Yonathan Score: 13                          Labs:   Recent Labs   Lab 09/16/19  0449 09/15/19  0937  09/12/19 2024   ALBUMIN  --  2.4*  --   --    HGB 8.4*  --    < >  --    WBC  11.1*  --    < >  --    A1C  --   --   --  6.0*   CRP 49.0*  --    < >  --     < > = values in this interval not displayed.       Physical Exam  Skin inspection: focused BUE and RLE    Wound Location:  BUE and RLE      Right arm                                                           Left hand                                                          R lateral knee    Date of last photo 9/16/19  Wound History: Present on admit.  Numerous wounds of varying sizes and stages of healing.  Combination of skin tears and self inflicted - pt will pick and scratch wounds.  Needs frequent reminders not to pick wounds  Measurements (length x width x depth, in cm) Most not measured.  R lateral knee: 7 x 5.5 x 0.2 cm area.  Left index finger: 3 x 1.5 x 0.2 cm  Wound Base: various stages of healing, some with dried drainage partially or fully covering.  Serous drainage, none appear actively infected.    Palpation of the wound bed: normal   Periwound skin: intact  Color: normal and consistent with surrounding tissue  Temperature: normal   Drainage:, small  Description of drainage: serous  Odor: none  Pain: ,denies    Interventions  Current support surface: Standard  Low air loss mattress  Current off-loading measures: Pillows under calves  Visual inspection of wound(s) completed  Wound Care: done per plan of care  Supplies: gathered, floor stock and discussed with RN  Education provided: importance of repositioning, plan of care, wound progress and Infection prevention   Discussed plan of care with Patient, Family and Nurse    Colleen Hsieh, ROQUE

## 2019-09-16 NOTE — PROGRESS NOTES
[facility], [place]   Neurology Consultation Progress Note    Reason for consult: The neurology service was asked to consult by the family medicine team to provide dementia resources and to reassess for altered mental status.         Assessment and Plan:   Assessment: Vincent Mishra is a 78 y/o M with delirium in the setting of active infection and opioid use. Neurology last saw the pt 8/25 for evaluation of altered mental status following recent pancreatitis and NSTEMI and pt was encephalopathic. The patient had no clear focal neurological changes. Plan was to treat underlying medical issues before assessing for underlying dementia.     He continues to be delirious in the setting of UTI. He continues to have elevated inflammatory markers along with leukocytosis. Urine culture on 9/13 grew Enterobacter and sensitivities indicate it is resistant to cefazolin. Continue to recommend treatment of underlying infections as well as limitation of opioids. In addition delirium precautions should be enforced. Psychiatry is on board as well and is managing antipsychotic medications.     Lastly, it is possible that he has underlying dementia given cerebral atrophy on CT; however this is a diagnosis that cannot be made in the inpatient setting and will need further evaluation as an outpatient after the acute phase has ended.     # Delirium due to underlying infection:  -Continued treatment of UTI and any other infection  -Delirium precautions:       -Reorient frequently       -Blinds open during the day       -Minimize interruptions as able at night to promote sleep       -Schedule melatonin 3mg at 1900       -Reduce tethers (ie IV poles, EKG leads, etc.) as able  -Minimize or discontinue opioid medications if able    Note was made with collaboration from Yane Christianson, MS3. I personally saw and examined the patient and edited all parts of the note.    Patient care was discussed with attending  neurologist, Dr. Parikh, who agrees with my assessment and plan.    Solo Hammond MD  Neurology PGY-4              Interval Events:     Patient was last seen by our service on 9/13. Since that time period there has been minimal improvement in his symptoms. He continues to be confused. He had a reported possible aspiration event. Psychiatry is consulted and has recommended lurasidone.          Medications:     Current Facility-Administered Medications   Medication     acetaminophen (TYLENOL) tablet 650 mg     acetylcysteine (MUCOMYST) 20 % nebulizer solution 2 mL     albuterol (PROVENTIL) neb solution 2.5 mg     amLODIPine (NORVASC) tablet 5 mg     aspirin EC tablet 81 mg     atorvastatin (LIPITOR) tablet 80 mg     benztropine (COGENTIN) tablet 1-2 mg     ceFAZolin (ANCEF) 1 g vial to attach to  ml bag for ADULT or 50 ml bag for PEDS     clopidogrel (PLAVIX) tablet 75 mg     cyanocobalamin (VITAMIN B-12) tablet 500 mcg     glucose gel 15-30 g    Or     dextrose 50 % injection 25-50 mL    Or     glucagon injection 1 mg     diazepam (VALIUM) tablet 5 mg     enoxaparin (LOVENOX) injection 40 mg     ferrous sulfate (FEROSUL) tablet 325 mg     insulin aspart (NovoLOG) inj (RAPID ACTING)     insulin aspart (NovoLOG) inj (RAPID ACTING)     lidocaine (LMX4) cream     lidocaine (XYLOCAINE) 2 % 15 mL, alum & mag hydroxide-simethicone (MYLANTA ES/MAALOX  ES) 15 mL GI Cocktail     lidocaine 1 % 0.1-1 mL     lisinopril (PRINIVIL/ZESTRIL) tablet 40 mg     [START ON 9/17/2019] lurasidone (LATUDA) tablet 40 mg     lurasidone (LATUDA) tablet 60 mg     magnesium sulfate 4 g in 100 mL sterile water (premade)     melatonin tablet 1 mg     naloxone (NARCAN) injection 0.1-0.4 mg     omeprazole (priLOSEC) CR capsule 20 mg     oxyCODONE (oxyCONTIN) 12 hr tablet 10 mg     oxyCODONE (oxyCONTIN) 12 hr tablet 20 mg     polyethylene glycol (MIRALAX/GLYCOLAX) Packet 17 g     potassium chloride (KLOR-CON) Packet 20-40 mEq     potassium  "chloride 10 mEq in 100 mL intermittent infusion with 10 mg lidocaine     potassium chloride 10 mEq in 100 mL sterile water intermittent infusion (premix)     potassium chloride 20 mEq in 50 mL intermittent infusion     potassium chloride ER (K-DUR/KLOR-CON M) CR tablet 20-40 mEq     sertraline (ZOLOFT) tablet 200 mg     sodium chloride (PF) 0.9% PF flush 3 mL     sodium chloride (PF) 0.9% PF flush 3 mL     sodium chloride (PF) 0.9% PF flush 3 mL     sodium chloride (PF) 0.9% PF flush 3 mL     spironolactone (ALDACTONE) half-tab 12.5 mg     tamsulosin (FLOMAX) capsule 0.4 mg     vitamin B1 (THIAMINE) tablet 100 mg     vitamin C (ASCORBIC ACID) tablet 500 mg     vitamin D3 (CHOLECALCIFEROL) 1000 units (25 mcg) tablet 1,000 Units             Physical Exam:   Vitals:  BP (!) 119/103 (BP Location: Left leg)   Pulse 90   Temp 97  F (36.1  C) (Axillary)   Resp 20   Wt 81 kg (178 lb 9.2 oz)   SpO2 97%   BMI 27.15 kg/m      General: Lying in bed in mild distress  Neuro: Awake, interacting, attention was impaired. Oriented to year, stated month was August. 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 He was hard of hearing with moderate dysarthria. His face appeared symmetric. He was moving all his extremities with antigravity strength; difficult to grade given lack of participation. Reflexes were brisk and symmetric with no clonus at the ankles and mute toes bilaterally. He had difficulty with cerebellar testing but did not have gross ataxia.          Data:     Lab Results   Component Value Date    WBC 11.1 (H) 09/16/2019    HGB 8.4 (L) 09/16/2019    HCT 28.1 (L) 09/16/2019     (H) 09/16/2019     (L) 09/16/2019    POTASSIUM 4.5 09/16/2019    CHLORIDE 98 09/16/2019    CO2 22 09/16/2019    BUN 18 09/16/2019    CR 0.88 09/16/2019     (H) 09/16/2019    SED 74 (H) 09/16/2019    DD 0.3 08/20/2012    " NTBNPI 77,223 (H) 09/16/2019    NTBNP 305 (H) 04/24/2012    TROPONIN <0.012 07/25/2014    TROPI 1.235 (HH) 09/15/2019    AST 31 09/15/2019    ALT 19 09/15/2019    ALKPHOS 124 09/15/2019    BILITOTAL 0.5 09/15/2019    LAILA 30 09/15/2019    INR 1.16 (H) 12/11/2018

## 2019-09-16 NOTE — TELEPHONE ENCOUNTER
"HYDROCHLOROTHIAZIDE 25MG TABLETS      Last Written Prescription Date:  6/21/19  Last Fill Quantity: 90,   # refills: 0  Last Office Visit: 9/12/19  Future Office visit:       metFORMIN (GLUCOPHAGE-XR) 500 MG 24 hr tablet      Last Written Prescription Date:  6/21/19  Last Fill Quantity: 360,   # refills: 0  Last Office Visit: 9/12/19  Future Office visit:       omeprazole (PRILOSEC) 20 MG DR capsule      Last Written Prescription Date:  6/21/19  Last Fill Quantity: 180,   # refills: 0  Last Office Visit: 9/12/19  Future Office visit:       lisinopril (PRINIVIL/ZESTRIL) 40 MG tablet      Last Written Prescription Date:  6/21/19  Last Fill Quantity: 90,   # refills: 0  Last Office Visit: 9/12/19  Future Office visit:       Requested Prescriptions   Pending Prescriptions Disp Refills     hydrochlorothiazide (HYDRODIURIL) 25 MG tablet [Pharmacy Med Name: HYDROCHLOROTHIAZIDE 25MG TABLETS] 90 tablet 0     Sig: TAKE 1 TABLET BY MOUTH DAILY       Diuretics (Including Combos) Protocol Failed - 9/15/2019  3:15 PM        Failed - Blood pressure under 140/90 in past 12 months     BP Readings from Last 3 Encounters:   09/16/19 (!) 119/103   09/12/19 139/80   09/06/19 (!) 148/76                 Failed - Medication is active on med list        Passed - Recent (12 mo) or future (30 days) visit within the authorizing provider's specialty     Patient had office visit in the last 12 months or has a visit in the next 30 days with authorizing provider or within the authorizing provider's specialty.  See \"Patient Info\" tab in inbasket, or \"Choose Columns\" in Meds & Orders section of the refill encounter.              Passed - Patient is age 18 or older        Passed - Normal serum creatinine on file in past 12 months     Recent Labs   Lab Test 09/16/19  0449   CR 0.88              Passed - Normal serum potassium on file in past 12 months     Recent Labs   Lab Test 09/16/19  0449   POTASSIUM 4.5                    Passed - Normal serum " "sodium on file in past 12 months     Recent Labs   Lab Test 09/16/19 0449   *              metFORMIN (GLUCOPHAGE-XR) 500 MG 24 hr tablet [Pharmacy Med Name: METFORMIN ER 500MG 24HR TABS] 360 tablet 0     Sig: TAKE 2 TABLETS BY MOUTH TWICE DAILY WITH MEALS       Biguanide Agents Failed - 9/15/2019  3:15 PM        Failed - Blood pressure less than 140/90 in past 6 months     BP Readings from Last 3 Encounters:   09/16/19 (!) 119/103   09/12/19 139/80   09/06/19 (!) 148/76                 Failed - Patient has had a Microalbumin in the past 15 mos.     Recent Labs   Lab Test 09/20/17  1309   MICROL 5   UMALCR 5.36             Passed - Patient has documented LDL within the past 12 mos.     Recent Labs   Lab Test 08/25/19  0624   LDL 39             Passed - Patient is age 10 or older        Passed - Patient has documented A1c within the specified period of time.     If HgbA1C is 8 or greater, it needs to be on file within the past 3 months.  If less than 8, must be on file within the past 6 months.     Recent Labs   Lab Test 09/12/19 2024   A1C 6.0*             Passed - Patient's CR is NOT>1.4 OR Patient's EGFR is NOT<45 within past 12 mos.     Recent Labs   Lab Test 09/16/19 0449   GFRESTIMATED 82   GFRESTBLACK >90       Recent Labs   Lab Test 09/16/19 0449   CR 0.88             Passed - Patient does NOT have a diagnosis of CHF.        Passed - Medication is active on med list        Passed - Recent (6 mo) or future (30 days) visit within the authorizing provider's specialty     Patient had office visit in the last 6 months or has a visit in the next 30 days with authorizing provider or within the authorizing provider's specialty.  See \"Patient Info\" tab in inbasket, or \"Choose Columns\" in Meds & Orders section of the refill encounter.            omeprazole (PRILOSEC) 20 MG DR capsule [Pharmacy Med Name: OMEPRAZOLE 20MG CAPSULES] 180 capsule 0     Sig: TAKE 1 CAPSULE BY MOUTH TWICE DAILY       PPI Protocol " "Failed - 9/15/2019  3:15 PM        Failed - Not on Clopidogrel (unless Pantoprazole ordered)        Passed - No diagnosis of osteoporosis on record        Passed - Recent (12 mo) or future (30 days) visit within the authorizing provider's specialty     Patient had office visit in the last 12 months or has a visit in the next 30 days with authorizing provider or within the authorizing provider's specialty.  See \"Patient Info\" tab in inbasket, or \"Choose Columns\" in Meds & Orders section of the refill encounter.              Passed - Medication is active on med list        Passed - Patient is age 18 or older        lisinopril (PRINIVIL/ZESTRIL) 40 MG tablet [Pharmacy Med Name: LISINOPRIL 40MG TABLETS] 90 tablet 0     Sig: TAKE 1 TABLET(40 MG) BY MOUTH DAILY       ACE Inhibitors (Including Combos) Protocol Failed - 9/15/2019  3:15 PM        Failed - Blood pressure under 140/90 in past 12 months     BP Readings from Last 3 Encounters:   09/16/19 (!) 119/103   09/12/19 139/80   09/06/19 (!) 148/76                 Passed - Recent (12 mo) or future (30 days) visit within the authorizing provider's specialty     Patient had office visit in the last 12 months or has a visit in the next 30 days with authorizing provider or within the authorizing provider's specialty.  See \"Patient Info\" tab in inbasket, or \"Choose Columns\" in Meds & Orders section of the refill encounter.              Passed - Medication is active on med list        Passed - Patient is age 18 or older        Passed - Normal serum creatinine on file in past 12 months     Recent Labs   Lab Test 09/16/19 0449 01/18/19  0837   CR 0.88   < >  --    CREAT  --   --  1.3*    < > = values in this interval not displayed.             Passed - Normal serum potassium on file in past 12 months     Recent Labs   Lab Test 09/16/19 0449   POTASSIUM 4.5               "

## 2019-09-16 NOTE — PROGRESS NOTES
Brief progress note    6 pm patient SOB, desaturations to 88%. Assessed at bedside with day team. Patient anxious, diaphoretic, some chest pain, cough/secretions. Lung exam clear, good air entry, cardiac exam unremarkable. Temp wnl. Obtained CXR, EKG, troponin. EKG was unchanged from prior, no notable ischemic changes. CXR with some edema, and L basilar opacity (atelectasis vs infection).    7:15 PM, reassessed. RR to 30s, but saturations 96-99%. RT present. Deep suction done. At this time no chest pain. Diaphoresis resolved. Lungs again clear. ABG obtained, 7.42/36/76/23. Troponin resulted 1.235 (decreased from afternoon check).     BP (!) 156/78   Pulse 74   Temp 98.3  F (36.8  C) (Oral)   Resp 26   Wt 81 kg (178 lb 9.2 oz)   SpO2 97%   BMI 27.15 kg/m      A/P:  At this time appears SOB largely due to secretions, anxiety/agitation, vs atelectasis. Less likely PE given blood gas, normal pulse, and normal sats currently. Intermittent pain, anxiety may also be 2/2 decreased oxycodone doses since admission. Not attributable to ACS or arrhythmia.       Continue deep suctioning. Incentive spirometer.    IVF discontinued given edema on XR.    Reassess if recurrence of dyspnea. If clinically apparent edema on exam consider diuresis (net ins up since admission)    Diaphoresis resolved, and temp wnl but if recurs and if fever will repeat infectious work-up, consider broadening abx.    Kristina Caballero MD

## 2019-09-16 NOTE — PLAN OF CARE
D/I: Pt is confused, oriented to self and place only, vitally stable on 2L oxygen and sating in the mid to upper 90s. Bilateral upper extremities wounds dressings CDI. Pt continues with bedside attendant for impulsivity and picking at wounds.  Also biting the wounds on his hands. Has a chronic earl and Earl cares done.   P: Continue to monitor and notify MD of any changes. WOC nurse consult in place. Care conference today.

## 2019-09-16 NOTE — PROVIDER NOTIFICATION
09/15/19 1900   Call Information   Date of Call 09/15/19   Time of Call 1850   Name of person requesting the team Roma   Title of person requesting team RN   RRT Arrival time 1852   Time RRT ended 1915   Reason for call   Type of RRT Adult   Primary reason for call Respiratory   Respiratory O2sat less than 88% for greater than 5 minutes despite O2   Was patient transferred from the ED, ICU, or PACU within last 24 hours prior to RRT call? No   SBAR   Situation Pt sats dropped to 84% on RA. Pt reports feeling SOB. Restless in bed.   Background Acute on chronic pancreatitis, left renal cell carcinoma s/p cryoablation, chronic skin lesions, NSTEMI s/p PCI 07/2011, on clopidogrel, chronic back pain on long term opiods admitted 9/12 for AMS of unclear etiology, polypharmacy vs infection. However no improvement with med adjustemtn and abx.    Notable History/Conditions Cancer;Cardiac;Hypertension   Assessment Pt oriented to self only, restless and agitated in bed. Afebrile, VSS. Sats currently 96% on 3L NC.   Interventions CXR;ECG;Labs;O2 per N/C or mask  (ABG and troponin results pending)   Patient Outcome   Patient Outcome Stabilized on unit   RRT Team   Attending/Primary/Covering Physician Zuleyka   Date Attending Physician notified 09/15/19   Time Attending Physician notified 1850   Physician(s) Kristina Caballero   Lead RN Fritz Brandon   RT Kika   Post RRT Intervention Assessment   Post RRT Assessment Stable/Improved   Date Follow Up Done 09/16/19   Time Follow Up Done 0110   Comments sats improved. VSS

## 2019-09-16 NOTE — TELEPHONE ENCOUNTER
Routing refill request to provider for review/approval because:  Chart indicates that patient is in the Hospital.  Yvon Dickens RN

## 2019-09-16 NOTE — PLAN OF CARE
Oriented to self only at beginning of shift, then too lethargic to state name and  midshift.  VSS on 1L NC O2 with cont sats. One time Valium 5mg po given at 17:15 for anxiety/restlessness. Primapore dressing on back and multiple primapore dressings on left leg changed with vaseline gauze underneath. Unable to obtain BP due to restlessness. Diuresing well after IV lasix given. Tylenol given X2 for pain except pt too lethargic to swallow 1 tylenol pill at 20:30 but pt received 325mg. Pt spit out aspirin pill. Ate early dinner at 16:30 then slept well after valium and did not eat anything else. Crushed latuda and gave with thickened apple juice. Cont to monitor mental status, swallowing and update MD with changes.

## 2019-09-16 NOTE — PROGRESS NOTES
[facility], [facility] Progress Note - [place] Service       Main Plans for Today   - neurology consult, recommendations appreciated- will revisit today, requesting dementia resources  - palliative consult, family considering adjusting code status and considering home with hospice  - cardiology consult  - psych reconsult  - increase latuda  - swallow study  - continue ancef (5/5 days)  - delirium precautions    Assessment & Plan   Vincent Mishra is a 78yo male with PMHx significant for recent hospitalization for acute on chronic pancreatitis 8/22/19 - 9/3/19, left renal cell carcinoma s/p cryoablation 12/2019, chronic skin lesions, NSTEMI s/p PCI in 07/2011 on clopidogrel, chronic back pain on long term opioids presenting with subacute presentation of altered mental status, due to delirium in the setting of underlying dementia.      # Subacute altered mental status  # delirium  # dementia  # Insomnia  # depression  AMS since hospital admission in 08/2019, worsening since discharge, upon further investigation, has been deteriorating for atleast a year. Due to delirium in the setting of underlying dementia. Differential included strokes, metabolic derangement, seizure, nutritional deficiency, substance use, polypharmacy, malignancy, thyroid disorder, infection, etc. Labs obtained negative for electrolyte abnormalities, hypercalcemia, hypoglycemia, hyperammonemia, uremia, alcohol use. No elevated WBC, fever, tachycardia to suggest infection. Recent CT 8/24/19 with no evidence of acute hemorrhage or mass, but did show chronic microvascular ischemic changes. Initially suspected polypharmacy vs infectious (mutliple wounds and right hand cellulitis), however has not improved with medication adjustment and antibiotics. Given microvascular changes on CT, likely small strokes, suspect this is likely the patients new baseline mental status.   - neurology consulted, appreciate recs,  re-eval 9/16, requesting dementia resources      -- delirium precaustions      -- consider additional evaluations (MRI, EEG, LP)  - Psych consulted, appreciate recs      -- discontinued zyprexa, tapered trazodone off      -- started lurasidone 20 mg qam and 40 mg qpm      -- In the future, could consider adding donepezil or memantine.   - pharmacy consult, appreciate recs      -- meds that may cause impaired cognition and confusion: Olanzapine, Spironolactone, trazodone.   - Palliative consulted     --family considering adjusting code status and considering home with hospice    # Acute hypoxia  Acute chest pain, hypoxia and diaphoresis 9/15, EKG unchanged, CXR with mild pulmonary edema vs aspiration pna vs atelectasis. Afebrile. Mild leukocytosis. Negative procal. High risk aspiration.  - cardiology consulted  - swallow study    # Prolonged QTc  Consider decreasing Sertrazine, omeprazole  -avoid QT prolonging medications     # Chronic skin wounds due to picking behavior  # Concern for right hand cellulitis  Extensive skin wounds 2/2 scratching on bilateral upper and lower extremities. No active purulence or drainage from wounds. Given 1 dose vancomycin IV in the ED. Concern for cellulitis on right hand as well as wound management for other chronic wounds.  - Wound care consult.  - Day 5 of Cefazolin 1g IV q8h  - Continue monitoring for signs of infection/sepsis     # Chest and Abdominal pain   Reports are intermittent. Labs reassuring with stable chronic troponin elevation. Exam not concerning.   - GI cocktail prn  - continue to monitor     # Hypertension  # History of multiple ACS events with PCI  # Coronary stent  # chronic troponin leak  - Continue PTA clopidogrel.  - continue PTA asa 81mg, consider increasing  - cardiology curbside  Nonhypertensive on admission.  - Continue PTA amlodipine.  - Continue PTA lisinopril.  - Continue PTA spironolactone- consider discontinuing due to side effect of confusion  - unable  to tolerate BB, symptomatic bradycardiac down to 30s    Chronic issues  # Type 2 diabetes mellitus  - Hold PTA metformin.  - Medium intensity sliding scale insulin.      # BPH  # Urinary retention  Has had catheter in place since prior admission- exchanged 9/13/19  - Continue PTA tamsulosin.  - voiding trial once medically stable, consider urology consult (had outpt appt scheduled)      # Chronic back pain  # History of multiple spinal fusions  - home regimen: 30 mg oxy in am, 10 in pm + additional prns  - holding PTA PRN oxycodone   - currently at 20 mg in am and 10 mg in pm    # Pain Assessment:  Current Pain Score 9/16/2019   Patient currently in pain? yes   Pain score (0-10) -   Pain location -   Pain descriptors Aching   - Vincent is experiencing pain due to chronic back pain 2/2 multiple spinal fusion surgeries. Pain management was discussed and the plan was created in a collaborative fashion.  Vincent's response to the current recommendations: engaged  - Opioid regimen: am oxycodone scheduled  - Response to opioid medications: pain is managed  - Bowel regimen: senna and miralax, scheduled  - Pharmacologic adjuvants: NSAIDs and Acetaminophen    Diet: Combination Diet Regular Diet Adult  Fluids: PO  DVT Prophylaxis: Enoxaparin (Lovenox) SQ  Code Status: Full Code    Disposition Plan   Expected discharge: 2 - 3 days, recommended to transitional care unit once safe disposition plan/ TCU bed available. Dispo: Expected Discharge Date: 09/16/19        Entered: Monet Mandujano 09/16/2019, 2:55 PM   Information in the above section will display in the discharge planner report.      The patient's care was discussed with the Attending Physician, Dr. Culp.    Monet Mandujano  [facility]  Pager: [phone]  Please see sticky note for cross cover information    Interval History   Patient with worsening confusion in the evenings. Family at bedside. Overnight event of chest pain, hypoxia and  diaphoresis. Pleasantly confused this morning, asking to go homw.    Physical Exam   Vital Signs: Temp: 97.6  F (36.4  C) Temp src: Oral BP: (!) 119/103 Pulse: 89   Resp: 20 SpO2: 95 % O2 Device: Nasal cannula Oxygen Delivery: 2.5 LPM  Weight: 178 lbs 9.16 oz  General Appearance: Elderly disheveled male, lying in bed, intermittently agitated. Dressings over wounds. Mitts on bilateral hands.   Respiratory: CTAB, no rales, no rhonchi, no wheeze  Cardiovascular: RRR, no murmur, no beryl, no rub   GI: nontender, nondistended  Skin: Multiple excoriations in various stages of healing noted on bilateral upper and lower extremities. Multiple open wounds with surrounding erythema noted, no active drainage or purulence seen. Mildly tender to palpation.  diffuse redness surrounding wounds greatly improved.  Neuro: agitated and disoriented x 3.   Neuro: disoriented this am, Oriented to self only. Repeating that he wants to go home.      Data

## 2019-09-16 NOTE — PLAN OF CARE
Oriented to self and place; disoriented to time. VSS on 3L O2 via [place]. Attendant at bedside. Pt very agitated and restless throughout shift. RRT called this shift as pt began to desat in the low-mid 80s with HR in high 80s. MD paged to assess pt. Pt became stable with O2 admin and raised HOB. Chest xray and EKG done this shift. Pt reported pain in epigastric region and sciatic region this shift; partial relief with GI cocktail and tylenol. Pt troponin level still elevated but decreased from AM lab. Pt voiding via urethral catheter with low UOP.  BM x1 this shift.  Pt has not slept for consecutive nights, family concerned about pt condition with limited sleep. MD paged about ordering medications to help, but declined d/t antipsychotic med risks outweighing benefits. Granddaughter at bedside tonight to try and help calm pt to sleep. Care conference planned for tomorrow AM to discuss pt next steps. Continue to monitor and follow plan of care.

## 2019-09-16 NOTE — CONSULTS
[facility]  Palliative Care Consultation Note    Patient: Vincent Mishra  Date of Admission:  9/12/2019    Requesting Clinician / Team: Dr. Mandujano, Family Medicine  Reason for consult: Goals of care    Recommendations:    Goals remain restorative pending further evaluation from Cardiology and Neurology    Family is anticipating that if these evaluations are unrevealing for a reversible cause for Vincent's AMS, they will ask for a transition to comfort measures only with discharge to Hospice at home    Full Code for now, Pat is considering changing this    Family requesting Speech evaluation to better understand aspiration risks    Consider Latuda increase, defer to Psychiatry          If goals change to comfort measures only, he may benefit from sedating doses of lorazepam for agitation with consideration for PRN antipsychotics following a risk/benefit conversation with family    These recommendations have been discussed with primary team.      Thank you for the opportunity to participate in the care of this patient and family. Our team: will continue to follow.     During regular M-F work hours--if you are not sure who specifically to contact--please contact us by sending a text page to our team consult pager at [phone].    After regular work hours and on weekends/holidays, you can call our answering service at [phone]. Also, who's on call for us is available in [facility] Smart Web.       Assessments:  Vincent Mishra is a 79 year old male with history of RCC treated with cryoablation (f/u with Urology in July 2019 with YAHIR), CAD, recent acute on chronic pancreatitis with hospitalization resulting in delirium, admitted with concern for cellulitis and on-going AMS.      Today, the patient was seen for:  Delirium     Prognosis, Goals, & Planning:      Functional Status just prior to hospitalization: 4 (Completely disabled and dependent on others for selfcare;  "bedbound)      Prognosis, Goals, and/or Advance Care Planning were addressed today: Yes I met with Vincent's wife Sheyla, granddaughter Savita, and daughters Sommer and Yenifer along with [facility].  Typical trajectory of dementia and delirium were reviewed.  This is Mr. Mishra's third hospitalization in 6 weeks and he has had persistent altered mental status despite his return home.  If a reversible cause for Vincent's AMS is not found, and given his multiple hospitalizations in the past few weeks, I do think that Vincent would meet criteria for Hospice enrollment if consistent with his wishes.      Vincent's family shared that it is hard to accept that Vincent has dementia as his decline over the past month has seemed so sudden.  He was walking a month ago (although it sounds like he was using a walker/arm crutches), and now can no longer do this.  They feel that Vincent would want to be comfortable if \"his mind was gone.\"  We discussed the Hospice philosophy of care and how that could support Vincent and his family.  Also reviewed that [facility] can start inpatient, however his family requests to be able to collect the information from today's consultant visits before making that decision. They know that Vincent wants to go back home, and they want to support him in that. Code status reviewed and DNR/DNI recommended.  Sheyla is considering this.       Patient's decision making preferences: unable to assess      Patient has decision-making capacity today for complex decisions: No      I have concerns about the patient/family's health literacy today: No      Patient has a completed Health Care Directive: No.       Code status: full code    Coping, Meaning, & Spirituality:   Mood, coping, and/or meaning in the context of serious illness were addressed today: Yes  Family feels this is hard to believe/accept given that he was able to walk across the kitchen one month ago.     Social:     Living situation: Lives with his " "wife, daughter, and others    Key family / caregivers: Has four children, 17 grandchildren    Substance use history: Family briefly referred to a history of \"addictions\"     History of Present Illness:  History gathered today from: family/loved ones, medical chart    Vincent is a 79 year old man with history of RCC, more recently acute on chronic pancreatitis, who presented to [facility] on  9/12/19 with altered mental status and concern for cellulitis.  Vincent had been admitted to [facility] recently and had AMS, thought to be related to delirium superimposed on baseline cognitive impairment. He was discharged to home on 9/3. Since that time, he was seen at the ED on 9/6 with FTT at home.  He had a plan for admission but ultimately his wife elected to return home with Vincent. He then presented to his primary care clinic on 9/12 and was noted to have been picking himself to the point of injury, worsened delirium, falls.  He was transferred to the ED and admitted from there.      Since admission,  Vincent has been evaluated by Neurology who felt Mr. Mishra's symptoms were most consistent with delirium, with recommendation to pursue more aggressive evaluation if his symptoms did not improve within 3-4 days of treating his infection and minimizing precipitating medications. He was also evaluated by Psychiatry due to concern for anti-psychotic use in setting of QT prolongation.  His medications have been rotated to lurasidone.     Key Palliative Symptom Data:  # Pain severity the last 12 hours: none  # Dyspnea severity the last 12 hours: none    Patient is on opioids: assessed and bowels ok/no needed changes to plan of care today.    ROS:  Comprehensive ROS is not applicable due to altered mental status.      Past Medical History:  Past Medical History:   Diagnosis Date     CAD (coronary artery disease) 7/26/2011 7/2011 ([facility]uan[facility]): s/p MI, PTCA - RCA      Cancer of kidney (H)      Chest pain 1/12/2012     Coronary artery " disease      History of angina      History of blood transfusion      Hyperlipidaemia      Hyperlipidemia LDL goal <100 9/23/2010     Malignant neoplasm (H)     Prostate CA (removed)     Myocardial infarction (H)     s/p MI 7/29/14, stent placement     NSTEMI (non-ST elevated myocardial infarction) (H)     07-25-14 CATH- RCA, L.Main and CFX  had minor luminal irregularites. Mid LAD high grade stenosis 80-90%.Stent placed to mid LAD     Other and unspecified hyperlipidemia     MI in July 2011     Right bundle branch block      Type II or unspecified type diabetes mellitus without mention of complication, not stated as uncontrolled      Unspecified essential hypertension         Past Surgical History:  Past Surgical History:   Procedure Laterality Date     ARTHROSCOPY KNEE  2/11/2011    ARTHROSCOPY KNEE performed by LEY, JEFFREY DUANE at [facility]     ARTHROSCOPY KNEE WITH MEDIAL MENISCECTOMY  6/26/2012    Procedure: ARTHROSCOPY KNEE WITH MEDIAL MENISCECTOMY;  Right Knee Arthroscopy With Medial Menisectomy;  Surgeon: Ley, Jeffrey Duane, MD;  Location: [facility]     BACK SURGERY      back surgery x4     BIOPSY       CARDIAC SURGERY  7/2011    stentt placed     COLONOSCOPY       CORONARY ANGIOGRAPHY ADULT ORDER  07-25-14    RCA, L.Main and CFX  had minor luminal irregularites. Mid LAD high grade stenosis 80-90%.Stent placed to mid LAD     CV CORONARY ANGIOGRAM  3/28/2019    Procedure: CV CORONARY ANGIOGRAM;  Surgeon: Dominguez Mishra MD;  Location: [facility] CARDIAC CATH LAB     ESOPHAGOSCOPY, GASTROSCOPY, DUODENOSCOPY (EGD), COMBINED  10/25/2012    Procedure: COMBINED ESOPHAGOSCOPY, GASTROSCOPY, DUODENOSCOPY (EGD), BIOPSY SINGLE OR MULTIPLE;  Gastroscopy  ;  Surgeon: Lucius Dasilva MD;  Location: [facility]     HEART CATH, ANGIOPLASTY  07-25-14    mid LAD      ORTHOPEDIC SURGERY       back surgery         Family History:  Family History   Problem Relation Age of Onset     Cancer Mother      Depression Mother      Diabetes Father   "    Hypertension Father      Heart Disease Father      Mental Illness Father      Heart Disease Maternal Grandfather      Substance Abuse Maternal Grandfather      Alcohol/Drug Paternal Grandmother      Substance Abuse Paternal Grandmother      Heart Disease Paternal Grandfather      Alcohol/Drug Paternal Grandfather      Substance Abuse Paternal Grandfather      Heart Disease Brother      Diabetes Brother      Substance Abuse Brother      Obesity Brother      Diabetes Brother      Obesity Sister      Alcohol/Drug Daughter      Depression Daughter      Obesity Sister      Diabetes Sister      Obesity Sister      Diabetes Sister      Alcohol/Drug Sister      Cancer Sister 55        possible kidney cancer     Substance Abuse Sister      Alcohol/Drug Son      Substance Abuse Son      Diabetes Son      Alcohol/Drug Son      Substance Abuse Son      Obesity Daughter      Diabetes Daughter      Hypertension Daughter      Bipolar Disorder Other          Allergies:  Allergies   Allergen Reactions     Prozac [Fluoxetine] Other (See Comments)     \"I went crazy.\"       Benadryl [Diphenhydramine Hcl] Other (See Comments)     Starts to shake, and paranoid     Codeine Itching     Diphenhydramine Visual Disturbance     Hallucinations, See [facility] records scanned on 7/16/15     Labetalol Other (See Comments)     See [facility] records scanned on 7/16/15  Bradycardia down to 30 on holter, dizziness. Stopped med and symptoms resolved     Metoprolol Other (See Comments)     Bradycardia even at 12.5 mg     Morphine Visual Disturbance        Medications:  I have reviewed this patient's medication profile and medications from this hospitalization.   Noted scheduled meds are:  lurasidone 20mg/40mg  Oxycontin 20mg in AM, 10mg in PM (home dose was 30/10 with oxycodone 5mg PRN as well)  Sertraline 200mg daily    Noted PRN meds are:  Acetaminophen   Maalox      Physical Exam:  Vital Signs: Temp: 97.6 " " F (36.4  C) Temp src: Oral BP: (!) 119/103 Pulse: 89   Resp: 20 SpO2: 95 % O2 Device: Nasal cannula Oxygen Delivery: 2.5 LPM  Weight: 178 lbs 9.16 oz   General: Alert, comfortable appearing male in no acute physical distress. Restless and in mitts.   Eyes: Pupils equal, sclera clear.   ENT: MMM.   Resp: Unlabored on room air.   Abd: Soft, non-distended.   Ext: Mitts are in use.  Upper extremities are wrapped. Wounds not directly examined.   Neuro: No facial asymmetry.  Speech is of waxing/waning clarity.  Restless movements noted in bed.  Hard of hearing.  Answers some basic questions (\"Do you have any pain?\") Cannot identify his wife or location.     Data reviewed:  Recent imaging reviewed, my comments on pertinents:   Echo today:   \"Limited examination. Technically challenging study.  Left ventricle EF is roughly 40% by visual estimation. Cannot assess for  regional wall motion abnormalities.  Right ventricle is mildly dilated and moderately dysfunctional.  No significant valve disease on limited images.  Moderate (pulmonary artery systolic pressure 50-75mmHg) pulmonary hypertension  is present.  IVC diameter >2.1 cm collapsing <50% with sniff suggests a high RA pressure  estimated at 15 mmHg or greater.  No pericardial effusion is present.  Aortic root poorly visualized.\"    Recent lab data reviewed, my comments on pertinents:   Na 132  CRP 49  Troponin 1.235    Genesis Huang MD / Palliative Medicine / Pager [phone]     Time spent: 122 minutes, with >50% devoted to counseling and/or coordination of care. [id number], Including participation in an out of room care conference with start time 105, end time 238.   "

## 2019-09-16 NOTE — PROVIDER NOTIFICATION
Eliceo fierro at [phone]  LOW: 5B [phone] LEA Calderon RN [phone] family wants to talk to dr. walls because she said she might stop to check on pt and they are wondering where she is?

## 2019-09-16 NOTE — PLAN OF CARE
RN assumed cares at 0700, Pt alert though confused, VS stable throughout the shift with some hypertension early on.  Pt received 0.5mg Ativan IV this AM in the hope that he would sleep.  Has not had a good sleep in multiple days.  Pt has slept off and one for no more than 30 min at once.  Pt does not endorse pain this shift.  Pt continues to pick at skin and scabs, sitter at the bedside to redirect.  Pt continues on IV abx per plan of care.  One dose IV lasix given this afternoon.  Adequate output through earl this shift.  Bed bath and linen change provided via NA/RN this AM.  Multiple family members at the bedside throughout the shift; care conference completed this afternoon.  Pt continues to be confused and forgetful.  No acute incidents this shift.  Continue to monitor and notify MD of any changes.

## 2019-09-16 NOTE — CONSULTS
Consult Date:  09/16/2019      PSYCHIATRIC CONSULTATION      IDENTIFICATION:  Mr. Mishra is a 79[facility] who is hospitalized with delirium superimposed on dementia.  He has a complicated medical situation involving multiple organs.  I am asked to provide a followup consultation by Dr. Duarte.      HISTORY OF PRESENT ILLNESS:  Prior to interviewing this patient, I had an opportunity to review my initial consultation completed on 09/13/2019.  At that time I thought he likely had delirium superimposed on dementia.  Since that time the treatment team has interviewed a number of family members who have noted that he did have deterioration in the last year or so, which would be consistent with dementia.  He also has a long history of picking behaviors, but these are now markedly exacerbated.  He has begun not only picking at his wounds, but actually biting at his wounds.  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  Today, he seems to have much less energy.  He is really not able to engage in conversation.  He smiles as if he is happy to see me and wants to shake my hand, but he does not seem to be able to have fluent speech, either because of increased neurological issues, but it seems to me more likely increasing fatigue.  I note that he does have some pulmonary edema and perhaps pneumonia on chest x-ray.      The lurasidone does not seem to be harming him.  His QTc has come down to 500, likely because of the tapering of trazodone and olanzapine.  Lurasidone does not seem to be causing any heart issues.  He continues to be trying to bite and pick at himself, so I am recommending we increase his lurasidone to 40 in the morning and 60 at bedtime.  I note that since seeing this patient last weekend, I have reviewed the literature on lurasidone in delirium and there are now a few papers suggesting its use in patients with prolonged  QTc.      On my interview, the patient gave me a smile as if he recognized me.  He wanted to shake my hand which I did 3 times, but he really was never able to make any words.  His family is at the bedside and report that he has been picking and biting at his wounds.  They had wanted him to get some Valium to see if it would help his anxiety.  They tell me the treatment team gave him a little bit of Ativan which really did not help at all as far as the family could tell.  Generally, I would avoid benzodiazepines in a situation like this.  However, if the family would feel better if he had a trial of Valium and if they understood that it could make him worse, it might be worth it just to maintain a good rapport.  This will be up to the treatment team.  They have been able to cut back on his narcotic load.      MENTAL STATUS EXAMINATION:  Today, the patient was pleasant and attempted to be cooperative.  His mood seemed remarkably good.  His affect was restricted.  He did smile and appeared happy to see me.  He really did not have any meaningful speech.  Recent vitals include a temperature of 97, pulse of 90, respiration rate of 20 with 97% oxygen saturation on nasal cannula oxygen.  His blood pressure is 119/103.      ASSESSMENT:  Delirium superimposed on dementia.      RECOMMENDATION:  Increase lurasidone to 40 in the morning and 60 at bedtime.         ALEXI FUNES MD             D: 2019   T: 2019   MT:       Name:     ISELA AGUIRRE   MRN:      [id number]        Account:       [id number]   :      1940           Consult Date:  2019      Document: [id number]

## 2019-09-16 NOTE — CONSULTS
Cardiology Consult  September 16, 2019    Vincent Mishra  MRN# [id number]            Reason for consult: NSTEMI       Requesting physician: Kaylie [facility]'s        Level of consult: One-time consult to assist in determining a diagnosis, recommend an appropriate treatment plan and place orders              Assessment and Impression:   Vincent Mishra is a 79 year old male with PMH notable for NSTEMI s/p PCI in 2011 (RCA) and 2014 (mLAD), HTN, prostate cancer, T2DM, renal cell carcinoma and frequent falls who presents to [facility] for acute on chronic altered mental status. Etiology felt 2/2 worsening dementia, polypharmacy, possible infectious etiology (aspiration?).    NSTEMI, likely type II   ?Microvascular ischemia   Newly reduced LV EF  Patient is altered and has intermittently complained of chest pain. Primary team has thus sporadically checked troponin, values elevated to 1.0-1.2 range - the trend is flat. Last negative troponin was in the year 2012. He had an echocardiogram yesterday which reflected global dysfunction of his LV with LV EF 40-45% however the image quality is quite poor as he was not able to comply with exam; prior TTE showed normal LV EF. He does not have specific complaints at time of interview. He was admitted months ago to our service for NSTEMI and atypical chest pain, elevated but flat troponin trend and had non-obstructing disease on a coronary angiogram (March 2019). He did have in-stent restenosis wit ha 40% lesion in his mLAD, but feel his echo findings and troponin trend do not refect an acute LAD infarct. His TTE also showed that he has high right atrial pressure and RV dilation suggestive of volume overload. NT pro BNP is 77k. He is now on oxygen via NC 3LPM which is new and might consider that he has pulmonary edema versus a new aspiration PNA.   -Lasix 40 mg once, monitor UOP. Titrate dose to meet 2 L UOP/24 hrs  -Strict I/O. Goal 2 L/24 hrs, fluid restrict 2 L, salt restrict 2  G  -Continue aspirin and plavix for goal directed medical management of NSTEMI  -Continue high intensity statin, lipitor 80 mg daily   -Continue lisinopril 40 mg daily   -Spironolactone 12.5 mg daily   -He has historically not tolerated beta-blockade and will not recommend beta-blockade at this time  -Would increase amlodipine dose to 10 mg and give at bedtime. This may improve his BP and also help with anginal symptoms   -Can consider Imdur 30 mg daily   -Would focus on medical management     HTN   Patient notably hypertensive at time of interview, but also confused and somewhat alarmed which may be contributing. His pulse pressure is very narrow. Would recommend that his PTA amlodipine dose is increased to 10 mg at bedtime   -Amlodipine 10 mg at bedtime   -Consider addition of imdur, especially if team is to discontinue spironolactone   -Continue lisinopril 40 mg   -Continue spironolactone for now          Coronary Angiogram March 2019   Left Main   The vessel is angiographically normal.   Left Anterior Descending   LAD is a type 1 vessel with small caliber mid to distal portions.   Prox LAD to Mid LAD lesion is 40% stenosed. The lesion was previously treated using a stent of unknown type. Previous stent with restenosis.   First Diagonal Branch   The vessel is small.   Second Diagonal Branch   The vessel is small.   Left Circumflex   First Obtuse Marginal Branch   The vessel is large.   Second Obtuse Marginal Branch   The vessel is moderate in size.   Third Obtuse Marginal Branch   The vessel is small.   Right Coronary Artery   Mid RCA lesion is 15% stenosed. The lesion was previously treated using a stent of unknown type. Previous stent with restenosis.              Chief Complaint:     NSTEMI     Unable to obtain a history from the patient due to mental status         History of Present Illness:     Vincent Mishra is a 79 year old male with PMH notable for NSTEMI s/p PCI in 2011 (RCA) and 2014 (mLAD),  "HTN, prostate cancer, T2DM, renal cell carcinoma and frequent falls who presents to [facility] for acute on chronic altered mental status. Etiology felt 2/2 worsening dementia, polypharmacy, possible infectious etiology (aspiration?).     Notably, he was admitted to our cardiology service in March of 2019 for NSTEMI. Due to high pre-test probability of coronary artery disease (CRF: HTN, DM II, and CAD), and presenting complaint of chest pain, we did do a coronary angiogram which showed 40% in-stent restenosis in his mLAD that was not hemodynamically significant. He did not have angioplasty or stent.     At the time of interview, he does not have any specific complaints of chest pain or shortness of breath. HPI is limited 2/2 AMS            Past Medical History:   I have reviewed this patient's past medical history and commented on sigificant events within the HPI          Past Surgical History:   I have reviewed this patient's past surgical history and commented on sigificant events within the HPI          Social History:   I have reviewed this patient's social history          Family History:   I have reviewed this patient's family history          Allergies:     Allergies   Allergen Reactions     Prozac [Fluoxetine] Other (See Comments)     \"I went crazy.\"       Benadryl [Diphenhydramine Hcl] Other (See Comments)     Starts to shake, and paranoid     Codeine Itching     Diphenhydramine Visual Disturbance     Hallucinations, See [facility] records scanned on 7/16/15     Labetalol Other (See Comments)     See [facility] records scanned on 7/16/15  Bradycardia down to 30 on holter, dizziness. Stopped med and symptoms resolved     Metoprolol Other (See Comments)     Bradycardia even at 12.5 mg     Morphine Visual Disturbance             Medications:     Current Facility-Administered Medications   Medication     acetaminophen (TYLENOL) tablet 650 mg     acetylcysteine (MUCOMYST) 20 % " nebulizer solution 2 mL     albuterol (PROVENTIL) neb solution 2.5 mg     amLODIPine (NORVASC) tablet 5 mg     aspirin EC tablet 81 mg     atorvastatin (LIPITOR) tablet 80 mg     benztropine (COGENTIN) tablet 1-2 mg     ceFAZolin (ANCEF) 1 g vial to attach to  ml bag for ADULT or 50 ml bag for PEDS     clopidogrel (PLAVIX) tablet 75 mg     cyanocobalamin (VITAMIN B-12) tablet 500 mcg     glucose gel 15-30 g    Or     dextrose 50 % injection 25-50 mL    Or     glucagon injection 1 mg     enoxaparin (LOVENOX) injection 40 mg     ferrous sulfate (FEROSUL) tablet 325 mg     furosemide (LASIX) injection 40 mg     insulin aspart (NovoLOG) inj (RAPID ACTING)     insulin aspart (NovoLOG) inj (RAPID ACTING)     lidocaine (LMX4) cream     lidocaine (XYLOCAINE) 2 % 15 mL, alum & mag hydroxide-simethicone (MYLANTA ES/MAALOX  ES) 15 mL GI Cocktail     lidocaine 1 % 0.1-1 mL     lisinopril (PRINIVIL/ZESTRIL) tablet 40 mg     lurasidone (LATUDA) tablet 20 mg     lurasidone (LATUDA) tablet 40 mg     magnesium sulfate 4 g in 100 mL sterile water (premade)     melatonin tablet 1 mg     naloxone (NARCAN) injection 0.1-0.4 mg     omeprazole (priLOSEC) CR capsule 20 mg     oxyCODONE (oxyCONTIN) 12 hr tablet 10 mg     oxyCODONE (oxyCONTIN) 12 hr tablet 20 mg     polyethylene glycol (MIRALAX/GLYCOLAX) Packet 17 g     potassium chloride (KLOR-CON) Packet 20-40 mEq     potassium chloride 10 mEq in 100 mL intermittent infusion with 10 mg lidocaine     potassium chloride 10 mEq in 100 mL sterile water intermittent infusion (premix)     potassium chloride 20 mEq in 50 mL intermittent infusion     potassium chloride ER (K-DUR/KLOR-CON M) CR tablet 20-40 mEq     sertraline (ZOLOFT) tablet 200 mg     sodium chloride (PF) 0.9% PF flush 3 mL     sodium chloride (PF) 0.9% PF flush 3 mL     sodium chloride (PF) 0.9% PF flush 3 mL     sodium chloride (PF) 0.9% PF flush 3 mL     spironolactone (ALDACTONE) half-tab 12.5 mg     tamsulosin (FLOMAX)  capsule 0.4 mg     vitamin B1 (THIAMINE) tablet 100 mg     vitamin C (ASCORBIC ACID) tablet 500 mg     vitamin D3 (CHOLECALCIFEROL) 1000 units (25 mcg) tablet 1,000 Units             Review of Systems:    ROS: 10 point ROS neg other than the symptoms noted above in the HPI.          Physical Exam:   Vitals were reviewed  Temp: 97.6  F (36.4  C) Temp src: Oral BP: (!) 119/103 Pulse: 89   Resp: 20 SpO2: 95 % O2 Device: Nasal cannula Oxygen Delivery: 2.5 LPM  Neck:   Supple, symmetrical, trachea midline. JVD 10-12cm     Lungs:   Limited anterior exam, no focal findings     Cardiovascular:   Irreg/irreg, no murmurs     Abdomen:   No scars, normal bowel sounds, soft, non-distended, no tenderness     Constitutional:   Non-compliant with exam. Altered. Alert to person.              Data:   All laboratory data reviewed           Patient discussed with Dr. Grady who agrees with above assessment and recommendations.    BELEN Brown  Interventional Cardiology-CSI Service  Pager: [phone]

## 2019-09-17 NOTE — TELEPHONE ENCOUNTER
All are denied. He is currentlly hospitalized and will need to review his discharge orders before refilling any medications. Keyla Fonseca M.D.;

## 2019-09-17 NOTE — PROGRESS NOTES
[facility] Progress Note - [place] Service       Main Plans for Today   - neurology consult, recommendations appreciated- planning MRI, will trial pretreatment with ativan  - palliative consult, family has decided to pursue home hospice, recommend valium and oxy prns  - psych, recommend decrease in latuda  - swallow study, Dysphagia diet 2  - discontinue ancef (completed 5/5 days)  - delirium precautions  - plan for discharge to home hospice 9/18  - increased amlodipine and added imdur      Assessment & Plan   Vincent Msihra is a 78yo male with PMHx significant for recent hospitalization for acute on chronic pancreatitis 8/22/19 - 9/3/19, left renal cell carcinoma s/p cryoablation 12/2019, chronic skin lesions, NSTEMI s/p PCI in 07/2011 on clopidogrel, chronic back pain on long term opioids presenting with subacute presentation of altered mental status, due to delirium in the setting of underlying dementia. Delirium not clearing despite treatment of infection.      # altered mental status  # delirium  # dementia  # Insomnia  # depression  AMS since hospital admission in 08/2019, worsening since discharge, upon further investigation, has been deteriorating for atleast 3 years. Due to delirium in the setting of underlying dementia. Differential included strokes, metabolic derangement, seizure, nutritional deficiency, substance use, polypharmacy, malignancy, thyroid disorder, infection, etc. Labs obtained negative for electrolyte abnormalities, hypercalcemia, hypoglycemia, hyperammonemia, uremia, alcohol use. No elevated WBC, fever, tachycardia to suggest ongoing infection. UA and urine culture with <10,000 colonies, likely colonization, does not require treatment. Recent CT 8/24/19 with no evidence of acute hemorrhage or mass, but did show chronic microvascular ischemic changes. Initially suspected polypharmacy vs infectious (mutliple wounds and right hand  cellulitis), however has not improved with medication adjustment and antibiotics. Given microvascular changes on CT, likely small strokes, suspect this is likely the patients new baseline mental status.   - neurology consulted, appreciate recs      -- delirium precautions      -- MRI  - Psych consulted, appreciate recs      -- discontinued zyprexa, tapered trazodone off      -- decrease lurasidone to 20 mg qam and 40 mg qpm  - pharmacy consult, appreciate recs      -- meds that may cause impaired cognition and confusion: Olanzapine, Spironolactone, trazodone.   - Palliative consulted     --family considering adjusting code status and pursing home with hospice     -- start valium 5 mg q4h as needed + oxy 5-10mg prn    # Left upper extremity weakness   noted on exam today, unable to lift arm against gravity. CN intact, Mental status not grossly worsened in comparison to yesterday. Need to Rule-out stroke  - neurology consulted, appreciate recs    -- MRI brain, will try this with ativan, patient may need to be sedated.    # Acute hypoxia  Acute chest pain, hypoxia and diaphoresis 9/15, EKG unchanged, CXR with mild pulmonary edema vs aspiration pna vs atelectasis. Afebrile. Mild leukocytosis. Negative procal. High risk aspiration. Improving with diuresis  - cardiology consulted, not consistent with NSTEMI, increased amlodipine and added imdur  - swallow study  - additional does of lasix this afternoon    # Prolonged QTc  Consider decreasing Sertrazine, omeprazole  -avoid QT prolonging medications     # Chronic skin wounds due to picking behavior  # Concern for right hand cellulitis  Extensive skin wounds 2/2 scratching on bilateral upper and lower extremities. No active purulence or drainage from wounds. Given 1 dose vancomycin IV in the ED. Concern for cellulitis on right hand as well as wound management for other chronic wounds. completed 5 day course of Cefazolin 1g IV q8h, improvement of erythema and downtrending of  CRP.  procal negative   - Wound care consult.  - Continue monitoring for signs of infection/sepsis       # Hypertension  # History of multiple ACS events with PCI  # Coronary stent  # chronic troponin leak  - Continue PTA clopidogrel.  - continue PTA asa 81mg, consider increasing  Nonhypertensive on admission.  - Increase PTA amlodipine to 10 mg daily at bedtime  - Continue PTA lisinopril.  - add imdur  - Continue PTA spironolactone- consider discontinuing due to side effect of confusion  - unable to tolerate BB, symptomatic bradycardiac down to 30s    Chronic issues  # Type 2 diabetes mellitus  - Hold PTA metformin.  - Medium intensity sliding scale insulin.      # BPH  # Urinary retention  Has had catheter in place since prior admission- exchanged 9/13/19  - Continue PTA tamsulosin.  - voiding trial once medically stable, consider urology consult (had outpt appt scheduled)      # Chronic back pain  # History of multiple spinal fusions  - home regimen: 30 mg oxy in am, 10 in pm + additional prns  - holding PTA PRN oxycodone   - currently at 20 mg in am and 10 mg in pm    # Pain Assessment:  Current Pain Score 9/17/2019   Patient currently in pain? denies   Pain score (0-10) -   Pain location -   Pain descriptors -   - Vincent is experiencing pain due to chronic back pain 2/2 multiple spinal fusion surgeries. Pain management was discussed and the plan was created in a collaborative fashion.  Vincent's response to the current recommendations: engaged  - Opioid regimen: am oxycodone scheduled  - Response to opioid medications: pain is managed  - Bowel regimen: senna and miralax, scheduled  - Pharmacologic adjuvants: NSAIDs and Acetaminophen    Diet: Combination Diet Regular Diet Adult for safety: dysphagia 2 diet with nectar thickened liquids, as hospice will allow full diet  Fluids: PO  DVT Prophylaxis: Enoxaparin (Lovenox) SQ  Code Status: Full Code    Disposition Plan   Expected discharge: 1-2 days, recommended to home  with hospice once safe disposition plan/ hospice bed available. Dispo: Expected Discharge Date: 09/16/19        Entered: Monet Mandujano 09/17/2019, 3:16 PM   Information in the above section will display in the discharge planner report.      The patient's care was discussed with the Attending Physician, Dr. Culp.    Monet Mandujano  [facility] Medicine  Pager: [phone]  Please see sticky note for cross cover information    Interval History   Patient with worsening confusion in the evenings. Family at bedside. This morning developed left sided arm weakness. Pleasantly confused this morning, asking to go home.    Physical Exam   Vital Signs: Temp: 97.6  F (36.4  C) Temp src: Oral BP: 130/63 Pulse: 84   Resp: 22 SpO2: 97 % O2 Device: None (Room air) Oxygen Delivery: 1 LPM  Weight: 178 lbs 9.16 oz  General Appearance: Elderly disheveled male, lying in bed. Dressings over wounds.  Respiratory: CTAB, no rales, no rhonchi, no wheeze  Cardiovascular: RRR, no murmur, no beryl, no rub   GI: nontender, nondistended  Skin: Multiple excoriations in various stages of healing noted on bilateral upper and lower extremities. Multiple open wounds with surrounding erythema noted, no active drainage or purulence seen. Mildly tender to palpation.  diffuse redness surrounding wounds greatly improved.  Neuro: minimally agitated and disoriented x 2. CN grossly intact, sensation intact all extremities. Left upper extremity weakness noted on exam today, unable to lift arm against gravity.       Data

## 2019-09-17 NOTE — PROGRESS NOTES
Social Work Services Progress Note    Hospital Day: 5  Date of Initial Social Work Evaluation:  n/a  Collaborated with:  Medical team, palliative doctor, pt's family, Kelly Capone-  Hospice liaison    Data:  Pt is a 79 year old male admitted to [facility] on 9/12/2019 from a routine clinic appointment for delirium/altered mental status.     Intervention:  SW participated in interdisciplinary rounds this morning to assess for needs and discharge planning. Team expressed pt's family met w/ the palliative team yesterday and that they would like to move forward w/ discharge to home w/ hospice services.     SW met w/ the family today along w/ the palliative doctor in a private conference room. Present for the meeting was the pt's wife, two daughters, one son, and one granddaughter. The family had many questions regarding hospice services as well as the pt's current state, all of which was answered primarily by the doctor.    SW provided the family with a list of hospice agencies as well as home care agencies for possible future needs. The family wished to speak w/  hospice.    SW spoke w/ Kelly Capone, liaison with  Hospice. Kelly stated she would speak w/ the family today regarding a discharge for tomorrow. After Kelly met w/ the family, she requested that SW set up a HE stretcher ride w/ 02 for tomorrow (9/18/19) at 12pm.     SW has set up a HE stretcher ride for tomorrow at 12pm to home. Ambulance PCS form completed and faxed.    Team has been updated on discharge plans. Pt's wife updated on discharge plans.       Assessment:  Pt's family understanding of SW intervention    Plan:    Anticipated Disposition:  Home with services    Barriers to d/c plan:  Set up w/ hospice services    Follow Up:  SW will remain available and continue to follow, assess for needs, and assist in discharge planning.     VIMAL Hodgson, [facility]   5B   Pager [phone]  Phone [phone]

## 2019-09-17 NOTE — PLAN OF CARE
[id number]  Status: Patient admitted on 9/12 with subacute presentation of altered mental status, due to delirium in the setting of underlying dementia. Delirium not clearing despite treatment of infection.   Vitals: VSS  Neuros: Oriented to self only, confused, forgetful, diplopia, generalized weakness, sitter at bedside for safety   IV: PIV saline locked  Resp/trach: Good productive cough, lung sounds clear throughout  Diet: Regular, requires assistance with eating  Bowel status: No BM this evening, BS+  : Mcelroy catheter in place for retention  Skin: Generalized bruising throughout, BUE with wraps in place over wounds. RLE with wrap in place. Patient requires frequent re-direction not to pick at wounds and dressings  Pain: Denies  Activity: Turn and repo Q2hrs  Social: Patient restless at 1500, PO Valium given with good effect, no family at bedside  Plan: Discharge home tomorrow with hospice at 1200, continue to monitor and follow POC

## 2019-09-17 NOTE — PLAN OF CARE
Bedside swallow evaluation completed. Pt presents with moderate oropharyngeal dysphagia characterized by impaired oral motor strength and reduction in laryngeal movement. Patients family/medical team report pt will be going home tomorrow on hospice, but are seeking recommendations for feeding to reduce aspiration risk.  Recommend DD2 solids with nectar thick liquids by spoon.     Swallow strategies: check mouth frequently for oral residue/pocketing;maintain upright posture during/after eating for 30 mins;small sips/bites.    Pt did demonstrate a delayed cough response with nectar thick liquids by spoon, though pt/family declines to trial honey thick liquids. Medical team could consider VFSS if further assessment of pharyngeal swallow function is desired. Pt requires 1:1 supervision/assist for feeding. Recommend home care SLP to educate family/caregivers on swallow strategies/diet modifications to minimize aspiration risk.

## 2019-09-17 NOTE — PLAN OF CARE
D/I: Pt is confused, oriented to self only, vitally stable on 1L oxygen and sating in the upper 90s. Restless in bed and unable to sleep. MD called and Valium 5mg given with some effect. Oxycodone also given for back pain with some relief. Bilateral upper extremities wounds dressings CDI. Pt continues with bedside attendant for impulsivity and picking at wounds. Has a chronic earl. Daughter, Barbara, at bedside.   P: Continue to monitor and notify MD of any changes. WOC nurse consult in place.

## 2019-09-17 NOTE — DISCHARGE SUMMARY
Attestation:  This patient has been seen and evaluated by me, Mariah Culp DO on 9/18/19.  I saw and discussed the case with the primary resident and the care team. I agree with the findings and plan in this note. I have reviewed today's vital signs, medications, laboratory results.      DO Elida Wong [facility], [facility] Discharge Summary- Elida  Service    Date of Admission:  9/12/2019  Date of Service: 9/18/2019  Date of Discharge:  9/18/2019  Discharging Attending Provider: Suni  Discharge Team: Elida    Discharge Diagnoses   Delirium  Dementia    Follow-ups Needed After Discharge   Hospice provider to evaluate upon arrival home    Hospital Course   Vincent Mishra was admitted on 9/12/2019 for for altered mental status. He has a past medical history significant for recent hospitalization for acute on chronic pancreatitis 8/22/19 - 9/3/19, left renal cell carcinoma s/p cryoablation 12/2019, chronic skin lesions, NSTEMI s/p PCI in 07/2011 on clopidogrel, chronic back pain on long term opioids.  The following problems were addressed during his hospitalization:     # Altered mental status  # Delirium  # Dementia  # Insomnia  # Depression  Patient was initially brought into the hospital by his wife, Sheyla, after he was unable to be cared for at home due to worsening mental status and insomnia.  Sheyla had noted that during a recent hospitalization in the middle of August for pancreatitis he had developed worsening insomnia and confusion. At that time he was told it was likely hospital delirium and that his mental status would improve upon returning to home, this however was not the case. Sheyla noted that he was not sleeping at night, he was not interested in doing his normal activities such as reading the newspaper and he was now unable to walk (had a few months prior been able to walk using arm crutches). Most distressingly he was  calling out in pain throughout the night.     Upon further conversations with Sheyla and her children it is apparent that Vincent has memory loss for a few years, and has had decreased involvement in daily activities gradually over time, with his daily activities consisting mostly of reading the newspaper, and traveling with his wife in their RV.   He did have an acute worsening when he was hospitalized in august.    During this admission he was noted to have multiple chronic wounds, some of which were concerning for infection given the erythema and his elevated CRP. He was treated with a 5 day course of ancef with good improvement of his skin erythema and lab markers, but no change in his mental status. Labs obtained negative for electrolyte abnormalities, hypercalcemia, hypoglycemia, hyperammonemia, uremia, alcohol use. No elevated WBC, fever, tachycardia to suggest ongoing infection. UA and urine culture with <10,000 colonies, likely colonization, does not require treatment. Recent CT 8/24/19 with no evidence of acute hemorrhage or mass, but did show chronic microvascular ischemic changes. Medications were reviewed by pharmacy and likely culprits of AMS were removed or weaned as able, without improvement of mental status.    Neurology was consulted and recommended treatment of underlying infection, weaning of oxycodone and delirium precautions. Psychiatry was consulted recommended removal of all QTC prolongating medications () and starting latuda. Cardiology was also consulted due to chronically elevated troponin, not NSTEMI, and imdur was provided for chest pain control. With no improvement with the above interventions it is likely that this patient is suffering from delirium on top of underlying dementia and his mental status is unlikely to improve. Palliative was consulted and patient and family decided that goals of care best aligned with hospice.     On 9/16 patient did develop left upper extremity  weakness, MRI was recommended by neuro obtained to evaluate for stroke and rule out CGD and seizures. Discussed possibility of obtaining an EEG, neurology does not recommend this at this time. This was discussed with family and the decision was ultimately made to forego the MRI and/or further studies and have patient go home today with hospice cares.     # Hospice  # Comfort cares     -- discharge to home on hospice     -- family considering adjusting code status     -- start valium 5 mg q4h as needed + oxy 5-10mg as needed    -- a swallow study was performed, safest diet would be dysphagia diet 2 with nectar thickened liquids by spoon + check mouth frequently for oral residue/pocketing;maintain upright posture during/after eating for 30 mins;small sips/bites. Family aware and provided teaching, however will allow patient to eat a regular diet for comfort.     -- valium 5 mg Q4h as needed agitation    -- latuda 20 mg in am and 40 mg in pm, best absorbed with food    -- oxycontin 10 mg in am and 20 mg in pm + oxy IR 5-10 mg Q4 h as needed      # Acute hypoxia  Acute chest pain, hypoxia and diaphoresis 9/15, EKG unchanged, CXR with mild pulmonary edema vs aspiration pna vs atelectasis. Afebrile. Mild leukocytosis. Negative procal. High risk aspiration. Improved with diuresis and now back on RA.  cardiology consulted, not consistent with NSTEMI, increased amlodipine and added imdur.     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 Concern for cellulitis on right hand as well as wound management for other chronic wounds. completed 5 day course of Cefazolin 1g IV q8h, improvement of erythema and downtrending of CRP.   - wound wrapping and encouragement to not  pick  - zoloft for picking tendencies (often similar to OCD and difficult to treat).     # Hypertension  # History of multiple ACS events with PCI  # Coronary stent  # chronic troponin leak  - Continue PTA clopidogrel.  - continue PTA asa 81mg, consider increasing   -Increased amlodipine to 10 mg daily at bedtime  - Continue PTA lisinopril.  - added imdur 30 mg daily  - Continue PTA spironolactone  - unable to tolerate BB, symptomatic bradycardiac down to 30s     Chronic issues  # Type 2 diabetes mellitus  - continue PTA metformin.      # BPH  # Urinary retention  Has had catheter in place since prior admission- exchanged 9/13/19  - Continue PTA tamsulosin.      # Chronic back pain  # History of multiple spinal fusions  - see pain plan above    # Discharge Pain Plan:   - During his hospitalization, Vincent experienced pain due to chronic pain.  The pain plan for discharge was discussed with Vincent and his family and the plan was created in a collaborative fashion.    - Opioids prescribed on discharge: see pain plan above    Consultations This Hospital Stay   PHARMACY TO DOSE VANCO  PHARMACY IP CONSULT  NEUROLOGY GENERAL ADULT IP CONSULT  PSYCHIATRY IP CONSULT  PHYSICAL THERAPY ADULT IP CONSULT  OCCUPATIONAL THERAPY ADULT IP CONSULT  PHARMACY IP CONSULT  VASCULAR ACCESS CARE ADULT IP CONSULT  WOUND OSTOMY CONTINENCE NURSE  IP CONSULT  PALLIATIVE CARE ADULT IP CONSULT  CARDIOLOGY GENERAL ADULT IP CONSULT  SWALLOW EVAL SPEECH PATH AT BEDSIDE IP CONSULT  PSYCHIATRY IP CONSULT  PSYCHIATRY IP CONSULT  VASCULAR ACCESS CARE ADULT IP CONSULT    Code Status   Full code       The patient was discussed with Dr. Suni [facility]  [facility]   Pager:[phone]_  ___________________________________________________________________  Review of Systems: limited due to patient's AMS  CONSTITUTIONAL: NEGATIVE for fever, chills  INTEGUMENTARY/SKIN: Positive for multiple  wounds  EYES: NEGATIVE for vision changes or irritation  ENT/MOUTH: NEGATIVE for ear, mouth and throat problems  RESP: NEGATIVE for significant cough or SOB  CV: Positive for intermittent chest pain  GI: NEGATIVE for nausea, abdominal pain, or change in bowel habits  : NEGATIVE for frequency, dysuria, or hematuria  MUSCULOSKELETAL: Positive for left arm weakness.   NEURO: Positive for confusion and aggitiation  ENDOCRINE: NEGATIVE for temperature intolerance  HEME/ALLERGY: NEGATIVE for bleeding problems  PSYCHIATRIC: Positive for intermittent aggitation  Physical Exam   Vital Signs: Temp: 97.7  F (36.5  C) Temp src: Oral BP: 116/63 Pulse: 76   Resp: 18 SpO2: 94 % O2 Device: None (Room air)    Weight: 178 lbs 9.16 oz    General Appearance: Elderly disheveled male, lying in bed, mitt on right hand. Wrapping on wounds  Respiratory: Difficult to assess due to cooperation, no overt crackles or wheezes  Cardiovascular: RRR  GI/ : no abdominal tenderness or distention. Catheter in place  Skin: Multiple excoriations in various stages of healing noted on bilateral upper and lower extremities, wrapped today.   Neuro: minimally agitated and disoriented x 2. CN grossly intact. Left upper extremity weakness noted again on exam today, unable to lift arm against gravity.  strength equal bilaterally.     Significant Results and Procedures   Most Recent 3 CBC's:  Recent Labs   Lab Test 09/18/19  0535 09/17/19  0525 09/16/19  0449   WBC 10.0 9.5 11.1*   HGB 8.9* 8.8* 8.4*   MCV 88 87 89   * 506* 563*       Pending Results   These results will be followed up by pcp   Unresulted Labs Ordered in the Past 30 Days of this Admission     Date and Time Order Name Status Description    9/12/2019 1420 T4 free In process              Primary Care Physician   Keyla Fonseca    Discharge Disposition   Discharged to home with hospice  Condition at discharge: Terminal    Discharge Orders     Discharge Medications   Current  Discharge Medication List      START taking these medications    Details   acetaminophen (TYLENOL) 650 MG suppository Place 1 suppository (650 mg) rectally every 4 hours as needed for fever or mild pain (Do not exceed 4000 mg total acetaminophen per day.)  Qty: 12 suppository, Refills: 1    Associated Diagnoses: Hospice care patient      atropine 1 % ophthalmic solution Take 2-4 drops by mouth, place under tongue or place inside cheek every 2 hours as needed for other (terminal respiratory secretions) Not for ophthalmic use.  Qty: 5 mL, Refills: 1    Associated Diagnoses: Hospice care patient      bisacodyl (DULCOLAX) 10 MG suppository Unwrap and insert 1 suppository rectally twice daily as needed for constipation.  Qty: 12 suppository, Refills: 1    Associated Diagnoses: Hospice care patient      diazepam (VALIUM) 5 MG tablet Take 1 tablet (5 mg) by mouth every 4 hours as needed for anxiety, sleep or agitation  Qty: 84 tablet, Refills: 0    Associated Diagnoses: Hospice care patient      glycopyrrolate (ROBINUL) 1 MG tablet Take 1-2 tablets (1-2 mg) by mouth every 4 hours as needed (secretions) May also be give via G-tube or J-tube, if needed.  Qty: 30 tablet, Refills: 1    Associated Diagnoses: Hospice care patient      isosorbide mononitrate (IMDUR) 30 MG 24 hr tablet Take 1 tablet (30 mg) by mouth daily for 14 days  Qty: 14 tablet, Refills: 0    Associated Diagnoses: Other chest pain      !! lurasidone (LATUDA) 20 MG TABS tablet Take 1 tablet (20 mg) by mouth every morning for 14 days  Qty: 14 tablet, Refills: 0    Associated Diagnoses: Delirium      !! lurasidone (LATUDA) 40 MG TABS tablet Take 1 tablet (40 mg) by mouth At Bedtime for 14 days  Qty: 14 tablet, Refills: 0    Associated Diagnoses: Delirium      naloxone (NARCAN) 0.4 MG/ML injection Inject 0.25-1 mLs (0.1-0.4 mg) into the vein once for 1 dose  Qty: 1 mL, Refills: 0    Associated Diagnoses: Hospice care patient      naloxone (NARCAN) 4 MG/0.1ML  nasal spray Spray 1 spray (4 mg) into one nostril alternating nostrils as needed for opioid reversal every 2-3 minutes until assistance arrives  Qty: 0.2 mL, Refills: 0    Associated Diagnoses: Chronic, continuous use of opioids      sennosides (SENOKOT) 8.6 MG tablet Take 1-2 tablets by mouth 2 times daily  Qty: 100 tablet, Refills: 1    Associated Diagnoses: Hospice care patient       !! - Potential duplicate medications found. Please discuss with provider.      CONTINUE these medications which have CHANGED    Details   amLODIPine (NORVASC) 10 MG tablet Take 1 tablet (10 mg) by mouth daily  Qty: 14 tablet, Refills: 0    Associated Diagnoses: Other chest pain      !! oxyCODONE (OXYCONTIN) 10 MG 12 hr tablet Take 1 tablet (10 mg) by mouth every evening  Qty: 14 tablet, Refills: 0    Associated Diagnoses: Hospice care patient      !! oxyCODONE (OXYCONTIN) 20 MG 12 hr tablet Take 1 tablet (20 mg) by mouth every 24 hours  Qty: 14 tablet, Refills: 0    Associated Diagnoses: Hospice care patient      oxyCODONE (ROXICODONE) 5 MG tablet Take 1 tablet (5 mg) by mouth every 4 hours as needed for moderate to severe pain  Qty: 84 tablet, Refills: 0    Associated Diagnoses: Hospice care patient       !! - Potential duplicate medications found. Please discuss with provider.      CONTINUE these medications which have NOT CHANGED    Details   acetaminophen (TYLENOL) 325 MG tablet Take 2 tablets (650 mg) by mouth every 4 hours as needed for mild pain  Qty: 30 tablet, Refills: 1    Associated Diagnoses: Closed fracture of multiple ribs of left side, initial encounter      aspirin (ASA) 81 MG tablet Take 81 mg by mouth every evening       atorvastatin (LIPITOR) 80 MG tablet TAKE 1 TABLET BY MOUTH EVERY EVENING  Qty: 90 tablet, Refills: 1    Associated Diagnoses: Hyperlipidemia LDL goal <100      clopidogrel (PLAVIX) 75 MG tablet Take 1 tablet (75 mg) by mouth daily  Qty: 90 tablet, Refills: 0    Associated Diagnoses: Right bundle  branch block; NSTEMI (non-ST elevated myocardial infarction) (H)      ferrous sulfate (FE TABS) 325 (65 Fe) MG EC tablet Take 1 tablet (325 mg) by mouth daily  Qty: 90 tablet, Refills: 3    Associated Diagnoses: Iron deficiency anemia due to chronic blood loss      ibuprofen (ADVIL/MOTRIN) 800 MG tablet Take 800 mg by mouth every 8 hours as needed for moderate pain      lisinopril (PRINIVIL/ZESTRIL) 40 MG tablet TAKE 1 TABLET(40 MG) BY MOUTH DAILY  Qty: 90 tablet, Refills: 0    Associated Diagnoses: Essential hypertension with goal blood pressure less than 140/90      omeprazole (PRILOSEC) 20 MG DR capsule TAKE ONE CAPSULE BY MOUTH TWICE DAILY  Qty: 180 capsule, Refills: 0    Associated Diagnoses: Gastroesophageal reflux disease without esophagitis      sertraline (ZOLOFT) 100 MG tablet TAKE 2 TABLETS(200 MG) BY MOUTH DAILY  Qty: 180 tablet, Refills: 1    Associated Diagnoses: Severe episode of recurrent major depressive disorder, without psychotic features (H)      spironolactone (ALDACTONE) 25 MG tablet Take 0.5 tablets (12.5 mg) by mouth daily  Qty: 30 tablet, Refills: 0    Comments: Future refills by PCP Dr. Keyla Fonseca with phone number [phone].  Associated Diagnoses: Hypertension goal BP (blood pressure) < 140/90      tamsulosin (FLOMAX) 0.4 MG capsule Take 1 capsule (0.4 mg) by mouth daily  Qty: 30 capsule, Refills: 0    Comments: Future refills by PCP Dr. Keyla Fonseca with phone number [phone].  Associated Diagnoses: Benign prostatic hyperplasia with urinary retention      vitamin B1 (THIAMINE) 100 MG tablet Take 1 tablet (100 mg) by mouth daily X 1 month.  Qty: 30 tablet, Refills: 0    Associated Diagnoses: Dementia due to medical condition with behavioral disturbance         STOP taking these medications       ascorbic acid (VITAMIN C) 500 MG tablet Comments:   Reason for Stopping:         Calcium Carbonate-Vitamin D (CALCIUM + D) 600-200 MG-UNIT per tablet Comments:   Reason for  "Stopping:         cholecalciferol (VITAMIN  -D) 1000 UNITS capsule Comments:   Reason for Stopping:         Cyanocobalamin (VITAMIN B-12 PO) Comments:   Reason for Stopping:         metFORMIN (GLUCOPHAGE-XR) 500 MG 24 hr tablet Comments:   Reason for Stopping:         mirtazapine (REMERON) 15 MG tablet Comments:   Reason for Stopping:         nitroGLYcerin (NITROSTAT) 0.4 MG sublingual tablet Comments:   Reason for Stopping:         OLANZapine (ZYPREXA) 2.5 MG tablet Comments:   Reason for Stopping:         OLANZapine zydis (ZYPREXA) 5 MG ODT Comments:   Reason for Stopping:         polyethylene glycol (MIRALAX/GLYCOLAX) packet Comments:   Reason for Stopping:         traZODone (DESYREL) 100 MG tablet Comments:   Reason for Stopping:             Allergies   Allergies   Allergen Reactions     Prozac [Fluoxetine] Other (See Comments)     \"I went crazy.\"       Benadryl [Diphenhydramine Hcl] Other (See Comments)     Starts to shake, and paranoid     Codeine Itching     Diphenhydramine Visual Disturbance     Hallucinations, See [facility] [place] scanned on 7/16/15     Labetalol Other (See Comments)     See [facility] [place] records scanned on 7/16/15  Bradycardia down to 30 on holter, dizziness. Stopped med and symptoms resolved     Metoprolol Other (See Comments)     Bradycardia even at 12.5 mg     Morphine Visual Disturbance       "

## 2019-09-17 NOTE — PROGRESS NOTES
[facility]  Met with pt and family to discuss plans for hospice.  Pt to be discharged home tomorrow at 12 noon and has agreed to have  [facility] follow with hospice services.  Equipment of a Hospital bed with 1/2 rails OBT, commode, O2 air mattress over lay and a W/C was ordered and will be delivered to the home tomorrow morning.  Medications are being filled at the discharge pharmacy and will be sent home with the pt.   Pt and family verbalized understanding that completion of the  admission visit is scheduled for tomorrow at 1 pm.    The  POLST documentation is NOT completed as pt is still a FULL Code.    The    Hospice consents are signed by pt spouse.   Pt has the 24 hour phone number for  Hospice for any questions or concerns.     Kelly Capone RN Liaison   [facility]

## 2019-09-17 NOTE — PROVIDER NOTIFICATION
Paged and notified night MD that pt is c/o back pain and tylenol was given with no relief. Daughter requesting that pt should have a stronger pain medication.

## 2019-09-17 NOTE — PROGRESS NOTES
09/17/19 1200   General Information   Onset Date 09/17/19   Start of Care Date 09/12/19   Referring Physician Dr. Mandujano   Patient Profile Review/OT: Additional Occupational Profile Info See Profile for full history and prior level of function   Patient/Family Goals Statement Family requests education on feeding strategies to minimize aspiration risk.   Swallowing Evaluation Bedside swallow evaluation   Behaviorial Observations Combative/agitated;Confused;Distractible;Impulsive   Mode of current nutrition Oral diet   Type of oral diet Regular;Thin liquid   Respiratory Status O2 Supply   Type of O2 supply Nasal cannula  (1 L)   Clinical Swallow Evaluation   Oral Musculature generally intact   Dentition uses dentures to eat   Oral Labial Strength and Mobility impaired retraction;impaired pursing;impaired seal  (mild generalized weakness)   Lingual Strength and Mobility impaired anterior elevation;impaired left lateral movement;impaired right lateral movement   Buccal Strength and Mobility impaired   Clinical Swallow Eval: Thin Liquid Texture Trial   Mode of Presentation, Thin Liquids cup;spoon;straw  (fed by family)   Volume of Liquid or Food Presented 2 sips   Pharyngeal Phase of Swallow coughing/choking;reduction in laryngeal movement;repeated swallows   Clinical Swallow Eval: Nectar Thick Liquid Texture Trial   Mode of Presentation, Nectar cup;spoon;straw  (fed by family)   Volume of Nectar Presented 2 sips   Pharyngeal Phase, Nectar coughing/choking;reduction in laryngeal movement;repeated swallows  (less coughing than with thin liquids.)   Clinical Swallow Eval: Honey Thick Liquid Texture Trial   Diagnostic Statement Pt/family declined trials of honey thick liquids.   Clinical Swallow Eval: Puree Solid Texture Trial   Mode of Presentation, Puree spoon  (fed by family)   Oral Phase, Puree WFL   Pharyngeal Phase, Puree   (no overt s/s aspiration)   Clinical Swallow Eval: Semisolid Texture Trial   Mode of  Presentation, Semisolid   (fed by family)   Volume of Semisolid Food Presented moist cupcake   Oral Phase, Semisolid WFL   Pharyngeal Phase, Semisolid   (no overt s/s aspiration)   Clinical Swallow Eval: Solid Food Texture Trial   Mode of Presentation, Solid   (did not trial due to safety concerns/oral motor impairment)   Swallow Eval: Clinical Impressions   Skilled Criteria for Therapy Intervention Skilled criteria met.  Treatment indicated.   Functional Assessment Scale (FAS) 3   Dysphagia Outcome Severity Scale (RAKAN) Level 3 - RAKAN   Diet texture recommendations Dysphagia diet level 2;Nectar thick liquids  (BY SPOON)   Recommended Feeding/Eating Techniques check mouth frequently for oral residue/pocketing;maintain upright posture during/after eating for 30 mins;small sips/bites   Therapy Frequency Daily   Predicted Duration of Therapy Intervention (days/wks) 1 day   Anticipated Discharge Disposition home w/ assist  (home with hospice tomorrow)   Risks and Benefits of Treatment have been explained. Yes   Patient, family and/or staff in agreement with Plan of Care Yes   Clinical Impression Comments Bedside swallow evaluation completed. Pt presents with moderate oropharyngeal dysphagia characterized by impaired oral motor strength and reduction in laryngeal movement. Patients family/medical team report pt will be going home tomorrow on hospice, but are seeking recommendations for feeding to reduce aspiration risk.  Recommend DD2 solids with nectar thick liquids by spoon. Pt did demonstrate a delayed cough response with nectar thick liquids by spoon, though pt/family declines to trial honey thick liquids at this time. Medical team could consider VFSS if further assessment of pharyngeal swallow function is desired. Pt requires 1:1 supervision/assist for feeding. Recommend home care SLP to educate family/caregivers on swallow strategies/diet modifications to minimize aspiration risk.   Total Evaluation Time   Total  Evaluation Time (Minutes) 35

## 2019-09-17 NOTE — PROVIDER NOTIFICATION
Paged and notified night MD that pt's daughter is asking for Valium to help pt sleep. She states that Valium helped earlier today and would like pt to get another dose.

## 2019-09-17 NOTE — PLAN OF CARE
Occupational Therapy Discharge Summary    Reason for therapy discharge:    Pt. Plan discharge home tomorrow with A from family, Home care/Hospice     Progress towards therapy goal(s). See goals on Care Plan in [facility] electronic health record for goal details.  Pt. requiring A with ADLS/transf.s. Per chart pt. was having A with all BADLs and transf.s prior to recent admit and plans to discharge home with previous A and home services including home hospice.      Therapy recommendation(s):    No further therapy is recommended.

## 2019-09-17 NOTE — PROGRESS NOTES
[facility]  Palliative Care Daily Progress Note       Recommendations & Counseling     Family requesting hospice enrollment; hospice liason is seeing today  Diazepam 5 mg IV or po elixir q 4 hours prn agitation  Continue Lurasidone at this time  Make available oxy IR 5-10 mg q 4 hours prn in addition to current OxyContin  Need to further address code status    Discussion:  Met today with patient's wife, 2 daughters and son, granddaughter, .  Family is unanimous and clear in identifying primary goal of helping Vincent get home, and wanting hospice support for his care, focused on comfort and no further returns to hospital.  We discussed that a limited potential for some improvement cannot be ruled out, but neither can the burden associated with his persistent delirium.  Discussed comfort focused medications in the setting of persistent delirium.  He remains agitated and restless.  He has responded well to diazepam.  While we would not use this first line for delirium, and his current state of agitation, restlessness and combativeness, and with an antipsychotic on board, will start diazepam and make available PRN oxycodone in addition to his long-acting.  Await confirmation from hospice about his eligibility for enrollment.  Family would like to take him to a pow-wow while in their RV late this week, we discussed that we can see how he is doing.    Lorenzo Heller MD  Palliative Medicine Consult Team  Pager: [phone]   TT: 48 minutes, with > 50% spent in C/C/E patient/family/care teams re: GOC, POC, Sx management.         Assessments          Vincent Mishra is a 79 year old male with history of RCC treated with cryoablation (f/u with Urology in July 2019 with YAHIR), CAD, recent acute on chronic pancreatitis with hospitalization resulting in delirium, admitted with concern for cellulitis and on-going AMS.  It is likely that he suffers from a degree of dementing illness as well;  "see psychiatry consult.    Today, the patient was seen for:  Delirium, GOC, generalized pain    Prognosis, Goals, or Advance Care Planning was addressed today with: Yes.  Mood, coping, and/or meaning in the context of serious illness were addressed today: Yes.  He relies on multiple generations of his family for care and support.            Interval History:     He is having wide swings in his cognition and behavior throughout the day.  There are times when he is able to focus more than he has been able to in the past, but times when he is agitated, restless, shouting at family.    Ke y Palliative Symptoms:  # Pain severity the last 12 hours: moderate  # Dyspnea severity the last 12 hours: not assessed  # Nausea severity the last 12 hours: not assessed  # Anxiety severity the last 12 hours: moderate    Patient is on opioids: assessed and bowels ok/no needed changes to plan of care today.           Review of Systems:     Besides above, an additional  system ROS was reviewed and is unremarkable          Medications:     I have reviewed this patient's medication profile and medications during this hospitalization.  Noted scheduled meds are:  lurasidone 20mg/40mg  Oxycontin 20mg in AM, 10mg in PM (home dose was 30/10 with oxycodone 5mg PRN as well)  Sertraline 200mg daily     Noted PRN meds are:  Acetaminophen   Maalox           Physical Exam:   Vitals were reviewed  Temp: 98  F (36.7  C) Temp src: Oral BP: 133/71 Pulse: 81   Resp: 20 SpO2: 98 % O2 Device: None (Room air) Oxygen Delivery: 1 LPM  GEN: Awake, angry, agitated, combative  CV: Regular radial pulse 96  ABD:Soft  EXTR: Mitts off now  SKIN: Warm, dry  NEUROPSYCH: \"Why do you care\" to symptom questions             Data Reviewed:     ROUTINE ICU LABS (Last four results)  CMP  Recent Labs   Lab 09/17/19  0525 09/16/19  0449 09/15/19  0937 09/15/19  0452 09/14/19  0525  09/12/19  1420    132*  --  134 139   < > 139   POTASSIUM 3.3* 4.5  --  4.4 3.7   < > 3.6 "   CHLORIDE 102 98  --  102 106   < > 103   CO2 27 22  --  22 23   < > 28   ANIONGAP 7 11  --  10 10   < > 8   * 132*  --  157* 146*   < > 117*   BUN 18 18  --  12 11   < > 14   CR 1.04 0.88  --  0.77 0.81   < > 0.89   GFRESTIMATED 68 82  --  87 85   < > 82   GFRESTBLACK 79 >90  --  >90 >90   < > >90   LIOR 8.1* 8.3*  --  7.9* 8.1*   < > 8.6   PROTTOTAL  --   --  6.3*  --   --   --  6.7*   ALBUMIN  --   --  2.4*  --   --   --  2.6*   BILITOTAL  --   --  0.5  --   --   --  0.7   ALKPHOS  --   --  124  --   --   --  118   AST  --   --  31  --   --   --  31   ALT  --   --  19  --   --   --  35    < > = values in this interval not displayed.     CBC  Recent Labs   Lab 09/17/19  0525 09/16/19  0449 09/15/19  0452 09/14/19  0525   WBC 9.5 11.1* 9.6 10.2   RBC 3.38* 3.17* 3.10* 3.06*   HGB 8.8* 8.4* 8.3* 8.3*   HCT 29.4* 28.1* 27.0* 27.5*   MCV 87 89 87 90   MCH 26.0* 26.5 26.8 27.1   MCHC 29.9* 29.9* 30.7* 30.2*   RDW 14.8 14.7 14.9 15.0   * 563* 420 431     INRNo lab results found in last 7 days.  Arterial Blood Gas  Recent Labs   Lab 09/15/19  1915   PH 7.42   PCO2 36   PO2 76*   HCO3 23   O2PER 2.5L PM

## 2019-09-17 NOTE — CONSULTS
Psychiatry Consultation; Follow up              Reason for Consult, requesting source:    Ongoing AMS. His care was discussed with Dr Villalba and the primary team. Multiple family members were present during my visit.   Requesting source: Mariah Suni               Interim history:    I clarified with the son that his father has had memory problems for at least 3 years.  Also has had a relatively long-term visual hallucinations and paranoia.  However, over the last several days his family feels that he has really improved in terms of his interaction with them etc.  However sleep remains a real problem.  They felt that he did better with Valium when he received 5 mg last night versus the Ativan which did not help much.  He is a bit difficult interview since he is quite hard of hearing but he was pleasant and cooperative during my visit.  He denied visual hallucinations but his family members mentioned that he had been having him up until recently.  He is quite anxious to get out of the hospital since he would like to attend a powwow this weekend to show off his ornate head dress.   Plan is for him to return home with [facility] care.            Medications:     Current Facility-Administered Medications   Medication     acetaminophen (TYLENOL) tablet 650 mg     acetylcysteine (MUCOMYST) 20 % nebulizer solution 2 mL     albuterol (PROVENTIL) neb solution 2.5 mg     [START ON 9/18/2019] amLODIPine (NORVASC) tablet 10 mg     aspirin EC tablet 81 mg     atorvastatin (LIPITOR) tablet 80 mg     benztropine (COGENTIN) tablet 1-2 mg     clopidogrel (PLAVIX) tablet 75 mg     cyanocobalamin (VITAMIN B-12) tablet 500 mcg     glucose gel 15-30 g    Or     dextrose 50 % injection 25-50 mL    Or     glucagon injection 1 mg     enoxaparin (LOVENOX) injection 40 mg     ferrous sulfate (FEROSUL) tablet 325 mg     furosemide (LASIX) injection 40 mg     insulin aspart (NovoLOG) inj (RAPID ACTING)     insulin aspart (NovoLOG)  "inj (RAPID ACTING)     isosorbide mononitrate (IMDUR) 24 hr tablet 30 mg     lidocaine (LMX4) cream     lidocaine (XYLOCAINE) 2 % 15 mL, alum & mag hydroxide-simethicone (MYLANTA ES/MAALOX  ES) 15 mL GI Cocktail     lidocaine 1 % 0.1-1 mL     lisinopril (PRINIVIL/ZESTRIL) tablet 40 mg     lurasidone (LATUDA) tablet 40 mg     lurasidone (LATUDA) tablet 60 mg     magnesium sulfate 4 g in 100 mL sterile water (premade)     melatonin tablet 3 mg     naloxone (NARCAN) injection 0.1-0.4 mg     omeprazole (priLOSEC) CR capsule 20 mg     oxyCODONE (oxyCONTIN) 12 hr tablet 10 mg     oxyCODONE (oxyCONTIN) 12 hr tablet 20 mg     polyethylene glycol (MIRALAX/GLYCOLAX) Packet 17 g     potassium chloride (KLOR-CON) Packet 20-40 mEq     potassium chloride 10 mEq in 100 mL intermittent infusion with 10 mg lidocaine     potassium chloride 10 mEq in 100 mL sterile water intermittent infusion (premix)     potassium chloride 20 mEq in 50 mL intermittent infusion     potassium chloride ER (K-DUR/KLOR-CON M) CR tablet 20-40 mEq     sertraline (ZOLOFT) tablet 200 mg     sodium chloride (PF) 0.9% PF flush 3 mL     sodium chloride (PF) 0.9% PF flush 3 mL     sodium chloride (PF) 0.9% PF flush 3 mL     sodium chloride (PF) 0.9% PF flush 3 mL     spironolactone (ALDACTONE) half-tab 12.5 mg     tamsulosin (FLOMAX) capsule 0.4 mg     vitamin B1 (THIAMINE) tablet 100 mg     vitamin C (ASCORBIC ACID) tablet 500 mg     vitamin D3 (CHOLECALCIFEROL) 1000 units (25 mcg) tablet 1,000 Units              MSE:     Lying in the hospital bed constantly moving. He is well groomed, cooperative, made a point of shaking my hand. Speech dysarthric. There is no rigidity of the extremities.  Associations loose.  Mood is \"good\"  Affect quite restricted.  Thought process tangential.  Thought content negative for suicidal thoughts, denies hallucinations, but may be paranoid.  Oriented x1.  Recent and remote memory, attention span and concentration are very impaired. " " Fund of knowledge, use of language impaired.  Insight and judgment fair to poor.     Vital signs:  Temp: 97.1  F (36.2  C) Temp src: Oral BP: (!) 143/72 Pulse: 73   Resp: 24 SpO2: 100 % O2 Device: Nasal cannula Oxygen Delivery: 1 LPM   Weight: 81 kg (178 lb 9.2 oz)  Estimated body mass index is 27.15 kg/m  as calculated from the following:    Height as of 8/23/19: 1.727 m (5' 8\").    Weight as of this encounter: 81 kg (178 lb 9.2 oz).            DSM-5 Diagnosis:   unspecified neurocognitive disorder   Delirium           Assessment:   I think that with his recent history of hallucinations and paranoia, and the improvement since Latuda was started, I think that we obligated to continue with this.  However this medication should be given with food (at least 350 tesfaye).  Since I do not like to see patients with significant cognitive impairment on long-term antipsychotics I think would be best to try to taper down on the Latuda once in a while.  Since absorption of the Latuda is taken on empty stomach is reduced by 50%, I think that we can decrease the dose.  Also I do not like to use benzodiazepines in this patient population but it appears that that may be the only option to help with his sleep; he is very uncomfortable with current insomnia.  I think the most reasonable option would be Klonopin at low dose since the Ativan did not help.  Use of Cogentin should be avoided in his case.           Summary of Recommendations:   I would change the Latuda to 20 mg with breakfast and 40 mg with his evening meal.  In my opinion, I think would be okay to use Klonopin 0.5-1 mg at bedtime  Would continue with Zoloft at 200 mg/day  It appears that his psychiatric medications will be managed by the hospice MD vs Dr Fonseca   page me at [phone] as needed     \"Much or all of the text in this note was generated through the use of Dragon Dictate voice to text software. Errors in spelling or words which appear to be out of contact are " "unintentional, may be present due having escaped editing\"           "

## 2019-09-17 NOTE — PLAN OF CARE
RN assumed cares at 0700, Pt alert and oriented, VS stable throughout the shift.  Pt continues to pick when agitated, was able to scratch at wound on left index finger when mitts off.  Dressing changed on left hand.  Pt continues without mitts with sitter at the bedside, Pt swinging when trying to replace mitts.  Family at the bedside is attentive to cares.  Pt to have MRI of the brain this evening, tentatively scheduled for 1800.  No reports of pain this shift.  Pt continues with earl catheter, IV lasix given.  Pt had a medium soft black stool this shift; Pt on iron supplement.  Pt will discharge to home with hospice tomorrow at 1200.  No other incidents this shift.  Continue to monitor and notify MD of any changes.

## 2019-09-17 NOTE — PLAN OF CARE
Discharge Planner PT 5B  Patient plan for discharge: did not state  Current status: Pt encountered in supine, initially declining therapy but agreeable with encouragement. Mod A required to complete bed mobility supine > sit, A x 2 for return to supine and repositioning. Completes x 2 sit <> stands with mod A x 2 from elevated bed position, impulsively sits.   Barriers to return to prior living situation: medical status, mobility status, safety concerns  Recommendations for discharge: TCU  Rationale for recommendations: to improve safety with mobility and improve functional strength.       Entered by: Johnny Zabala 09/17/2019 1:17 PM

## 2019-09-17 NOTE — PROVIDER NOTIFICATION
Eliceo fierro MD at 0793   LOW 5B 5-230 M.RIsaias Calderon RN [id number] writer concerned about pt swallowing ability given his lethargy and confusion. consider swallow study? please call RN.     MD called writer back and discussed at length the possibility of pt having a swallow study tomorrow as well as starting hospice. MD recommended keeping pt unofficially NPO except for meds if pt alert. Also try thickened liquids.

## 2019-09-18 NOTE — PLAN OF CARE
"Speech Language Therapy Discharge Summary    Reason for therapy discharge:    Discharged to home with hospice.     Progress towards therapy goal(s). See goals on Care Plan in [facility] electronic health record for goal details.  Goals partially met.  Barriers to achieving goals:   discharge from facility.    Therapy recommendation(s):    Continued therapy is recommended.  Rationale/Recommendations:  See below.    SLP note from 9/17/2019:  \"Bedside swallow evaluation completed. Pt presents with moderate oropharyngeal dysphagia characterized by impaired oral motor strength and reduction in laryngeal movement. Patients family/medical team report pt will be going home tomorrow on hospice, but are seeking recommendations for feeding to reduce aspiration risk.  Recommend DD2 solids with nectar thick liquids by spoon.      Swallow strategies: check mouth frequently for oral residue/pocketing;maintain upright posture during/after eating for 30 mins;small sips/bites.     Pt did demonstrate a delayed cough response with nectar thick liquids by spoon, though pt/family declines to trial honey thick liquids. Medical team could consider VFSS if further assessment of pharyngeal swallow function is desired. Pt requires 1:1 supervision/assist for feeding. Recommend home care SLP to educate family/caregivers on swallow strategies/diet modifications to minimize aspiration risk.\"    "

## 2019-09-18 NOTE — PROGRESS NOTES
[id number]    VSS. Afebrile. Clarified orders with covering MD regarding code status and VSS etc. With patient moving to hospice tomorrow. MD stated to continue with orders and to re-evaluate tomorrow AM. MRI of head to be completed, but unable to complete due to MRI schedule. Will try again tomorrow. Family requested to still complete/request MRI for more information. To have one time dose of PRN ativan prior to MRI. Patient seems more restful this evening and lessening itching of skin. PRN oxycodone given x1. Mitts put on for about an hour until patient became less agitated. Mitts not currently on. Sitter at bedside. Mcelroy catheter with good output. Patient turning independently. Continue to monitor.

## 2019-09-18 NOTE — PROGRESS NOTES
[facility]  Palliative Care Daily Progress Note       Recommendations & Counseling       Diazepam 5-10 mg po elixir q 4 hours prn agitation    Continue Lurasidone at this time    Make available oxy IR 5-10 mg q 4 hours prn in addition to current OxyContin    Would utilize oxycodone PRN for pain or dyspnea            Assessments          Vincent Mishra is a 79 year old male with history of RCC treated with cryoablation (f/u with Urology in July 2019 with YAHIR), CAD, recent acute on chronic pancreatitis with hospitalization resulting in delirium, admitted with concern for cellulitis and on-going AMS.  It is likely that he suffers from a degree of dementing illness as well; see psychiatry consult.     Today, the patient was seen for:  Delirium, generalized pain    Prognosis, Goals, or Advance Care Planning was addressed today with: No.    Mood, coping, and/or meaning in the context of serious illness were addressed today: No.  Summary/Comments:            Interval History:     Chart review/discussion with unit or clinical team members:   Notes reviewed, discharging today on hospice, noted to have continued picking and impulsivitiy.    Per patient or family/caregivers today:  Seen laying in bed, appears to be fidgeting, gabled speech.     Key Palliative Symptoms:  # Pain severity the last 12 hours: low  # Dyspnea severity the last 12 hours: moderate  # Nausea severity the last 12 hours: none  # Anxiety severity the last 12 hours: moderate    Patient is on opioids: bowels not assessed today.           Review of Systems:     Besides above, ROS was reviewed and is unremarkable          Medications:     I have reviewed this patient's medication profile and medications during this hospitalization.    Noted meds:    Melatonin 3 mg at bedtime  prilosec 20 mg BID  oxycontin 10 mg every evening and 20 mg every AM  Sertraline 200 mg daily  acetaminophen PRN - x1  Diazepam 5 mg q4h PRN- x3  Lorazepam  PRN  Atropine PRN  Oxycodone PRN- x2               Physical Exam:   Vitals were reviewed  Temp: 97.7  F (36.5  C) Temp src: Oral BP: 116/63 Pulse: 76   Resp: 18 SpO2: 94 % O2 Device: None (Room air)      Intake/Output Summary (Last 24 hours) at 9/18/2019 0918  Last data filed at 9/18/2019 0707  Gross per 24 hour   Intake 330 ml   Output 3825 ml   Net -3495 ml     Constitutional: Awake, alert, cooperative, no apparent distress  Lungs: mild increased work of breathing  Neurologic: Awake, alert, oriented to self, gabled speech  Skin: No rashes             Data Reviewed:       Reviewed recent labs, comments:   Creatinine 0.92  GFR 79  Sodium 138  Potassium 3.5  WBC 10.0  Hemoglobin 8.9  Platelets 495      BESSY Perez CNS  Palliative Care Consult Team  Pager: [phone]     Total time spent was 25 minutes,  >50% of time was spent counseling and/or coordination of care regarding symptom management.

## 2019-09-18 NOTE — PLAN OF CARE
Pt discharged from unit 5B at 1230 via stretcher with [facility] transport, belongings, meds, and paperwork in tow.  Pt to discharge home with hospice.  No further action.

## 2019-09-18 NOTE — PROGRESS NOTES
Social Work Services Discharge Note      Patient Name:  Vincent Mishra     Anticipated Discharge Date: 9/18/2019    Discharge Disposition:   Other:  Home w/ [facility]    Following MD:  Monet Mandujano MD     Pre-Admission Screening (PAS) online form has been completed.  The Level of Care (LOC) is:  Determined  Confirmation Code is:  Not needed   Patient/caregiver informed of referral to [facility] for Pre-Admission Screening for skilled nursing facility (SNF) placement and to expect a phone call post discharge from SNF.     Additional Services/Equipment Arranged:  HE stretcher ride arranged for 12pm - stretcher arranged due to O2 needs and dementia - family aware of stretcher cost     Patient / Family response to discharge plan:  in agreement     Persons notified of above discharge plan:  Pt's family, MD, bedside RN, [facility]    Staff Discharge Instructions:  SW will fax discharge orders and signed hard scripts for any controlled substances.    Please print a packet and send with patient.     CTS Handoff completed:  YES    Medicare Notice of Rights provided to the patient/family:  YES    Pt is a 79 year old male admitted to [facility] 9/12/2019 for increased delirium. Pt's family is opting for comfort cares w/ services at home through [facility].     Pt is set to discharge today at 12pm via HE stretcher ride.     VIMAL Hodgson, [facility]   5B   Pager [phone]  Phone [phone]

## 2019-09-18 NOTE — PLAN OF CARE
D/I: Pt is oriented to self and place, vitally stable. Intermittently agitated and restless and given Valium 5mg x2 with some relief. Oxycodone also given for back pain with some relief. Bilateral upper and lower  extremities wounds dressings CDI. Pt continues with bedside attendant for impulsivity and picking at wounds. Has a chronic earl.  P: Continue to monitor and notify MD of any changes. Pt is discharging today to home hospice with family. Orders in place.

## 2019-09-18 NOTE — PROGRESS NOTES
Clinic Care Coordination Contact  Care Team Conversations    Patient was discharged to home on 9/18/19 with hospice care provided by [facility] and family members.     Will close to clinic care coordination.     Ekaterina Shook RN, [facility] RN Coordinator  [facility]   9/18/2019    4:07 PM  [phone]

## 2019-09-18 NOTE — PLAN OF CARE
Physical Therapy Discharge Summary    Reason for therapy discharge:    Patient/family request discontinuation of services.    Progress towards therapy goal(s). See goals on Care Plan in [facility] electronic health record for goal details.  Goals not met.  Barriers to achieving goals:   limited tolerance for therapy and discharge from facility.    Therapy recommendation(s):    No further therapy is recommended.

## 2019-09-18 NOTE — PROGRESS NOTES
BRIEF NEUROLOGY FOLLOW UP NOTE:    Chart reviewed, patient care discussed with staff neurologist. Left-sided weakness noted on exam yesterday and MRI was recommended. Not completed due to MRI scheduling.     Plan to discharge home with hospice. We continue to recommend further workup with MRI brain. In addition causes of his delirium remain unclear and of note he has never formally been given a dementia diagnosis to the best of my knowledge. Continue to recommend investigations of underlying metabolic/infectious issues (CRP increased today) and clinical observation for improvement in delirium.    Patient care was discussed with attending neurologist, Dr. Parikh, who agrees with my assessment and plan.    Solo Hammond MD  Neurology PGY-4

## 2019-09-26 NOTE — TELEPHONE ENCOUNTER
[facility] Cert. of Terminal Illness sent to PCP.    Danitza Torres  [facility]

## 2019-10-01 NOTE — TELEPHONE ENCOUNTER
Signature page faxed to [phone] and sent to scanning.    Danitza Torres  [facility]

## 2020-03-15 ENCOUNTER — HEALTH MAINTENANCE LETTER (OUTPATIENT)
Age: 80
End: 2020-03-15

## 2020-04-23 NOTE — RESULT ENCOUNTER NOTE
Has had a boil on side of her breast for the third day -painful and red. Has been treating as previous ones but not opening up for her.Not sure if she would have to be seen in person for thisPlease call [phone]   Mr. Tad,  As we discussed in clinic your post cryoablation CT shows good treatment of your mass.  Thank you, Asif Capone.

## 2020-07-03 ENCOUNTER — PRE VISIT (OUTPATIENT)
Dept: UROLOGY | Facility: CLINIC | Age: 80
End: 2020-07-03

## 2021-01-10 ENCOUNTER — HEALTH MAINTENANCE LETTER (OUTPATIENT)
Age: 81
End: 2021-01-10

## 2021-05-08 ENCOUNTER — HEALTH MAINTENANCE LETTER (OUTPATIENT)
Age: 81
End: 2021-05-08

## 2021-08-28 ENCOUNTER — HEALTH MAINTENANCE LETTER (OUTPATIENT)
Age: 81
End: 2021-08-28

## 2021-09-02 NOTE — TELEPHONE ENCOUNTER
It appears that Sertraline was discontinued 9/20/17  Wife did report that he is not taking this medication  Palliative care information was given to patient's wife    Routing to provider.    Corrina TREJO Rn     129.6

## 2021-10-23 ENCOUNTER — HEALTH MAINTENANCE LETTER (OUTPATIENT)
Age: 81
End: 2021-10-23

## 2023-02-17 NOTE — ED PROVIDER NOTES
"  History     Chief Complaint   Patient presents with     Abdominal Pain     nausea, vomiting; has been ill for about 3 months. was seen for this in the past, never found out what it was     HPI  Vincent Mishra is a 79 year old male with complex past medical history which includes ACS, type II DM, stage III CKD, previous renal cancer, iron deficiency anemia, and GERD with esophagitis who presents for evaluation of abdominal pain.  Patient is alert and oriented in the department but at times gets confused when he is talking.  His significant other and daughter are accompanying him to the department, state that he has been suffering from severe intermittent epigastric abdominal pain for the last 1-2 months.  He has been seen at outside facilities, was admitted to [facility] on 8/6/2019 for possible NSTEMI but left before receiving his recommended angiogram.  The patient has followed with his primary care provider and has an endoscopy scheduled for early next month, is compliant with medications including omeprazole and Carafate but without improvement in pain.  This evening the pain grew more severe and was associated with nausea and vomiting so the family decided to bring him to the department.  In the department he is without pain or nausea.  No active symptoms.    Allergies:  Allergies   Allergen Reactions     Prozac [Fluoxetine] Other (See Comments)     \"I went crazy.\"       Benadryl [Diphenhydramine Hcl] Other (See Comments)     Starts to shake, and paranoid     Codeine Itching     Diphenhydramine Other (See Comments)     Hallucinations, See [facility] records scanned on 7/16/15     Labetalol Other (See Comments)     See [facility] records scanned on 7/16/15  Bradycardia down to 30 on holter, dizziness. Stopped med and symptoms resolved     Metoprolol Other (See Comments)     Bradycardia even at 12.5 mg     Morphine Visual Disturbance       Problem List:  "   Patient Active Problem List    Diagnosis Date Noted     Chest pain 01/12/2012     Priority: High     Gastroesophageal reflux disease with esophagitis 08/21/2019     Priority: Medium     Physical deconditioning 08/21/2019     Priority: Medium     CKD (chronic kidney disease) stage 3, GFR 30-59 ml/min (H) 07/05/2019     Priority: Medium     Atypical chest pain 03/28/2019     Priority: Medium     Skin picking habit 02/26/2019     Priority: Medium     Iron deficiency anemia, unspecified iron deficiency anemia type 02/26/2019     Priority: Medium     Renal hematoma 12/11/2018     Priority: Medium     Severe episode of recurrent major depressive disorder, without psychotic features (H) 02/20/2018     Priority: Medium     Chronic bilateral low back pain with sciatica, sciatica laterality unspecified 09/20/2017     Priority: Medium     ACS (acute coronary syndrome) (H) 05/30/2017     Priority: Medium     Regarding his cardiac history, patient underwent PCI to the RCA 2011 at [facility] in [place] and to the mid LAD in 2014 in [facility].  He underwent cardiac catheterization 2014 without evidence of worsening obstructive disease.  Patient was later admitted in July 2015 for an STEMI and placed on DVT at that time.  Angiogram 4/2019 - normal        Bilateral low back pain with right-sided sciatica 04/03/2017     Priority: Medium     Bilateral low back pain with left-sided sciatica 04/03/2017     Priority: Medium     Claudication (H) 05/09/2016     Priority: Medium     Hip pain, bilateral 04/05/2016     Priority: Medium     Thyroid nodule 10/30/2015     Priority: Medium     Type 2 diabetes mellitus with other circulatory complication, without long-term current use of insulin (H) 10/22/2015     Priority: Medium     Myocardial infarction, nontransmural (H) 07/16/2015     Priority: Medium     Sinoatrial node dysfunction (H) 07/16/2015     Priority: Medium     Right bundle branch block (RBBB) 07/16/2015      Priority: Medium     Essential hypertension 07/16/2015     Priority: Medium     Hyperlipidemia 07/16/2015     Priority: Medium     Sinus bradycardia, chronic 07/16/2015     Priority: Medium     NSTEMI (non-ST elevated myocardial infarction) (H)      Priority: Medium     07-25-14 CATH- RCA, L.Main and CFX  had minor luminal irregularites. Mid LAD high grade stenosis 80-90%.Stent placed to mid LAD       Hard of hearing 06/05/2014     Priority: Medium     Constipation 06/05/2014     Priority: Medium     Abdominal pain, right upper quadrant 03/26/2014     Priority: Medium     Esophageal reflux 03/26/2014     Priority: Medium     Insomnia 10/21/2013     Priority: Medium     Health Care Home 10/08/2013     Priority: Medium     *See Letters for [facility] Care Plan: My Access Plan           Renal mass, left 12/05/2012     Priority: Medium     Malignant neoplasm of kidney excluding renal pelvis (H) 11/16/2012     Priority: Medium     SURGERY, PATHOLOGY, AND TREATMENT HISTORY FOR KIDNEY CANCER  11/12/12:  Left lower pole kidney mass biopsy: Renal cell carcinoma, clear cell type; Viktor grade 1  12/5/12 Left percutaneous cryoablation of two renal tumors.  These were located adjacent to one another in the lateral kidney.  Dr Asif Capone, [facility].  Problem list name updated by automated process. Provider to review       Moderate major depression (H) 09/17/2012     Priority: Medium     Orchitis, epididymitis, and epididymo-orchitis 09/17/2012     Priority: Medium     Osteoarthritis, knee 09/05/2012     Priority: Medium     Abnormal antinuclear antibody titer 03/12/2012     Priority: Medium     Anatomic airway obstruction 02/09/2012     Priority: Medium     fixed obstruction on PFTs with dyspnea       Right bundle branch block 07/26/2011     Priority: Medium     Seen on EKG 2/2011.  Different configuration on 7/26/2011 s/p MI - but same at [facility] post-MI as here        Coronary atherosclerosis  07/14/2011     Priority: Medium     7/2011 ([place]): s/p MI, PTCA - RCA    Coronary Atherosclerosis of Unspecified Type of Vessel, Native or Graft  CAD (coronary artery disease) 7/2011 ([place]): s/p MI, PTCA - RCA       Acute myocardial infarction of inferolateral wall (H) 07/14/2011     Priority: Medium     Overview:   IMO Update       Obesity 07/14/2011     Priority: Medium     Hypertension goal BP (blood pressure) < 140/90 02/28/2011     Priority: Medium     C6-7 disc with radiculopathy 02/28/2011     Priority: Medium     consider second Epidural steroid injection at [facility].  Continue PT.  Await accupuncture       Tear of meniscus of left knee 02/11/2011     Priority: Medium     Neck pain 11/12/2010     Priority: Medium     B12 deficiency 11/12/2010     Priority: Medium     Diplopia 11/02/2010     Priority: Medium     Neuropathy 11/02/2010     Priority: Medium     Parotid mass 10/04/2010     Priority: Medium     Tension headache 09/23/2010     Priority: Medium     Depression 04/21/2009     Priority: Medium     Prostate cancer (H) 04/21/2009     Priority: Medium     Overview:   4/09 Robotic prostatectomy          Past Medical History:    Past Medical History:   Diagnosis Date     CAD (coronary artery disease) 7/26/2011     Cancer of kidney (H)      Chest pain 1/12/2012     Coronary artery disease      History of angina      History of blood transfusion      Hyperlipidaemia      Hyperlipidemia LDL goal <100 9/23/2010     Malignant neoplasm (H)      Myocardial infarction (H)      NSTEMI (non-ST elevated myocardial infarction) (H)      Other and unspecified hyperlipidemia      Right bundle branch block      Type II or unspecified type diabetes mellitus without mention of complication, not stated as uncontrolled      Unspecified essential hypertension        Past Surgical History:    Past Surgical History:   Procedure Laterality Date     ARTHROSCOPY KNEE  2/11/2011    ARTHROSCOPY KNEE performed by LEY, JEFFREY DUANE at  [facility]     ARTHROSCOPY KNEE WITH MEDIAL MENISCECTOMY  6/26/2012    Procedure: ARTHROSCOPY KNEE WITH MEDIAL MENISCECTOMY;  Right Knee Arthroscopy With Medial Menisectomy;  Surgeon: Ley, Jeffrey Duane, MD;  Location: [facility]     BACK SURGERY      back surgery x4     BIOPSY       CARDIAC SURGERY  7/2011    stentt placed     COLONOSCOPY       CORONARY ANGIOGRAPHY ADULT ORDER  07-25-14    RCA, L.Main and CFX  had minor luminal irregularites. Mid LAD high grade stenosis 80-90%.Stent placed to mid LAD     CV CORONARY ANGIOGRAM  3/28/2019    Procedure: CV CORONARY ANGIOGRAM;  Surgeon: Dominguez Mishra MD;  Location: [facility] CARDIAC CATH LAB     ESOPHAGOSCOPY, GASTROSCOPY, DUODENOSCOPY (EGD), COMBINED  10/25/2012    Procedure: COMBINED ESOPHAGOSCOPY, GASTROSCOPY, DUODENOSCOPY (EGD), BIOPSY SINGLE OR MULTIPLE;  Gastroscopy  ;  Surgeon: Lucius Dasilva MD;  Location: [facility]     HEART CATH, ANGIOPLASTY  07-25-14    mid LAD      ORTHOPEDIC SURGERY       back surgery       Family History:    Family History   Problem Relation Age of Onset     Cancer Mother      Depression Mother      Diabetes Father      Hypertension Father      Heart Disease Father      Mental Illness Father      Heart Disease Maternal Grandfather      Substance Abuse Maternal Grandfather      Alcohol/Drug Paternal Grandmother      Substance Abuse Paternal Grandmother      Heart Disease Paternal Grandfather      Alcohol/Drug Paternal Grandfather      Substance Abuse Paternal Grandfather      Heart Disease Brother      Diabetes Brother      Substance Abuse Brother      Obesity Brother      Diabetes Brother      Obesity Sister      Alcohol/Drug Daughter      Depression Daughter      Obesity Sister      Diabetes Sister      Obesity Sister      Diabetes Sister      Alcohol/Drug Sister      Cancer Sister 55        possible kidney cancer     Substance Abuse Sister      Alcohol/Drug Son      Substance Abuse Son      Diabetes Son      Alcohol/Drug Son      Substance Abuse  Son      Obesity Daughter      Diabetes Daughter      Hypertension Daughter      Bipolar Disorder Other        Social History:  Marital Status:   [2]  Social History     Tobacco Use     Smoking status: Never Smoker     Smokeless tobacco: Never Used   Substance Use Topics     Alcohol use: No     Alcohol/week: 0.0 oz     Drug use: No        Medications:      acetaminophen (TYLENOL) 325 MG tablet   amLODIPine (NORVASC) 5 MG tablet   ascorbic acid (VITAMIN C) 500 MG tablet   aspirin (ASA) 81 MG tablet   atorvastatin (LIPITOR) 80 MG tablet   blood glucose (ONETOUCH ULTRA) test strip   blood glucose monitoring (NO BRAND SPECIFIED) meter device kit   Calcium Carbonate-Vitamin D (CALCIUM + D) 600-200 MG-UNIT per tablet   cholecalciferol (VITAMIN  -D) 1000 UNITS capsule   clopidogrel (PLAVIX) 75 MG tablet   Cyanocobalamin (VITAMIN B-12 PO)   ferrous sulfate (FE TABS) 325 (65 Fe) MG EC tablet   gabapentin (NEURONTIN) 100 MG capsule   hydrochlorothiazide (HYDRODIURIL) 25 MG tablet   ibuprofen (ADVIL/MOTRIN) 800 MG tablet   Lancets Misc. (SELECT-LITE DEVICE/LANCETS) KIT   Neocrafts FINEPOINT LANCETS MISC   lisinopril (PRINIVIL/ZESTRIL) 40 MG tablet   Melatonin 10 MG TBDP   metFORMIN (GLUCOPHAGE-XR) 500 MG 24 hr tablet   nitroGLYcerin (NITROSTAT) 0.4 MG sublingual tablet   omeprazole (PRILOSEC) 20 MG DR capsule   order for DME   order for DME   order for DME   order for DME   oxyCODONE (OXYCONTIN) 30 MG 12 hr tablet   oxyCODONE (ROXICODONE) 5 MG tablet   polyethylene glycol (MIRALAX/GLYCOLAX) powder   sertraline (ZOLOFT) 100 MG tablet   traZODone (DESYREL) 100 MG tablet         Review of Systems  Constitutional:  Negative for fever or recent illness.  Cardiovascular:  Negative for chest pain.  Respiratory:  Negative for shortness of breath.  Gastrointestinal: Positive for epigastric abdominal pain with nausea and vomiting.  Denies postprandial nature.  If her blood with bowel movements.  Musculoskeletal: Negative for back  "or flank pain.      All others reviewed and are negative.      Physical Exam   BP: (!) 177/83  Pulse: 74  Heart Rate: 71  Temp: 97.7  F (36.5  C)  Resp: 16  Height: 172.7 cm (5' 8\")  Weight: 83 kg (183 lb)  SpO2: 98 %      Physical Exam  Constitutional:  Well developed, well nourished.  Appears nontoxic and in no acute distress.    HENT:  Normocephalic and atraumatic.  Symmetric in appearance.  Eyes:  Conjunctivae are normal.  Neck:  Neck supple.  Cardiovascular:  No cyanosis.  RRR.  No audible murmurs noted.   Respiratory:  Effort normal without sign of respiratory distress.  CTAB without diminished regions.    Gastrointestinal:  Soft nondistended abdomen.  Nontender and without guarding.  No rigidity or rebound tenderness.  Musculoskeletal:  Moves extremities spontaneously.  Neurological:  Patient is alert.  Skin:  Skin is warm and dry.  Psychiatric:  Normal mood and affect.      ED Course        Procedures                 EKG Interpretation:      Interpreted by: Jefe Yang  Time reviewed: Upon completion    Symptoms at time of EKG: Asymptomatic   Rhythm: Sinus  Rate: Normal  Axis: Normal    Conduction: RBBB  ST Segments/ T Waves: No pathologic ST-elevations or T-wave abnormalities.  Q Waves: None  Comparison to prior: Similar morphology to previous     Clinical Impression: No sign of ischemia         Critical Care time:  none               Results for orders placed or performed during the hospital encounter of 08/22/19 (from the past 24 hour(s))   CBC with platelets differential   Result Value Ref Range    WBC 6.4 4.0 - 11.0 10e9/L    RBC Count 3.86 (L) 4.4 - 5.9 10e12/L    Hemoglobin 10.6 (L) 13.3 - 17.7 g/dL    Hematocrit 34.1 (L) 40.0 - 53.0 %    MCV 88 78 - 100 fl    MCH 27.5 26.5 - 33.0 pg    MCHC 31.1 (L) 31.5 - 36.5 g/dL    RDW 15.5 (H) 10.0 - 15.0 %    Platelet Count 307 150 - 450 10e9/L    Diff Method Automated Method     % Neutrophils 87.7 %    % Lymphocytes 5.0 %    % Monocytes 6.4 %    % " Eosinophils 0.3 %    % Basophils 0.3 %    % Immature Granulocytes 0.3 %    Nucleated RBCs 0 0 /100    Absolute Neutrophil 5.6 1.6 - 8.3 10e9/L    Absolute Lymphocytes 0.3 (L) 0.8 - 5.3 10e9/L    Absolute Monocytes 0.4 0.0 - 1.3 10e9/L    Absolute Eosinophils 0.0 0.0 - 0.7 10e9/L    Absolute Basophils 0.0 0.0 - 0.2 10e9/L    Abs Immature Granulocytes 0.0 0 - 0.4 10e9/L    Absolute Nucleated RBC 0.0    Comprehensive metabolic panel   Result Value Ref Range    Sodium 139 133 - 144 mmol/L    Potassium 4.1 3.4 - 5.3 mmol/L    Chloride 104 94 - 109 mmol/L    Carbon Dioxide 28 20 - 32 mmol/L    Anion Gap 7 3 - 14 mmol/L    Glucose 141 (H) 70 - 99 mg/dL    Urea Nitrogen 14 7 - 30 mg/dL    Creatinine 1.07 0.66 - 1.25 mg/dL    GFR Estimate 66 >60 mL/min/[1.73_m2]    GFR Estimate If Black 76 >60 mL/min/[1.73_m2]    Calcium 8.8 8.5 - 10.1 mg/dL    Bilirubin Total 0.7 0.2 - 1.3 mg/dL    Albumin 3.6 3.4 - 5.0 g/dL    Protein Total 6.8 6.8 - 8.8 g/dL    Alkaline Phosphatase 86 40 - 150 U/L    ALT 39 0 - 70 U/L    AST 45 0 - 45 U/L   Lipase   Result Value Ref Range    Lipase 127 73 - 393 U/L   Troponin I   Result Value Ref Range    Troponin I ES 0.561 (HH) 0.000 - 0.045 ug/L       Medications   nitroGLYcerin (NITROSTAT) sublingual tablet 0.4 mg (0.4 mg Sublingual Given 8/22/19 2144)   aspirin (ASA) chewable tablet 162 mg (162 mg Oral Given 8/22/19 2114)       Assessments & Plan (with Medical Decision Making)   Vincent Mishra is a 79 year old male who presented to the department for evaluation of intermittent abdominal pain over the past 1-2 months.  Tonight's pain was described as being severe and sharp associated with nausea/vomiting, but seems to have resolved prior to arrival.  During stay in the department he again developed abdominal pain which was refractory to nitroglycerin.  EKG morphology similar to previous and without obvious ischemia, troponin returned significantly elevated at 0.561 and higher than previous values.   Lipase and hepatic enzymes are within reference range.  Difficult to discern whether patient's symptoms are gastrointestinal in etiology versus cardiac, however his troponin is nearly twice the value of previous on file.  Consulted [facility] cardiologist Dr. Arevalo who is an associate of his previous cardiologist Dr. Oh, he agrees that patient symptoms could be due to NSTEMI and has requested transfer for [facility] admission.  Cardiology does not recommend initiating heparin therapy yet but instead monitoring lab values and having to rule out GI etiology.  [facility]    The patient and family have been informed of results and the recommendation for transfer to [facility].  They have verbalized an understanding, all questions answered, and in agreement with the plan.      Disclaimer:  This note consists of symbols derived from keyboarding, dictation, and/or voice recognition software.  As a result, there may be errors in the script that have gone undetected.  Please consider this when interpreting information found in the chart.        I have reviewed the nursing notes.    I have reviewed the findings, diagnosis, plan and need for follow up with the patient.       New Prescriptions    No medications on file       Final diagnoses:   NSTEMI (non-ST elevated myocardial infarction) (H)   Abdominal pain, epigastric   Anemia, unspecified type       8/22/2019   [facility] EMERGENCY DEPARTMENT     Jefe Yang DO  08/22/19 0252     Chest pain

## 2023-06-08 NOTE — NURSING NOTE
"Vincent Mishra is a 79 year old male who presents for:  Chief Complaint   Patient presents with     Follow Up     RTC to review scans and discuss recommendations.         Initial Vitals:  /63 (BP Location: Right arm, Patient Position: Sitting, Cuff Size: Adult Large)   Pulse 68   Temp 98.7  F (37.1  C) (Oral)   Ht 5' 9\" (1.753 m)   Wt 194 lb (88 kg)   SpO2 97%   BMI 28.65 kg/m   Estimated body mass index is 28.65 kg/m  as calculated from the following:    Height as of this encounter: 5' 9\" (1.753 m).    Weight as of this encounter: 194 lb (88 kg).. Body surface area is 2.07 meters squared. BP completed using cuff size: large  Worst Pain (10)        Nursing Comments: Pain level of a 10 today.         Maxine Paz    " no

## 2023-08-15 NOTE — TELEPHONE ENCOUNTER
DARREN to Dr. Fonseca: Dr. Chew called and sad to order CT of the chest without contrast. Done. Sheyla and Vincent notified.  Yvon Dickens RN     Patient called stating a PA is needed on ozempic   Please advise

## 2025-02-28 NOTE — PLAN OF CARE
Post-Op Assessment Note    CV Status:  Stable  Pain Score: 0    Pain management: adequate       Mental Status:  Arousable   Hydration Status:  Stable   PONV Controlled:  None   Airway Patency:  Patent     Post Op Vitals Reviewed: Yes    No anethesia notable event occurred.    Staff: CRNA           Last Filed PACU Vitals:  Vitals Value Taken Time   Temp 97    Pulse 90    /90    Resp 10    SpO2 90                Pt up most of the night. Initially did not tolerate mitts well. Attendant in room. Pt oriented to person. VSS. Turns assist x2. Mcelroy catheter in place. BM incontinence x1. New PIV placed and infusing D51/2NS @ 75mL/hr. Pt removed prior PIV w/ mitts on. Mitts removed to assess pt's re-directability. Pt tried to bite his hands and stated that he did not know what they were.   Pt complaint of pain in abdomen. GI cocktail given.    Continue with plan of care.

## (undated) DEVICE — CATH ANGIO INFINITI JL5 4FRX100CM 538422

## (undated) DEVICE — PACK HEART LEFT CUSTOM

## (undated) DEVICE — MANIFOLD KIT ANGIO AUTOMATED 014613

## (undated) DEVICE — CATH ANGIO INFINITI JL4 4FRX100CM 538420

## (undated) DEVICE — CATH ANGIO INFINITI 3DRC 4FRX100CM 538476

## (undated) DEVICE — KIT HAND CONTROL ACIST 014644 AR-P54

## (undated) DEVICE — INTRO SHEATH AVANTI 4FRX23CM 504604T

## (undated) RX ORDER — ONDANSETRON 2 MG/ML
INJECTION INTRAMUSCULAR; INTRAVENOUS
Status: DISPENSED
Start: 2018-01-01

## (undated) RX ORDER — EPHEDRINE SULFATE 50 MG/ML
INJECTION, SOLUTION INTRAMUSCULAR; INTRAVENOUS; SUBCUTANEOUS
Status: DISPENSED
Start: 2018-01-01

## (undated) RX ORDER — DEXAMETHASONE SODIUM PHOSPHATE 4 MG/ML
INJECTION, SOLUTION INTRA-ARTICULAR; INTRALESIONAL; INTRAMUSCULAR; INTRAVENOUS; SOFT TISSUE
Status: DISPENSED
Start: 2018-01-01

## (undated) RX ORDER — REGADENOSON 0.08 MG/ML
INJECTION, SOLUTION INTRAVENOUS
Status: DISPENSED
Start: 2017-05-31

## (undated) RX ORDER — PHENYLEPHRINE HCL IN 0.9% NACL 1 MG/10 ML
SYRINGE (ML) INTRAVENOUS
Status: DISPENSED
Start: 2018-01-01

## (undated) RX ORDER — LIDOCAINE HYDROCHLORIDE 20 MG/ML
INJECTION, SOLUTION EPIDURAL; INFILTRATION; INTRACAUDAL; PERINEURAL
Status: DISPENSED
Start: 2018-01-01

## (undated) RX ORDER — HEPARIN SODIUM 1000 [USP'U]/ML
INJECTION, SOLUTION INTRAVENOUS; SUBCUTANEOUS
Status: DISPENSED
Start: 2017-05-31

## (undated) RX ORDER — HEPARIN SODIUM 1000 [USP'U]/ML
INJECTION, SOLUTION INTRAVENOUS; SUBCUTANEOUS
Status: DISPENSED
Start: 2019-01-01

## (undated) RX ORDER — LIDOCAINE HYDROCHLORIDE 10 MG/ML
INJECTION, SOLUTION EPIDURAL; INFILTRATION; INTRACAUDAL; PERINEURAL
Status: DISPENSED
Start: 2018-01-01

## (undated) RX ORDER — FENTANYL CITRATE 50 UG/ML
INJECTION, SOLUTION INTRAMUSCULAR; INTRAVENOUS
Status: DISPENSED
Start: 2019-01-01

## (undated) RX ORDER — CEFAZOLIN SODIUM 2 G/100ML
INJECTION, SOLUTION INTRAVENOUS
Status: DISPENSED
Start: 2018-01-01

## (undated) RX ORDER — PROPOFOL 10 MG/ML
INJECTION, EMULSION INTRAVENOUS
Status: DISPENSED
Start: 2018-01-01

## (undated) RX ORDER — FENTANYL CITRATE 50 UG/ML
INJECTION, SOLUTION INTRAMUSCULAR; INTRAVENOUS
Status: DISPENSED
Start: 2018-01-01

## (undated) RX ORDER — NITROGLYCERIN 5 MG/ML
VIAL (ML) INTRAVENOUS
Status: DISPENSED
Start: 2019-01-01

## (undated) RX ORDER — CEFAZOLIN SODIUM 1 G/3ML
INJECTION, POWDER, FOR SOLUTION INTRAMUSCULAR; INTRAVENOUS
Status: DISPENSED
Start: 2018-01-01

## (undated) RX ORDER — VERAPAMIL HYDROCHLORIDE 2.5 MG/ML
INJECTION, SOLUTION INTRAVENOUS
Status: DISPENSED
Start: 2017-05-31